# Patient Record
Sex: FEMALE | Race: WHITE | HISPANIC OR LATINO | Employment: UNEMPLOYED | URBAN - METROPOLITAN AREA
[De-identification: names, ages, dates, MRNs, and addresses within clinical notes are randomized per-mention and may not be internally consistent; named-entity substitution may affect disease eponyms.]

---

## 2017-10-04 ENCOUNTER — APPOINTMENT (OUTPATIENT)
Dept: WOMENS IMAGING | Facility: HOSPITAL | Age: 32
End: 2017-10-04

## 2017-10-04 PROCEDURE — 76641 ULTRASOUND BREAST COMPLETE: CPT | Performed by: RADIOLOGY

## 2017-10-04 PROCEDURE — 77066 DX MAMMO INCL CAD BI: CPT | Performed by: RADIOLOGY

## 2017-10-04 PROCEDURE — G0204 DX MAMMO INCL CAD BI: HCPCS | Performed by: RADIOLOGY

## 2018-04-30 LAB
EXTERNAL HEPATITIS B SURFACE ANTIGEN: NEGATIVE
EXTERNAL RUBELLA QUALITATIVE: NORMAL
EXTERNAL SYPHILIS RPR SCREEN: NORMAL
HIV1 P24 AG SERPL QL IA: NORMAL

## 2018-11-14 ENCOUNTER — HOSPITAL ENCOUNTER (OUTPATIENT)
Facility: HOSPITAL | Age: 33
Setting detail: SURGERY ADMIT
End: 2018-11-14
Attending: OBSTETRICS & GYNECOLOGY | Admitting: OBSTETRICS & GYNECOLOGY

## 2018-12-02 ENCOUNTER — HOSPITAL ENCOUNTER (OUTPATIENT)
Facility: HOSPITAL | Age: 33
Setting detail: OBSERVATION
Discharge: HOME | End: 2018-12-02
Attending: OBSTETRICS & GYNECOLOGY | Admitting: OBSTETRICS & GYNECOLOGY

## 2018-12-02 VITALS
BODY MASS INDEX: 26.23 KG/M2 | RESPIRATION RATE: 16 BRPM | HEIGHT: 60 IN | DIASTOLIC BLOOD PRESSURE: 56 MMHG | WEIGHT: 133.6 LBS | SYSTOLIC BLOOD PRESSURE: 104 MMHG | TEMPERATURE: 98.2 F | HEART RATE: 77 BPM

## 2018-12-02 PROBLEM — Z34.90 PREGNANCY: Status: ACTIVE | Noted: 2018-12-02

## 2018-12-02 LAB
EXPIRATION DATE: NORMAL
Lab: NORMAL
PROT UR STRIP-MCNC: NEGATIVE MG/DL

## 2018-12-02 PROCEDURE — 81002 URINALYSIS NONAUTO W/O SCOPE: CPT | Performed by: OBSTETRICS & GYNECOLOGY

## 2018-12-02 PROCEDURE — 59025 FETAL NON-STRESS TEST: CPT

## 2018-12-02 PROCEDURE — G0378 HOSPITAL OBSERVATION PER HR: HCPCS

## 2018-12-02 NOTE — NURSING NOTE
KS to Dr. Gutierrez, informed pt here at 37wks with decr DALTON. For rpt c/s 12/10. Will call MD back after tracing acquired.  Carlos Sagastume RN

## 2018-12-02 NOTE — NON STRESS TEST
Maribell Trinh, a  at 37w0d with an SUGEY of 2018, by Other Basis, was seen at [facility] DEL[facility] for a nonstress test.    Chief Complaint   Patient presents with   • Decreased Fetal Movement     since this am. Ate chocolate bar with brunch and still movt was not as much as normal. Denies ctx, LOF or bleeding.       Patient Active Problem List   Diagnosis   • Pregnancy       Start Time:   Stop Time:     Interpretation A  Nonstress Test Interpretation A: Reactive (18 1517 : Carlos Sagastume, RN)

## 2018-12-02 NOTE — NURSING NOTE
D/c papers given and explained. To call MD with any decr FM, vag bleeding, labor ctx or LOF. To keep next appt. Pt verbalized understanding. D/C home with .  Carlos Sagastume RN

## 2018-12-02 NOTE — NURSING NOTE
Tracing rev'd with MD. MILAD and reviewed. Pt not feeling any ctx. May d/c home, keep scheduled appt.  Carlos Sagastume RN

## 2018-12-10 ENCOUNTER — HOSPITAL ENCOUNTER (INPATIENT)
Facility: HOSPITAL | Age: 33
LOS: 3 days | Discharge: HOME | End: 2018-12-13
Attending: OBSTETRICS & GYNECOLOGY | Admitting: OBSTETRICS & GYNECOLOGY

## 2018-12-10 ENCOUNTER — ANESTHESIA (OUTPATIENT)
Dept: LABOR AND DELIVERY | Facility: HOSPITAL | Age: 33
End: 2018-12-10

## 2018-12-10 ENCOUNTER — ANESTHESIA EVENT (OUTPATIENT)
Dept: LABOR AND DELIVERY | Facility: HOSPITAL | Age: 33
End: 2018-12-10

## 2018-12-10 LAB
ABO GROUP BLD: NORMAL
ATMOSPHERIC PRESS: 757.3 MMHG
BASE EXCESS BLDCOA CALC-SCNC: -1.5 MMOL/L
BASOPHILS # BLD AUTO: 0.01 10*3/MM3 (ref 0–0.2)
BASOPHILS NFR BLD AUTO: 0.1 % (ref 0–1.5)
BDY SITE: ABNORMAL
BLD GP AB SCN SERPL QL: NEGATIVE
DEPRECATED RDW RBC AUTO: 46.2 FL (ref 37–54)
EOSINOPHIL # BLD AUTO: 0.08 10*3/MM3 (ref 0–0.7)
EOSINOPHIL NFR BLD AUTO: 0.9 % (ref 0.3–6.2)
ERYTHROCYTE [DISTWIDTH] IN BLOOD BY AUTOMATED COUNT: 14 % (ref 11.7–13)
GAS FLOW AIRWAY: 2 LPM
HCO3 BLDCOA-SCNC: 25.7 MMOL/L (ref 22–28)
HCT VFR BLD AUTO: 34.2 % (ref 35.6–45.5)
HGB BLD-MCNC: 11.8 G/DL (ref 11.9–15.5)
IMM GRANULOCYTES # BLD: 0.1 10*3/MM3 (ref 0–0.03)
IMM GRANULOCYTES NFR BLD: 1.2 % (ref 0–0.5)
LYMPHOCYTES # BLD AUTO: 2.03 10*3/MM3 (ref 0.9–4.8)
LYMPHOCYTES NFR BLD AUTO: 23.4 % (ref 19.6–45.3)
MCH RBC QN AUTO: 31.3 PG (ref 26.9–32)
MCHC RBC AUTO-ENTMCNC: 34.5 G/DL (ref 32.4–36.3)
MCV RBC AUTO: 90.7 FL (ref 80.5–98.2)
MODALITY: ABNORMAL
MONOCYTES # BLD AUTO: 0.75 10*3/MM3 (ref 0.2–1.2)
MONOCYTES NFR BLD AUTO: 8.7 % (ref 5–12)
NEUTROPHILS # BLD AUTO: 5.8 10*3/MM3 (ref 1.9–8.1)
NEUTROPHILS NFR BLD AUTO: 66.9 % (ref 42.7–76)
NOTE: ABNORMAL
PCO2 BLDCOA: 52.5 MMHG
PH BLDCOA: 7.3 PH UNITS (ref 7.18–7.34)
PLATELET # BLD AUTO: 126 10*3/MM3 (ref 140–500)
PMV BLD AUTO: 13.5 FL (ref 6–12)
PO2 BLDCOA: 17.1 MMHG (ref 12–26)
RBC # BLD AUTO: 3.77 10*6/MM3 (ref 3.9–5.2)
RH BLD: POSITIVE
SAO2 % BLDCOA: 20 % (ref 92–99)
T&S EXPIRATION DATE: NORMAL
WBC NRBC COR # BLD: 8.67 10*3/MM3 (ref 4.5–10.7)

## 2018-12-10 PROCEDURE — 25010000002 METHYLERGONOVINE MALEATE PER 0.2 MG: Performed by: OBSTETRICS & GYNECOLOGY

## 2018-12-10 PROCEDURE — 86850 RBC ANTIBODY SCREEN: CPT | Performed by: OBSTETRICS & GYNECOLOGY

## 2018-12-10 PROCEDURE — 86901 BLOOD TYPING SEROLOGIC RH(D): CPT | Performed by: OBSTETRICS & GYNECOLOGY

## 2018-12-10 PROCEDURE — 25010000002 HYDROMORPHONE PER 4 MG: Performed by: ANESTHESIOLOGY

## 2018-12-10 PROCEDURE — 85025 COMPLETE CBC W/AUTO DIFF WBC: CPT | Performed by: OBSTETRICS & GYNECOLOGY

## 2018-12-10 PROCEDURE — 82803 BLOOD GASES ANY COMBINATION: CPT

## 2018-12-10 PROCEDURE — 25010000002 ONDANSETRON PER 1 MG: Performed by: NURSE ANESTHETIST, CERTIFIED REGISTERED

## 2018-12-10 PROCEDURE — 25010000002 PROMETHAZINE PER 50 MG: Performed by: ANESTHESIOLOGY

## 2018-12-10 PROCEDURE — 25010000002 PHENYLEPHRINE PER 1 ML: Performed by: NURSE ANESTHETIST, CERTIFIED REGISTERED

## 2018-12-10 PROCEDURE — 86900 BLOOD TYPING SEROLOGIC ABO: CPT | Performed by: OBSTETRICS & GYNECOLOGY

## 2018-12-10 PROCEDURE — 25010000003 CEFAZOLIN IN DEXTROSE 2-4 GM/100ML-% SOLUTION: Performed by: OBSTETRICS & GYNECOLOGY

## 2018-12-10 PROCEDURE — 25010000002 MORPHINE PER 10 MG: Performed by: ANESTHESIOLOGY

## 2018-12-10 RX ORDER — MORPHINE SULFATE 1 MG/ML
INJECTION, SOLUTION EPIDURAL; INTRATHECAL; INTRAVENOUS
Status: COMPLETED | OUTPATIENT
Start: 2018-12-10 | End: 2018-12-10

## 2018-12-10 RX ORDER — CEFAZOLIN SODIUM 2 G/100ML
2 INJECTION, SOLUTION INTRAVENOUS ONCE
Status: COMPLETED | OUTPATIENT
Start: 2018-12-10 | End: 2018-12-10

## 2018-12-10 RX ORDER — FAMOTIDINE 10 MG/ML
20 INJECTION, SOLUTION INTRAVENOUS ONCE AS NEEDED
Status: CANCELLED | OUTPATIENT
Start: 2018-12-10

## 2018-12-10 RX ORDER — EPHEDRINE SULFATE 50 MG/ML
INJECTION, SOLUTION INTRAVENOUS AS NEEDED
Status: DISCONTINUED | OUTPATIENT
Start: 2018-12-10 | End: 2018-12-10 | Stop reason: SURG

## 2018-12-10 RX ORDER — IBUPROFEN 600 MG/1
600 TABLET ORAL EVERY 8 HOURS PRN
Status: DISCONTINUED | OUTPATIENT
Start: 2018-12-10 | End: 2018-12-13 | Stop reason: HOSPADM

## 2018-12-10 RX ORDER — NALOXONE HCL 0.4 MG/ML
0.2 VIAL (ML) INJECTION
Status: DISCONTINUED | OUTPATIENT
Start: 2018-12-10 | End: 2018-12-13 | Stop reason: HOSPADM

## 2018-12-10 RX ORDER — METHYLERGONOVINE MALEATE 0.2 MG/ML
200 INJECTION INTRAVENOUS ONCE AS NEEDED
Status: COMPLETED | OUTPATIENT
Start: 2018-12-10 | End: 2018-12-10

## 2018-12-10 RX ORDER — ONDANSETRON 4 MG/1
4 TABLET, FILM COATED ORAL EVERY 6 HOURS PRN
Status: DISCONTINUED | OUTPATIENT
Start: 2018-12-10 | End: 2018-12-10 | Stop reason: HOSPADM

## 2018-12-10 RX ORDER — PHYTONADIONE 2 MG/ML
INJECTION, EMULSION INTRAMUSCULAR; INTRAVENOUS; SUBCUTANEOUS
Status: DISPENSED
Start: 2018-12-10 | End: 2018-12-11

## 2018-12-10 RX ORDER — SIMETHICONE 80 MG
80 TABLET,CHEWABLE ORAL 4 TIMES DAILY PRN
Status: DISCONTINUED | OUTPATIENT
Start: 2018-12-10 | End: 2018-12-13 | Stop reason: HOSPADM

## 2018-12-10 RX ORDER — ERYTHROMYCIN 5 MG/G
OINTMENT OPHTHALMIC
Status: DISPENSED
Start: 2018-12-10 | End: 2018-12-11

## 2018-12-10 RX ORDER — OXYTOCIN-SODIUM CHLORIDE 0.9% IV SOLN 30 UNIT/500ML 30-0.9/5 UT/ML-%
125 SOLUTION INTRAVENOUS CONTINUOUS PRN
Status: COMPLETED | OUTPATIENT
Start: 2018-12-10 | End: 2018-12-10

## 2018-12-10 RX ORDER — SODIUM CHLORIDE, SODIUM LACTATE, POTASSIUM CHLORIDE, CALCIUM CHLORIDE 600; 310; 30; 20 MG/100ML; MG/100ML; MG/100ML; MG/100ML
125 INJECTION, SOLUTION INTRAVENOUS CONTINUOUS
Status: DISCONTINUED | OUTPATIENT
Start: 2018-12-10 | End: 2018-12-10

## 2018-12-10 RX ORDER — BUPIVACAINE HYDROCHLORIDE 7.5 MG/ML
INJECTION, SOLUTION EPIDURAL; RETROBULBAR
Status: COMPLETED | OUTPATIENT
Start: 2018-12-10 | End: 2018-12-10

## 2018-12-10 RX ORDER — BUPIVACAINE HYDROCHLORIDE 7.5 MG/ML
INJECTION, SOLUTION INTRASPINAL
Status: DISPENSED
Start: 2018-12-10 | End: 2018-12-10

## 2018-12-10 RX ORDER — ACETAMINOPHEN 500 MG
1000 TABLET ORAL ONCE
Status: CANCELLED | OUTPATIENT
Start: 2018-12-10 | End: 2018-12-10

## 2018-12-10 RX ORDER — OXYCODONE AND ACETAMINOPHEN 7.5; 325 MG/1; MG/1
1 TABLET ORAL EVERY 4 HOURS PRN
Status: DISCONTINUED | OUTPATIENT
Start: 2018-12-10 | End: 2018-12-13 | Stop reason: HOSPADM

## 2018-12-10 RX ORDER — DOCUSATE SODIUM 100 MG/1
100 CAPSULE, LIQUID FILLED ORAL 2 TIMES DAILY PRN
Status: DISCONTINUED | OUTPATIENT
Start: 2018-12-10 | End: 2018-12-13 | Stop reason: HOSPADM

## 2018-12-10 RX ORDER — MORPHINE SULFATE 1 MG/ML
INJECTION, SOLUTION EPIDURAL; INTRATHECAL; INTRAVENOUS
Status: COMPLETED
Start: 2018-12-10 | End: 2018-12-10

## 2018-12-10 RX ORDER — ONDANSETRON 2 MG/ML
INJECTION INTRAMUSCULAR; INTRAVENOUS AS NEEDED
Status: DISCONTINUED | OUTPATIENT
Start: 2018-12-10 | End: 2018-12-10 | Stop reason: SURG

## 2018-12-10 RX ORDER — ONDANSETRON 4 MG/1
4 TABLET, ORALLY DISINTEGRATING ORAL EVERY 6 HOURS PRN
Status: DISCONTINUED | OUTPATIENT
Start: 2018-12-10 | End: 2018-12-10 | Stop reason: HOSPADM

## 2018-12-10 RX ORDER — OXYTOCIN-SODIUM CHLORIDE 0.9% IV SOLN 30 UNIT/500ML 30-0.9/5 UT/ML-%
250 SOLUTION INTRAVENOUS CONTINUOUS
Status: DISPENSED | OUTPATIENT
Start: 2018-12-10 | End: 2018-12-10

## 2018-12-10 RX ORDER — ONDANSETRON 2 MG/ML
4 INJECTION INTRAMUSCULAR; INTRAVENOUS ONCE AS NEEDED
Status: DISCONTINUED | OUTPATIENT
Start: 2018-12-10 | End: 2018-12-13 | Stop reason: HOSPADM

## 2018-12-10 RX ORDER — ONDANSETRON 4 MG/1
4 TABLET, FILM COATED ORAL EVERY 8 HOURS PRN
Status: DISCONTINUED | OUTPATIENT
Start: 2018-12-10 | End: 2018-12-13 | Stop reason: HOSPADM

## 2018-12-10 RX ORDER — PROMETHAZINE HYDROCHLORIDE 25 MG/ML
5 INJECTION, SOLUTION INTRAMUSCULAR; INTRAVENOUS EVERY 6 HOURS PRN
Status: DISCONTINUED | OUTPATIENT
Start: 2018-12-10 | End: 2018-12-13 | Stop reason: HOSPADM

## 2018-12-10 RX ORDER — SODIUM CHLORIDE 0.9 % (FLUSH) 0.9 %
3 SYRINGE (ML) INJECTION EVERY 12 HOURS SCHEDULED
Status: DISCONTINUED | OUTPATIENT
Start: 2018-12-10 | End: 2018-12-10 | Stop reason: HOSPADM

## 2018-12-10 RX ORDER — OXYTOCIN-SODIUM CHLORIDE 0.9% IV SOLN 30 UNIT/500ML 30-0.9/5 UT/ML-%
999 SOLUTION INTRAVENOUS ONCE
Status: COMPLETED | OUTPATIENT
Start: 2018-12-10 | End: 2018-12-10

## 2018-12-10 RX ORDER — CARBOPROST TROMETHAMINE 250 UG/ML
250 INJECTION, SOLUTION INTRAMUSCULAR AS NEEDED
Status: DISCONTINUED | OUTPATIENT
Start: 2018-12-10 | End: 2018-12-10 | Stop reason: HOSPADM

## 2018-12-10 RX ORDER — ONDANSETRON 2 MG/ML
4 INJECTION INTRAMUSCULAR; INTRAVENOUS ONCE AS NEEDED
Status: CANCELLED | OUTPATIENT
Start: 2018-12-10

## 2018-12-10 RX ORDER — HYDROMORPHONE HYDROCHLORIDE 1 MG/ML
0.5 INJECTION, SOLUTION INTRAMUSCULAR; INTRAVENOUS; SUBCUTANEOUS
Status: DISPENSED | OUTPATIENT
Start: 2018-12-10 | End: 2018-12-11

## 2018-12-10 RX ORDER — ONDANSETRON 2 MG/ML
4 INJECTION INTRAMUSCULAR; INTRAVENOUS EVERY 6 HOURS PRN
Status: DISCONTINUED | OUTPATIENT
Start: 2018-12-10 | End: 2018-12-10 | Stop reason: HOSPADM

## 2018-12-10 RX ORDER — ACETAMINOPHEN 500 MG
1000 TABLET ORAL EVERY 4 HOURS PRN
Status: DISCONTINUED | OUTPATIENT
Start: 2018-12-10 | End: 2018-12-10 | Stop reason: HOSPADM

## 2018-12-10 RX ORDER — OXYCODONE HYDROCHLORIDE AND ACETAMINOPHEN 5; 325 MG/1; MG/1
1 TABLET ORAL EVERY 4 HOURS PRN
Status: DISCONTINUED | OUTPATIENT
Start: 2018-12-10 | End: 2018-12-13 | Stop reason: HOSPADM

## 2018-12-10 RX ORDER — LIDOCAINE HYDROCHLORIDE 10 MG/ML
5 INJECTION, SOLUTION EPIDURAL; INFILTRATION; INTRACAUDAL; PERINEURAL AS NEEDED
Status: DISCONTINUED | OUTPATIENT
Start: 2018-12-10 | End: 2018-12-10 | Stop reason: HOSPADM

## 2018-12-10 RX ORDER — MISOPROSTOL 200 UG/1
800 TABLET ORAL AS NEEDED
Status: DISCONTINUED | OUTPATIENT
Start: 2018-12-10 | End: 2018-12-10 | Stop reason: HOSPADM

## 2018-12-10 RX ORDER — FAMOTIDINE 10 MG/ML
20 INJECTION, SOLUTION INTRAVENOUS EVERY 12 HOURS SCHEDULED
Status: DISCONTINUED | OUTPATIENT
Start: 2018-12-10 | End: 2018-12-10 | Stop reason: HOSPADM

## 2018-12-10 RX ORDER — SODIUM CHLORIDE 0.9 % (FLUSH) 0.9 %
3-10 SYRINGE (ML) INJECTION AS NEEDED
Status: DISCONTINUED | OUTPATIENT
Start: 2018-12-10 | End: 2018-12-10 | Stop reason: HOSPADM

## 2018-12-10 RX ORDER — FAMOTIDINE 20 MG/1
20 TABLET, FILM COATED ORAL EVERY 12 HOURS SCHEDULED
Status: DISCONTINUED | OUTPATIENT
Start: 2018-12-10 | End: 2018-12-10 | Stop reason: HOSPADM

## 2018-12-10 RX ORDER — ACETAMINOPHEN 325 MG/1
650 TABLET ORAL EVERY 4 HOURS PRN
Status: DISCONTINUED | OUTPATIENT
Start: 2018-12-10 | End: 2018-12-13 | Stop reason: HOSPADM

## 2018-12-10 RX ADMIN — HYDROMORPHONE HYDROCHLORIDE 0.5 MG: 1 INJECTION, SOLUTION INTRAMUSCULAR; INTRAVENOUS; SUBCUTANEOUS at 16:04

## 2018-12-10 RX ADMIN — MORPHINE SULFATE 0.3 MCG: 1 INJECTION EPIDURAL; INTRATHECAL; INTRAVENOUS at 14:20

## 2018-12-10 RX ADMIN — SODIUM CHLORIDE, POTASSIUM CHLORIDE, SODIUM LACTATE AND CALCIUM CHLORIDE 125 ML/HR: 600; 310; 30; 20 INJECTION, SOLUTION INTRAVENOUS at 08:55

## 2018-12-10 RX ADMIN — ONDANSETRON 4 MG: 2 INJECTION INTRAMUSCULAR; INTRAVENOUS at 14:52

## 2018-12-10 RX ADMIN — FAMOTIDINE 20 MG: 10 INJECTION, SOLUTION INTRAVENOUS at 09:41

## 2018-12-10 RX ADMIN — EPHEDRINE SULFATE 5 MG: 50 INJECTION INTRAMUSCULAR; INTRAVENOUS; SUBCUTANEOUS at 14:34

## 2018-12-10 RX ADMIN — OXYTOCIN 125 ML/HR: 10 INJECTION, SOLUTION INTRAMUSCULAR; INTRAVENOUS at 16:28

## 2018-12-10 RX ADMIN — PHENYLEPHRINE HYDROCHLORIDE 100 MCG: 10 INJECTION INTRAVENOUS at 14:50

## 2018-12-10 RX ADMIN — HYDROMORPHONE HYDROCHLORIDE 0.5 MG: 1 INJECTION, SOLUTION INTRAMUSCULAR; INTRAVENOUS; SUBCUTANEOUS at 17:29

## 2018-12-10 RX ADMIN — SODIUM CHLORIDE, POTASSIUM CHLORIDE, SODIUM LACTATE AND CALCIUM CHLORIDE: 600; 310; 30; 20 INJECTION, SOLUTION INTRAVENOUS at 14:40

## 2018-12-10 RX ADMIN — CEFAZOLIN SODIUM 2 G: 2 INJECTION, SOLUTION INTRAVENOUS at 14:02

## 2018-12-10 RX ADMIN — OXYTOCIN 999 ML/HR: 10 INJECTION INTRAVENOUS at 14:36

## 2018-12-10 RX ADMIN — ACETAMINOPHEN 1000 MG: 500 TABLET, FILM COATED ORAL at 09:39

## 2018-12-10 RX ADMIN — SODIUM CHLORIDE, POTASSIUM CHLORIDE, SODIUM LACTATE AND CALCIUM CHLORIDE 1000 ML: 600; 310; 30; 20 INJECTION, SOLUTION INTRAVENOUS at 07:40

## 2018-12-10 RX ADMIN — PROMETHAZINE HYDROCHLORIDE 5 MG: 25 INJECTION INTRAMUSCULAR; INTRAVENOUS at 17:04

## 2018-12-10 RX ADMIN — BUPIVACAINE HYDROCHLORIDE 1.5 ML: 7.5 INJECTION, SOLUTION EPIDURAL; RETROBULBAR at 14:20

## 2018-12-10 RX ADMIN — IBUPROFEN 600 MG: 600 TABLET ORAL at 21:32

## 2018-12-10 RX ADMIN — SODIUM CHLORIDE, POTASSIUM CHLORIDE, SODIUM LACTATE AND CALCIUM CHLORIDE: 600; 310; 30; 20 INJECTION, SOLUTION INTRAVENOUS at 14:10

## 2018-12-10 RX ADMIN — METHYLERGONOVINE MALEATE 200 MCG: 0.2 INJECTION, SOLUTION INTRAMUSCULAR; INTRAVENOUS at 14:42

## 2018-12-10 NOTE — ANESTHESIA PREPROCEDURE EVALUATION
Anesthesia Evaluation     Patient summary reviewed and Nursing notes reviewed   NPO Solid Status: > 8 hours  NPO Liquid Status: > 8 hours           Airway   Mallampati: II  Dental      Pulmonary - negative pulmonary ROS and normal exam    breath sounds clear to auscultation  Cardiovascular - negative cardio ROS and normal exam        Neuro/Psych- negative ROS  GI/Hepatic/Renal/Endo - negative ROS     Musculoskeletal (-) negative ROS    Abdominal    Substance History - negative use     OB/GYN          Other - negative ROS                     Anesthesia Plan    ASA 2     spinal     Anesthetic plan, all risks, benefits, and alternatives have been provided, discussed and informed consent has been obtained with: patient and spouse/significant other.

## 2018-12-10 NOTE — ANESTHESIA POSTPROCEDURE EVALUATION
"Patient: Maribell Trinh    Procedure Summary     Date:  12/10/18 Room / Location:   [facility]   [facility]    Anesthesia Start:  1410 Anesthesia Stop:      Procedure:   SECTION REPEAT (N/A Abdomen) Diagnosis:      Surgeon:  Heidy Gutierrez MD Provider:  Rashid Jamison MD    Anesthesia Type:  spinal ASA Status:  2          Anesthesia Type: spinal  Last vitals  BP   104/66 (12/10/18 1031)   Temp   36.7 °C (98.1 °F) (12/10/18 0807)   Pulse   67 (12/10/18 1031)   Resp   16 (12/10/18 0807)     SpO2   98 % (12/10/18 0750)     Post Anesthesia Care and Evaluation    Patient location during evaluation: bedside  Patient participation: complete - patient participated  Level of consciousness: awake  Pain score: 1  Pain management: adequate  Airway patency: patent  Anesthetic complications: No anesthetic complications    Cardiovascular status: acceptable  Respiratory status: acceptable  Hydration status: acceptable    Comments: /66   Pulse 67   Temp 36.7 °C (98.1 °F) (Oral)   Resp 16   Ht 157.5 cm (62\")   Wt 60.5 kg (133 lb 4.8 oz)   SpO2 98%   Breastfeeding? Yes   BMI 24.38 kg/m²         "

## 2018-12-10 NOTE — ADDENDUM NOTE
Addendum  created 12/10/18 4168 by Rashid Jamison MD    Alternative orders not taken and original order placed, Order list changed, Order sets accessed

## 2018-12-10 NOTE — ANESTHESIA PROCEDURE NOTES
Spinal Block      Patient location during procedure: OB  Indication:at surgeon's request  Performed By  Anesthesiologist: Rashid Jamison MD  Preanesthetic Checklist  Completed: patient identified, site marked, surgical consent, pre-op evaluation, timeout performed, IV checked, risks and benefits discussed and monitors and equipment checked  Spinal Block Prep:  Patient Position:sitting  Sterile Tech:cap, gloves, mask and sterile barriers  Prep:Chloraprep  Patient Monitoring:blood pressure monitoring, continuous pulse oximetry and EKG  Spinal Block Procedure  Approach:midline  Guidance:landmark technique and palpation technique  Location:L3-L4  Needle Type:Sprotte  Needle Gauge:24 G  Placement of Spinal needle event:cerebrospinal fluid aspirated  Paresthesia: no  Fluid Appearance:clear  Medications: bupivacaine PF (MARCAINE) 0.75 % injection, 1.5 mL  Morphine PF injection, 0.3 mcg  Med Administered at 12/10/2018 2:20 PM   Post Assessment  Patient Tolerance:patient tolerated the procedure well with no apparent complications  Complications no

## 2018-12-11 PROBLEM — D64.9 ANEMIA: Status: ACTIVE | Noted: 2018-12-11

## 2018-12-11 PROBLEM — Z34.90 PREGNANCY: Status: RESOLVED | Noted: 2018-12-02 | Resolved: 2018-12-11

## 2018-12-11 LAB
BASOPHILS # BLD AUTO: 0.01 10*3/MM3 (ref 0–0.2)
BASOPHILS NFR BLD AUTO: 0.1 % (ref 0–1.5)
DEPRECATED RDW RBC AUTO: 46.6 FL (ref 37–54)
EOSINOPHIL # BLD AUTO: 0.07 10*3/MM3 (ref 0–0.7)
EOSINOPHIL NFR BLD AUTO: 0.7 % (ref 0.3–6.2)
ERYTHROCYTE [DISTWIDTH] IN BLOOD BY AUTOMATED COUNT: 13.9 % (ref 11.7–13)
HCT VFR BLD AUTO: 30.2 % (ref 35.6–45.5)
HGB BLD-MCNC: 10.2 G/DL (ref 11.9–15.5)
IMM GRANULOCYTES # BLD: 0.08 10*3/MM3 (ref 0–0.03)
IMM GRANULOCYTES NFR BLD: 0.8 % (ref 0–0.5)
LYMPHOCYTES # BLD AUTO: 1.63 10*3/MM3 (ref 0.9–4.8)
LYMPHOCYTES NFR BLD AUTO: 17.2 % (ref 19.6–45.3)
MCH RBC QN AUTO: 30.9 PG (ref 26.9–32)
MCHC RBC AUTO-ENTMCNC: 33.8 G/DL (ref 32.4–36.3)
MCV RBC AUTO: 91.5 FL (ref 80.5–98.2)
MONOCYTES # BLD AUTO: 0.68 10*3/MM3 (ref 0.2–1.2)
MONOCYTES NFR BLD AUTO: 7.2 % (ref 5–12)
NEUTROPHILS # BLD AUTO: 7.1 10*3/MM3 (ref 1.9–8.1)
NEUTROPHILS NFR BLD AUTO: 74.8 % (ref 42.7–76)
PLATELET # BLD AUTO: 127 10*3/MM3 (ref 140–500)
PMV BLD AUTO: 13.5 FL (ref 6–12)
RBC # BLD AUTO: 3.3 10*6/MM3 (ref 3.9–5.2)
WBC NRBC COR # BLD: 9.49 10*3/MM3 (ref 4.5–10.7)

## 2018-12-11 PROCEDURE — 85025 COMPLETE CBC W/AUTO DIFF WBC: CPT | Performed by: OBSTETRICS & GYNECOLOGY

## 2018-12-11 RX ADMIN — OXYCODONE AND ACETAMINOPHEN 1 TABLET: 5; 325 TABLET ORAL at 11:40

## 2018-12-11 RX ADMIN — IBUPROFEN 600 MG: 600 TABLET ORAL at 11:40

## 2018-12-11 RX ADMIN — SIMETHICONE CHEW TAB 80 MG 80 MG: 80 TABLET ORAL at 20:33

## 2018-12-11 RX ADMIN — IBUPROFEN 600 MG: 600 TABLET ORAL at 20:33

## 2018-12-11 RX ADMIN — DOCUSATE SODIUM 100 MG: 100 CAPSULE, LIQUID FILLED ORAL at 11:40

## 2018-12-11 RX ADMIN — OXYCODONE AND ACETAMINOPHEN 1 TABLET: 5; 325 TABLET ORAL at 20:33

## 2018-12-11 RX ADMIN — OXYCODONE AND ACETAMINOPHEN 1 TABLET: 5; 325 TABLET ORAL at 02:10

## 2018-12-12 RX ADMIN — OXYCODONE AND ACETAMINOPHEN 1 TABLET: 5; 325 TABLET ORAL at 18:43

## 2018-12-12 RX ADMIN — DOCUSATE SODIUM 100 MG: 100 CAPSULE, LIQUID FILLED ORAL at 10:53

## 2018-12-12 RX ADMIN — DOCUSATE SODIUM 100 MG: 100 CAPSULE, LIQUID FILLED ORAL at 00:47

## 2018-12-12 RX ADMIN — DOCUSATE SODIUM 100 MG: 100 CAPSULE, LIQUID FILLED ORAL at 23:02

## 2018-12-12 RX ADMIN — OXYCODONE AND ACETAMINOPHEN 1 TABLET: 5; 325 TABLET ORAL at 10:53

## 2018-12-12 RX ADMIN — OXYCODONE AND ACETAMINOPHEN 1 TABLET: 5; 325 TABLET ORAL at 23:02

## 2018-12-12 RX ADMIN — IBUPROFEN 600 MG: 600 TABLET ORAL at 18:43

## 2018-12-12 RX ADMIN — SIMETHICONE CHEW TAB 80 MG 80 MG: 80 TABLET ORAL at 13:45

## 2018-12-13 VITALS
HEIGHT: 62 IN | WEIGHT: 133.3 LBS | TEMPERATURE: 98 F | DIASTOLIC BLOOD PRESSURE: 70 MMHG | HEART RATE: 75 BPM | SYSTOLIC BLOOD PRESSURE: 106 MMHG | BODY MASS INDEX: 24.53 KG/M2 | OXYGEN SATURATION: 98 % | RESPIRATION RATE: 16 BRPM

## 2018-12-13 RX ORDER — IBUPROFEN 600 MG/1
600 TABLET ORAL EVERY 8 HOURS PRN
Qty: 30 TABLET | Refills: 3 | Status: SHIPPED | OUTPATIENT
Start: 2018-12-13 | End: 2021-06-25

## 2018-12-13 RX ORDER — OXYCODONE HYDROCHLORIDE AND ACETAMINOPHEN 5; 325 MG/1; MG/1
2 TABLET ORAL EVERY 4 HOURS PRN
Qty: 24 TABLET | Refills: 0 | Status: SHIPPED | OUTPATIENT
Start: 2018-12-13 | End: 2018-12-16

## 2018-12-13 RX ADMIN — OXYCODONE AND ACETAMINOPHEN 1 TABLET: 5; 325 TABLET ORAL at 09:27

## 2018-12-13 RX ADMIN — DOCUSATE SODIUM 100 MG: 100 CAPSULE, LIQUID FILLED ORAL at 09:27

## 2018-12-13 RX ADMIN — IBUPROFEN 600 MG: 600 TABLET ORAL at 09:26

## 2018-12-13 RX ADMIN — SIMETHICONE CHEW TAB 80 MG 80 MG: 80 TABLET ORAL at 09:27

## 2021-06-16 ENCOUNTER — TRANSCRIBE ORDERS (OUTPATIENT)
Dept: SLEEP MEDICINE | Facility: HOSPITAL | Age: 36
End: 2021-06-16

## 2021-06-16 DIAGNOSIS — Z01.818 OTHER SPECIFIED PRE-OPERATIVE EXAMINATION: Primary | ICD-10-CM

## 2021-06-25 ENCOUNTER — APPOINTMENT (OUTPATIENT)
Dept: LAB | Facility: HOSPITAL | Age: 36
End: 2021-06-25

## 2021-06-25 ENCOUNTER — PRE-ADMISSION TESTING (OUTPATIENT)
Dept: PREADMISSION TESTING | Facility: HOSPITAL | Age: 36
End: 2021-06-25

## 2021-06-25 VITALS
WEIGHT: 124.7 LBS | RESPIRATION RATE: 18 BRPM | HEIGHT: 64 IN | HEART RATE: 67 BPM | TEMPERATURE: 98 F | DIASTOLIC BLOOD PRESSURE: 65 MMHG | OXYGEN SATURATION: 100 % | BODY MASS INDEX: 21.29 KG/M2 | SYSTOLIC BLOOD PRESSURE: 99 MMHG

## 2021-06-25 LAB
BASOPHILS # BLD AUTO: 0.03 10*3/MM3 (ref 0–0.2)
BASOPHILS NFR BLD AUTO: 0.6 % (ref 0–1.5)
DEPRECATED RDW RBC AUTO: 41.1 FL (ref 37–54)
EOSINOPHIL # BLD AUTO: 0.2 10*3/MM3 (ref 0–0.4)
EOSINOPHIL NFR BLD AUTO: 3.7 % (ref 0.3–6.2)
ERYTHROCYTE [DISTWIDTH] IN BLOOD BY AUTOMATED COUNT: 12.5 % (ref 12.3–15.4)
HCG SERPL QL: NEGATIVE
HCT VFR BLD AUTO: 39.3 % (ref 34–46.6)
HGB BLD-MCNC: 13.1 G/DL (ref 12–15.9)
IMM GRANULOCYTES # BLD AUTO: 0.03 10*3/MM3 (ref 0–0.05)
IMM GRANULOCYTES NFR BLD AUTO: 0.6 % (ref 0–0.5)
LYMPHOCYTES # BLD AUTO: 1.71 10*3/MM3 (ref 0.7–3.1)
LYMPHOCYTES NFR BLD AUTO: 31.5 % (ref 19.6–45.3)
MCH RBC QN AUTO: 29.8 PG (ref 26.6–33)
MCHC RBC AUTO-ENTMCNC: 33.3 G/DL (ref 31.5–35.7)
MCV RBC AUTO: 89.5 FL (ref 79–97)
MONOCYTES # BLD AUTO: 0.39 10*3/MM3 (ref 0.1–0.9)
MONOCYTES NFR BLD AUTO: 7.2 % (ref 5–12)
NEUTROPHILS NFR BLD AUTO: 3.07 10*3/MM3 (ref 1.7–7)
NEUTROPHILS NFR BLD AUTO: 56.4 % (ref 42.7–76)
NRBC BLD AUTO-RTO: 0 /100 WBC (ref 0–0.2)
PLATELET # BLD AUTO: 212 10*3/MM3 (ref 140–450)
PMV BLD AUTO: 11.2 FL (ref 6–12)
RBC # BLD AUTO: 4.39 10*6/MM3 (ref 3.77–5.28)
SARS-COV-2 ORF1AB RESP QL NAA+PROBE: NOT DETECTED
WBC # BLD AUTO: 5.43 10*3/MM3 (ref 3.4–10.8)

## 2021-06-25 PROCEDURE — 84703 CHORIONIC GONADOTROPIN ASSAY: CPT

## 2021-06-25 PROCEDURE — 36415 COLL VENOUS BLD VENIPUNCTURE: CPT

## 2021-06-25 PROCEDURE — 85025 COMPLETE CBC W/AUTO DIFF WBC: CPT

## 2021-06-25 PROCEDURE — C9803 HOPD COVID-19 SPEC COLLECT: HCPCS | Performed by: NURSE PRACTITIONER

## 2021-06-25 PROCEDURE — U0004 COV-19 TEST NON-CDC HGH THRU: HCPCS | Performed by: NURSE PRACTITIONER

## 2021-06-25 RX ORDER — ACETAMINOPHEN 500 MG
500 TABLET ORAL EVERY 6 HOURS PRN
COMMUNITY
End: 2022-01-17

## 2021-06-25 RX ORDER — CETIRIZINE HYDROCHLORIDE 10 MG/1
10 TABLET ORAL DAILY
COMMUNITY
End: 2022-01-17

## 2021-06-28 ENCOUNTER — ANESTHESIA EVENT (OUTPATIENT)
Dept: PERIOP | Facility: HOSPITAL | Age: 36
End: 2021-06-28

## 2021-06-28 ENCOUNTER — ANESTHESIA (OUTPATIENT)
Dept: PERIOP | Facility: HOSPITAL | Age: 36
End: 2021-06-28

## 2021-06-28 ENCOUNTER — HOSPITAL ENCOUNTER (OUTPATIENT)
Facility: HOSPITAL | Age: 36
Setting detail: HOSPITAL OUTPATIENT SURGERY
Discharge: HOME | End: 2021-06-28
Attending: OBSTETRICS & GYNECOLOGY | Admitting: OBSTETRICS & GYNECOLOGY

## 2021-06-28 VITALS
RESPIRATION RATE: 18 BRPM | OXYGEN SATURATION: 100 % | SYSTOLIC BLOOD PRESSURE: 114 MMHG | HEART RATE: 60 BPM | TEMPERATURE: 97 F | DIASTOLIC BLOOD PRESSURE: 78 MMHG

## 2021-06-28 DIAGNOSIS — R10.2 PELVIC PAIN: ICD-10-CM

## 2021-06-28 PROCEDURE — 25010000002 PROPOFOL 10 MG/ML EMULSION: Performed by: REGISTERED NURSE

## 2021-06-28 PROCEDURE — 25010000002 DEXAMETHASONE PER 1 MG: Performed by: REGISTERED NURSE

## 2021-06-28 PROCEDURE — 87070 CULTURE OTHR SPECIMN AEROBIC: CPT | Performed by: OBSTETRICS & GYNECOLOGY

## 2021-06-28 PROCEDURE — 87186 SC STD MICRODIL/AGAR DIL: CPT | Performed by: OBSTETRICS & GYNECOLOGY

## 2021-06-28 PROCEDURE — 25010000002 FENTANYL CITRATE (PF) 50 MCG/ML SOLUTION: Performed by: REGISTERED NURSE

## 2021-06-28 PROCEDURE — 88305 TISSUE EXAM BY PATHOLOGIST: CPT | Performed by: OBSTETRICS & GYNECOLOGY

## 2021-06-28 PROCEDURE — 25010000002 ONDANSETRON PER 1 MG: Performed by: REGISTERED NURSE

## 2021-06-28 RX ORDER — DOXYCYCLINE HYCLATE 100 MG/1
100 TABLET, DELAYED RELEASE ORAL 2 TIMES DAILY
Qty: 28 TABLET | Refills: 0 | Status: SHIPPED | OUTPATIENT
Start: 2021-06-28 | End: 2021-07-12

## 2021-06-28 RX ORDER — LABETALOL HYDROCHLORIDE 5 MG/ML
5 INJECTION, SOLUTION INTRAVENOUS
Status: DISCONTINUED | OUTPATIENT
Start: 2021-06-28 | End: 2021-06-28 | Stop reason: HOSPADM

## 2021-06-28 RX ORDER — SODIUM CHLORIDE 0.9 % (FLUSH) 0.9 %
3 SYRINGE (ML) INJECTION EVERY 12 HOURS SCHEDULED
Status: DISCONTINUED | OUTPATIENT
Start: 2021-06-28 | End: 2021-06-28 | Stop reason: HOSPADM

## 2021-06-28 RX ORDER — HYDRALAZINE HYDROCHLORIDE 20 MG/ML
5 INJECTION INTRAMUSCULAR; INTRAVENOUS
Status: DISCONTINUED | OUTPATIENT
Start: 2021-06-28 | End: 2021-06-28 | Stop reason: HOSPADM

## 2021-06-28 RX ORDER — FENTANYL CITRATE 50 UG/ML
INJECTION, SOLUTION INTRAMUSCULAR; INTRAVENOUS AS NEEDED
Status: DISCONTINUED | OUTPATIENT
Start: 2021-06-28 | End: 2021-06-28 | Stop reason: SURG

## 2021-06-28 RX ORDER — OXYCODONE AND ACETAMINOPHEN 10; 325 MG/1; MG/1
1 TABLET ORAL EVERY 4 HOURS PRN
Status: DISCONTINUED | OUTPATIENT
Start: 2021-06-28 | End: 2021-06-28 | Stop reason: HOSPADM

## 2021-06-28 RX ORDER — FENTANYL CITRATE 50 UG/ML
50 INJECTION, SOLUTION INTRAMUSCULAR; INTRAVENOUS
Status: DISCONTINUED | OUTPATIENT
Start: 2021-06-28 | End: 2021-06-28 | Stop reason: HOSPADM

## 2021-06-28 RX ORDER — ONDANSETRON 2 MG/ML
4 INJECTION INTRAMUSCULAR; INTRAVENOUS ONCE AS NEEDED
Status: DISCONTINUED | OUTPATIENT
Start: 2021-06-28 | End: 2021-06-28 | Stop reason: HOSPADM

## 2021-06-28 RX ORDER — HYDROCODONE BITARTRATE AND ACETAMINOPHEN 7.5; 325 MG/1; MG/1
1 TABLET ORAL ONCE AS NEEDED
Status: DISCONTINUED | OUTPATIENT
Start: 2021-06-28 | End: 2021-06-28 | Stop reason: HOSPADM

## 2021-06-28 RX ORDER — MIDAZOLAM HYDROCHLORIDE 1 MG/ML
1 INJECTION INTRAMUSCULAR; INTRAVENOUS
Status: DISCONTINUED | OUTPATIENT
Start: 2021-06-28 | End: 2021-06-28 | Stop reason: HOSPADM

## 2021-06-28 RX ORDER — DIPHENHYDRAMINE HCL 25 MG
25 CAPSULE ORAL
Status: DISCONTINUED | OUTPATIENT
Start: 2021-06-28 | End: 2021-06-28 | Stop reason: HOSPADM

## 2021-06-28 RX ORDER — LIDOCAINE HYDROCHLORIDE 20 MG/ML
INJECTION, SOLUTION INFILTRATION; PERINEURAL AS NEEDED
Status: DISCONTINUED | OUTPATIENT
Start: 2021-06-28 | End: 2021-06-28 | Stop reason: SURG

## 2021-06-28 RX ORDER — SODIUM CHLORIDE 0.9 % (FLUSH) 0.9 %
3-10 SYRINGE (ML) INJECTION AS NEEDED
Status: DISCONTINUED | OUTPATIENT
Start: 2021-06-28 | End: 2021-06-28 | Stop reason: HOSPADM

## 2021-06-28 RX ORDER — PROMETHAZINE HYDROCHLORIDE 25 MG/1
25 SUPPOSITORY RECTAL ONCE AS NEEDED
Status: DISCONTINUED | OUTPATIENT
Start: 2021-06-28 | End: 2021-06-28 | Stop reason: HOSPADM

## 2021-06-28 RX ORDER — SODIUM CHLORIDE, SODIUM LACTATE, POTASSIUM CHLORIDE, CALCIUM CHLORIDE 600; 310; 30; 20 MG/100ML; MG/100ML; MG/100ML; MG/100ML
9 INJECTION, SOLUTION INTRAVENOUS CONTINUOUS
Status: DISCONTINUED | OUTPATIENT
Start: 2021-06-28 | End: 2021-06-28 | Stop reason: HOSPADM

## 2021-06-28 RX ORDER — DIPHENHYDRAMINE HYDROCHLORIDE 50 MG/ML
12.5 INJECTION INTRAMUSCULAR; INTRAVENOUS
Status: DISCONTINUED | OUTPATIENT
Start: 2021-06-28 | End: 2021-06-28 | Stop reason: HOSPADM

## 2021-06-28 RX ORDER — FAMOTIDINE 10 MG/ML
20 INJECTION, SOLUTION INTRAVENOUS ONCE
Status: COMPLETED | OUTPATIENT
Start: 2021-06-28 | End: 2021-06-28

## 2021-06-28 RX ORDER — PROPOFOL 10 MG/ML
VIAL (ML) INTRAVENOUS AS NEEDED
Status: DISCONTINUED | OUTPATIENT
Start: 2021-06-28 | End: 2021-06-28 | Stop reason: SURG

## 2021-06-28 RX ORDER — SCOLOPAMINE TRANSDERMAL SYSTEM 1 MG/1
1 PATCH, EXTENDED RELEASE TRANSDERMAL ONCE
Status: DISCONTINUED | OUTPATIENT
Start: 2021-06-28 | End: 2021-06-28 | Stop reason: HOSPADM

## 2021-06-28 RX ORDER — NALOXONE HCL 0.4 MG/ML
0.2 VIAL (ML) INJECTION AS NEEDED
Status: DISCONTINUED | OUTPATIENT
Start: 2021-06-28 | End: 2021-06-28 | Stop reason: HOSPADM

## 2021-06-28 RX ORDER — MAGNESIUM HYDROXIDE 1200 MG/15ML
LIQUID ORAL AS NEEDED
Status: DISCONTINUED | OUTPATIENT
Start: 2021-06-28 | End: 2021-06-28 | Stop reason: HOSPADM

## 2021-06-28 RX ORDER — FLUMAZENIL 0.1 MG/ML
0.2 INJECTION INTRAVENOUS AS NEEDED
Status: DISCONTINUED | OUTPATIENT
Start: 2021-06-28 | End: 2021-06-28 | Stop reason: HOSPADM

## 2021-06-28 RX ORDER — ONDANSETRON 2 MG/ML
INJECTION INTRAMUSCULAR; INTRAVENOUS AS NEEDED
Status: DISCONTINUED | OUTPATIENT
Start: 2021-06-28 | End: 2021-06-28 | Stop reason: SURG

## 2021-06-28 RX ORDER — LIDOCAINE HYDROCHLORIDE 10 MG/ML
0.5 INJECTION, SOLUTION EPIDURAL; INFILTRATION; INTRACAUDAL; PERINEURAL ONCE AS NEEDED
Status: COMPLETED | OUTPATIENT
Start: 2021-06-28 | End: 2021-06-28

## 2021-06-28 RX ORDER — HYDROMORPHONE HYDROCHLORIDE 1 MG/ML
0.5 INJECTION, SOLUTION INTRAMUSCULAR; INTRAVENOUS; SUBCUTANEOUS
Status: DISCONTINUED | OUTPATIENT
Start: 2021-06-28 | End: 2021-06-28 | Stop reason: HOSPADM

## 2021-06-28 RX ORDER — PROMETHAZINE HYDROCHLORIDE 25 MG/1
25 TABLET ORAL ONCE AS NEEDED
Status: DISCONTINUED | OUTPATIENT
Start: 2021-06-28 | End: 2021-06-28 | Stop reason: HOSPADM

## 2021-06-28 RX ORDER — EPHEDRINE SULFATE 50 MG/ML
5 INJECTION, SOLUTION INTRAVENOUS ONCE AS NEEDED
Status: DISCONTINUED | OUTPATIENT
Start: 2021-06-28 | End: 2021-06-28 | Stop reason: HOSPADM

## 2021-06-28 RX ORDER — DEXAMETHASONE SODIUM PHOSPHATE 10 MG/ML
INJECTION INTRAMUSCULAR; INTRAVENOUS AS NEEDED
Status: DISCONTINUED | OUTPATIENT
Start: 2021-06-28 | End: 2021-06-28 | Stop reason: SURG

## 2021-06-28 RX ADMIN — SODIUM CHLORIDE, POTASSIUM CHLORIDE, SODIUM LACTATE AND CALCIUM CHLORIDE 9 ML/HR: 600; 310; 30; 20 INJECTION, SOLUTION INTRAVENOUS at 10:27

## 2021-06-28 RX ADMIN — FENTANYL CITRATE 25 MCG: 50 INJECTION INTRAMUSCULAR; INTRAVENOUS at 11:16

## 2021-06-28 RX ADMIN — PROPOFOL 200 MG: 10 INJECTION, EMULSION INTRAVENOUS at 11:03

## 2021-06-28 RX ADMIN — LIDOCAINE HYDROCHLORIDE 0.5 ML: 10 INJECTION, SOLUTION EPIDURAL; INFILTRATION; INTRACAUDAL; PERINEURAL at 10:27

## 2021-06-28 RX ADMIN — ONDANSETRON 4 MG: 2 INJECTION INTRAMUSCULAR; INTRAVENOUS at 11:11

## 2021-06-28 RX ADMIN — SCOLOPAMINE TRANSDERMAL SYSTEM 1 PATCH: 1 PATCH, EXTENDED RELEASE TRANSDERMAL at 10:44

## 2021-06-28 RX ADMIN — DEXAMETHASONE SODIUM PHOSPHATE 8 MG: 10 INJECTION INTRAMUSCULAR; INTRAVENOUS at 11:11

## 2021-06-28 RX ADMIN — FAMOTIDINE 20 MG: 10 INJECTION INTRAVENOUS at 10:43

## 2021-06-28 RX ADMIN — LIDOCAINE HYDROCHLORIDE 100 MG: 20 INJECTION, SOLUTION INFILTRATION; PERINEURAL at 11:03

## 2021-06-28 RX ADMIN — FENTANYL CITRATE 50 MCG: 50 INJECTION INTRAMUSCULAR; INTRAVENOUS at 11:08

## 2021-06-28 RX ADMIN — FENTANYL CITRATE 25 MCG: 50 INJECTION INTRAMUSCULAR; INTRAVENOUS at 11:25

## 2021-06-28 NOTE — ANESTHESIA PREPROCEDURE EVALUATION
Anesthesia Evaluation     Patient summary reviewed and Nursing notes reviewed   history of anesthetic complications: PONV  NPO Solid Status: > 8 hours  NPO Liquid Status: > 2 hours           Airway   Mallampati: II  TM distance: >3 FB  Neck ROM: full  No difficulty expected  Dental - normal exam     Pulmonary - negative pulmonary ROS and normal exam     ROS comment: Seasonal allergies   Cardiovascular - normal exam  Exercise tolerance: good (4-7 METS)        Neuro/Psych- negative ROS  GI/Hepatic/Renal/Endo - negative ROS     Musculoskeletal     Abdominal    Substance History      OB/GYN          Other   blood dyscrasia anemia,                   Anesthesia Plan    ASA 2     general     intravenous induction     Anesthetic plan, all risks, benefits, and alternatives have been provided, discussed and informed consent has been obtained with: patient.

## 2021-06-28 NOTE — ANESTHESIA PROCEDURE NOTES
Airway  Urgency: elective    Date/Time: 6/28/2021 11:04 AM  Airway not difficult    General Information and Staff    Patient location during procedure: OR  CRNA: Michelle Eller CRNA    Indications and Patient Condition  Indications for airway management: airway protection    Preoxygenated: yes  MILS maintained throughout  Mask difficulty assessment: 0 - not attempted    Final Airway Details  Final airway type: supraglottic airway      Successful airway: LMA  Size 4    Number of attempts at approach: 1  Assessment: lips, teeth, and gum same as pre-op and atraumatic intubation    Additional Comments  Atraumatic insertion

## 2021-06-28 NOTE — ANESTHESIA POSTPROCEDURE EVALUATION
Patient: Maribell Lynn    Procedure Summary     Date: 06/28/21 Room / Location:  [facility]  /  [facility]    Anesthesia Start: 1056 Anesthesia Stop: 1148    Procedure: HYSTEROSCOPY REMOVAL INTRAUTERINE DEVICE WILL NEED ULTRASOUND IN ROOM (N/A Uterus) Diagnosis:     Surgeons: Heidy Gutierrez MD Provider: Carissa Shipley MD    Anesthesia Type: general ASA Status: 2          Anesthesia Type: general    Vitals  Vitals Value Taken Time   /75 06/28/21 1235   Temp 36.1 °C (97 °F) 06/28/21 1145   Pulse 63 06/28/21 1240   Resp 16 06/28/21 1230   SpO2 100 % 06/28/21 1240   Vitals shown include unvalidated device data.        Post Anesthesia Care and Evaluation    Patient location during evaluation: bedside  Patient participation: complete - patient participated  Level of consciousness: awake  Pain management: adequate  Airway patency: patent  Anesthetic complications: No anesthetic complications    Cardiovascular status: acceptable  Respiratory status: acceptable  Hydration status: acceptable    Comments: */78 (BP Location: Right arm, Patient Position: Sitting)   Pulse 60   Temp 36.1 °C (97 °F) (Temporal)   Resp 18   SpO2 100%

## 2021-06-29 LAB
LAB AP CASE REPORT: NORMAL
LAB AP DIAGNOSIS COMMENT: NORMAL
PATH REPORT.FINAL DX SPEC: NORMAL
PATH REPORT.GROSS SPEC: NORMAL

## 2021-07-04 LAB
BACTERIA SPEC AEROBE CULT: ABNORMAL
BACTERIA SPEC AEROBE CULT: ABNORMAL

## 2022-02-09 ENCOUNTER — APPOINTMENT (OUTPATIENT)
Dept: WOMENS IMAGING | Facility: HOSPITAL | Age: 37
End: 2022-02-09

## 2022-02-09 PROCEDURE — G0279 TOMOSYNTHESIS, MAMMO: HCPCS | Performed by: RADIOLOGY

## 2022-02-09 PROCEDURE — 77062 BREAST TOMOSYNTHESIS BI: CPT | Performed by: RADIOLOGY

## 2022-02-09 PROCEDURE — 77066 DX MAMMO INCL CAD BI: CPT | Performed by: RADIOLOGY

## 2022-02-09 PROCEDURE — 76642 ULTRASOUND BREAST LIMITED: CPT | Performed by: RADIOLOGY

## 2022-02-09 PROCEDURE — 76641 ULTRASOUND BREAST COMPLETE: CPT | Performed by: RADIOLOGY

## 2022-02-17 ENCOUNTER — APPOINTMENT (OUTPATIENT)
Dept: WOMENS IMAGING | Facility: HOSPITAL | Age: 37
End: 2022-02-17

## 2022-02-17 PROCEDURE — 19083 BX BREAST 1ST LESION US IMAG: CPT | Performed by: RADIOLOGY

## 2022-02-17 PROCEDURE — A4648 IMPLANTABLE TISSUE MARKER: HCPCS | Performed by: RADIOLOGY

## 2022-02-17 PROCEDURE — 19084 BX BREAST ADD LESION US IMAG: CPT | Performed by: RADIOLOGY

## 2022-02-23 ENCOUNTER — TELEPHONE (OUTPATIENT)
Dept: SURGERY | Facility: CLINIC | Age: 37
End: 2022-02-23

## 2022-02-23 NOTE — TELEPHONE ENCOUNTER
New patient appointment with Dr. Escudero is scheduled on 3/11/2022 @ 9:30am.    Called and spoke to patient, patient expressed v/u of appointment time.    Sent patient a reminder letter in the mail.

## 2022-03-02 ENCOUNTER — TELEPHONE (OUTPATIENT)
Dept: SURGERY | Facility: CLINIC | Age: 37
End: 2022-03-02

## 2022-03-02 DIAGNOSIS — Z17.0 MALIGNANT NEOPLASM OF UPPER-OUTER QUADRANT OF LEFT BREAST IN FEMALE, ESTROGEN RECEPTOR POSITIVE: Primary | ICD-10-CM

## 2022-03-02 DIAGNOSIS — C50.412 MALIGNANT NEOPLASM OF UPPER-OUTER QUADRANT OF LEFT BREAST IN FEMALE, ESTROGEN RECEPTOR POSITIVE: Primary | ICD-10-CM

## 2022-03-02 NOTE — TELEPHONE ENCOUNTER
Breast MRI scheduled on 03/04 @ 4:45pm. Pt arrival @ 4:15pm.     Called s/w Pt. Patient expressed v/u of appt

## 2022-03-04 ENCOUNTER — CLINICAL SUPPORT (OUTPATIENT)
Dept: SURGERY | Facility: CLINIC | Age: 37
End: 2022-03-04

## 2022-03-04 ENCOUNTER — HOSPITAL ENCOUNTER (OUTPATIENT)
Dept: MRI IMAGING | Facility: HOSPITAL | Age: 37
Discharge: HOME | End: 2022-03-04
Admitting: SURGERY

## 2022-03-04 DIAGNOSIS — C50.412 MALIGNANT NEOPLASM OF UPPER-OUTER QUADRANT OF LEFT BREAST IN FEMALE, ESTROGEN RECEPTOR POSITIVE: ICD-10-CM

## 2022-03-04 DIAGNOSIS — Z17.0 MALIGNANT NEOPLASM OF UPPER-OUTER QUADRANT OF LEFT BREAST IN FEMALE, ESTROGEN RECEPTOR POSITIVE: ICD-10-CM

## 2022-03-04 PROCEDURE — A9577 INJ MULTIHANCE: HCPCS | Performed by: SURGERY

## 2022-03-04 PROCEDURE — 77049 MRI BREAST C-+ W/CAD BI: CPT

## 2022-03-04 PROCEDURE — 0 GADOBENATE DIMEGLUMINE 529 MG/ML SOLUTION: Performed by: SURGERY

## 2022-03-04 RX ADMIN — GADOBENATE DIMEGLUMINE 11 ML: 529 INJECTION, SOLUTION INTRAVENOUS at 17:07

## 2022-03-08 ENCOUNTER — PREP FOR SURGERY (OUTPATIENT)
Dept: OTHER | Facility: HOSPITAL | Age: 37
End: 2022-03-08

## 2022-03-08 ENCOUNTER — OFFICE VISIT (OUTPATIENT)
Dept: SURGERY | Facility: CLINIC | Age: 37
End: 2022-03-08

## 2022-03-08 VITALS
HEART RATE: 72 BPM | DIASTOLIC BLOOD PRESSURE: 66 MMHG | BODY MASS INDEX: 24.54 KG/M2 | SYSTOLIC BLOOD PRESSURE: 102 MMHG | OXYGEN SATURATION: 98 % | WEIGHT: 125 LBS | HEIGHT: 60 IN

## 2022-03-08 DIAGNOSIS — Z17.0 MALIGNANT NEOPLASM OF UPPER-OUTER QUADRANT OF LEFT BREAST IN FEMALE, ESTROGEN RECEPTOR POSITIVE: Primary | ICD-10-CM

## 2022-03-08 DIAGNOSIS — C50.412 MALIGNANT NEOPLASM OF UPPER-OUTER QUADRANT OF LEFT BREAST IN FEMALE, ESTROGEN RECEPTOR POSITIVE: Primary | ICD-10-CM

## 2022-03-08 DIAGNOSIS — N64.89 COMPLEX SCLEROSING LESION OF LEFT BREAST: ICD-10-CM

## 2022-03-08 DIAGNOSIS — R92.8 ABNORMAL MRI, BREAST: ICD-10-CM

## 2022-03-08 PROCEDURE — 99204 OFFICE O/P NEW MOD 45 MIN: CPT | Performed by: SURGERY

## 2022-03-08 RX ORDER — SODIUM CHLORIDE, SODIUM LACTATE, POTASSIUM CHLORIDE, CALCIUM CHLORIDE 600; 310; 30; 20 MG/100ML; MG/100ML; MG/100ML; MG/100ML
100 INJECTION, SOLUTION INTRAVENOUS CONTINUOUS
Status: CANCELLED | OUTPATIENT
Start: 2022-04-13

## 2022-03-08 RX ORDER — CETIRIZINE HYDROCHLORIDE 10 MG/1
10 TABLET ORAL AS NEEDED
COMMUNITY
End: 2022-06-09 | Stop reason: SDDI

## 2022-03-08 RX ORDER — CEFAZOLIN SODIUM 2 G/100ML
2 INJECTION, SOLUTION INTRAVENOUS ONCE
Status: CANCELLED | OUTPATIENT
Start: 2022-04-13 | End: 2022-03-08

## 2022-03-08 RX ORDER — DIAZEPAM 5 MG/1
10 TABLET ORAL ONCE
Status: CANCELLED | OUTPATIENT
Start: 2022-04-13 | End: 2022-03-08

## 2022-03-08 NOTE — PROGRESS NOTES
Chief Complaint: Kylah Lynn is a 37 y.o. female who was seen in consultation at the request of Heidy Gutierrez MD  for newly diagnosed breast cancer    History of Present Illness:  Patient presents with newly diagnosed breast cancer.  She noted a left breast mass in December.  No associated pain or skin changes or nipple discharge.  She noted no other new masses, skin changes, nipple discharge, nipple changes prior to her most recent imaging.  Her most recent imaging includes the following:  10/04/2017   BILATERAL DIAGNOSTIC MAMMO & BILATERAL BREAST US   [facility]   KYLAH LYNN   No family history. Baseline study.  MMG:  Heterogeneously dense.  Finding 1: Area of asymmetric breast tissue seen in the left breast in the 1:30 o’clock region and in the upper outer region.  US:  Finding 1: Elongated isoechoic area 3 cm and is most consistent with a prominent lobule. This correlates to the palpable area.  Finding 2: Two oval complicated cyst versus solid masses right breast. This is an incidental finding.  IMPRESSION:  Finding 1: Ultrasound of the left breast in 6 months.  Finding 2: Ultrasound of the right breast in 6 months is recommended.  BI-RADS Category 3: Probably Benign.    02/09/2022   BILATERAL DIAGNOSTIC MAMMO WITH MARY & RIGHT LIMITED/LEFT COMPLETE BREAST US   [facility]   KYLAH LYNN   Palpable lump or thickening in the left breast.  MMG:  Heterogeneously dense.  Finding 1: There is a new area of architectural distortion seen in the left upper outer breast. This correlates with the pinpointed palpable area at 2:30-3:00. The extent is difficult to measure mammographically.  Finding 2: New focal asymmetry seen in the left upper outer breast, just superior and lateral to the above finding.  Finding 3: There is a new focal asymmetry seen in the left breast upper outer quadrant.  US:  Finding 1: Irregular hypoechoic area versus non-parallel hypoechoic mass with  posterior acoustic shadowing and indistinct and spiculated margins measuring 17 x 20 x 16 mm, in the 2:30 o’clock region of the left breast, 6 centimeters from the nipple.  Finding 2: Irregular non-parallel hypoechoic mass 11 x 10 x 11 mm seen in the 1:30 o’clock region of the left breast located 6 centimeters from the nipple.  Finding 3: Oval parallel hypoechoic mass 4 x 3 x 4 mm seen in the left breast at 1 o’clock located 4 centimeters from the nipple.  Finding 4: Stable oval parallel mass with circumscribed margins measuring 12 x 6 x 8 mm seen in the right breast at 3 o’clock located 5 centimeters from the nipple.  Finding 5: Oval mass measuring 8 x 5 x 7 mm seen in the sub-areolar region of the right breast. Finding has decreased in size.  IMPRESSION:  Finding 1: New palpable architectural distortion and mass in the 2:30 o’clock region of the left breast located 6 centimeters from the nipple is highly suggestive of malignancy.  Finding 2: Mass in the 1:30 o’clock region of the left breast located 6 centimeters from the nipple is suspicious.  Finding 3: Mass in the left breast at 1 o’clock located 4 centimeters from the nipple is suspicious.  Finding 4: Stable mass in the right breast since 2017 is considered benign.  Finding 5: Considered benign.  BI-RADS Category 5.      She had a biopsy on the following day that showed:   02/17/2022 LEFT US GUIDED BIOPSY     [facility]     KYLAH SHEIKHKAREEM LUIS  Let breast 1:00. 12 gauge Suros. 7 cores. [place] shaped s-pravin tissue marker was placed. Clip in the expected position. Pathology returned as benign breast tissue with complex sclerosing lesion. Pathology high risk and concordant.  Left breast 1:30. 12 gauge. 9 cores were obtained. A tophat shaped s-pravin tissue marker was placed. Clip in the expected position. Invasive lobular carcinoma. Malignant and concordant.  Left breast at the 2:30 position. 12 gauge core needle Suros. 14 cores were obtained. A [employer] Pro Q  shaped tissue marker was placed at the biopsy site. Clip in the expected position. Invasive lobular carcinoma.  PATHOLOGY:  1. Breast, left, 1 o’clock, biopsy:  Benign breast tissue with complex sclerosing lesion.  2. Breast, left, 1:30 o’clock, biopsy:  Invasive lobular carcinoma, histologic grade 2 (tubules = 3, nuclei = 2, mitosis = 1), largest focus measures 9 mm.  ER: 97.01%  NE: 94.9%  HER2: 1+  Ki-67: 32.71%  3. Breast, left, 2:30 o’clock, biopsy:  Invasive lobular carcinoma, histologic grade 2 (tubules = 3, nuclei = 2, mitosis = 1), largest focus measures 10 mm.  ER: 89.94%  NE: 95.74%  HER2: Score 0  Ki-67: 39.2%    We then arranged for a breast MRI:    03/04/22 [facility] MRI BREAST BILATERAL   KYLAH HENSLEY   1.  Suspicious mass and nonmass enhancement measuring up to 7.1 cm in the outer middle and posterior left breast encompasses 2 sites of biopsy-proven malignancy marked with Q and top hat clips. A biopsy clip (mini cork) with adjacent faint ill-defined nonmass enhancement inferior to the biopsy clip in the outer left breast represents a site of biopsy-proven complex sclerosing lesion. Surgical management isrecommended.  2.  No MRI evidence of malignancy in the right breast.   BI-RADS Category 6: Known biopsy-proven malignancy        She has not had a breast biopsy in the past.  She has her uterus and ovaries, is premenopausal, and takes nor hormones. She had a mirena removed in June 2021 and uses no birth control  Her family history includes the following: He has 1 daughter, 1 son, 1 sister, 2 maternal aunts, one paternal aunt, no family history of breast or ovarian cancer.  She does have a paternal aunt who had melanoma.    She is here for evaluation.    Review of Systems:  Review of Systems   All other systems reviewed and are negative.       Past Medical and Surgical History:  Breast Biopsy History:  Patient had not had a breast biopsy prior to her cancer diagnosis.  Breast Cancer  HIstory:  Patient does not have a past medical history of breast cancer.  Breast Operations, and year:  NONE   Obstetric/Gynecologic History:  Age menstrual periods began: 11  Patient is premenopausal, first day of last period: 21  Number of pregnancies:2  Number of live births: 2  Number of abortions or miscarriages: 0  Age of delivery of first child: 28  Patient breast fed, for the following lenth of time: 10 MONTHS   Length of time taking birth control pills: N/A   Patient has never taken hormone replacement    PATIENT HAS BOTH OVARIES AND UTERUS  Past Surgical History:   Procedure Laterality Date   • APPENDECTOMY     •  SECTION     •  SECTION N/A 12/10/2018    Procedure:  SECTION REPEAT;  Surgeon: Heidy Gutierrez MD;  Location: [facility] LABOR DELIVERY;  Service: Obstetrics/Gynecology   • D & C HYSTEROSCOPY N/A 2021    Procedure: HYSTEROSCOPY REMOVAL INTRAUTERINE DEVICE WILL NEED ULTRASOUND IN ROOM;  Surgeon: Heidy Gutierrez MD;  Location: [facility] OR [facility];  Service: Obstetrics/Gynecology;  Laterality: N/A;   • KNEE ARTHROSCOPY Right      Past Medical History:   Diagnosis Date   • Anesthesia complication     hypotension with last c section   • History of COVID-19     2021   • Pelvic pain    • PONV (postoperative nausea and vomiting)    • Seasonal allergies        Prior Hospitalizations, other than for surgery or childbirth, and year:  NONE     Social History     Socioeconomic History   • Marital status:    Tobacco Use   • Smoking status: Never Smoker   • Smokeless tobacco: Never Used   Vaping Use   • Vaping Use: Never used   Substance and Sexual Activity   • Alcohol use: Yes     Comment: occasionally   • Drug use: No   • Sexual activity: Defer     Patient is .  Patient is a homemaker.  Patient drinks 1 servings of caffeine per day.    Family History:  Family History   Problem Relation Age of Onset   • Melanoma Paternal Aunt    • Malig Hyperthermia Neg Hx   "      Vital Signs:  /66   Pulse 72   Ht 152.8 cm (60.15\")   Wt 56.7 kg (125 lb)   LMP 01/23/2022 (Approximate)   SpO2 98%   Breastfeeding No   BMI 24.29 kg/m²      Medications:    Current Outpatient Medications:   •  atorvastatin (LIPITOR) 10 MG tablet, Take 10 mg by mouth Daily., Disp: , Rfl:   •  cetirizine (zyrTEC) 10 MG tablet, Take 10 mg by mouth As Needed for Allergies., Disp: , Rfl:      Allergies:  Allergies   Allergen Reactions   • Asa [Aspirin] Anaphylaxis       Physical Examination:  /66   Pulse 72   Ht 152.8 cm (60.15\")   Wt 56.7 kg (125 lb)   LMP 01/23/2022 (Approximate)   SpO2 98%   Breastfeeding No   BMI 24.29 kg/m²   General Appearance:  Patient is in no distress.  She is well kept and has an average build.   Psychiatric:  Patient with appropriate mood and affect. Alert and oriented to self, time, and place.    Breast, RIGHT:  large sized, 34C, symmetric with the contralateral side.  Breast skin is without erythema, edema, rashes.  There are no visible abnormalities upon inspection during the arm-raising maneuver or with hands on hips in the sitting position. There is no nipple retraction, discharge or nipple/areolar skin changes.There are no masses palpable in the sitting or supine positions.    Breast, LEFT:  large sized, 34C, symmetric with the contralateral side.  Breast skin is without erythema, edema, rashes.  There are no visible abnormalities upon inspection during the arm-raising maneuver or with hands on hips in the sitting position. There is no nipple retraction, discharge or nipple/areolar skin changes.there are palpable masses in the left upper outer and lateral left breast at the sites of known malignancy.  Located at the 1:30, 6 cm from the nipple location and a 230, 6 cm from the nipple location.  No overlying skin changes.  Really mobile.  There are no other masses palpable in the sitting or supine positions.    Lymphatic:  There is no axillary, cervical, " infraclavicular, or supraclavicular adenopathy bilaterally.  Eyes:  Pupils are round and reactive to light.  Cardiovascular:  Heart rate and rhythm are regular.  Respiratory:  Lungs are clear bilaterally with no crackles or wheezes in any lung field.  Gastrointestinal:  Abdomen is soft, nondistended, and nontender    Musculoskeletal:  Good strength in all 4 extremities.   There is good range of motion in both shoulders.    Skin:  No new skin lesions or rashes on the skin excluding the breast (see breast exam above).        Imaging:  10/04/2017   BILATERAL DIAGNOSTIC MAMMO & BILATERAL BREAST US   [facility]   KYLAH JOYCE   No family history. Baseline study.  MMG:  Heterogeneously dense.  Finding 1: Area of asymmetric breast tissue seen in the left breast in the 1:30 o’clock region and in the upper outer region.  US:  Finding 1: Elongated isoechoic area 3 cm and is most consistent with a prominent lobule. This correlates to the palpable area.  Finding 2: Two oval complicated cyst versus solid masses right breast. This is an incidental finding.  IMPRESSION:  Finding 1: Ultrasound of the left breast in 6 months.  Finding 2: Ultrasound of the right breast in 6 months is recommended.  BI-RADS Category 3: Probably Benign.    02/09/2022   BILATERAL DIAGNOSTIC MAMMO WITH MARY & RIGHT LIMITED/[facility] BRE[facility]   Palpable lump or thickening in the left breast.  MMG:  Heterogeneously dense.  Finding 1: There is a new area of architectural distortion seen in the left upper outer breast. This correlates with the pinpointed palpable area at 2:30-3:00. The extent is difficult to measure mammographically.  Finding 2: New focal asymmetry seen in the left upper outer breast, just superior and lateral to the above finding.  Finding 3: There is a new focal asymmetry seen in the left breast upper outer quadrant.  US:  Finding 1: Irregular hypoechoic area versus non-parallel  hypoechoic mass with posterior acoustic shadowing and indistinct and spiculated margins measuring 17 x 20 x 16 mm, in the 2:30 o’clock region of the left breast, 6 centimeters from the nipple.  Finding 2: Irregular non-parallel hypoechoic mass 11 x 10 x 11 mm seen in the 1:30 o’clock region of the left breast located 6 centimeters from the nipple.  Finding 3: Oval parallel hypoechoic mass 4 x 3 x 4 mm seen in the left breast at 1 o’clock located 4 centimeters from the nipple.  Finding 4: Stable oval parallel mass with circumscribed margins measuring 12 x 6 x 8 mm seen in the right breast at 3 o’clock located 5 centimeters from the nipple.  Finding 5: Oval mass measuring 8 x 5 x 7 mm seen in the sub-areolar region of the right breast. Finding has decreased in size.  IMPRESSION:  Finding 1: New palpable architectural distortion and mass in the 2:30 o’clock region of the left breast located 6 centimeters from the nipple is highly suggestive of malignancy.  Finding 2: Mass in the 1:30 o’clock region of the left breast located 6 centimeters from the nipple is suspicious.  Finding 3: Mass in the left breast at 1 o’clock located 4 centimeters from the nipple is suspicious.  Finding 4: Stable mass in the right breast since 2017 is considered benign.  Finding 5: Considered benign.  BI-RADS Category 5.    03/04/22 [facility] MRI BREAST BILATERAL   KYLAH HENSLEY   1.  Suspicious mass and nonmass enhancement measuring up to 7.1 cm in the outer middle and posterior left breast encompasses 2 sites of biopsy-proven malignancy marked with Q and top hat clips. A biopsy clip (mini cork) with adjacent faint ill-defined nonmass enhancement inferior to the biopsy clip in the outer left breast represents a site of biopsy-proven complex sclerosing lesion. Surgical management isrecommended.  2.  No MRI evidence of malignancy in the right breast.   BI-RADS Category 6: Known biopsy-proven  malignancy        Pathology:  02/17/2022 [facility]  Let breast 1:00. 12 gauge Suros. 7 cores. [place] shaped s-pravin tissue marker was placed. Clip in the expected position. Pathology returned as benign breast tissue with complex sclerosing lesion. Pathology high risk and concordant.  Left breast 1:30. 12 gauge. 9 cores were obtained. A tophat shaped s-pravin tissue marker was placed. Clip in the expected position. Invasive lobular carcinoma. Malignant and concordant.  Left breast at the 2:30 position. 12 gauge core needle Suros. 14 cores were obtained. A [place] Pro Q shaped tissue marker was placed at the biopsy site. Clip in the expected position. Invasive lobular carcinoma.  PATHOLOGY:  1. Breast, left, 1 o’clock, biopsy:  Benign breast tissue with complex sclerosing lesion.  2. Breast, left, 1:30 o’clock, biopsy:  Invasive lobular carcinoma, histologic grade 2 (tubules = 3, nuclei = 2, mitosis = 1), largest focus measures 9 mm.  ER: 97.01%  FL: 94.9%  HER2: 1+  Ki-67: 32.71%  3. Breast, left, 2:30 o’clock, biopsy:  Invasive lobular carcinoma, histologic grade 2 (tubules = 3, nuclei = 2, mitosis = 1), largest focus measures 10 mm.  ER: 89.94%  FL: 95.74%  HER2: Score 0  Ki-67: 39.2%    Procedures:      Assessment:   Diagnosis Plan   1. Malignant neoplasm of upper-outer quadrant of left breast in female, estrogen receptor positive (HCC)     2. Abnormal MRI, breast     3. Complex sclerosing lesion of left breast     1-  LEFT multifocal malignancy  Left breast 130, 6 cm from the nipple, 1.1 cm on ultrasound.  Top- in good position, 1.2 cm on MRI, invasive lobular carcinoma, intermediate grade, 3, 2, 1, 9 mm in a core, estrogen 97, progesterone 95, HER-2/leonie 1+, Ki-67 33%.    Left breast 230, 6 cm from the nipple, 2 cm on ultrasound-palpable lesion, 2 cm on ultrasound, 6.1 cm on MRI.  Q. marker in good position.  Invasive lobular carcinoma, intermediate  grade, 3, 2, 1, 1 cm in greatest dimension, estrogen 90, progesterone 96, HER-2/leonie 0, Ki-67 39%.    Clinical stage mT3N0 stage IIb.      2-  RIGHT immediate retroareolar 7:-8:00- felt to be sebaceous cyst on MRI. Will obtain ultrasound      3-  Left breast 1:00, 4 cm in the nipple, 4 mm on ultrasound, complex sclerosing lesion cork marker.      Plan:  Maribell is here today wit her  Herb.  We reviewed her history, imaging, imaging reports, bedside ultrasound, pathology, family history and examination together today.  We reviewed her MRI and mammogram images together and she understands that she would not be a candidate for breast conserving therapy on the left.  She does not wish to do anything on the right unless her genetics return is abnormal.  We did discuss the plan to test genetics for breast and gynecology add on melanoma.  We discussed the procedure of a left total mastectomy, left axillary sentinel lymph node biopsy, possible axillary lymph node dissection, possible reconstruction.  We discussed the risks of bleeding, infection, skin loss, injury to nerves or vessels in the axilla, lymphedema.    We will possibly plan for an Oncotype in the postop period.    Preoperatively I will plan for an ultrasound of the right retroareolar area to further characterize the likely sebaceous cyst and to get a baseline for future clinical follow-up.    I will have her see plastic surgery.    I will get her in to see physical therapy for bioimpedance measurements.    I have asked her to please not get pregnant and told her that a metal IUD, vasectomy or condoms would be appropriate methods of birth control.  Before this diagnosis she and her  were considering having another child.    We will have our nurse navigator reach out to her and she would also be interested in talking with Jody Snider our behavioral oncologist.  We will arrange for those consultations.    We will plan to see a RIGHT US of the  supposed sebaceous cyst if needed, in 3-6 months.    If sent off [facility] breast and gynecology add on melanoma today.  Monisha Escudero MD        We have spent 65 minutes in face to face visit today, 55 in face to face .      Next Appointment:  Return for surgery.      EMR Dragon/transcription disclaimer:    Much of this encounter note is an electronic transcription/translocation of spoken language to printed text.  The electronic translation of spoken language may permit erroneous, or at times, nonsensical words or phrases to be inadvertently transcribed.  Although I have reviewed the note from such areas, some may still exist.

## 2022-03-09 ENCOUNTER — NURSE NAVIGATOR (OUTPATIENT)
Dept: OTHER | Facility: HOSPITAL | Age: 37
End: 2022-03-09

## 2022-03-09 ENCOUNTER — TELEPHONE (OUTPATIENT)
Dept: SURGERY | Facility: CLINIC | Age: 37
End: 2022-03-09

## 2022-03-09 NOTE — TELEPHONE ENCOUNTER
Emla cream 30 G tube no refills   Apply topically to affected nipple, outer edge of affected nipple and cover day of surgery before leaving home.     [id number] - [place], [place] - [address] [place] [address] - [phone]  - [phone] FX Phone:  [phone]

## 2022-03-09 NOTE — PROGRESS NOTES
Referral received from Dr. Escudero office. I called Ms. Romero and introduced myself and navigational services. She stated the plan is to have a mastectomy with reconstruction and she is waiting on surgery dates. She wanted to travel to [place] March 25th through April 5th and wanted to know if that would be ok. Dr. Escudero's office stated that would be fine. Otherwise, she has a good understanding of her pathology and treatment options and is comfortable with her plan of care.     She stated she has a good support system a this time. She was interested in talking to another lady who had a mastectomy. Referred her to [email or web address]. They will call her and set that up. We also discussed that we have Supportive Oncology Services and Dr. Escudero made a referral for her to speak with Jody ROPER. She stated she wanted to wait on that and will call back if the need arises.     We discussed integrative therapies and other services at [facility] She verbalized interest in receiving a navigation folder outlining services. I verified her mailing address and will send out a navigation folder with the following information:     [facility],  [facility] Breast Cancer Support Group, Cancer and Restorative Exercise (CARE), [employer] Exercise program, Guide for the Newly Diagnosed, Bioimpedance, [facility], Massage Therapy, Reiki Therapy, [facility] [place], Cancer Nutrition, and Survivorship Clinic.    She verbalized appreciation for navigational services and she has my contact information and will call with any questions that arise.

## 2022-03-10 ENCOUNTER — TELEPHONE (OUTPATIENT)
Dept: SURGERY | Facility: CLINIC | Age: 37
End: 2022-03-10

## 2022-03-10 ENCOUNTER — TRANSCRIBE ORDERS (OUTPATIENT)
Dept: SURGERY | Facility: CLINIC | Age: 37
End: 2022-03-10

## 2022-03-10 DIAGNOSIS — Z17.0 MALIGNANT NEOPLASM OF UPPER-OUTER QUADRANT OF LEFT BREAST IN FEMALE, ESTROGEN RECEPTOR POSITIVE: Primary | ICD-10-CM

## 2022-03-10 DIAGNOSIS — C50.412 MALIGNANT NEOPLASM OF UPPER-OUTER QUADRANT OF LEFT BREAST IN FEMALE, ESTROGEN RECEPTOR POSITIVE: Primary | ICD-10-CM

## 2022-03-10 NOTE — TELEPHONE ENCOUNTER
PAT Scheduled on 3/24/2022 @ 12:30pm    Consult with Dr. Johnson scheduled on 3/24/2022 @ 9:45am    PT Scheduled on 4/11/2022 @ 10:30am    U/S Scheduled on 4/11/2022 @ 11:30am    Surgery Scheduled on 4/13/2022 @ 8:30am, arrive @ 5:15am.    Post Op Scheduled on 5/5/2022 @ 1:30pm    Called Pt. Pt expressed v/u of all appointment times.     Appointment reminder letters sent in mail

## 2022-03-18 ENCOUNTER — TELEPHONE (OUTPATIENT)
Dept: SURGERY | Facility: CLINIC | Age: 37
End: 2022-03-18

## 2022-03-18 NOTE — TELEPHONE ENCOUNTER
[facility] genetic testing panel March 4, 2022 breast and gynecology add on melanoma returned as negative for mutation.  We will let her know

## 2022-03-24 ENCOUNTER — TELEPHONE (OUTPATIENT)
Dept: SURGERY | Facility: CLINIC | Age: 37
End: 2022-03-24

## 2022-03-24 ENCOUNTER — HOSPITAL ENCOUNTER (OUTPATIENT)
Dept: GENERAL RADIOLOGY | Facility: HOSPITAL | Age: 37
Discharge: HOME | End: 2022-03-24

## 2022-03-24 ENCOUNTER — NURSE NAVIGATOR (OUTPATIENT)
Dept: OTHER | Facility: HOSPITAL | Age: 37
End: 2022-03-24

## 2022-03-24 ENCOUNTER — PRE-ADMISSION TESTING (OUTPATIENT)
Dept: PREADMISSION TESTING | Facility: HOSPITAL | Age: 37
End: 2022-03-24

## 2022-03-24 VITALS
SYSTOLIC BLOOD PRESSURE: 106 MMHG | OXYGEN SATURATION: 99 % | DIASTOLIC BLOOD PRESSURE: 69 MMHG | TEMPERATURE: 98.3 F | BODY MASS INDEX: 20.79 KG/M2 | HEART RATE: 69 BPM | HEIGHT: 64 IN | WEIGHT: 121.8 LBS | RESPIRATION RATE: 16 BRPM

## 2022-03-24 DIAGNOSIS — C50.412 MALIGNANT NEOPLASM OF UPPER-OUTER QUADRANT OF LEFT BREAST IN FEMALE, ESTROGEN RECEPTOR POSITIVE: ICD-10-CM

## 2022-03-24 DIAGNOSIS — Z17.0 MALIGNANT NEOPLASM OF UPPER-OUTER QUADRANT OF LEFT BREAST IN FEMALE, ESTROGEN RECEPTOR POSITIVE: ICD-10-CM

## 2022-03-24 LAB
ALBUMIN SERPL-MCNC: 4.6 G/DL (ref 3.5–5.2)
ALBUMIN/GLOB SERPL: 2 G/DL
ALP SERPL-CCNC: 79 U/L (ref 39–117)
ALT SERPL W P-5'-P-CCNC: 14 U/L (ref 1–33)
ANION GAP SERPL CALCULATED.3IONS-SCNC: 10 MMOL/L (ref 5–15)
AST SERPL-CCNC: 25 U/L (ref 1–32)
B-HCG UR QL: NEGATIVE
BASOPHILS # BLD AUTO: 0.03 10*3/MM3 (ref 0–0.2)
BASOPHILS NFR BLD AUTO: 0.6 % (ref 0–1.5)
BILIRUB SERPL-MCNC: 0.3 MG/DL (ref 0–1.2)
BUN SERPL-MCNC: 7 MG/DL (ref 6–20)
BUN/CREAT SERPL: 11.1 (ref 7–25)
CALCIUM SPEC-SCNC: 8.7 MG/DL (ref 8.6–10.5)
CHLORIDE SERPL-SCNC: 104 MMOL/L (ref 98–107)
CO2 SERPL-SCNC: 24 MMOL/L (ref 22–29)
CREAT SERPL-MCNC: 0.63 MG/DL (ref 0.57–1)
DEPRECATED RDW RBC AUTO: 39.3 FL (ref 37–54)
EGFRCR SERPLBLD CKD-EPI 2021: 116.6 ML/MIN/1.73
EOSINOPHIL # BLD AUTO: 0.23 10*3/MM3 (ref 0–0.4)
EOSINOPHIL NFR BLD AUTO: 4.3 % (ref 0.3–6.2)
ERYTHROCYTE [DISTWIDTH] IN BLOOD BY AUTOMATED COUNT: 12.2 % (ref 12.3–15.4)
GLOBULIN UR ELPH-MCNC: 2.3 GM/DL
GLUCOSE SERPL-MCNC: 103 MG/DL (ref 65–99)
HCT VFR BLD AUTO: 37.2 % (ref 34–46.6)
HGB BLD-MCNC: 12.1 G/DL (ref 12–15.9)
IMM GRANULOCYTES # BLD AUTO: 0.01 10*3/MM3 (ref 0–0.05)
IMM GRANULOCYTES NFR BLD AUTO: 0.2 % (ref 0–0.5)
LYMPHOCYTES # BLD AUTO: 2.16 10*3/MM3 (ref 0.7–3.1)
LYMPHOCYTES NFR BLD AUTO: 40.5 % (ref 19.6–45.3)
MCH RBC QN AUTO: 29 PG (ref 26.6–33)
MCHC RBC AUTO-ENTMCNC: 32.5 G/DL (ref 31.5–35.7)
MCV RBC AUTO: 89.2 FL (ref 79–97)
MONOCYTES # BLD AUTO: 0.36 10*3/MM3 (ref 0.1–0.9)
MONOCYTES NFR BLD AUTO: 6.8 % (ref 5–12)
NEUTROPHILS NFR BLD AUTO: 2.54 10*3/MM3 (ref 1.7–7)
NEUTROPHILS NFR BLD AUTO: 47.6 % (ref 42.7–76)
NRBC BLD AUTO-RTO: 0 /100 WBC (ref 0–0.2)
PLATELET # BLD AUTO: 197 10*3/MM3 (ref 140–450)
PMV BLD AUTO: 12.3 FL (ref 6–12)
POTASSIUM SERPL-SCNC: 3.9 MMOL/L (ref 3.5–5.2)
PROT SERPL-MCNC: 6.9 G/DL (ref 6–8.5)
QT INTERVAL: 391 MS
RBC # BLD AUTO: 4.17 10*6/MM3 (ref 3.77–5.28)
SODIUM SERPL-SCNC: 138 MMOL/L (ref 136–145)
WBC NRBC COR # BLD: 5.33 10*3/MM3 (ref 3.4–10.8)

## 2022-03-24 PROCEDURE — 36415 COLL VENOUS BLD VENIPUNCTURE: CPT

## 2022-03-24 PROCEDURE — 71046 X-RAY EXAM CHEST 2 VIEWS: CPT

## 2022-03-24 PROCEDURE — 93010 ELECTROCARDIOGRAM REPORT: CPT | Performed by: INTERNAL MEDICINE

## 2022-03-24 PROCEDURE — 93005 ELECTROCARDIOGRAM TRACING: CPT

## 2022-03-24 PROCEDURE — 85025 COMPLETE CBC W/AUTO DIFF WBC: CPT

## 2022-03-24 PROCEDURE — 80053 COMPREHEN METABOLIC PANEL: CPT

## 2022-03-24 PROCEDURE — 81025 URINE PREGNANCY TEST: CPT

## 2022-03-24 RX ORDER — LIDOCAINE AND PRILOCAINE 25; 25 MG/G; MG/G
CREAM TOPICAL
COMMUNITY
Start: 2022-03-22 | End: 2022-04-14 | Stop reason: HOSPADM

## 2022-03-24 NOTE — DISCHARGE INSTRUCTIONS

## 2022-03-24 NOTE — PROGRESS NOTES
Met Ms. Eddie Romero after her pre admission testing today. I introduced myself and navigational services. She is scheduled for a mastectomy with reconstruction on April 13th. She has a good understanding of her pathology and treatment options and is comfortable with her plan of care. She had some questions about laser hair removal and will send message to Dr. Escudero's office for confirmation on answer.     She stated she has a good support system of family and friends and feels fortunate with that. She will be traveling to [place] for two weeks and will return with family to help during her recovery. She stated they have no needs at this time, but will reach out if the need arises.      She received the navigation folder in the mail and verbalized appreciation for navigational services. She has my contact information and will call with any questions that arise.

## 2022-03-28 ENCOUNTER — TELEPHONE (OUTPATIENT)
Dept: SURGERY | Facility: CLINIC | Age: 37
End: 2022-03-28

## 2022-03-28 NOTE — TELEPHONE ENCOUNTER
Patient scheduled for left mastectomy, left slnb 4/13/22  She does laser hair removal of her pelvic area q. 6wks  She wants to know if this is safe and okay with you that she has before and/or after surgery?     Pls cancel the one right before surgery.

## 2022-04-07 ENCOUNTER — TELEPHONE (OUTPATIENT)
Dept: SURGERY | Facility: CLINIC | Age: 37
End: 2022-04-07

## 2022-04-07 NOTE — TELEPHONE ENCOUNTER
Changed Appointment time for Pt's Post Op appointment with Dr. Escudero on 5/5/2022 from 1:30pm to 7:00am, arrive @ 6:45am. Due to scheduling conflict.    Called Pt and LM for her to call back to confirm appointment time change.

## 2022-04-11 ENCOUNTER — HOSPITAL ENCOUNTER (OUTPATIENT)
Dept: ULTRASOUND IMAGING | Facility: HOSPITAL | Age: 37
Discharge: HOME | End: 2022-04-11
Admitting: SURGERY

## 2022-04-11 ENCOUNTER — HOSPITAL ENCOUNTER (OUTPATIENT)
Dept: PHYSICAL THERAPY | Facility: HOSPITAL | Age: 37
Setting detail: THERAPIES SERIES
Discharge: HOME | End: 2022-04-11

## 2022-04-11 DIAGNOSIS — Z01.818 PRE-OPERATIVE EXAMINATION: ICD-10-CM

## 2022-04-11 DIAGNOSIS — Z17.0 MALIGNANT NEOPLASM OF UPPER-OUTER QUADRANT OF LEFT BREAST IN FEMALE, ESTROGEN RECEPTOR POSITIVE: Primary | ICD-10-CM

## 2022-04-11 DIAGNOSIS — Z91.89 AT RISK FOR LYMPHEDEMA: ICD-10-CM

## 2022-04-11 DIAGNOSIS — R92.8 ABNORMAL MRI, BREAST: ICD-10-CM

## 2022-04-11 DIAGNOSIS — C50.412 MALIGNANT NEOPLASM OF UPPER-OUTER QUADRANT OF LEFT BREAST IN FEMALE, ESTROGEN RECEPTOR POSITIVE: Primary | ICD-10-CM

## 2022-04-11 PROCEDURE — 76642 ULTRASOUND BREAST LIMITED: CPT

## 2022-04-11 PROCEDURE — 97535 SELF CARE MNGMENT TRAINING: CPT

## 2022-04-11 PROCEDURE — 97161 PT EVAL LOW COMPLEX 20 MIN: CPT

## 2022-04-11 PROCEDURE — 93702 BIS XTRACELL FLUID ANALYSIS: CPT

## 2022-04-11 NOTE — THERAPY EVALUATION
Physical Therapy Lymphedema Initial Evaluation  [facility]     Patient Name: Maribell Lynn  : 1985  MRN: [id number]  Today's Date: 2022      Visit Date: 2022    Visit Dx:    ICD-10-CM ICD-9-CM   1. Malignant neoplasm of upper-outer quadrant of left breast in female, estrogen receptor positive (HCC)  C50.412 174.4    Z17.0 V86.0   2. At risk for lymphedema  Z91.89 V49.89   3. Pre-operative examination  Z818 V784       Patient Active Problem List   Diagnosis   • Anemia   • Malignant neoplasm of upper-outer quadrant of left breast in female, estrogen receptor positive (HCC)        Past Medical History:   Diagnosis Date   • Anesthesia complication     hypotension with last c section   • Breast cancer (HCC)    • History of COVID-19     2021 AND 2022   • Hyperlipidemia    • PONV (postoperative nausea and vomiting)    • Seasonal allergies         Past Surgical History:   Procedure Laterality Date   • APPENDECTOMY     • BREAST BIOPSY Left    •  SECTION     •  SECTION N/A 12/10/2018    Procedure:  SECTION REPEAT;  Surgeon: Heidy Gutierrez MD;  Location: [facility] LABOR DELIVERY;  Service: Obstetrics/Gynecology   • D & C HYSTEROSCOPY N/A 2021    Procedure: HYSTEROSCOPY REMOVAL INTRAUTERINE DEVICE WILL NEED ULTRASOUND IN ROOM;  Surgeon: Heidy Gutierrez MD;  [place] [facility] [place] [facility] [place]  Service: Obstetrics/Gynecology;  Laterality: N/A;   • KNEE ARTHROSCOPY Right        Visit Dx:    ICD-10-CM ICD-9-CM   1. Malignant neoplasm of upper-outer quadrant of left breast in female, estrogen receptor positive (HCC)  C50.412 174.4    Z17.0 V86.0   2. At risk for lymphedema  Z91.89 V49.89   3. Pre-operative examination  Z.818 V72.84        Patient History     Row Name 22 1000             History    Chief Complaint --  none; pre-op  -LB      Date Current Problem(s) Began 22  -LB      Brief Description of Current Complaint Pt will undergo L  mastectomy with possible SLNB vs. Ax dissection 4/13/22. She is R handed. 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 She runs occasionally and does [employer] workouts. She has full ROM of BUE and denies issues with either arm. She does still carry her 3 year old daughter.  -LB      Patient/Caregiver Goals Return to prior level of function  -LB      Hand Dominance right-handed  -LB              Pain     Pain at Present 0  -LB      Pain at Best 0  -LB      Pain at Worst 0  -LB              Fall Risk Assessment    Any falls in the past year: No  -LB              Services    Prior Rehab/Home Health Experiences No  -LB      Are you currently receiving Home Health services No  -LB      Do you plan to receive Home Health services in the near future No  -LB              Daily Activities    Primary Language English  -LB      Pt Participated in POC and Goals Yes  -LB              Safety    Are you being hurt, hit, or frightened by anyone at home or in your life? No  -LB      Are you being neglected by a caregiver No  -LB      Have you had any of the following issues with N/A  -LB            User Key  (r) = Recorded By, (t) = Taken By, (c) = Cosigned By    Initials Name Provider Type    Carissa Steel PT Physical Therapist                 Lymphedema     Row Name 04/11/22 1000             Subjective Pain    Able to rate subjective pain? yes  -LB      Pre-Treatment Pain Level 0  -LB      Post-Treatment Pain Level 0  -LB              Subjective Comments    Subjective Comments I am doing well, just nervous about surgery.  -LB              Lymphedema Assessment    Lymphedema Classification LUE:;at risk/stage 0  -LB      Lymphedema Assessment Comments pre-operative examination  -LB              Posture/Observations    Posture/Observations Comments WNL  -LB              General ROM    GENERAL ROM COMMENTS BUE WFL  -LB              MMT (Manual Muscle Testing)    General MMT Comments BUE WFL  -LB              Lymphedema Edema  Assessment    Edema Assessment Comment no obvious edema  -LB              Skin Changes/Observations    Skin Observations Comment normal  -LB              Lymphedema Sensation    Lymphedema Sensation Comments normal  -LB              Lymphedema Pulses/Capillary Refill    Lymph Pulses Capillary Refill Comments normal  -LB              L-Dex Bioimpedence Screening    L-Dex Measurement Extremity LUE  -LB      L-Dex Patient Position Standing  -LB      L-Dex UE Dominate Side Right  -LB      L-Dex UE At Risk Side Left  -LB      L-Dex UE Pre Surgical Value Yes  -LB      L-Dex UE Score 4.5  -LB      L-Dex UE Baseline Score 4.5  -LB      L-Dex UE Value Change 0  -LB      L-Dex UE Comment WNL  -LB      $ L-Dex Charge yes  -LB            User Key  (r) = Recorded By, (t) = Taken By, (c) = Cosigned By    Initials Name Provider Type    Carissa Steel, WAN Physical Therapist                                Therapy Education  Education Details: discussed bioimpedance results and interpretation, post-operative rehab expectations, lymphedema etiology and management  Given: Symptoms/condition management, Edema management, Mobility training  Program: New  How Provided: Demonstration, Verbal  Provided to: Patient  Level of Understanding: Teach back education performed, Verbalized, Demonstrated  31667 - PT Self Care/Mgmt Minutes: 15       OP Exercises     Row Name 04/11/22 1000             Subjective Comments    Subjective Comments I am doing well, just nervous about surgery.  -LB              Subjective Pain    Able to rate subjective pain? yes  -LB      Pre-Treatment Pain Level 0  -LB      Post-Treatment Pain Level 0  -LB            User Key  (r) = Recorded By, (t) = Taken By, (c) = Cosigned By    Initials Name Provider Type    Carissa Steel PT Physical Therapist                             PT OP Goals     Row Name 04/11/22 1100          Long Term Goals    LTG Date to Achieve 07/10/22  -LB     LTG 1 Pt will maintain LDex  bioimpedance score WNL.  -LB     LTG 1 Progress New  -LB            Time Calculation    PT Goal Re-Cert Due Date 07/10/22  -LB           User Key  (r) = Recorded By, (t) = Taken By, (c) = Cosigned By    Initials Name Provider Type    LB Carissa Funes, PT Physical Therapist                 PT Assessment/Plan     Row Name 04/11/22 1106          PT Assessment    Functional Limitations Other (comment)  at risk for lymphedema; pre-op  -LB     Impairments --  at risk for lymphedema  -LB     Assessment Comments Pt is 36 yo female who presents today for pre-operative examination. She is scheduled for L mastectomy, SLNB, possible ax dissection with Dr. Escudero 4/13/22. She is R handed. She has 2 children, 3 and 9. She demonstrates full AROM and functional strength of BUE. She denies hx of injury to either shoulder. Her baseline bioimpedance is WNL at 4.5. We discussed post-operative rehab expectations, bioimpedance results, and lymphedema etiology. Pt's self reported QuickDASH disability score is 0% disability at today's pre-operative examination. She will be appropriate for continued skilled PT services post-operatively to ensure she returns to her prior level of function from mobility standpoint and continue to monitor bioimpedance.  -LB     Please refer to paper survey for additional self-reported information Yes  -LB     Rehab Potential Good  -LB     Patient/caregiver participated in establishment of treatment plan and goals Yes  -LB     Patient would benefit from skilled therapy intervention Yes  -LB            PT Plan    PT Frequency Other (comment)  -LB     Predicted Duration of Therapy Intervention (PT) return post-operatively for assessment  -LB     Planned CPT's? PT EVAL LOW COMPLEXITY: 35376;PT RE-EVAL: 77094;PT THER PROC EA 15 MIN: 59115;PT THER ACT EA 15 MIN: 30363;PT MANUAL THERAPY EA 15 MIN: 40698;PT SELF CARE/HOME MGMT/TRAIN EA 15: 73069;PT BIS XTRACELL FLUID ANALYSIS: 73827  -LB     PT Plan Comments repeat  bioimpedance post op, assess ROM, lymphedema education, compression garment fitting  -ERNESTINA           User Key  (r) = Recorded By, (t) = Taken By, (c) = Cosigned By    Initials Name Provider Type    Carissa Steel PT Physical Therapist                   Outcome Measure Options: Quick DASH  Quick DASH  Open a tight or new jar.: No Difficulty  Do heavy household chores (e.g., wash walls, wash floors): No Difficulty  Carry a shopping bag or briefcase: No Difficulty  Wash your back: No Difficulty  Use a knife to cut food: No Difficulty  Recreational activities in which you take some force or impact through your arm, should or hand (e.g. golf, hammering, tennis, etc.): No Difficulty  During the past week, to what extent has your arm, shoulder, or hand problem interfered with your normal social activites with family, friends, neighbors or groups?: Not at all  During the past week, were you limited in your work or other regular daily activities as a result of your arm, shoulder or hand problem?: Not limited at all  Arm, Shoulder, or hand pain: None  Tingling (pins and needles) in your arm, shoulder, or hand: None  During the past week, how much difficulty have you had sleeping because of the pain in your arm, shoulder or hand?: No difficulty  Number of Questions Answered: 11  Quick DASH Score: 0  Quick Dash Comments: 0% disability         Time Calculation:   Start Time: 1030  Stop Time: 1100  Time Calculation (min): 30 min  Total Timed Code Minutes- PT: 15 minute(s)  Timed Charges  06102 - PT Self Care/Mgmt Minutes: 15  Total Minutes  Timed Charges Total Minutes: 15   Total Minutes: 15   Therapy Charges for Today     Code Description Service Date Service Provider Modifiers Qty    54039850795 HC PT BIS XTRACELL FLUID ANALYSIS 4/11/2022 Carissa Funes, PT  1    63502410800 HC PT SELF CARE/MGMT/TRAIN EA 15 MIN 4/11/2022 Carissa Funse, PT GP 1    30978474097 HC PT EVAL LOW COMPLEXITY 1 4/11/2022 Carissa Funes, PT GP 1           PT G-Codes  Outcome Measure Options: Quick DASH  Quick DASH Score: 0         Carissa Funes, PT  4/11/2022

## 2022-04-12 ENCOUNTER — LAB (OUTPATIENT)
Dept: LAB | Facility: HOSPITAL | Age: 37
End: 2022-04-12

## 2022-04-12 ENCOUNTER — TELEPHONE (OUTPATIENT)
Dept: SURGERY | Facility: CLINIC | Age: 37
End: 2022-04-12

## 2022-04-12 DIAGNOSIS — C50.412 MALIGNANT NEOPLASM OF UPPER-OUTER QUADRANT OF LEFT BREAST IN FEMALE, ESTROGEN RECEPTOR POSITIVE: ICD-10-CM

## 2022-04-12 DIAGNOSIS — Z17.0 MALIGNANT NEOPLASM OF UPPER-OUTER QUADRANT OF LEFT BREAST IN FEMALE, ESTROGEN RECEPTOR POSITIVE: ICD-10-CM

## 2022-04-12 LAB — SARS-COV-2 ORF1AB RESP QL NAA+PROBE: NOT DETECTED

## 2022-04-12 PROCEDURE — C9803 HOPD COVID-19 SPEC COLLECT: HCPCS

## 2022-04-12 PROCEDURE — U0004 COV-19 TEST NON-CDC HGH THRU: HCPCS

## 2022-04-12 NOTE — TELEPHONE ENCOUNTER
Right breast ultrasound April 11, 2022 [facility] ultrasound retroareolar right breast 7-8 o'clock there is an 8 mm hypoechoic mass immediately deep to the skin.  Consistent with that of a benign complex cyst.  This corresponds to the nonenhancing lesion seen on the breast MRI consistent with a benign etiology.  We will let her know

## 2022-04-13 ENCOUNTER — ANESTHESIA (OUTPATIENT)
Dept: PERIOP | Facility: HOSPITAL | Age: 37
End: 2022-04-13

## 2022-04-13 ENCOUNTER — HOSPITAL ENCOUNTER (OUTPATIENT)
Dept: NUCLEAR MEDICINE | Facility: HOSPITAL | Age: 37
Discharge: HOME | End: 2022-04-13

## 2022-04-13 ENCOUNTER — ANESTHESIA EVENT (OUTPATIENT)
Dept: PERIOP | Facility: HOSPITAL | Age: 37
End: 2022-04-13

## 2022-04-13 ENCOUNTER — HOSPITAL ENCOUNTER (OUTPATIENT)
Facility: HOSPITAL | Age: 37
Discharge: HOME | End: 2022-04-14
Attending: SURGERY | Admitting: SURGERY
Payer: COMMERCIAL

## 2022-04-13 DIAGNOSIS — C50.412 MALIGNANT NEOPLASM OF UPPER-OUTER QUADRANT OF LEFT BREAST IN FEMALE, ESTROGEN RECEPTOR POSITIVE: ICD-10-CM

## 2022-04-13 DIAGNOSIS — Z17.0 MALIGNANT NEOPLASM OF UPPER-OUTER QUADRANT OF LEFT BREAST IN FEMALE, ESTROGEN RECEPTOR POSITIVE: ICD-10-CM

## 2022-04-13 LAB
B-HCG UR QL: NEGATIVE
EXPIRATION DATE: NORMAL
INTERNAL NEGATIVE CONTROL: NEGATIVE
INTERNAL POSITIVE CONTROL: POSITIVE
Lab: NORMAL

## 2022-04-13 PROCEDURE — 25010000002 HYDROMORPHONE PER 4 MG: Performed by: NURSE ANESTHETIST, CERTIFIED REGISTERED

## 2022-04-13 PROCEDURE — C1889 IMPLANT/INSERT DEVICE, NOC: HCPCS | Performed by: SURGERY

## 2022-04-13 PROCEDURE — 19303 MAST SIMPLE COMPLETE: CPT | Performed by: SPECIALIST/TECHNOLOGIST, OTHER

## 2022-04-13 PROCEDURE — 25810000003 LACTATED RINGERS PER 1000 ML: Performed by: ANESTHESIOLOGY

## 2022-04-13 PROCEDURE — 25010000002 EPINEPHRINE PER 0.1 MG: Performed by: SURGERY

## 2022-04-13 PROCEDURE — A9520 TC99 TILMANOCEPT DIAG 0.5MCI: HCPCS | Performed by: SURGERY

## 2022-04-13 PROCEDURE — 25010000002 HYDROMORPHONE PER 4 MG: Performed by: SURGERY

## 2022-04-13 PROCEDURE — 25010000002 ONDANSETRON PER 1 MG: Performed by: NURSE ANESTHETIST, CERTIFIED REGISTERED

## 2022-04-13 PROCEDURE — 25010000002 PROPOFOL 10 MG/ML EMULSION: Performed by: NURSE ANESTHETIST, CERTIFIED REGISTERED

## 2022-04-13 PROCEDURE — 25010000002 DEXAMETHASONE PER 1 MG: Performed by: NURSE ANESTHETIST, CERTIFIED REGISTERED

## 2022-04-13 PROCEDURE — 88307 TISSUE EXAM BY PATHOLOGIST: CPT | Performed by: SURGERY

## 2022-04-13 PROCEDURE — 78195 LYMPH SYSTEM IMAGING: CPT | Performed by: SURGERY

## 2022-04-13 PROCEDURE — 25010000002 FENTANYL CITRATE (PF) 50 MCG/ML SOLUTION: Performed by: NURSE ANESTHETIST, CERTIFIED REGISTERED

## 2022-04-13 PROCEDURE — 88342 IMHCHEM/IMCYTCHM 1ST ANTB: CPT | Performed by: SURGERY

## 2022-04-13 PROCEDURE — 88331 PATH CONSLTJ SURG 1 BLK 1SPC: CPT | Performed by: SURGERY

## 2022-04-13 PROCEDURE — 25010000002 NEOSTIGMINE 5 MG/10ML SOLUTION: Performed by: NURSE ANESTHETIST, CERTIFIED REGISTERED

## 2022-04-13 PROCEDURE — 0 TECHETIUM TC99M TILMANOCEPT: Performed by: SURGERY

## 2022-04-13 PROCEDURE — G0378 HOSPITAL OBSERVATION PER HR: HCPCS

## 2022-04-13 PROCEDURE — 25010000002 DIPHENHYDRAMINE PER 50 MG: Performed by: NURSE ANESTHETIST, CERTIFIED REGISTERED

## 2022-04-13 PROCEDURE — S0260 H&P FOR SURGERY: HCPCS | Performed by: SURGERY

## 2022-04-13 PROCEDURE — 19303 MAST SIMPLE COMPLETE: CPT | Performed by: SURGERY

## 2022-04-13 PROCEDURE — 63710000001 DIPHENHYDRAMINE PER 50 MG: Performed by: SURGERY

## 2022-04-13 PROCEDURE — 25010000002 CEFAZOLIN IN DEXTROSE 2-4 GM/100ML-% SOLUTION: Performed by: SURGERY

## 2022-04-13 PROCEDURE — 25010000002 ONDANSETRON PER 1 MG: Performed by: SURGERY

## 2022-04-13 PROCEDURE — C1789 PROSTHESIS, BREAST, IMP: HCPCS | Performed by: SURGERY

## 2022-04-13 PROCEDURE — 25010000002 GENTAMICIN PER 80 MG: Performed by: PLASTIC SURGERY

## 2022-04-13 PROCEDURE — 81025 URINE PREGNANCY TEST: CPT | Performed by: ANESTHESIOLOGY

## 2022-04-13 PROCEDURE — 38525 BIOPSY/REMOVAL LYMPH NODES: CPT | Performed by: SURGERY

## 2022-04-13 PROCEDURE — 38792 RA TRACER ID OF SENTINL NODE: CPT

## 2022-04-13 DEVICE — CLIP LIGAT VASC HORIZON TI SM/WD RED 24CT: Type: IMPLANTABLE DEVICE | Site: BREAST | Status: FUNCTIONAL

## 2022-04-13 DEVICE — CLIP LIGAT VASC HORIZON TI MD BLU 24CT: Type: IMPLANTABLE DEVICE | Site: BREAST | Status: FUNCTIONAL

## 2022-04-13 DEVICE — GRFT TISS ALLODERM RTM PERF 16X20CM 2.4MM/THK .4MM: Type: IMPLANTABLE DEVICE | Site: BREAST | Status: FUNCTIONAL

## 2022-04-13 DEVICE — TISSUE EXPANDER, SMOOTH SURFACE, INTEGRATED PORTS, FULL HEIGHT, 360-430 CC
Type: IMPLANTABLE DEVICE | Site: BREAST | Status: FUNCTIONAL
Brand: ALLOX2 TISSUE EXPANDER

## 2022-04-13 RX ORDER — FLUMAZENIL 0.1 MG/ML
0.2 INJECTION INTRAVENOUS AS NEEDED
Status: DISCONTINUED | OUTPATIENT
Start: 2022-04-13 | End: 2022-04-13 | Stop reason: HOSPADM

## 2022-04-13 RX ORDER — METOCLOPRAMIDE HYDROCHLORIDE 5 MG/ML
10 INJECTION INTRAMUSCULAR; INTRAVENOUS EVERY 6 HOURS PRN
Status: DISCONTINUED | OUTPATIENT
Start: 2022-04-13 | End: 2022-04-14 | Stop reason: HOSPADM

## 2022-04-13 RX ORDER — GLYCOPYRROLATE 0.2 MG/ML
INJECTION INTRAMUSCULAR; INTRAVENOUS AS NEEDED
Status: DISCONTINUED | OUTPATIENT
Start: 2022-04-13 | End: 2022-04-13 | Stop reason: SURG

## 2022-04-13 RX ORDER — CETIRIZINE HYDROCHLORIDE 10 MG/1
10 TABLET ORAL AS NEEDED
Status: DISCONTINUED | OUTPATIENT
Start: 2022-04-13 | End: 2022-04-14 | Stop reason: HOSPADM

## 2022-04-13 RX ORDER — DEXAMETHASONE SODIUM PHOSPHATE 10 MG/ML
INJECTION INTRAMUSCULAR; INTRAVENOUS AS NEEDED
Status: DISCONTINUED | OUTPATIENT
Start: 2022-04-13 | End: 2022-04-13 | Stop reason: SURG

## 2022-04-13 RX ORDER — MAGNESIUM HYDROXIDE 1200 MG/15ML
LIQUID ORAL AS NEEDED
Status: DISCONTINUED | OUTPATIENT
Start: 2022-04-13 | End: 2022-04-13 | Stop reason: HOSPADM

## 2022-04-13 RX ORDER — PROMETHAZINE HYDROCHLORIDE 25 MG/1
25 SUPPOSITORY RECTAL ONCE AS NEEDED
Status: DISCONTINUED | OUTPATIENT
Start: 2022-04-13 | End: 2022-04-13 | Stop reason: HOSPADM

## 2022-04-13 RX ORDER — NALOXONE HCL 0.4 MG/ML
0.2 VIAL (ML) INJECTION AS NEEDED
Status: DISCONTINUED | OUTPATIENT
Start: 2022-04-13 | End: 2022-04-13 | Stop reason: HOSPADM

## 2022-04-13 RX ORDER — ONDANSETRON 2 MG/ML
4 INJECTION INTRAMUSCULAR; INTRAVENOUS ONCE AS NEEDED
Status: COMPLETED | OUTPATIENT
Start: 2022-04-13 | End: 2022-04-13

## 2022-04-13 RX ORDER — FAMOTIDINE 10 MG/ML
20 INJECTION, SOLUTION INTRAVENOUS ONCE
Status: COMPLETED | OUTPATIENT
Start: 2022-04-13 | End: 2022-04-13

## 2022-04-13 RX ORDER — SODIUM CHLORIDE 0.9 % (FLUSH) 0.9 %
3 SYRINGE (ML) INJECTION EVERY 12 HOURS SCHEDULED
Status: DISCONTINUED | OUTPATIENT
Start: 2022-04-13 | End: 2022-04-13 | Stop reason: HOSPADM

## 2022-04-13 RX ORDER — DIPHENHYDRAMINE HCL 25 MG
25 CAPSULE ORAL EVERY 6 HOURS PRN
Status: DISCONTINUED | OUTPATIENT
Start: 2022-04-13 | End: 2022-04-14 | Stop reason: HOSPADM

## 2022-04-13 RX ORDER — HYDROCODONE BITARTRATE AND ACETAMINOPHEN 5; 325 MG/1; MG/1
1 TABLET ORAL EVERY 4 HOURS PRN
Status: DISCONTINUED | OUTPATIENT
Start: 2022-04-13 | End: 2022-04-14 | Stop reason: HOSPADM

## 2022-04-13 RX ORDER — SCOLOPAMINE TRANSDERMAL SYSTEM 1 MG/1
1 PATCH, EXTENDED RELEASE TRANSDERMAL ONCE
Status: COMPLETED | OUTPATIENT
Start: 2022-04-13 | End: 2022-04-14

## 2022-04-13 RX ORDER — FENTANYL CITRATE 50 UG/ML
INJECTION, SOLUTION INTRAMUSCULAR; INTRAVENOUS AS NEEDED
Status: DISCONTINUED | OUTPATIENT
Start: 2022-04-13 | End: 2022-04-13 | Stop reason: SURG

## 2022-04-13 RX ORDER — DEXTROSE MONOHYDRATE, SODIUM CHLORIDE, AND POTASSIUM CHLORIDE 50; 1.49; 4.5 G/1000ML; G/1000ML; G/1000ML
75 INJECTION, SOLUTION INTRAVENOUS CONTINUOUS
Status: DISCONTINUED | OUTPATIENT
Start: 2022-04-13 | End: 2022-04-14 | Stop reason: HOSPADM

## 2022-04-13 RX ORDER — HYDRALAZINE HYDROCHLORIDE 20 MG/ML
5 INJECTION INTRAMUSCULAR; INTRAVENOUS
Status: DISCONTINUED | OUTPATIENT
Start: 2022-04-13 | End: 2022-04-13 | Stop reason: HOSPADM

## 2022-04-13 RX ORDER — DIPHENHYDRAMINE HCL 25 MG
25 CAPSULE ORAL
Status: DISCONTINUED | OUTPATIENT
Start: 2022-04-13 | End: 2022-04-13 | Stop reason: HOSPADM

## 2022-04-13 RX ORDER — HYDROMORPHONE HYDROCHLORIDE 1 MG/ML
0.5 INJECTION, SOLUTION INTRAMUSCULAR; INTRAVENOUS; SUBCUTANEOUS
Status: DISCONTINUED | OUTPATIENT
Start: 2022-04-13 | End: 2022-04-13 | Stop reason: HOSPADM

## 2022-04-13 RX ORDER — NEOSTIGMINE METHYLSULFATE 0.5 MG/ML
INJECTION, SOLUTION INTRAVENOUS AS NEEDED
Status: DISCONTINUED | OUTPATIENT
Start: 2022-04-13 | End: 2022-04-13 | Stop reason: SURG

## 2022-04-13 RX ORDER — HYDROMORPHONE HYDROCHLORIDE 1 MG/ML
0.5 INJECTION, SOLUTION INTRAMUSCULAR; INTRAVENOUS; SUBCUTANEOUS
Status: DISCONTINUED | OUTPATIENT
Start: 2022-04-13 | End: 2022-04-14 | Stop reason: HOSPADM

## 2022-04-13 RX ORDER — LIDOCAINE HYDROCHLORIDE 10 MG/ML
0.5 INJECTION, SOLUTION EPIDURAL; INFILTRATION; INTRACAUDAL; PERINEURAL ONCE AS NEEDED
Status: DISCONTINUED | OUTPATIENT
Start: 2022-04-13 | End: 2022-04-13 | Stop reason: HOSPADM

## 2022-04-13 RX ORDER — HYDROMORPHONE HCL 110MG/55ML
PATIENT CONTROLLED ANALGESIA SYRINGE INTRAVENOUS AS NEEDED
Status: DISCONTINUED | OUTPATIENT
Start: 2022-04-13 | End: 2022-04-13 | Stop reason: SURG

## 2022-04-13 RX ORDER — SODIUM CHLORIDE 0.9 % (FLUSH) 0.9 %
3-10 SYRINGE (ML) INJECTION AS NEEDED
Status: DISCONTINUED | OUTPATIENT
Start: 2022-04-13 | End: 2022-04-13 | Stop reason: HOSPADM

## 2022-04-13 RX ORDER — PROMETHAZINE HYDROCHLORIDE 25 MG/1
25 TABLET ORAL ONCE AS NEEDED
Status: DISCONTINUED | OUTPATIENT
Start: 2022-04-13 | End: 2022-04-13 | Stop reason: HOSPADM

## 2022-04-13 RX ORDER — ROCURONIUM BROMIDE 10 MG/ML
INJECTION, SOLUTION INTRAVENOUS AS NEEDED
Status: DISCONTINUED | OUTPATIENT
Start: 2022-04-13 | End: 2022-04-13 | Stop reason: SURG

## 2022-04-13 RX ORDER — ONDANSETRON 2 MG/ML
4 INJECTION INTRAMUSCULAR; INTRAVENOUS EVERY 6 HOURS PRN
Status: DISCONTINUED | OUTPATIENT
Start: 2022-04-13 | End: 2022-04-14 | Stop reason: HOSPADM

## 2022-04-13 RX ORDER — LIDOCAINE HYDROCHLORIDE 20 MG/ML
INJECTION, SOLUTION INFILTRATION; PERINEURAL AS NEEDED
Status: DISCONTINUED | OUTPATIENT
Start: 2022-04-13 | End: 2022-04-13 | Stop reason: SURG

## 2022-04-13 RX ORDER — ATORVASTATIN CALCIUM 20 MG/1
10 TABLET, FILM COATED ORAL DAILY
Status: DISCONTINUED | OUTPATIENT
Start: 2022-04-13 | End: 2022-04-14 | Stop reason: HOSPADM

## 2022-04-13 RX ORDER — OXYCODONE AND ACETAMINOPHEN 7.5; 325 MG/1; MG/1
1 TABLET ORAL EVERY 4 HOURS PRN
Status: DISCONTINUED | OUTPATIENT
Start: 2022-04-13 | End: 2022-04-13 | Stop reason: HOSPADM

## 2022-04-13 RX ORDER — SODIUM CHLORIDE, SODIUM LACTATE, POTASSIUM CHLORIDE, CALCIUM CHLORIDE 600; 310; 30; 20 MG/100ML; MG/100ML; MG/100ML; MG/100ML
9 INJECTION, SOLUTION INTRAVENOUS CONTINUOUS
Status: DISCONTINUED | OUTPATIENT
Start: 2022-04-13 | End: 2022-04-14 | Stop reason: HOSPADM

## 2022-04-13 RX ORDER — ONDANSETRON 2 MG/ML
INJECTION INTRAMUSCULAR; INTRAVENOUS AS NEEDED
Status: DISCONTINUED | OUTPATIENT
Start: 2022-04-13 | End: 2022-04-13 | Stop reason: SURG

## 2022-04-13 RX ORDER — ONDANSETRON 4 MG/1
4 TABLET, FILM COATED ORAL EVERY 6 HOURS PRN
Status: DISCONTINUED | OUTPATIENT
Start: 2022-04-13 | End: 2022-04-14 | Stop reason: HOSPADM

## 2022-04-13 RX ORDER — SODIUM CHLORIDE, SODIUM LACTATE, POTASSIUM CHLORIDE, CALCIUM CHLORIDE 600; 310; 30; 20 MG/100ML; MG/100ML; MG/100ML; MG/100ML
100 INJECTION, SOLUTION INTRAVENOUS CONTINUOUS
Status: DISCONTINUED | OUTPATIENT
Start: 2022-04-13 | End: 2022-04-14 | Stop reason: HOSPADM

## 2022-04-13 RX ORDER — DIAZEPAM 5 MG
5 TABLET ORAL EVERY 8 HOURS PRN
Status: DISCONTINUED | OUTPATIENT
Start: 2022-04-13 | End: 2022-04-14 | Stop reason: HOSPADM

## 2022-04-13 RX ORDER — HYDROCODONE BITARTRATE AND ACETAMINOPHEN 7.5; 325 MG/1; MG/1
1 TABLET ORAL ONCE AS NEEDED
Status: DISCONTINUED | OUTPATIENT
Start: 2022-04-13 | End: 2022-04-13 | Stop reason: HOSPADM

## 2022-04-13 RX ORDER — ACETAMINOPHEN 325 MG/1
650 TABLET ORAL EVERY 4 HOURS PRN
Status: DISCONTINUED | OUTPATIENT
Start: 2022-04-13 | End: 2022-04-14 | Stop reason: HOSPADM

## 2022-04-13 RX ORDER — FENTANYL CITRATE 50 UG/ML
50 INJECTION, SOLUTION INTRAMUSCULAR; INTRAVENOUS
Status: DISCONTINUED | OUTPATIENT
Start: 2022-04-13 | End: 2022-04-13 | Stop reason: HOSPADM

## 2022-04-13 RX ORDER — EPHEDRINE SULFATE 50 MG/ML
5 INJECTION, SOLUTION INTRAVENOUS ONCE AS NEEDED
Status: DISCONTINUED | OUTPATIENT
Start: 2022-04-13 | End: 2022-04-13 | Stop reason: HOSPADM

## 2022-04-13 RX ORDER — PROPOFOL 10 MG/ML
VIAL (ML) INTRAVENOUS AS NEEDED
Status: DISCONTINUED | OUTPATIENT
Start: 2022-04-13 | End: 2022-04-13 | Stop reason: SURG

## 2022-04-13 RX ORDER — NALOXONE HCL 0.4 MG/ML
0.1 VIAL (ML) INJECTION
Status: DISCONTINUED | OUTPATIENT
Start: 2022-04-13 | End: 2022-04-14 | Stop reason: HOSPADM

## 2022-04-13 RX ORDER — LABETALOL HYDROCHLORIDE 5 MG/ML
5 INJECTION, SOLUTION INTRAVENOUS
Status: DISCONTINUED | OUTPATIENT
Start: 2022-04-13 | End: 2022-04-13 | Stop reason: HOSPADM

## 2022-04-13 RX ORDER — CEFAZOLIN SODIUM 2 G/100ML
2 INJECTION, SOLUTION INTRAVENOUS ONCE
Status: COMPLETED | OUTPATIENT
Start: 2022-04-13 | End: 2022-04-13

## 2022-04-13 RX ORDER — HYDROCODONE BITARTRATE AND ACETAMINOPHEN 5; 325 MG/1; MG/1
2 TABLET ORAL EVERY 4 HOURS PRN
Status: DISCONTINUED | OUTPATIENT
Start: 2022-04-13 | End: 2022-04-14 | Stop reason: HOSPADM

## 2022-04-13 RX ORDER — CEFAZOLIN SODIUM 2 G/100ML
2 INJECTION, SOLUTION INTRAVENOUS EVERY 8 HOURS
Status: COMPLETED | OUTPATIENT
Start: 2022-04-13 | End: 2022-04-14

## 2022-04-13 RX ORDER — MIDAZOLAM HYDROCHLORIDE 1 MG/ML
1 INJECTION INTRAMUSCULAR; INTRAVENOUS
Status: DISCONTINUED | OUTPATIENT
Start: 2022-04-13 | End: 2022-04-13 | Stop reason: HOSPADM

## 2022-04-13 RX ORDER — ACETAMINOPHEN 650 MG/1
650 SUPPOSITORY RECTAL EVERY 4 HOURS PRN
Status: DISCONTINUED | OUTPATIENT
Start: 2022-04-13 | End: 2022-04-14 | Stop reason: HOSPADM

## 2022-04-13 RX ORDER — DIPHENHYDRAMINE HYDROCHLORIDE 50 MG/ML
12.5 INJECTION INTRAMUSCULAR; INTRAVENOUS
Status: DISCONTINUED | OUTPATIENT
Start: 2022-04-13 | End: 2022-04-13 | Stop reason: HOSPADM

## 2022-04-13 RX ORDER — DIAZEPAM 5 MG
10 TABLET ORAL ONCE
Status: COMPLETED | OUTPATIENT
Start: 2022-04-13 | End: 2022-04-13

## 2022-04-13 RX ADMIN — DIPHENHYDRAMINE HYDROCHLORIDE 25 MG: 25 CAPSULE ORAL at 21:55

## 2022-04-13 RX ADMIN — FENTANYL CITRATE 50 MCG: 0.05 INJECTION, SOLUTION INTRAMUSCULAR; INTRAVENOUS at 09:24

## 2022-04-13 RX ADMIN — ONDANSETRON 4 MG: 2 INJECTION INTRAMUSCULAR; INTRAVENOUS at 11:18

## 2022-04-13 RX ADMIN — FENTANYL CITRATE 25 MCG: 0.05 INJECTION, SOLUTION INTRAMUSCULAR; INTRAVENOUS at 09:07

## 2022-04-13 RX ADMIN — PROPOFOL 60 MG: 10 INJECTION, EMULSION INTRAVENOUS at 10:16

## 2022-04-13 RX ADMIN — NEOSTIGMINE METHYLSULFATE 2 MG: 0.5 INJECTION INTRAVENOUS at 11:27

## 2022-04-13 RX ADMIN — SODIUM CHLORIDE, POTASSIUM CHLORIDE, SODIUM LACTATE AND CALCIUM CHLORIDE 9 ML/HR: 600; 310; 30; 20 INJECTION, SOLUTION INTRAVENOUS at 06:51

## 2022-04-13 RX ADMIN — FENTANYL CITRATE 25 MCG: 0.05 INJECTION, SOLUTION INTRAMUSCULAR; INTRAVENOUS at 09:52

## 2022-04-13 RX ADMIN — FENTANYL CITRATE 50 MCG: 0.05 INJECTION, SOLUTION INTRAMUSCULAR; INTRAVENOUS at 11:36

## 2022-04-13 RX ADMIN — CEFAZOLIN SODIUM 2 G: 2 INJECTION, SOLUTION INTRAVENOUS at 08:58

## 2022-04-13 RX ADMIN — CEFAZOLIN SODIUM 2 G: 2 INJECTION, SOLUTION INTRAVENOUS at 17:37

## 2022-04-13 RX ADMIN — DIAZEPAM 10 MG: 5 TABLET ORAL at 06:51

## 2022-04-13 RX ADMIN — POTASSIUM CHLORIDE, DEXTROSE MONOHYDRATE AND SODIUM CHLORIDE 75 ML/HR: 150; 5; 450 INJECTION, SOLUTION INTRAVENOUS at 20:47

## 2022-04-13 RX ADMIN — HYDROMORPHONE HYDROCHLORIDE 1 MG: 2 INJECTION, SOLUTION INTRAMUSCULAR; INTRAVENOUS; SUBCUTANEOUS at 10:16

## 2022-04-13 RX ADMIN — ONDANSETRON 4 MG: 2 INJECTION INTRAMUSCULAR; INTRAVENOUS at 18:58

## 2022-04-13 RX ADMIN — DIPHENHYDRAMINE HYDROCHLORIDE 12.5 MG: 50 INJECTION, SOLUTION INTRAMUSCULAR; INTRAVENOUS at 12:58

## 2022-04-13 RX ADMIN — DEXAMETHASONE SODIUM PHOSPHATE 8 MG: 10 INJECTION INTRAMUSCULAR; INTRAVENOUS at 09:23

## 2022-04-13 RX ADMIN — SODIUM CHLORIDE, POTASSIUM CHLORIDE, SODIUM LACTATE AND CALCIUM CHLORIDE: 600; 310; 30; 20 INJECTION, SOLUTION INTRAVENOUS at 10:06

## 2022-04-13 RX ADMIN — PROPOFOL 140 MG: 10 INJECTION, EMULSION INTRAVENOUS at 09:07

## 2022-04-13 RX ADMIN — GLYCOPYRROLATE 0.3 MG: 0.2 INJECTION INTRAMUSCULAR; INTRAVENOUS at 11:27

## 2022-04-13 RX ADMIN — LIDOCAINE HYDROCHLORIDE 60 MG: 20 INJECTION, SOLUTION INFILTRATION; PERINEURAL at 09:07

## 2022-04-13 RX ADMIN — SCOPALAMINE 1 PATCH: 1 PATCH, EXTENDED RELEASE TRANSDERMAL at 06:51

## 2022-04-13 RX ADMIN — FAMOTIDINE 20 MG: 10 INJECTION INTRAVENOUS at 06:51

## 2022-04-13 RX ADMIN — FENTANYL CITRATE 50 MCG: 50 INJECTION INTRAMUSCULAR; INTRAVENOUS at 12:58

## 2022-04-13 RX ADMIN — HYDROMORPHONE HYDROCHLORIDE 0.5 MG: 1 INJECTION, SOLUTION INTRAMUSCULAR; INTRAVENOUS; SUBCUTANEOUS at 20:47

## 2022-04-13 RX ADMIN — TILMANOCEPT 1 DOSE: KIT at 07:32

## 2022-04-13 RX ADMIN — FENTANYL CITRATE 50 MCG: 0.05 INJECTION, SOLUTION INTRAMUSCULAR; INTRAVENOUS at 11:29

## 2022-04-13 RX ADMIN — ONDANSETRON 4 MG: 2 INJECTION INTRAMUSCULAR; INTRAVENOUS at 12:05

## 2022-04-13 RX ADMIN — DIAZEPAM 5 MG: 5 TABLET ORAL at 16:04

## 2022-04-13 RX ADMIN — FENTANYL CITRATE 50 MCG: 50 INJECTION INTRAMUSCULAR; INTRAVENOUS at 12:04

## 2022-04-13 RX ADMIN — HYDROCODONE BITARTRATE AND ACETAMINOPHEN 2 TABLET: 5; 325 TABLET ORAL at 18:57

## 2022-04-13 RX ADMIN — ROCURONIUM BROMIDE 35 MG: 50 INJECTION INTRAVENOUS at 09:07

## 2022-04-13 RX ADMIN — HYDROMORPHONE HYDROCHLORIDE 0.5 MG: 1 INJECTION, SOLUTION INTRAMUSCULAR; INTRAVENOUS; SUBCUTANEOUS at 12:26

## 2022-04-13 RX ADMIN — PROPOFOL 25 MCG/KG/MIN: 10 INJECTION, EMULSION INTRAVENOUS at 09:13

## 2022-04-13 NOTE — OP NOTE
Operative note    Preoperative Diagnosis: Breast cancer     Postoperative Diagnosis: Breast cancer    Procedure Performed: left mastectomy and left DEEP axillary sentinel node biopsy with lymphoscintographic guidance. Reconstruction immediately following, tissue expanders with AlloDerm.    Dictating physician and surgeon: Monisha Escudero MD    Plastic Surgeon: Hermelinda Johnson MD    First asst: Joy Aime      was responsible for performing the following activities: Retraction, Suction, Irrigation and Suturing and their skilled assistance was necessary for the success of this case.       EBL:30cc    FINDINGS AND DESCRIPTION OF PROCEDURE:     The patient was brought to the operating room and placed on the table in the supine position. After adequate general ETT anesthesia was obtained, we sterilely prepped and draped the breast and axilla. We did a time out to identify correct patient and correct operative site.  She did receive IV antibiotic within an hour of and prior to incision.     For the mastectomy, I performed the following procedure:  I tumesced the mastectomy flaps using 210 mL of 1:500,000 epinephrine in normal saline.  I tumesced superiorly to the clavicle, inferiorly to the inframammary fold, medially to the sternum and laterally to the anterior axillary line.  I then incised a skin sparing ellipse of skin surrounding the nipple areolar complex using a 10 blade. I then sharply dissected using a 10 blade and a long curved Roblero scissors superiorly to the clavicle, inferiorly to the inframammary fold, medially to the sternum and laterally to the anterior axillary line. I then scored the perimeter of the specimen, the pectoral fascia, circumferentially. I then lifted the pectoral fascia off of the pectoralis major, as the posterior margin of the specimen.    I then labelled  the specimen stitch marks 12:00 and passed it from the field.    Next, I turned my attention to the axilla. I used the Neoprobe at  the start of the case to ensure that the radiotracer had migrated to the axilla, which it had.  I used Bovie electrocautery for dissection and the gamma probe for guidance, to remove the lymph nodes demonstrating radiopharmaceutical uptake.     There were 3 sentinel nodes removed. Counts 2300, 1280,, 900. All were grossly normal. These were sent for frozen section pathology and were found to be benign.    I then irrigated, assured hemostasis and plastics then came in to do the reconstructive portion of the case.    She tolerated the procedure well, there were no immediate complications, and all counts were correct at the end of the case.

## 2022-04-13 NOTE — H&P
There are no changes in the history or physical exam, aside from any that are listed as an addendum at the bottom of this document.   Today, patient will go for the surgery listed in the plan below.    Chief Complaint: Kylah Lynn is a 37 y.o. female who was seen in consultation at the request of Heidy Gutierrez MD  for newly diagnosed breast cancer    History of Present Illness:  Patient presents with newly diagnosed breast cancer.  She noted a left breast mass in December.  No associated pain or skin changes or nipple discharge.  She noted no other new masses, skin changes, nipple discharge, nipple changes prior to her most recent imaging.  Her most recent imaging includes the following:  10/04/2017   BILATERAL DIAGNOSTIC MAMMO & BILATERAL BREAST US   [facility]   KYLAH LYNN   No family history. Baseline study.  MMG:  Heterogeneously dense.  Finding 1: Area of asymmetric breast tissue seen in the left breast in the 1:30 o’clock region and in the upper outer region.  US:  Finding 1: Elongated isoechoic area 3 cm and is most consistent with a prominent lobule. This correlates to the palpable area.  Finding 2: Two oval complicated cyst versus solid masses right breast. This is an incidental finding.  IMPRESSION:  Finding 1: Ultrasound of the left breast in 6 months.  Finding 2: Ultrasound of the right breast in 6 months is recommended.  BI-RADS Category 3: Probably Benign.    02/09/2022   BILATERAL DIAGNOSTIC MAMMO WITH MARY & RIGHT LIMITED/LEFT COMPLETE BREAST US   [facility]   KYLAH LYNN   Palpable lump or thickening in the left breast.  MMG:  Heterogeneously dense.  Finding 1: There is a new area of architectural distortion seen in the left upper outer breast. This correlates with the pinpointed palpable area at 2:30-3:00. The extent is difficult to measure mammographically.  Finding 2: New focal asymmetry seen in the left upper outer breast, just superior and  lateral to the above finding.  Finding 3: There is a new focal asymmetry seen in the left breast upper outer quadrant.  US:  Finding 1: Irregular hypoechoic area versus non-parallel hypoechoic mass with posterior acoustic shadowing and indistinct and spiculated margins measuring 17 x 20 x 16 mm, in the 2:30 o’clock region of the left breast, 6 centimeters from the nipple.  Finding 2: Irregular non-parallel hypoechoic mass 11 x 10 x 11 mm seen in the 1:30 o’clock region of the left breast located 6 centimeters from the nipple.  Finding 3: Oval parallel hypoechoic mass 4 x 3 x 4 mm seen in the left breast at 1 o’clock located 4 centimeters from the nipple.  Finding 4: Stable oval parallel mass with circumscribed margins measuring 12 x 6 x 8 mm seen in the right breast at 3 o’clock located 5 centimeters from the nipple.  Finding 5: Oval mass measuring 8 x 5 x 7 mm seen in the sub-areolar region of the right breast. Finding has decreased in size.  IMPRESSION:  Finding 1: New palpable architectural distortion and mass in the 2:30 o’clock region of the left breast located 6 centimeters from the nipple is highly suggestive of malignancy.  Finding 2: Mass in the 1:30 o’clock region of the left breast located 6 centimeters from the nipple is suspicious.  Finding 3: Mass in the left breast at 1 o’clock located 4 centimeters from the nipple is suspicious.  Finding 4: Stable mass in the right breast since 2017 is considered benign.  Finding 5: Considered benign.  BI-RADS Category 5.      She had a biopsy on the following day that showed:   02/17/2022 LEFT US GUIDED BIOPSY     [facility]     KYLAH JOYCE  Let breast 1:00. 12 gauge Suros. 7 cores. [place] shaped s-pravin tissue marker was placed. Clip in the expected position. Pathology returned as benign breast tissue with complex sclerosing lesion. Pathology high risk and concordant.  Left breast 1:30. 12 gauge. 9 cores were obtained. A tophat shaped s-pravin tissue  marker was placed. Clip in the expected position. Invasive lobular carcinoma. Malignant and concordant.  Left breast at the 2:30 position. 12 gauge core needle Suros. 14 cores were obtained. A Tumark Pro Q shaped tissue marker was placed at the biopsy site. Clip in the expected position. Invasive lobular carcinoma.  PATHOLOGY:  1. Breast, left, 1 o’clock, biopsy:  Benign breast tissue with complex sclerosing lesion.  2. Breast, left, 1:30 o’clock, biopsy:  Invasive lobular carcinoma, histologic grade 2 (tubules = 3, nuclei = 2, mitosis = 1), largest focus measures 9 mm.  ER: 97.01%  IA: 94.9%  HER2: 1+  Ki-67: 32.71%  3. Breast, left, 2:30 o’clock, biopsy:  Invasive lobular carcinoma, histologic grade 2 (tubules = 3, nuclei = 2, mitosis = 1), largest focus measures 10 mm.  ER: 89.94%  IA: 95.74%  HER2: Score 0  Ki-67: 39.2%    We then arranged for a breast MRI:    03/04/22 [facility] MRI BREAST BILATERAL   KYLAH HENSLEY   1.  Suspicious mass and nonmass enhancement measuring up to 7.1 cm in the outer middle and posterior left breast encompasses 2 sites of biopsy-proven malignancy marked with Q and top hat clips. A biopsy clip (mini cork) with adjacent faint ill-defined nonmass enhancement inferior to the biopsy clip in the outer left breast represents a site of biopsy-proven complex sclerosing lesion. Surgical management isrecommended.  2.  No MRI evidence of malignancy in the right breast.   BI-RADS Category 6: Known biopsy-proven malignancy        She has not had a breast biopsy in the past.  She has her uterus and ovaries, is premenopausal, and takes nor hormones. She had a mirena removed in June 2021 and uses no birth control  Her family history includes the following: He has 1 daughter, 1 son, 1 sister, 2 maternal aunts, one paternal aunt, no family history of breast or ovarian cancer.  She does have a paternal aunt who had melanoma.    She is here for evaluation.    Review of Systems:  Review of  Systems   All other systems reviewed and are negative.       Past Medical and Surgical History:  Breast Biopsy History:  Patient had not had a breast biopsy prior to her cancer diagnosis.  Breast Cancer HIstory:  Patient does not have a past medical history of breast cancer.  Breast Operations, and year:  NONE   Obstetric/Gynecologic History:  Age menstrual periods began: 11  Patient is premenopausal, first day of last period: 21  Number of pregnancies:2  Number of live births: 2  Number of abortions or miscarriages: 0  Age of delivery of first child: 28  Patient breast fed, for the following lenth of time: 10 MONTHS   Length of time taking birth control pills: N/A   Patient has never taken hormone replacement    PATIENT HAS BOTH OVARIES AND UTERUS  Past Surgical History:   Procedure Laterality Date   • APPENDECTOMY     •  SECTION     •  SECTION N/A 12/10/2018    Procedure:  SECTION REPEAT;  Surgeon: Heidy Gutierrez MD;  Location: [facility] LABOR DELIVERY;  Service: Obstetrics/Gynecology   • D & C HYSTEROSCOPY N/A 2021    Procedure: HYSTEROSCOPY REMOVAL INTRAUTERINE DEVICE WILL NEED ULTRASOUND IN ROOM;  Surgeon: Heidy Gutierrez MD;  [place] [facility] [place] [facility]  Service: Obstetrics/Gynecology;  Laterality: N/A;   • KNEE ARTHROSCOPY Right      Past Medical History:   Diagnosis Date   • Anesthesia complication     hypotension with last c section   • History of COVID-19     2021   • Pelvic pain    • PONV (postoperative nausea and vomiting)    • Seasonal allergies        Prior Hospitalizations, other than for surgery or childbirth, and year:  NONE     Social History     Socioeconomic History   • Marital status:    Tobacco Use   • Smoking status: Never Smoker   • Smokeless tobacco: Never Used   Vaping Use   • Vaping Use: Never used   Substance and Sexual Activity   • Alcohol use: Yes     Comment: occasionally   • Drug use: No   • Sexual activity: Defer     Patient is  ".  Patient is a homemaker.  Patient drinks 1 servings of caffeine per day.    Family History:  Family History   Problem Relation Age of Onset   • Melanoma Paternal Aunt    • Malig Hyperthermia Neg Hx        Vital Signs:  /66   Pulse 72   Ht 152.8 cm (60.15\")   Wt 56.7 kg (125 lb)   LMP 01/23/2022 (Approximate)   SpO2 98%   Breastfeeding No   BMI 24.29 kg/m²      Medications:    Current Outpatient Medications:   •  atorvastatin (LIPITOR) 10 MG tablet, Take 10 mg by mouth Daily., Disp: , Rfl:   •  cetirizine (zyrTEC) 10 MG tablet, Take 10 mg by mouth As Needed for Allergies., Disp: , Rfl:      Allergies:  Allergies   Allergen Reactions   • Asa [Aspirin] Anaphylaxis       Physical Examination:  /66   Pulse 72   Ht 152.8 cm (60.15\")   Wt 56.7 kg (125 lb)   LMP 01/23/2022 (Approximate)   SpO2 98%   Breastfeeding No   BMI 24.29 kg/m²   General Appearance:  Patient is in no distress.  She is well kept and has an average build.   Psychiatric:  Patient with appropriate mood and affect. Alert and oriented to self, time, and place.    Breast, RIGHT:  large sized, 34C, symmetric with the contralateral side.  Breast skin is without erythema, edema, rashes.  There are no visible abnormalities upon inspection during the arm-raising maneuver or with hands on hips in the sitting position. There is no nipple retraction, discharge or nipple/areolar skin changes.There are no masses palpable in the sitting or supine positions.    Breast, LEFT:  large sized, 34C, symmetric with the contralateral side.  Breast skin is without erythema, edema, rashes.  There are no visible abnormalities upon inspection during the arm-raising maneuver or with hands on hips in the sitting position. There is no nipple retraction, discharge or nipple/areolar skin changes.there are palpable masses in the left upper outer and lateral left breast at the sites of known malignancy.  Located at the 1:30, 6 cm from the nipple location " and a 230, 6 cm from the nipple location.  No overlying skin changes.  Really mobile.  There are no other masses palpable in the sitting or supine positions.    Lymphatic:  There is no axillary, cervical, infraclavicular, or supraclavicular adenopathy bilaterally.  Eyes:  Pupils are round and reactive to light.  Cardiovascular:  Heart rate and rhythm are regular.  Respiratory:  Lungs are clear bilaterally with no crackles or wheezes in any lung field.  Gastrointestinal:  Abdomen is soft, nondistended, and nontender    Musculoskeletal:  Good strength in all 4 extremities.   There is good range of motion in both shoulders.    Skin:  No new skin lesions or rashes on the skin excluding the breast (see breast exam above).        Imaging:  10/04/2017   [facility]   No family history. Baseline study.  MMG:  Heterogeneously dense.  Finding 1: Area of asymmetric breast tissue seen in the left breast in the 1:30 o’clock region and in the upper outer region.  US:  Finding 1: Elongated isoechoic area 3 cm and is most consistent with a prominent lobule. This correlates to the palpable area.  Finding 2: Two oval complicated cyst versus solid masses right breast. This is an incidental finding.  IMPRESSION:  Finding 1: Ultrasound of the left breast in 6 months.  Finding 2: Ultrasound of the right breast in 6 months is recommended.  BI-RADS Category 3: Probably Benign.    02/09/2022   BILATERAL DIAGNOSTIC MAMMO WITH MARY & RIGHT LIMITED/LEFT COMPLETE BREAST [facility]   Palpable lump or thickening in the left breast.  MMG:  Heterogeneously dense.  Finding 1: There is a new area of architectural distortion seen in the left upper outer breast. This correlates with the pinpointed palpable area at 2:30-3:00. The extent is difficult to measure mammographically.  Finding 2: New focal asymmetry seen in the left upper outer breast,  just superior and lateral to the above finding.  Finding 3: There is a new focal asymmetry seen in the left breast upper outer quadrant.  US:  Finding 1: Irregular hypoechoic area versus non-parallel hypoechoic mass with posterior acoustic shadowing and indistinct and spiculated margins measuring 17 x 20 x 16 mm, in the 2:30 o’clock region of the left breast, 6 centimeters from the nipple.  Finding 2: Irregular non-parallel hypoechoic mass 11 x 10 x 11 mm seen in the 1:30 o’clock region of the left breast located 6 centimeters from the nipple.  Finding 3: Oval parallel hypoechoic mass 4 x 3 x 4 mm seen in the left breast at 1 o’clock located 4 centimeters from the nipple.  Finding 4: Stable oval parallel mass with circumscribed margins measuring 12 x 6 x 8 mm seen in the right breast at 3 o’clock located 5 centimeters from the nipple.  Finding 5: Oval mass measuring 8 x 5 x 7 mm seen in the sub-areolar region of the right breast. Finding has decreased in size.  IMPRESSION:  Finding 1: New palpable architectural distortion and mass in the 2:30 o’clock region of the left breast located 6 centimeters from the nipple is highly suggestive of malignancy.  Finding 2: Mass in the 1:30 o’clock region of the left breast located 6 centimeters from the nipple is suspicious.  Finding 3: Mass in the left breast at 1 o’clock located 4 centimeters from the nipple is suspicious.  Finding 4: Stable mass in the right breast since 2017 is considered benign.  Finding 5: Considered benign.  BI-RADS Category 5.    03/04/22 [facility] MRI BREAST BILATERAL   KYLAH HENSLEY   1.  Suspicious mass and nonmass enhancement measuring up to 7.1 cm in the outer middle and posterior left breast encompasses 2 sites of biopsy-proven malignancy marked with Q and top hat clips. A biopsy clip (mini cork) with adjacent faint ill-defined nonmass enhancement inferior to the biopsy clip in the outer left breast represents a site of biopsy-proven  complex sclerosing lesion. Surgical management isrecommended.  2.  No MRI evidence of malignancy in the right breast.   BI-RADS Category 6: Known biopsy-proven malignancy        Pathology:  02/17/2022 LEFT US GUIDED BIOPSY     [facility]     KYLAH JOYCE  Let breast 1:00. 12 gauge Suros. 7 cores. [place] shaped s-pravin tissue marker was placed. Clip in the expected position. Pathology returned as benign breast tissue with complex sclerosing lesion. Pathology high risk and concordant.  Left breast 1:30. 12 gauge. 9 cores were obtained. A tophat shaped s-pravin tissue marker was placed. Clip in the expected position. Invasive lobular carcinoma. Malignant and concordant.  Left breast at the 2:30 position. 12 gauge core needle Suros. 14 cores were obtained. A Tumark Pro Q shaped tissue marker was placed at the biopsy site. Clip in the expected position. Invasive lobular carcinoma.  PATHOLOGY:  1. Breast, left, 1 o’clock, biopsy:  Benign breast tissue with complex sclerosing lesion.  2. Breast, left, 1:30 o’clock, biopsy:  Invasive lobular carcinoma, histologic grade 2 (tubules = 3, nuclei = 2, mitosis = 1), largest focus measures 9 mm.  ER: 97.01%  SD: 94.9%  HER2: 1+  Ki-67: 32.71%  3. Breast, left, 2:30 o’clock, biopsy:  Invasive lobular carcinoma, histologic grade 2 (tubules = 3, nuclei = 2, mitosis = 1), largest focus measures 10 mm.  ER: 89.94%  SD: 95.74%  HER2: Score 0  Ki-67: 39.2%    Procedures:      Assessment:   Diagnosis Plan   1. Malignant neoplasm of upper-outer quadrant of left breast in female, estrogen receptor positive (HCC)     2. Abnormal MRI, breast     3. Complex sclerosing lesion of left breast     1-  LEFT multifocal malignancy  Left breast 130, 6 cm from the nipple, 1.1 cm on ultrasound.  Top- in good position, 1.2 cm on MRI, invasive lobular carcinoma, intermediate grade, 3, 2, 1, 9 mm in a core, estrogen 97, progesterone 95, HER-2/leonie 1+, Ki-67 33%.    Left breast 230, 6 cm  from the nipple, 2 cm on ultrasound-palpable lesion, 2 cm on ultrasound, 6.1 cm on MRI.  Q. marker in good position.  Invasive lobular carcinoma, intermediate grade, 3, 2, 1, 1 cm in greatest dimension, estrogen 90, progesterone 96, HER-2/leonie 0, Ki-67 39%.    Clinical stage mT3N0 stage IIb.      2-  RIGHT immediate retroareolar 7:-8:00- felt to be sebaceous cyst on MRI. Will obtain ultrasound      3-  Left breast 1:00, 4 cm in the nipple, 4 mm on ultrasound, complex sclerosing lesion cork marker.      Plan:  Maribell is here today wit her  Herb.  We reviewed her history, imaging, imaging reports, bedside ultrasound, pathology, family history and examination together today.  We reviewed her MRI and mammogram images together and she understands that she would not be a candidate for breast conserving therapy on the left.  She does not wish to do anything on the right unless her genetics return is abnormal.  We did discuss the plan to test genetics for breast and gynecology add on melanoma.  We discussed the procedure of a left total mastectomy, left axillary sentinel lymph node biopsy, possible axillary lymph node dissection, possible reconstruction.  We discussed the risks of bleeding, infection, skin loss, injury to nerves or vessels in the axilla, lymphedema.    We will possibly plan for an Oncotype in the postop period.    Preoperatively I will plan for an ultrasound of the right retroareolar area to further characterize the likely sebaceous cyst and to get a baseline for future clinical follow-up.    I will have her see plastic surgery.    I will get her in to see physical therapy for bioimpedance measurements.    I have asked her to please not get pregnant and told her that a metal IUD, vasectomy or condoms would be appropriate methods of birth control.  Before this diagnosis she and her  were considering having another child.    We will have our nurse navigator reach out to her and she would also be  interested in talking with Jody Snider our behavioral oncologist.  We will arrange for those consultations.    We will plan to see a RIGHT US of the supposed sebaceous cyst if needed, in 3-6 months.    If sent off [facility] breast [facility]ne[facility] add on melanoma today.  Monisha Escudero MD        We have spent 65 minutes in face to face visit today, 55 in face to face .      Next Appointment:  Return for surgery.      EMR Dragon/transcription disclaimer:    Much of this encounter note is an electronic transcription/translocation of spoken language to printed text.  The electronic translation of spoken language may permit erroneous, or at times, nonsensical words or phrases to be inadvertently transcribed.  Although I have reviewed the note from such areas, some may still exist.

## 2022-04-13 NOTE — PLAN OF CARE
Goal Outcome Evaluation:  Plan of Care Reviewed With: patient        Progress: improving  Outcome Evaluation: Pt arrived from PACU slighty drowsy, arousing to voice, dressing to left chest is clean, dry and intact, c/o intermittent pain/spasms, given Valium x1, opal x2 has serosanguinous output, IVF infusing, 2L O2 nasal cannula, afebrile, slightly tachycardic but otherwise stable, cruz catheter in place is to be removed at midnight, will continue to monitor.

## 2022-04-13 NOTE — ANESTHESIA PREPROCEDURE EVALUATION
" Anesthesia Evaluation     Patient summary reviewed and Nursing notes reviewed   history of anesthetic complications (hypotension with C section, patient in 90s SBP this morning): PONV  NPO Solid Status: > 8 hours  NPO Liquid Status: > 2 hours           Airway   Mallampati: II  TM distance: >3 FB  Neck ROM: full  No difficulty expected  Dental - normal exam     Pulmonary - normal exam   Cardiovascular - normal exam    ECG reviewed    (+) hyperlipidemia,       Neuro/Psych  GI/Hepatic/Renal/Endo      Musculoskeletal     Abdominal    Substance History      OB/GYN          Other      history of cancer (Left breast)                    Anesthesia Plan    ASA 2     general   (PONV prophylaxis    I have reviewed the patient's history and chart with the patient, including all pertinent laboratory results and imaging. I have explained the risks of anesthesia including but not limited to dental damage, corneal abrasion, nerve injury, MI, stroke, aspiration, and death. Patient has agreed to proceed.     BP 95/45 (BP Location: Right arm, Patient Position: Lying)   Pulse 70   Temp 36.7 °C (98 °F) (Oral)   Resp 20   Ht 162.6 cm (64\")   Wt 54.1 kg (119 lb 4.8 oz)   LMP 03/17/2022 Comment: (-) urine hcg 4/13/22  SpO2 100%   BMI 20.48 kg/m²   )  intravenous induction     Anesthetic plan, all risks, benefits, and alternatives have been provided, discussed and informed consent has been obtained with: patient.        CODE STATUS:       "

## 2022-04-13 NOTE — ANESTHESIA PROCEDURE NOTES
Airway  Urgency: elective    Date/Time: 4/13/2022 9:11 AM  Airway not difficult    General Information and Staff    Patient location during procedure: OR  Anesthesiologist: Justice Rutherford MD  CRNA: Kim Fong CRNA    Indications and Patient Condition  Indications for airway management: airway protection    Preoxygenated: yes  MILS maintained throughout  Mask difficulty assessment: 1 - vent by mask    Final Airway Details  Final airway type: endotracheal airway      Successful airway: ETT  Cuffed: yes   Successful intubation technique: direct laryngoscopy  Facilitating devices/methods: intubating stylet  Endotracheal tube insertion site: oral  Blade: Glaser  Blade size: 2  ETT size (mm): 6.5  Cormack-Lehane Classification: grade I - full view of glottis  Placement verified by: chest auscultation   Cuff volume (mL): 8  Measured from: lips  ETT/EBT  to lips (cm): 20  Number of attempts at approach: 1  Assessment: lips, teeth, and gum same as pre-op and atraumatic intubation    Additional Comments  Pt preoxygenated, SIVI, bag mask vent, ATETI, dentition as before             No

## 2022-04-13 NOTE — BRIEF OP NOTE
BREAST RECONSTRUCTION IMMEDIATE WITH TISSUE EXPANDER INSERTION  Progress Note    Maribell Lynn  4/13/2022    Pre-op Diagnosis:   Malignant neoplasm of upper-outer quadrant of left breast in female, estrogen receptor positive (HCC) [C50.412, Z17.0]       Post-Op Diagnosis Codes:     * Malignant neoplasm of upper-outer quadrant of left breast in female, estrogen receptor positive (HCC) [C50.412, Z17.0]    Procedure/CPT® Codes:        Procedure(s):  left total mastectomy with left axillary sentinel lymph node biopsy  LEFT BREAST Prepectoral 1ST STAGE RECONSTRUCTION WITH INSERTION OF TISSUE EXPANDER AND BIOLOGIC    Surgeon(s):  Monisha Escudero MD Palazzo, Michelle D, MD    Anesthesia: General    Staff:   Circulator: Deloris Valenzuela RN; Jaycee Sagastume RN  Scrub Person: Venus Vila RN; Tamra Gilmore  Assistant: Joy Salomon CSA  Assistant: Joy Salomon CSA      Estimated Blood Loss: 50 mL    Urine Voided: 90 mL    Specimens:                Specimens     ID Source Type Tests Collected By Collected At Frozen?    A [place] Lymph Node Tissue · TISSUE PATHOLOGY EXAM   Monisha Escudero MD 4/13/22 0957 Yes    Description: Left sentinel node # 1    Comment: rm # [id number]    B [place] Lymph Node Tissue · TISSUE PATHOLOGY EXAM   Monisha Escudero MD 4/13/22 1000 Yes    Description: Left sentinel node # 2    Comment: grant # [id number]    C [place] Lymph Node Tissue · TISSUE PATHOLOGY EXAM   Monisha Escudero MD 4/13/22 1003 Yes    Description: Left sentinel node # 3    Comment: rm # [id number]    D Breast, Left Tissue · TISSUE PATHOLOGY EXAM   Monisha Escudero MD 4/13/22 1030 No    Description: Left breast total mastecctomy- Stitch marks 12 o'clock    Comment: Fresh for  Permanent  622 grams                Drains:   Closed/Suction Drain 1 Left Breast Bulb 15 Fr. (Active)   Site Description Unable to view 04/13/22 1459   Dressing Status Clean;Dry;Intact 04/13/22 1459   Drainage  Appearance Serosanguineous 04/13/22 1459   Status To bulb suction 04/13/22 1459   Output (mL) 30 mL 04/13/22 1459       Closed/Suction Drain 2 Left Breast Bulb 15 Fr. (Active)   Site Description Unable to view 04/13/22 1459   Dressing Status Clean;Dry;Intact 04/13/22 1459   Drainage Appearance Serosanguineous 04/13/22 1459   Status To bulb suction 04/13/22 1459   Output (mL) 30 mL 04/13/22 1459       Urethral Catheter Double-lumen;Silicone (Active)   Daily Indications Required activity restriction from trauma, surgery, (e.g. unstable spine, fracture, hemodynamics) 04/13/22 1335   Site Assessment Clean;Skin intact 04/13/22 1459   Collection Container Standard drainage bag 04/13/22 1459   Securement Method Securing device 04/13/22 1459   Output (mL) 500 mL 04/13/22 1740       Findings: L breast 622 g  L prepectoral placement of tissue expander [id number]  0 fill  Alloderm 16 x 20 cm perforated     Complications: none        Hermelinda Johnson MD     Date: 4/13/2022  Time: 18:17 EDT

## 2022-04-13 NOTE — ANESTHESIA POSTPROCEDURE EVALUATION
Patient: Maribell Lynn    Procedure Summary     Date: 04/13/22 Room / Location: [facility] OR 09 / [facility] MAIN OR    Anesthesia Start: 0901 Anesthesia Stop: 1145    Procedures:       left total mastectomy with left axillary sentinel lymph node biopsy (Left Breast)      LEFT BREAST 1ST STAGE RECONSTRUCTION WITH INSERTION OF TISSUE EXPANDER AND BIOLOGIC (Left Breast) Diagnosis:       Malignant neoplasm of upper-outer quadrant of left breast in female, estrogen receptor positive (HCC)      (Malignant neoplasm of upper-outer quadrant of left breast in female, estrogen receptor positive (HCC) [C50.412, Z17.0])    Surgeons: Monisha Escudero MD; Hermelinda Johnson MD Provider: Justice Rutherford MD    Anesthesia Type: general ASA Status: 2          Anesthesia Type: general    Vitals  Vitals Value Taken Time   /73 04/13/22 1236   Temp 36.6 °C (97.9 °F) 04/13/22 1142   Pulse 97 04/13/22 1249   Resp 16 04/13/22 1205   SpO2 100 % 04/13/22 1249   Vitals shown include unvalidated device data.        Post Anesthesia Care and Evaluation    Patient location during evaluation: PACU  Patient participation: complete - patient participated  Level of consciousness: awake and alert  Pain management: adequate  Airway patency: patent  Anesthetic complications: No anesthetic complications    Cardiovascular status: acceptable  Respiratory status: acceptable  Hydration status: acceptable    Comments: --------------------            04/13/22               1235     --------------------   BP:       107/73     Pulse:     101       Resp:                Temp:                SpO2:      100%     --------------------

## 2022-04-14 VITALS
HEIGHT: 64 IN | OXYGEN SATURATION: 99 % | HEART RATE: 65 BPM | TEMPERATURE: 97.6 F | DIASTOLIC BLOOD PRESSURE: 59 MMHG | WEIGHT: 119.3 LBS | RESPIRATION RATE: 18 BRPM | SYSTOLIC BLOOD PRESSURE: 93 MMHG | BODY MASS INDEX: 20.37 KG/M2

## 2022-04-14 PROCEDURE — 99024 POSTOP FOLLOW-UP VISIT: CPT | Performed by: SURGERY

## 2022-04-14 PROCEDURE — G0378 HOSPITAL OBSERVATION PER HR: HCPCS

## 2022-04-14 PROCEDURE — 25010000002 CEFAZOLIN IN DEXTROSE 2-4 GM/100ML-% SOLUTION: Performed by: SURGERY

## 2022-04-14 RX ADMIN — HYDROCODONE BITARTRATE AND ACETAMINOPHEN 2 TABLET: 5; 325 TABLET ORAL at 00:25

## 2022-04-14 RX ADMIN — HYDROCODONE BITARTRATE AND ACETAMINOPHEN 2 TABLET: 5; 325 TABLET ORAL at 04:59

## 2022-04-14 RX ADMIN — HYDROCODONE BITARTRATE AND ACETAMINOPHEN 2 TABLET: 5; 325 TABLET ORAL at 09:00

## 2022-04-14 RX ADMIN — DIAZEPAM 5 MG: 5 TABLET ORAL at 09:00

## 2022-04-14 RX ADMIN — CEFAZOLIN SODIUM 2 G: 2 INJECTION, SOLUTION INTRAVENOUS at 00:07

## 2022-04-14 RX ADMIN — HYDROCODONE BITARTRATE AND ACETAMINOPHEN 2 TABLET: 5; 325 TABLET ORAL at 13:03

## 2022-04-14 NOTE — PLAN OF CARE
Goal Outcome Evaluation:  Plan of Care Reviewed With: patient        Progress: improving  Outcome Evaluation: VSS, low BP's noted overnight, Radu's X 2 w/ serosanguinous drainage, voiding freely, ambulated in hallway w/ staff, tolerating regular diet, PO and IV  meds given for c/o pain, dressing to chest is CDI, spouse at bedside

## 2022-04-14 NOTE — PROGRESS NOTES
INPATIENT ROUNDING NOTE    Postoperative day: 1    Overnight events:     Patient has done well overnight.    no nausea  She reports that her pain is responding favorably to the prescribed meds.  She has voided since her catheter has been removed.  She has ambulated.    Exam:  Temp:  [96.7 °F (35.9 °C)-98.6 °F (37 °C)] 97.6 °F (36.4 °C)  Heart Rate:  [] 65  Resp:  [16-18] 18  BP: ()/(52-75) 93/59     UOP  2.3L  Drains  Serosanguinous      On examination, her incisions are dressed and dressings clean dry and intact.  Her mastectomy flaps are well perfused with minimal ecchymoses.   There are no undrained fluid collections.      Assessment and plan:    Breast cancer, postoperative day 1.  Doing well.  HLIV.  Likely home today after drain teaching and breakfast and/or lunch.    She has an appointment with me in the office already scheduled for her postop visit.

## 2022-04-14 NOTE — NURSING NOTE
Pt,  and mother given discharge instructions both verbal and written. All watched RN show how to strip, empty and recompress MATHIEU drain. Mother was able to correctly demonstrate how to strip, empty and recompress MATHIEU drain. Breast resource packet, softee camisole and MATHIEU drain care kit given to pt. Pt will eat lunch and then discharge.

## 2022-04-14 NOTE — DISCHARGE INSTRUCTIONS
Responsible adult to stay with patient at discharge and at least 24 hours after discharge.   Call the office for any of the following: persistent bleeding, excessive bruising or swelling, excessive sleepiness or trouble breathing, severe pain, persistent nausea or vomiting, fever greater than 101.0   Please note: if a plastic surgeon has been involved in the case, please call his or her office. If there is no plastic surgeon involved, please call Dr Escudero's office.   Postop appointment was scheduled in Dr. Escudero's office preoperatively. If patient cannot find that appointment, please call the office for a reminder on the next business day.   No showering until Dr Johnson follow up appt. Sponge bath only.  Empty drains every 4 hours and keep track on drain sheets.

## 2022-04-15 ENCOUNTER — TELEPHONE (OUTPATIENT)
Dept: SURGERY | Facility: CLINIC | Age: 37
End: 2022-04-15

## 2022-04-15 ENCOUNTER — NURSE NAVIGATOR (OUTPATIENT)
Dept: OTHER | Facility: HOSPITAL | Age: 37
End: 2022-04-15

## 2022-04-15 DIAGNOSIS — C50.412 CARCINOMA OF UPPER-OUTER QUADRANT OF FEMALE BREAST, LEFT: Primary | ICD-10-CM

## 2022-04-15 NOTE — TELEPHONE ENCOUNTER
Pathology from 4/13/21 left total mastectomy and sentinel lymph node biopsy followed by reconstruction returned as:  1 of 4 lymph nodes with breast cancer.  Isolated tumor cells.  Invasive lobular carcinoma, intermediate grade, 3, 2, 1, 5 cm, lobular carcinoma in situ, measuring 7 cm.  All margins are clear with the closest being 10 mm from the posterior margin.  The size of the greatest deposit in the lymph node is 0.1 mm or isolated tumor cells.  Pathologic stage T2N0 (i+), stage 2A.  Estrogen , progesterone , HER-2/leonie 1+, Ki-67 39%.    I will arrange for her to see medical oncology and radiation oncology.  I will send an oncotype              Final Diagnosis   1. Lymph Node, Left [place] #1, Excision (H&E, AE1/AE3):               A. One lymph node with isolated tumor cells.     2. Lymph Node, Left [place] #2, Excision (H&E, AE1/AE3):               A. One benign lymph node (0/1).     3. Lymph Node, Left [place] #3, Excision (H&E, AE1/AE3):               A. Two benign lymph nodes (0/2).     4. Breast, Left, Mastectomy (655 grams):               A. Invasive lobular carcinoma, Shazia histologic grade II (tubules = 3, nuclei = 2, mitoses = 1) measuring        50 mm maximally.   B. Lobular carcinoma in situ, measuring 70 mm maximally.  C. All margins are free of in situ and invasive malignancy.  D. See synoptic template for complete tumor details.     [facility]    Electronically signed by Alyse Carver MD on 4/14/2022 at 1217   Synoptic Checklist     INVASIVE CARCINOMA OF THE BREAST: Resection  8th Edition - Protocol posted: 12/17/2021  INVASIVE CARCINOMA OF THE BREAST: COMPLETE EXCISION - All Specimens  SPECIMEN   Procedure  Total mastectomy    Specimen Laterality  Left    TUMOR   Tumor Site  Upper outer quadrant      Lower outer quadrant    Histologic Type  Invasive lobular carcinoma    Histologic Grade (Wheeler Histologic Score)     Glandular (Acinar) / Tubular Differentiation  Score 3     Nuclear Pleomorphism  Score 2    Mitotic Rate  Score 1    Overall Grade  Grade 2 (scores of 6 or 7)    Tumor Size  Greatest dimension of largest invasive focus (Millimeters): 50 mm   Tumor Focality  Single focus of invasive carcinoma    Ductal Carcinoma In Situ (DCIS)  Not identified    Lobular Carcinoma In Situ (LCIS)  Present         Lymphovascular Invasion  Not identified    Dermal Lymphovascular Invasion  Not identified    Microcalcifications  Not identified    Treatment Effect in the Breast  No known presurgical therapy    MARGINS   Margin Status for Invasive Carcinoma  All margins negative for invasive carcinoma    Distance from Invasive Carcinoma to Closest Margin  10 mm   Closest Margin(s) to Invasive Carcinoma  Posterior    Distance from Invasive Carcinoma to Anterior Margin  15 mm   Distance from Invasive Carcinoma to Superior Margin  40 mm   Distance from Invasive Carcinoma to Inferior Margin  30 mm   Distance from Invasive Carcinoma to Medial Margin  100 mm   Distance from Invasive Carcinoma to Lateral Margin  20 mm   REGIONAL LYMPH NODES   Regional Lymph Node Status  Tumor present in regional lymph node(s)    Number of Lymph Nodes with Macrometastases  0    Number of Lymph Nodes with Micrometastases  0    Number of Lymph Nodes with Isolated Tumor Cells  1    Size of Largest Krystin Metastatic Deposit  0.1 mm   Extranodal Extension  Not identified    Total Number of Lymph Nodes Examined (sentinel and non-sentinel)  4    Number of [place] Nodes Examined  4    PATHOLOGIC STAGE CLASSIFICATION (pTNM, AJCC 8th Edition)   Reporting of pT, pN, and (when applicable) pM categories is based on information available to the pathologist at the time the report is issued. As per the AJCC (Chapter 1, 8th Ed.) it is the managing physician’s responsibility to establish the final pathologic stage based upon all pertinent information, including but potentially not limited to this pathology report.   pT Category  pT2     Regional Lymph Nodes Modifier  (sn): [place] node(s) evaluated.    pN Category  pN0 (i+)    SPECIAL STUDIES        Estrogen Receptor (ER) Status  Positive (greater than 10% of cells demonstrate nuclear positivity)    Percentage of Cells with Nuclear Positivity  %         Progesterone Receptor (PgR) Status  Positive    Percentage of Cells with Nuclear Positivity  %         HER2 (by immunohistochemistry)  Negative (Score 1+)         Ki-67 Percentage of Positive Nuclei  39 %   Testing Performed on Case Number  [facility] [id number]    .      Comment    The patient's concurrent left breast core biopsy material is on file at [facility] ([id number]); those slides are not available for review. Both clips are associated with an invasive lobular carcinoma that is part of one roughly dumb bell shaped mass. Hormone receptors were previously performed at [facility]. The tumor is ER and MD positive, HER2 negative with a Ki-67 measuring up to 39.2% maximally.      mec/pkm

## 2022-04-15 NOTE — PROGRESS NOTES
POD #1 L mastectomy with reconstruction  Overall doing well, having pain, doesn't think she was getting muscle relaxer.     97.6 65 18 93/59 99  155 serosanguinous drainage    Sitting up in bed in pain  L breast incision c/d/i with dressings  Drains with serosanguinous fluid    POD #1  Has all Rx's, IS  Appointment fu 1 week  No lifting LUE, no showering

## 2022-04-15 NOTE — PROGRESS NOTES
Case Management Discharge Note      Final Note: Discharge home. Negin Tsang, [facility]         Transportation Services  Private: Car    Final Discharge Disposition Code: 01 - home or self-care

## 2022-04-15 NOTE — TELEPHONE ENCOUNTER
Pt calls, states she has had a lot of vomiting and upset stomach, she is worried about being dehydrated. She worries her antibiotic is up setting stomach.     She is contacting Dr. Hermelinda Johnson about this now.

## 2022-04-15 NOTE — PROGRESS NOTES
Called MsMariano Heather to see how she was doing. She stated she is struggling with constant nausea and vomiting and cannot keep anything down. She has been trying to take her pain and nausea meds, but keeps vomiting them up. She called Dr. Johnson's office and tried to have her paged. I also called their office and spoke with her nurse. They will contact the patient with next steps. Let Ms. Romero know to be expecting a call soon.     She was thankful for the call and will reach out if any questions or needs arise.

## 2022-04-20 ENCOUNTER — NURSE NAVIGATOR (OUTPATIENT)
Dept: OTHER | Facility: HOSPITAL | Age: 37
End: 2022-04-20

## 2022-04-20 NOTE — PROGRESS NOTES
Called Ms. Romero to see how she was doing and if her pain, nausea and vomiting were better. She stated her nausea was much better, but her pain was still lingering. She has a follow up appt with Dr. Johnson tomorrow and will discuss her pain needs at that time. Otherwise, she is doing well and has no questions or concerns. She will reach out if any needs arise.

## 2022-04-27 ENCOUNTER — TELEPHONE (OUTPATIENT)
Dept: SURGERY | Facility: CLINIC | Age: 37
End: 2022-04-27

## 2022-04-27 LAB
BEAKER LAB AP INTRAOPERATIVE CONSULTATION: NORMAL
LAB AP CASE REPORT: NORMAL
LAB AP CLINICAL INFORMATION: NORMAL
LAB AP DIAGNOSIS COMMENT: NORMAL
LAB AP SYNOPTIC CHECKLIST: NORMAL
Lab: NORMAL
PATH REPORT.ADDENDUM SPEC: NORMAL
PATH REPORT.FINAL DX SPEC: NORMAL
PATH REPORT.GROSS SPEC: NORMAL

## 2022-04-27 NOTE — TELEPHONE ENCOUNTER
Oncotype DX April 26, 2022 for patients with micro mets and nodes +1-30 returned as recurrence score of 18 with a breast cancer specific survival at 9 years of 97%.  Risk of distant recurrence at 9 years on a hormone blocker alone is 16%.  Chemotherapy for this group as benefit cannot be excluded.

## 2022-05-05 ENCOUNTER — OFFICE VISIT (OUTPATIENT)
Dept: SURGERY | Facility: CLINIC | Age: 37
End: 2022-05-05

## 2022-05-05 ENCOUNTER — TELEPHONE (OUTPATIENT)
Dept: SURGERY | Facility: CLINIC | Age: 37
End: 2022-05-05

## 2022-05-05 ENCOUNTER — MDT ASSESSMENT (OUTPATIENT)
Dept: OTHER | Facility: HOSPITAL | Age: 37
End: 2022-05-05

## 2022-05-05 VITALS
OXYGEN SATURATION: 98 % | BODY MASS INDEX: 20.49 KG/M2 | HEIGHT: 64 IN | WEIGHT: 120 LBS | HEART RATE: 70 BPM | DIASTOLIC BLOOD PRESSURE: 62 MMHG | SYSTOLIC BLOOD PRESSURE: 98 MMHG

## 2022-05-05 DIAGNOSIS — R92.8 ABNORMAL MRI, BREAST: ICD-10-CM

## 2022-05-05 DIAGNOSIS — Z17.0 MALIGNANT NEOPLASM OF UPPER-OUTER QUADRANT OF LEFT BREAST IN FEMALE, ESTROGEN RECEPTOR POSITIVE: Primary | ICD-10-CM

## 2022-05-05 DIAGNOSIS — C50.412 MALIGNANT NEOPLASM OF UPPER-OUTER QUADRANT OF LEFT BREAST IN FEMALE, ESTROGEN RECEPTOR POSITIVE: Primary | ICD-10-CM

## 2022-05-05 PROCEDURE — 99024 POSTOP FOLLOW-UP VISIT: CPT | Performed by: SURGERY

## 2022-05-05 RX ORDER — DIAZEPAM 2 MG/1
2 TABLET ORAL EVERY 8 HOURS PRN
COMMUNITY
Start: 2022-03-28 | End: 2022-05-05

## 2022-05-05 RX ORDER — HYDROCODONE BITARTRATE AND ACETAMINOPHEN 5; 325 MG/1; MG/1
TABLET ORAL
COMMUNITY
Start: 2022-03-26 | End: 2022-05-05

## 2022-05-05 RX ORDER — CEPHALEXIN 500 MG/1
CAPSULE ORAL
COMMUNITY
Start: 2022-03-26 | End: 2022-05-05

## 2022-05-05 RX ORDER — PROMETHAZINE HYDROCHLORIDE 25 MG/1
TABLET ORAL
COMMUNITY
Start: 2022-03-26 | End: 2022-05-05

## 2022-05-05 RX ORDER — IBUPROFEN 200 MG
400 TABLET ORAL EVERY 6 HOURS PRN
COMMUNITY
End: 2022-05-13 | Stop reason: HOSPADM

## 2022-05-05 NOTE — TELEPHONE ENCOUNTER
Scheduled apt with Dr. Lopes Thursday 5/12/22 2:00. Patient will stop by Dr. Lopes's office today to  new patient packet.

## 2022-05-05 NOTE — PROGRESS NOTES
Chief Complaint: Kylah Lynn is a 37 y.o. female who was seen in consultation at the request of Heidy Gutierrez MD  for newly diagnosed breast cancer    History of Present Illness:  Patient presents with newly diagnosed breast cancer.  She noted a left breast mass in December.  No associated pain or skin changes or nipple discharge.  She noted no other new masses, skin changes, nipple discharge, nipple changes prior to her most recent imaging.  Her most recent imaging includes the following:  10/04/2017   BILATERAL DIAGNOSTIC MAMMO & BILATERAL BREAST US   [facility]   KYLAH LYNN   No family history. Baseline study.  MMG:  Heterogeneously dense.  Finding 1: Area of asymmetric breast tissue seen in the left breast in the 1:30 o’clock region and in the upper outer region.  US:  Finding 1: Elongated isoechoic area 3 cm and is most consistent with a prominent lobule. This correlates to the palpable area.  Finding 2: Two oval complicated cyst versus solid masses right breast. This is an incidental finding.  IMPRESSION:  Finding 1: Ultrasound of the left breast in 6 months.  Finding 2: Ultrasound of the right breast in 6 months is recommended.  BI-RADS Category 3: Probably Benign.    02/09/2022   BILATERAL DIAGNOSTIC MAMMO WITH MARY & RIGHT LIMITED/LEFT COMPLETE BREAST US   [facility]   KYLAH LYNN   Palpable lump or thickening in the left breast.  MMG:  Heterogeneously dense.  Finding 1: There is a new area of architectural distortion seen in the left upper outer breast. This correlates with the pinpointed palpable area at 2:30-3:00. The extent is difficult to measure mammographically.  Finding 2: New focal asymmetry seen in the left upper outer breast, just superior and lateral to the above finding.  Finding 3: There is a new focal asymmetry seen in the left breast upper outer quadrant.  US:  Finding 1: Irregular hypoechoic area versus non-parallel hypoechoic mass with  posterior acoustic shadowing and indistinct and spiculated margins measuring 17 x 20 x 16 mm, in the 2:30 o’clock region of the left breast, 6 centimeters from the nipple.  Finding 2: Irregular non-parallel hypoechoic mass 11 x 10 x 11 mm seen in the 1:30 o’clock region of the left breast located 6 centimeters from the nipple.  Finding 3: Oval parallel hypoechoic mass 4 x 3 x 4 mm seen in the left breast at 1 o’clock located 4 centimeters from the nipple.  Finding 4: Stable oval parallel mass with circumscribed margins measuring 12 x 6 x 8 mm seen in the right breast at 3 o’clock located 5 centimeters from the nipple.  Finding 5: Oval mass measuring 8 x 5 x 7 mm seen in the sub-areolar region of the right breast. Finding has decreased in size.  IMPRESSION:  Finding 1: New palpable architectural distortion and mass in the 2:30 o’clock region of the left breast located 6 centimeters from the nipple is highly suggestive of malignancy.  Finding 2: Mass in the 1:30 o’clock region of the left breast located 6 centimeters from the nipple is suspicious.  Finding 3: Mass in the left breast at 1 o’clock located 4 centimeters from the nipple is suspicious.  Finding 4: Stable mass in the right breast since 2017 is considered benign.  Finding 5: Considered benign.  BI-RADS Category 5.      She had a biopsy on the following day that showed:   02/17/2022 LEFT US GUIDED BIOPSY     [facility]     KYLAH SHEIKHKAREEM LUIS  Let breast 1:00. 12 gauge Suros. 7 cores. [place] shaped s-pravin tissue marker was placed. Clip in the expected position. Pathology returned as benign breast tissue with complex sclerosing lesion. Pathology high risk and concordant.  Left breast 1:30. 12 gauge. 9 cores were obtained. A tophat shaped s-pravin tissue marker was placed. Clip in the expected position. Invasive lobular carcinoma. Malignant and concordant.  Left breast at the 2:30 position. 12 gauge core needle Suros. 14 cores were obtained. A [place] Pro Q  shaped tissue marker was placed at the biopsy site. Clip in the expected position. Invasive lobular carcinoma.  PATHOLOGY:  1. Breast, left, 1 o’clock, biopsy:  Benign breast tissue with complex sclerosing lesion.  2. Breast, left, 1:30 o’clock, biopsy:  Invasive lobular carcinoma, histologic grade 2 (tubules = 3, nuclei = 2, mitosis = 1), largest focus measures 9 mm.  ER: 97.01%  CO: 94.9%  HER2: 1+  Ki-67: 32.71%  3. Breast, left, 2:30 o’clock, biopsy:  Invasive lobular carcinoma, histologic grade 2 (tubules = 3, nuclei = 2, mitosis = 1), largest focus measures 10 mm.  ER: 89.94%  CO: 95.74%  HER2: Score 0  Ki-67: 39.2%    We then arranged for a breast MRI:    03/04/22 [facility] BREAST BILATERAL   KYLAH HENSLEY   1.  Suspicious mass and nonmass enhancement measuring up to 7.1 cm in the outer middle and posterior left breast encompasses 2 sites of biopsy-proven malignancy marked with Q and top hat clips. A biopsy clip (mini cork) with adjacent faint ill-defined nonmass enhancement inferior to the biopsy clip in the outer left breast represents a site of biopsy-proven complex sclerosing lesion. Surgical management isrecommended.  2.  No MRI evidence of malignancy in the right breast.   BI-RADS Category 6: Known biopsy-proven malignancy        She has not had a breast biopsy in the past.  She has her uterus and ovaries, is premenopausal, and takes nor hormones. She had a mirena removed in June 2021 and uses no birth control  Her family history includes the following: He has 1 daughter, 1 son, 1 sister, 2 maternal aunts, one paternal aunt, no family history of breast or ovarian cancer.  She does have a paternal aunt who had melanoma.      INterval HIstory:  [facility] genetic testing panel March 4, 2022 breast and gynecology add on melanoma returned as negative for mutation.    Right breast ultrasound April 11, 2022 [facility] ultrasound retroareolar right breast 7-8 o'clock there is an 8 mm  hypoechoic mass immediately deep to the skin.  Consistent with that of a benign complex cyst.  This corresponds to the nonenhancing lesion seen on the breast MRI consistent with a benign etiology.    Pathology from 4/13/21 left total mastectomy and sentinel lymph node biopsy followed by reconstruction returned as:  1 of 4 lymph nodes with breast cancer.  Isolated tumor cells.  Invasive lobular carcinoma, intermediate grade, 3, 2, 1, 5 cm, lobular carcinoma in situ, measuring 7 cm.  All margins are clear with the closest being 10 mm from the posterior margin.  The size of the greatest deposit in the lymph node is 0.1 mm or isolated tumor cells.  Pathologic stage T2N0 (i+), stage 2A.  Estrogen , progesterone , HER-2/leonie 1+, Ki-67 39%.        Oncotype DX April 26, 2022 for patients with micro mets and nodes +1-30 returned as recurrence score of 18 with a breast cancer specific survival at 9 years of 97%.  Risk of distant recurrence at 9 years on a hormone blocker alone is 16%.  Chemotherapy for this group as benefit cannot be excluded.    She is here for review.  She states that her drains have come out and she is started her fill.  She sees Dr. Johnson today for another follow-up.  Review of Systems:  Review of Systems   Constitutional: Negative for unexpected weight change (5 lb wt loss).   All other systems reviewed and are negative.       Past Medical and Surgical History:  Breast Biopsy History:  Patient had not had a breast biopsy prior to her cancer diagnosis.  Breast Cancer HIstory:  Patient does not have a past medical history of breast cancer.  Breast Operations, and year:  NONE   Obstetric/Gynecologic History:  Age menstrual periods began: 11  Patient is premenopausal, first day of last period: 1/23/22  Number of pregnancies:2  Number of live births: 2  Number of abortions or miscarriages: 0  Age of delivery of first child: 28  Patient breast fed, for the following lenth of time: 10 MONTHS    Length of time taking birth control pills: N/A   Patient has never taken hormone replacement    PATIENT HAS BOTH OVARIES AND UTERUS  Past Surgical History:   Procedure Laterality Date   • APPENDECTOMY     • BREAST BIOPSY Left    • BREAST RECONSTRUCTION Left 2022    Procedure: LEFT BREAST 1ST STAGE RECONSTRUCTION WITH INSERTION OF TISSUE EXPANDER AND BIOLOGIC;  Surgeon: Hermelinda Johnson MD;  Location: [facility];  Service: Plastics;  Laterality: Left;   •  SECTION     •  SECTION N/A 12/10/2018    Procedure:  SECTION REPEAT;  Surgeon: Heidy Gutierrez MD;  Location: [facility] LABOR DELIVERY;  Service: Obstetrics/Gynecology   • D & C HYSTEROSCOPY N/A 2021    Procedure: HYSTEROSCOPY REMOVAL INTRAUTERINE DEVICE WILL NEED ULTRASOUND IN ROOM;  Surgeon: Heidy Gutierrez MD;  Location: [facility];  Service: Obstetrics/Gynecology;  Laterality: N/A;   • KNEE ARTHROSCOPY Right    • MASTECTOMY W/ SENTINEL NODE BIOPSY Left 2022    Procedure: left total mastectomy with left axillary sentinel lymph node biopsy;  Surgeon: Monisha Escudero MD;  Location: [facility];  Service: General;  Laterality: Left;     Past Medical History:   Diagnosis Date   • Anesthesia complication     hypotension with last c section   • Breast cancer (HCC)    • History of COVID-19     2021 AND 2022   • Hyperlipidemia    • PONV (postoperative nausea and vomiting)    • Seasonal allergies        Prior Hospitalizations, other than for surgery or childbirth, and year:  NONE     Social History     Socioeconomic History   • Marital status:    Tobacco Use   • Smoking status: Never Smoker   • Smokeless tobacco: Never Used   Vaping Use   • Vaping Use: Never used   Substance and Sexual Activity   • Alcohol use: Not Currently   • Drug use: No   • Sexual activity: Defer     Patient is .  Patient is a homemaker.  Patient drinks 1 servings of caffeine per day.    Family  "History:  Family History   Problem Relation Age of Onset   • Melanoma Paternal Aunt    • [place] Hyperthermia Neg Hx        Vital Signs:  BP 98/62   Pulse 70   Ht 162.6 cm (64.02\")   Wt 54.4 kg (120 lb)   LMP  (LMP Unknown)   SpO2 98%   Breastfeeding No   BMI 20.59 kg/m²      Medications:    Current Outpatient Medications:   •  atorvastatin (LIPITOR) 10 MG tablet, Take 10 mg by mouth Daily., Disp: , Rfl:   •  cetirizine (zyrTEC) 10 MG tablet, Take 10 mg by mouth As Needed for Allergies., Disp: , Rfl:   •  ibuprofen (ADVIL,MOTRIN) 200 MG tablet, Take 200 mg by mouth Every 6 (Six) Hours As Needed for Mild Pain ., Disp: , Rfl:      Allergies:  Allergies   Allergen Reactions   • Asa [Aspirin] Anaphylaxis       Physical Examination:  BP 98/62   Pulse 70   Ht 162.6 cm (64.02\")   Wt 54.4 kg (120 lb)   LMP  (LMP Unknown)   SpO2 98%   Breastfeeding No   BMI 20.59 kg/m²   General Appearance:  Patient is in no distress.  She is well kept and has an average build.   Psychiatric:  Patient with appropriate mood and affect. Alert and oriented to self, time, and place.    Breast, RIGHT:  large sized, 34C,asymmetric with the contralateral surgically absent side.  Breast skin is without erythema, edema, rashes.  There are no visible abnormalities upon inspection during the arm-raising maneuver or with hands on hips in the sitting position. There is no nipple retraction, discharge or nipple/areolar skin changes.There are no masses palpable in the sitting or supine positions.    Breast, LEFT:  Surgically absent with a partially filled tissue expander in place.  Well-healing transverse mastectomy incision.  No skin nodules or discolorations.  No seroma.    Lymphatic:  There is no axillary, cervical, infraclavicular, or supraclavicular adenopathy bilaterally.  Eyes:  Pupils are round and reactive to light.  Cardiovascular:  Heart rate and rhythm are regular.  Respiratory:  Lungs are clear bilaterally with no crackles or " wheezes in any lung field.  Gastrointestinal:  Abdomen is soft, nondistended, and nontender    Musculoskeletal:  Good strength in all 4 extremities.   There is good range of motion in both shoulders.    Skin:  No new skin lesions or rashes on the skin excluding the breast (see breast exam above).        Imaging:  10/04/2017   BILATERAL DIAGNOSTIC MAMMO & BILATERAL BREAST US   [facility] MARIAN LUIS   No family history. Baseline study.  MMG:  Heterogeneously dense.  Finding 1: Area of asymmetric breast tissue seen in the left breast in the 1:30 o’clock region and in the upper outer region.  US:  Finding 1: Elongated isoechoic area 3 cm and is most consistent with a prominent lobule. This correlates to the palpable area.  Finding 2: Two oval complicated cyst versus solid masses right breast. This is an incidental finding.  IMPRESSION:  Finding 1: Ultrasound of the left breast in 6 months.  Finding 2: Ultrasound of the right breast in 6 months is recommended.  BI-RADS Category 3: Probably Benign.    02/09/2022   BILATERAL DIAGNOSTIC MAMMO WITH MARY & RIGHT LIMITED/LEFT COMPLETE BREAST US   [facility] HILARIO DUARTEYOGI LUIS   Palpable lump or thickening in the left breast.  MMG:  Heterogeneously dense.  Finding 1: There is a new area of architectural distortion seen in the left upper outer breast. This correlates with the pinpointed palpable area at 2:30-3:00. The extent is difficult to measure mammographically.  Finding 2: New focal asymmetry seen in the left upper outer breast, just superior and lateral to the above finding.  Finding 3: There is a new focal asymmetry seen in the left breast upper outer quadrant.  US:  Finding 1: Irregular hypoechoic area versus non-parallel hypoechoic mass with posterior acoustic shadowing and indistinct and spiculated margins measuring 17 x 20 x 16 mm, in the 2:30 o’clock region of the left breast, 6 centimeters from the nipple.  Finding 2: Irregular non-parallel  hypoechoic mass 11 x 10 x 11 mm seen in the 1:30 o’clock region of the left breast located 6 centimeters from the nipple.  Finding 3: Oval parallel hypoechoic mass 4 x 3 x 4 mm seen in the left breast at 1 o’clock located 4 centimeters from the nipple.  Finding 4: Stable oval parallel mass with circumscribed margins measuring 12 x 6 x 8 mm seen in the right breast at 3 o’clock located 5 centimeters from the nipple.  Finding 5: Oval mass measuring 8 x 5 x 7 mm seen in the sub-areolar region of the right breast. Finding has decreased in size.  IMPRESSION:  Finding 1: New palpable architectural distortion and mass in the 2:30 o’clock region of the left breast located 6 centimeters from the nipple is highly suggestive of malignancy.  Finding 2: Mass in the 1:30 o’clock region of the left breast located 6 centimeters from the nipple is suspicious.  Finding 3: Mass in the left breast at 1 o’clock located 4 centimeters from the nipple is suspicious.  Finding 4: Stable mass in the right breast since 2017 is considered benign.  Finding 5: Considered benign.  BI-RADS Category 5.    03/04/22 [facility] MRI BREAST BILATERAL   KYLAH HENSLEY   1.  Suspicious mass and nonmass enhancement measuring up to 7.1 cm in the outer middle and posterior left breast encompasses 2 sites of biopsy-proven malignancy marked with Q and top hat clips. A biopsy clip (mini cork) with adjacent faint ill-defined nonmass enhancement inferior to the biopsy clip in the outer left breast represents a site of biopsy-proven complex sclerosing lesion. Surgical management isrecommended.  2.  No MRI evidence of malignancy in the right breast.   BI-RADS Category 6: Known biopsy-proven malignancy    Right breast ultrasound April 11, 2022 [facility] ultrasound retroareolar right breast 7-8 o'clock there is an 8 mm hypoechoic mass immediately deep to the skin.  Consistent with that of a benign complex cyst.  This corresponds to the  nonenhancing lesion seen on the breast MRI consistent with a benign etiology.  We will let her know      Pathology:  02/17/2022 LEFT US GUIDED BIOPSY     [facility]     KYLAHBRAYAN JOYCE  Let breast 1:00. 12 gauge Suros. 7 cores. [place] shaped s-pravin tissue marker was placed. Clip in the expected position. Pathology returned as benign breast tissue with complex sclerosing lesion. Pathology high risk and concordant.  Left breast 1:30. 12 gauge. 9 cores were obtained. A tophat shaped s-pravin tissue marker was placed. Clip in the expected position. Invasive lobular carcinoma. Malignant and concordant.  Left breast at the 2:30 position. 12 gauge core needle Suros. 14 cores were obtained. A [place] Pro Q shaped tissue marker was placed at the biopsy site. Clip in the expected position. Invasive lobular carcinoma.  PATHOLOGY:  1. Breast, left, 1 o’clock, biopsy:  Benign breast tissue with complex sclerosing lesion.  2. Breast, left, 1:30 o’clock, biopsy:  Invasive lobular carcinoma, histologic grade 2 (tubules = 3, nuclei = 2, mitosis = 1), largest focus measures 9 mm.  ER: 97.01%  CA: 94.9%  HER2: 1+  Ki-67: 32.71%  3. Breast, left, 2:30 o’clock, biopsy:  Invasive lobular carcinoma, histologic grade 2 (tubules = 3, nuclei = 2, mitosis = 1), largest focus measures 10 mm.  ER: 89.94%  CA: 95.74%  HER2: Score 0  Ki-67: 39.2%    Pathology from 4/13/21 left total mastectomy and sentinel lymph node biopsy followed by reconstruction returned as:  1 of 4 lymph nodes with breast cancer.  Isolated tumor cells.  Invasive lobular carcinoma, intermediate grade, 3, 2, 1, 5 cm, lobular carcinoma in situ, measuring 7 cm.  All margins are clear with the closest being 10 mm from the posterior margin.  The size of the greatest deposit in the lymph node is 0.1 mm or isolated tumor cells.  Pathologic stage T2N0 (i+), stage 2A.  Estrogen , progesterone , HER-2/leonie 1+, Ki-67 39%.     I will arrange for her to see medical  oncology and radiation oncology.  I will send an oncotype                        Final Diagnosis   1. Lymph Node, Left [place] #1, Excision (H&E, AE1/AE3):               A. One lymph node with isolated tumor cells.     2. Lymph Node, Left [place] #2, Excision (H&E, AE1/AE3):               A. One benign lymph node (0/1).     3. Lymph Node, Left [place] #3, Excision (H&E, AE1/AE3):               A. Two benign lymph nodes (0/2).     4. Breast, Left, Mastectomy (655 grams):               A. Invasive lobular carcinoma, Shazia histologic grade II (tubules = 3, nuclei = 2, mitoses = 1) measuring        50 mm maximally.   B. Lobular carcinoma in situ, measuring 70 mm maximally.  C. All margins are free of in situ and invasive malignancy.  D. See synoptic template for complete tumor details.     [facility]    Electronically signed by Alyse Carver MD on 4/14/2022 at 1217   Synoptic Checklist      INVASIVE CARCINOMA OF THE BREAST: Resection  8th Edition - Protocol posted: 12/17/2021  INVASIVE CARCINOMA OF THE BREAST: COMPLETE EXCISION - All Specimens       SPECIMEN   Procedure   Total mastectomy    Specimen Laterality   Left    TUMOR   Tumor Site   Upper outer quadrant        Lower outer quadrant    Histologic Type   Invasive lobular carcinoma    Histologic Grade (Lagrange Histologic Score)       Glandular (Acinar) / Tubular Differentiation   Score 3    Nuclear Pleomorphism   Score 2    Mitotic Rate   Score 1    Overall Grade   Grade 2 (scores of 6 or 7)    Tumor Size   Greatest dimension of largest invasive focus (Millimeters): 50 mm   Tumor Focality   Single focus of invasive carcinoma    Ductal Carcinoma In Situ (DCIS)   Not identified    Lobular Carcinoma In Situ (LCIS)   Present            Lymphovascular Invasion   Not identified    Dermal Lymphovascular Invasion   Not identified    Microcalcifications   Not identified    Treatment Effect in the Breast   No known presurgical therapy    MARGINS   Margin Status  for Invasive Carcinoma   All margins negative for invasive carcinoma    Distance from Invasive Carcinoma to Closest Margin   10 mm   Closest Margin(s) to Invasive Carcinoma   Posterior    Distance from Invasive Carcinoma to Anterior Margin   15 mm   Distance from Invasive Carcinoma to Superior Margin   40 mm   Distance from Invasive Carcinoma to Inferior Margin   30 mm   Distance from Invasive Carcinoma to Medial Margin   100 mm   Distance from Invasive Carcinoma to Lateral Margin   20 mm   REGIONAL LYMPH NODES   Regional Lymph Node Status   Tumor present in regional lymph node(s)    Number of Lymph Nodes with Macrometastases   0    Number of Lymph Nodes with Micrometastases   0    Number of Lymph Nodes with Isolated Tumor Cells   1    Size of Largest Krystin Metastatic Deposit   0.1 mm   Extranodal Extension   Not identified    Total Number of Lymph Nodes Examined (sentinel and non-sentinel)   4    Number of [place] Nodes Examined   4    PATHOLOGIC STAGE CLASSIFICATION (pTNM, AJCC 8th Edition)   Reporting of pT, pN, and (when applicable) pM categories is based on information available to the pathologist at the time the report is issued. As per the AJCC (Chapter 1, 8th Ed.) it is the managing physician’s responsibility to establish the final pathologic stage based upon all pertinent information, including but potentially not limited to this pathology report.   pT Category   pT2    Regional Lymph Nodes Modifier   (sn): [place] node(s) evaluated.    pN Category   pN0 (i+)    SPECIAL STUDIES           Estrogen Receptor (ER) Status   Positive (greater than 10% of cells demonstrate nuclear positivity)    Percentage of Cells with Nuclear Positivity   %            Progesterone Receptor (PgR) Status   Positive    Percentage of Cells with Nuclear Positivity   %            HER2 (by immunohistochemistry)   Negative (Score 1+)            Ki-67 Percentage of Positive Nuclei   39 %   Testing Performed on Case Number    [facility] case [id number]    .      Comment     The patient's concurrent left breast core biopsy material is on file at [facility] ([id number]); those slides are not available for review. Both clips are associated with an invasive lobular carcinoma that is part of one roughly dumb bell shaped mass. Hormone receptors were previously performed at [facility]. The tumor is ER and HI positive, HER2 negative with a Ki-67 measuring up to 39.2% maximally.      mec/pkm             Oncotype DX April 26, 2022 for patients with micro mets and nodes +1-30 returned as recurrence score of 18 with a breast cancer specific survival at 9 years of 97%.  Risk of distant recurrence at 9 years on a hormone blocker alone is 16%.  Chemotherapy for this group as benefit cannot be excluded.        Labs:   [facility] genetic testing panel March 4, 2022 breast and gynecology add on melanoma returned as negative for mutation.  We will let her know        Procedures:      Assessment:   Diagnosis Plan   1. Malignant neoplasm of upper-outer quadrant of left breast in female, estrogen receptor positive (HCC)     2. Abnormal MRI, breast     1-  LEFT multifocal malignancy  Left breast 130, 6 cm from the nipple, 1.1 cm on ultrasound.  Top- in good position, 1.2 cm on MRI, invasive lobular carcinoma, intermediate grade, 3, 2, 1, 9 mm in a core, estrogen 97, progesterone 95, HER-2/leonie 1+, Ki-67 33%.    Left breast 230, 6 cm from the nipple, 2 cm on ultrasound-palpable lesion, 2 cm on ultrasound, 6.1 cm on MRI.  Q. marker in good position.  Invasive lobular carcinoma, intermediate grade, 3, 2, 1, 1 cm in greatest dimension, estrogen 90, progesterone 96, HER-2/leonie 0, Ki-67 39%.    Clinical stage mT3N0 stage IIb.      [facility] genetic testing panel March 4, 2022 breast and gynecology add on melanoma returned as negative for mutation.        Pathology from 4/13/21 left total mastectomy and sentinel lymph node biopsy followed by reconstruction returned as:  1 of  4 lymph nodes with breast cancer.  Isolated tumor cells.  Invasive lobular carcinoma, intermediate grade, 3, 2, 1, 5 cm, lobular carcinoma in situ, measuring 7 cm.  All margins are clear with the closest being 10 mm from the posterior margin.  The size of the greatest deposit in the lymph node is 0.1 mm or isolated tumor cells.  Estrogen , progesterone , HER-2/leonie 1+, Ki-67 39%.  Pathologic stage T2N0 (i+), stage 2A.        Oncotype DX April 26, 2022 for patients with micro mets and nodes +1-30 returned as recurrence score of 18 with a breast cancer specific survival at 8 years of 97%.  Risk of distant recurrence at 8 years on a hormone blocker alone is 16%.  Chemotherapy for this group as benefit cannot be excluded.        2-  RIGHT immediate retroareolar 7:-8:00- felt to be sebaceous cyst on MRI.   Right breast ultrasound April 11, 2022 [facility] ultrasound retroareolar right breast 7-8 o'clock there is an 8 mm hypoechoic mass immediately deep to the skin.  Consistent with that of a benign complex cyst.  This corresponds to the nonenhancing lesion seen on the breast MRI consistent with a benign etiology.        Plan:  Maribell is here today with her  Herb.    We reviewed her preoperative ultrasound which was benign on the right felt gerri benign.  She has seen physical therapy and had a lymphedema baseline.  Her genetic testing was negative.  We performed her left total mastectomy and sentinel node biopsy April 13, 2021.  Based on the size of 5 cm and possibly related to having isolated tumor cells, she may need radiation so I will have her see Dr. Falcon to discuss.  Her Oncotype returned with a score of 18 and she is seeing Dr. Kennedy on the sixth to discuss this.  She and I discussed the procedure of right port placement with risks of bleeding, infection, malfunction, thrombus, pneumothorax.  If he request a port we will schedule this for her.  Her next screening mammogram will  [facility]  I will arrange this and see her back after.      Monisha Escudero MD      Next Appointment:  Return for Next scheduled follow up, after imaging.      EMR Dragon/transcription disclaimer:    Much of this encounter note is an electronic transcription/translocation of spoken language to printed text.  The electronic translation of spoken language may permit erroneous, or at times, nonsensical words or phrases to be inadvertently transcribed.  Although I have reviewed the note from such areas, some may still exist.

## 2022-05-05 NOTE — PROGRESS NOTES
Subjective       DURING THE VISIT WITH THE PATIENT TODAY , PATIENT HAD FACE MASK, MY MEDICAL ASSISTANT AND I  HAD PROPPER PROTECTIVE EQUIPMENT, AND I DID HAND HYGIENE WITH SOAP AND WATER BEFORE AND AFTER THE VISIT.     REASON FOR CONSULTATION:  LOBULAR CARCINOMA OF THE LEFT BREAST, 70 MM IN SIZE, MARGINS OF RESECTION NEGATIVE AFTER MASTECTOMY, GRADE 2, ER POSITIVE CO POSITIVE, HER 2 NEGATIVE BY IHC,KI 67 39% ,ONE POSITIVE AXILLARY LYMPH NODE OUT OF 4 NODES REMOVAL, PREMENOPAUSAL .INVITAE PANEL NEGATIVE.ONCOTYPE 18  Provide an opinion on any further workup or treatment                             REQUESTING PHYSICIAN:    Monisha Escudero MD      Referring Physician, Breast Surgery     Since 5/5/2022     [phone]     Natalie Berumen PA      PCP - General, Physician Assistant     Since 6/25/2021     DR MÓNICA SANTIAGO MD.    RECORDS OBTAINED:  Records of the patients history including those obtained from the referring provider were reviewed and summarized in detail.    HISTORY OF PRESENT ILLNESS:  The patient is a 37 y.o. year old female who is here for an opinion about the above issue.  I had the opportunity to see this delightful Mong[place]ian female, 37 years old, mother of 2, who is premenopausal who has found abnormalities in the left breast since 12/2021, having sensation of fullness in the breast and discomfort and occasional pain. After this complaint she was seen by her primary physician and she initiated a process of mammograms and ultrasounds that culminated with the diagnosis of lobular carcinoma of the breast, invasive, 7 cm in size. The patient completed a mastectomy and sentinel lymph nodes almost 3 weeks ago and she is here for review and advice in regard to adjuvant therapy. The patient is still having pain at the surgical site, especially since yesterday she had injection of saline in the expander that was placed after the mastectomy. She also has neuropathic pain in the inner aspect of the  left arm that is bothering her a lot through the day and sometimes almost driving her crazy. She has no motor deficit into the left upper extremity. She denies any fevers or chills or bone pain. Her appetite is acceptable but she has changed her diet and she is almost going into vegetarian with the exception of some fish. She has eliminated all the dairy products and she has eliminated mostly carbohydrates as well. She has lost some weight. The patient otherwise feels well. She has normal bowel activity, normal urination. No skeletal pain or joint pain. No cough, sputum production, shortness of breath or palpitations. She is extremely anxious about this visit today. She is in company of her .        Past Medical History:   Diagnosis Date    Anesthesia complication     hypotension with last c section    Breast cancer (HCC)     History of COVID-19     2021 AND 2022    Hyperlipidemia     PONV (postoperative nausea and vomiting)     Seasonal allergies         Past Surgical History:   Procedure Laterality Date    APPENDECTOMY      BREAST BIOPSY Left     BREAST RECONSTRUCTION Left 2022    Procedure: LEFT BREAST 1ST STAGE RECONSTRUCTION WITH INSERTION OF TISSUE EXPANDER AND BIOLOGIC;  Surgeon: Hermelinda Johnson MD;  Location: [facility];  Service: Plastics;  Laterality: Left;     SECTION       SECTION N/A 12/10/2018    Procedure:  SECTION REPEAT;  Surgeon: Heidy Gutierrez MD;  Location: [facility] LABOR DELIVERY;  Service: Obstetrics/Gynecology    D & C HYSTEROSCOPY N/A 2021    Procedure: HYSTEROSCOPY REMOVAL INTRAUTERINE DEVICE WILL NEED ULTRASOUND IN ROOM;  Surgeon: Heidy Gutierrez MD;  Location: [facility];  Service: Obstetrics/Gynecology;  Laterality: N/A;    KNEE ARTHROSCOPY Right     MASTECTOMY W/ SENTINEL NODE BIOPSY Left 2022    Procedure: left total mastectomy with left axillary sentinel lymph node biopsy;  Surgeon: Monisha Escudero MD;   Location: [facility] OR;  Service: General;  Laterality: Left;        Current Outpatient Medications on File Prior to Visit   Medication Sig Dispense Refill    atorvastatin (LIPITOR) 10 MG tablet Take 10 mg by mouth Daily.      cetirizine (zyrTEC) 10 MG tablet Take 10 mg by mouth As Needed for Allergies.      ibuprofen (ADVIL,MOTRIN) 200 MG tablet Take 200 mg by mouth Every 6 (Six) Hours As Needed for Mild Pain .       No current facility-administered medications on file prior to visit.        ALLERGIES:    Allergies   Allergen Reactions    Asa [Aspirin] Anaphylaxis        Social History     Socioeconomic History    Marital status:    Tobacco Use    Smoking status: Never Smoker    Smokeless tobacco: Never Used   Vaping Use    Vaping Use: Never used   Substance and Sexual Activity    Alcohol use: Not Currently    Drug use: No    Sexual activity: Defer        Family History   Problem Relation Age of Onset    Melanoma Paternal Aunt     Malig Hyperthermia Neg Hx         Review of Systems   General: no fever, no chills,  fatigue,no weight changes, no lack of appetite.  Eyes: no epiphora, xerophthalmia,conjunctivitis, pain, glaucoma, blurred vision, blindness, secretion, photophobia, proptosis, diplopia.  Ears: no otorrhea, tinnitus, otorrhagia, deafness, pain, vertigo.  Nose: no rhinorrhea, no epistaxis, no alteration in perception of odors, no sinuses pressure.  Mouth: no alteration in gums or teeth,  No ulcers, no difficulty with mastication or deglution, no odynophagia.  Neck: no masses or pain, no thyroid alterations, no pain in muscles or arteries, no carotid odynia, no crepitation.  Respiratory: no cough, no sputum production,no dyspnea,no trepopnea, no pleuritic pain,no hemoptysis.  Heart: no syncope, no irregularity, no palpitations, no angina,no orthopnea,no paroxysmal nocturnal dyspnea.  Vascular Venous: no tenderness,no edema,no palpable cords,no postphlebitic syndrome, no skin changes no  "ulcerations.  Vascular Arterial: no distal ischemia, no claudication, no gangrene, no neuropathic ischemic pain, no skin ulcers, no paleness no cyanosis.  GI: no dysphagia, no odynophagia, no regurgitation, no heartburn,no indigestion,no nausea,no vomiting,no hematemesis ,no melena,no jaundice,no distention, no obstipation,no enterorrhagia,no proctalgia,no anal  lesions, no changes in bowel habits.  : no frequency, no hesitancy, no hematuria, no discharge,no  pain.  Musculoskeletal: no muscle or tendon pain or inflammation,no  joint pain, no edema, no functional limitation,no fasciculations, no mass.  Neurologic: no headache, no seizures, no alterations on craneal nerves, no motor deficit, no sensory deficit, normal coordination, no alteration in memory,normal orientation, calculation,normal writing, verbal and written language.  Skin: no rashes,no pruritus no localized lesions.  Psychiatric: no anxiety, no depression,no agitation, no delusions, proper insight.      Objective     Vitals:    05/06/22 0759   BP: 90/59   Pulse: 68   Resp: 18   Temp: 97.7 °F (36.5 °C)   TempSrc: Temporal   SpO2: 100%   Weight: 53.9 kg (118 lb 12.8 oz)   Height: 160 cm (63\")   PainSc:   6   PainLoc: Breast  Comment: left breast     Current Status 5/6/2022   ECOG score 0       Physical Exam  I HAVE PERSONALLY REVIEWED THE HISTORY OF THE PRESENT ILLNESS, PAST MEDICAL HISTORY, FAMILY HISTORY, SOCIAL HISTORY, ALLERGIES, MEDICATIONS STATED ABOVE IN THE  NOTE FROM TODAY.        GENERAL:  Well-developed, well-nourished  Patient  in no acute distress.   SKIN:  Warm, dry ,NO purpura ,NO petechiae, no rash.  HEENT:  Pupils were equal and reactive to light and accomodation, conjunctivae noninjected, no pterygium, normal extraocular movements, normal visual acuity.   NECK:  Supple with good range of motion; no thyromegaly , no other masses, no JVD or bruits,.No carotid artery pain, no carotid abnormal pulsation , NO arterial dance.  LYMPHATICS:  " No cervical, NO supraclavicular, NO axillary,NO epitrochlear , NO inguinal adenopathies.  CARDIAC   normal rate , regular rhythm, without murmur,NO rubs NO S3 NO S4   LUNGS: normal breath sounds bilateral, no wheezing, NO rhonchi, NO crackles ,NO rubs.  INSPECTION of RIGHT breast documented symmetry of the tissue per se and location and size of the nipple,no retractions or inversion of the nipple, normal skin without lesions, no erythema or nodules,no peau d'orange, no prominence of superficial veins or chest wall collateral circulation.PALPATION of the breast documented normal skin turgor, no induration, alteration in local temperature, or pain, no palpable masses or nodules, normal mobility of the tissues,no fixation of the tissue or parenchyma to the chest wall, no alteration at the tail of the breasts or axillas, no adenopathies. The patient had a total left mastectomy and she has had now an expander placed. It is very dense and mildly sensitive today with no erythema at the surgical site. The left axilla is very much sensitive but no fluid accumulation, no erythema or areas of alteration otherwise. There are no palpable adenopathies and there is no lymphedema. The patient has significant numbness in the inner aspect of the left arm. There is no erythema or alterations in the skin at that level.    VASCULAR VENOUS: no cyanosis, NO collateral circulation, NO varicosities, NO edema, NO palpable cords, NO pain,NO erythema, NO pigmentation of the skin.  ABDOMEN:  Soft, NO pain,no hepatomegaly, no splenomegaly,no masses, no ascites, no collateral circulation,no distention,no Srinivas sign.  EXTREMITIES  AND SPINE:  No clubbing, no cyanosis ,no deformities , no pain .No kyphosis,  no pain in spine, no pain in ribs , no pain in pelvic bone.  NEUROLOGICAL:  Patient was awake, alert, oriented to time, person and place.        RECENT LABS:  Hematology WBC   Date Value Ref Range Status   05/06/2022 7.60 3.40 - 10.80 10*3/mm3  Final     RBC   Date Value Ref Range Status   05/06/2022 4.20 3.77 - 5.28 10*6/mm3 Final     Hemoglobin   Date Value Ref Range Status   05/06/2022 12.4 12.0 - 15.9 g/dL Final     Hematocrit   Date Value Ref Range Status   05/06/2022 36.8 34.0 - 46.6 % Final     Platelets   Date Value Ref Range Status   05/06/2022 235 140 - 450 10*3/mm3 Final       CBC:    WBC   Date Value Ref Range Status   05/06/2022 7.60 3.40 - 10.80 10*3/mm3 Final     RBC   Date Value Ref Range Status   05/06/2022 4.20 3.77 - 5.28 10*6/mm3 Final     Hemoglobin   Date Value Ref Range Status   05/06/2022 12.4 12.0 - 15.9 g/dL Final     Hematocrit   Date Value Ref Range Status   05/06/2022 36.8 34.0 - 46.6 % Final     MCV   Date Value Ref Range Status   05/06/2022 87.6 79.0 - 97.0 fL Final     MCH   Date Value Ref Range Status   05/06/2022 29.5 26.6 - 33.0 pg Final     MCHC   Date Value Ref Range Status   05/06/2022 33.7 31.5 - 35.7 g/dL Final     RDW   Date Value Ref Range Status   05/06/2022 12.4 12.3 - 15.4 % Final     RDW-SD   Date Value Ref Range Status   05/06/2022 39.7 37.0 - 54.0 fl Final     MPV   Date Value Ref Range Status   05/06/2022 12.1 (H) 6.0 - 12.0 fL Final     Platelets   Date Value Ref Range Status   05/06/2022 235 140 - 450 10*3/mm3 Final     Neutrophil %   Date Value Ref Range Status   05/06/2022 46.5 42.7 - 76.0 % Final     Lymphocyte %   Date Value Ref Range Status   05/06/2022 30.3 19.6 - 45.3 % Final     Monocyte %   Date Value Ref Range Status   05/06/2022 6.2 5.0 - 12.0 % Final     Eosinophil %   Date Value Ref Range Status   05/06/2022 15.1 (H) 0.3 - 6.2 % Final     Basophil %   Date Value Ref Range Status   05/06/2022 0.8 0.0 - 1.5 % Final     Immature Grans %   Date Value Ref Range Status   05/06/2022 1.1 (H) 0.0 - 0.5 % Final     Neutrophils, Absolute   Date Value Ref Range Status   05/06/2022 3.54 1.70 - 7.00 10*3/mm3 Final     Lymphocytes, Absolute   Date Value Ref Range Status   05/06/2022 2.30 0.70 - 3.10 10*3/mm3  Final     Monocytes, Absolute   Date Value Ref Range Status   05/06/2022 0.47 0.10 - 0.90 10*3/mm3 Final     Eosinophils, Absolute   Date Value Ref Range Status   05/06/2022 1.15 (H) 0.00 - 0.40 10*3/mm3 Final     Basophils, Absolute   Date Value Ref Range Status   05/06/2022 0.06 0.00 - 0.20 10*3/mm3 Final     Immature Grans, Absolute   Date Value Ref Range Status   05/06/2022 0.08 (H) 0.00 - 0.05 10*3/mm3 Final     nRBC   Date Value Ref Range Status   05/06/2022 0.0 0.0 - 0.2 /100 WBC Final        CMP:    Glucose   Date Value Ref Range Status   05/06/2022 88 74 - 124 mg/dL Final     BUN   Date Value Ref Range Status   05/06/2022 12 6 - 20 mg/dL Final     Creatinine   Date Value Ref Range Status   05/06/2022 0.67 0.60 - 1.10 mg/dL Final     Sodium   Date Value Ref Range Status   05/06/2022 138 134 - 145 mmol/L Final     Potassium   Date Value Ref Range Status   05/06/2022 4.1 3.5 - 4.7 mmol/L Final     Chloride   Date Value Ref Range Status   05/06/2022 103 98 - 107 mmol/L Final     CO2   Date Value Ref Range Status   05/06/2022 22.5 22.0 - 29.0 mmol/L Final     Calcium   Date Value Ref Range Status   05/06/2022 9.6 8.5 - 10.2 mg/dL Final     Total Protein   Date Value Ref Range Status   05/06/2022 7.6 6.3 - 8.0 g/dL Final     Albumin   Date Value Ref Range Status   05/06/2022 4.80 3.50 - 5.20 g/dL Final     ALT (SGPT)   Date Value Ref Range Status   05/06/2022 16 0 - 33 U/L Final     AST (SGOT)   Date Value Ref Range Status   05/06/2022 22 0 - 32 U/L Final     Alkaline Phosphatase   Date Value Ref Range Status   05/06/2022 88 38 - 116 U/L Final     Total Bilirubin   Date Value Ref Range Status   05/06/2022 0.3 0.2 - 1.2 mg/dL Final     Globulin   Date Value Ref Range Status   05/06/2022 2.8 1.8 - 3.5 gm/dL Final     A/G Ratio   Date Value Ref Range Status   05/06/2022 1.7 1.1 - 2.4 g/dL Final     BUN/Creatinine Ratio   Date Value Ref Range Status   05/06/2022 17.9 7.3 - 30.0 Final     Anion Gap   Date Value Ref  Range Status   05/06/2022 12.5 5.0 - 15.0 mmol/L Final         Recent imaging:    NM Midnight Node Injection Only    Result Date: 4/13/2022  Narrative: PROCEDURE: Nuclear medicine injection to identify sentinel node.  HISTORY: 37 year old female with left breast carcinoma.  Procedure Note: The periareolar region of the left breast was swabbed with alcohol. Subsequently, 582 uCi of technetium Tc 99m tilmanocept was injected in 3 intradermal periareolar aliquots. The patient was then transported to the operating suite.  This report was finalized on 4/13/2022 10:01 AM by Dr. Jeremiah Maldonado M.D.      US Breast Right Limited    Result Date: 4/11/2022  Narrative: LIMITED RIGHT BREAST SONOGRAPHY  HISTORY: 37-year-old female with a diagnosis of left breast carcinoma who underwent a breast MRI which raised concern regarding a lesion in the right breast. The patient presents for second look sonography of the area.  ULTRASOUND: Targeted sonographic evaluation of the retroareolar region of the right breast at the 7-o'clock to 8-o'clock positions was performed. Comparison is made to a prior breast MRI without and with contrast dated 03/04/2022.  At this location there is a 0.8 x 0.7 x 0.5 cm oval circumscribed heterogenous hypoechoic mass that is located immediately deep to the skin. There is no overlying skin thickening. No definite inclusion within the skin is appreciated. There is no detectable internal vascularity. Mild posterior acoustic enhancement is noted. The imaging features are consistent with that of a benign complex cyst.      Impression: There is a 0.8 cm benign-appearing complex cyst in the right breast in a retroareolar location at the 7:30-o'clock position. This corresponds to the nonenhancing lesion seen on the breast MRI examination dated 3/4/2022. This is consistent with a benign etiology. Clinical follow-up for the known left breast malignancy is recommended.  BI-RADS Category 6: Known biopsy-proven  malignancy.  This report was finalized on 4/11/2022 3:34 PM by Dr. Jeremiah Maldonado M.D.     MRI BREAST BILATERAL W WO CONTRAST-     CLINICAL INDICATION: 37 years old female presents for contrast-enhanced  breast MRI to evaluate extent of disease. She has recently diagnosed  biopsy-proven left breast malignancy.      COMPARISON: Mammograms and breast ultrasounds dating back to 9/2/2022     TECHNIQUE: Multiplanar T1 and T2-weighted pre and postcontrast images of  the breast were obtained. Intravenous gadolinium was administered.   Post-processing includes subtraction and MIP images.  Contrast  enhancement kinetics were evaluated using commercial breast MRI CAD  software.      FINDINGS: There is heterogeneous fibroglandular tissue. There is mild  background parenchymal enhancement.     RIGHT BREAST:    At 7-8 o'clock in the subareolar right breast, there is a 0.7 x 0.6 x  0.8 cm oval heterogeneous predominantly T1 hyperintense intradermal mass  with no corresponding enhancement, which is most compatible with a  benign epidermal inclusion or sebaceous cyst. No suspicious enhancing  mass or area of non-mass enhancement is identified.  The visualized axilla is within normal limits.      LEFT BREAST:    In the outer middle and posterior left breast, involving the upper and  lower quadrants, there is suspicious mass and nonmass enhancement  together measuring approximately 7.1 cm AP dimension, 2.9 cm in  transverse dimension, and at least 3.8 cm in craniocaudal dimension.  This encompasses a focus of susceptibility from a biopsy clip (Q) at  4:00, 7.6 cm posterior to the nipple, which is located within a more  focal 6.1 cm AP dimension, 2.1 cm transverse dimension, 1.8 cm  craniocaudal dimension area of segmental nonmass enhancement and marks a  site of biopsy-proven malignancy. This also encompasses a more focal 1.0  cm AP dimension, 1.2 cm transverse dimension, 1.0 cm craniocaudal  dimension irregular enhancing mass  containing a focus of susceptibility  from a biopsy clip (top hat) at the medial margin of the mass marking an  additional site of biopsy-proven malignancy, which is located at 1:00,  6.1 cm posterior to the nipple.  An additional focus of susceptibility from a biopsy clip (mini cork) at  1:00 in the middle left breast, 3.8 cm posterior to the nipple marks the  site of biopsy-proven complex sclerosing lesion. Nonspecific faint  nonmass enhancement is identified inferior to the biopsy clip.  No suspicious enhancement is identified in the right nipple or chest  wall.  The visualized axilla is within normal limits.      EXTRAMAMMARY FINDINGS:   There are no abnormally enlarged internal mammary chain lymph nodes on  either side.           IMPRESSION AND RECOMMENDATION:     1.  Suspicious mass and nonmass enhancement measuring up to 7.1 cm in  the outer middle and posterior left breast encompasses 2 sites of  biopsy-proven malignancy marked with Q and top hat clips. A biopsy clip  (mini cork) with adjacent faint ill-defined nonmass enhancement inferior  to the biopsy clip in the outer left breast represents a site of  biopsy-proven complex sclerosing lesion. Surgical management is  recommended.  2.  No MRI evidence of malignancy in the right breast.     BI-RADS Category 6: Known biopsy-proven malignancy           This report was finalized on 3/7/2022 9:23 AM by Dr. Judie Koroma M.D.       Tissue Pathology Exam: [id number]  Order: [id number]  Status: Edited Result - FINAL    Visible to patient: No (not released)    Next appt: 05/06/2022 at 07:30 AM in Lab (LAB CHAIR 5 DOE [place])    Dx: Malignant neoplasm of upper-outer catherine...    Specimen Information: A: [place] Lymph Node; Tissue    B: [place] Lymph Node; Tissue    C: [place] Lymph Node; Tissue    D: Breast, Left; Tissue        0 Result Notes    Component    Addendum 2   Please see the completely scanned OncotypeDx report from [employer] below.      Addendum  electronically signed by Alyse Carver MD on 4/27/2022 at 1606   Addendum   A request for Oncotype Dx was received on 04/19/2022 from Dr. Monisha Escudero.  The test is to be performed on tissue from case [id number].  The case report, slides, and blocks for the cited accession were retrieved from archives. The pathologist whose signature appears below reviewed the original pathology report, examined candidate H&E slides, and selected block 4L as being appropriate to the specifications of the ordered molecular analysis.  Block 4L was prepared and forwarded to [facility] where the subject molecular test will be performed.  An addendum report will be issued when the results of this molecular test are available.     [facility]/jse      CPT Code: 35050    Addendum electronically signed by Alyse Carver MD on 4/19/2022 at 1141   Case Report   Surgical Pathology Report                         Case: [id number]                                   Authorizing Provider:  Monisha Escudero MD    Collected:           04/13/2022 09:57 AM           Ordering Location:     [facility]  Received:            04/13/2022 10:10 AM                                  MAIN OR                                                                       Pathologist:           Alyse Carver MD                                                           Specimens:   1) - [place] Lymph Node, Left sentinel node # 1                                                     2) - [place] Lymph Node, Left sentinel node # 2                                                     3) - [place] Lymph Node, Left sentinel node # 3                                                     4) - Breast, Left, Left breast total mastecctomy- Stitch marks 12 o'clock                  Clinical Information    rm # [id number]   Final Diagnosis   1. Lymph Node, Left [place] #1, Excision (H&E, AE1/AE3):               A. One lymph node with isolated tumor cells.     2. Lymph  Node, Left [place] #2, Excision (H&E, AE1/AE3):               A. One benign lymph node (0/1).     3. Lymph Node, Left [place] #3, Excision (H&E, AE1/AE3):               A. Two benign lymph nodes (0/2).     4. Breast, Left, Mastectomy (655 grams):               A. Invasive lobular carcinoma, Shazia histologic grade II (tubules = 3, nuclei = 2, mitoses = 1) measuring        50 mm maximally.   B. Lobular carcinoma in situ, measuring 70 mm maximally.  C. All margins are free of in situ and invasive malignancy.  D. See synoptic template for complete tumor details.     [facility]    Electronically signed by Alyse Carver MD on 4/14/2022 at 1217   Synoptic Checklist     INVASIVE CARCINOMA OF THE BREAST: Resection  8th Edition - Protocol posted: 12/17/2021  INVASIVE CARCINOMA OF THE BREAST: COMPLETE EXCISION - All Specimens  SPECIMEN   Procedure  Total mastectomy    Specimen Laterality  Left    TUMOR   Tumor Site  Upper outer quadrant      Lower outer quadrant    Histologic Type  Invasive lobular carcinoma    Histologic Grade (Shazia Histologic Score)     Glandular (Acinar) / Tubular Differentiation  Score 3    Nuclear Pleomorphism  Score 2    Mitotic Rate  Score 1    Overall Grade  Grade 2 (scores of 6 or 7)    Tumor Size  Greatest dimension of largest invasive focus (Millimeters): 50 mm   Tumor Focality  Single focus of invasive carcinoma    Ductal Carcinoma In Situ (DCIS)  Not identified    Lobular Carcinoma In Situ (LCIS)  Present         Lymphovascular Invasion  Not identified    Dermal Lymphovascular Invasion  Not identified    Microcalcifications  Not identified    Treatment Effect in the Breast  No known presurgical therapy    MARGINS   Margin Status for Invasive Carcinoma  All margins negative for invasive carcinoma    Distance from Invasive Carcinoma to Closest Margin  10 mm   Closest Margin(s) to Invasive Carcinoma  Posterior    Distance from Invasive Carcinoma to Anterior Margin  15 mm   Distance from  Invasive Carcinoma to Superior Margin  40 mm   Distance from Invasive Carcinoma to Inferior Margin  30 mm   Distance from Invasive Carcinoma to Medial Margin  100 mm   Distance from Invasive Carcinoma to Lateral Margin  20 mm   REGIONAL LYMPH NODES   Regional Lymph Node Status  Tumor present in regional lymph node(s)    Number of Lymph Nodes with Macrometastases  0    Number of Lymph Nodes with Micrometastases  0    Number of Lymph Nodes with Isolated Tumor Cells  1    Size of Largest Krystin Metastatic Deposit  0.1 mm   Extranodal Extension  Not identified    Total Number of Lymph Nodes Examined (sentinel and non-sentinel)  4    Number of [place] Nodes Examined  4    PATHOLOGIC STAGE CLASSIFICATION (pTNM, AJCC 8th Edition)   Reporting of pT, pN, and (when applicable) pM categories is based on information available to the pathologist at the time the report is issued. As per the AJCC (Chapter 1, 8th Ed.) it is the managing physician’s responsibility to establish the final pathologic stage based upon all pertinent information, including but potentially not limited to this pathology report.   pT Category  pT2    Regional Lymph Nodes Modifier  (sn): [place]orn node(s) evaluated.    pN Category  pN0 (i+)    SPECIAL STUDIES        Estrogen Receptor (ER) Status  Positive (greater than 10% of cells demonstrate nuclear positivity)    Percentage of Cells with Nuclear Positivity  %         Progesterone Receptor (PgR) Status  Positive    Percentage of Cells with Nuclear Positivity  %         HER2 (by immunohistochemistry)  Negative (Score 1+)         Ki-67 Percentage of Positive Nuclei  39 %   Testing Performed on Case Number  OPUS case [id number]    .               Assessment/Plan     Diagnoses and all orders for this visit:    1. Malignant neoplasm of upper-outer quadrant of left breast in female, estrogen receptor positive (HCC) (Primary)  -     CBC & Differential; Future  -     Comprehensive Metabolic Panel  -      Ferritin  -     Iron Profile  -     Retic With IRF & RET-He  -     Vitamin B12  -     Folate  -     Lactate Dehydrogenase  -     Cancer Antigen 15-3; Future  -     Lipid Panel; Future  -     Adult Transthoracic Echo Complete W/ Cont if Necessary Per Protocol; Future  -     Ambulatory Referral to Cardiology  -     Ambulatory Referral to Breast Surgery  -     gabapentin (NEURONTIN) 100 MG capsule; Take 1 capsule by mouth every night at bedtime.  Dispense: 30 capsule; Refill: 0    2. Anemia due to other cause, not classified       In summary this 37-year-old  female originally from [place] noticed abnormalities in the left breast in 12/2021, sensation of pressure, minor pain, sensation of fullness. She looked for help. Further mammogram and ultrasound documented abnormalities in the left breast that were consistent with breast cancer. Since then the patient has undergone breast MRI. Breast biopsies documented invasive lobular carcinoma. All this culminated with a left-sided mastectomy and sentinel lymph node sampling. The pathology has been posted above. She had a lobular carcinoma, grade 2, margins of resection negative, Ki-67 at 39%, ER positive, RI positive, HER2/leonie negative. She had 1 positive sentinel lymph node for malignancy with no extracapsular invasion. There was no evidence of lymphovascular invasion.     Invitae panel was negative for any genetic alteration.     The patient has had an expander placed at the mastectomy site. She has discomfort and pain after this was instillated with saline solution yesterday.     The other phenomenon that is happening to the patient is significant pain and discomfort in the inner aspect of the left arm. Sounds neuropathic related to her recent sentinel lymph node sampling. The patient is taking Aleve or ibuprofen with not too much benefit.     The patient also has a minimal component of anemia that will require assessment with ferritin, iron, TIBC, B12, folic acid  levels.     RECOMMENDATIONS:  I have advised this patient and  today that she needs to receive adjuvant chemotherapy in the form of AC x4 and subsequently Taxol x12. Oncotype DX in this patient shows intermediate risk of recurrence. Also not only the chemotherapy will be necessary but we need to make her postmenopausal in the next several weeks or months. This will bring in a lot of benefit for her in the long run to minimize any recurrent disease. She is extremely young. She has 2 young children and we have to do as much as we can in this patient to minimize any potentiality for recurrent disease.     I discussed briefly with her side effects of the treatment including alopecia, cardiac toxicity, anemia, leukopenia, thrombocytopenia, mucositis among others and peripheral neuropathy in the Taxol part of the treatment along with leukopenia, anemia, thrombocytopenia and allergic reaction to the medicine. I discussed with her that the whole treatment is going to take at least 24-26 weeks, in other words 6 months, and thereafter the patient will be hopefully postmenopausal. If she does not become postmenopausal she could qualify for a clinical trial or we could ask Dr. Heidy Gutierrez to do bilateral oophorectomy in this patient.     Eventually the patient will require endocrine adjuvant therapy after she becomes postmenopausal hopefully with letrozole.     I discussed all these facts with the patient and her . I spent 1 hour and 30 minutes with her going through the details of all this but she was able to grasp a lot of information. I appreciated highly the visit with her  and I pointed out to them that they need to work as a team. The patient has 2 young children. She is the only one taking care of the kids. The patient's mother is living with her, coming from [place] after all these issues. I asked her to ask her mother to stay for the period of time that she receives chemotherapy. If we need  to do any services for her in regard to visa requirements or status, we will be glad to incorporate our  into this process.     Finally, I discussed with the patient the need for her to proceed with cardiac assessment. I made a referral to be seen by Cardiology and scheduled her to have an echocardiogram and I sent her back to see Dr. Escudero in order to proceed with the placement of a port.     In order to control the pain in the inner aspect of her arm, I asked her to use Voltaren topically 4 times a day. It will be perfectly fine for her to use ibuprofen and I sent Neurontin 100 mg to take at bedtime. Hopefully this will be a transitory issue.     Dr. Johnson eventually will be in charge of deciding the time for the patient to initiate physical therapy for her left shoulder.     I reviewed all the pertinent records on this patient and I posted the information out of the pathology and radiology as above. I discussed the case with my partner, Dr. Landeros, who agrees with my plan of care.    Finally, I also posted to the patient today that will require followup in the future in regard to her right breast not only with mammogram but also ultrasound. I discussed with her the fact that sometimes lobular carcinoma likes to be a bilateral disease. There are minimal abnormalities in the mammogram in the right breast as well as in the MRI. This will require to be followed up in the future. I did not recommend a 2nd mastectomy prophylactically on her even though she asked the question today.

## 2022-05-05 NOTE — PROGRESS NOTES
MULTIDISCIPLINARY TREATMENT PLANNING CONFERENCE  DATE: 5/6/2022      SPECIALTY: Breast Conference    PRESENTER: Monisha Escudero MD    SITE: Left Breast        Please discuss clinical/working stage, TNM, Stage Group, National Treatment Guidelines, and Prognostic Indicators.       CONFERENCE SUMMARY:    - recommend radiation  -recommend consideration of 4xTC then focus on ovarian suppression with lupron and AI                          AJCC STAGE: clinical mT3N0- IIB        REFERRAL SUPPORT   Psychosocial Assessment:  []                Clinical Trials:  []                Genetic Testing:  [x]                Geriatric Assessment:  []                Smoking Cessation:  []                Nutrition Assessment:  []                Social Work Evaluation/Barriers to Care:  []                Behavioral Oncology Evaluation:  []                Palliative Care:  []        EVIDENCE BASED NATIONAL TREATMENT GUIDELINES:  [x]                   SOCIAL HISTORY:   reports that she has never smoked. She has never used smokeless tobacco. She reports previous alcohol use. She reports that she does not use drugs.                  PAST MEDICAL HISTORY:   has a past medical history of Anesthesia complication, Breast cancer (HCC), History of COVID-19, Hyperlipidemia, PONV (postoperative nausea and vomiting), and Seasonal allergies.                  PAST SURGICAL HISTORY:  @                 IMAGING:  XR Chest 2 View    Result Date: 3/24/2022  Negative PA and lateral chest radiographs.  This report was finalized on 3/24/2022 2:16 PM by Dr. Jeremiah Maldonado M.D.      US Breast Right Limited    Result Date: 4/11/2022  There is a 0.8 cm benign-appearing complex cyst in the right breast in a retroareolar location at the 7:30-o'clock position. This corresponds to the nonenhancing lesion seen on the breast MRI examination dated 3/4/2022. This is consistent with a benign etiology. Clinical follow-up for the known left breast malignancy is recommended.   BI-RADS Category 6: Known biopsy-proven malignancy.  This report was finalized on 4/11/2022 3:34 PM by Dr. Jeremiah Maldonado M.D.                      SURGICAL PROCEDURE / PATHOLOGY:     2/17/2022: [id number] ([facility])                       4/13/2022: [id number] [facility]    Final Diagnosis  1. Lymph Node, Left [place] #1, Excision (H&E, AE1/AE3):  A. One lymph node with isolated tumor cells.    2. Lymph Node, Left [place] #2, Excision (H&E, AE1/AE3):  A. One benign lymph node (0/1).    3. Lymph Node, Left [place] #3, Excision (H&E, AE1/AE3):  A. Two benign lymph nodes (0/2).    4. Breast, Left, Mastectomy (655 grams):  A. Invasive lobular carcinoma, Shazia histologic grade II (tubules = 3, nuclei = 2, mitoses = 1) measuring 50 mm maximally.  B. Lobular carcinoma in situ, measuring 70 mm maximally.      Labs & Biomarkers:      2/17/2022:           4/13/2022: OncoTyoe: 18      3/4/2022: Invitae, 44 genes, negative

## 2022-05-06 ENCOUNTER — LAB (OUTPATIENT)
Dept: LAB | Facility: HOSPITAL | Age: 37
End: 2022-05-06

## 2022-05-06 ENCOUNTER — CONSULT (OUTPATIENT)
Dept: ONCOLOGY | Facility: CLINIC | Age: 37
End: 2022-05-06

## 2022-05-06 VITALS
TEMPERATURE: 97.7 F | BODY MASS INDEX: 21.05 KG/M2 | HEIGHT: 63 IN | RESPIRATION RATE: 18 BRPM | OXYGEN SATURATION: 100 % | DIASTOLIC BLOOD PRESSURE: 59 MMHG | WEIGHT: 118.8 LBS | SYSTOLIC BLOOD PRESSURE: 90 MMHG | HEART RATE: 68 BPM

## 2022-05-06 DIAGNOSIS — C50.412 MALIGNANT NEOPLASM OF UPPER-OUTER QUADRANT OF LEFT BREAST IN FEMALE, ESTROGEN RECEPTOR POSITIVE: Primary | ICD-10-CM

## 2022-05-06 DIAGNOSIS — Z17.0 MALIGNANT NEOPLASM OF UPPER-OUTER QUADRANT OF LEFT BREAST IN FEMALE, ESTROGEN RECEPTOR POSITIVE: ICD-10-CM

## 2022-05-06 DIAGNOSIS — D64.89 ANEMIA DUE TO OTHER CAUSE, NOT CLASSIFIED: ICD-10-CM

## 2022-05-06 DIAGNOSIS — Z17.0 MALIGNANT NEOPLASM OF UPPER-OUTER QUADRANT OF LEFT BREAST IN FEMALE, ESTROGEN RECEPTOR POSITIVE: Primary | ICD-10-CM

## 2022-05-06 DIAGNOSIS — C50.412 MALIGNANT NEOPLASM OF UPPER-OUTER QUADRANT OF LEFT BREAST IN FEMALE, ESTROGEN RECEPTOR POSITIVE: ICD-10-CM

## 2022-05-06 LAB
ALBUMIN SERPL-MCNC: 4.8 G/DL (ref 3.5–5.2)
ALBUMIN/GLOB SERPL: 1.7 G/DL (ref 1.1–2.4)
ALP SERPL-CCNC: 88 U/L (ref 38–116)
ALT SERPL W P-5'-P-CCNC: 16 U/L (ref 0–33)
ANION GAP SERPL CALCULATED.3IONS-SCNC: 12.5 MMOL/L (ref 5–15)
AST SERPL-CCNC: 22 U/L (ref 0–32)
BASOPHILS # BLD AUTO: 0.06 10*3/MM3 (ref 0–0.2)
BASOPHILS NFR BLD AUTO: 0.8 % (ref 0–1.5)
BILIRUB SERPL-MCNC: 0.3 MG/DL (ref 0.2–1.2)
BUN SERPL-MCNC: 12 MG/DL (ref 6–20)
BUN/CREAT SERPL: 17.9 (ref 7.3–30)
CALCIUM SPEC-SCNC: 9.6 MG/DL (ref 8.5–10.2)
CANCER AG15-3 SERPL-ACNC: 10.8 U/ML
CHLORIDE SERPL-SCNC: 103 MMOL/L (ref 98–107)
CHOLEST SERPL-MCNC: 175 MG/DL (ref 0–200)
CO2 SERPL-SCNC: 22.5 MMOL/L (ref 22–29)
CREAT SERPL-MCNC: 0.67 MG/DL (ref 0.6–1.1)
DEPRECATED RDW RBC AUTO: 39.7 FL (ref 37–54)
EGFRCR SERPLBLD CKD-EPI 2021: 114.9 ML/MIN/1.73
EOSINOPHIL # BLD AUTO: 1.15 10*3/MM3 (ref 0–0.4)
EOSINOPHIL NFR BLD AUTO: 15.1 % (ref 0.3–6.2)
ERYTHROCYTE [DISTWIDTH] IN BLOOD BY AUTOMATED COUNT: 12.4 % (ref 12.3–15.4)
FERRITIN SERPL-MCNC: 60.9 NG/ML (ref 11–207)
FOLATE SERPL-MCNC: 13.4 NG/ML (ref 4.78–24.2)
GLOBULIN UR ELPH-MCNC: 2.8 GM/DL (ref 1.8–3.5)
GLUCOSE SERPL-MCNC: 88 MG/DL (ref 74–124)
HCT VFR BLD AUTO: 36.8 % (ref 34–46.6)
HDLC SERPL-MCNC: 62 MG/DL (ref 40–60)
HGB BLD-MCNC: 12.4 G/DL (ref 12–15.9)
HGB RETIC QN AUTO: 34.2 PG (ref 29.8–36.1)
IMM GRANULOCYTES # BLD AUTO: 0.08 10*3/MM3 (ref 0–0.05)
IMM GRANULOCYTES NFR BLD AUTO: 1.1 % (ref 0–0.5)
IMM RETICS NFR: 7.8 % (ref 3–15.8)
IRON 24H UR-MRATE: 112 MCG/DL (ref 37–145)
IRON SATN MFR SERPL: 30 % (ref 14–48)
LDH SERPL-CCNC: 188 U/L (ref 99–259)
LDLC SERPL CALC-MCNC: 95 MG/DL (ref 0–100)
LDLC/HDLC SERPL: 1.51 {RATIO}
LYMPHOCYTES # BLD AUTO: 2.3 10*3/MM3 (ref 0.7–3.1)
LYMPHOCYTES NFR BLD AUTO: 30.3 % (ref 19.6–45.3)
MCH RBC QN AUTO: 29.5 PG (ref 26.6–33)
MCHC RBC AUTO-ENTMCNC: 33.7 G/DL (ref 31.5–35.7)
MCV RBC AUTO: 87.6 FL (ref 79–97)
MONOCYTES # BLD AUTO: 0.47 10*3/MM3 (ref 0.1–0.9)
MONOCYTES NFR BLD AUTO: 6.2 % (ref 5–12)
NEUTROPHILS NFR BLD AUTO: 3.54 10*3/MM3 (ref 1.7–7)
NEUTROPHILS NFR BLD AUTO: 46.5 % (ref 42.7–76)
NRBC BLD AUTO-RTO: 0 /100 WBC (ref 0–0.2)
PLATELET # BLD AUTO: 235 10*3/MM3 (ref 140–450)
PMV BLD AUTO: 12.1 FL (ref 6–12)
POTASSIUM SERPL-SCNC: 4.1 MMOL/L (ref 3.5–4.7)
PROT SERPL-MCNC: 7.6 G/DL (ref 6.3–8)
RBC # BLD AUTO: 4.2 10*6/MM3 (ref 3.77–5.28)
RETICS # AUTO: 0.07 10*6/MM3 (ref 0.02–0.13)
RETICS/RBC NFR AUTO: 1.68 % (ref 0.7–1.9)
SODIUM SERPL-SCNC: 138 MMOL/L (ref 134–145)
TIBC SERPL-MCNC: 374 MCG/DL (ref 249–505)
TRANSFERRIN SERPL-MCNC: 267 MG/DL (ref 200–360)
TRIGL SERPL-MCNC: 98 MG/DL (ref 0–150)
VIT B12 BLD-MCNC: 839 PG/ML (ref 211–946)
VLDLC SERPL-MCNC: 18 MG/DL (ref 5–40)
WBC NRBC COR # BLD: 7.6 10*3/MM3 (ref 3.4–10.8)

## 2022-05-06 PROCEDURE — 80053 COMPREHEN METABOLIC PANEL: CPT | Performed by: INTERNAL MEDICINE

## 2022-05-06 PROCEDURE — 82746 ASSAY OF FOLIC ACID SERUM: CPT | Performed by: INTERNAL MEDICINE

## 2022-05-06 PROCEDURE — 99205 OFFICE O/P NEW HI 60 MIN: CPT | Performed by: INTERNAL MEDICINE

## 2022-05-06 PROCEDURE — 84466 ASSAY OF TRANSFERRIN: CPT | Performed by: INTERNAL MEDICINE

## 2022-05-06 PROCEDURE — 83615 LACTATE (LD) (LDH) ENZYME: CPT | Performed by: INTERNAL MEDICINE

## 2022-05-06 PROCEDURE — 36415 COLL VENOUS BLD VENIPUNCTURE: CPT

## 2022-05-06 PROCEDURE — 86300 IMMUNOASSAY TUMOR CA 15-3: CPT | Performed by: INTERNAL MEDICINE

## 2022-05-06 PROCEDURE — 80061 LIPID PANEL: CPT | Performed by: INTERNAL MEDICINE

## 2022-05-06 PROCEDURE — 82728 ASSAY OF FERRITIN: CPT | Performed by: INTERNAL MEDICINE

## 2022-05-06 PROCEDURE — 83540 ASSAY OF IRON: CPT | Performed by: INTERNAL MEDICINE

## 2022-05-06 PROCEDURE — 85025 COMPLETE CBC W/AUTO DIFF WBC: CPT

## 2022-05-06 PROCEDURE — 85046 RETICYTE/HGB CONCENTRATE: CPT | Performed by: INTERNAL MEDICINE

## 2022-05-06 PROCEDURE — 82607 VITAMIN B-12: CPT | Performed by: INTERNAL MEDICINE

## 2022-05-06 RX ORDER — GABAPENTIN 100 MG/1
100 CAPSULE ORAL
Qty: 30 CAPSULE | Refills: 0 | Status: SHIPPED | OUTPATIENT
Start: 2022-05-06 | End: 2022-05-12

## 2022-05-07 ENCOUNTER — NURSE NAVIGATOR (OUTPATIENT)
Dept: OTHER | Facility: HOSPITAL | Age: 37
End: 2022-05-07

## 2022-05-07 DIAGNOSIS — Z17.0 MALIGNANT NEOPLASM OF UPPER-OUTER QUADRANT OF LEFT BREAST IN FEMALE, ESTROGEN RECEPTOR POSITIVE: Primary | ICD-10-CM

## 2022-05-07 DIAGNOSIS — C50.412 MALIGNANT NEOPLASM OF UPPER-OUTER QUADRANT OF LEFT BREAST IN FEMALE, ESTROGEN RECEPTOR POSITIVE: Primary | ICD-10-CM

## 2022-05-07 NOTE — PROGRESS NOTES
Called Ms. Jannette Romero to see how she was doing. She stated her consult with Dr. Palacios went well. She is expecting to hear about her port surgery date soon and will start chemotherapy on May 20th. She was interested in speaking with our dietitian and that referral has been made. Otherwise she has no needs at this time. She was thankful for the call and will reach out if any questions or needs arise.

## 2022-05-09 ENCOUNTER — PREP FOR SURGERY (OUTPATIENT)
Dept: OTHER | Facility: HOSPITAL | Age: 37
End: 2022-05-09

## 2022-05-09 ENCOUNTER — TELEPHONE (OUTPATIENT)
Dept: SURGERY | Facility: CLINIC | Age: 37
End: 2022-05-09

## 2022-05-09 DIAGNOSIS — C50.412 MALIGNANT NEOPLASM OF UPPER-OUTER QUADRANT OF LEFT FEMALE BREAST, UNSPECIFIED ESTROGEN RECEPTOR STATUS: Primary | ICD-10-CM

## 2022-05-09 RX ORDER — SODIUM CHLORIDE, SODIUM LACTATE, POTASSIUM CHLORIDE, CALCIUM CHLORIDE 600; 310; 30; 20 MG/100ML; MG/100ML; MG/100ML; MG/100ML
100 INJECTION, SOLUTION INTRAVENOUS CONTINUOUS
Status: CANCELLED | OUTPATIENT
Start: 2022-05-13

## 2022-05-09 RX ORDER — CEFAZOLIN SODIUM 2 G/100ML
2 INJECTION, SOLUTION INTRAVENOUS ONCE
Status: CANCELLED | OUTPATIENT
Start: 2022-05-13 | End: 2022-05-09

## 2022-05-09 NOTE — TELEPHONE ENCOUNTER
Surgery scheduled on 5/13/2022 @ 10:00am, arrive @ 8:00am.    PAT scheduled on 5/12/2022 @ 3:30pm    Suture Removal w/MA scheduled on 5/25/2022 @ 9:15am.    Called Pt. Patient expressed v/u of all appointments.     Appointment Reminder sent in mail.

## 2022-05-09 NOTE — H&P
There are no changes in the history or physical exam, aside from any that are listed as an addendum at the bottom of this document.   Today, patient will go for the surgery listed in the plan below      Chief Complaint: Kylah Lynn is a 37 y.o. female who was seen in consultation at the request of Heidy Gutierrez MD  for newly diagnosed breast cancer    History of Present Illness:  Patient presents with newly diagnosed breast cancer.  She noted a left breast mass in December.  No associated pain or skin changes or nipple discharge.  She noted no other new masses, skin changes, nipple discharge, nipple changes prior to her most recent imaging.  Her most recent imaging includes the following:  10/04/2017   BILATERAL DIAGNOSTIC MAMMO & BILATERAL BREAST US   [facility]   KYLAH LYNN   No family history. Baseline study.  MMG:  Heterogeneously dense.  Finding 1: Area of asymmetric breast tissue seen in the left breast in the 1:30 o’clock region and in the upper outer region.  US:  Finding 1: Elongated isoechoic area 3 cm and is most consistent with a prominent lobule. This correlates to the palpable area.  Finding 2: Two oval complicated cyst versus solid masses right breast. This is an incidental finding.  IMPRESSION:  Finding 1: Ultrasound of the left breast in 6 months.  Finding 2: Ultrasound of the right breast in 6 months is recommended.  BI-RADS Category 3: Probably Benign.    02/09/2022   BILATERAL DIAGNOSTIC MAMMO WITH MARY & RIGHT LIMITED/LEFT COMPLETE BREAST US   [facility]   KYLAH LYNN   Palpable lump or thickening in the left breast.  MMG:  Heterogeneously dense.  Finding 1: There is a new area of architectural distortion seen in the left upper outer breast. This correlates with the pinpointed palpable area at 2:30-3:00. The extent is difficult to measure mammographically.  Finding 2: New focal asymmetry seen in the left upper outer breast, just superior and  lateral to the above finding.  Finding 3: There is a new focal asymmetry seen in the left breast upper outer quadrant.  US:  Finding 1: Irregular hypoechoic area versus non-parallel hypoechoic mass with posterior acoustic shadowing and indistinct and spiculated margins measuring 17 x 20 x 16 mm, in the 2:30 o’clock region of the left breast, 6 centimeters from the nipple.  Finding 2: Irregular non-parallel hypoechoic mass 11 x 10 x 11 mm seen in the 1:30 o’clock region of the left breast located 6 centimeters from the nipple.  Finding 3: Oval parallel hypoechoic mass 4 x 3 x 4 mm seen in the left breast at 1 o’clock located 4 centimeters from the nipple.  Finding 4: Stable oval parallel mass with circumscribed margins measuring 12 x 6 x 8 mm seen in the right breast at 3 o’clock located 5 centimeters from the nipple.  Finding 5: Oval mass measuring 8 x 5 x 7 mm seen in the sub-areolar region of the right breast. Finding has decreased in size.  IMPRESSION:  Finding 1: New palpable architectural distortion and mass in the 2:30 o’clock region of the left breast located 6 centimeters from the nipple is highly suggestive of malignancy.  Finding 2: Mass in the 1:30 o’clock region of the left breast located 6 centimeters from the nipple is suspicious.  Finding 3: Mass in the left breast at 1 o’clock located 4 centimeters from the nipple is suspicious.  Finding 4: Stable mass in the right breast since 2017 is considered benign.  Finding 5: Considered benign.  BI-RADS Category 5.      She had a biopsy on the following day that showed:   02/17/2022 LEFT US GUIDED BIOPSY     [facility]     KYLAH JOYCE  Let breast 1:00. 12 gauge Suros. 7 cores. [place] shaped s-pravin tissue marker was placed. Clip in the expected position. Pathology returned as benign breast tissue with complex sclerosing lesion. Pathology high risk and concordant.  Left breast 1:30. 12 gauge. 9 cores were obtained. A tophat shaped s-pravin tissue  marker was placed. Clip in the expected position. Invasive lobular carcinoma. Malignant and concordant.  Left breast at the 2:30 position. 12 gauge core needle Suros. 14 cores were obtained. A Tumark Pro Q shaped tissue marker was placed at the biopsy site. Clip in the expected position. Invasive lobular carcinoma.  PATHOLOGY:  1. Breast, left, 1 o’clock, biopsy:  Benign breast tissue with complex sclerosing lesion.  2. Breast, left, 1:30 o’clock, biopsy:  Invasive lobular carcinoma, histologic grade 2 (tubules = 3, nuclei = 2, mitosis = 1), largest focus measures 9 mm.  ER: 97.01%  ND: 94.9%  HER2: 1+  Ki-67: 32.71%  3. Breast, left, 2:30 o’clock, biopsy:  Invasive lobular carcinoma, histologic grade 2 (tubules = 3, nuclei = 2, mitosis = 1), largest focus measures 10 mm.  ER: 89.94%  ND: 95.74%  HER2: Score 0  Ki-67: 39.2%    We then arranged for a breast MRI:    03/04/22 [facility] MRI BREAST BILATERAL   KYLAH HENSLEY   1.  Suspicious mass and nonmass enhancement measuring up to 7.1 cm in the outer middle and posterior left breast encompasses 2 sites of biopsy-proven malignancy marked with Q and top hat clips. A biopsy clip (mini cork) with adjacent faint ill-defined nonmass enhancement inferior to the biopsy clip in the outer left breast represents a site of biopsy-proven complex sclerosing lesion. Surgical management isrecommended.  2.  No MRI evidence of malignancy in the right breast.   BI-RADS Category 6: Known biopsy-proven malignancy        She has not had a breast biopsy in the past.  She has her uterus and ovaries, is premenopausal, and takes nor hormones. She had a mirena removed in June 2021 and uses no birth control  Her family history includes the following: He has 1 daughter, 1 son, 1 sister, 2 maternal aunts, one paternal aunt, no family history of breast or ovarian cancer.  She does have a paternal aunt who had melanoma.      INterval HIstory:  [facility] genetic testing panel March 4, 2022  breast and gynecology add on melanoma returned as negative for mutation.    Right breast ultrasound April 11, 2022 [facility] ultrasound retroareolar right breast 7-8 o'clock there is an 8 mm hypoechoic mass immediately deep to the skin.  Consistent with that of a benign complex cyst.  This corresponds to the nonenhancing lesion seen on the breast MRI consistent with a benign etiology.    Pathology from 4/13/21 left total mastectomy and sentinel lymph node biopsy followed by reconstruction returned as:  1 of 4 lymph nodes with breast cancer.  Isolated tumor cells.  Invasive lobular carcinoma, intermediate grade, 3, 2, 1, 5 cm, lobular carcinoma in situ, measuring 7 cm.  All margins are clear with the closest being 10 mm from the posterior margin.  The size of the greatest deposit in the lymph node is 0.1 mm or isolated tumor cells.  Pathologic stage T2N0 (i+), stage 2A.  Estrogen , progesterone , HER-2/leonie 1+, Ki-67 39%.        Oncotype DX April 26, 2022 for patients with micro mets and nodes +1-30 returned as recurrence score of 18 with a breast cancer specific survival at 9 years of 97%.  Risk of distant recurrence at 9 years on a hormone blocker alone is 16%.  Chemotherapy for this group as benefit cannot be excluded.    She is here for review.  She states that her drains have come out and she is started her fill.  She sees Dr. Johnson today for another follow-up.  Review of Systems:  Review of Systems   Constitutional: Negative for unexpected weight change (5 lb wt loss).   All other systems reviewed and are negative.       Past Medical and Surgical History:  Breast Biopsy History:  Patient had not had a breast biopsy prior to her cancer diagnosis.  Breast Cancer HIstory:  Patient does not have a past medical history of breast cancer.  Breast Operations, and year:  NONE   Obstetric/Gynecologic History:  Age menstrual periods began: 11  Patient is premenopausal, first day of last  period: 21  Number of pregnancies:2  Number of live births: 2  Number of abortions or miscarriages: 0  Age of delivery of first child: 28  Patient breast fed, for the following lenth of time: 10 MONTHS   Length of time taking birth control pills: N/A   Patient has never taken hormone replacement    PATIENT HAS BOTH OVARIES AND UTERUS  Past Surgical History:   Procedure Laterality Date   • APPENDECTOMY     • BREAST BIOPSY Left    • BREAST RECONSTRUCTION Left 2022    Procedure: LEFT BREAST 1ST STAGE RECONSTRUCTION WITH INSERTION OF TISSUE EXPANDER AND BIOLOGIC;  Surgeon: Hermelinda Johnson MD;  Location: [facility];  Service: Plastics;  Laterality: Left;   •  SECTION     •  SECTION N/A 12/10/2018    Procedure:  SECTION REPEAT;  Surgeon: Heidy Gutierrez MD;  Location: [facility] LABOR DELIVERY;  Service: Obstetrics/Gynecology   • D & C HYSTEROSCOPY N/A 2021    Procedure: HYSTEROSCOPY REMOVAL INTRAUTERINE DEVICE WILL NEED ULTRASOUND IN ROOM;  Surgeon: Heidy Gutierrez MD;  Location: [facility];  Service: Obstetrics/Gynecology;  Laterality: N/A;   • KNEE ARTHROSCOPY Right    • MASTECTOMY W/ SENTINEL NODE BIOPSY Left 2022    Procedure: left total mastectomy with left axillary sentinel lymph node biopsy;  Surgeon: Monisha Escudero MD;  Location: [facility];  Service: General;  Laterality: Left;     Past Medical History:   Diagnosis Date   • Anesthesia complication     hypotension with last c section   • Breast cancer (HCC)    • History of COVID-19     2021 AND 2022   • Hyperlipidemia    • PONV (postoperative nausea and vomiting)    • Seasonal allergies        Prior Hospitalizations, other than for surgery or childbirth, and year:  NONE     Social History     Socioeconomic History   • Marital status:    Tobacco Use   • Smoking status: Never Smoker   • Smokeless tobacco: Never Used   Vaping Use   • Vaping Use: Never used   Substance and  "Sexual Activity   • Alcohol use: Not Currently   • Drug use: No   • Sexual activity: Defer     Patient is .  Patient is a homemaker.  Patient drinks 1 servings of caffeine per day.    Family History:  Family History   Problem Relation Age of Onset   • Melanoma Paternal Aunt    • Malig Hyperthermia Neg Hx        Vital Signs:  BP 98/62   Pulse 70   Ht 162.6 cm (64.02\")   Wt 54.4 kg (120 lb)   LMP  (LMP Unknown)   SpO2 98%   Breastfeeding No   BMI 20.59 kg/m²      Medications:    Current Outpatient Medications:   •  atorvastatin (LIPITOR) 10 MG tablet, Take 10 mg by mouth Daily., Disp: , Rfl:   •  cetirizine (zyrTEC) 10 MG tablet, Take 10 mg by mouth As Needed for Allergies., Disp: , Rfl:   •  ibuprofen (ADVIL,MOTRIN) 200 MG tablet, Take 200 mg by mouth Every 6 (Six) Hours As Needed for Mild Pain ., Disp: , Rfl:      Allergies:  Allergies   Allergen Reactions   • Asa [Aspirin] Anaphylaxis       Physical Examination:  BP 98/62   Pulse 70   Ht 162.6 cm (64.02\")   Wt 54.4 kg (120 lb)   LMP  (LMP Unknown)   SpO2 98%   Breastfeeding No   BMI 20.59 kg/m²   General Appearance:  Patient is in no distress.  She is well kept and has an average build.   Psychiatric:  Patient with appropriate mood and affect. Alert and oriented to self, time, and place.    Breast, RIGHT:  large sized, 34C,asymmetric with the contralateral surgically absent side.  Breast skin is without erythema, edema, rashes.  There are no visible abnormalities upon inspection during the arm-raising maneuver or with hands on hips in the sitting position. There is no nipple retraction, discharge or nipple/areolar skin changes.There are no masses palpable in the sitting or supine positions.    Breast, LEFT:  Surgically absent with a partially filled tissue expander in place.  Well-healing transverse mastectomy incision.  No skin nodules or discolorations.  No seroma.    Lymphatic:  There is no axillary, cervical, infraclavicular, or " supraclavicular adenopathy bilaterally.  Eyes:  Pupils are round and reactive to light.  Cardiovascular:  Heart rate and rhythm are regular.  Respiratory:  Lungs are clear bilaterally with no crackles or wheezes in any lung field.  Gastrointestinal:  Abdomen is soft, nondistended, and nontender    Musculoskeletal:  Good strength in all 4 extremities.   There is good range of motion in both shoulders.    Skin:  No new skin lesions or rashes on the skin excluding the breast (see breast exam above).        Imaging:  10/04/2017   B[facility]   No family history. Baseline study.  MMG:  Heterogeneously dense.  Finding 1: Area of asymmetric breast tissue seen in the left breast in the 1:30 o’clock region and in the upper outer region.  US:  Finding 1: Elongated isoechoic area 3 cm and is most consistent with a prominent lobule. This correlates to the palpable area.  Finding 2: Two oval complicated cyst versus solid masses right breast. This is an incidental finding.  IMPRESSION:  Finding 1: Ultrasound of the left breast in 6 months.  Finding 2: Ultrasound of the right breast in 6 months is recommended.  BI-RADS Category 3: Probably Benign.    02/09/2022   BILATERAL DIAGNOSTIC MAMMO WITH MARY & RIGHT LIMITED/LEFT COMPLETE BREAST US   [facility]   Palpable lump or thickening in the left breast.  MMG:  Heterogeneously dense.  Finding 1: There is a new area of architectural distortion seen in the left upper outer breast. This correlates with the pinpointed palpable area at 2:30-3:00. The extent is difficult to measure mammographically.  Finding 2: New focal asymmetry seen in the left upper outer breast, just superior and lateral to the above finding.  Finding 3: There is a new focal asymmetry seen in the left breast upper outer quadrant.  US:  Finding 1: Irregular hypoechoic area versus non-parallel hypoechoic mass with  posterior acoustic shadowing and indistinct and spiculated margins measuring 17 x 20 x 16 mm, in the 2:30 o’clock region of the left breast, 6 centimeters from the nipple.  Finding 2: Irregular non-parallel hypoechoic mass 11 x 10 x 11 mm seen in the 1:30 o’clock region of the left breast located 6 centimeters from the nipple.  Finding 3: Oval parallel hypoechoic mass 4 x 3 x 4 mm seen in the left breast at 1 o’clock located 4 centimeters from the nipple.  Finding 4: Stable oval parallel mass with circumscribed margins measuring 12 x 6 x 8 mm seen in the right breast at 3 o’clock located 5 centimeters from the nipple.  Finding 5: Oval mass measuring 8 x 5 x 7 mm seen in the sub-areolar region of the right breast. Finding has decreased in size.  IMPRESSION:  Finding 1: New palpable architectural distortion and mass in the 2:30 o’clock region of the left breast located 6 centimeters from the nipple is highly suggestive of malignancy.  Finding 2: Mass in the 1:30 o’clock region of the left breast located 6 centimeters from the nipple is suspicious.  Finding 3: Mass in the left breast at 1 o’clock located 4 centimeters from the nipple is suspicious.  Finding 4: Stable mass in the right breast since 2017 is considered benign.  Finding 5: Considered benign.  BI-RADS Category 5.    03/04/22 [facility] MRI BREAST BILATERAL   KYLAH HENSLEY   1.  Suspicious mass and nonmass enhancement measuring up to 7.1 cm in the outer middle and posterior left breast encompasses 2 sites of biopsy-proven malignancy marked with Q and top hat clips. A biopsy clip (mini cork) with adjacent faint ill-defined nonmass enhancement inferior to the biopsy clip in the outer left breast represents a site of biopsy-proven complex sclerosing lesion. Surgical management isrecommended.  2.  No MRI evidence of malignancy in the right breast.   BI-RADS Category 6: Known biopsy-proven malignancy    Right breast ultrasound April 11, 2022 [facility]  [facility] ultrasound retroareolar right breast 7-8 o'clock there is an 8 mm hypoechoic mass immediately deep to the skin.  Consistent with that of a benign complex cyst.  This corresponds to the nonenhancing lesion seen on the breast MRI consistent with a benign etiology.  We will let her know      Pathology:  02/17/2022 LEFT US GUIDED BIOPSY     [facility]     KYLAH JOYCE  Let breast 1:00. 12 gauge Suros. 7 cores. [place] shaped s-pravin tissue marker was placed. Clip in the expected position. Pathology returned as benign breast tissue with complex sclerosing lesion. Pathology high risk and concordant.  Left breast 1:30. 12 gauge. 9 cores were obtained. A tophat shaped s-pravin tissue marker was placed. Clip in the expected position. Invasive lobular carcinoma. Malignant and concordant.  Left breast at the 2:30 position. 12 gauge core needle Suros. 14 cores were obtained. A Tumark Pro Q shaped tissue marker was placed at the biopsy site. Clip in the expected position. Invasive lobular carcinoma.  PATHOLOGY:  1. Breast, left, 1 o’clock, biopsy:  Benign breast tissue with complex sclerosing lesion.  2. Breast, left, 1:30 o’clock, biopsy:  Invasive lobular carcinoma, histologic grade 2 (tubules = 3, nuclei = 2, mitosis = 1), largest focus measures 9 mm.  ER: 97.01%  OH: 94.9%  HER2: 1+  Ki-67: 32.71%  3. Breast, left, 2:30 o’clock, biopsy:  Invasive lobular carcinoma, histologic grade 2 (tubules = 3, nuclei = 2, mitosis = 1), largest focus measures 10 mm.  ER: 89.94%  OH: 95.74%  HER2: Score 0  Ki-67: 39.2%    Pathology from 4/13/21 left total mastectomy and sentinel lymph node biopsy followed by reconstruction returned as:  1 of 4 lymph nodes with breast cancer.  Isolated tumor cells.  Invasive lobular carcinoma, intermediate grade, 3, 2, 1, 5 cm, lobular carcinoma in situ, measuring 7 cm.  All margins are clear with the closest being 10 mm from the posterior margin.  The size of the greatest deposit  in the lymph node is 0.1 mm or isolated tumor cells.  Pathologic stage T2N0 (i+), stage 2A.  Estrogen , progesterone , HER-2/leonie 1+, Ki-67 39%.     I will arrange for her to see medical oncology and radiation oncology.  I will send an oncotype                        Final Diagnosis   1. Lymph Node, Left Cobb #1, Excision (H&E, AE1/AE3):               A. One lymph node with isolated tumor cells.     2. Lymph Node, Left Cobb #2, Excision (H&E, AE1/AE3):               A. One benign lymph node (0/1).     3. Lymph Node, Left Cobb #3, Excision (H&E, AE1/AE3):               A. Two benign lymph nodes (0/2).     4. Breast, Left, Mastectomy (655 grams):               A. Invasive lobular carcinoma, [place] histologic grade II (tubules = 3, nuclei = 2, mitoses = 1) measuring        50 mm maximally.   B. Lobular carcinoma in situ, measuring 70 mm maximally.  C. All margins are free of in situ and invasive malignancy.  D. See synoptic template for complete tumor details.     [facility]    Electronically signed by Alyse Carver MD on 4/14/2022 at 1217   Synoptic Checklist      INVASIVE CARCINOMA OF THE BREAST: Resection  8th Edition - Protocol posted: 12/17/2021  INVASIVE CARCINOMA OF THE BREAST: COMPLETE EXCISION - All Specimens       SPECIMEN   Procedure   Total mastectomy    Specimen Laterality   Left    TUMOR   Tumor Site   Upper outer quadrant        Lower outer quadrant    Histologic Type   Invasive lobular carcinoma    Histologic Grade ([place] Histologic Score)       Glandular (Acinar) / Tubular Differentiation   Score 3    Nuclear Pleomorphism   Score 2    Mitotic Rate   Score 1    Overall Grade   Grade 2 (scores of 6 or 7)    Tumor Size   Greatest dimension of largest invasive focus (Millimeters): 50 mm   Tumor Focality   Single focus of invasive carcinoma    Ductal Carcinoma In Situ (DCIS)   Not identified    Lobular Carcinoma In Situ (LCIS)   Present            Lymphovascular Invasion    Not identified    Dermal Lymphovascular Invasion   Not identified    Microcalcifications   Not identified    Treatment Effect in the Breast   No known presurgical therapy    MARGINS   Margin Status for Invasive Carcinoma   All margins negative for invasive carcinoma    Distance from Invasive Carcinoma to Closest Margin   10 mm   Closest Margin(s) to Invasive Carcinoma   Posterior    Distance from Invasive Carcinoma to Anterior Margin   15 mm   Distance from Invasive Carcinoma to Superior Margin   40 mm   Distance from Invasive Carcinoma to Inferior Margin   30 mm   Distance from Invasive Carcinoma to Medial Margin   100 mm   Distance from Invasive Carcinoma to Lateral Margin   20 mm   REGIONAL LYMPH NODES   Regional Lymph Node Status   Tumor present in regional lymph node(s)    Number of Lymph Nodes with Macrometastases   0    Number of Lymph Nodes with Micrometastases   0    Number of Lymph Nodes with Isolated Tumor Cells   1    Size of Largest Krystin Metastatic Deposit   0.1 mm   Extranodal Extension   Not identified    Total Number of Lymph Nodes Examined (sentinel and non-sentinel)   4    Number of [place] Nodes Examined   4    PATHOLOGIC STAGE CLASSIFICATION (pTNM, AJCC 8th Edition)   Reporting of pT, pN, and (when applicable) pM categories is based on information available to the pathologist at the time the report is issued. As per the AJCC (Chapter 1, 8th Ed.) it is the managing physician’s responsibility to establish the final pathologic stage based upon all pertinent information, including but potentially not limited to this pathology report.   pT Category   pT2    Regional Lymph Nodes Modifier   (sn): [place] node(s) evaluated.    pN Category   pN0 (i+)    SPECIAL STUDIES           Estrogen Receptor (ER) Status   Positive (greater than 10% of cells demonstrate nuclear positivity)    Percentage of Cells with Nuclear Positivity   %            Progesterone Receptor (PgR) Status   Positive    Percentage  of Cells with Nuclear Positivity   %            HER2 (by immunohistochemistry)   Negative (Score 1+)            Ki-67 Percentage of Positive Nuclei   39 %   Testing Performed on Case Number   [facility] case [id number]    .      Comment     The patient's concurrent left breast core biopsy material is on file at [facility] ([id number]); those slides are not available for review. Both clips are associated with an invasive lobular carcinoma that is part of one roughly dumb bell shaped mass. Hormone receptors were previously performed at [facility]. The tumor is ER and LA positive, HER2 negative with a Ki-67 measuring up to 39.2% maximally.      mec/pkm             Oncotype DX April 26, 2022 for patients with micro mets and nodes +1-30 returned as recurrence score of 18 with a breast cancer specific survival at 9 years of 97%.  Risk of distant recurrence at 9 years on a hormone blocker alone is 16%.  Chemotherapy for this group as benefit cannot be excluded.        Labs:   [facility] genetic testing panel March 4, 2022 breast and gynecology add on melanoma returned as negative for mutation.  We will let her know        Procedures:      Assessment:   Diagnosis Plan   1. Malignant neoplasm of upper-outer quadrant of left breast in female, estrogen receptor positive (HCC)     2. Abnormal MRI, breast     1-  LEFT multifocal malignancy  Left breast 130, 6 cm from the nipple, 1.1 cm on ultrasound.  Top- in good position, 1.2 cm on MRI, invasive lobular carcinoma, intermediate grade, 3, 2, 1, 9 mm in a core, estrogen 97, progesterone 95, HER-2/leonie 1+, Ki-67 33%.    Left breast 230, 6 cm from the nipple, 2 cm on ultrasound-palpable lesion, 2 cm on ultrasound, 6.1 cm on MRI.  Q. marker in good position.  Invasive lobular carcinoma, intermediate grade, 3, 2, 1, 1 cm in greatest dimension, estrogen 90, progesterone 96, HER-2/leonie 0, Ki-67 39%.    Clinical stage mT3N0 stage IIb.      [facility] genetic testing panel March 4, 2022  breast and gynecology add on melanoma returned as negative for mutation.        Pathology from 4/13/21 left total mastectomy and sentinel lymph node biopsy followed by reconstruction returned as:  1 of 4 lymph nodes with breast cancer.  Isolated tumor cells.  Invasive lobular carcinoma, intermediate grade, 3, 2, 1, 5 cm, lobular carcinoma in situ, measuring 7 cm.  All margins are clear with the closest being 10 mm from the posterior margin.  The size of the greatest deposit in the lymph node is 0.1 mm or isolated tumor cells.  Estrogen , progesterone , HER-2/leonie 1+, Ki-67 39%.  Pathologic stage T2N0 (i+), stage 2A.        Oncotype DX April 26, 2022 for patients with micro mets and nodes +1-30 returned as recurrence score of 18 with a breast cancer specific survival at 9 years of 97%.  Risk of distant recurrence at 9 years on a hormone blocker alone is 16%.  Chemotherapy for this group as benefit cannot be excluded.        2-  RIGHT immediate retroareolar 7:-8:00- felt to be sebaceous cyst on MRI.   Right breast ultrasound April 11, 2022 [facility] ultrasound retroareolar right breast 7-8 o'clock there is an 8 mm hypoechoic mass immediately deep to the skin.  Consistent with that of a benign complex cyst.  This corresponds to the nonenhancing lesion seen on the breast MRI consistent with a benign etiology.        Plan:  Maribell is here today with her  Herb.    We reviewed her preoperative ultrasound which was benign on the right felt gerri benign.  She has seen physical therapy and had a lymphedema baseline.  Her genetic testing was negative.  We performed her left total mastectomy and sentinel node biopsy April 13, 2021.  Based on the size of 5 cm and possibly related to having isolated tumor cells, she may need radiation so I will have her see Dr. Falcon to discuss.  Her Oncotype returned with a score of 18 and she is seeing Dr. Kennedy on the sixth to discuss this.  She and I  discussed the procedure of right port placement with risks of bleeding, infection, malfunction, thrombus, pneumothorax.  If he request a port we will schedule this for her.  [facility]  I will arrange this and see her back after.      Monisha Escudero MD      Next Appointment:  Return for Next scheduled follow up, after imaging.      EMR Dragon/transcription disclaimer:    Much of this encounter note is an electronic transcription/translocation of spoken language to printed text.  The electronic translation of spoken language may permit erroneous, or at times, nonsensical words or phrases to be inadvertently transcribed.  Although I have reviewed the note from such areas, some may still exist.                .

## 2022-05-10 ENCOUNTER — TELEPHONE (OUTPATIENT)
Dept: ONCOLOGY | Facility: CLINIC | Age: 37
End: 2022-05-10

## 2022-05-10 NOTE — TELEPHONE ENCOUNTER
Called pt and discussed Mac. With assistance this will not be a financially feasible option for the pt. Chelsea Abbott RN

## 2022-05-11 ENCOUNTER — OFFICE VISIT (OUTPATIENT)
Dept: ONCOLOGY | Facility: CLINIC | Age: 37
End: 2022-05-11

## 2022-05-11 ENCOUNTER — RESEARCH ENCOUNTER (OUTPATIENT)
Dept: ONCOLOGY | Facility: CLINIC | Age: 37
End: 2022-05-11

## 2022-05-11 ENCOUNTER — APPOINTMENT (OUTPATIENT)
Dept: LAB | Facility: HOSPITAL | Age: 37
End: 2022-05-11

## 2022-05-11 ENCOUNTER — HOSPITAL ENCOUNTER (OUTPATIENT)
Dept: CARDIOLOGY | Facility: HOSPITAL | Age: 37
Discharge: HOME | End: 2022-05-11
Admitting: INTERNAL MEDICINE

## 2022-05-11 VITALS
TEMPERATURE: 98 F | HEIGHT: 63 IN | RESPIRATION RATE: 18 BRPM | BODY MASS INDEX: 21.35 KG/M2 | HEART RATE: 72 BPM | DIASTOLIC BLOOD PRESSURE: 70 MMHG | OXYGEN SATURATION: 99 % | SYSTOLIC BLOOD PRESSURE: 106 MMHG | WEIGHT: 120.5 LBS

## 2022-05-11 DIAGNOSIS — C50.412 MALIGNANT NEOPLASM OF UPPER-OUTER QUADRANT OF LEFT BREAST IN FEMALE, ESTROGEN RECEPTOR POSITIVE: Primary | ICD-10-CM

## 2022-05-11 DIAGNOSIS — C50.412 MALIGNANT NEOPLASM OF UPPER-OUTER QUADRANT OF LEFT BREAST IN FEMALE, ESTROGEN RECEPTOR POSITIVE: ICD-10-CM

## 2022-05-11 DIAGNOSIS — Z17.0 MALIGNANT NEOPLASM OF UPPER-OUTER QUADRANT OF LEFT BREAST IN FEMALE, ESTROGEN RECEPTOR POSITIVE: ICD-10-CM

## 2022-05-11 DIAGNOSIS — Z17.0 MALIGNANT NEOPLASM OF UPPER-OUTER QUADRANT OF LEFT BREAST IN FEMALE, ESTROGEN RECEPTOR POSITIVE: Primary | ICD-10-CM

## 2022-05-11 LAB
AORTIC ARCH: 1.8 CM
ASCENDING AORTA: 1.7 CM
BH CV ECHO MEAS - ACS: 1.66 CM
BH CV ECHO MEAS - AO MAX PG: 10.6 MMHG
BH CV ECHO MEAS - AO MEAN PG: 6 MMHG
BH CV ECHO MEAS - AO ROOT DIAM: 2.11 CM
BH CV ECHO MEAS - AO V2 MAX: 162.9 CM/SEC
BH CV ECHO MEAS - AO V2 VTI: 36 CM
BH CV ECHO MEAS - AVA(I,D): 1.53 CM2
BH CV ECHO MEAS - EDV(CUBED): 48.5 ML
BH CV ECHO MEAS - EDV(MOD-SP2): 78 ML
BH CV ECHO MEAS - EDV(MOD-SP4): 84 ML
BH CV ECHO MEAS - EF(MOD-BP): 64.9 %
BH CV ECHO MEAS - EF(MOD-SP2): 66.7 %
BH CV ECHO MEAS - EF(MOD-SP4): 66.7 %
BH CV ECHO MEAS - ESV(CUBED): 17.5 ML
BH CV ECHO MEAS - ESV(MOD-SP2): 26 ML
BH CV ECHO MEAS - ESV(MOD-SP4): 28 ML
BH CV ECHO MEAS - FS: 28.8 %
BH CV ECHO MEAS - IVS/LVPW: 0.87 CM
BH CV ECHO MEAS - IVSD: 0.87 CM
BH CV ECHO MEAS - LAT PEAK E' VEL: 17.4 CM/SEC
BH CV ECHO MEAS - LV DIASTOLIC VOL/BSA (35-75): 54 CM2
BH CV ECHO MEAS - LV MASS(C)D: 100.3 GRAMS
BH CV ECHO MEAS - LV MAX PG: 4 MMHG
BH CV ECHO MEAS - LV MEAN PG: 2.14 MMHG
BH CV ECHO MEAS - LV SYSTOLIC VOL/BSA (12-30): 18 CM2
BH CV ECHO MEAS - LV V1 MAX: 99.4 CM/SEC
BH CV ECHO MEAS - LV V1 VTI: 19.2 CM
BH CV ECHO MEAS - LVIDD: 3.6 CM
BH CV ECHO MEAS - LVIDS: 2.6 CM
BH CV ECHO MEAS - LVOT AREA: 2.9 CM2
BH CV ECHO MEAS - LVOT DIAM: 1.91 CM
BH CV ECHO MEAS - LVPWD: 1 CM
BH CV ECHO MEAS - MED PEAK E' VEL: 12.6 CM/SEC
BH CV ECHO MEAS - MV A DUR: 0.13 SEC
BH CV ECHO MEAS - MV A MAX VEL: 87.4 CM/SEC
BH CV ECHO MEAS - MV DEC SLOPE: 357.4 CM/SEC2
BH CV ECHO MEAS - MV DEC TIME: 0.19 MSEC
BH CV ECHO MEAS - MV E MAX VEL: 103 CM/SEC
BH CV ECHO MEAS - MV E/A: 1.18
BH CV ECHO MEAS - MV MAX PG: 4.8 MMHG
BH CV ECHO MEAS - MV MEAN PG: 1.8 MMHG
BH CV ECHO MEAS - MV P1/2T: 96.8 MSEC
BH CV ECHO MEAS - MV V2 VTI: 31.9 CM
BH CV ECHO MEAS - MVA(P1/2T): 2.27 CM2
BH CV ECHO MEAS - MVA(VTI): 1.73 CM2
BH CV ECHO MEAS - PA ACC TIME: 0.16 SEC
BH CV ECHO MEAS - PA PR(ACCEL): 7.7 MMHG
BH CV ECHO MEAS - PA V2 MAX: 88.2 CM/SEC
BH CV ECHO MEAS - PULM A REVS DUR: 0.14 SEC
BH CV ECHO MEAS - PULM A REVS VEL: 21.9 CM/SEC
BH CV ECHO MEAS - PULM DIAS VEL: 30 CM/SEC
BH CV ECHO MEAS - PULM S/D: 1.01
BH CV ECHO MEAS - PULM SYS VEL: 30.4 CM/SEC
BH CV ECHO MEAS - QP/QS: 0.49
BH CV ECHO MEAS - RV MAX PG: 1.76 MMHG
BH CV ECHO MEAS - RV V1 MAX: 66.4 CM/SEC
BH CV ECHO MEAS - RV V1 VTI: 13.5 CM
BH CV ECHO MEAS - RVOT DIAM: 1.59 CM
BH CV ECHO MEAS - SI(MOD-SP2): 33.4 ML/M2
BH CV ECHO MEAS - SI(MOD-SP4): 36 ML/M2
BH CV ECHO MEAS - SUP REN AO DIAM: 1.6 CM
BH CV ECHO MEAS - SV(LVOT): 55.2 ML
BH CV ECHO MEAS - SV(MOD-SP2): 52 ML
BH CV ECHO MEAS - SV(MOD-SP4): 56 ML
BH CV ECHO MEAS - SV(RVOT): 26.8 ML
BH CV ECHO MEASUREMENTS AVERAGE E/E' RATIO: 6.87
BH CV XLRA - RV BASE: 2.47 CM
BH CV XLRA - RV LENGTH: 6.2 CM
BH CV XLRA - RV MID: 1.87 CM
LV EF 2D ECHO EST: 60 %
MAXIMAL PREDICTED HEART RATE: 182 BPM
SINUS: 2.37 CM
STJ: 1.76 CM
STRESS TARGET HR: 155 BPM

## 2022-05-11 PROCEDURE — 93306 TTE W/DOPPLER COMPLETE: CPT | Performed by: INTERNAL MEDICINE

## 2022-05-11 PROCEDURE — 93306 TTE W/DOPPLER COMPLETE: CPT

## 2022-05-11 PROCEDURE — 25010000002 PERFLUTREN (DEFINITY) 8.476 MG IN SODIUM CHLORIDE (PF) 0.9 % 10 ML INJECTION: Performed by: INTERNAL MEDICINE

## 2022-05-11 PROCEDURE — 99215 OFFICE O/P EST HI 40 MIN: CPT | Performed by: NURSE PRACTITIONER

## 2022-05-11 RX ORDER — OLANZAPINE 5 MG/1
5 TABLET ORAL NIGHTLY
Qty: 3 TABLET | Refills: 3 | Status: SHIPPED | OUTPATIENT
Start: 2022-05-11 | End: 2022-05-30 | Stop reason: HOSPADM

## 2022-05-11 RX ORDER — ONDANSETRON HYDROCHLORIDE 8 MG/1
8 TABLET, FILM COATED ORAL 3 TIMES DAILY PRN
Qty: 30 TABLET | Refills: 5 | Status: ON HOLD | OUTPATIENT
Start: 2022-05-11 | End: 2022-05-27

## 2022-05-11 RX ADMIN — PERFLUTREN 2 ML: 6.52 INJECTION, SUSPENSION INTRAVENOUS at 13:57

## 2022-05-11 NOTE — PROGRESS NOTES
TREATMENT  PREPARATION    Maribell Lynn  [id number]  1985    Chief Complaint: Treatment preparation and needs assessment    History of present illness:  Maribell Lynn is a 37 y.o. year old female who is here today for treatment preparation and needs assessment.  The patient has been diagnosed with   Encounter Diagnosis   Name Primary?   • Malignant neoplasm of upper-outer quadrant of left breast in female, estrogen receptor positive (HCC) Yes    and is scheduled to begin treatment with:     Oncology History:    Oncology/Hematology History   Malignant neoplasm of upper-outer quadrant of left breast in female, estrogen receptor positive (HCC)   3/10/2022 Initial Diagnosis    Malignant neoplasm of upper-outer quadrant of left breast in female, estrogen receptor positive (HCC)     5/5/2022 Cancer Staged    Staging form: Breast, AJCC 8th Edition  - Clinical stage from 5/5/2022: Stage IIA (cT3, cN1(sn), cM0, G2, ER+, HI+, HER2-) - Signed by Chau Palacios MD on 5/5/2022 5/20/2022 -  Chemotherapy    OP BREAST AC DD DOXOrubicin / Cyclophosphamide     7/11/2022 -  Chemotherapy    OP BREAST PACLitaxel (weekly X 12)         The current medication list and allergy list were reviewed and reconciled.     Past Medical History, Past Surgical History, Social History, Family History have been reviewed and are without significant changes except as mentioned.    Physical Exam:    Vitals:    05/11/22 1036   BP: 106/70   Pulse: 72   Resp: 18   Temp: 98 °F (36.7 °C)   SpO2: 99%     Vitals:    05/11/22 1036   PainSc: 6  Comment: pt stated pain under armpit on left side   PainLoc: Arm        ECOG score: 0         Physical Exam  Constitutional:       Appearance: Normal appearance.   HENT:      Head: Normocephalic.      Nose: Nose normal. No congestion.      Mouth/Throat:      Mouth: Mucous membranes are moist.      Pharynx: Oropharynx is clear. No oropharyngeal exudate.   Eyes:       Conjunctiva/sclera: Conjunctivae normal.      Pupils: Pupils are equal, round, and reactive to light.   Cardiovascular:      Rate and Rhythm: Normal rate.   Pulmonary:      Effort: Pulmonary effort is normal.   Musculoskeletal:         General: Normal range of motion.      Cervical back: Normal range of motion.   Skin:     General: Skin is warm and dry.      Findings: No rash.   Neurological:      Mental Status: She is alert.      Motor: No weakness.   Psychiatric:         Mood and Affect: Mood normal.         Behavior: Behavior normal.         Thought Content: Thought content normal.         Judgment: Judgment normal.           NEEDS ASSESSMENTS    Genetics  The patient's new diagnosis and family history have been reviewed for genetic counseling needs. A genetic referral is not recommended.     Psychosocial and Barriers to care  The patient has completed a PHQ-9 Depression Screening and the Distress Thermometer (DT) today.  PHQ-9 results show PHQ-2 Total Score: 0 PHQ-9 Total Score: PHQ-9 Total Score: 0     The patient scored their distress today as Distress Level: 0-No distress on a scale of 0-10 with 0 being no distress and 10 being extreme distress. Problems marked by the patient as being an issue for them within the last week include   .      Results were reviewed along with psychosocial resources offered by our cancer center.  Our Supportive Oncology team will be flagged for a score of 4 or above, and a same day call will be made for a score of 9 or 10.  A mental health referral is offered at that time. Patients who score less than 4 have been educated on our support services and can be referred to our  upon request.  The patient will not be referred to our .       Nutrition  The patient has completed the malnutrition screening today. They scored Malnutrition Screening Tool  Have you recently lost weight without trying?  If yes, how much weight have you lost?: 0--> No  Have you been  eating poorly because of a decreased appetite?: 0--> No  MST score: 0   with a score of 0-1 meaning not at risk in a score of 2 or greater meaning at risk.  Patients with a score of 3 or higher will be referred to our oncology dietitian for support. Patients beginning at risk treatment regimens or who have dietary concerns will also be referred to our oncology dietitian. The patient has already been referred per her request.    Functional Assessment  Persons who are age 70 or greater will be screened for qualification of a comprehensive geriatric assessment by our survivorship nurse practitioner.  Older adults with cancer face unique challenges. These may include an increased risk of drug reactions, financial burdens, and caregiver stress. The patient scored   . Patients scoring 14 or lower will referred for an older adult functional assessment with the survivorship advanced practice registered nurse to ensure all needed support is provided as patients plan for their treatments. NOT APPLICABLE    Intravenous Access Assessment  The patient and I discussed planned intravenous chemo/biotherapy as well as other IV treatments that are often needed throughout the course of treatment. These may include, but are not limited to blood transfusions, antibiotics, and IV hydration. Discussed that depending on selected treatment and vein assessment, patient may require venous access device (VAD) which could include but not limited to a Mediport or PICC line. Risks and benefits of VADs reviewed. The patient will be treated via Port.    Reproductive/Sexual Activity   People should avoid becoming pregnant and should not get a partner pregnant while undergoing chemo/biotherapy.  People of childbearing age should use effective contraception during active therapy. The best recommendation for all people is to use a barrier method for a minimum of 1 week after the last infusion of chemo/biotherapy to prevent your partner being exposed to  "byproducts from treatment medications in bodily fluids. Effective contraception should be discussed with your oncology team to make sure it is safe to take based on your diagnosis. Possible options include oral contraceptives, barrier methods. Chemo/biotherapy can change your ability to reproduce children in the future.  There are options for fertility preservation. The patient will not be referred.    Advanced Care Planning  Advance Care Planning   The patient and I discussed advanced care planning, \"Conversations that Matter\".   This service is offered for development of advance directives with a certified ACP facilitator.  The patient does not have an up-to-date advanced directive. This document is not on file with our office. The patient is not interested in an appointment with one of our facilitators to create or update their advanced directives.     Smoking cessation  Tobacco Use: Low Risk    • Smoking Tobacco Use: Never Smoker   • Smokeless Tobacco Use: Never Used       Patient and I discussed their tobacco use history. Referral will not be made for smoking cessation.      Palliative Care  When appropriate, the patient and I discussed the availability palliative care services and when appropriate Hospice care. Palliative care is not the same as Hospice care which was explained to the patient.  The patient is not interested in additional information from our  on these services.     Survivorship   When appropriate, we discussed that we will refer the patient to survivorship clinic to discuss next steps following completion of planned treatment.  Reviewed this visit will include assessment of your physical, psychological, functional, and spiritual needs as a survivor and the need at attend this visit when scheduled.    TREATMENT EDUCATION    Today I met with the patient to discuss the chemo/biotherapy regimen recommended for treatment of Malignant neoplasm of upper-outer quadrant of left breast in " female, estrogen receptor positive (HCC)  .  The patient was given explanation of treatment premed side effects including office policy that prohibits patients to drive if sedating medications are administered, MD explanation given regarding benefits, side effects, toxicities and goals of treatment.  The patient received a Chemotherapy/Biotherapy Plan Summary including diagnosis and explanation of specific treatment plan.    SIDE EFFECTS:  Common side effects were discussed with the patient and/or significant other.  Discussion included where applicable hair loss/discoloration, anemia/fatigue, infection/chills/fever, appetite, bleeding risk/precautions, constipation, diarrhea, mouth sores, taste alteration, loss of appetite, nausea/vomiting, peripheral neuropathy, skin/nail changes, rash, muscle aches/weakness, photosensitivity, weight gain/loss, hearing loss, dizziness, menopausal symptoms, menstrual irregularity, sterility, high blood pressure, heart damage, liver damage, lung damage, kidney damage, DVT/PE risk, fluid retention, pleural/pericardial effusion, somnolence, electrolyte/LFT imbalance, vein exercises and/or the possible need for vascular access/port placement.  The patient was advised that although uncommon, leakage of an infused medication from the vein or venous access device may lead to skin breakdown and/or other tissue damage.  The patient was advised that he/she may have pain, bleeding, and/or bruising from the insertion of a needle in their vein or venous access device (port).  The patient was further advised that, in spite of proper technique, infection with redness and irritation may rarely occur at the site where the needle was inserted.  The patient was advised that if complications occur, additional medical treatment is available.  Finally, where applicable we have reviewed rare but potential immune mediated side effects including shortness of breath, cough, chest pain (pneumonitis),  abdominal pain, diarrhea (colitis), thyroiditis (hypothyroid or hyperthyroid), hepatitis and liver dysfunction, nephritis and renal dysfunction.    Discussion also included side effects specific to drugs in the treatment plan, specifically:    Treatment Plans     Name Type Plan Dates Plan Provider         Active    OP BREAST AC DD DOXOrubicin / Cyclophosphamide ONCOLOGY TREATMENT  5/15/2022 - Present Chau Palacios MD                    Questions answered and additional information discussed on topics including:  Anemia, Thrombocytopenia, Neutropenia, Nutrition and appetite changes, Constipation, Diarrhea, Nausea & vomiting, Mouth sores, Alopecia, Skin & nail changes, [place] and Hand/Foot Cooling       Assessment and Plan:    Diagnoses and all orders for this visit:    1. Malignant neoplasm of upper-outer quadrant of left breast in female, estrogen receptor positive (HCC) (Primary)      No orders of the defined types were placed in this encounter.        1. The patient and I have reviewed their diagnosis and scheduled treatment plan. Needs assessment was completed where applicable including genetics, psychosocial needs, barriers to care, VAD evaluation, advanced care planning, survivorship, and palliative care services where indicated. Referrals have been ordered as appropriate based upon evaluation today and patient desires.   2. Chemo/biotherapy teaching was completed today and consent obtained. See separate documentation for further details.  3. Adequate time was given to answer questions.  Patient made aware of their care team members and contact information if they have questions or problems throughout the treatment course.  4. Discussion held and written information provided describing frequency of office visits and ongoing monitoring throughout the treatment plan.     5. Reviewed with patient any prescribed medication sent to pharmacy.  Education provided regarding proper storage, safe handling, and proper  disposal of unused medication.  6. Proper handling of body fluids and waste discussed and written information provided.  7. If appropriate, patient had pretreatment labs drawn today.    Learning assessment completed at initial patient encounter. See separate flowsheet. Chemo/biotherapy education comprehension assessed at today's visit.    I spent 50 minutes caring for Maribell Nolan on this date of service. This time includes time spent by me in the following activities: preparing for the visit, reviewing tests, obtaining and/or reviewing a separately obtained history, performing a medically appropriate examination and/or evaluation, counseling and educating the patient/family/caregiver, documenting information in the medical record and care coordination.     Tracy Mark, APRN   05/11/22

## 2022-05-11 NOTE — PROGRESS NOTES
[facility] Hematology/Oncology Treatment Plan Summary    Name: Maribell Lynn  [facility]# [id number]  MD: Dr. Palacios    Diagnosis:     ICD-10-CM ICD-9-CM   1. Malignant neoplasm of upper-outer quadrant of left breast in female, estrogen receptor positive (HCC)  C50.412 174.4    Z17.0 V86.0     Goal of treatment: neoadjuvant    Treatment Medication(s):   1. Adriamycin  2. Cytoxan    3. Taxol    Frequency/Number of Cycles:  AC every 14 days x 4  Taxol weekly x 12    Starting on: 5/20/2022    Repeat after TBD cycles: US    Items for home use: Senokot-S (for constipation), Imodium AD (for diarrhea) and Thermometer    Rx written for: [] Nausea    [] Pre-Treatment   olanzapine 5mg nightly on days as directed and ondansetron 8 mg by mouth every 8 hours as needed for nausea    Notes:      Next Steps: PORT, MUGA/ECHO and Obtain Hand and Feet Cooling Mitts and Gloves     Completing Provider: JACQUELIN Mijares           Date/time: 05/11/2022      Please note: You will be seen by a provider frequently with your treatment plan. This plan may change depending on many factors, if so, this will be discussed with you by your physician.  Last update 03/2022.

## 2022-05-11 NOTE — RESEARCH
Informed consent worksheet for the protocol: A Phase 3, Single Arm, Open-Label Study Evaluating  Ovarian Suppression Following Three-Month Leuprolide Acetate For Injectable Suspension ([id number]) in Combination with Endocrine Therapy in   Premenopausal Subjects with Hormone-Receptor-Positive (HR), Human Epidermal Growth Factor Receptor 2 (HER2) Negative Breast Cancer     Sub Title: OVarian Suppression Evaluating Subcutaneous LeuprolIde Acetate in Breast Cancer (OVELIA)    Consent version [id number] Protocol Version Date 12/10/2021 with the approval dated IRB APPROVED Feb 19, 2022    Subject -003      This patient was seen in the office by Dr. Palacios on 5/6/2022 and identified as a candidate for the for the above mentioned clinical trial.     The following people were present at the consent conference:   Patient: Maribell Lynn   : Nichelle Lucio RN   Other: Tamie Bai, research coordinator      The following was discussed with the patient prior to signing the informed consent.    • The study purposes   • Procedures   • Duration of study participation  • New findings   • Risks   • Discomforts  • Any known side effects when applicable    • The alternative treatments   • Benefits   • Patient and insurance costs   • Payments if applicable     • Patient's accountability and responsibilities    • The voluntary nature of research participants     • Right to withdraw consent    • The withdrawal process    • Confidentiality   • Authorization to use and disclose information for research purposes - Including who can view information and the types of information shared.    The patient was informed of what to do in case of an illness or injury resulting from the study and who to contact for more trial information, or information on rights and welfare as a research volunteer. The patient was given the  contact Information and clinical trial contact information.     The  patient was given opportunity for questions and opportunity to discuss this with family and friends. The  and or sub investigators were also available for any questions. The patient verbalizes understanding and is willing to sign the informed consent.     After all questions were satisfied the patient signed the informed consent and a copy of the signed main informed consent form & any sub-study informed consent forms were given to the patient.    No study-specific procedures were completed prior to patient signing study informed consent form(s)    The  and or sub investigator is aware of the subject's participation status.    Patient will begin Chemotherapy next week, 5/20/2022, and after all chemo completed, patient will meet with Research again for Screening visit 2.     ECOG 0.

## 2022-05-12 ENCOUNTER — TELEPHONE (OUTPATIENT)
Dept: ONCOLOGY | Facility: CLINIC | Age: 37
End: 2022-05-12

## 2022-05-12 ENCOUNTER — PRE-ADMISSION TESTING (OUTPATIENT)
Dept: PREADMISSION TESTING | Facility: HOSPITAL | Age: 37
End: 2022-05-12

## 2022-05-12 ENCOUNTER — CONSULT (OUTPATIENT)
Dept: RADIATION ONCOLOGY | Facility: HOSPITAL | Age: 37
End: 2022-05-12

## 2022-05-12 ENCOUNTER — APPOINTMENT (OUTPATIENT)
Dept: RADIATION ONCOLOGY | Facility: HOSPITAL | Age: 37
End: 2022-05-12

## 2022-05-12 VITALS
BODY MASS INDEX: 23.36 KG/M2 | HEART RATE: 71 BPM | OXYGEN SATURATION: 100 % | WEIGHT: 119 LBS | HEIGHT: 60 IN | RESPIRATION RATE: 20 BRPM | DIASTOLIC BLOOD PRESSURE: 66 MMHG | TEMPERATURE: 97.5 F | SYSTOLIC BLOOD PRESSURE: 102 MMHG

## 2022-05-12 VITALS
WEIGHT: 120 LBS | BODY MASS INDEX: 21.26 KG/M2 | DIASTOLIC BLOOD PRESSURE: 67 MMHG | OXYGEN SATURATION: 98 % | SYSTOLIC BLOOD PRESSURE: 100 MMHG | HEART RATE: 70 BPM

## 2022-05-12 DIAGNOSIS — C50.412 MALIGNANT NEOPLASM OF UPPER-OUTER QUADRANT OF LEFT BREAST IN FEMALE, ESTROGEN RECEPTOR POSITIVE: Primary | ICD-10-CM

## 2022-05-12 DIAGNOSIS — C50.412 MALIGNANT NEOPLASM OF UPPER-OUTER QUADRANT OF LEFT FEMALE BREAST, UNSPECIFIED ESTROGEN RECEPTOR STATUS: ICD-10-CM

## 2022-05-12 DIAGNOSIS — Z17.0 MALIGNANT NEOPLASM OF UPPER-OUTER QUADRANT OF LEFT BREAST IN FEMALE, ESTROGEN RECEPTOR POSITIVE: Primary | ICD-10-CM

## 2022-05-12 PROCEDURE — G0463 HOSPITAL OUTPT CLINIC VISIT: HCPCS | Performed by: RADIOLOGY

## 2022-05-12 PROCEDURE — 99205 OFFICE O/P NEW HI 60 MIN: CPT | Performed by: RADIOLOGY

## 2022-05-12 PROCEDURE — C9803 HOPD COVID-19 SPEC COLLECT: HCPCS

## 2022-05-12 RX ORDER — LIDOCAINE AND PRILOCAINE 25; 25 MG/G; MG/G
1 CREAM TOPICAL AS NEEDED
Status: ON HOLD | COMMUNITY
End: 2022-05-27

## 2022-05-12 RX ORDER — CHLORHEXIDINE GLUCONATE 500 MG/1
1 CLOTH TOPICAL TAKE AS DIRECTED
COMMUNITY
End: 2022-05-21

## 2022-05-12 NOTE — DISCHARGE INSTRUCTIONS

## 2022-05-12 NOTE — TELEPHONE ENCOUNTER
Clinical Case Management/[place]:  Telephone    Patient is a 37 year old woman who was seen by Dr. Palacios for malignant neoplasm of upper-outer quadrant of left breast in female, estrogen receptor positive stage IIA (cT3, cN1(sn), cM0, G2, ER+, HER2-). Patient completed the [facility] Distress Thermometer and Problems List for Patient on which she scored 6/10.     OSW called to introduce self/role, explain supportive oncology team services, provide support, and assess for needs. OSW was able to speak with patient and patient's  at the same time.     Distress Screening Follow-up    Diagnosis:     Location of Distress Screening: [facility] [place]     Distress Level: 0-No distress (5/12/2022  2:00 PM)    Physical Concerns:       Practical Problems:       Emotional Concerns:       Family Concerns:       Spiritual Concerns:        Interventions:   1. OSW introduced self/role and Supportive Oncology Team Services.  2. OSW assessed for needs, provided active listening, and discussed available resources, including resources for their children, should needs arise.   3. OSW provided direct contact information to patient and patient's .        Comments:  Patient and patient's  were open and sharing throughout this encounter. Patient reported to be feeling better since her appointment with Dr. Palacios on 5/6/22. Patient and patient's  feel supported at this time with patient's parents visiting from [place] and patient's 's company being understanding. There are no needs at this time however patient is aware and verbalized understanding to call if needs do arise.     Monet WADDELL, KAMLESHW, CSW  Oncology Social Worker   [place]

## 2022-05-12 NOTE — PROGRESS NOTES
Subjective     Monisha Escudero MD    Cancer Staging  CC:pT2N0 left breast cancer                                    Dear Monisha Escudero MD    I had the pleasure of seeing Maribell Lynn  [facility].   The patient is a 37 y.o. female with recently diagnosed left breast cancer.  She first noticed a mass in her left breast in 2021.  She had a bilateral diagnostic mammogram and ultrasound on 22 which showed a new area of architectural distortion in upper outer left breast corresponding to palpable mass at 2:30, a new focal asymmetry just superior to this, a dn a thrid focal asymmetry in uoq left breast.  Ultrasound confirmed a hypoechoic mass 17mm x 20mm x 16mm at 2:30 left breast, 6cm from nipple, a hypoechoic mass 11mm at 1:30 position, a 4mm mass at 1 oclock left breast, stable mass right breast considered benign.  She had an ultrasound guided biopsy on 22 with pathology revealing invasive lobular carcinoma at 1:30 left breast and at 2:30 left breast, ER MA pos Her 2 neg and ki67 39%.  Biopsy from 1 olcok was benign.      She then had a breast mri on 3/4/22 which showed a suspicious mass with nomass enhancement measuring up to 7.1cm outer middle and posterior left breast with clip.  She had a right brast us 22 which showed a complex cyst benign.      She underwent a left total mastectomy and sentinel node biopsy on 21 with pathology revealing invasive lobular caricnoma itnermediate grade, 5cm in size with negative margins and one of 4 lymph nodes involved with isolated tumor cells.  Her pathologic stage was pT2N0(i+).  Her oncotype score returned at 18.       She met with Dr. Palacios on May 6 and he has recommended dose dense AC follwed by weekly Taxol x 12.      She is  with menarche age 11 and first childbirth age 28.  She breast fed for 10 months and has never taken hormones.  She is premenopausal and takes no hormones. She has no  family hx of breast or ovarian cancer. She had invitae genetic testing which was negative.     She is recovering well from surgery and referred today for evaluation for adjuvant radiation.     Review of Systems   Constitutional: Negative.    HENT: Negative.    Respiratory: Negative.    Cardiovascular: Negative.    Gastrointestinal: Negative.    Genitourinary: Negative.    Musculoskeletal: Negative.    Skin: Negative.    Neurological: Negative.    Hematological: Negative.    Psychiatric/Behavioral: Negative.          Past Medical History:   Diagnosis Date   • Anesthesia complication     hypotension with last c section   • Breast cancer (HCC)    • History of COVID-19     2021 AND 2022   • Hyperlipidemia    • PONV (postoperative nausea and vomiting)    • Seasonal allergies          Past Surgical History:   Procedure Laterality Date   • APPENDECTOMY     • BREAST BIOPSY Left    • BREAST RECONSTRUCTION Left 2022    Procedure: LEFT BREAST 1ST STAGE RECONSTRUCTION WITH INSERTION OF TISSUE EXPANDER AND BIOLOGIC;  Surgeon: Hermelinda Johnson MD;  Location: [facility];  Service: Plastics;  Laterality: Left;   •  SECTION     •  SECTION N/A 12/10/2018    Procedure:  SECTION REPEAT;  Surgeon: Heidy Gutierrez MD;  Location: [facility] LABOR DELIVERY;  Service: Obstetrics/Gynecology   • D & C HYSTEROSCOPY N/A 2021    Procedure: HYSTEROSCOPY REMOVAL INTRAUTERINE DEVICE WILL NEED ULTRASOUND IN ROOM;  Surgeon: Heidy Gutierrez MD;  Location: [facility];  Service: Obstetrics/Gynecology;  Laterality: N/A;   • KNEE ARTHROSCOPY Right    • MASTECTOMY W/ SENTINEL NODE BIOPSY Left 2022    Procedure: left total mastectomy with left axillary sentinel lymph node biopsy;  Surgeon: Monisha Escudero MD;  Location: [facility];  Service: General;  Laterality: Left;         Social History     Socioeconomic History   • Marital status:    Tobacco Use   • Smoking status:  Never Smoker   • Smokeless tobacco: Never Used   Vaping Use   • Vaping Use: Never used   Substance and Sexual Activity   • Alcohol use: Not Currently   • Drug use: No   • Sexual activity: Defer         Family History   Problem Relation Age of Onset   • Melanoma Paternal Aunt    • Malig Hyperthermia Neg Hx           Objective    Physical Exam  Constitutional:       Appearance: Normal appearance.   HENT:      Head: Atraumatic.   Eyes:      Extraocular Movements: Extraocular movements intact.   Cardiovascular:      Pulses: Normal pulses.   Chest:          Comments: Left breast surgically absent with expander in place, no palpable masses in right breast or axilla  Abdominal:      General: Abdomen is flat.   Neurological:      General: No focal deficit present.      Mental Status: She is alert.   Psychiatric:         Mood and Affect: Mood normal.           Current Outpatient Medications on File Prior to Visit   Medication Sig Dispense Refill   • cetirizine (zyrTEC) 10 MG tablet Take 10 mg by mouth As Needed for Allergies.     • gabapentin (NEURONTIN) 100 MG capsule Take 1 capsule by mouth every night at bedtime. 30 capsule 0   • ibuprofen (ADVIL,MOTRIN) 200 MG tablet Take 200 mg by mouth Every 6 (Six) Hours As Needed for Mild Pain .     • OLANZapine (zyPREXA) 5 MG tablet Take 1 tablet by mouth Every Night. Take on days 2, 3 and 4 after chemotherapy. 3 tablet 3   • ondansetron (ZOFRAN) 8 MG tablet Take 1 tablet by mouth 3 (Three) Times a Day As Needed for Nausea or Vomiting. 30 tablet 5     Current Facility-Administered Medications on File Prior to Visit   Medication Dose Route Frequency Provider Last Rate Last Admin   • [COMPLETED] perflutren (DEFINITY) 8.476 mg in Sodium Chloride (PF) 0.9 % 10 mL injection  2 mL Intravenous Once in imaging Chau Palacios MD   2 mL at 05/11/22 1357       ALLERGIES:    Allergies   Allergen Reactions   • Asa [Aspirin] Anaphylaxis       LMP  (LMP Unknown)      Current Status 5/11/2022    ECOG score 0         Assessment & Plan     37 year old pre menopausal female with pT2N0 (i) invasive lobular carcinoma of left breast, ER NV pos Her 2 neg.  I discussed with her my recommendation for post-operative radiation therapy to the left chest wall and regional nodes to decrease the risk of local regional recurrence.      I discussed with her in detail the risks, benefits and rationale of radiation therapy to include but not limited to the following:    Acute: skin erythema, breakdown/moist desquamation, swelling or discomfort of the chest wall, tissues, t, fatigue, infection requiring removal of expander, pneumonitis resulting in shortness of breath, cough or pain, esophagitis, lymphedema of left arm    Late: Permanent skin changes including hyperpigmentation, telangiectasias, fibrosis of the skin tissue resulting in smaller size or poor cosmetic outcome, late edema or cellulitis, late rib fracture, late pulmonary fibrosis, late lymphedema and the remote risk of late cardiac damage resulting in increased risk of heart attack or second malignancies.      She and her  voiced understanding and were given an opportunity to ask questions which I believe were answered to their satisfaction.  I will see her back once she completes chemotherapy for re-evaluation and  CT simulation for treatment planning.  I plan to treat the left chest wall and regional nodes to a dose of approximately 5000cGy in 25 fractions with no boost.     I personally spent greater than 60minutes today assessing, managing, discussing and documenting my visit with the patient. That time includes review of records, imaging and pathology reports, obtaining my own history, performing a medically appropriate evaluation, counseling and educating the patient, discussing goals, logistics, alternatives and risks of my recommendations, surveillance options and potential outcomes. It also includes the time documenting the clinical information in  the EMR and communicating my recommendations to the other involved physicians.               Thank you very much for allowing me to participate in the care of this very pleasant patient.    Sincerely,      Rebecca Lopes MD

## 2022-05-13 ENCOUNTER — APPOINTMENT (OUTPATIENT)
Dept: GENERAL RADIOLOGY | Facility: HOSPITAL | Age: 37
End: 2022-05-13

## 2022-05-13 ENCOUNTER — TELEPHONE (OUTPATIENT)
Dept: SURGERY | Facility: CLINIC | Age: 37
End: 2022-05-13

## 2022-05-13 ENCOUNTER — APPOINTMENT (OUTPATIENT)
Dept: LAB | Facility: HOSPITAL | Age: 37
End: 2022-05-13

## 2022-05-13 ENCOUNTER — ANESTHESIA EVENT (OUTPATIENT)
Dept: PERIOP | Facility: HOSPITAL | Age: 37
End: 2022-05-13

## 2022-05-13 ENCOUNTER — ANESTHESIA (OUTPATIENT)
Dept: PERIOP | Facility: HOSPITAL | Age: 37
End: 2022-05-13

## 2022-05-13 ENCOUNTER — HOSPITAL ENCOUNTER (OUTPATIENT)
Facility: HOSPITAL | Age: 37
Setting detail: HOSPITAL OUTPATIENT SURGERY
Discharge: HOME | End: 2022-05-13
Attending: SURGERY | Admitting: SURGERY

## 2022-05-13 VITALS
WEIGHT: 118.39 LBS | SYSTOLIC BLOOD PRESSURE: 103 MMHG | BODY MASS INDEX: 21.79 KG/M2 | DIASTOLIC BLOOD PRESSURE: 63 MMHG | OXYGEN SATURATION: 97 % | HEIGHT: 62 IN | RESPIRATION RATE: 16 BRPM | HEART RATE: 67 BPM | TEMPERATURE: 98.6 F

## 2022-05-13 DIAGNOSIS — C50.412 MALIGNANT NEOPLASM OF UPPER-OUTER QUADRANT OF LEFT FEMALE BREAST, UNSPECIFIED ESTROGEN RECEPTOR STATUS: ICD-10-CM

## 2022-05-13 LAB
B-HCG UR QL: NEGATIVE
EXPIRATION DATE: NORMAL
INTERNAL NEGATIVE CONTROL: NEGATIVE
INTERNAL POSITIVE CONTROL: POSITIVE
Lab: NORMAL
SARS-COV-2 RNA PNL SPEC NAA+PROBE: NOT DETECTED

## 2022-05-13 PROCEDURE — 25010000002 CEFAZOLIN IN DEXTROSE 2-4 GM/100ML-% SOLUTION: Performed by: SURGERY

## 2022-05-13 PROCEDURE — 25010000002 ONDANSETRON PER 1 MG: Performed by: NURSE ANESTHETIST, CERTIFIED REGISTERED

## 2022-05-13 PROCEDURE — C1788 PORT, INDWELLING, IMP: HCPCS | Performed by: SURGERY

## 2022-05-13 PROCEDURE — 25010000002 DROPERIDOL PER 5 MG: Performed by: NURSE ANESTHETIST, CERTIFIED REGISTERED

## 2022-05-13 PROCEDURE — 77001 FLUOROGUIDE FOR VEIN DEVICE: CPT | Performed by: SURGERY

## 2022-05-13 PROCEDURE — 25010000002 HEPARIN (PORCINE) PER 1000 UNITS: Performed by: SURGERY

## 2022-05-13 PROCEDURE — 87635 SARS-COV-2 COVID-19 AMP PRB: CPT | Performed by: SURGERY

## 2022-05-13 PROCEDURE — 25010000002 DEXAMETHASONE PER 1 MG: Performed by: NURSE ANESTHETIST, CERTIFIED REGISTERED

## 2022-05-13 PROCEDURE — 36561 INSERT TUNNELED CV CATH: CPT | Performed by: SURGERY

## 2022-05-13 PROCEDURE — 76000 FLUOROSCOPY <1 HR PHYS/QHP: CPT

## 2022-05-13 PROCEDURE — 81025 URINE PREGNANCY TEST: CPT | Performed by: ANESTHESIOLOGY

## 2022-05-13 PROCEDURE — 0 LIDOCAINE 1 % SOLUTION 20 ML VIAL: Performed by: SURGERY

## 2022-05-13 PROCEDURE — C9803 HOPD COVID-19 SPEC COLLECT: HCPCS

## 2022-05-13 PROCEDURE — 25010000002 FENTANYL CITRATE (PF) 50 MCG/ML SOLUTION: Performed by: NURSE ANESTHETIST, CERTIFIED REGISTERED

## 2022-05-13 PROCEDURE — 25010000002 PROPOFOL 10 MG/ML EMULSION: Performed by: NURSE ANESTHETIST, CERTIFIED REGISTERED

## 2022-05-13 DEVICE — SMARTPORT PLASTIC LOW PROFILE PORT WITH VORTEX TECHNOLOGY
Type: IMPLANTABLE DEVICE | Site: CHEST | Status: FUNCTIONAL
Brand: BIOFLO VORTEX

## 2022-05-13 RX ORDER — LIDOCAINE HYDROCHLORIDE 20 MG/ML
INJECTION, SOLUTION INFILTRATION; PERINEURAL AS NEEDED
Status: DISCONTINUED | OUTPATIENT
Start: 2022-05-13 | End: 2022-05-13 | Stop reason: SURG

## 2022-05-13 RX ORDER — HYDROCODONE BITARTRATE AND ACETAMINOPHEN 7.5; 325 MG/1; MG/1
1 TABLET ORAL ONCE AS NEEDED
Status: COMPLETED | OUTPATIENT
Start: 2022-05-13 | End: 2022-05-13

## 2022-05-13 RX ORDER — PROMETHAZINE HYDROCHLORIDE 25 MG/1
25 SUPPOSITORY RECTAL ONCE AS NEEDED
Status: DISCONTINUED | OUTPATIENT
Start: 2022-05-13 | End: 2022-05-13 | Stop reason: HOSPADM

## 2022-05-13 RX ORDER — SODIUM CHLORIDE, SODIUM LACTATE, POTASSIUM CHLORIDE, CALCIUM CHLORIDE 600; 310; 30; 20 MG/100ML; MG/100ML; MG/100ML; MG/100ML
9 INJECTION, SOLUTION INTRAVENOUS CONTINUOUS
Status: DISCONTINUED | OUTPATIENT
Start: 2022-05-13 | End: 2022-05-13 | Stop reason: HOSPADM

## 2022-05-13 RX ORDER — EPHEDRINE SULFATE 50 MG/ML
5 INJECTION, SOLUTION INTRAVENOUS ONCE AS NEEDED
Status: DISCONTINUED | OUTPATIENT
Start: 2022-05-13 | End: 2022-05-13 | Stop reason: HOSPADM

## 2022-05-13 RX ORDER — HYDRALAZINE HYDROCHLORIDE 20 MG/ML
5 INJECTION INTRAMUSCULAR; INTRAVENOUS
Status: DISCONTINUED | OUTPATIENT
Start: 2022-05-13 | End: 2022-05-13 | Stop reason: HOSPADM

## 2022-05-13 RX ORDER — MIDAZOLAM HYDROCHLORIDE 1 MG/ML
1 INJECTION INTRAMUSCULAR; INTRAVENOUS
Status: DISCONTINUED | OUTPATIENT
Start: 2022-05-13 | End: 2022-05-13 | Stop reason: HOSPADM

## 2022-05-13 RX ORDER — FAMOTIDINE 10 MG/ML
20 INJECTION, SOLUTION INTRAVENOUS ONCE
Status: COMPLETED | OUTPATIENT
Start: 2022-05-13 | End: 2022-05-13

## 2022-05-13 RX ORDER — SODIUM CHLORIDE 0.9 % (FLUSH) 0.9 %
3-10 SYRINGE (ML) INJECTION AS NEEDED
Status: DISCONTINUED | OUTPATIENT
Start: 2022-05-13 | End: 2022-05-13 | Stop reason: HOSPADM

## 2022-05-13 RX ORDER — OXYCODONE AND ACETAMINOPHEN 7.5; 325 MG/1; MG/1
1 TABLET ORAL EVERY 4 HOURS PRN
Status: DISCONTINUED | OUTPATIENT
Start: 2022-05-13 | End: 2022-05-13 | Stop reason: HOSPADM

## 2022-05-13 RX ORDER — FLUMAZENIL 0.1 MG/ML
0.2 INJECTION INTRAVENOUS AS NEEDED
Status: DISCONTINUED | OUTPATIENT
Start: 2022-05-13 | End: 2022-05-13 | Stop reason: HOSPADM

## 2022-05-13 RX ORDER — DROPERIDOL 2.5 MG/ML
0.62 INJECTION, SOLUTION INTRAMUSCULAR; INTRAVENOUS ONCE AS NEEDED
Status: COMPLETED | OUTPATIENT
Start: 2022-05-13 | End: 2022-05-13

## 2022-05-13 RX ORDER — PROPOFOL 10 MG/ML
VIAL (ML) INTRAVENOUS AS NEEDED
Status: DISCONTINUED | OUTPATIENT
Start: 2022-05-13 | End: 2022-05-13 | Stop reason: SURG

## 2022-05-13 RX ORDER — LABETALOL HYDROCHLORIDE 5 MG/ML
5 INJECTION, SOLUTION INTRAVENOUS
Status: DISCONTINUED | OUTPATIENT
Start: 2022-05-13 | End: 2022-05-13 | Stop reason: HOSPADM

## 2022-05-13 RX ORDER — FENTANYL CITRATE 50 UG/ML
50 INJECTION, SOLUTION INTRAMUSCULAR; INTRAVENOUS
Status: DISCONTINUED | OUTPATIENT
Start: 2022-05-13 | End: 2022-05-13 | Stop reason: HOSPADM

## 2022-05-13 RX ORDER — SODIUM CHLORIDE 0.9 % (FLUSH) 0.9 %
3 SYRINGE (ML) INJECTION EVERY 12 HOURS SCHEDULED
Status: DISCONTINUED | OUTPATIENT
Start: 2022-05-13 | End: 2022-05-13 | Stop reason: HOSPADM

## 2022-05-13 RX ORDER — DIPHENHYDRAMINE HCL 25 MG
25 CAPSULE ORAL
Status: DISCONTINUED | OUTPATIENT
Start: 2022-05-13 | End: 2022-05-13 | Stop reason: HOSPADM

## 2022-05-13 RX ORDER — ONDANSETRON 2 MG/ML
4 INJECTION INTRAMUSCULAR; INTRAVENOUS ONCE AS NEEDED
Status: COMPLETED | OUTPATIENT
Start: 2022-05-13 | End: 2022-05-13

## 2022-05-13 RX ORDER — FENTANYL CITRATE 50 UG/ML
INJECTION, SOLUTION INTRAMUSCULAR; INTRAVENOUS AS NEEDED
Status: DISCONTINUED | OUTPATIENT
Start: 2022-05-13 | End: 2022-05-13 | Stop reason: SURG

## 2022-05-13 RX ORDER — SODIUM CHLORIDE, SODIUM LACTATE, POTASSIUM CHLORIDE, CALCIUM CHLORIDE 600; 310; 30; 20 MG/100ML; MG/100ML; MG/100ML; MG/100ML
100 INJECTION, SOLUTION INTRAVENOUS CONTINUOUS
Status: DISCONTINUED | OUTPATIENT
Start: 2022-05-13 | End: 2022-05-13 | Stop reason: HOSPADM

## 2022-05-13 RX ORDER — DIPHENHYDRAMINE HYDROCHLORIDE 50 MG/ML
12.5 INJECTION INTRAMUSCULAR; INTRAVENOUS
Status: DISCONTINUED | OUTPATIENT
Start: 2022-05-13 | End: 2022-05-13 | Stop reason: HOSPADM

## 2022-05-13 RX ORDER — NALOXONE HCL 0.4 MG/ML
0.2 VIAL (ML) INJECTION AS NEEDED
Status: DISCONTINUED | OUTPATIENT
Start: 2022-05-13 | End: 2022-05-13 | Stop reason: HOSPADM

## 2022-05-13 RX ORDER — SCOLOPAMINE TRANSDERMAL SYSTEM 1 MG/1
1 PATCH, EXTENDED RELEASE TRANSDERMAL ONCE
Status: DISCONTINUED | OUTPATIENT
Start: 2022-05-13 | End: 2022-05-13 | Stop reason: HOSPADM

## 2022-05-13 RX ORDER — DEXAMETHASONE SODIUM PHOSPHATE 10 MG/ML
INJECTION INTRAMUSCULAR; INTRAVENOUS AS NEEDED
Status: DISCONTINUED | OUTPATIENT
Start: 2022-05-13 | End: 2022-05-13 | Stop reason: SURG

## 2022-05-13 RX ORDER — PROMETHAZINE HYDROCHLORIDE 25 MG/1
25 TABLET ORAL ONCE AS NEEDED
Status: DISCONTINUED | OUTPATIENT
Start: 2022-05-13 | End: 2022-05-13 | Stop reason: HOSPADM

## 2022-05-13 RX ORDER — CEFAZOLIN SODIUM 2 G/100ML
2 INJECTION, SOLUTION INTRAVENOUS ONCE
Status: COMPLETED | OUTPATIENT
Start: 2022-05-13 | End: 2022-05-13

## 2022-05-13 RX ORDER — LIDOCAINE HYDROCHLORIDE 10 MG/ML
0.5 INJECTION, SOLUTION EPIDURAL; INFILTRATION; INTRACAUDAL; PERINEURAL ONCE AS NEEDED
Status: DISCONTINUED | OUTPATIENT
Start: 2022-05-13 | End: 2022-05-13 | Stop reason: HOSPADM

## 2022-05-13 RX ADMIN — FENTANYL CITRATE 50 MCG: 0.05 INJECTION, SOLUTION INTRAMUSCULAR; INTRAVENOUS at 10:33

## 2022-05-13 RX ADMIN — DROPERIDOL 0.62 MG: 2.5 INJECTION, SOLUTION INTRAMUSCULAR; INTRAVENOUS at 11:51

## 2022-05-13 RX ADMIN — CEFAZOLIN SODIUM 2 G: 2 INJECTION, SOLUTION INTRAVENOUS at 10:28

## 2022-05-13 RX ADMIN — HYDROCODONE BITARTRATE AND ACETAMINOPHEN 1 TABLET: 7.5; 325 TABLET ORAL at 12:54

## 2022-05-13 RX ADMIN — PROPOFOL 150 MG: 10 INJECTION, EMULSION INTRAVENOUS at 10:40

## 2022-05-13 RX ADMIN — FAMOTIDINE 20 MG: 10 INJECTION INTRAVENOUS at 08:57

## 2022-05-13 RX ADMIN — SODIUM CHLORIDE, POTASSIUM CHLORIDE, SODIUM LACTATE AND CALCIUM CHLORIDE 100 ML/HR: 600; 310; 30; 20 INJECTION, SOLUTION INTRAVENOUS at 08:57

## 2022-05-13 RX ADMIN — SCOPALAMINE 1 PATCH: 1 PATCH, EXTENDED RELEASE TRANSDERMAL at 08:57

## 2022-05-13 RX ADMIN — ONDANSETRON 4 MG: 2 INJECTION INTRAMUSCULAR; INTRAVENOUS at 12:55

## 2022-05-13 RX ADMIN — DEXAMETHASONE SODIUM PHOSPHATE 8 MG: 10 INJECTION INTRAMUSCULAR; INTRAVENOUS at 10:44

## 2022-05-13 RX ADMIN — FENTANYL CITRATE 50 MCG: 0.05 INJECTION, SOLUTION INTRAMUSCULAR; INTRAVENOUS at 10:37

## 2022-05-13 RX ADMIN — LIDOCAINE HYDROCHLORIDE 100 MG: 20 INJECTION, SOLUTION INFILTRATION; PERINEURAL at 10:40

## 2022-05-13 NOTE — DISCHARGE INSTRUCTIONS
A responsible adult should accompany the patient on discharge and for 24 hours following surgery.  No heavy lifting >5# or strenuous upper arm activity, and no raising arms over head until followup appointment.  No IV or blood pressure cuffs on arm on side of lymph node surgery, if patient has had lymph node surgery. No driving while taking pain or nausea medications, including muscle relaxants.      FOR PATIENTS WHO HAVE HAD PORT PLACED:   Keep dressing  in place and dressings dry for 72 hours.   May then remove dressings and may shower. Leave steri strips on skin and ignore clear suture tails.  Blot dry incision with towel.  No tubs, hot tubs, pools.   May use port immediately, but prefer to keep incision dry for 3 days.  Do not remove steri strips for 7-10 days.     FOR ALL PATIENTS:   Responsible adult to stay with patient at discharge and at least 24 hours after discharge.   Call the office for any of the following: persistent bleeding, excessive bruising or swelling, excessive sleepiness or trouble breathing, severe pain, persistent nausea or vomiting, fever greater than 101.0     Postop appointment was scheduled in the office preoperatively. If patient cannot find that appointment, please call the office for a reminder on the next business day. [phone].         ****YOU RECEIVED A PAIN PILL AT 12:54PM***        Scopolamine Patch  This patch has been applied to the skin behind one of your ears.  It may stay in place up to 24 hours. You may remove it at any time after your surgery; however, it should be removed after you are up and walking around the next day.  This medicine reduces stomach upset. Side effects may include: dry mouth, dizziness, sleepiness, constipation, or upset stomach.  An allergy would show up as: a rash, itching, wheezing or shortness of breath.  Follow these instructions:  Do not drink alcohol, drive or operate machinery while taking this medicine.  Wear only 1 patch at a time. You can  leave the patch on for up to 24 hours.  When you remove the patch, fold it in half with the sticky sides together and throw it away. Wash your hands and the area under the patch.  Do not touch your eye with your hand if it has touched the patch.  Wash your hands well before and after touching the patch.  Sit or stand slowly to avoid dizziness.  Call your doctor if you have:  Any sign of allergy  No relief  Trouble passing urine  Any new or severe symptoms

## 2022-05-13 NOTE — OP NOTE
Operative note    Preoperative Diagnosis: Breast cancer       Postoperative Diagnosis: Breast cancer    Procedure Performed: right port placement using Seldinger technique and fluoroscopic guidance    Dictating physician and surgeon: Monisha Escudero MD    EBL:30cc    FINDINGS AND DESCRIPTION OF PROCEDURE:       The patient was brought to the operating room and placed on the table in the supine position. After adequate general anesthesia was obtained, we sterily prepped and draped bilateral anterior chest wall.   We did a time out to identify correct patient and correct operative site.  She did receive IV antibiotic within an hour of and prior to incision.     I made an oblique incision in the deltopectoral groove using a 15 blade and dissected down using bovie electrocautery to open the clavipectoral fascia.  He had a diminutive cephalic vein so it was decided to perform a seldinger technique rather than planned cutdown.  I used the following port: 6 Fr low profile smart port     I made a pocket on the pectoral fascia using bovie electrocautery.    I placed the patient in Trendelenberg position. I then used a needle and syringe to access the subclavian vein with ease on the first pass.  There was good venous blood return . I then threaded the glidewire through the needle  And used intraoperative fluoroscopy to ensure that the glidewire was coursing towards the atrium. I then removed the needle and threaded the peel-away sheath and dilator combination over the glidewire. I then removed the glidewire and introducer, leaving the peel away sheath in place. I then threaded the catheter of the port through the sheath and peeled away the sheath, leaving the catheter in place.  I then confirmed the placement on fluoroscopy. I then sewed the port down to the pectoral fascia using 3 x 2-0 prolene sutures.    I then flushed and aly from the port using heparainized saline 10 units per mL to ensure that the port worked  properly, which it did.    I then irrigated, assured hemostasis and closed the skin in 2 layers. the first being an interrupted 3-0 Vicryl layer, followed by a running 4-0 Monocryl.    I then applied lengthwise 1/2 inch Steri-Strips, 2x2 s and a Tegederm.    She will have a CXR in the recovery room.    She tolerated the procedure well, there were no immediate complications, and all counts were correct at the end of the case.

## 2022-05-13 NOTE — ANESTHESIA PROCEDURE NOTES
Airway  Urgency: elective    Date/Time: 5/13/2022 10:41 AM  Airway not difficult    General Information and Staff    Patient location during procedure: OR  Anesthesiologist: Janay Rouse MD  CRNA/CAA: Tiny Vaughan CRNA    Indications and Patient Condition  Indications for airway management: airway protection    Preoxygenated: yes  MILS not maintained throughout  Mask difficulty assessment: 0 - not attempted    Final Airway Details  Final airway type: supraglottic airway      Successful airway: unique  Size 4    Number of attempts at approach: 1  Assessment: lips, teeth, and gum same as pre-op and atraumatic intubation    Additional Comments  LMA inserted with ease, assist to SV

## 2022-05-13 NOTE — TELEPHONE ENCOUNTER
Patient scheduled to see Dr. Anguiano for Radiation Oncology Monday 5/16/22 1:30 [place] location.

## 2022-05-13 NOTE — PERIOPERATIVE NURSING NOTE
Spoke with Patty from Dr. Zamora office, who stated that Dr. Escudero is aware of the pt's chest xray and it is okay for the pt to be discharged.

## 2022-05-13 NOTE — ANESTHESIA PREPROCEDURE EVALUATION
Anesthesia Evaluation     Patient summary reviewed and Nursing notes reviewed   history of anesthetic complications (hypotension with C section, patient in 90s SBP this morning): PONV  NPO Solid Status: > 8 hours  NPO Liquid Status: > 2 hours           Airway   Mallampati: II  TM distance: >3 FB  Neck ROM: full  No difficulty expected  Dental - normal exam     Pulmonary - normal exam   Cardiovascular - normal exam    ECG reviewed    (+) hyperlipidemia,       Neuro/Psych  GI/Hepatic/Renal/Endo      Musculoskeletal     Abdominal    Substance History      OB/GYN          Other      history of cancer (Left breast)                      Anesthesia Plan    ASA 2     general   (PONV prophylaxis    I have reviewed the patient's history and chart with the patient, including all pertinent laboratory results and imaging. I have explained the risks of anesthesia including but not limited to dental damage, corneal abrasion, nerve injury, MI, stroke, aspiration, and death. Patient has agreed to proceed.       )  intravenous induction     Anesthetic plan, all risks, benefits, and alternatives have been provided, discussed and informed consent has been obtained with: patient.        CODE STATUS:

## 2022-05-13 NOTE — ANESTHESIA POSTPROCEDURE EVALUATION
"Patient: KYLAH Gilbert    Procedure Summary     Date: 05/13/22 Room / Location: [facility] OR [address] [place] [address] MAIN OR    Anesthesia Start: 1031 Anesthesia Stop: 1147    Procedure: right port placement (Right ) Diagnosis:       Malignant neoplasm of upper-outer quadrant of left female breast, unspecified estrogen receptor status (HCC)      (Malignant neoplasm of upper-outer quadrant of left female breast, unspecified estrogen receptor status (HCC) [C50.412])    Surgeons: Monisha Escudero MD Provider: Janay Rouse MD    Anesthesia Type: general ASA Status: 2          Anesthesia Type: general    Vitals  Vitals Value Taken Time   BP 98/70 05/13/22 1316   Temp 37 °C (98.6 °F) 05/13/22 1144   Pulse 69 05/13/22 1326   Resp 18 05/13/22 1315   SpO2 98 % 05/13/22 1326   Vitals shown include unvalidated device data.        Post Anesthesia Care and Evaluation    Patient location during evaluation: bedside  Patient participation: complete - patient participated  Level of consciousness: awake and alert  Pain management: adequate  Airway patency: patent  Anesthetic complications: No anesthetic complications    Cardiovascular status: acceptable  Respiratory status: acceptable  Hydration status: acceptable    Comments: /63   Pulse 67   Temp 37 °C (98.6 °F) (Oral)   Resp 16   Ht 157.5 cm (62\")   Wt 53.7 kg (118 lb 6.2 oz)   LMP 04/15/2022 (Within Days)   SpO2 97%   BMI 21.65 kg/m²       "

## 2022-05-16 ENCOUNTER — OFFICE VISIT (OUTPATIENT)
Dept: RADIATION ONCOLOGY | Facility: HOSPITAL | Age: 37
End: 2022-05-16

## 2022-05-16 VITALS
TEMPERATURE: 98.1 F | SYSTOLIC BLOOD PRESSURE: 116 MMHG | HEART RATE: 71 BPM | RESPIRATION RATE: 16 BRPM | OXYGEN SATURATION: 100 % | WEIGHT: 118.6 LBS | BODY MASS INDEX: 21.69 KG/M2 | DIASTOLIC BLOOD PRESSURE: 65 MMHG

## 2022-05-16 DIAGNOSIS — C50.412 MALIGNANT NEOPLASM OF UPPER-OUTER QUADRANT OF LEFT BREAST IN FEMALE, ESTROGEN RECEPTOR POSITIVE: Primary | ICD-10-CM

## 2022-05-16 DIAGNOSIS — Z17.0 MALIGNANT NEOPLASM OF UPPER-OUTER QUADRANT OF LEFT BREAST IN FEMALE, ESTROGEN RECEPTOR POSITIVE: Primary | ICD-10-CM

## 2022-05-16 PROCEDURE — 99213 OFFICE O/P EST LOW 20 MIN: CPT | Performed by: RADIOLOGY

## 2022-05-16 NOTE — PROGRESS NOTES
FOLLOW-UP NOTE    Name: KYLAH Gilbert  YOB: 1985  MRN #: [id number]  Date of Service: 5/16/22  Primary Care Provider: Natalie Berumen PA    DIAGNOSIS: [id number];  pT2N0(i+) ILC of the L breast, ER/CT+[id number]  1. Malignant neoplasm of upper-outer quadrant of left breast in female, estrogen receptor positive (HCC)        REASON FOR VISIT: 2nd opinion/XRT discussion needs    HISTORY OF PRESENT ILLNESS: The patient is a 37 y.o. year old female who was seen in consult on 05/12/22 by my colleague Dr. Lopes and recommended adjuvant L CW and comprehensive tahir XRT after completion of chemotherapy. They had questions about this recommendation and wanted to further discuss today.    Briefly, her medical history is for noticing a mass in her left breast in December 2021.  She had a bilateral diagnostic mammogram and ultrasound on 2/9/22 which showed a new area of architectural distortion in the upper outer left breast corresponding to palpable mass at 2:30, a new focal asymmetry just superior to this, and a thrid focal asymmetry in uoq left breast.  Ultrasound confirmed a hypoechoic mass 17mm x 20mm x 16mm at 2:30 left breast, 6cm from nipple, a hypoechoic mass 11mm at 1:30 position, a 4mm mass at 1 oclock left breast, stable mass right breast considered benign.  She had an ultrasound guided biopsy on 2/17/22 with pathology revealing invasive lobular carcinoma at 1:30 left breast and at 2:30 left breast, ER CT pos Her 2 neg and ki67 39%.  Biopsy from [facility] was benign.       She then had a breast mri on 3/4/22 which showed a suspicious mass with nomass enhancement measuring up to 7.1cm outer middle and posterior left breast with clip.  She had a right breast u/s on 4/11/22 which showed a complex cyst benign.       She underwent a left total mastectomy and sentinel node biopsy on 4/13/21 with pathology revealing invasive lobular caricnoma itnermediate grade, 5cm in size with negative  margins and one of 4 lymph nodes involved with isolated tumor cells.  Her pathologic stage was pT2N0(i+).  Her oncotype score returned at 18.       Including the path from the biopsy, the total tumor size was greater than 5 cm.     She met with Dr. Palacios on May 6 and he has recommended dose dense AC follwed by weekly Taxol x 12.       She is  with menarche age 11 and first childbirth age 28.  She breast fed for 10 months and has never taken hormones.    She is premenopausal and takes no hormones. She has no family hx of breast or ovarian cancer. She had invitae genetic testing which was negative.           The following portions of the patient's history were reviewed and updated as appropriate: allergies, current medications, past family history, past medical history, past social history, past surgical history and problem list. Reviewed with the patient and remain unchanged.    PAST MEDICAL HISTORY:  she  has a past medical history of Anemia, Anesthesia complication, Breast cancer (HCC) (2022), History of COVID-19, Hyperlipidemia, PONV (postoperative nausea and vomiting), and Seasonal allergies.  MEDICATIONS:   Current Outpatient Medications:   •  cetirizine (zyrTEC) 10 MG tablet, Take 10 mg by mouth As Needed for Allergies., Disp: , Rfl:   •  lidocaine-prilocaine (EMLA) 2.5-2.5 % cream, Apply 1 application topically to the appropriate area as directed As Needed for Mild Pain ., Disp: , Rfl:   •  OLANZapine (zyPREXA) 5 MG tablet, Take 1 tablet by mouth Every Night. Take on days 2, 3 and 4 after chemotherapy., Disp: 3 tablet, Rfl: 3  •  ondansetron (ZOFRAN) 8 MG tablet, Take 1 tablet by mouth 3 (Three) Times a Day As Needed for Nausea or Vomiting., Disp: 30 tablet, Rfl: 5  No current facility-administered medications for this visit.  ALLERGIES:   Allergies   Allergen Reactions   • Asa [Aspirin] Anaphylaxis and Shortness Of Breath     PAST SURGICAL HISTORY: she has a past surgical history that includes   section; Appendectomy; Knee arthroscopy (Right);  section (N/A, 12/10/2018); d & c hysteroscopy (N/A, 2021); Breast biopsy (Left, ); Mastectomy w/ sentinel node biopsy (Left, 2022); Breast reconstruction (Left, 2022); and Venous Access Device (Port) (Right, 2022).  PREVIOUS RADIOTHERAPY OR CHEMOTHERAPY: no, will be starting chemotherapy.  FAMILY HISTORY: her family history includes Diabetes in an other family member; Melanoma in her paternal aunt.  SOCIAL HISTORY: she  reports that she has never smoked. She has never used smokeless tobacco. She reports previous alcohol use of about 5.0 standard drinks of alcohol per week. She reports that she does not use drugs.  PAIN AND PAIN MANAGEMENT: no pain.  Vitals:    22 1406   BP: 116/65   Pulse: 71   Resp: 16   Temp: 98.1 °F (36.7 °C)   SpO2: 100%   Weight: 53.8 kg (118 lb 9.6 oz)   PainSc: 0-No pain     NUTRITIONAL STATUS:     no issues  KPS: 90:  Minor signs or symptoms  Review of Systems:   General: No fevers, chills, weight change, or drenching night sweats. Skin: No rashes or jaundice.  HEENT: No change in vision or hearing, no headaches.  Neck: No dysphagia or masses.  Heme/Lymph: No easy bruising or bleeding.  Respiratory System: No shortness of breath or cough.  Cardiovascular: No chest pain, palpitations, or dyspnea on exertion.  - Pacemaker. GI: No nausea, vomiting, diarrhea, melena, or hematochezia.  : No dysuria or hematuria.  Endocrine: No heat or cold intolerance. Musculoskeletal: No myalgias or arthralgias.  Neuro: No weakness, numbness, syncope, or seizures. Psych: No mood changes or depression. Ext: Denies swelling.        Objective     Vitals:  Vitals:    22 1406   BP: 116/65   Pulse: 71   Resp: 16   Temp: 98.1 °F (36.7 °C)   SpO2: 100%   Weight: 53.8 kg (118 lb 9.6 oz)   PainSc: 0-No pain       PHYSICAL EXAM:  GENERAL: in no apparent distress, sitting comfortably in room.    HEENT: normocephalic,  atraumatic. Pupils are equal, round, reactive to light. Sclera anicteric. Conjunctiva not injected. Oropharynx without erythema, ulcerations or thrush.   NECK: Supple with no masses.  LYMPHATIC: no cervical, supraclavicular or axillary adenopathy appreciated bilaterally.   CARDIOVASCULAR: S1 & S2 detected; no murmurs, rubs or gallops.  CHEST: clear to auscultation bilaterally; no wheezes, crackles or rubs. Work of breathing normal.  ABDOMEN: bowel sounds present. Abdomen is soft, nontender, nondistended.   MUSCULOSKELETAL: no tenderness to palpation along the spine or scapulae. Normal range of motion.  EXTREMITIES: no clubbing, cyanosis, edema.  SKIN: no erythema, rashes, ulcerations noted.   NEUROLOGIC: cranial nerves II-XII grossly intact bilaterally. No focal neurologic deficits.  PSYCHIATRIC:  alert, aware, and appropriate.  BREASTS: Left CW post reconstruction with expander in place, no palpable CW or Right breast or bilateral axillary findings.        PERTINENT IMAGING/PATHOLOGY/LABS (Medical Decision Making):     COORDINATION OF CARE: A copy of this note is sent to the referring provider.    PATHOLOGY (Reviewed): as noted above.    IMAGING (Reviewed): as noted above.    LABS (Reviewed):  Hematology WBC   Date Value Ref Range Status   05/20/2022 6.33 3.40 - 10.80 10*3/mm3 Final     RBC   Date Value Ref Range Status   05/20/2022 3.91 3.77 - 5.28 10*6/mm3 Final     Hemoglobin   Date Value Ref Range Status   05/20/2022 11.7 (L) 12.0 - 15.9 g/dL Final     Hematocrit   Date Value Ref Range Status   05/20/2022 34.7 34.0 - 46.6 % Final     Platelets   Date Value Ref Range Status   05/20/2022 185 140 - 450 10*3/mm3 Final      Chemistry   Lab Results   Component Value Date    GLUCOSE 115 05/20/2022    BUN 9 05/20/2022    CREATININE 0.56 (L) 05/20/2022    BCR 16.1 05/20/2022    K 4.0 05/20/2022    CO2 24.5 05/20/2022    CALCIUM 9.2 05/20/2022    ALBUMIN 4.30 05/20/2022    AST 23 05/20/2022    ALT 16 05/20/2022          Assessment & Plan     ASSESSMENT AND PLAN:    1. Malignant neoplasm of upper-outer quadrant of left breast in female, estrogen receptor positive (HCC)       -- [id number] L breast ILC  -final path on mastectomy was exactly 5 cm and 1/4 LNs had isolated tumor cells.  She also had lobular in situ spanning 7 cm.    We discussed the NCCN guidelines and opened them in the room today for a long discussion of her high risk findings and potential role for adjuvant radiation therapy: Young age, tumor size > 5cm, isolated tumor cells.    Discussed this slide in great detail including the randomized trials which shaped it:            I discussed CW XRT including high tangents to cover the CW + Levels I/II vs. Comprehensive RNI in addition to CW XRT.    After a long discussion she is more understanding of the recommendation and will return post chemo for CT simulation for CW XRT + RNI, 50 Gy in 25 fractions.      This assessment comes from my review of the imaging, pathology, physician notes and other pertinent information as mentioned.    DISPOSITION: Patient to start systemic therapy with Dr. Palacios.      TIME SPENT WITH PATIENT:   I spent 25 minutes caring for KYLAH on this date of service. This time includes time spent by me in the following activities: preparing for the visit, reviewing tests, obtaining and/or reviewing a separately obtained history, performing a medically appropriate examination and/or evaluation, counseling and educating the patient/family/caregiver, documenting information in the medical record, independently interpreting results and communicating that information with the patient/family/caregiver and care coordination      CC: MD Chau Sharif MD      Approved by:     Lan Anguiano MD

## 2022-05-18 ENCOUNTER — TELEPHONE (OUTPATIENT)
Dept: ONCOLOGY | Facility: CLINIC | Age: 37
End: 2022-05-18

## 2022-05-18 NOTE — TELEPHONE ENCOUNTER
Returned call to patient who is reporting runny nose and sore throat.  She had a negative Covid test on 5/13/2022, prior to her surgery.  Please advise if should re-test for Covid or if ok to come into office on Friday.  She has no fever.

## 2022-05-18 NOTE — TELEPHONE ENCOUNTER
Per Dr. Palacios advised pt to test again and let us know results of test. Pt v/u. Chelsea Abbott RN

## 2022-05-20 ENCOUNTER — CLINICAL SUPPORT (OUTPATIENT)
Dept: OTHER | Facility: HOSPITAL | Age: 37
End: 2022-05-20

## 2022-05-20 ENCOUNTER — OFFICE VISIT (OUTPATIENT)
Dept: ONCOLOGY | Facility: CLINIC | Age: 37
End: 2022-05-20

## 2022-05-20 ENCOUNTER — INFUSION (OUTPATIENT)
Dept: ONCOLOGY | Facility: HOSPITAL | Age: 37
End: 2022-05-20

## 2022-05-20 VITALS
DIASTOLIC BLOOD PRESSURE: 67 MMHG | SYSTOLIC BLOOD PRESSURE: 100 MMHG | TEMPERATURE: 97.2 F | WEIGHT: 118.2 LBS | HEIGHT: 62 IN | BODY MASS INDEX: 21.75 KG/M2 | OXYGEN SATURATION: 98 % | RESPIRATION RATE: 16 BRPM | HEART RATE: 70 BPM

## 2022-05-20 VITALS — BODY MASS INDEX: 21.71 KG/M2 | HEIGHT: 62 IN | WEIGHT: 118 LBS

## 2022-05-20 DIAGNOSIS — D64.89 ANEMIA DUE TO OTHER CAUSE, NOT CLASSIFIED: ICD-10-CM

## 2022-05-20 DIAGNOSIS — Z17.0 MALIGNANT NEOPLASM OF UPPER-OUTER QUADRANT OF LEFT BREAST IN FEMALE, ESTROGEN RECEPTOR POSITIVE: Primary | ICD-10-CM

## 2022-05-20 DIAGNOSIS — Z17.0 MALIGNANT NEOPLASM OF UPPER-OUTER QUADRANT OF LEFT BREAST IN FEMALE, ESTROGEN RECEPTOR POSITIVE: ICD-10-CM

## 2022-05-20 DIAGNOSIS — C50.412 MALIGNANT NEOPLASM OF UPPER-OUTER QUADRANT OF LEFT BREAST IN FEMALE, ESTROGEN RECEPTOR POSITIVE: Primary | ICD-10-CM

## 2022-05-20 DIAGNOSIS — C50.412 MALIGNANT NEOPLASM OF UPPER-OUTER QUADRANT OF LEFT BREAST IN FEMALE, ESTROGEN RECEPTOR POSITIVE: ICD-10-CM

## 2022-05-20 LAB
ALBUMIN SERPL-MCNC: 4.3 G/DL (ref 3.5–5.2)
ALBUMIN/GLOB SERPL: 1.7 G/DL (ref 1.1–2.4)
ALP SERPL-CCNC: 85 U/L (ref 38–116)
ALT SERPL W P-5'-P-CCNC: 16 U/L (ref 0–33)
ANION GAP SERPL CALCULATED.3IONS-SCNC: 10.5 MMOL/L (ref 5–15)
AST SERPL-CCNC: 23 U/L (ref 0–32)
B-HCG UR QL: NEGATIVE
BASOPHILS # BLD AUTO: 0.05 10*3/MM3 (ref 0–0.2)
BASOPHILS NFR BLD AUTO: 0.8 % (ref 0–1.5)
BILIRUB SERPL-MCNC: 0.2 MG/DL (ref 0.2–1.2)
BUN SERPL-MCNC: 9 MG/DL (ref 6–20)
BUN/CREAT SERPL: 16.1 (ref 7.3–30)
CALCIUM SPEC-SCNC: 9.2 MG/DL (ref 8.5–10.2)
CHLORIDE SERPL-SCNC: 104 MMOL/L (ref 98–107)
CO2 SERPL-SCNC: 24.5 MMOL/L (ref 22–29)
CREAT SERPL-MCNC: 0.56 MG/DL (ref 0.6–1.1)
DEPRECATED RDW RBC AUTO: 39.4 FL (ref 37–54)
EGFRCR SERPLBLD CKD-EPI 2021: 120 ML/MIN/1.73
EOSINOPHIL # BLD AUTO: 1.26 10*3/MM3 (ref 0–0.4)
EOSINOPHIL NFR BLD AUTO: 19.9 % (ref 0.3–6.2)
ERYTHROCYTE [DISTWIDTH] IN BLOOD BY AUTOMATED COUNT: 12.2 % (ref 12.3–15.4)
ESTRADIOL SERPL HS-MCNC: 37.9 PG/ML
FSH SERPL-ACNC: 6.32 MIU/ML
GLOBULIN UR ELPH-MCNC: 2.5 GM/DL (ref 1.8–3.5)
GLUCOSE SERPL-MCNC: 115 MG/DL (ref 74–124)
HCT VFR BLD AUTO: 34.7 % (ref 34–46.6)
HGB BLD-MCNC: 11.7 G/DL (ref 12–15.9)
IMM GRANULOCYTES # BLD AUTO: 0.01 10*3/MM3 (ref 0–0.05)
IMM GRANULOCYTES NFR BLD AUTO: 0.2 % (ref 0–0.5)
LH SERPL-ACNC: 5.13 MIU/ML
LYMPHOCYTES # BLD AUTO: 1.72 10*3/MM3 (ref 0.7–3.1)
LYMPHOCYTES NFR BLD AUTO: 27.2 % (ref 19.6–45.3)
MCH RBC QN AUTO: 29.9 PG (ref 26.6–33)
MCHC RBC AUTO-ENTMCNC: 33.7 G/DL (ref 31.5–35.7)
MCV RBC AUTO: 88.7 FL (ref 79–97)
MONOCYTES # BLD AUTO: 0.43 10*3/MM3 (ref 0.1–0.9)
MONOCYTES NFR BLD AUTO: 6.8 % (ref 5–12)
NEUTROPHILS NFR BLD AUTO: 2.86 10*3/MM3 (ref 1.7–7)
NEUTROPHILS NFR BLD AUTO: 45.1 % (ref 42.7–76)
NRBC BLD AUTO-RTO: 0 /100 WBC (ref 0–0.2)
PLATELET # BLD AUTO: 185 10*3/MM3 (ref 140–450)
PMV BLD AUTO: 11.3 FL (ref 6–12)
POTASSIUM SERPL-SCNC: 4 MMOL/L (ref 3.5–4.7)
PROT SERPL-MCNC: 6.8 G/DL (ref 6.3–8)
RBC # BLD AUTO: 3.91 10*6/MM3 (ref 3.77–5.28)
SODIUM SERPL-SCNC: 139 MMOL/L (ref 134–145)
WBC NRBC COR # BLD: 6.33 10*3/MM3 (ref 3.4–10.8)

## 2022-05-20 PROCEDURE — 25010000002 PALONOSETRON PER 25 MCG: Performed by: INTERNAL MEDICINE

## 2022-05-20 PROCEDURE — 96367 TX/PROPH/DG ADDL SEQ IV INF: CPT

## 2022-05-20 PROCEDURE — 25010000002 CYCLOPHOSPHAMIDE 1 GM/5ML SOLUTION 5 ML VIAL: Performed by: INTERNAL MEDICINE

## 2022-05-20 PROCEDURE — 96413 CHEMO IV INFUSION 1 HR: CPT

## 2022-05-20 PROCEDURE — 96375 TX/PRO/DX INJ NEW DRUG ADDON: CPT

## 2022-05-20 PROCEDURE — 25010000002 FOSAPREPITANT PER 1 MG: Performed by: INTERNAL MEDICINE

## 2022-05-20 PROCEDURE — 99214 OFFICE O/P EST MOD 30 MIN: CPT | Performed by: INTERNAL MEDICINE

## 2022-05-20 PROCEDURE — 25010000002 PEGFILGRASTIM 6 MG/0.6ML PREFILLED SYRINGE KIT: Performed by: INTERNAL MEDICINE

## 2022-05-20 PROCEDURE — 81025 URINE PREGNANCY TEST: CPT | Performed by: INTERNAL MEDICINE

## 2022-05-20 PROCEDURE — 80053 COMPREHEN METABOLIC PANEL: CPT

## 2022-05-20 PROCEDURE — 83001 ASSAY OF GONADOTROPIN (FSH): CPT | Performed by: INTERNAL MEDICINE

## 2022-05-20 PROCEDURE — 96377 APPLICATON ON-BODY INJECTOR: CPT

## 2022-05-20 PROCEDURE — 25010000002 DOXORUBICIN PER 10 MG: Performed by: INTERNAL MEDICINE

## 2022-05-20 PROCEDURE — 82670 ASSAY OF TOTAL ESTRADIOL: CPT | Performed by: INTERNAL MEDICINE

## 2022-05-20 PROCEDURE — 83002 ASSAY OF GONADOTROPIN (LH): CPT | Performed by: INTERNAL MEDICINE

## 2022-05-20 PROCEDURE — 85025 COMPLETE CBC W/AUTO DIFF WBC: CPT

## 2022-05-20 PROCEDURE — 96411 CHEMO IV PUSH ADDL DRUG: CPT

## 2022-05-20 PROCEDURE — 25010000002 DEXAMETHASONE SODIUM PHOSPHATE 100 MG/10ML SOLUTION: Performed by: INTERNAL MEDICINE

## 2022-05-20 RX ORDER — DOXORUBICIN HYDROCHLORIDE 2 MG/ML
60 INJECTION, SOLUTION INTRAVENOUS ONCE
Status: COMPLETED | OUTPATIENT
Start: 2022-05-20 | End: 2022-05-20

## 2022-05-20 RX ORDER — PALONOSETRON 0.05 MG/ML
0.25 INJECTION, SOLUTION INTRAVENOUS ONCE
Status: COMPLETED | OUTPATIENT
Start: 2022-05-20 | End: 2022-05-20

## 2022-05-20 RX ORDER — SODIUM CHLORIDE 9 MG/ML
250 INJECTION, SOLUTION INTRAVENOUS ONCE
Status: COMPLETED | OUTPATIENT
Start: 2022-05-20 | End: 2022-05-20

## 2022-05-20 RX ORDER — OLANZAPINE 5 MG/1
5 TABLET ORAL ONCE
Status: CANCELLED | OUTPATIENT
Start: 2022-05-20 | End: 2022-05-20

## 2022-05-20 RX ORDER — PALONOSETRON 0.05 MG/ML
0.25 INJECTION, SOLUTION INTRAVENOUS ONCE
Status: CANCELLED | OUTPATIENT
Start: 2022-05-20

## 2022-05-20 RX ORDER — SODIUM CHLORIDE 9 MG/ML
250 INJECTION, SOLUTION INTRAVENOUS ONCE
Status: CANCELLED | OUTPATIENT
Start: 2022-05-20

## 2022-05-20 RX ORDER — DOXORUBICIN HYDROCHLORIDE 2 MG/ML
60 INJECTION, SOLUTION INTRAVENOUS ONCE
Status: CANCELLED | OUTPATIENT
Start: 2022-05-20

## 2022-05-20 RX ORDER — OLANZAPINE 5 MG/1
5 TABLET ORAL ONCE
Status: COMPLETED | OUTPATIENT
Start: 2022-05-20 | End: 2022-05-20

## 2022-05-20 RX ADMIN — CYCLOPHOSPHAMIDE 930 MG: 200 INJECTION, SOLUTION INTRAVENOUS at 10:19

## 2022-05-20 RX ADMIN — OLANZAPINE 5 MG: 5 TABLET, FILM COATED ORAL at 08:47

## 2022-05-20 RX ADMIN — PEGFILGRASTIM 6 MG: KIT SUBCUTANEOUS at 10:35

## 2022-05-20 RX ADMIN — SODIUM CHLORIDE 250 ML: 9 INJECTION, SOLUTION INTRAVENOUS at 08:46

## 2022-05-20 RX ADMIN — DOXORUBICIN HYDROCHLORIDE 94 MG: 2 INJECTION, SOLUTION INTRAVENOUS at 10:04

## 2022-05-20 RX ADMIN — DEXAMETHASONE SODIUM PHOSPHATE 12 MG: 10 INJECTION, SOLUTION INTRAMUSCULAR; INTRAVENOUS at 08:46

## 2022-05-20 RX ADMIN — PALONOSETRON 0.25 MG: 0.05 INJECTION, SOLUTION INTRAVENOUS at 08:47

## 2022-05-20 RX ADMIN — SODIUM CHLORIDE 100 ML: 9 INJECTION, SOLUTION INTRAVENOUS at 09:01

## 2022-05-20 NOTE — PROGRESS NOTES
RM:________    Referral Provider: Chau Palacios MD Coleman, Christy, PA    NEW PATIENT/ CONSULT  PREVIOUS CARDIOLOGIST:   CARDIAC TESTING:     : 1985   AGE: 37 y.o.    2022  REASON FOR VISIT/  CC:      WT: ____________ BP: __________L __________R/ HR_______________    CHEST PAIN: _____________    SOA: ____________PALPS: __________      LIGHTHEADED: ___________ FATIGUE: _______________ EDEMA______________  ALLERGIES:  Asa [aspirin]  SMOKING HISTORY  Social History     Tobacco Use   • Smoking status: Never Smoker   • Smokeless tobacco: Never Used   Vaping Use   • Vaping Use: Never used   Substance Use Topics   • Alcohol use: Not Currently     Alcohol/week: 5.0 standard drinks     Types: 5 Shots of liquor per week   • Drug use: Never          CHILDREN: _______________________       CAFFEINE USE________  ALCOHOL _____________  OCCUPATION_____________  Past Surgical History:   Procedure Laterality Date   • APPENDECTOMY     • BREAST BIOPSY Left    • BREAST RECONSTRUCTION Left 2022    Procedure: LEFT BREAST 1ST STAGE RECONSTRUCTION WITH INSERTION OF TISSUE EXPANDER AND BIOLOGIC;  Surgeon: Hermelinda Johnson MD;  Location: [facility];  Service: Plastics;  Laterality: Left;   •  SECTION     •  SECTION N/A 12/10/2018    Procedure:  SECTION REPEAT;  Surgeon: Heidy Gutierrez MD;  Location: [facility] LABOR DELIVERY;  Service: Obstetrics/Gynecology   • D & C HYSTEROSCOPY N/A 2021    Procedure: HYSTEROSCOPY REMOVAL INTRAUTERINE DEVICE WILL NEED ULTRASOUND IN ROOM;  Surgeon: Heidy Gutierrez MD;  Location: [facility];  Service: Obstetrics/Gynecology;  Laterality: N/A;   • KNEE ARTHROSCOPY Right    • MASTECTOMY W/ SENTINEL NODE BIOPSY Left 2022    Procedure: left total mastectomy with left axillary sentinel lymph node biopsy;  Surgeon: Monisha Escudero MD;  Location: [facility];  Service: General;  Laterality: Left;   • VENOUS ACCESS DEVICE  (PORT) INSERTION Right 5/13/2022    Procedure: right port placement;  Surgeon: Monisha Escudero MD;  Location: [facility];  Service: General;  Laterality: Right;                FAMILY HISTORY  HEART DISEASE  CHF  DIABETES  CARDIAC ARREST  STROKE  CANCER  ANEURYSM                                                             H/O: MI_____   STROKE________   GOUT_____   ANEMIA______     CAROTID________ HIV____ CAD_______ HYPERCHOL _____    H/O: CHF _____   RF____ DM___ HTN_______PVD____THYROID DISEASE_______    PMH: GI ____   HEPATITIS ___ KIDNEY DISEASE ___ LUNG DISEASE _______     SLEEP APNEA ____ BLOOD CLOTS ____ DVT ____ VEIN STRIPPING ___________     CANCER _________________________________ CHEMO/ RADIATION__________

## 2022-05-20 NOTE — PROGRESS NOTES
TREATMENT  PREPARATION    KYLAH Gilbert  [id number]  1985    Chief Complaint: Treatment preparation and needs assessment    History of present illness:  KYLAH Gilbert is a 37 y.o. year old female who is here today for treatment preparation and needs assessment.  The patient has been diagnosed with   Encounter Diagnoses   Name Primary?   • Malignant neoplasm of upper-outer quadrant of left breast in female, estrogen receptor positive (HCC) Yes   • Anemia due to other cause, not classified     and is scheduled to begin treatment with:     Oncology History:    Oncology/Hematology History   Malignant neoplasm of upper-outer quadrant of left breast in female, estrogen receptor positive (HCC)   3/10/2022 Initial Diagnosis    Malignant neoplasm of upper-outer quadrant of left breast in female, estrogen receptor positive (HCC)     5/5/2022 Cancer Staged    Staging form: Breast, AJCC 8th Edition  - Clinical stage from 5/5/2022: Stage IIA (cT3, cN1(sn), cM0, G2, ER+, MT+, HER2-) - Signed by Chau Palacios MD on 5/5/2022 5/20/2022 -  Chemotherapy    OP BREAST AC DD DOXOrubicin / Cyclophosphamide     7/11/2022 -  Chemotherapy    OP BREAST PACLitaxel (weekly X 12)         The current medication list and allergy list were reviewed and reconciled.     Past Medical History, Past Surgical History, Social History, Family History have been reviewed and are without significant changes except as mentioned.    Physical Exam:    Vitals:    05/20/22 0754   BP: 100/67   Pulse: 70   Resp: 16   Temp: 97.2 °F (36.2 °C)   SpO2: 98%     Vitals:    05/20/22 0754   PainSc: 0-No pain        ECOG score: 0         Physical Exam  Constitutional:       Appearance: Normal appearance.   HENT:      Head: Normocephalic.      Nose: Nose normal. No congestion.      Mouth/Throat:      Mouth: Mucous membranes are moist.      Pharynx: Oropharynx is clear. No oropharyngeal exudate.   Eyes:      Conjunctiva/sclera:  Conjunctivae normal.      Pupils: Pupils are equal, round, and reactive to light.   Cardiovascular:      Rate and Rhythm: Normal rate.   Pulmonary:      Effort: Pulmonary effort is normal.   Musculoskeletal:         General: Normal range of motion.      Cervical back: Normal range of motion.   Skin:     General: Skin is warm and dry.      Findings: No rash.   Neurological:      Mental Status: She is alert.      Motor: No weakness.   Psychiatric:         Mood and Affect: Mood normal.         Behavior: Behavior normal.         Thought Content: Thought content normal.         Judgment: Judgment normal.           NEEDS ASSESSMENTS    Genetics  The patient's new diagnosis and family history have been reviewed for genetic counseling needs. A genetic referral is not recommended.     Psychosocial and Barriers to care  The patient has completed a PHQ-9 Depression Screening and the Distress Thermometer (DT) today.  PHQ-9 results show PHQ-2 Total Score:   PHQ-9 Total Score: PHQ-9 Total Score:       The patient scored their distress today as   on a scale of 0-10 with 0 being no distress and 10 being extreme distress. Problems marked by the patient as being an issue for them within the last week include   .      Results were reviewed along with psychosocial resources offered by our cancer center.  Our Supportive Oncology team will be flagged for a score of 4 or above, and a same day call will be made for a score of 9 or 10.  A mental health referral is offered at that time. Patients who score less than 4 have been educated on our support services and can be referred to our  upon request.  The patient will not be referred to our .       Nutrition  The patient has completed the malnutrition screening today. They scored Malnutrition Screening Tool  Have you recently lost weight without trying?  If yes, how much weight have you lost?: 0--> No  Have you been eating poorly because of a decreased appetite?:  0--> No  MST score: 0   with a score of 0-1 meaning not at risk in a score of 2 or greater meaning at risk.  Patients with a score of 3 or higher will be referred to our oncology dietitian for support. Patients beginning at risk treatment regimens or who have dietary concerns will also be referred to our oncology dietitian. The patient has already been referred per her request.    Functional Assessment  Persons who are age 70 or greater will be screened for qualification of a comprehensive geriatric assessment by our survivorship nurse practitioner.  Older adults with cancer face unique challenges. These may include an increased risk of drug reactions, financial burdens, and caregiver stress. The patient scored   . Patients scoring 14 or lower will referred for an older adult functional assessment with the survivorship advanced practice registered nurse to ensure all needed support is provided as patients plan for their treatments. NOT APPLICABLE    Intravenous Access Assessment  The patient and I discussed planned intravenous chemo/biotherapy as well as other IV treatments that are often needed throughout the course of treatment. These may include, but are not limited to blood transfusions, antibiotics, and IV hydration. Discussed that depending on selected treatment and vein assessment, patient may require venous access device (VAD) which could include but not limited to a Mediport or PICC line. Risks and benefits of VADs reviewed. The patient will be treated via Port.    Reproductive/Sexual Activity   People should avoid becoming pregnant and should not get a partner pregnant while undergoing chemo/biotherapy.  People of childbearing age should use effective contraception during active therapy. The best recommendation for all people is to use a barrier method for a minimum of 1 week after the last infusion of chemo/biotherapy to prevent your partner being exposed to byproducts from treatment medications in bodily  "fluids. Effective contraception should be discussed with your oncology team to make sure it is safe to take based on your diagnosis. Possible options include oral contraceptives, barrier methods. Chemo/biotherapy can change your ability to reproduce children in the future.  There are options for fertility preservation. The patient will not be referred.    Advanced Care Planning  Advance Care Planning   The patient and I discussed advanced care planning, \"Conversations that Matter\".   This service is offered for development of advance directives with a certified ACP facilitator.  The patient does not have an up-to-date advanced directive. This document is not on file with our office. The patient is not interested in an appointment with one of our facilitators to create or update their advanced directives.     Smoking cessation  Tobacco Use: Low Risk    • Smoking Tobacco Use: Never Smoker   • Smokeless Tobacco Use: Never Used       Patient and I discussed their tobacco use history. Referral will not be made for smoking cessation.      Palliative Care  When appropriate, the patient and I discussed the availability palliative care services and when appropriate Hospice care. Palliative care is not the same as Hospice care which was explained to the patient.  The patient is not interested in additional information from our  on these services.     Survivorship   When appropriate, we discussed that we will refer the patient to survivorship clinic to discuss next steps following completion of planned treatment.  Reviewed this visit will include assessment of your physical, psychological, functional, and spiritual needs as a survivor and the need at attend this visit when scheduled.    TREATMENT EDUCATION    Today I met with the patient to discuss the chemo/biotherapy regimen recommended for treatment of Malignant neoplasm of upper-outer quadrant of left breast in female, estrogen receptor positive (HCC)  - " Estradiol  - Follicle Stimulating Hormone  - Luteinizing Hormone    Anemia due to other cause, not classified  - Estradiol  - Follicle Stimulating Hormone  - Luteinizing Hormone  .  The patient was given explanation of treatment premed side effects including office policy that prohibits patients to drive if sedating medications are administered, MD explanation given regarding benefits, side effects, toxicities and goals of treatment.  The patient received a Chemotherapy/Biotherapy Plan Summary including diagnosis and explanation of specific treatment plan.    SIDE EFFECTS:  Common side effects were discussed with the patient and/or significant other.  Discussion included where applicable hair loss/discoloration, anemia/fatigue, infection/chills/fever, appetite, bleeding risk/precautions, constipation, diarrhea, mouth sores, taste alteration, loss of appetite, nausea/vomiting, peripheral neuropathy, skin/nail changes, rash, muscle aches/weakness, photosensitivity, weight gain/loss, hearing loss, dizziness, menopausal symptoms, menstrual irregularity, sterility, high blood pressure, heart damage, liver damage, lung damage, kidney damage, DVT/PE risk, fluid retention, pleural/pericardial effusion, somnolence, electrolyte/LFT imbalance, vein exercises and/or the possible need for vascular access/port placement.  The patient was advised that although uncommon, leakage of an infused medication from the vein or venous access device may lead to skin breakdown and/or other tissue damage.  The patient was advised that he/she may have pain, bleeding, and/or bruising from the insertion of a needle in their vein or venous access device (port).  The patient was further advised that, in spite of proper technique, infection with redness and irritation may rarely occur at the site where the needle was inserted.  The patient was advised that if complications occur, additional medical treatment is available.  Finally, where applicable  we have reviewed rare but potential immune mediated side effects including shortness of breath, cough, chest pain (pneumonitis), abdominal pain, diarrhea (colitis), thyroiditis (hypothyroid or hyperthyroid), hepatitis and liver dysfunction, nephritis and renal dysfunction.    Discussion also included side effects specific to drugs in the treatment plan, specifically:    Treatment Plans     Name Type Plan Dates Plan Provider         Active    OP BREAST AC DD DOXOrubicin / Cyclophosphamide ONCOLOGY TREATMENT  5/15/2022 - Present Chau Palacios MD                    Questions answered and additional information discussed on topics including:  Anemia, Thrombocytopenia, Neutropenia, Nutrition and appetite changes, Constipation, Diarrhea, Nausea & vomiting, Mouth sores, Alopecia, Skin & nail changes, [place] and Hand/Foot Cooling       Assessment and Plan:    Diagnoses and all orders for this visit:    1. Malignant neoplasm of upper-outer quadrant of left breast in female, estrogen receptor positive (HCC) (Primary)  -     Estradiol; Future  -     Follicle Stimulating Hormone; Future  -     Luteinizing Hormone; Future    2. Anemia due to other cause, not classified  -     Estradiol; Future  -     Follicle Stimulating Hormone; Future  -     Luteinizing Hormone; Future      Orders Placed This Encounter   Procedures   • Estradiol     Standing Status:   Future     Number of Occurrences:   1     Standing Expiration Date:   5/17/2023     Order Specific Question:   Release to patient     Answer:   Immediate   • Follicle Stimulating Hormone     Standing Status:   Future     Number of Occurrences:   1     Standing Expiration Date:   5/17/2023     Order Specific Question:   Release to patient     Answer:   Immediate   • Luteinizing Hormone     Standing Status:   Future     Number of Occurrences:   1     Standing Expiration Date:   5/17/2023     Order Specific Question:   Release to patient     Answer:   Immediate         1. The  patient and I have reviewed their diagnosis and scheduled treatment plan. Needs assessment was completed where applicable including genetics, psychosocial needs, barriers to care, VAD evaluation, advanced care planning, survivorship, and palliative care services where indicated. Referrals have been ordered as appropriate based upon evaluation today and patient desires.   2. Chemo/biotherapy teaching was completed today and consent obtained. See separate documentation for further details.  3. Adequate time was given to answer questions.  Patient made aware of their care team members and contact information if they have questions or problems throughout the treatment course.  4. Discussion held and written information provided describing frequency of office visits and ongoing monitoring throughout the treatment plan.     5. Reviewed with patient any prescribed medication sent to pharmacy.  Education provided regarding proper storage, safe handling, and proper disposal of unused medication.  6. Proper handling of body fluids and waste discussed and written information provided.  7. If appropriate, patient had pretreatment labs drawn today.    Learning assessment completed at initial patient encounter. See separate flowsheet. Chemo/biotherapy education comprehension assessed at today's visit.    I spent 50 minutes caring for KYLAH on this date of service. This time includes time spent by me in the following activities: preparing for the visit, reviewing tests, obtaining and/or reviewing a separately obtained history, performing a medically appropriate examination and/or evaluation, counseling and educating the patient/family/caregiver, documenting information in the medical record and care coordination.

## 2022-05-20 NOTE — PROGRESS NOTES
"Outpatient Oncology Nutrition  Assessment/PES    Patient Name:  KYLAH Gilbert  YOB: 1985  MRN: [id number]    Assessment Date:  5/20/2022    Comments:  Met patient in infusion. Beginning AC today for breast cancer. The patient was groggy from [employer] so left the ACS \"nutrition during cancer treatment\" booklet and contact info with family member. Encouraged them to call with any questions.  Will be available as needed.      General Info     Row Name 05/20/22 1058       Today's Session    Person(s) attending today's session Patient       General Information    Oncology patient? yes  L breast cancer       Oncology Specific Assessment    Type of treatment Chemotherapy    Frequency of treatment AC every 14 days x 4, then taxol x12                 Anthropometrics     Row Name 05/20/22 1110          Anthropometrics    Height 157 cm (61.81\")     Weight 53.5 kg (118 lb)     Weight for Calculation 53.5 kg (118 lb)                    Estimated/Assessed Needs - Anthropometrics     Row Name 05/20/22 1110          Anthropometrics    Height 157 cm (61.81\")     Weight 53.5 kg (118 lb)     Weight for Calculation 53.5 kg (118 lb)            Estimated/Assessed Needs    Additional Documentation KCAL/KG (Group);Fluid Requirements (Group);Protein Requirements (Group)            KCAL/KG    KCAL/KG 25 Kcal/Kg (kcal)     25 Kcal/Kg (kcal) 1338.1            Protein Requirements    Weight Used For Protein Calculations 52.6 kg (116 lb)     Est Protein Requirement Amount (gms/kg) 1.0 gm protein     Estimated Protein Requirements (gms/day) 52.62            Fluid Requirements    Fluid Requirements (mL/day) 1400     Estimated Fluid Requirement Method RDA Method     RDA Method (mL) 1400                  Labs/Tests/Procedures/Meds     Row Name 05/20/22 1111          Labs/Procedures/Meds    Lab Results Reviewed reviewed            Diagnostic Tests/Procedures    Diagnostic Test/Procedure Reviewed reviewed            " Medications    Pertinent Medications Reviewed reviewed     Pertinent Medications Comments zyrtec, zyprexa, zofran                   Problem/Interventions:   Problem 1     Row Name 05/20/22 1111          Nutrition Diagnoses Problem 1    Problem 1 Nutrition Appropriate for Condition at this Time     Etiology (related to) Medical Diagnosis     Oncology Breast cancer     Signs/Symptoms (evidenced by) Potential Information Deficit                        Intervention Goal     Row Name 05/20/22 1111          Intervention Goal    General Provide information regarding MNT for treatment/condition;Disease management/therapy;Meet nutritional needs for age/condition;Maintain nutrition     PO Maintain intake     Weight Maintain weight                    Education/Evaluation     Row Name 05/20/22 1112          Education    Education Will Instruct as appropriate                 Electronically signed by:  Jasmin Valladares RD, LD  05/20/22 11:12 EDT

## 2022-05-20 NOTE — NURSING NOTE
Pt c/o numbness around lips and dizziness after Emend infusion. S/w FARIDA Molina, will put pt on saline for 15 minutes and monitor for worsening symptoms. After 15 minutes, pt states that symptoms have improved. OK to proceed with first AC per NP.     Pt given Neulasta educational video to watch.

## 2022-05-20 NOTE — PROGRESS NOTES
Subjective       DURING THE VISIT WITH THE PATIENT TODAY , PATIENT HAD FACE MASK, MY MEDICAL ASSISTANT AND I  HAD PROPPER PROTECTIVE EQUIPMENT, AND I DID HAND HYGIENE WITH SOAP AND WATER BEFORE AND AFTER THE VISIT.     REASON FOR FOLLOW UP:  LOBULAR CARCINOMA OF THE LEFT BREAST, 70 MM IN SIZE, MARGINS OF RESECTION NEGATIVE AFTER MASTECTOMY, GRADE 2, ER POSITIVE [place] POSITIVE, HER 2 NEGATIVE BY IHC,KI 67 39% ,ONE POSITIVE AXILLARY LYMPH NODE OUT OF 4 NODES REMOVAL, PREMENOPAUSAL .INVITAE PANEL NEGATIVE.ONCOTYPE 18        HISTORY OF PRESENT ILLNESS:  The patient is a 37 y.o. year old female who is here for an opinion about the above issue.  I had the opportunity to see this delightful [place] female, 37 years old, mother of 2, who is premenopausal who has found abnormalities in the left breast since 12/2021, having sensation of fullness in the breast and discomfort and occasional pain. After this complaint she was seen by her primary physician and she initiated a process of mammograms and ultrasounds that culminated with the diagnosis of lobular carcinoma of the breast, invasive, 7 cm in size. The patient completed a mastectomy and sentinel lymph nodes almost 3 weeks ago and she is here for review and advice in regard to adjuvant therapy. The patient is still having pain at the surgical site, especially since yesterday she had injection of saline in the expander that was placed after the mastectomy. She also has neuropathic pain in the inner aspect of the left arm that is bothering her a lot through the day and sometimes almost driving her crazy. She has no motor deficit into the left upper extremity. She denies any fevers or chills or bone pain. Her appetite is acceptable but she has changed her diet and she is almost going into vegetarian with the exception of some fish. She has eliminated all the dairy products and she has eliminated mostly carbohydrates as well. She has lost some weight. The patient otherwise  feels well. She has normal bowel activity, normal urination. No skeletal pain or joint pain. No cough, sputum production, shortness of breath or palpitations. She is extremely anxious about this visit today. She is in company of her .    The patient returned to the office on 2022 in preparation for initiation of chemotherapy. In the meantime she has undergone a port placement in right subclavian position with no difficulties and an echocardiogram that will be discussed below. The patient has had very significant upper respiratory allergies. She has been tested this week for COVID being negative. She is not running any fever. Most of the symptoms in the respiratory tract include sneezing, itching, itchy eyes and rhinorrhea. No sore throat, no alteration in taste or smell. No cough, shortness of breath, pleuritic pain or hemoptysis.     The surgical site from the left breast expander is not painful. She has healed extremely well. The same is applicable to her port that was placed a week ago.    ·     Past Medical History:   Diagnosis Date   • Anemia    • Anesthesia complication     hypotension with last C section   • Breast cancer (HCC) 2022    Left   • History of COVID-19     2021 AND 2022   • Hyperlipidemia    • PONV (postoperative nausea and vomiting)    • Seasonal allergies         Past Surgical History:   Procedure Laterality Date   • APPENDECTOMY     • BREAST BIOPSY Left    • BREAST RECONSTRUCTION Left 2022    Procedure: LEFT BREAST 1ST STAGE RECONSTRUCTION WITH INSERTION OF TISSUE EXPANDER AND BIOLOGIC;  Surgeon: Hermelinda Johnson MD;  Location: [facility] OR;  Service: Plastics;  Laterality: Left;   •  SECTION     •  SECTION N/A 12/10/2018    Procedure:  SECTION REPEAT;  Surgeon: Heidy Gutierrez MD;  Location: [facility] LABOR DELIVERY;  Service: Obstetrics/Gynecology   • D & C HYSTEROSCOPY N/A 2021    Procedure: HYSTEROSCOPY REMOVAL  INTRAUTERINE DEVICE WILL NEED ULTRASOUND IN ROOM;  Surgeon: Heidy Gutierrez MD;  [facility];  Service: Obstetrics/Gynecology;  Laterality: N/A;   • KNEE ARTHROSCOPY Right    • MASTECTOMY W/ SENTINEL NODE BIOPSY Left 04/13/2022    Procedure: left total mastectomy with left axillary sentinel lymph node biopsy;  Surgeon: Monisha Escudero MD;  Location: [facility];  Service: General;  Laterality: Left;   • VENOUS ACCESS DEVICE (PORT) INSERTION Right 5/13/2022    Procedure: right port placement;  Surgeon: Monisha Escudero MD;  Location: [facility] OR;  Service: General;  Laterality: Right;        Current Outpatient Medications on File Prior to Visit   Medication Sig Dispense Refill   • cetirizine (zyrTEC) 10 MG tablet Take 10 mg by mouth As Needed for Allergies.     • lidocaine-prilocaine (EMLA) 2.5-2.5 % cream Apply 1 application topically to the appropriate area as directed As Needed for Mild Pain .     • OLANZapine (zyPREXA) 5 MG tablet Take 1 tablet by mouth Every Night. Take on days 2, 3 and 4 after chemotherapy. 3 tablet 3   • ondansetron (ZOFRAN) 8 MG tablet Take 1 tablet by mouth 3 (Three) Times a Day As Needed for Nausea or Vomiting. 30 tablet 5   • Chlorhexidine Gluconate Cloth 2 % pads Apply 1 application topically Take As Directed. USE AS DIRECTED PREOP       No current facility-administered medications on file prior to visit.        ALLERGIES:    Allergies   Allergen Reactions   • Asa [Aspirin] Anaphylaxis and Shortness Of Breath        Social History     Socioeconomic History   • Marital status:    Tobacco Use   • Smoking status: Never Smoker   • Smokeless tobacco: Never Used   Vaping Use   • Vaping Use: Never used   Substance and Sexual Activity   • Alcohol use: Not Currently     Alcohol/week: 5.0 standard drinks     Types: 5 Shots of liquor per week   • Drug use: Never   • Sexual activity: Defer        Family History   Problem Relation Age of Onset   • Melanoma  "Paternal Aunt    • Malig Hyperthermia Neg Hx           Objective     Vitals:    05/20/22 0754   BP: 100/67   Pulse: 70   Resp: 16   Temp: 97.2 °F (36.2 °C)   TempSrc: Temporal   SpO2: 98%   Weight: 53.6 kg (118 lb 3.2 oz)   Height: 157 cm (61.81\")   PainSc: 0-No pain     Current Status 5/20/2022   ECOG score 0       Physical Exam    I HAVE PERSONALLY REVIEWED THE HISTORY OF THE PRESENT ILLNESS, PAST MEDICAL HISTORY, FAMILY HISTORY, SOCIAL HISTORY, ALLERGIES, MEDICATIONS STATED ABOVE IN THE  NOTE FROM TODAY.        GENERAL:  Well-developed, well-nourished  Patient  in no acute distress.   SKIN:  Warm, dry ,NO purpura ,NO petechiae, no rash.  HEENT:  Pupils were equal and reactive to light and accomodation, conjunctivae noninjected, no pterygium, normal extraocular movements, normal visual acuity.   NECK:  Supple with good range of motion; no thyromegaly , no other masses, no JVD or bruits,.No carotid artery pain, no carotid abnormal pulsation , NO arterial dance.  LYMPHATICS:  No cervical, NO supraclavicular, NO axillary,NO epitrochlear , NO inguinal adenopathies.  CARDIAC   normal rate , regular rhythm, without murmur,NO rubs NO S3 NO S4   LUNGS: normal breath sounds bilateral, no wheezing, NO rhonchi, NO crackles ,NO rubs.PORT R SUBCLAVIAN POSITION NORMAL.LEFT BREAST EXPANDER LOOKS FINE, NO ERYTHEMA  IN THE WOUND THAT IS HEALING FINE  VASCULAR VENOUS: no cyanosis, NO collateral circulation, NO varicosities, NO edema, NO palpable cords, NO pain,NO erythema, NO pigmentation of the skin.  ABDOMEN:  Soft, NO pain,no hepatomegaly, no splenomegaly,no masses, no ascites, no collateral circulation,no distention,no [place] sign.  EXTREMITIES  AND SPINE:  No clubbing, no cyanosis ,no deformities , no pain .No kyphosis,  no pain in spine, no pain in ribs , no pain in pelvic bone.  NEUROLOGICAL:  Patient was awake, alert, oriented to time, person and place.          RECENT LABS:  Hematology WBC   Date Value Ref Range Status "   05/20/2022 6.33 3.40 - 10.80 10*3/mm3 Final     RBC   Date Value Ref Range Status   05/20/2022 3.91 3.77 - 5.28 10*6/mm3 Final     Hemoglobin   Date Value Ref Range Status   05/20/2022 11.7 (L) 12.0 - 15.9 g/dL Final     Hematocrit   Date Value Ref Range Status   05/20/2022 34.7 34.0 - 46.6 % Final     Platelets   Date Value Ref Range Status   05/20/2022 185 140 - 450 10*3/mm3 Final       CBC:    WBC   Date Value Ref Range Status   05/20/2022 6.33 3.40 - 10.80 10*3/mm3 Final     RBC   Date Value Ref Range Status   05/20/2022 3.91 3.77 - 5.28 10*6/mm3 Final     Hemoglobin   Date Value Ref Range Status   05/20/2022 11.7 (L) 12.0 - 15.9 g/dL Final     Hematocrit   Date Value Ref Range Status   05/20/2022 34.7 34.0 - 46.6 % Final     MCV   Date Value Ref Range Status   05/20/2022 88.7 79.0 - 97.0 fL Final     MCH   Date Value Ref Range Status   05/20/2022 29.9 26.6 - 33.0 pg Final     MCHC   Date Value Ref Range Status   05/20/2022 33.7 31.5 - 35.7 g/dL Final     RDW   Date Value Ref Range Status   05/20/2022 12.2 (L) 12.3 - 15.4 % Final     RDW-SD   Date Value Ref Range Status   05/20/2022 39.4 37.0 - 54.0 fl Final     MPV   Date Value Ref Range Status   05/20/2022 11.3 6.0 - 12.0 fL Final     Platelets   Date Value Ref Range Status   05/20/2022 185 140 - 450 10*3/mm3 Final     Neutrophil %   Date Value Ref Range Status   05/20/2022 45.1 42.7 - 76.0 % Final     Lymphocyte %   Date Value Ref Range Status   05/20/2022 27.2 19.6 - 45.3 % Final     Monocyte %   Date Value Ref Range Status   05/20/2022 6.8 5.0 - 12.0 % Final     Eosinophil %   Date Value Ref Range Status   05/20/2022 19.9 (H) 0.3 - 6.2 % Final     Basophil %   Date Value Ref Range Status   05/20/2022 0.8 0.0 - 1.5 % Final     Immature Grans %   Date Value Ref Range Status   05/20/2022 0.2 0.0 - 0.5 % Final     Neutrophils, Absolute   Date Value Ref Range Status   05/20/2022 2.86 1.70 - 7.00 10*3/mm3 Final     Lymphocytes, Absolute   Date Value Ref Range  Status   05/20/2022 1.72 0.70 - 3.10 10*3/mm3 Final     Monocytes, Absolute   Date Value Ref Range Status   05/20/2022 0.43 0.10 - 0.90 10*3/mm3 Final     Eosinophils, Absolute   Date Value Ref Range Status   05/20/2022 1.26 (H) 0.00 - 0.40 10*3/mm3 Final     Basophils, Absolute   Date Value Ref Range Status   05/20/2022 0.05 0.00 - 0.20 10*3/mm3 Final     Immature Grans, Absolute   Date Value Ref Range Status   05/20/2022 0.01 0.00 - 0.05 10*3/mm3 Final     nRBC   Date Value Ref Range Status   05/20/2022 0.0 0.0 - 0.2 /100 WBC Final        CMP:    Glucose   Date Value Ref Range Status   05/20/2022 115 74 - 124 mg/dL Final     BUN   Date Value Ref Range Status   05/20/2022 9 6 - 20 mg/dL Final     Creatinine   Date Value Ref Range Status   05/20/2022 0.56 (L) 0.60 - 1.10 mg/dL Final     Sodium   Date Value Ref Range Status   05/20/2022 139 134 - 145 mmol/L Final     Potassium   Date Value Ref Range Status   05/20/2022 4.0 3.5 - 4.7 mmol/L Final     Chloride   Date Value Ref Range Status   05/20/2022 104 98 - 107 mmol/L Final     CO2   Date Value Ref Range Status   05/20/2022 24.5 22.0 - 29.0 mmol/L Final     Calcium   Date Value Ref Range Status   05/20/2022 9.2 8.5 - 10.2 mg/dL Final     Total Protein   Date Value Ref Range Status   05/20/2022 6.8 6.3 - 8.0 g/dL Final     Albumin   Date Value Ref Range Status   05/20/2022 4.30 3.50 - 5.20 g/dL Final     ALT (SGPT)   Date Value Ref Range Status   05/20/2022 16 0 - 33 U/L Final     AST (SGOT)   Date Value Ref Range Status   05/20/2022 23 0 - 32 U/L Final     Alkaline Phosphatase   Date Value Ref Range Status   05/20/2022 85 38 - 116 U/L Final     Total Bilirubin   Date Value Ref Range Status   05/20/2022 0.2 0.2 - 1.2 mg/dL Final     Globulin   Date Value Ref Range Status   05/20/2022 2.5 1.8 - 3.5 gm/dL Final     A/G Ratio   Date Value Ref Range Status   05/20/2022 1.7 1.1 - 2.4 g/dL Final     BUN/Creatinine Ratio   Date Value Ref Range Status   05/20/2022 16.1 7.3  - 30.0 Final     Anion Gap   Date Value Ref Range Status   05/20/2022 10.5 5.0 - 15.0 mmol/L Final         Recent imaging:    Adult Transthoracic Echo Complete W/ Cont if Necessary Per Protocol    Result Date: 5/11/2022  Narrative: · The study was certainly difficult due to very recent surgery and the presence of tissue expanders. · Estimated left ventricular EF = 60% Left ventricular systolic function is normal. Normal left ventricular cavity size and wall thickness noted. All left ventricular wall segments contract normally. Left ventricular diastolic function was normal. · Global longitudinal strain could not be determined due to suboptimal visualization.      FL C Arm During Surgery    Result Date: 5/13/2022  Narrative: This procedure was auto-finalized with no dictation required.    XR Chest Post CVA Port    Result Date: 5/13/2022  Narrative: Portable chest radiograph  HISTORY:Port placement  TECHNIQUE: Single AP portable radiograph of the chest  COMPARISON:Chest radiograph 03/24/2022      Impression: FINDINGS AND IMPRESSION: Presumed left breast expander is present. A right Port-A-Cath tip terminates over the right atrium. There is asymmetric opacification overlying the left midlung suggestive of atelectasis versus pneumonia in the appropriate clinical context and correlation with patient history is recommended. Radiopaque linear object overlies the epigastrium is of indeterminate etiology.  No pleural effusion or pneumothorax is seen. Cardiac silhouette is within normal limits for size.  This report was finalized on 5/13/2022 1:09 PM by Dr. Juan Pérez M.D.              Assessment & Plan     Diagnoses and all orders for this visit:    1. Malignant neoplasm of upper-outer quadrant of left breast in female, estrogen receptor positive (HCC) (Primary)  -     Estradiol; Future  -     Follicle Stimulating Hormone; Future  -     Luteinizing Hormone; Future  -     CBC & Differential; Future  -     CBC &  Differential; Future  -     Comprehensive Metabolic Panel; Future  -     CBC & Differential; Future  -     CBC & Differential; Future  -     Comprehensive Metabolic Panel; Future  -     CBC & Differential; Future  -     CBC & Differential; Future  -     Comprehensive Metabolic Panel; Future    2. Anemia due to other cause, not classified  -     Estradiol; Future  -     Follicle Stimulating Hormone; Future  -     Luteinizing Hormone; Future  -     CBC & Differential; Future  -     CBC & Differential; Future  -     Comprehensive Metabolic Panel; Future  -     CBC & Differential; Future  -     CBC & Differential; Future  -     Comprehensive Metabolic Panel; Future  -     CBC & Differential; Future  -     CBC & Differential; Future  -     Comprehensive Metabolic Panel; Future       In summary this 37-year-old  female originally from [place] noticed abnormalities in the left breast in 12/2021, sensation of pressure, minor pain, sensation of fullness. She looked for help. Further mammogram and ultrasound documented abnormalities in the left breast that were consistent with breast cancer. Since then the patient has undergone breast MRI. Breast biopsies documented invasive lobular carcinoma. All this culminated with a left-sided mastectomy and sentinel lymph node sampling. The pathology has been posted above. She had a lobular carcinoma, grade 2, margins of resection negative, Ki-67 at 39%, ER positive, KY positive, HER2/leonie negative. She had 1 positive sentinel lymph node for malignancy with no extracapsular invasion. There was no evidence of lymphovascular invasion.     Invitae panel was negative for any genetic alteration.     The patient has had an expander placed at the mastectomy site. She has discomfort and pain after this was instillated with saline solution yesterday.     The other phenomenon that is happening to the patient is significant pain and discomfort in the inner aspect of the left arm. Sounds  neuropathic related to her recent sentinel lymph node sampling. The patient is taking Aleve or ibuprofen with not too much benefit.     The patient also has a minimal component of anemia that will require assessment with ferritin, iron, TIBC, B12, folic acid levels.     RECOMMENDATIONS:  I have advised this patient and  today that she needs to receive adjuvant chemotherapy in the form of AC x4 and subsequently Taxol x12. Oncotype DX in this patient shows intermediate risk of recurrence. Also not only the chemotherapy will be necessary but we need to make her postmenopausal in the next several weeks or months. This will bring in a lot of benefit for her in the long run to minimize any recurrent disease. She is extremely young. She has 2 young children and we have to do as much as we can in this patient to minimize any potentiality for recurrent disease.     I discussed briefly with her side effects of the treatment including alopecia, cardiac toxicity, anemia, leukopenia, thrombocytopenia, mucositis among others and peripheral neuropathy in the Taxol part of the treatment along with leukopenia, anemia, thrombocytopenia and allergic reaction to the medicine. I discussed with her that the whole treatment is going to take at least 24-26 weeks, in other words 6 months, and thereafter the patient will be hopefully postmenopausal. If she does not become postmenopausal she could qualify for a clinical trial or we could ask Dr. Heidy Gutierrez to do bilateral oophorectomy in this patient.     Eventually the patient will require endocrine adjuvant therapy after she becomes postmenopausal hopefully with letrozole.     I discussed all these facts with the patient and her . I spent 1 hour and 30 minutes with her going through the details of all this but she was able to grasp a lot of information. I appreciated highly the visit with her  and I pointed out to them that they need to work as a team. The  patient has 2 young children. She is the only one taking care of the kids. The patient's mother is living with her, coming from [place] after all these issues. I asked her to ask her mother to stay for the period of time that she receives chemotherapy. If we need to do any services for her in regard to visa requirements or status, we will be glad to incorporate our  into this process.     Finally, I discussed with the patient the need for her to proceed with cardiac assessment. I made a referral to be seen by Cardiology and scheduled her to have an echocardiogram and I sent her back to see Dr. Escudero in order to proceed with the placement of a port.     In order to control the pain in the inner aspect of her arm, I asked her to use Voltaren topically 4 times a day. It will be perfectly fine for her to use ibuprofen and I sent Neurontin 100 mg to take at bedtime. Hopefully this will be a transitory issue.     Dr. Johnson eventually will be in charge of deciding the time for the patient to initiate physical therapy for her left shoulder.     I reviewed all the pertinent records on this patient and I posted the information out of the pathology and radiology as above. I discussed the case with my partner, Dr. Landeros, who agrees with my plan of care.    Finally, I also posted to the patient today that will require followup in the future in regard to her right breast not only with mammogram but also ultrasound. I discussed with her the fact that sometimes lobular carcinoma likes to be a bilateral disease. There are minimal abnormalities in the mammogram in the right breast as well as in the MRI. This will require to be followed up in the future. I did not recommend a 2nd mastectomy prophylactically on her even though she asked the question today.     The patient was reviewed on 05/20/2022. She has had her port placed in right subclavian position a week ago. She is healing perfectly fine and her surgical site  on the left mastectomy and the expander looks perfectly normal with healing. No infection, erythema or discharge.     The patient since then has undergone formal education and consent for chemotherapy administration. Her echocardiogram even though technically difficult documented a normal left ventricular ejection fraction. No pericardial effusion and no valvular dysfunction.     Her white count, hemoglobin and platelets are normal today.     Her chemistry profile, CA 15-3 as well as ferritin, iron, TIBC, B12 and folic acid levels were all normal.     Therefore under the present circumstances I advised the patient and  the followin. For the treatment of her breast cancer the patient will initiate today chemotherapy with dose dense AC that she will receive every 2 weeks supported with Neulasta. The side effects of this already were discussed with her including nausea, vomiting, alopecia, anemia, leukopenia, thrombocytopenia and cardiac dysfunction among others. The patient was ready to proceed.   2. The patient will be utilizing Neulasta On-body to minimize hematological toxicity and be able to keep up on the schedule in regard to her treatment. I advised her that this can produce significant bone pain in the sternum, in the rib cage, in the pelvic bone and she could use antihistaminic that she is already taking in the first place and also she can use either Aleve or Tylenol or a hot shower if necessary.   3. I encouraged her to remain on her nausea medicine, ondansetron, during the next 3 days to minimize any nausea or vomiting.   4. I advised her to stay away from smelling foods like hot soup or fried foods in the next 3 days given the significant increase in smell that patients receiving AC typically have. This will minimize the potential for nausea and vomiting.   5. The patient will require toxicity assessment in a week along with blood clot.   6. The patient once completes 4 cycles of AC will  initiate weekly cycles of Taxol x12. Upon completion of that the patient will initiate a plan of radiation therapy and she already has been seen by Radiation Oncology.     The patient will participate in a clinical trial that we have in the office and she already has been consented for this in regard to the utilization of antiestrogen medicine upon completion of chemotherapy administration.     The patient will continue using her other medications on routine basis that include the Zyprexa and she will use the EMLA cream to minimize pain at the port site at the time of accessing.     Finally, I advised the patient and the  present in the room that they at any cost had to minimize the risk of pregnancy. In fact a pregnancy test today was negative. I advised them to use a condom.     The patient was ready to proceed.    All the discussion of all these issues took place in [place].    ·

## 2022-05-24 ENCOUNTER — TELEPHONE (OUTPATIENT)
Dept: ONCOLOGY | Facility: CLINIC | Age: 37
End: 2022-05-24

## 2022-05-24 NOTE — TELEPHONE ENCOUNTER
Returned call to patient who for the past 3 days she has been experiencing a stabbing pain in her head.  She states that this pain comes and goes, but is not controlled by Tylenol.  She has no sinus congestion or drainage. She is scheduled to see Dr. Palacios on 5/26/2022.  Please advise.

## 2022-05-24 NOTE — TELEPHONE ENCOUNTER
Discussed w/ pt and Dr. Palacios. Informed her per Dr. Palacios it could be the neulasta causing her headache as well as zofran. Pt hasn't had to take any zofran today. Pt will be in on 5/26 for her appt. Chelsea Abbott RN

## 2022-05-25 ENCOUNTER — HOSPITAL ENCOUNTER (OUTPATIENT)
Dept: CARDIOLOGY | Facility: HOSPITAL | Age: 37
Discharge: HOME | End: 2022-05-25
Admitting: INTERNAL MEDICINE

## 2022-05-25 ENCOUNTER — OFFICE VISIT (OUTPATIENT)
Dept: CARDIOLOGY | Facility: CLINIC | Age: 37
End: 2022-05-25

## 2022-05-25 VITALS
HEIGHT: 62 IN | HEART RATE: 80 BPM | SYSTOLIC BLOOD PRESSURE: 94 MMHG | DIASTOLIC BLOOD PRESSURE: 60 MMHG | WEIGHT: 113.4 LBS | BODY MASS INDEX: 20.87 KG/M2

## 2022-05-25 DIAGNOSIS — C50.919 MALIGNANT NEOPLASM OF FEMALE BREAST, UNSPECIFIED ESTROGEN RECEPTOR STATUS, UNSPECIFIED LATERALITY, UNSPECIFIED SITE OF BREAST: Primary | ICD-10-CM

## 2022-05-25 DIAGNOSIS — C50.912 MALIGNANT NEOPLASM OF LEFT BREAST IN FEMALE, ESTROGEN RECEPTOR POSITIVE, UNSPECIFIED SITE OF BREAST: Primary | ICD-10-CM

## 2022-05-25 DIAGNOSIS — Z17.0 MALIGNANT NEOPLASM OF LEFT BREAST IN FEMALE, ESTROGEN RECEPTOR POSITIVE, UNSPECIFIED SITE OF BREAST: Primary | ICD-10-CM

## 2022-05-25 DIAGNOSIS — Z09 CHEMOTHERAPY FOLLOW-UP EXAMINATION: ICD-10-CM

## 2022-05-25 LAB
BH CV ECHO LEFT VENTRICLE GLOBAL LONGITUDINAL STRAIN: -22.8 %
BH CV ECHO MEAS - EDV(MOD-SP2): 96 ML
BH CV ECHO MEAS - EDV(MOD-SP4): 74 ML
BH CV ECHO MEAS - EF(MOD-BP): 57.5 %
BH CV ECHO MEAS - EF(MOD-SP2): 61.5 %
BH CV ECHO MEAS - EF(MOD-SP4): 59.5 %
BH CV ECHO MEAS - ESV(MOD-SP2): 37 ML
BH CV ECHO MEAS - ESV(MOD-SP4): 30 ML
BH CV ECHO MEAS - SV(MOD-SP2): 59 ML
BH CV ECHO MEAS - SV(MOD-SP4): 44 ML
LV EF 2D ECHO EST: 60 %
MAXIMAL PREDICTED HEART RATE: 182 BPM
STRESS TARGET HR: 155 BPM

## 2022-05-25 PROCEDURE — 93000 ELECTROCARDIOGRAM COMPLETE: CPT | Performed by: INTERNAL MEDICINE

## 2022-05-25 PROCEDURE — 25010000002 PERFLUTREN (DEFINITY) 8.476 MG IN SODIUM CHLORIDE (PF) 0.9 % 10 ML INJECTION: Performed by: INTERNAL MEDICINE

## 2022-05-25 PROCEDURE — 93308 TTE F-UP OR LMTD: CPT | Performed by: INTERNAL MEDICINE

## 2022-05-25 PROCEDURE — 93356 MYOCRD STRAIN IMG SPCKL TRCK: CPT | Performed by: INTERNAL MEDICINE

## 2022-05-25 PROCEDURE — 93308 TTE F-UP OR LMTD: CPT

## 2022-05-25 PROCEDURE — 93356 MYOCRD STRAIN IMG SPCKL TRCK: CPT

## 2022-05-25 PROCEDURE — 99204 OFFICE O/P NEW MOD 45 MIN: CPT | Performed by: INTERNAL MEDICINE

## 2022-05-25 RX ADMIN — PERFLUTREN 2 ML: 6.52 INJECTION, SUSPENSION INTRAVENOUS at 12:13

## 2022-05-25 NOTE — PROGRESS NOTES
Date of Office Visit: 22  Encounter Provider: Ananda Arita MD  Place of Service: [facility] CARDIOLOGY  Patient Name: KYLAH Gilbert  :1985    Chief Complaint   Patient presents with   • CARDIO ONCOLOGY    :     HPI:     Ms. Eddie Romero is 37 y.o. and presents today in cardio-oncology consultation at the request of Dr Palacios.     She is a healthy young woman with no previous chronic medical conditions.  She has had 2 uncomplicated pregnancies and deliveries.  She has never had any history of pregnancy-induced hypertension, diabetes, or preeclampsia.    In 2021, she palpated a left breast mass.  Work-up ultimately revealed a stage III breast cancer, ER/OH positive, HER2/leonie negative.  She is status post left-sided mastectomy and placement of tissue expanders.  She has received the first treatment with doxorubicin/cyclophosphamide.  She will receive 4 treatments and then will receive 12 treatments of paclitaxel.  She will need long-term antiestrogen therapy, radiation therapy, and eventual reconstruction.    She denies any cardiac symptoms.  She is feeling very poorly today (nausea, headache, generalized malaise/fatigue) due to her chemotherapy treatment but denies chest pain, shortness of breath, palpitations, leg swelling, or orthopnea.    She had an echo performed prior to her first dose of doxorubicin.  However, it was an extremely difficult study because of her recent surgery.  She was in too much pain to allow adequate imaging.    Past Medical History:   Diagnosis Date   • Anemia    • Anesthesia complication     hypotension with last C section   • Breast cancer (HCC) 2022    left sided, stage 3, ER/OH+, Her2-, s/p mastectomy with plans for AC/Taxol and radiation   • History of COVID-19     2021 AND 2022   • Hyperlipidemia    • PONV (postoperative nausea and vomiting)    • Seasonal allergies        Past Surgical History:   Procedure  Laterality Date   • APPENDECTOMY     • BREAST BIOPSY Left    • BREAST RECONSTRUCTION Left 2022    Procedure: LEFT BREAST 1ST STAGE RECONSTRUCTION WITH INSERTION OF TISSUE EXPANDER AND BIOLOGIC;  Surgeon: Hermelinda Johnson MD;  Location: [facility] OR;  Service: Plastics;  Laterality: Left;   •  SECTION     •  SECTION N/A 12/10/2018    Procedure:  SECTION REPEAT;  Surgeon: Heidy Gutierrez MD;  Location: [facility] LABOR DELIVERY;  Service: Obstetrics/Gynecology   • D & C HYSTEROSCOPY N/A 2021    Procedure: HYSTEROSCOPY REMOVAL INTRAUTERINE DEVICE WILL NEED ULTRASOUND IN ROOM;  Surgeon: Heidy Gutierrez MD;  Location: [facility] OR OSC;  Service: Obstetrics/Gynecology;  Laterality: N/A;   • KNEE ARTHROSCOPY Right    • MASTECTOMY W/ SENTINEL NODE BIOPSY Left 2022    Procedure: left total mastectomy with left axillary sentinel lymph node biopsy;  Surgeon: Monisha Escudero MD;  Location: [facility];  Service: General;  Laterality: Left;   • VENOUS ACCESS DEVICE (PORT) INSERTION Right 2022    Procedure: right port placement;  Surgeon: Monisha Escudero MD;  Location: [facility] OR;  Service: General;  Laterality: Right;       Social History     Socioeconomic History   • Marital status:    Tobacco Use   • Smoking status: Never Smoker   • Smokeless tobacco: Never Used   Vaping Use   • Vaping Use: Never used   Substance and Sexual Activity   • Alcohol use: Not Currently     Alcohol/week: 5.0 standard drinks     Types: 5 Shots of liquor per week   • Drug use: Never   • Sexual activity: Defer       Family History   Problem Relation Age of Onset   • Melanoma Paternal Aunt    • Diabetes Other    • Malig Hyperthermia Neg Hx        Review of Systems   Constitutional: Positive for malaise/fatigue.   Gastrointestinal: Positive for nausea.   Neurological: Positive for headaches.   All other systems reviewed and are negative.      Allergies   Allergen Reactions  "  • Asa [Aspirin] Anaphylaxis and Shortness Of Breath         Current Outpatient Medications:   •  cetirizine (zyrTEC) 10 MG tablet, Take 10 mg by mouth As Needed for Allergies., Disp: , Rfl:   •  lidocaine-prilocaine (EMLA) 2.5-2.5 % cream, Apply 1 application topically to the appropriate area as directed As Needed for Mild Pain ., Disp: , Rfl:   •  OLANZapine (zyPREXA) 5 MG tablet, Take 1 tablet by mouth Every Night. Take on days 2, 3 and 4 after chemotherapy., Disp: 3 tablet, Rfl: 3  •  ondansetron (ZOFRAN) 8 MG tablet, Take 1 tablet by mouth 3 (Three) Times a Day As Needed for Nausea or Vomiting., Disp: 30 tablet, Rfl: 5  No current facility-administered medications for this visit.      Objective:     STOP-Bang Score  Have you been diagnosed with Sleep Apnea?: no  Snoring?: no  Tired?: yes  Observed?: no  Pressure?: no  Stop Score: 1  Body Mass Index more than 35 kg/m2?: no  Age older than 50 year old?: no  Neck large? \">17\"/43cm-M, >16\"/41cm-F: yes  Gender=Male?: no  Total Stop-Bang Score: 2    Vitals:    05/25/22 1009   BP: 94/60   BP Location: Right arm   Pulse: 80   Weight: 51.4 kg (113 lb 6.4 oz)   Height: 157 cm (61.81\")     Body mass index is 20.87 kg/m².    Vitals reviewed.   Constitutional:       Appearance: Well-developed and not in distress.      Comments: She looks tired, but she's in NAD   Eyes:      Conjunctiva/sclera: Conjunctivae normal.   HENT:      Head: Normocephalic.      Nose: Nose normal.         Comments: masked  Neck:      Vascular: No JVD. JVD normal.      Lymphadenopathy: No cervical adenopathy.   Pulmonary:      Effort: Pulmonary effort is normal.      Breath sounds: Normal breath sounds.   Chest:      Comments: Left breast expander, no redness, mild TTP, incision looks good  Cardiovascular:      Normal rate. Regular rhythm.      Murmurs: There is no murmur.   Pulses:     Intact distal pulses.   Edema:     Peripheral edema absent.   Abdominal:      Palpations: Abdomen is soft.      " Tenderness: There is no abdominal tenderness.   Musculoskeletal: Normal range of motion.      Cervical back: Normal range of motion. Skin:     General: Skin is warm and dry.      Findings: No rash.   Neurological:      General: No focal deficit present.      Mental Status: Alert and oriented to person, place, and time.      Cranial Nerves: No cranial nerve deficit.   Psychiatric:         Behavior: Behavior normal.         Thought Content: Thought content normal.         Judgment: Judgment normal.           ECG 12 Lead    Date/Time: 5/25/2022 12:28 PM  Performed by: Ananda Arita MD  Authorized by: Ananda Arita MD   Comparison: compared with previous ECG   Similar to previous ECG  Rhythm: sinus rhythm  Conduction: conduction normal  ST Segments: ST segments normal  T Waves: T waves normal  QRS axis: normal  Other: no other findings    Clinical impression: normal ECG            Assessment:       Diagnosis Plan   1. Malignant neoplasm of left breast in female, estrogen receptor positive, unspecified site of breast (HCC)  Adult Transthoracic Echo Limited W/ Cont if Necessary Per Protocol    Adult Transthoracic Echo Limited W/ Cont if Necessary Per Protocol    Adult Transthoracic Echo Limited W/ Cont if Necessary Per Protocol   2. Chemotherapy follow-up examination  Adult Transthoracic Echo Limited W/ Cont if Necessary Per Protocol    Adult Transthoracic Echo Limited W/ Cont if Necessary Per Protocol    Adult Transthoracic Echo Limited W/ Cont if Necessary Per Protocol          Plan:       Mrs Eddie Romero is a previously healthy 36yo woman who will be undergoing treatment with doxorubicin for breast cancer, followed by long-term antiestrogen therapy.  She will also require left-sided radiation.  At this point, her 10-year risk of cardiovascular events is so low, she does not require any preventive therapies.  I was able to get a limited echo today, and her ejection fraction is 60%.  Global longitudinal strain is  normal (-23%).    She will need an echo every 3 months for 1 year during treatment with the anthracycline, then yearly for 5 years.  We will reassess her ischemic heart disease risk on a yearly basis.    Sincerely,       Ananda Arita MD

## 2022-05-26 ENCOUNTER — LAB (OUTPATIENT)
Dept: LAB | Facility: HOSPITAL | Age: 37
End: 2022-05-26

## 2022-05-26 ENCOUNTER — OFFICE VISIT (OUTPATIENT)
Dept: ONCOLOGY | Facility: CLINIC | Age: 37
End: 2022-05-26

## 2022-05-26 VITALS
RESPIRATION RATE: 16 BRPM | SYSTOLIC BLOOD PRESSURE: 94 MMHG | DIASTOLIC BLOOD PRESSURE: 60 MMHG | TEMPERATURE: 97.6 F | HEART RATE: 94 BPM | BODY MASS INDEX: 21.04 KG/M2 | WEIGHT: 114.3 LBS | HEIGHT: 62 IN | OXYGEN SATURATION: 97 %

## 2022-05-26 DIAGNOSIS — D70.1 LEUKOPENIA DUE TO ANTINEOPLASTIC CHEMOTHERAPY: ICD-10-CM

## 2022-05-26 DIAGNOSIS — D64.89 ANEMIA DUE TO OTHER CAUSE, NOT CLASSIFIED: ICD-10-CM

## 2022-05-26 DIAGNOSIS — C50.412 MALIGNANT NEOPLASM OF UPPER-OUTER QUADRANT OF LEFT BREAST IN FEMALE, ESTROGEN RECEPTOR POSITIVE: Primary | ICD-10-CM

## 2022-05-26 DIAGNOSIS — T45.1X5A CHEMOTHERAPY INDUCED NAUSEA AND VOMITING: ICD-10-CM

## 2022-05-26 DIAGNOSIS — K21.00 PEPTIC REFLUX ESOPHAGITIS: ICD-10-CM

## 2022-05-26 DIAGNOSIS — Z17.0 MALIGNANT NEOPLASM OF UPPER-OUTER QUADRANT OF LEFT BREAST IN FEMALE, ESTROGEN RECEPTOR POSITIVE: Primary | ICD-10-CM

## 2022-05-26 DIAGNOSIS — Z17.0 MALIGNANT NEOPLASM OF UPPER-OUTER QUADRANT OF LEFT BREAST IN FEMALE, ESTROGEN RECEPTOR POSITIVE: ICD-10-CM

## 2022-05-26 DIAGNOSIS — T45.1X5A LEUKOPENIA DUE TO ANTINEOPLASTIC CHEMOTHERAPY: ICD-10-CM

## 2022-05-26 DIAGNOSIS — C50.412 MALIGNANT NEOPLASM OF UPPER-OUTER QUADRANT OF LEFT BREAST IN FEMALE, ESTROGEN RECEPTOR POSITIVE: ICD-10-CM

## 2022-05-26 DIAGNOSIS — R11.2 CHEMOTHERAPY INDUCED NAUSEA AND VOMITING: ICD-10-CM

## 2022-05-26 LAB
BASOPHILS # BLD AUTO: 0.03 10*3/MM3 (ref 0–0.2)
BASOPHILS NFR BLD AUTO: 2.1 % (ref 0–1.5)
DEPRECATED RDW RBC AUTO: 37.7 FL (ref 37–54)
EOSINOPHIL # BLD AUTO: 0.32 10*3/MM3 (ref 0–0.4)
EOSINOPHIL NFR BLD AUTO: 22.4 % (ref 0.3–6.2)
ERYTHROCYTE [DISTWIDTH] IN BLOOD BY AUTOMATED COUNT: 11.7 % (ref 12.3–15.4)
HCT VFR BLD AUTO: 34.4 % (ref 34–46.6)
HGB BLD-MCNC: 11.5 G/DL (ref 12–15.9)
IMM GRANULOCYTES # BLD AUTO: 0.15 10*3/MM3 (ref 0–0.05)
IMM GRANULOCYTES NFR BLD AUTO: 10.5 % (ref 0–0.5)
LYMPHOCYTES # BLD AUTO: 0.56 10*3/MM3 (ref 0.7–3.1)
LYMPHOCYTES NFR BLD AUTO: 39.2 % (ref 19.6–45.3)
MCH RBC QN AUTO: 29.6 PG (ref 26.6–33)
MCHC RBC AUTO-ENTMCNC: 33.4 G/DL (ref 31.5–35.7)
MCV RBC AUTO: 88.7 FL (ref 79–97)
MONOCYTES # BLD AUTO: 0.06 10*3/MM3 (ref 0.1–0.9)
MONOCYTES NFR BLD AUTO: 4.2 % (ref 5–12)
NEUTROPHILS NFR BLD AUTO: 0.31 10*3/MM3 (ref 1.7–7)
NEUTROPHILS NFR BLD AUTO: 21.6 % (ref 42.7–76)
NRBC BLD AUTO-RTO: 0 /100 WBC (ref 0–0.2)
PLATELET # BLD AUTO: 111 10*3/MM3 (ref 140–450)
PMV BLD AUTO: 11.9 FL (ref 6–12)
RBC # BLD AUTO: 3.88 10*6/MM3 (ref 3.77–5.28)
WBC NRBC COR # BLD: 1.43 10*3/MM3 (ref 3.4–10.8)

## 2022-05-26 PROCEDURE — 99214 OFFICE O/P EST MOD 30 MIN: CPT | Performed by: INTERNAL MEDICINE

## 2022-05-26 PROCEDURE — 36415 COLL VENOUS BLD VENIPUNCTURE: CPT

## 2022-05-26 PROCEDURE — 85025 COMPLETE CBC W/AUTO DIFF WBC: CPT

## 2022-05-26 RX ORDER — OMEPRAZOLE 20 MG/1
20 CAPSULE, DELAYED RELEASE ORAL 2 TIMES DAILY
Qty: 60 CAPSULE | Refills: 3 | Status: SHIPPED | OUTPATIENT
Start: 2022-05-26 | End: 2023-04-05

## 2022-05-27 ENCOUNTER — APPOINTMENT (OUTPATIENT)
Dept: CT IMAGING | Facility: HOSPITAL | Age: 37
End: 2022-05-27

## 2022-05-27 ENCOUNTER — TELEPHONE (OUTPATIENT)
Dept: ONCOLOGY | Facility: CLINIC | Age: 37
End: 2022-05-27

## 2022-05-27 ENCOUNTER — HOSPITAL ENCOUNTER (INPATIENT)
Facility: HOSPITAL | Age: 37
LOS: 3 days | Discharge: HOME | End: 2022-05-30
Attending: EMERGENCY MEDICINE | Admitting: HOSPITALIST

## 2022-05-27 ENCOUNTER — APPOINTMENT (OUTPATIENT)
Dept: GENERAL RADIOLOGY | Facility: HOSPITAL | Age: 37
End: 2022-05-27

## 2022-05-27 ENCOUNTER — LAB (OUTPATIENT)
Dept: LAB | Facility: HOSPITAL | Age: 37
End: 2022-05-27

## 2022-05-27 ENCOUNTER — OFFICE VISIT (OUTPATIENT)
Dept: ONCOLOGY | Facility: CLINIC | Age: 37
End: 2022-05-27

## 2022-05-27 VITALS
BODY MASS INDEX: 21.03 KG/M2 | RESPIRATION RATE: 16 BRPM | DIASTOLIC BLOOD PRESSURE: 59 MMHG | HEART RATE: 92 BPM | TEMPERATURE: 99.8 F | HEIGHT: 62 IN | OXYGEN SATURATION: 100 % | SYSTOLIC BLOOD PRESSURE: 86 MMHG

## 2022-05-27 DIAGNOSIS — C50.412 MALIGNANT NEOPLASM OF UPPER-OUTER QUADRANT OF LEFT BREAST IN FEMALE, ESTROGEN RECEPTOR POSITIVE: ICD-10-CM

## 2022-05-27 DIAGNOSIS — Z17.0 MALIGNANT NEOPLASM OF UPPER-OUTER QUADRANT OF LEFT BREAST IN FEMALE, ESTROGEN RECEPTOR POSITIVE: ICD-10-CM

## 2022-05-27 DIAGNOSIS — E87.1 HYPONATREMIA: ICD-10-CM

## 2022-05-27 DIAGNOSIS — C50.412 MALIGNANT NEOPLASM OF UPPER-OUTER QUADRANT OF LEFT BREAST IN FEMALE, ESTROGEN RECEPTOR POSITIVE: Primary | ICD-10-CM

## 2022-05-27 DIAGNOSIS — T45.1X5A CHEMOTHERAPY INDUCED NAUSEA AND VOMITING: ICD-10-CM

## 2022-05-27 DIAGNOSIS — T45.1X5A CHEMOTHERAPY INDUCED NEUTROPENIA: ICD-10-CM

## 2022-05-27 DIAGNOSIS — R11.2 CHEMOTHERAPY INDUCED NAUSEA AND VOMITING: ICD-10-CM

## 2022-05-27 DIAGNOSIS — D70.9 NEUTROPENIC FEVER: Primary | ICD-10-CM

## 2022-05-27 DIAGNOSIS — D70.9 FEBRILE NEUTROPENIA: Primary | ICD-10-CM

## 2022-05-27 DIAGNOSIS — R50.81 NEUTROPENIC FEVER: Primary | ICD-10-CM

## 2022-05-27 DIAGNOSIS — R50.81 FEBRILE NEUTROPENIA: Primary | ICD-10-CM

## 2022-05-27 DIAGNOSIS — D70.1 CHEMOTHERAPY INDUCED NEUTROPENIA: ICD-10-CM

## 2022-05-27 DIAGNOSIS — C50.912 MALIGNANT NEOPLASM OF LEFT FEMALE BREAST, UNSPECIFIED ESTROGEN RECEPTOR STATUS, UNSPECIFIED SITE OF BREAST: ICD-10-CM

## 2022-05-27 DIAGNOSIS — Z17.0 MALIGNANT NEOPLASM OF UPPER-OUTER QUADRANT OF LEFT BREAST IN FEMALE, ESTROGEN RECEPTOR POSITIVE: Primary | ICD-10-CM

## 2022-05-27 DIAGNOSIS — A41.9 SEPSIS, DUE TO UNSPECIFIED ORGANISM, UNSPECIFIED WHETHER ACUTE ORGAN DYSFUNCTION PRESENT: ICD-10-CM

## 2022-05-27 PROBLEM — C50.919 BREAST CANCER: Status: ACTIVE | Noted: 2022-03-01

## 2022-05-27 LAB
ALBUMIN SERPL-MCNC: 4.3 G/DL (ref 3.5–5.2)
ALBUMIN SERPL-MCNC: 4.4 G/DL (ref 3.5–5.2)
ALBUMIN/GLOB SERPL: 1.4 G/DL
ALBUMIN/GLOB SERPL: 1.6 G/DL (ref 1.1–2.4)
ALP SERPL-CCNC: 86 U/L (ref 39–117)
ALP SERPL-CCNC: 89 U/L (ref 38–116)
ALT SERPL W P-5'-P-CCNC: 8 U/L (ref 1–33)
ALT SERPL W P-5'-P-CCNC: 9 U/L (ref 0–33)
ANION GAP SERPL CALCULATED.3IONS-SCNC: 10.2 MMOL/L (ref 5–15)
ANION GAP SERPL CALCULATED.3IONS-SCNC: 8.1 MMOL/L (ref 5–15)
AST SERPL-CCNC: 15 U/L (ref 0–32)
AST SERPL-CCNC: 15 U/L (ref 1–32)
BASOPHILS # BLD AUTO: 0.01 10*3/MM3 (ref 0–0.2)
BASOPHILS # BLD MANUAL: 0.01 10*3/MM3 (ref 0–0.2)
BASOPHILS NFR BLD AUTO: 1.9 % (ref 0–1.5)
BASOPHILS NFR BLD MANUAL: 1 % (ref 0–1.5)
BILIRUB SERPL-MCNC: 0.5 MG/DL (ref 0.2–1.2)
BILIRUB SERPL-MCNC: 0.5 MG/DL (ref 0–1.2)
BILIRUB UR QL STRIP: NEGATIVE
BUN SERPL-MCNC: 10 MG/DL (ref 6–20)
BUN SERPL-MCNC: 10 MG/DL (ref 6–20)
BUN/CREAT SERPL: 17.9 (ref 7.3–30)
BUN/CREAT SERPL: 17.9 (ref 7–25)
CALCIUM SPEC-SCNC: 9.3 MG/DL (ref 8.5–10.2)
CALCIUM SPEC-SCNC: 9.4 MG/DL (ref 8.6–10.5)
CHLORIDE SERPL-SCNC: 100 MMOL/L (ref 98–107)
CHLORIDE SERPL-SCNC: 101 MMOL/L (ref 98–107)
CLARITY UR: CLEAR
CO2 SERPL-SCNC: 21.9 MMOL/L (ref 22–29)
CO2 SERPL-SCNC: 23.8 MMOL/L (ref 22–29)
COLOR UR: YELLOW
CREAT SERPL-MCNC: 0.56 MG/DL (ref 0.57–1)
CREAT SERPL-MCNC: 0.56 MG/DL (ref 0.6–1.1)
D-LACTATE SERPL-SCNC: 1.8 MMOL/L (ref 0.5–2)
DEPRECATED RDW RBC AUTO: 37.2 FL (ref 37–54)
DEPRECATED RDW RBC AUTO: 38.2 FL (ref 37–54)
EGFRCR SERPLBLD CKD-EPI 2021: 120 ML/MIN/1.73
EGFRCR SERPLBLD CKD-EPI 2021: 120 ML/MIN/1.73
EOSINOPHIL # BLD AUTO: 0.08 10*3/MM3 (ref 0–0.4)
EOSINOPHIL # BLD MANUAL: 0.11 10*3/MM3 (ref 0–0.4)
EOSINOPHIL NFR BLD AUTO: 15.4 % (ref 0.3–6.2)
EOSINOPHIL NFR BLD MANUAL: 20 % (ref 0.3–6.2)
ERYTHROCYTE [DISTWIDTH] IN BLOOD BY AUTOMATED COUNT: 11.6 % (ref 12.3–15.4)
ERYTHROCYTE [DISTWIDTH] IN BLOOD BY AUTOMATED COUNT: 11.9 % (ref 12.3–15.4)
GLOBULIN UR ELPH-MCNC: 2.7 GM/DL (ref 1.8–3.5)
GLOBULIN UR ELPH-MCNC: 3 GM/DL
GLUCOSE SERPL-MCNC: 102 MG/DL (ref 74–124)
GLUCOSE SERPL-MCNC: 105 MG/DL (ref 65–99)
GLUCOSE UR STRIP-MCNC: NEGATIVE MG/DL
HCT VFR BLD AUTO: 31.3 % (ref 34–46.6)
HCT VFR BLD AUTO: 32.9 % (ref 34–46.6)
HGB BLD-MCNC: 10.7 G/DL (ref 12–15.9)
HGB BLD-MCNC: 11 G/DL (ref 12–15.9)
HGB UR QL STRIP.AUTO: NEGATIVE
IMM GRANULOCYTES # BLD AUTO: 0 10*3/MM3 (ref 0–0.05)
IMM GRANULOCYTES NFR BLD AUTO: 0 % (ref 0–0.5)
KETONES UR QL STRIP: ABNORMAL
LEUKOCYTE ESTERASE UR QL STRIP.AUTO: NEGATIVE
LIPASE SERPL-CCNC: 16 U/L (ref 13–60)
LYMPHOCYTES # BLD AUTO: 0.3 10*3/MM3 (ref 0.7–3.1)
LYMPHOCYTES # BLD MANUAL: 0.32 10*3/MM3 (ref 0.7–3.1)
LYMPHOCYTES NFR BLD AUTO: 57.7 % (ref 19.6–45.3)
LYMPHOCYTES NFR BLD MANUAL: 7 % (ref 5–12)
MAGNESIUM SERPL-MCNC: 2.1 MG/DL (ref 1.6–2.6)
MCH RBC QN AUTO: 29.5 PG (ref 26.6–33)
MCH RBC QN AUTO: 30.1 PG (ref 26.6–33)
MCHC RBC AUTO-ENTMCNC: 33.4 G/DL (ref 31.5–35.7)
MCHC RBC AUTO-ENTMCNC: 34.2 G/DL (ref 31.5–35.7)
MCV RBC AUTO: 88.2 FL (ref 79–97)
MCV RBC AUTO: 88.2 FL (ref 79–97)
MONOCYTES # BLD AUTO: 0.07 10*3/MM3 (ref 0.1–0.9)
MONOCYTES # BLD: 0.04 10*3/MM3 (ref 0.1–0.9)
MONOCYTES NFR BLD AUTO: 13.5 % (ref 5–12)
NEUTROPHILS # BLD AUTO: 0.06 10*3/MM3 (ref 1.7–7)
NEUTROPHILS NFR BLD AUTO: 0.06 10*3/MM3 (ref 1.7–7)
NEUTROPHILS NFR BLD AUTO: 11.5 % (ref 42.7–76)
NEUTROPHILS NFR BLD MANUAL: 12 % (ref 42.7–76)
NITRITE UR QL STRIP: NEGATIVE
NRBC BLD AUTO-RTO: 0 /100 WBC (ref 0–0.2)
OSMOLALITY UR: 165 MOSM/KG
PH UR STRIP.AUTO: 7 [PH] (ref 5–8)
PLAT MORPH BLD: NORMAL
PLATELET # BLD AUTO: 103 10*3/MM3 (ref 140–450)
PLATELET # BLD AUTO: 99 10*3/MM3 (ref 140–450)
PMV BLD AUTO: 10.8 FL (ref 6–12)
PMV BLD AUTO: 11.2 FL (ref 6–12)
POTASSIUM SERPL-SCNC: 4 MMOL/L (ref 3.5–5.2)
POTASSIUM SERPL-SCNC: 4.1 MMOL/L (ref 3.5–4.7)
PROT SERPL-MCNC: 7.1 G/DL (ref 6.3–8)
PROT SERPL-MCNC: 7.3 G/DL (ref 6–8.5)
PROT UR QL STRIP: NEGATIVE
QT INTERVAL: 344 MS
RBC # BLD AUTO: 3.55 10*6/MM3 (ref 3.77–5.28)
RBC # BLD AUTO: 3.73 10*6/MM3 (ref 3.77–5.28)
RBC MORPH BLD: NORMAL
SARS-COV-2 RNA RESP QL NAA+PROBE: NOT DETECTED
SODIUM SERPL-SCNC: 130 MMOL/L (ref 136–145)
SODIUM SERPL-SCNC: 135 MMOL/L (ref 134–145)
SODIUM UR-SCNC: 42 MMOL/L
SP GR UR STRIP: 1.02 (ref 1–1.03)
TROPONIN T SERPL-MCNC: <0.01 NG/ML (ref 0–0.03)
UROBILINOGEN UR QL STRIP: ABNORMAL
VARIANT LYMPHS NFR BLD MANUAL: 60 % (ref 19.6–45.3)
WBC MORPH BLD: NORMAL
WBC NRBC COR # BLD: 0.52 10*3/MM3 (ref 3.4–10.8)
WBC NRBC COR # BLD: 0.53 10*3/MM3 (ref 3.4–10.8)
WHOLE BLOOD HOLD COAG: NORMAL
WHOLE BLOOD HOLD SPECIMEN: NORMAL

## 2022-05-27 PROCEDURE — 84300 ASSAY OF URINE SODIUM: CPT | Performed by: HOSPITALIST

## 2022-05-27 PROCEDURE — 83605 ASSAY OF LACTIC ACID: CPT | Performed by: EMERGENCY MEDICINE

## 2022-05-27 PROCEDURE — U0003 INFECTIOUS AGENT DETECTION BY NUCLEIC ACID (DNA OR RNA); SEVERE ACUTE RESPIRATORY SYNDROME CORONAVIRUS 2 (SARS-COV-2) (CORONAVIRUS DISEASE [COVID-19]), AMPLIFIED PROBE TECHNIQUE, MAKING USE OF HIGH THROUGHPUT TECHNOLOGIES AS DESCRIBED BY CMS-2020-01-R: HCPCS | Performed by: EMERGENCY MEDICINE

## 2022-05-27 PROCEDURE — 93005 ELECTROCARDIOGRAM TRACING: CPT

## 2022-05-27 PROCEDURE — 99215 OFFICE O/P EST HI 40 MIN: CPT | Performed by: NURSE PRACTITIONER

## 2022-05-27 PROCEDURE — 85025 COMPLETE CBC W/AUTO DIFF WBC: CPT

## 2022-05-27 PROCEDURE — 81003 URINALYSIS AUTO W/O SCOPE: CPT | Performed by: EMERGENCY MEDICINE

## 2022-05-27 PROCEDURE — 83690 ASSAY OF LIPASE: CPT | Performed by: EMERGENCY MEDICINE

## 2022-05-27 PROCEDURE — 93010 ELECTROCARDIOGRAM REPORT: CPT | Performed by: INTERNAL MEDICINE

## 2022-05-27 PROCEDURE — 83735 ASSAY OF MAGNESIUM: CPT | Performed by: EMERGENCY MEDICINE

## 2022-05-27 PROCEDURE — 36415 COLL VENOUS BLD VENIPUNCTURE: CPT

## 2022-05-27 PROCEDURE — 25010000002 IOPAMIDOL 61 % SOLUTION: Performed by: EMERGENCY MEDICINE

## 2022-05-27 PROCEDURE — 84484 ASSAY OF TROPONIN QUANT: CPT

## 2022-05-27 PROCEDURE — 71045 X-RAY EXAM CHEST 1 VIEW: CPT

## 2022-05-27 PROCEDURE — 80053 COMPREHEN METABOLIC PANEL: CPT

## 2022-05-27 PROCEDURE — 87040 BLOOD CULTURE FOR BACTERIA: CPT | Performed by: EMERGENCY MEDICINE

## 2022-05-27 PROCEDURE — 25010000002 ONDANSETRON PER 1 MG: Performed by: EMERGENCY MEDICINE

## 2022-05-27 PROCEDURE — 83935 ASSAY OF URINE OSMOLALITY: CPT | Performed by: HOSPITALIST

## 2022-05-27 PROCEDURE — 99285 EMERGENCY DEPT VISIT HI MDM: CPT

## 2022-05-27 PROCEDURE — 85007 BL SMEAR W/DIFF WBC COUNT: CPT

## 2022-05-27 PROCEDURE — 25010000002 CEFEPIME PER 500 MG: Performed by: EMERGENCY MEDICINE

## 2022-05-27 PROCEDURE — 70491 CT SOFT TISSUE NECK W/DYE: CPT

## 2022-05-27 RX ORDER — ACETAMINOPHEN 500 MG
1000 TABLET ORAL ONCE
Status: COMPLETED | OUTPATIENT
Start: 2022-05-27 | End: 2022-05-27

## 2022-05-27 RX ORDER — ACETAMINOPHEN 325 MG/1
650 TABLET ORAL EVERY 4 HOURS PRN
Status: DISCONTINUED | OUTPATIENT
Start: 2022-05-27 | End: 2022-05-30 | Stop reason: HOSPADM

## 2022-05-27 RX ORDER — SODIUM CHLORIDE 0.9 % (FLUSH) 0.9 %
10 SYRINGE (ML) INJECTION AS NEEDED
Status: DISCONTINUED | OUTPATIENT
Start: 2022-05-27 | End: 2022-05-30 | Stop reason: HOSPADM

## 2022-05-27 RX ORDER — ONDANSETRON 2 MG/ML
4 INJECTION INTRAMUSCULAR; INTRAVENOUS ONCE
Status: COMPLETED | OUTPATIENT
Start: 2022-05-27 | End: 2022-05-27

## 2022-05-27 RX ADMIN — SODIUM CHLORIDE, POTASSIUM CHLORIDE, SODIUM LACTATE AND CALCIUM CHLORIDE 500 ML: 600; 310; 30; 20 INJECTION, SOLUTION INTRAVENOUS at 20:47

## 2022-05-27 RX ADMIN — SODIUM CHLORIDE, POTASSIUM CHLORIDE, SODIUM LACTATE AND CALCIUM CHLORIDE 1000 ML: 600; 310; 30; 20 INJECTION, SOLUTION INTRAVENOUS at 14:30

## 2022-05-27 RX ADMIN — CEFEPIME HYDROCHLORIDE 2 G: 2 INJECTION, POWDER, FOR SOLUTION INTRAVENOUS at 15:25

## 2022-05-27 RX ADMIN — ACETAMINOPHEN 1000 MG: 500 TABLET ORAL at 15:24

## 2022-05-27 RX ADMIN — ACETAMINOPHEN 650 MG: 325 TABLET ORAL at 22:52

## 2022-05-27 RX ADMIN — SODIUM CHLORIDE, POTASSIUM CHLORIDE, SODIUM LACTATE AND CALCIUM CHLORIDE 1000 ML: 600; 310; 30; 20 INJECTION, SOLUTION INTRAVENOUS at 16:41

## 2022-05-27 RX ADMIN — ONDANSETRON 4 MG: 2 INJECTION INTRAMUSCULAR; INTRAVENOUS at 14:31

## 2022-05-27 RX ADMIN — IOPAMIDOL 75 ML: 612 INJECTION, SOLUTION INTRAVENOUS at 17:44

## 2022-05-27 RX ADMIN — CEFEPIME HYDROCHLORIDE 2 G: 2 INJECTION, POWDER, FOR SOLUTION INTRAVENOUS at 15:47

## 2022-05-27 NOTE — TELEPHONE ENCOUNTER
----- Message from Chelsea Abbott RN sent at 5/27/2022 12:01 PM EDT -----    ----- Message -----  From: Emily Mcgowan APRN  Sent: 5/27/2022  11:50 AM EDT  To: Chelsea Abbott RN    Tracy can see her this afternoon, id do a CBC CMP. 2 units.    Thanks    Emily    ----- Message -----  From: Chelsea Abbott RN  Sent: 5/27/2022  11:43 AM EDT  To: [facility] Hessel Barak Pool    Could anyone see this patient today? She had AC on 5/20 and she is not feeling well experiencing chills, sore throat, and right ear pain. I had her take a home test which came back negative. I can place labs if you tell me what you'd like. Thanks! Kaykay

## 2022-05-27 NOTE — PROGRESS NOTES
Subjective     REASON FOR FOLLOW UP:  LOBULAR CARCINOMA OF THE LEFT BREAST, 70 MM IN SIZE, MARGINS OF RESECTION NEGATIVE AFTER MASTECTOMY, GRADE 2, ER POSITIVE MI POSITIVE, HER 2 NEGATIVE BY IHC,KI 67 39% ,ONE POSITIVE AXILLARY LYMPH NODE OUT OF 4 NODES REMOVAL, PREMENOPAUSAL .INVITAE PANEL NEGATIVE.ONCOTYPE 18    HISTORY OF PRESENT ILLNESS:    The patient returns today for triage visit.  She received cycle 1 AC on 5/20/2022.  She was seen yesterday in office by Dr. Palacios and adjustment were made to future treatments due to significant toxicities including 20% dose reduction in before meals and changing treatment to every 3 weeks.  She called today to report new onset sore throat, right ear pain, and chills.  She had taken tylenol without relief.      She is seen in the exam room today seated in a wheelchair because she is too weak to stand.  She is so fatigued that she is unable to hold her head up and can hardly keep her eyes open.  She is seen with her significant other who reports she woke up feeling like this.  She reports feeling chilled, but no fever.  Temperature 99.8 in clinic today.  She has been feeling chilled with ear pain and sore throat.    After brief discussion with the patient and her significant other, the patient was wheeled straight over to the ED for most likely admission due to concern for sepsis.    Past Medical History:   Diagnosis Date   • Anemia    • Anesthesia complication     hypotension with last C section   • Breast cancer (HCC) 03/2022    left sided, stage 3, ER/MI+, Her2-, s/p mastectomy with plans for AC/Taxol and radiation   • History of COVID-19     1/2021 AND 2/8/2022   • Hyperlipidemia    • PONV (postoperative nausea and vomiting)    • Seasonal allergies         Past Surgical History:   Procedure Laterality Date   • APPENDECTOMY     • BREAST BIOPSY Left 2022   • BREAST RECONSTRUCTION Left 04/13/2022    Procedure: LEFT BREAST 1ST STAGE RECONSTRUCTION WITH INSERTION OF TISSUE  EXPANDER AND BIOLOGIC;  Surgeon: Hermelinda Johnson MD;  [facility] OR;  Service: Plastics;  Laterality: Left;   •  SECTION     •  SECTION N/A 12/10/2018    Procedure:  SECTION REPEAT;  Surgeon: Heidy Gutierrez MD;  Location: [facility] LABOR DELIVERY;  Service: Obstetrics/Gynecology   • D & C HYSTEROSCOPY N/A 2021    Procedure: HYSTEROSCOPY REMOVAL INTRAUTERINE DEVICE WILL NEED ULTRASOUND IN ROOM;  Surgeon: Heidy Gutierrez MD;  Location: [facility] OR OSC;  Service: Obstetrics/Gynecology;  Laterality: N/A;   • KNEE ARTHROSCOPY Right    • MASTECTOMY W/ SENTINEL NODE BIOPSY Left 2022    Procedure: left total mastectomy with left axillary sentinel lymph node biopsy;  Surgeon: Monisha Escudero MD;  Location: [facility];  Service: General;  Laterality: Left;   • VENOUS ACCESS DEVICE (PORT) INSERTION Right 2022    Procedure: right port placement;  Surgeon: Monisha Escudero MD;  Location: [facility];  Service: General;  Laterality: Right;        No current facility-administered medications on file prior to visit.     Current Outpatient Medications on File Prior to Visit   Medication Sig Dispense Refill   • cetirizine (zyrTEC) 10 MG tablet Take 10 mg by mouth As Needed for Allergies.     • lidocaine-prilocaine (EMLA) 2.5-2.5 % cream Apply 1 application topically to the appropriate area as directed As Needed for Mild Pain .     • OLANZapine (zyPREXA) 5 MG tablet Take 1 tablet by mouth Every Night. Take on days 2, 3 and 4 after chemotherapy. 3 tablet 3   • omeprazole (priLOSEC) 20 MG capsule Take 1 capsule by mouth 2 (Two) Times a Day. 60 capsule 3   • ondansetron (ZOFRAN) 8 MG tablet Take 1 tablet by mouth 3 (Three) Times a Day As Needed for Nausea or Vomiting. 30 tablet 5        ALLERGIES:    Allergies   Allergen Reactions   • Asa [Aspirin] Anaphylaxis and Shortness Of Breath        Social History     Socioeconomic History   • Marital status:   "  Tobacco Use   • Smoking status: Never Smoker   • Smokeless tobacco: Never Used   Vaping Use   • Vaping Use: Never used   Substance and Sexual Activity   • Alcohol use: Not Currently     Alcohol/week: 5.0 standard drinks     Types: 5 Shots of liquor per week   • Drug use: Never   • Sexual activity: Defer        Family History   Problem Relation Age of Onset   • Melanoma Paternal Aunt    • Diabetes Other    • Malig Hyperthermia Neg Hx           Objective     Vitals:    05/27/22 1345   BP: (!) 86/59   Pulse: 92   Resp: 16   Temp: 99.8 °F (37.7 °C)   TempSrc: Temporal   SpO2: 100%   Weight: Comment: Unable to stand   Height: 157 cm (61.81\")   PainSc: 0-No pain     Current Status 5/20/2022   ECOG score 0     Physical Exam  HENT:      Head: Normocephalic.      Nose: Nose normal.   Eyes:      Conjunctiva/sclera: Conjunctivae normal.      Pupils: Pupils are equal, round, and reactive to light.   Cardiovascular:      Rate and Rhythm: Normal rate.      Heart sounds: Normal heart sounds.   Pulmonary:      Effort: Pulmonary effort is normal.   Abdominal:      General: Abdomen is flat. Bowel sounds are normal.   Musculoskeletal:         General: Normal range of motion.      Cervical back: Normal range of motion.   Skin:     General: Skin is warm and dry.      Findings: No erythema.   Neurological:      Mental Status: She is alert.      Motor: Weakness present.      Comments: Lethargic         RECENT LABS:  Hematology WBC   Date Value Ref Range Status   05/27/2022 0.52 (C) 3.40 - 10.80 10*3/mm3 Final     RBC   Date Value Ref Range Status   05/27/2022 3.55 (L) 3.77 - 5.28 10*6/mm3 Final     Hemoglobin   Date Value Ref Range Status   05/27/2022 10.7 (L) 12.0 - 15.9 g/dL Final     Hematocrit   Date Value Ref Range Status   05/27/2022 31.3 (L) 34.0 - 46.6 % Final     Platelets   Date Value Ref Range Status   05/27/2022 103 (L) 140 - 450 10*3/mm3 Final       CBC:    WBC   Date Value Ref Range Status   05/27/2022 0.52 (C) 3.40 - " 10.80 10*3/mm3 Final     RBC   Date Value Ref Range Status   05/27/2022 3.55 (L) 3.77 - 5.28 10*6/mm3 Final     Hemoglobin   Date Value Ref Range Status   05/27/2022 10.7 (L) 12.0 - 15.9 g/dL Final     Hematocrit   Date Value Ref Range Status   05/27/2022 31.3 (L) 34.0 - 46.6 % Final     MCV   Date Value Ref Range Status   05/27/2022 88.2 79.0 - 97.0 fL Final     MCH   Date Value Ref Range Status   05/27/2022 30.1 26.6 - 33.0 pg Final     MCHC   Date Value Ref Range Status   05/27/2022 34.2 31.5 - 35.7 g/dL Final     RDW   Date Value Ref Range Status   05/27/2022 11.6 (L) 12.3 - 15.4 % Final     RDW-SD   Date Value Ref Range Status   05/27/2022 37.2 37.0 - 54.0 fl Final     MPV   Date Value Ref Range Status   05/27/2022 10.8 6.0 - 12.0 fL Final     Platelets   Date Value Ref Range Status   05/27/2022 103 (L) 140 - 450 10*3/mm3 Final     Neutrophil %   Date Value Ref Range Status   05/27/2022 11.5 (L) 42.7 - 76.0 % Final     Lymphocyte %   Date Value Ref Range Status   05/27/2022 57.7 (H) 19.6 - 45.3 % Final     Monocyte %   Date Value Ref Range Status   05/27/2022 13.5 (H) 5.0 - 12.0 % Final     Eosinophil %   Date Value Ref Range Status   05/27/2022 15.4 (H) 0.3 - 6.2 % Final     Basophil %   Date Value Ref Range Status   05/27/2022 1.9 (H) 0.0 - 1.5 % Final     Immature Grans %   Date Value Ref Range Status   05/27/2022 0.0 0.0 - 0.5 % Final     Neutrophils, Absolute   Date Value Ref Range Status   05/27/2022 0.06 (L) 1.70 - 7.00 10*3/mm3 Final     Lymphocytes, Absolute   Date Value Ref Range Status   05/27/2022 0.30 (L) 0.70 - 3.10 10*3/mm3 Final     Monocytes, Absolute   Date Value Ref Range Status   05/27/2022 0.07 (L) 0.10 - 0.90 10*3/mm3 Final     Eosinophils, Absolute   Date Value Ref Range Status   05/27/2022 0.08 0.00 - 0.40 10*3/mm3 Final     Basophils, Absolute   Date Value Ref Range Status   05/27/2022 0.01 0.00 - 0.20 10*3/mm3 Final     Immature Grans, Absolute   Date Value Ref Range Status    05/27/2022 0.00 0.00 - 0.05 10*3/mm3 Final     nRBC   Date Value Ref Range Status   05/27/2022 0.0 0.0 - 0.2 /100 WBC Final        CMP:    Glucose   Date Value Ref Range Status   05/27/2022 102 74 - 124 mg/dL Final     BUN   Date Value Ref Range Status   05/27/2022 10 6 - 20 mg/dL Final     Creatinine   Date Value Ref Range Status   05/27/2022 0.56 (L) 0.60 - 1.10 mg/dL Final     Sodium   Date Value Ref Range Status   05/27/2022 135 134 - 145 mmol/L Final     Potassium   Date Value Ref Range Status   05/27/2022 4.1 3.5 - 4.7 mmol/L Final     Chloride   Date Value Ref Range Status   05/27/2022 101 98 - 107 mmol/L Final     CO2   Date Value Ref Range Status   05/27/2022 23.8 22.0 - 29.0 mmol/L Final     Calcium   Date Value Ref Range Status   05/27/2022 9.3 8.5 - 10.2 mg/dL Final     Total Protein   Date Value Ref Range Status   05/27/2022 7.1 6.3 - 8.0 g/dL Final     Albumin   Date Value Ref Range Status   05/27/2022 4.40 3.50 - 5.20 g/dL Final     ALT (SGPT)   Date Value Ref Range Status   05/27/2022 9 0 - 33 U/L Final     AST (SGOT)   Date Value Ref Range Status   05/27/2022 15 0 - 32 U/L Final     Alkaline Phosphatase   Date Value Ref Range Status   05/27/2022 89 38 - 116 U/L Final     Total Bilirubin   Date Value Ref Range Status   05/27/2022 0.5 0.2 - 1.2 mg/dL Final     Globulin   Date Value Ref Range Status   05/27/2022 2.7 1.8 - 3.5 gm/dL Final     A/G Ratio   Date Value Ref Range Status   05/27/2022 1.6 1.1 - 2.4 g/dL Final     BUN/Creatinine Ratio   Date Value Ref Range Status   05/27/2022 17.9 7.3 - 30.0 Final     Anion Gap   Date Value Ref Range Status   05/27/2022 10.2 5.0 - 15.0 mmol/L Final           Assessment & Plan     There are no diagnoses linked to this encounter.   *Left Breast Cancer  · 37-year-old  female originally from [place] noticed abnormalities in the left breast in 12/2021, sensation of pressure, minor pain, sensation of fullness.   · Further mammogram and ultrasound documented  abnormalities in the left breast that were consistent with breast cancer.   · Since then the patient has undergone breast MRI. Breast biopsies documented invasive lobular carcinoma.   · All this culminated with a left-sided mastectomy and sentinel lymph node sampling.   · She had a lobular carcinoma, grade 2, margins of resection negative, Ki-67 at 39%, ER positive, ME positive, HER2/leonie negative. She had 1 positive sentinel lymph node for malignancy with no extracapsular invasion. There was no evidence of lymphovascular invasion.   · Invitae panel was negative for any genetic alteration.   · The patient has had an expander placed at the mastectomy site. She has discomfort and pain after this was instillated with saline solution yesterday.   · Recommended adjuvant AC x 4 followed by Taxol x 12.  · Oncotype DX in this patient shows intermediate risk of recurrence.   · Also not only the chemotherapy will be necessary but we need to make her postmenopausal in the next several weeks or months. This will bring in a lot of benefit for her in the long run to minimize any recurrent disease. She is extremely young. She has 2 young children and we have to do as much as we can in this patient to minimize any potentiality for recurrent disease.   · Dr. Palacios discussed with her that the whole treatment is going to take at least 24-26 weeks, in other words 6 months, and thereafter the patient will be hopefully postmenopausal. If she does not become postmenopausal she could qualify for a clinical trial or we could ask Dr. Heidy Gutierrez to do bilateral oophorectomy in this patient.   · Eventually the patient will require endocrine adjuvant therapy after she becomes postmenopausal hopefully with letrozole.   · Finally, the patient will need cardiac assessment.  She has been referred to Cardiology and scheduled her to have an echocardiogram.  · Dr. Palacios also discussed that the patient will require followup in the future in  regard to her right breast not only with mammogram but also ultrasound due to the fact that sometimes lobular carcinoma likes to be a bilateral disease. There are minimal abnormalities in the mammogram in the right breast as well as in the MRI. This will require to be followed up in the future. Did not recommend a 2nd mastectomy prophylactically on her even though she asked the question.   The patient was reviewed on 05/20/2022. She has had her port placed in right subclavian position a week ago. She is healing perfectly fine and her surgical site on the left mastectomy and the expander looks perfectly normal with healing. No infection, erythema or discharge.   1 week following C1D1 AC WBC 0.52, ANC 0.06.  Temperature 102.9.  Patient admitted through ED for neutropenic fever and concern for sepsis as detailed below.    *Left arm pain, neuropathic?  · She reports experiencing discomfort in the inner aspect of the left arm. Sounds neuropathic related to her recent sentinel lymph node sampling. The patient is taking Aleve or ibuprofen with not too much benefit.   · Recommended Voltaren topically 4 times a day. It will be perfectly fine for her to use ibuprofen and start Neurontin 100 mg to take at bedtime. Hopefully this will be a transitory issue.     *Social Factors  · The patient's mother is living with her, coming from [place] after all these issues.   · Dr. Palacios asked her to ask her mother to stay for the period of time that she receives chemotherapy. If we need to do any services for her in regard to visa requirements or status, we will be glad to incorporate our  into this process.     *Cardiac Function  · 5/25/2022, left ventricular ejection fraction 60%, GLS within normal limits.    *Weakness, chills, lethargy- concern for sepsis  · 5/27/2022, patient seen for triage visit.  Her  called this morning to report she was very fatigued and experiencing chills and sore throat.  Temperature at  that time was 99.8.  Upon arrival to clinic, temperature 99.8 °F.  She was in a wheelchair due to severe weakness.  She is so weak/fatigued she is unable to hold her head up and can barely keep her eyes open.  WBC 0.52, ANC 0.06.  The patient was taken directly to the ED for admission due to serious concern for sepsis.  · Temperature in the .9.    PLAN:   · Patient taken to ED for admission, possible sepsis.  · Patient is currently receiving AC with a 20% dose reduction every 3 weeks due to toxicities.  She will receive 4 total doses.   Next due 6/9/2022.  · Continue Neulasta support with chemotherapy.  She can utilize Tylenol for arthralgias secondary to Neulasta.  · Continue Zofran as needed for nausea.    · Continue Zyprexa 2.5 mg.  · Continue omeprazole 20 mg twice daily.  · After completion of AC, patient will initiate weekly taxol x 12.  · Upon completion of chemotherapy the patient will initiate radiation therapy and has already been seen by Radiation Oncology.  · The patient will participate in a clinical trial that we have in the office and she already has been consented for this in regard to the utilization of antiestrogen medicine upon completion of chemotherapy administration.     Patient is on high risk medication that requires close monitoring for toxicity.    I spent 50 minutes caring for KYLAH on this date of service. This time includes time spent by me in the following activities: preparing for the visit, reviewing tests, obtaining and/or reviewing a separately obtained history, performing a medically appropriate examination and/or evaluation, counseling and educating the patient/family/caregiver, documenting information in the medical record and care coordination.     Tracy Mark, APRN  05/27/22

## 2022-05-27 NOTE — TELEPHONE ENCOUNTER
Returned call to patient who is reporting that she is not feeling well.  Symptoms started last night.  She has a sore throat, right ear pain, chills, temp is 98 currently.  She has no runny nose of cough.  She took some Tylenol, but has not helped.  Please advise.

## 2022-05-28 PROBLEM — T45.1X5A ANEMIA ASSOCIATED WITH CHEMOTHERAPY: Status: ACTIVE | Noted: 2022-05-28

## 2022-05-28 PROBLEM — T45.1X5A PANCYTOPENIA DUE TO ANTINEOPLASTIC CHEMOTHERAPY: Status: ACTIVE | Noted: 2022-05-28

## 2022-05-28 PROBLEM — D64.81 ANEMIA ASSOCIATED WITH CHEMOTHERAPY: Status: ACTIVE | Noted: 2022-05-28

## 2022-05-28 PROBLEM — D61.810 PANCYTOPENIA DUE TO ANTINEOPLASTIC CHEMOTHERAPY: Status: ACTIVE | Noted: 2022-05-28

## 2022-05-28 PROBLEM — T45.1X5A CHEMOTHERAPY-INDUCED THROMBOCYTOPENIA: Status: ACTIVE | Noted: 2022-05-28

## 2022-05-28 PROBLEM — D69.59 CHEMOTHERAPY-INDUCED THROMBOCYTOPENIA: Status: ACTIVE | Noted: 2022-05-28

## 2022-05-28 LAB
B PARAPERT DNA SPEC QL NAA+PROBE: NOT DETECTED
B PERT DNA SPEC QL NAA+PROBE: NOT DETECTED
BACTERIA UR QL AUTO: NORMAL /HPF
BILIRUB UR QL STRIP: NEGATIVE
C PNEUM DNA NPH QL NAA+NON-PROBE: NOT DETECTED
CLARITY UR: CLEAR
COLOR UR: YELLOW
FLUAV SUBTYP SPEC NAA+PROBE: NOT DETECTED
FLUBV RNA ISLT QL NAA+PROBE: NOT DETECTED
GLUCOSE UR STRIP-MCNC: NEGATIVE MG/DL
HADV DNA SPEC NAA+PROBE: NOT DETECTED
HCOV 229E RNA SPEC QL NAA+PROBE: NOT DETECTED
HCOV HKU1 RNA SPEC QL NAA+PROBE: NOT DETECTED
HCOV NL63 RNA SPEC QL NAA+PROBE: NOT DETECTED
HCOV OC43 RNA SPEC QL NAA+PROBE: NOT DETECTED
HGB UR QL STRIP.AUTO: ABNORMAL
HMPV RNA NPH QL NAA+NON-PROBE: NOT DETECTED
HPIV1 RNA ISLT QL NAA+PROBE: NOT DETECTED
HPIV2 RNA SPEC QL NAA+PROBE: NOT DETECTED
HPIV3 RNA NPH QL NAA+PROBE: NOT DETECTED
HPIV4 P GENE NPH QL NAA+PROBE: NOT DETECTED
HYALINE CASTS UR QL AUTO: NORMAL /LPF
KETONES UR QL STRIP: ABNORMAL
LEUKOCYTE ESTERASE UR QL STRIP.AUTO: NEGATIVE
M PNEUMO IGG SER IA-ACNC: NOT DETECTED
NITRITE UR QL STRIP: NEGATIVE
PH UR STRIP.AUTO: 8 [PH] (ref 5–8)
PROCALCITONIN SERPL-MCNC: 0.14 NG/ML (ref 0–0.25)
PROT UR QL STRIP: NEGATIVE
RBC # UR STRIP: NORMAL /HPF
REF LAB TEST METHOD: NORMAL
RHINOVIRUS RNA SPEC NAA+PROBE: DETECTED
RSV RNA NPH QL NAA+NON-PROBE: NOT DETECTED
SARS-COV-2 RNA NPH QL NAA+NON-PROBE: NOT DETECTED
SP GR UR STRIP: 1.03 (ref 1–1.03)
SQUAMOUS #/AREA URNS HPF: NORMAL /HPF
UROBILINOGEN UR QL STRIP: ABNORMAL
WBC # UR STRIP: NORMAL /HPF

## 2022-05-28 PROCEDURE — 99222 1ST HOSP IP/OBS MODERATE 55: CPT | Performed by: INTERNAL MEDICINE

## 2022-05-28 PROCEDURE — 25010000002 FILGRASTIM 300 MCG/0.5ML SOLUTION PREFILLED SYRINGE: Performed by: INTERNAL MEDICINE

## 2022-05-28 PROCEDURE — 25010000002 CEFEPIME PER 500 MG: Performed by: INTERNAL MEDICINE

## 2022-05-28 PROCEDURE — 84145 PROCALCITONIN (PCT): CPT | Performed by: INTERNAL MEDICINE

## 2022-05-28 PROCEDURE — 0202U NFCT DS 22 TRGT SARS-COV-2: CPT | Performed by: INTERNAL MEDICINE

## 2022-05-28 PROCEDURE — 81001 URINALYSIS AUTO W/SCOPE: CPT | Performed by: HOSPITALIST

## 2022-05-28 RX ORDER — ONDANSETRON 2 MG/ML
4 INJECTION INTRAMUSCULAR; INTRAVENOUS EVERY 6 HOURS PRN
Status: DISCONTINUED | OUTPATIENT
Start: 2022-05-28 | End: 2022-05-30 | Stop reason: HOSPADM

## 2022-05-28 RX ORDER — SODIUM CHLORIDE 0.9 % (FLUSH) 0.9 %
10 SYRINGE (ML) INJECTION AS NEEDED
Status: DISCONTINUED | OUTPATIENT
Start: 2022-05-28 | End: 2022-05-30 | Stop reason: HOSPADM

## 2022-05-28 RX ORDER — SODIUM CHLORIDE 9 MG/ML
75 INJECTION, SOLUTION INTRAVENOUS CONTINUOUS
Status: DISCONTINUED | OUTPATIENT
Start: 2022-05-28 | End: 2022-05-30 | Stop reason: HOSPADM

## 2022-05-28 RX ORDER — ACETAMINOPHEN 325 MG/1
650 TABLET ORAL EVERY 4 HOURS PRN
Status: DISCONTINUED | OUTPATIENT
Start: 2022-05-28 | End: 2022-05-30 | Stop reason: HOSPADM

## 2022-05-28 RX ORDER — PANTOPRAZOLE SODIUM 40 MG/1
40 TABLET, DELAYED RELEASE ORAL EVERY MORNING
Status: DISCONTINUED | OUTPATIENT
Start: 2022-05-28 | End: 2022-05-28

## 2022-05-28 RX ORDER — ACETAMINOPHEN 160 MG/5ML
650 SOLUTION ORAL EVERY 4 HOURS PRN
Status: DISCONTINUED | OUTPATIENT
Start: 2022-05-28 | End: 2022-05-30 | Stop reason: HOSPADM

## 2022-05-28 RX ORDER — SODIUM CHLORIDE 9 MG/ML
125 INJECTION, SOLUTION INTRAVENOUS CONTINUOUS
Status: DISCONTINUED | OUTPATIENT
Start: 2022-05-28 | End: 2022-05-28

## 2022-05-28 RX ORDER — SODIUM CHLORIDE 0.9 % (FLUSH) 0.9 %
10 SYRINGE (ML) INJECTION EVERY 12 HOURS SCHEDULED
Status: DISCONTINUED | OUTPATIENT
Start: 2022-05-28 | End: 2022-05-30 | Stop reason: HOSPADM

## 2022-05-28 RX ORDER — CETIRIZINE HYDROCHLORIDE 10 MG/1
10 TABLET ORAL DAILY
Status: DISCONTINUED | OUTPATIENT
Start: 2022-05-28 | End: 2022-05-30 | Stop reason: HOSPADM

## 2022-05-28 RX ORDER — OLANZAPINE 5 MG/1
5 TABLET ORAL NIGHTLY
Status: DISCONTINUED | OUTPATIENT
Start: 2022-05-28 | End: 2022-05-29

## 2022-05-28 RX ORDER — ACETAMINOPHEN 650 MG/1
650 SUPPOSITORY RECTAL EVERY 4 HOURS PRN
Status: DISCONTINUED | OUTPATIENT
Start: 2022-05-28 | End: 2022-05-30 | Stop reason: HOSPADM

## 2022-05-28 RX ORDER — ONDANSETRON 4 MG/1
4 TABLET, FILM COATED ORAL EVERY 6 HOURS PRN
Status: DISCONTINUED | OUTPATIENT
Start: 2022-05-28 | End: 2022-05-30 | Stop reason: HOSPADM

## 2022-05-28 RX ORDER — PANTOPRAZOLE SODIUM 40 MG/1
40 TABLET, DELAYED RELEASE ORAL
Status: DISCONTINUED | OUTPATIENT
Start: 2022-05-28 | End: 2022-05-30 | Stop reason: HOSPADM

## 2022-05-28 RX ORDER — HYDROCODONE BITARTRATE AND ACETAMINOPHEN 5; 325 MG/1; MG/1
1 TABLET ORAL EVERY 6 HOURS PRN
Status: DISCONTINUED | OUTPATIENT
Start: 2022-05-28 | End: 2022-05-30 | Stop reason: HOSPADM

## 2022-05-28 RX ADMIN — CEFEPIME HYDROCHLORIDE 2 G: 2 INJECTION, POWDER, FOR SOLUTION INTRAVENOUS at 13:45

## 2022-05-28 RX ADMIN — Medication 10 ML: at 21:26

## 2022-05-28 RX ADMIN — Medication 10 ML: at 10:25

## 2022-05-28 RX ADMIN — SODIUM CHLORIDE 75 ML/HR: 9 INJECTION, SOLUTION INTRAVENOUS at 08:00

## 2022-05-28 RX ADMIN — SODIUM CHLORIDE 125 ML/HR: 9 INJECTION, SOLUTION INTRAVENOUS at 03:40

## 2022-05-28 RX ADMIN — FILGRASTIM 300 MCG: 300 INJECTION, SOLUTION INTRAVENOUS; SUBCUTANEOUS at 18:47

## 2022-05-28 RX ADMIN — PANTOPRAZOLE SODIUM 40 MG: 40 TABLET, DELAYED RELEASE ORAL at 16:30

## 2022-05-28 RX ADMIN — ACETAMINOPHEN 650 MG: 325 TABLET ORAL at 07:56

## 2022-05-28 RX ADMIN — CETIRIZINE HYDROCHLORIDE 10 MG: 10 TABLET ORAL at 14:51

## 2022-05-28 RX ADMIN — ACETAMINOPHEN 650 MG: 325 TABLET ORAL at 14:51

## 2022-05-28 RX ADMIN — PANTOPRAZOLE SODIUM 40 MG: 40 TABLET, DELAYED RELEASE ORAL at 08:02

## 2022-05-28 RX ADMIN — CEFEPIME HYDROCHLORIDE 2 G: 2 INJECTION, POWDER, FOR SOLUTION INTRAVENOUS at 21:26

## 2022-05-28 RX ADMIN — SODIUM CHLORIDE 500 ML: 9 INJECTION, SOLUTION INTRAVENOUS at 02:22

## 2022-05-28 RX ADMIN — ACETAMINOPHEN 650 MG: 325 TABLET ORAL at 21:31

## 2022-05-29 LAB
ANION GAP SERPL CALCULATED.3IONS-SCNC: 9 MMOL/L (ref 5–15)
BUN SERPL-MCNC: 9 MG/DL (ref 6–20)
BUN/CREAT SERPL: 22 (ref 7–25)
CALCIUM SPEC-SCNC: 8.2 MG/DL (ref 8.6–10.5)
CHLORIDE SERPL-SCNC: 110 MMOL/L (ref 98–107)
CO2 SERPL-SCNC: 20 MMOL/L (ref 22–29)
CREAT SERPL-MCNC: 0.41 MG/DL (ref 0.57–1)
DEPRECATED RDW RBC AUTO: 38.1 FL (ref 37–54)
EGFRCR SERPLBLD CKD-EPI 2021: 129.3 ML/MIN/1.73
EOSINOPHIL # BLD MANUAL: 0.05 10*3/MM3 (ref 0–0.4)
EOSINOPHIL NFR BLD MANUAL: 2.1 % (ref 0.3–6.2)
ERYTHROCYTE [DISTWIDTH] IN BLOOD BY AUTOMATED COUNT: 11.6 % (ref 12.3–15.4)
GLUCOSE SERPL-MCNC: 90 MG/DL (ref 65–99)
HCT VFR BLD AUTO: 26.9 % (ref 34–46.6)
HGB BLD-MCNC: 9.1 G/DL (ref 12–15.9)
LYMPHOCYTES # BLD MANUAL: 0.84 10*3/MM3 (ref 0.7–3.1)
LYMPHOCYTES NFR BLD MANUAL: 8.2 % (ref 5–12)
MCH RBC QN AUTO: 30.1 PG (ref 26.6–33)
MCHC RBC AUTO-ENTMCNC: 33.8 G/DL (ref 31.5–35.7)
MCV RBC AUTO: 89.1 FL (ref 79–97)
MONOCYTES # BLD: 0.19 10*3/MM3 (ref 0.1–0.9)
NEUTROPHILS # BLD AUTO: 1.24 10*3/MM3 (ref 1.7–7)
NEUTROPHILS NFR BLD MANUAL: 53.6 % (ref 42.7–76)
PLAT MORPH BLD: NORMAL
PLATELET # BLD AUTO: 81 10*3/MM3 (ref 140–450)
PMV BLD AUTO: 11.5 FL (ref 6–12)
POTASSIUM SERPL-SCNC: 3.8 MMOL/L (ref 3.5–5.2)
RBC # BLD AUTO: 3.02 10*6/MM3 (ref 3.77–5.28)
RBC MORPH BLD: NORMAL
SODIUM SERPL-SCNC: 139 MMOL/L (ref 136–145)
VARIANT LYMPHS NFR BLD MANUAL: 1 % (ref 0–5)
VARIANT LYMPHS NFR BLD MANUAL: 35.1 % (ref 19.6–45.3)
WBC MORPH BLD: NORMAL
WBC NRBC COR # BLD: 2.32 10*3/MM3 (ref 3.4–10.8)

## 2022-05-29 PROCEDURE — 99232 SBSQ HOSP IP/OBS MODERATE 35: CPT | Performed by: INTERNAL MEDICINE

## 2022-05-29 PROCEDURE — 80048 BASIC METABOLIC PNL TOTAL CA: CPT | Performed by: HOSPITALIST

## 2022-05-29 PROCEDURE — 85007 BL SMEAR W/DIFF WBC COUNT: CPT | Performed by: HOSPITALIST

## 2022-05-29 PROCEDURE — 25010000002 FILGRASTIM 300 MCG/0.5ML SOLUTION PREFILLED SYRINGE: Performed by: INTERNAL MEDICINE

## 2022-05-29 PROCEDURE — 85025 COMPLETE CBC W/AUTO DIFF WBC: CPT | Performed by: HOSPITALIST

## 2022-05-29 PROCEDURE — 25010000002 CEFEPIME PER 500 MG: Performed by: INTERNAL MEDICINE

## 2022-05-29 RX ADMIN — SODIUM CHLORIDE 75 ML/HR: 9 INJECTION, SOLUTION INTRAVENOUS at 05:45

## 2022-05-29 RX ADMIN — ACETAMINOPHEN 650 MG: 325 TABLET ORAL at 07:50

## 2022-05-29 RX ADMIN — CETIRIZINE HYDROCHLORIDE 10 MG: 10 TABLET ORAL at 07:50

## 2022-05-29 RX ADMIN — ACETAMINOPHEN 650 MG: 325 TABLET ORAL at 23:04

## 2022-05-29 RX ADMIN — Medication 10 ML: at 20:09

## 2022-05-29 RX ADMIN — SODIUM CHLORIDE 75 ML/HR: 9 INJECTION, SOLUTION INTRAVENOUS at 23:04

## 2022-05-29 RX ADMIN — PANTOPRAZOLE SODIUM 40 MG: 40 TABLET, DELAYED RELEASE ORAL at 17:31

## 2022-05-29 RX ADMIN — Medication 10 ML: at 12:41

## 2022-05-29 RX ADMIN — ACETAMINOPHEN 650 MG: 325 TABLET ORAL at 15:51

## 2022-05-29 RX ADMIN — CEFEPIME HYDROCHLORIDE 2 G: 2 INJECTION, POWDER, FOR SOLUTION INTRAVENOUS at 05:45

## 2022-05-29 RX ADMIN — SODIUM CHLORIDE 75 ML/HR: 9 INJECTION, SOLUTION INTRAVENOUS at 12:41

## 2022-05-29 RX ADMIN — FILGRASTIM 300 MCG: 300 INJECTION, SOLUTION INTRAVENOUS; SUBCUTANEOUS at 17:33

## 2022-05-29 RX ADMIN — PANTOPRAZOLE SODIUM 40 MG: 40 TABLET, DELAYED RELEASE ORAL at 07:10

## 2022-05-30 ENCOUNTER — READMISSION MANAGEMENT (OUTPATIENT)
Dept: CALL CENTER | Facility: HOSPITAL | Age: 37
End: 2022-05-30

## 2022-05-30 VITALS
BODY MASS INDEX: 21.16 KG/M2 | WEIGHT: 115 LBS | OXYGEN SATURATION: 98 % | HEART RATE: 75 BPM | DIASTOLIC BLOOD PRESSURE: 56 MMHG | TEMPERATURE: 98 F | HEIGHT: 62 IN | RESPIRATION RATE: 16 BRPM | SYSTOLIC BLOOD PRESSURE: 90 MMHG

## 2022-05-30 LAB
ANION GAP SERPL CALCULATED.3IONS-SCNC: 9 MMOL/L (ref 5–15)
BUN SERPL-MCNC: 8 MG/DL (ref 6–20)
BUN/CREAT SERPL: 15.1 (ref 7–25)
CALCIUM SPEC-SCNC: 8.4 MG/DL (ref 8.6–10.5)
CHLORIDE SERPL-SCNC: 110 MMOL/L (ref 98–107)
CO2 SERPL-SCNC: 21 MMOL/L (ref 22–29)
CREAT SERPL-MCNC: 0.53 MG/DL (ref 0.57–1)
DEPRECATED RDW RBC AUTO: 39.4 FL (ref 37–54)
EGFRCR SERPLBLD CKD-EPI 2021: 121.6 ML/MIN/1.73
EOSINOPHIL # BLD MANUAL: 0.1 10*3/MM3 (ref 0–0.4)
EOSINOPHIL NFR BLD MANUAL: 1 % (ref 0.3–6.2)
ERYTHROCYTE [DISTWIDTH] IN BLOOD BY AUTOMATED COUNT: 11.9 % (ref 12.3–15.4)
GLUCOSE SERPL-MCNC: 90 MG/DL (ref 65–99)
HCT VFR BLD AUTO: 28.2 % (ref 34–46.6)
HGB BLD-MCNC: 9.2 G/DL (ref 12–15.9)
LYMPHOCYTES # BLD MANUAL: 1.56 10*3/MM3 (ref 0.7–3.1)
LYMPHOCYTES NFR BLD MANUAL: 7 % (ref 5–12)
MAGNESIUM SERPL-MCNC: 1.8 MG/DL (ref 1.6–2.6)
MCH RBC QN AUTO: 29.5 PG (ref 26.6–33)
MCHC RBC AUTO-ENTMCNC: 32.6 G/DL (ref 31.5–35.7)
MCV RBC AUTO: 90.4 FL (ref 79–97)
METAMYELOCYTES NFR BLD MANUAL: 1 % (ref 0–0)
MONOCYTES # BLD: 0.73 10*3/MM3 (ref 0.1–0.9)
MYELOCYTES NFR BLD MANUAL: 4 % (ref 0–0)
NEUTROPHILS # BLD AUTO: 7.3 10*3/MM3 (ref 1.7–7)
NEUTROPHILS NFR BLD MANUAL: 70 % (ref 42.7–76)
NRBC BLD AUTO-RTO: 0 /100 WBC (ref 0–0.2)
PLAT MORPH BLD: NORMAL
PLATELET # BLD AUTO: 91 10*3/MM3 (ref 140–450)
PMV BLD AUTO: 12.1 FL (ref 6–12)
POTASSIUM SERPL-SCNC: 3.6 MMOL/L (ref 3.5–5.2)
PROMYELOCYTES NFR BLD MANUAL: 2 % (ref 0–0)
RBC # BLD AUTO: 3.12 10*6/MM3 (ref 3.77–5.28)
RBC MORPH BLD: NORMAL
SODIUM SERPL-SCNC: 140 MMOL/L (ref 136–145)
TOXIC GRANULATION: ABNORMAL
VARIANT LYMPHS NFR BLD MANUAL: 15 % (ref 19.6–45.3)
WBC NRBC COR # BLD: 10.43 10*3/MM3 (ref 3.4–10.8)

## 2022-05-30 PROCEDURE — 83735 ASSAY OF MAGNESIUM: CPT | Performed by: HOSPITALIST

## 2022-05-30 PROCEDURE — 85025 COMPLETE CBC W/AUTO DIFF WBC: CPT | Performed by: HOSPITALIST

## 2022-05-30 PROCEDURE — 85007 BL SMEAR W/DIFF WBC COUNT: CPT | Performed by: HOSPITALIST

## 2022-05-30 PROCEDURE — 80048 BASIC METABOLIC PNL TOTAL CA: CPT | Performed by: HOSPITALIST

## 2022-05-30 RX ADMIN — ACETAMINOPHEN 650 MG: 325 TABLET ORAL at 12:32

## 2022-05-30 RX ADMIN — Medication 10 ML: at 08:18

## 2022-05-30 RX ADMIN — PANTOPRAZOLE SODIUM 40 MG: 40 TABLET, DELAYED RELEASE ORAL at 08:18

## 2022-05-30 RX ADMIN — CETIRIZINE HYDROCHLORIDE 10 MG: 10 TABLET ORAL at 08:18

## 2022-05-31 NOTE — OUTREACH NOTE
Prep Survey    Flowsheet Row Responses   [facility] facility patient discharged from? [facility]   Is LACE score < 7 ? No   Emergency Room discharge w/ pulse ox? No   Eligibility Readm Mgmt   Discharge diagnosis Febrile neutropenia   Does the patient have one of the following disease processes/diagnoses(primary or secondary)? Other   Does the patient have Home health ordered? No   Is there a DME ordered? No   Prep survey completed? Yes          YOBANY MARIA - Registered Nurse

## 2022-06-01 LAB
BACTERIA SPEC AEROBE CULT: NORMAL
BACTERIA SPEC AEROBE CULT: NORMAL

## 2022-06-02 ENCOUNTER — APPOINTMENT (OUTPATIENT)
Dept: LAB | Facility: HOSPITAL | Age: 37
End: 2022-06-02
Payer: COMMERCIAL

## 2022-06-02 ENCOUNTER — NURSE NAVIGATOR (OUTPATIENT)
Dept: OTHER | Facility: HOSPITAL | Age: 37
End: 2022-06-02

## 2022-06-02 ENCOUNTER — READMISSION MANAGEMENT (OUTPATIENT)
Dept: CALL CENTER | Facility: HOSPITAL | Age: 37
End: 2022-06-02

## 2022-06-02 NOTE — OUTREACH NOTE
Medical Week 1 Survey    Flowsheet Row Responses   [facility] patient discharged from? [place]   Does the patient have one of the following disease processes/diagnoses(primary or secondary)? Other   Week 1 attempt successful? Yes   Call start time 1448   Call end time 1450   Discharge diagnosis Febrile neutropenia   Meds reviewed with patient/caregiver? Yes   Is the patient having any side effects they believe may be caused by any medication additions or changes? No   Does the patient have all medications ordered at discharge? N/A   Is the patient taking all medications as directed (includes completed medication regime)? Yes   Comments regarding appointments Appt with hematology and chemo is on 6/9/22   Does the patient have a primary care provider?  Yes   Does the patient have an appointment with their PCP within 7 days of discharge? No   What is preventing the patient from scheduling follow up appointments within 7 days of discharge? Haven't had time   Nursing Interventions Advised patient to make appointment   Has the patient kept scheduled appointments due by today? N/A   Psychosocial issues? No   Did the patient receive a copy of their discharge instructions? Yes   Nursing interventions Reviewed instructions with patient   What is the patient's perception of their health status since discharge? Improving   Is the patient/caregiver able to teach back signs and symptoms related to disease process for when to call PCP? Yes   Is the patient/caregiver able to teach back signs and symptoms related to disease process for when to call 911? Yes   Is the patient/caregiver able to teach back the hierarchy of who to call/visit for symptoms/problems? PCP, Specialist, Home health nurse, Urgent Care, ED, 911 Yes   If the patient is a current smoker, are they able to teach back resources for cessation? Not a smoker   Week 1 call completed? Yes          JEANINE AUGUSTINE - Registered Nurse

## 2022-06-02 NOTE — PROGRESS NOTES
Called Ms. MorganJoseHeather to see how she was doing. She stated she was in the hospital last week with fever and infection. She is doing much better today and is able to function normally. She had questions about the injection they give post chemo treatment and weather she should have the Neulasta or the Neupogn. Encouraged her to talk to Dr. Palacios at her next appointment about her reactions to those. She stated she would do that.     She also had a question about her physical therapy bill. Message sent to PT department to help clarify for her. She was thankful for the call and will reach out if any questions or needs arise.

## 2022-06-07 ENCOUNTER — TELEPHONE (OUTPATIENT)
Dept: ONCOLOGY | Facility: CLINIC | Age: 37
End: 2022-06-07

## 2022-06-07 NOTE — TELEPHONE ENCOUNTER
Received call from patient asking about her schedule. Explained she will come in Friday for fluids and antiemetics. Pt v/u. Chelsea Abbott RN

## 2022-06-08 NOTE — PROGRESS NOTES
Subjective     REASON FOR FOLLOW UP:  LOBULAR CARCINOMA OF THE LEFT BREAST, 70 MM IN SIZE, MARGINS OF RESECTION NEGATIVE AFTER MASTECTOMY, GRADE 2, ER POSITIVE HI POSITIVE, HER 2 NEGATIVE BY IHC,KI 67 39% ,ONE POSITIVE AXILLARY LYMPH NODE OUT OF 4 NODES REMOVAL, PREMENOPAUSAL .INVITAE PANEL NEGATIVE.ONCOTYPE 18    HISTORY OF PRESENT ILLNESS:    Patient is a 37-year-old female with the above-mentioned history is here today for lab review and evaluation prior to cycle 2 Adriamycin/Cytoxan.    Patient was hospitalized 2022 - 2022 for febrile neutropenia.  Respiratory viral panel ended up being positive for human rhinovirus/enterovirus.    Patient comes in today reporting that she is feeling better.  After her first treatment she had a lot of nausea. She took Zofran but felt that it made her made her sleep a lot. As soon as she woke up very nauseated and very weak. No more fevers.     Past Medical History:   Diagnosis Date   • Anemia    • Anesthesia complication     hypotension with last C section   • Breast cancer (HCC) 2022    left sided, stage 3, ER/HI+, Her2-, s/p mastectomy with plans for AC/Taxol and radiation   • History of COVID-19     2021 AND 2022   • Hyperlipidemia    • PONV (postoperative nausea and vomiting)    • Seasonal allergies         Past Surgical History:   Procedure Laterality Date   • APPENDECTOMY     • BREAST BIOPSY Left    • BREAST RECONSTRUCTION Left 2022    Procedure: LEFT BREAST 1ST STAGE RECONSTRUCTION WITH INSERTION OF TISSUE EXPANDER AND BIOLOGIC;  Surgeon: Hermelinda Johnson MD;  Location: [facility] OR;  Service: Plastics;  Laterality: Left;   •  SECTION     •  SECTION N/A 12/10/2018    Procedure:  SECTION REPEAT;  Surgeon: Heidy Gutierrez MD;  Location: [facility] LABOR DELIVERY;  Service: Obstetrics/Gynecology   • D & C HYSTEROSCOPY N/A 2021    Procedure: HYSTEROSCOPY REMOVAL INTRAUTERINE DEVICE WILL NEED ULTRASOUND IN  "ROOM;  Surgeon: Heidy Gutierrez MD;  Location: [facility];  Service: Obstetrics/Gynecology;  Laterality: N/A;   • KNEE ARTHROSCOPY Right    • MASTECTOMY W/ SENTINEL NODE BIOPSY Left 04/13/2022    Procedure: left total mastectomy with left axillary sentinel lymph node biopsy;  Surgeon: Monisha Escudero MD;  Location: [facility];  Service: General;  Laterality: Left;   • VENOUS ACCESS DEVICE (PORT) INSERTION Right 5/13/2022    Procedure: right port placement;  Surgeon: Monisha Escudero MD;  Location: [facility];  Service: General;  Laterality: Right;        Current Outpatient Medications on File Prior to Visit   Medication Sig Dispense Refill   • omeprazole (priLOSEC) 20 MG capsule Take 1 capsule by mouth 2 (Two) Times a Day. 60 capsule 3   • [DISCONTINUED] cetirizine (zyrTEC) 10 MG tablet Take 10 mg by mouth As Needed for Allergies.       No current facility-administered medications on file prior to visit.        ALLERGIES:    Allergies   Allergen Reactions   • Asa [Aspirin] Anaphylaxis and Shortness Of Breath        Social History     Socioeconomic History   • Marital status:    Tobacco Use   • Smoking status: Never Smoker   • Smokeless tobacco: Never Used   Vaping Use   • Vaping Use: Never used   Substance and Sexual Activity   • Alcohol use: Not Currently     Alcohol/week: 5.0 standard drinks     Types: 5 Shots of liquor per week   • Drug use: Never   • Sexual activity: Defer        Family History   Problem Relation Age of Onset   • Melanoma Paternal Aunt    • Diabetes Other    • Malig Hyperthermia Neg Hx           Objective     Vitals:    06/09/22 0901   BP: 115/77   Pulse: 70   Resp: 16   Temp: 97.1 °F (36.2 °C)   TempSrc: Temporal   SpO2: 98%   Weight: 52.1 kg (114 lb 12.8 oz)   Height: 157 cm (61.81\")   PainSc: 0-No pain     Current Status 5/20/2022   ECOG score 0     Physical Exam  Vitals reviewed.   Constitutional:       General: She is not in acute distress.     Appearance: " Normal appearance. She is well-developed.   HENT:      Head: Normocephalic and atraumatic.      Mouth/Throat:      Pharynx: No oropharyngeal exudate.   Eyes:      Pupils: Pupils are equal, round, and reactive to light.   Cardiovascular:      Rate and Rhythm: Normal rate and regular rhythm.      Heart sounds: Normal heart sounds. No murmur heard.  Pulmonary:      Effort: Pulmonary effort is normal. No respiratory distress.      Breath sounds: Normal breath sounds. No wheezing, rhonchi or rales.   Abdominal:      General: Bowel sounds are normal. There is no distension.      Palpations: Abdomen is soft.   Musculoskeletal:         General: Normal range of motion.      Cervical back: Normal range of motion.   Skin:     General: Skin is warm and dry.      Findings: No rash.   Neurological:      Mental Status: She is alert and oriented to person, place, and time.         RECENT LABS:  Hematology WBC   Date Value Ref Range Status   06/09/2022 3.83 3.40 - 10.80 10*3/mm3 Final     RBC   Date Value Ref Range Status   06/09/2022 3.53 (L) 3.77 - 5.28 10*6/mm3 Final     Hemoglobin   Date Value Ref Range Status   06/09/2022 10.4 (L) 12.0 - 15.9 g/dL Final     Hematocrit   Date Value Ref Range Status   06/09/2022 32.1 (L) 34.0 - 46.6 % Final     Platelets   Date Value Ref Range Status   06/09/2022 344 140 - 450 10*3/mm3 Final       CBC:    WBC   Date Value Ref Range Status   06/09/2022 3.83 3.40 - 10.80 10*3/mm3 Final     RBC   Date Value Ref Range Status   06/09/2022 3.53 (L) 3.77 - 5.28 10*6/mm3 Final     Hemoglobin   Date Value Ref Range Status   06/09/2022 10.4 (L) 12.0 - 15.9 g/dL Final     Hematocrit   Date Value Ref Range Status   06/09/2022 32.1 (L) 34.0 - 46.6 % Final     MCV   Date Value Ref Range Status   06/09/2022 90.9 79.0 - 97.0 fL Final     MCH   Date Value Ref Range Status   06/09/2022 29.5 26.6 - 33.0 pg Final     MCHC   Date Value Ref Range Status   06/09/2022 32.4 31.5 - 35.7 g/dL Final     RDW   Date Value Ref  Range Status   06/09/2022 12.8 12.3 - 15.4 % Final     RDW-SD   Date Value Ref Range Status   06/09/2022 39.8 37.0 - 54.0 fl Final     MPV   Date Value Ref Range Status   06/09/2022 10.0 6.0 - 12.0 fL Final     Platelets   Date Value Ref Range Status   06/09/2022 344 140 - 450 10*3/mm3 Final     Neutrophil %   Date Value Ref Range Status   06/09/2022 60.8 42.7 - 76.0 % Final     Lymphocyte %   Date Value Ref Range Status   06/09/2022 23.8 19.6 - 45.3 % Final     Monocyte %   Date Value Ref Range Status   06/09/2022 10.7 5.0 - 12.0 % Final     Eosinophil %   Date Value Ref Range Status   06/09/2022 0.3 0.3 - 6.2 % Final     Basophil %   Date Value Ref Range Status   06/09/2022 2.3 (H) 0.0 - 1.5 % Final     Immature Grans %   Date Value Ref Range Status   06/09/2022 2.1 (H) 0.0 - 0.5 % Final     Neutrophils, Absolute   Date Value Ref Range Status   06/09/2022 2.33 1.70 - 7.00 10*3/mm3 Final     Lymphocytes, Absolute   Date Value Ref Range Status   06/09/2022 0.91 0.70 - 3.10 10*3/mm3 Final     Monocytes, Absolute   Date Value Ref Range Status   06/09/2022 0.41 0.10 - 0.90 10*3/mm3 Final     Eosinophils, Absolute   Date Value Ref Range Status   06/09/2022 0.01 0.00 - 0.40 10*3/mm3 Final     Basophils, Absolute   Date Value Ref Range Status   06/09/2022 0.09 0.00 - 0.20 10*3/mm3 Final     Immature Grans, Absolute   Date Value Ref Range Status   06/09/2022 0.08 (H) 0.00 - 0.05 10*3/mm3 Final     nRBC   Date Value Ref Range Status   06/09/2022 0.0 0.0 - 0.2 /100 WBC Final        CMP:    Glucose   Date Value Ref Range Status   06/09/2022 112 74 - 124 mg/dL Final     BUN   Date Value Ref Range Status   06/09/2022 14 6 - 20 mg/dL Final     Creatinine   Date Value Ref Range Status   06/09/2022 0.54 (L) 0.60 - 1.10 mg/dL Final     Sodium   Date Value Ref Range Status   06/09/2022 139 134 - 145 mmol/L Final     Potassium   Date Value Ref Range Status   06/09/2022 4.1 3.5 - 4.7 mmol/L Final     Chloride   Date Value Ref Range  Status   06/09/2022 103 98 - 107 mmol/L Final     CO2   Date Value Ref Range Status   06/09/2022 23.6 22.0 - 29.0 mmol/L Final     Calcium   Date Value Ref Range Status   06/09/2022 9.2 8.5 - 10.2 mg/dL Final     Total Protein   Date Value Ref Range Status   06/09/2022 7.0 6.3 - 8.0 g/dL Final     Albumin   Date Value Ref Range Status   06/09/2022 4.40 3.50 - 5.20 g/dL Final     ALT (SGPT)   Date Value Ref Range Status   06/09/2022 20 0 - 33 U/L Final     AST (SGOT)   Date Value Ref Range Status   06/09/2022 17 0 - 32 U/L Final     Alkaline Phosphatase   Date Value Ref Range Status   06/09/2022 79 38 - 116 U/L Final     Total Bilirubin   Date Value Ref Range Status   06/09/2022 0.2 0.2 - 1.2 mg/dL Final     Globulin   Date Value Ref Range Status   06/09/2022 2.6 1.8 - 3.5 gm/dL Final     A/G Ratio   Date Value Ref Range Status   06/09/2022 1.7 1.1 - 2.4 g/dL Final     BUN/Creatinine Ratio   Date Value Ref Range Status   06/09/2022 25.9 7.3 - 30.0 Final     Anion Gap   Date Value Ref Range Status   06/09/2022 12.4 5.0 - 15.0 mmol/L Final           Assessment & Plan     Diagnoses and all orders for this visit:    1. Malignant neoplasm of upper-outer quadrant of left breast in female, estrogen receptor positive (HCC) (Primary)    2. Pancytopenia due to antineoplastic chemotherapy (HCC)    3. Chemotherapy-induced nausea    Other orders  -     OLANZapine (ZyPREXA) 2.5 MG tablet; 1 tablet at bedtime on days 2, 3, 4 after each chemotherapy  Dispense: 12 tablet; Refill: 1       *Left Breast Cancer  · 37-year-old  female originally from [place] noticed abnormalities in the left breast in 12/2021, sensation of pressure, minor pain, sensation of fullness.   · Further mammogram and ultrasound documented abnormalities in the left breast that were consistent with breast cancer.   · Since then the patient has undergone breast MRI. Breast biopsies documented invasive lobular carcinoma.   · All this culminated with a  left-sided mastectomy and sentinel lymph node sampling.   · She had a lobular carcinoma, grade 2, margins of resection negative, Ki-67 at 39%, ER positive, [place] positive, HER2/leonie negative. She had 1 positive sentinel lymph node for malignancy with no extracapsular invasion. There was no evidence of lymphovascular invasion.   · Invitae panel was negative for any genetic alteration.   · The patient has had an expander placed at the mastectomy site. She has discomfort and pain after this was instillated with saline solution yesterday.   · Recommended adjuvant AC x 4 followed by Taxol x 12.  · Oncotype DX in this patient shows intermediate risk of recurrence.   · Also not only the chemotherapy will be necessary but we need to make her postmenopausal in the next several weeks or months. This will bring in a lot of benefit for her in the long run to minimize any recurrent disease. She is extremely young. She has 2 young children and we have to do as much as we can in this patient to minimize any potentiality for recurrent disease.   · Dr. Palacios discussed with her that the whole treatment is going to take at least 24-26 weeks, in other words 6 months, and thereafter the patient will be hopefully postmenopausal. If she does not become postmenopausal she could qualify for a clinical trial or we could ask Dr. Heidy Gutierrez to do bilateral oophorectomy in this patient.   · Eventually the patient will require endocrine adjuvant therapy after she becomes postmenopausal hopefully with letrozole.   · Finally, the patient will need cardiac assessment.  She has been referred to Cardiology and scheduled her to have an echocardiogram.  · Dr. Palacios also discussed that the patient will require followup in the future in regard to her right breast not only with mammogram but also ultrasound due to the fact that sometimes lobular carcinoma likes to be a bilateral disease. There are minimal abnormalities in the mammogram in the right  breast as well as in the MRI. This will require to be followed up in the future. Did not recommend a 2nd mastectomy prophylactically on her even though she asked the question.   The patient was reviewed on 05/20/2022. She has had her port placed in right subclavian position a week ago. She is healing perfectly fine and her surgical site on the left mastectomy and the expander looks perfectly normal with healing. No infection, erythema or discharge.   1 week following C1D1 AC WBC 0.52, ANC 0.06.  Temperature 102.9.  Patient admitted through ED for neutropenic fever and concern for sepsis as detailed below.  Respiratory viral panel ended up being positive for rhinovirus/enterovirus.  6/9/2022 patient here for C2 AC plans to dose reduce treatment by 20% and change treatment schedule to every 3 weeks.    *Left arm pain, neuropathic?  · She reports experiencing discomfort in the inner aspect of the left arm. Sounds neuropathic related to her recent sentinel lymph node sampling. The patient is taking Aleve or ibuprofen with not too much benefit.   · Recommended Voltaren topically 4 times a day. It will be perfectly fine for her to use ibuprofen and start Neurontin 100 mg to take at bedtime. Hopefully this will be a transitory issue.     *Social Factors  · The patient's mother is living with her, coming from [place] after all these issues.   · Dr. Palacios asked her to ask her mother to stay for the period of time that she receives chemotherapy. If we need to do any services for her in regard to visa requirements or status, we will be glad to incorporate our  into this process.     *Cardiac Function  · 5/25/2022, left ventricular ejection fraction 60%, GLS within normal limits.    *Weakness, chills, lethargy- concern for sepsis  · 5/27/2022, patient seen for triage visit.  Her  called this morning to report she was very fatigued and experiencing chills and sore throat.  Temperature at that time was 99.8.   Upon arrival to clinic, temperature 99.8 °F.  She was in a wheelchair due to severe weakness.  She is so weak/fatigued she is unable to hold her head up and can barely keep her eyes open.  WBC 0.52, ANC 0.06.  The patient was taken directly to the ED for admission due to serious concern for sepsis.  · Admitted 5/27/2022 - 5/30/2022.    *Nausea due to chemotherapy: Patient is scheduled to return tomorrow for IV fluids and IV antiemetics.  We discussed decreasing the dose of olanzapine down to 2.5 mg daily for 3 days after treatments and have also prescribed Compazine to see if she tolerates this better than the Zofran.    PLAN:   · Proceed with cycle 2 Adriamycin/Cytoxan with dose reduction..  · Patient is currently receiving AC with a 20% dose reduction every 3 weeks due to toxicities.  She will receive 4 total doses.     · Continue Neulasta support with chemotherapy, we will give her dose of Neulasta injection tomorrow in the office when she is here for day 2 IV fluids and IV antiemetics.  · She can utilize Tylenol for arthralgias secondary to Neulasta.  · Continue Zofran as needed for nausea.    · Continue Zyprexa 2.5 mg.  · Continue omeprazole 20 mg twice daily.  · After completion of AC, patient will initiate weekly taxol x 12.  · Upon completion of chemotherapy the patient will initiate radiation therapy and has already been seen by Radiation Oncology.  · The patient will participate in a clinical trial that we have in the office and she already has been consented for this in regard to the utilization of antiestrogen medicine upon completion of chemotherapy administration.   · Call/return sooner should she develop any new concerns or problems.    Patient is on high risk medication requiring close monitor for toxicity.        Jaycee Crawford, APRN  06/09/22

## 2022-06-09 ENCOUNTER — INFUSION (OUTPATIENT)
Dept: ONCOLOGY | Facility: HOSPITAL | Age: 37
End: 2022-06-09
Payer: COMMERCIAL

## 2022-06-09 ENCOUNTER — TELEPHONE (OUTPATIENT)
Dept: PHYSICAL THERAPY | Facility: HOSPITAL | Age: 37
End: 2022-06-09

## 2022-06-09 ENCOUNTER — OFFICE VISIT (OUTPATIENT)
Dept: ONCOLOGY | Facility: CLINIC | Age: 37
End: 2022-06-09

## 2022-06-09 VITALS
HEART RATE: 70 BPM | BODY MASS INDEX: 21.12 KG/M2 | WEIGHT: 114.8 LBS | DIASTOLIC BLOOD PRESSURE: 77 MMHG | SYSTOLIC BLOOD PRESSURE: 115 MMHG | RESPIRATION RATE: 16 BRPM | TEMPERATURE: 97.1 F | OXYGEN SATURATION: 98 % | HEIGHT: 62 IN

## 2022-06-09 DIAGNOSIS — T45.1X5A PANCYTOPENIA DUE TO ANTINEOPLASTIC CHEMOTHERAPY: ICD-10-CM

## 2022-06-09 DIAGNOSIS — T45.1X5A CHEMOTHERAPY-INDUCED NAUSEA: ICD-10-CM

## 2022-06-09 DIAGNOSIS — C50.412 MALIGNANT NEOPLASM OF UPPER-OUTER QUADRANT OF LEFT BREAST IN FEMALE, ESTROGEN RECEPTOR POSITIVE: Primary | ICD-10-CM

## 2022-06-09 DIAGNOSIS — Z17.0 MALIGNANT NEOPLASM OF UPPER-OUTER QUADRANT OF LEFT BREAST IN FEMALE, ESTROGEN RECEPTOR POSITIVE: ICD-10-CM

## 2022-06-09 DIAGNOSIS — Z17.0 MALIGNANT NEOPLASM OF UPPER-OUTER QUADRANT OF LEFT BREAST IN FEMALE, ESTROGEN RECEPTOR POSITIVE: Primary | ICD-10-CM

## 2022-06-09 DIAGNOSIS — R11.0 CHEMOTHERAPY-INDUCED NAUSEA: ICD-10-CM

## 2022-06-09 DIAGNOSIS — D61.810 PANCYTOPENIA DUE TO ANTINEOPLASTIC CHEMOTHERAPY: ICD-10-CM

## 2022-06-09 DIAGNOSIS — C50.412 MALIGNANT NEOPLASM OF UPPER-OUTER QUADRANT OF LEFT BREAST IN FEMALE, ESTROGEN RECEPTOR POSITIVE: ICD-10-CM

## 2022-06-09 DIAGNOSIS — Z45.2 FITTING AND ADJUSTMENT OF VASCULAR CATHETER: ICD-10-CM

## 2022-06-09 LAB
ALBUMIN SERPL-MCNC: 4.4 G/DL (ref 3.5–5.2)
ALBUMIN/GLOB SERPL: 1.7 G/DL (ref 1.1–2.4)
ALP SERPL-CCNC: 79 U/L (ref 38–116)
ALT SERPL W P-5'-P-CCNC: 20 U/L (ref 0–33)
ANION GAP SERPL CALCULATED.3IONS-SCNC: 12.4 MMOL/L (ref 5–15)
AST SERPL-CCNC: 17 U/L (ref 0–32)
BASOPHILS # BLD AUTO: 0.09 10*3/MM3 (ref 0–0.2)
BASOPHILS NFR BLD AUTO: 2.3 % (ref 0–1.5)
BILIRUB SERPL-MCNC: 0.2 MG/DL (ref 0.2–1.2)
BUN SERPL-MCNC: 14 MG/DL (ref 6–20)
BUN/CREAT SERPL: 25.9 (ref 7.3–30)
CALCIUM SPEC-SCNC: 9.2 MG/DL (ref 8.5–10.2)
CHLORIDE SERPL-SCNC: 103 MMOL/L (ref 98–107)
CO2 SERPL-SCNC: 23.6 MMOL/L (ref 22–29)
CREAT SERPL-MCNC: 0.54 MG/DL (ref 0.6–1.1)
DEPRECATED RDW RBC AUTO: 39.8 FL (ref 37–54)
EGFRCR SERPLBLD CKD-EPI 2021: 121 ML/MIN/1.73
EOSINOPHIL # BLD AUTO: 0.01 10*3/MM3 (ref 0–0.4)
EOSINOPHIL NFR BLD AUTO: 0.3 % (ref 0.3–6.2)
ERYTHROCYTE [DISTWIDTH] IN BLOOD BY AUTOMATED COUNT: 12.8 % (ref 12.3–15.4)
GLOBULIN UR ELPH-MCNC: 2.6 GM/DL (ref 1.8–3.5)
GLUCOSE SERPL-MCNC: 112 MG/DL (ref 74–124)
HCT VFR BLD AUTO: 32.1 % (ref 34–46.6)
HGB BLD-MCNC: 10.4 G/DL (ref 12–15.9)
IMM GRANULOCYTES # BLD AUTO: 0.08 10*3/MM3 (ref 0–0.05)
IMM GRANULOCYTES NFR BLD AUTO: 2.1 % (ref 0–0.5)
LYMPHOCYTES # BLD AUTO: 0.91 10*3/MM3 (ref 0.7–3.1)
LYMPHOCYTES NFR BLD AUTO: 23.8 % (ref 19.6–45.3)
MCH RBC QN AUTO: 29.5 PG (ref 26.6–33)
MCHC RBC AUTO-ENTMCNC: 32.4 G/DL (ref 31.5–35.7)
MCV RBC AUTO: 90.9 FL (ref 79–97)
MONOCYTES # BLD AUTO: 0.41 10*3/MM3 (ref 0.1–0.9)
MONOCYTES NFR BLD AUTO: 10.7 % (ref 5–12)
NEUTROPHILS NFR BLD AUTO: 2.33 10*3/MM3 (ref 1.7–7)
NEUTROPHILS NFR BLD AUTO: 60.8 % (ref 42.7–76)
NRBC BLD AUTO-RTO: 0 /100 WBC (ref 0–0.2)
PLATELET # BLD AUTO: 344 10*3/MM3 (ref 140–450)
PMV BLD AUTO: 10 FL (ref 6–12)
POTASSIUM SERPL-SCNC: 4.1 MMOL/L (ref 3.5–4.7)
PROT SERPL-MCNC: 7 G/DL (ref 6.3–8)
RBC # BLD AUTO: 3.53 10*6/MM3 (ref 3.77–5.28)
SODIUM SERPL-SCNC: 139 MMOL/L (ref 134–145)
WBC NRBC COR # BLD: 3.83 10*3/MM3 (ref 3.4–10.8)

## 2022-06-09 PROCEDURE — 96367 TX/PROPH/DG ADDL SEQ IV INF: CPT

## 2022-06-09 PROCEDURE — 85025 COMPLETE CBC W/AUTO DIFF WBC: CPT

## 2022-06-09 PROCEDURE — 25010000002 FOSAPREPITANT PER 1 MG: Performed by: NURSE PRACTITIONER

## 2022-06-09 PROCEDURE — 25010000002 CYCLOPHOSPHAMIDE 1 GM/5ML SOLUTION 5 ML VIAL: Performed by: NURSE PRACTITIONER

## 2022-06-09 PROCEDURE — 25010000002 DEXAMETHASONE SODIUM PHOSPHATE 100 MG/10ML SOLUTION: Performed by: NURSE PRACTITIONER

## 2022-06-09 PROCEDURE — 80053 COMPREHEN METABOLIC PANEL: CPT

## 2022-06-09 PROCEDURE — 25010000002 LORAZEPAM PER 2 MG: Performed by: INTERNAL MEDICINE

## 2022-06-09 PROCEDURE — 25010000002 HEPARIN LOCK FLUSH PER 10 UNITS: Performed by: INTERNAL MEDICINE

## 2022-06-09 PROCEDURE — 25010000002 PALONOSETRON PER 25 MCG: Performed by: NURSE PRACTITIONER

## 2022-06-09 PROCEDURE — 96411 CHEMO IV PUSH ADDL DRUG: CPT

## 2022-06-09 PROCEDURE — 96375 TX/PRO/DX INJ NEW DRUG ADDON: CPT

## 2022-06-09 PROCEDURE — 99214 OFFICE O/P EST MOD 30 MIN: CPT | Performed by: NURSE PRACTITIONER

## 2022-06-09 PROCEDURE — 25010000002 DOXORUBICIN PER 10 MG: Performed by: NURSE PRACTITIONER

## 2022-06-09 PROCEDURE — 96413 CHEMO IV INFUSION 1 HR: CPT

## 2022-06-09 RX ORDER — PALONOSETRON 0.05 MG/ML
0.25 INJECTION, SOLUTION INTRAVENOUS ONCE
Status: COMPLETED | OUTPATIENT
Start: 2022-06-09 | End: 2022-06-09

## 2022-06-09 RX ORDER — OLANZAPINE 5 MG/1
2.5 TABLET ORAL ONCE
Status: COMPLETED | OUTPATIENT
Start: 2022-06-09 | End: 2022-06-09

## 2022-06-09 RX ORDER — SODIUM CHLORIDE 0.9 % (FLUSH) 0.9 %
10 SYRINGE (ML) INJECTION AS NEEDED
Status: CANCELLED | OUTPATIENT
Start: 2022-06-09

## 2022-06-09 RX ORDER — PALONOSETRON 0.05 MG/ML
0.25 INJECTION, SOLUTION INTRAVENOUS ONCE
Status: CANCELLED | OUTPATIENT
Start: 2022-06-09

## 2022-06-09 RX ORDER — OLANZAPINE 2.5 MG/1
TABLET ORAL
Qty: 12 TABLET | Refills: 1 | Status: SHIPPED | OUTPATIENT
Start: 2022-06-09 | End: 2022-08-08

## 2022-06-09 RX ORDER — SODIUM CHLORIDE 9 MG/ML
250 INJECTION, SOLUTION INTRAVENOUS ONCE
Status: COMPLETED | OUTPATIENT
Start: 2022-06-09 | End: 2022-06-09

## 2022-06-09 RX ORDER — SODIUM CHLORIDE 0.9 % (FLUSH) 0.9 %
10 SYRINGE (ML) INJECTION AS NEEDED
Status: DISCONTINUED | OUTPATIENT
Start: 2022-06-09 | End: 2022-06-09 | Stop reason: HOSPADM

## 2022-06-09 RX ORDER — HEPARIN SODIUM (PORCINE) LOCK FLUSH IV SOLN 100 UNIT/ML 100 UNIT/ML
500 SOLUTION INTRAVENOUS AS NEEDED
Status: CANCELLED | OUTPATIENT
Start: 2022-06-09

## 2022-06-09 RX ORDER — HEPARIN SODIUM (PORCINE) LOCK FLUSH IV SOLN 100 UNIT/ML 100 UNIT/ML
500 SOLUTION INTRAVENOUS AS NEEDED
Status: DISCONTINUED | OUTPATIENT
Start: 2022-06-09 | End: 2022-06-09 | Stop reason: HOSPADM

## 2022-06-09 RX ORDER — LORAZEPAM 2 MG/ML
0.25 INJECTION INTRAMUSCULAR ONCE
Status: COMPLETED | OUTPATIENT
Start: 2022-06-09 | End: 2022-06-09

## 2022-06-09 RX ORDER — SODIUM CHLORIDE 9 MG/ML
250 INJECTION, SOLUTION INTRAVENOUS ONCE
Status: CANCELLED | OUTPATIENT
Start: 2022-06-09

## 2022-06-09 RX ORDER — OLANZAPINE 5 MG/1
2.5 TABLET ORAL ONCE
Status: CANCELLED | OUTPATIENT
Start: 2022-06-09 | End: 2022-06-09

## 2022-06-09 RX ORDER — DOXORUBICIN HYDROCHLORIDE 2 MG/ML
48 INJECTION, SOLUTION INTRAVENOUS ONCE
Status: COMPLETED | OUTPATIENT
Start: 2022-06-09 | End: 2022-06-09

## 2022-06-09 RX ORDER — DOXORUBICIN HYDROCHLORIDE 2 MG/ML
48 INJECTION, SOLUTION INTRAVENOUS ONCE
Status: CANCELLED | OUTPATIENT
Start: 2022-06-09

## 2022-06-09 RX ADMIN — DOXORUBICIN HYDROCHLORIDE 74 MG: 2 INJECTION, SOLUTION INTRAVENOUS at 10:57

## 2022-06-09 RX ADMIN — DEXAMETHASONE SODIUM PHOSPHATE 12 MG: 10 INJECTION, SOLUTION INTRAMUSCULAR; INTRAVENOUS at 10:36

## 2022-06-09 RX ADMIN — CYCLOPHOSPHAMIDE 740 MG: 200 INJECTION, SOLUTION INTRAVENOUS at 11:28

## 2022-06-09 RX ADMIN — PALONOSETRON 0.25 MG: 0.05 INJECTION, SOLUTION INTRAVENOUS at 10:01

## 2022-06-09 RX ADMIN — Medication 500 UNITS: at 12:02

## 2022-06-09 RX ADMIN — OLANZAPINE 2.5 MG: 5 TABLET, FILM COATED ORAL at 09:57

## 2022-06-09 RX ADMIN — SODIUM CHLORIDE 100 ML: 9 INJECTION, SOLUTION INTRAVENOUS at 10:02

## 2022-06-09 RX ADMIN — LORAZEPAM 0.25 MG: 2 INJECTION INTRAMUSCULAR; INTRAVENOUS at 11:19

## 2022-06-09 RX ADMIN — Medication 10 ML: at 12:02

## 2022-06-09 RX ADMIN — SODIUM CHLORIDE 250 ML: 9 INJECTION, SOLUTION INTRAVENOUS at 09:57

## 2022-06-09 NOTE — NURSING NOTE
Pt with complaint of tingling in both hands while IV Zachery being pushed.  Stopped Zachery. Pt with complaint of feeling nervous  Discussed with Jaycee ROPER.  0.25 mg of IV Ativan given slow IV push.   Tingling sensation gone.  Proceeded to complete pushing IV Ativan.  No complaints.  Tolerated Zachery and Cytoxan well.  Excellent blood return from port throughout Adriamycin administration.  Site clear, and needle intact.

## 2022-06-10 ENCOUNTER — INFUSION (OUTPATIENT)
Dept: ONCOLOGY | Facility: HOSPITAL | Age: 37
End: 2022-06-10
Payer: COMMERCIAL

## 2022-06-10 VITALS
HEART RATE: 81 BPM | SYSTOLIC BLOOD PRESSURE: 92 MMHG | DIASTOLIC BLOOD PRESSURE: 46 MMHG | RESPIRATION RATE: 16 BRPM | WEIGHT: 112.8 LBS | TEMPERATURE: 98 F | OXYGEN SATURATION: 97 % | BODY MASS INDEX: 20.76 KG/M2

## 2022-06-10 DIAGNOSIS — C50.412 MALIGNANT NEOPLASM OF UPPER-OUTER QUADRANT OF LEFT BREAST IN FEMALE, ESTROGEN RECEPTOR POSITIVE: Primary | ICD-10-CM

## 2022-06-10 DIAGNOSIS — Z17.0 MALIGNANT NEOPLASM OF UPPER-OUTER QUADRANT OF LEFT BREAST IN FEMALE, ESTROGEN RECEPTOR POSITIVE: Primary | ICD-10-CM

## 2022-06-10 PROCEDURE — 96372 THER/PROPH/DIAG INJ SC/IM: CPT

## 2022-06-10 PROCEDURE — 25010000002 PEGFILGRASTIM 6 MG/0.6ML SOLUTION PREFILLED SYRINGE: Performed by: NURSE PRACTITIONER

## 2022-06-10 PROCEDURE — 25010000002 ONDANSETRON PER 1 MG: Performed by: INTERNAL MEDICINE

## 2022-06-10 PROCEDURE — 96361 HYDRATE IV INFUSION ADD-ON: CPT

## 2022-06-10 PROCEDURE — 96374 THER/PROPH/DIAG INJ IV PUSH: CPT

## 2022-06-10 RX ORDER — ONDANSETRON HCL IN 0.9 % NACL 8 MG/50 ML
8 INTRAVENOUS SOLUTION, PIGGYBACK (ML) INTRAVENOUS ONCE
Status: DISCONTINUED | OUTPATIENT
Start: 2022-06-10 | End: 2022-06-10

## 2022-06-10 RX ORDER — PROCHLORPERAZINE MALEATE 10 MG
10 TABLET ORAL EVERY 6 HOURS PRN
Qty: 60 TABLET | Refills: 1 | Status: SHIPPED | OUTPATIENT
Start: 2022-06-10 | End: 2022-06-23

## 2022-06-10 RX ORDER — SODIUM CHLORIDE 9 MG/ML
1000 INJECTION, SOLUTION INTRAVENOUS ONCE
Status: COMPLETED | OUTPATIENT
Start: 2022-06-10 | End: 2022-06-10

## 2022-06-10 RX ORDER — ONDANSETRON 2 MG/ML
8 INJECTION INTRAMUSCULAR; INTRAVENOUS ONCE
Status: COMPLETED | OUTPATIENT
Start: 2022-06-10 | End: 2022-06-10

## 2022-06-10 RX ADMIN — ONDANSETRON 8 MG: 2 INJECTION, SOLUTION INTRAMUSCULAR; INTRAVENOUS at 14:35

## 2022-06-10 RX ADMIN — SODIUM CHLORIDE 1000 ML: 9 INJECTION, SOLUTION INTRAVENOUS at 14:29

## 2022-06-10 RX ADMIN — PEGFILGRASTIM 6 MG: 6 INJECTION SUBCUTANEOUS at 15:51

## 2022-06-10 NOTE — NURSING NOTE
Patient arrived with nausea. Stated is struggling with nausea and is not able to eat or drink. Discussed with Dr Palacios. Compazine sent to pharmacy. Written and verbal instructions given to patient. May add oral compazine every 6 hours to zofran and zyprexa. Not to take oral compazine at bedtime while taking zyprexa. May take in morning and afternoon. Verbalized understanding.

## 2022-06-13 ENCOUNTER — TELEPHONE (OUTPATIENT)
Dept: SURGERY | Facility: CLINIC | Age: 37
End: 2022-06-13

## 2022-06-13 NOTE — TELEPHONE ENCOUNTER
Pt notified of the following appts:  Screening MMG scheduled at [facility] on 02/13/2023 at 11:30  Follow up with Dr. Escudero on 02/20/2023 at 8:30.  Pt verbalized understanding but questioned if her reconstruction would be done by then.  I let her know if anything delayed that surgery she can always call us back to move this appt.     CMA

## 2022-06-16 ENCOUNTER — TELEPHONE (OUTPATIENT)
Dept: ONCOLOGY | Facility: CLINIC | Age: 37
End: 2022-06-16

## 2022-06-16 RX ORDER — LORAZEPAM 0.5 MG/1
0.5 TABLET ORAL EVERY 12 HOURS PRN
Qty: 28 TABLET | Refills: 0 | Status: SHIPPED | OUTPATIENT
Start: 2022-06-16 | End: 2022-11-21

## 2022-06-16 NOTE — TELEPHONE ENCOUNTER
Spoke w/ Maribell. Pt states this round of chemo she was less nauseas and the ativan given during admin of sophia helped. Discussed w/ Dr. Palacios who added ativan onto her careplans as well as ordered ativan for home to be used one week after BID to help w/ nausea. Discussed w/ pt. Pt v/u. Message sent to scheduling to change frequency of chemo to q3 weeks d/t toxicity. Chelsea Abbott RN

## 2022-06-23 ENCOUNTER — LAB (OUTPATIENT)
Dept: LAB | Facility: HOSPITAL | Age: 37
End: 2022-06-23
Payer: COMMERCIAL

## 2022-06-23 ENCOUNTER — APPOINTMENT (OUTPATIENT)
Dept: ONCOLOGY | Facility: HOSPITAL | Age: 37
End: 2022-06-23
Payer: COMMERCIAL

## 2022-06-23 ENCOUNTER — OFFICE VISIT (OUTPATIENT)
Dept: ONCOLOGY | Facility: CLINIC | Age: 37
End: 2022-06-23

## 2022-06-23 VITALS
TEMPERATURE: 97.5 F | RESPIRATION RATE: 16 BRPM | OXYGEN SATURATION: 100 % | DIASTOLIC BLOOD PRESSURE: 62 MMHG | SYSTOLIC BLOOD PRESSURE: 90 MMHG | HEART RATE: 76 BPM | BODY MASS INDEX: 21.05 KG/M2 | WEIGHT: 114.4 LBS | HEIGHT: 62 IN

## 2022-06-23 DIAGNOSIS — D69.59 CHEMOTHERAPY-INDUCED THROMBOCYTOPENIA: ICD-10-CM

## 2022-06-23 DIAGNOSIS — T45.1X5A PANCYTOPENIA DUE TO ANTINEOPLASTIC CHEMOTHERAPY: ICD-10-CM

## 2022-06-23 DIAGNOSIS — D70.1 LEUKOPENIA DUE TO ANTINEOPLASTIC CHEMOTHERAPY: ICD-10-CM

## 2022-06-23 DIAGNOSIS — C50.412 MALIGNANT NEOPLASM OF UPPER-OUTER QUADRANT OF LEFT BREAST IN FEMALE, ESTROGEN RECEPTOR POSITIVE: ICD-10-CM

## 2022-06-23 DIAGNOSIS — Z17.0 MALIGNANT NEOPLASM OF UPPER-OUTER QUADRANT OF LEFT BREAST IN FEMALE, ESTROGEN RECEPTOR POSITIVE: Primary | ICD-10-CM

## 2022-06-23 DIAGNOSIS — R11.2 CHEMOTHERAPY INDUCED NAUSEA AND VOMITING: ICD-10-CM

## 2022-06-23 DIAGNOSIS — T45.1X5A CHEMOTHERAPY INDUCED NAUSEA AND VOMITING: ICD-10-CM

## 2022-06-23 DIAGNOSIS — R50.81 FEBRILE NEUTROPENIA: ICD-10-CM

## 2022-06-23 DIAGNOSIS — K21.00 PEPTIC REFLUX ESOPHAGITIS: ICD-10-CM

## 2022-06-23 DIAGNOSIS — T45.1X5A LEUKOPENIA DUE TO ANTINEOPLASTIC CHEMOTHERAPY: ICD-10-CM

## 2022-06-23 DIAGNOSIS — Z17.0 MALIGNANT NEOPLASM OF UPPER-OUTER QUADRANT OF LEFT BREAST IN FEMALE, ESTROGEN RECEPTOR POSITIVE: ICD-10-CM

## 2022-06-23 DIAGNOSIS — C50.412 MALIGNANT NEOPLASM OF UPPER-OUTER QUADRANT OF LEFT BREAST IN FEMALE, ESTROGEN RECEPTOR POSITIVE: Primary | ICD-10-CM

## 2022-06-23 DIAGNOSIS — T45.1X5A CHEMOTHERAPY-INDUCED THROMBOCYTOPENIA: ICD-10-CM

## 2022-06-23 DIAGNOSIS — D64.89 ANEMIA DUE TO OTHER CAUSE, NOT CLASSIFIED: ICD-10-CM

## 2022-06-23 DIAGNOSIS — D61.810 PANCYTOPENIA DUE TO ANTINEOPLASTIC CHEMOTHERAPY: ICD-10-CM

## 2022-06-23 DIAGNOSIS — Z45.2 FITTING AND ADJUSTMENT OF VASCULAR CATHETER: ICD-10-CM

## 2022-06-23 DIAGNOSIS — D70.9 FEBRILE NEUTROPENIA: ICD-10-CM

## 2022-06-23 LAB
ALBUMIN SERPL-MCNC: 4.7 G/DL (ref 3.5–5.2)
ALBUMIN/GLOB SERPL: 1.7 G/DL (ref 1.1–2.4)
ALP SERPL-CCNC: 92 U/L (ref 38–116)
ALT SERPL W P-5'-P-CCNC: 11 U/L (ref 0–33)
ANION GAP SERPL CALCULATED.3IONS-SCNC: 11.7 MMOL/L (ref 5–15)
AST SERPL-CCNC: 18 U/L (ref 0–32)
BASOPHILS # BLD AUTO: 0.11 10*3/MM3 (ref 0–0.2)
BASOPHILS NFR BLD AUTO: 1.2 % (ref 0–1.5)
BILIRUB SERPL-MCNC: <0.2 MG/DL (ref 0.2–1.2)
BUN SERPL-MCNC: 11 MG/DL (ref 6–20)
BUN/CREAT SERPL: 19.3 (ref 7.3–30)
CALCIUM SPEC-SCNC: 9.3 MG/DL (ref 8.5–10.2)
CHLORIDE SERPL-SCNC: 103 MMOL/L (ref 98–107)
CO2 SERPL-SCNC: 23.3 MMOL/L (ref 22–29)
CREAT SERPL-MCNC: 0.57 MG/DL (ref 0.6–1.1)
DEPRECATED RDW RBC AUTO: 41.6 FL (ref 37–54)
EGFRCR SERPLBLD CKD-EPI 2021: 119.5 ML/MIN/1.73
EOSINOPHIL # BLD AUTO: 0.41 10*3/MM3 (ref 0–0.4)
EOSINOPHIL NFR BLD AUTO: 4.4 % (ref 0.3–6.2)
ERYTHROCYTE [DISTWIDTH] IN BLOOD BY AUTOMATED COUNT: 13.2 % (ref 12.3–15.4)
GLOBULIN UR ELPH-MCNC: 2.7 GM/DL (ref 1.8–3.5)
GLUCOSE SERPL-MCNC: 96 MG/DL (ref 74–124)
HCT VFR BLD AUTO: 32.4 % (ref 34–46.6)
HGB BLD-MCNC: 10.7 G/DL (ref 12–15.9)
IMM GRANULOCYTES # BLD AUTO: 0.82 10*3/MM3 (ref 0–0.05)
IMM GRANULOCYTES NFR BLD AUTO: 8.8 % (ref 0–0.5)
LYMPHOCYTES # BLD AUTO: 1.37 10*3/MM3 (ref 0.7–3.1)
LYMPHOCYTES NFR BLD AUTO: 14.6 % (ref 19.6–45.3)
MCH RBC QN AUTO: 30.2 PG (ref 26.6–33)
MCHC RBC AUTO-ENTMCNC: 33 G/DL (ref 31.5–35.7)
MCV RBC AUTO: 91.5 FL (ref 79–97)
MONOCYTES # BLD AUTO: 0.81 10*3/MM3 (ref 0.1–0.9)
MONOCYTES NFR BLD AUTO: 8.7 % (ref 5–12)
NEUTROPHILS NFR BLD AUTO: 5.84 10*3/MM3 (ref 1.7–7)
NEUTROPHILS NFR BLD AUTO: 62.3 % (ref 42.7–76)
NRBC BLD AUTO-RTO: 0 /100 WBC (ref 0–0.2)
PLATELET # BLD AUTO: 121 10*3/MM3 (ref 140–450)
PMV BLD AUTO: 10.4 FL (ref 6–12)
POTASSIUM SERPL-SCNC: 3.9 MMOL/L (ref 3.5–4.7)
PROT SERPL-MCNC: 7.4 G/DL (ref 6.3–8)
RBC # BLD AUTO: 3.54 10*6/MM3 (ref 3.77–5.28)
SODIUM SERPL-SCNC: 138 MMOL/L (ref 134–145)
WBC NRBC COR # BLD: 9.36 10*3/MM3 (ref 3.4–10.8)

## 2022-06-23 PROCEDURE — 36415 COLL VENOUS BLD VENIPUNCTURE: CPT

## 2022-06-23 PROCEDURE — 80053 COMPREHEN METABOLIC PANEL: CPT

## 2022-06-23 PROCEDURE — 99214 OFFICE O/P EST MOD 30 MIN: CPT | Performed by: INTERNAL MEDICINE

## 2022-06-23 PROCEDURE — 85025 COMPLETE CBC W/AUTO DIFF WBC: CPT

## 2022-06-23 NOTE — PROGRESS NOTES
Subjective       DURING THE VISIT WITH THE PATIENT TODAY , PATIENT HAD FACE MASK, MY MEDICAL ASSISTANT AND I  HAD PROPPER PROTECTIVE EQUIPMENT, AND I DID HAND HYGIENE WITH SOAP AND WATER BEFORE AND AFTER THE VISIT.     REASON FOR FOLLOW UP:  LOBULAR CARCINOMA OF THE LEFT BREAST, 70 MM IN SIZE, MARGINS OF RESECTION NEGATIVE AFTER MASTECTOMY, GRADE 2, ER POSITIVE IL POSITIVE, HER 2 NEGATIVE BY IHC,KI 67 39% ,ONE POSITIVE AXILLARY LYMPH NODE OUT OF 4 NODES REMOVAL, PREMENOPAUSAL .INVITAE PANEL NEGATIVE.ONCOTYPE 18        HISTORY OF PRESENT ILLNESS:  The patient is a 37 y.o. year old female who is here for an opinion about the above issue.  I had the opportunity to see this delightful [place] female, 37 years old, mother of 2, who is premenopausal who has found abnormalities in the left breast since 12/2021, having sensation of fullness in the breast and discomfort and occasional pain. After this complaint she was seen by her primary physician and she initiated a process of mammograms and ultrasounds that culminated with the diagnosis of lobular carcinoma of the breast, invasive, 7 cm in size. The patient completed a mastectomy and sentinel lymph nodes almost 3 weeks ago and she is here for review and advice in regard to adjuvant therapy. The patient is still having pain at the surgical site, especially since yesterday she had injection of saline in the expander that was placed after the mastectomy. She also has neuropathic pain in the inner aspect of the left arm that is bothering her a lot through the day and sometimes almost driving her crazy. She has no motor deficit into the left upper extremity. She denies any fevers or chills or bone pain. Her appetite is acceptable but she has changed her diet and she is almost going into vegetarian with the exception of some fish. She has eliminated all the dairy products and she has eliminated mostly carbohydrates as well. She has lost some weight. The patient otherwise  feels well. She has normal bowel activity, normal urination. No skeletal pain or joint pain. No cough, sputum production, shortness of breath or palpitations. She is extremely anxious about this visit today. She is in company of her .    The patient returned to the office on 05/20/2022 in preparation for initiation of chemotherapy. In the meantime she has undergone a port placement in right subclavian position with no difficulties and an echocardiogram that will be discussed below. The patient has had very significant upper respiratory allergies. She has been tested this week for COVID being negative. She is not running any fever. Most of the symptoms in the respiratory tract include sneezing, itching, itchy eyes and rhinorrhea. No sore throat, no alteration in taste or smell. No cough, shortness of breath, pleuritic pain or hemoptysis.     The surgical site from the left breast expander is not painful. She has healed extremely well. The same is applicable to her port that was placed a week ago.  The patient returned to the office on 05/26/2022 for an interim assessment. Since the chemotherapy administration last week the patient had persistent nausea and vomiting at least for 4 days to the point that she spent most of the time sleeping because of the effect of the Zyprexa but no resolution of her nausea and vomiting. She has vomited so much that now she has significant esophagitis with heartburn and indigestion. She has not had any hematemesis or melena. She just started to eat yesterday rice and potatoes. She was able to handle this and she has been able to handle liquids. She also has had discoloration of the urine that originally was pink and subsequently now has normalized. She had no burning sensation upon urination or vaginal bleeding. She has been very fatigued and tired. On top of that Neulasta triggers significant pain in the cranium, in the pelvic bone and in her femurs and she has the feeling that  she has the flu. Again, she has not had any fever. Today is the best day that she has had since the chemotherapy administration. Obviously given the symptoms and this toxicity assessment, the patient will require radical change in her treatment as will be described below.  On 06/23/2022, the patient returns after she has completed 2 cycles of AC. The first cycle was dramatically cumbersome because of the tremendous amount of side effects including persistent nausea and vomiting after chemotherapy that required IV fluids and modification in her nausea regimen. She also developed neutropenic fever and ended up in the hospital. After 2nd cycle with dose adjustment the patient handled the treatment much better and the miracle medicine in regard to nausea control was Ativan. Phenergan and Compazine triggered tremendous degree of confusion. Therefore, for the subsequent cycle 3 and 4, Ativan will be the main medicine to utilize. Zofran also will be utilized.     Physically the patient feels much better today. The patient has had appetite to eat. The heartburn and indigestion have mostly subsided. Her nutrition and hydration are much better. She is keeping her weight and she has no pain. Bowel activity with occasional constipation. Urination is normal. No cardiovascular or respiratory issues. Energy level much better.     She had significant pain with Neulasta use 4 days after the treatment requiring Zyrtec and significant Tylenol utilization.            Past Medical History:   Diagnosis Date    Anemia     Anesthesia complication     hypotension with last C section    Breast cancer (HCC) 03/2022    left sided, stage 3, ER/OR+, Her2-, s/p mastectomy with plans for AC/Taxol and radiation    History of COVID-19     1/2021 AND 2/8/2022    Hyperlipidemia     PONV (postoperative nausea and vomiting)     Seasonal allergies         Past Surgical History:   Procedure Laterality Date    APPENDECTOMY      BREAST BIOPSY Left 2022     BREAST RECONSTRUCTION Left 2022    Procedure: LEFT BREAST 1ST STAGE RECONSTRUCTION WITH INSERTION OF TISSUE EXPANDER AND BIOLOGIC;  Surgeon: Hermelinda Johnson MD;  Location: [facility] OR;  Service: Plastics;  Laterality: Left;     SECTION       SECTION N/A 12/10/2018    Procedure:  SECTION REPEAT;  Surgeon: Heidy Gutierrez MD;  Location: [facility] LABOR DELIVERY;  Service: Obstetrics/Gynecology    D & C HYSTEROSCOPY N/A 2021    Procedure: HYSTEROSCOPY REMOVAL INTRAUTERINE DEVICE WILL NEED ULTRASOUND IN ROOM;  Surgeon: eHidy Gutierrez MD;  Location: [facility] OR OSC;  Service: Obstetrics/Gynecology;  Laterality: N/A;    KNEE ARTHROSCOPY Right     MASTECTOMY W/ SENTINEL NODE BIOPSY Left 2022    Procedure: left total mastectomy with left axillary sentinel lymph node biopsy;  Surgeon: Monisha Escudero MD;  Location: [facility] OR;  Service: General;  Laterality: Left;    VENOUS ACCESS DEVICE (PORT) INSERTION Right 2022    Procedure: right port placement;  Surgeon: Monisha Escudero MD;  Location: [facility];  Service: General;  Laterality: Right;        Current Outpatient Medications on File Prior to Visit   Medication Sig Dispense Refill    LORazepam (ATIVAN) 0.5 MG tablet Take 1 tablet by mouth Every 12 (Twelve) Hours As Needed (Nausea). To be taken every 12 hours as needed for up to one week after chemotherapy 28 tablet 0    OLANZapine (ZyPREXA) 2.5 MG tablet 1 tablet at bedtime on days 2, 3, 4 after each chemotherapy 12 tablet 1    omeprazole (priLOSEC) 20 MG capsule Take 1 capsule by mouth 2 (Two) Times a Day. 60 capsule 3    prochlorperazine (COMPAZINE) 10 MG tablet Take 1 tablet by mouth Every 6 (Six) Hours As Needed for Nausea or Vomiting. 60 tablet 1     No current facility-administered medications on file prior to visit.        ALLERGIES:    Allergies   Allergen Reactions    Asa [Aspirin] Anaphylaxis and Shortness Of Breath        Social  "History     Socioeconomic History    Marital status:    Tobacco Use    Smoking status: Never Smoker    Smokeless tobacco: Never Used   Vaping Use    Vaping Use: Never used   Substance and Sexual Activity    Alcohol use: Not Currently     Alcohol/week: 5.0 standard drinks     Types: 5 Shots of liquor per week    Drug use: Never    Sexual activity: Defer        Family History   Problem Relation Age of Onset    Melanoma Paternal Aunt     Diabetes Other     Malig Hyperthermia Neg Hx           Objective     Vitals:    06/23/22 1313   BP: 90/62   Pulse: 76   Resp: 16   Temp: 97.5 °F (36.4 °C)   TempSrc: Temporal   SpO2: 100%   Weight: 51.9 kg (114 lb 6.4 oz)   Height: 157 cm (61.81\")   PainSc: 0-No pain     Current Status 6/23/2022   ECOG score 0       Physical Exam          GENERAL:  Well-developed, well-nourished  Patient  in no acute distress.   SKIN:  Warm, dry ,NO purpura ,no rash.  HEENT:  Pupils were equal and reactive to light and accomodation, conjunctivae noninjected, normal extraocular movements, normal visual acuity.   NECK:  Supple with good range of motion; no thyromegaly , no JVD or bruits,.No carotid artery pain, no carotid abnormal pulsation   LYMPHATICS:  No cervical, NO supraclavicular, NO axillary, NO inguinal adenopathies.  CARDIAC   normal rate , regular rhythm, without murmur,NO rubs NO S3 NO S4   LUNGS: normal breath sounds bilateral, no wheezing, NO rhonchi, NO crackles ,NO rubs.  VASCULAR VENOUS: no cyanosis, NO collateral circulation, NO varicosities, NO edema, NO palpable cords, NO pain,NO erythema, NO pigmentation of the skin.  ABDOMEN:  Soft, NO pain,no hepatomegaly, no splenomegaly,no masses, no ascites, no collateral circulation,no distention.  EXTREMITIES  AND SPINE:  No clubbing, no cyanosis ,no deformities , no pain .No kyphosis,  no pain in spine, no pain in ribs , no pain in pelvic bone.  NEUROLOGICAL:  Patient was awake, alert, oriented to time, person and " place.            RECENT LABS:  Hematology WBC   Date Value Ref Range Status   06/23/2022 9.36 3.40 - 10.80 10*3/mm3 Final     RBC   Date Value Ref Range Status   06/23/2022 3.54 (L) 3.77 - 5.28 10*6/mm3 Final     Hemoglobin   Date Value Ref Range Status   06/23/2022 10.7 (L) 12.0 - 15.9 g/dL Final     Hematocrit   Date Value Ref Range Status   06/23/2022 32.4 (L) 34.0 - 46.6 % Final     Platelets   Date Value Ref Range Status   06/23/2022 121 (L) 140 - 450 10*3/mm3 Final       CBC:    WBC   Date Value Ref Range Status   06/23/2022 9.36 3.40 - 10.80 10*3/mm3 Final     RBC   Date Value Ref Range Status   06/23/2022 3.54 (L) 3.77 - 5.28 10*6/mm3 Final     Hemoglobin   Date Value Ref Range Status   06/23/2022 10.7 (L) 12.0 - 15.9 g/dL Final     Hematocrit   Date Value Ref Range Status   06/23/2022 32.4 (L) 34.0 - 46.6 % Final     MCV   Date Value Ref Range Status   06/23/2022 91.5 79.0 - 97.0 fL Final     MCH   Date Value Ref Range Status   06/23/2022 30.2 26.6 - 33.0 pg Final     MCHC   Date Value Ref Range Status   06/23/2022 33.0 31.5 - 35.7 g/dL Final     RDW   Date Value Ref Range Status   06/23/2022 13.2 12.3 - 15.4 % Final     RDW-SD   Date Value Ref Range Status   06/23/2022 41.6 37.0 - 54.0 fl Final     MPV   Date Value Ref Range Status   06/23/2022 10.4 6.0 - 12.0 fL Final     Platelets   Date Value Ref Range Status   06/23/2022 121 (L) 140 - 450 10*3/mm3 Final     Neutrophil %   Date Value Ref Range Status   06/23/2022 62.3 42.7 - 76.0 % Final     Lymphocyte %   Date Value Ref Range Status   06/23/2022 14.6 (L) 19.6 - 45.3 % Final     Monocyte %   Date Value Ref Range Status   06/23/2022 8.7 5.0 - 12.0 % Final     Eosinophil %   Date Value Ref Range Status   06/23/2022 4.4 0.3 - 6.2 % Final     Basophil %   Date Value Ref Range Status   06/23/2022 1.2 0.0 - 1.5 % Final     Immature Grans %   Date Value Ref Range Status   06/23/2022 8.8 (H) 0.0 - 0.5 % Final     Neutrophils, Absolute   Date Value Ref Range  Status   06/23/2022 5.84 1.70 - 7.00 10*3/mm3 Final     Lymphocytes, Absolute   Date Value Ref Range Status   06/23/2022 1.37 0.70 - 3.10 10*3/mm3 Final     Monocytes, Absolute   Date Value Ref Range Status   06/23/2022 0.81 0.10 - 0.90 10*3/mm3 Final     Eosinophils, Absolute   Date Value Ref Range Status   06/23/2022 0.41 (H) 0.00 - 0.40 10*3/mm3 Final     Basophils, Absolute   Date Value Ref Range Status   06/23/2022 0.11 0.00 - 0.20 10*3/mm3 Final     Immature Grans, Absolute   Date Value Ref Range Status   06/23/2022 0.82 (H) 0.00 - 0.05 10*3/mm3 Final     nRBC   Date Value Ref Range Status   06/23/2022 0.0 0.0 - 0.2 /100 WBC Final        CMP:    Glucose   Date Value Ref Range Status   06/23/2022 96 74 - 124 mg/dL Final     BUN   Date Value Ref Range Status   06/23/2022 11 6 - 20 mg/dL Final     Creatinine   Date Value Ref Range Status   06/23/2022 0.57 (L) 0.60 - 1.10 mg/dL Final     Sodium   Date Value Ref Range Status   06/23/2022 138 134 - 145 mmol/L Final     Potassium   Date Value Ref Range Status   06/23/2022 3.9 3.5 - 4.7 mmol/L Final     Chloride   Date Value Ref Range Status   06/23/2022 103 98 - 107 mmol/L Final     CO2   Date Value Ref Range Status   06/23/2022 23.3 22.0 - 29.0 mmol/L Final     Calcium   Date Value Ref Range Status   06/23/2022 9.3 8.5 - 10.2 mg/dL Final     Total Protein   Date Value Ref Range Status   06/23/2022 7.4 6.3 - 8.0 g/dL Final     Albumin   Date Value Ref Range Status   06/23/2022 4.70 3.50 - 5.20 g/dL Final     ALT (SGPT)   Date Value Ref Range Status   06/23/2022 11 0 - 33 U/L Final     AST (SGOT)   Date Value Ref Range Status   06/23/2022 18 0 - 32 U/L Final     Alkaline Phosphatase   Date Value Ref Range Status   06/23/2022 92 38 - 116 U/L Final     Total Bilirubin   Date Value Ref Range Status   06/23/2022 <0.2 (L) 0.2 - 1.2 mg/dL Final     Globulin   Date Value Ref Range Status   06/23/2022 2.7 1.8 - 3.5 gm/dL Final     A/G Ratio   Date Value Ref Range Status    06/23/2022 1.7 1.1 - 2.4 g/dL Final     BUN/Creatinine Ratio   Date Value Ref Range Status   06/23/2022 19.3 7.3 - 30.0 Final     Anion Gap   Date Value Ref Range Status   06/23/2022 11.7 5.0 - 15.0 mmol/L Final                     Assessment & Plan     Diagnoses and all orders for this visit:    1. Malignant neoplasm of upper-outer quadrant of left breast in female, estrogen receptor positive (HCC) (Primary)  -     CBC & Differential; Future  -     Comprehensive Metabolic Panel; Future  -     CBC & Differential; Future  -     CBC & Differential; Future  -     CBC & Differential; Future  -     Comprehensive Metabolic Panel; Future    2. Chemotherapy induced nausea and vomiting  -     CBC & Differential; Future  -     Comprehensive Metabolic Panel; Future  -     CBC & Differential; Future  -     CBC & Differential; Future  -     CBC & Differential; Future  -     Comprehensive Metabolic Panel; Future    3. Peptic reflux esophagitis  -     CBC & Differential; Future  -     Comprehensive Metabolic Panel; Future  -     CBC & Differential; Future  -     CBC & Differential; Future  -     CBC & Differential; Future  -     Comprehensive Metabolic Panel; Future    4. Leukopenia due to antineoplastic chemotherapy (HCC)  -     CBC & Differential; Future  -     Comprehensive Metabolic Panel; Future  -     CBC & Differential; Future  -     CBC & Differential; Future  -     CBC & Differential; Future  -     Comprehensive Metabolic Panel; Future    5. Anemia due to other cause, not classified  -     CBC & Differential; Future  -     Comprehensive Metabolic Panel; Future  -     CBC & Differential; Future  -     CBC & Differential; Future  -     CBC & Differential; Future  -     Comprehensive Metabolic Panel; Future    6. Febrile neutropenia (HCC)  -     CBC & Differential; Future  -     Comprehensive Metabolic Panel; Future  -     CBC & Differential; Future  -     CBC & Differential; Future  -     CBC & Differential; Future  -      Comprehensive Metabolic Panel; Future    7. Pancytopenia due to antineoplastic chemotherapy (HCC)  -     CBC & Differential; Future  -     Comprehensive Metabolic Panel; Future  -     CBC & Differential; Future  -     CBC & Differential; Future  -     CBC & Differential; Future  -     Comprehensive Metabolic Panel; Future    8. Chemotherapy-induced thrombocytopenia  -     CBC & Differential; Future  -     Comprehensive Metabolic Panel; Future  -     CBC & Differential; Future  -     CBC & Differential; Future  -     CBC & Differential; Future  -     Comprehensive Metabolic Panel; Future    9. Fitting and adjustment of vascular catheter  -     CBC & Differential; Future  -     Comprehensive Metabolic Panel; Future  -     CBC & Differential; Future  -     CBC & Differential; Future  -     CBC & Differential; Future  -     Comprehensive Metabolic Panel; Future       In summary this 37-year-old  female originally from [place] noticed abnormalities in the left breast in 12/2021, sensation of pressure, minor pain, sensation of fullness. She looked for help. Further mammogram and ultrasound documented abnormalities in the left breast that were consistent with breast cancer. Since then the patient has undergone breast MRI. Breast biopsies documented invasive lobular carcinoma. All this culminated with a left-sided mastectomy and sentinel lymph node sampling. The pathology has been posted above. She had a lobular carcinoma, grade 2, margins of resection negative, Ki-67 at 39%, ER positive, NV positive, HER2/leonie negative. She had 1 positive sentinel lymph node for malignancy with no extracapsular invasion. There was no evidence of lymphovascular invasion.     Invitae panel was negative for any genetic alteration.     The patient has had an expander placed at the mastectomy site. She has discomfort and pain after this was instillated with saline solution yesterday.     The other phenomenon that is happening to the  patient is significant pain and discomfort in the inner aspect of the left arm. Sounds neuropathic related to her recent sentinel lymph node sampling. The patient is taking Aleve or ibuprofen with not too much benefit.     The patient also has a minimal component of anemia that will require assessment with ferritin, iron, TIBC, B12, folic acid levels.     RECOMMENDATIONS:  I have advised this patient and  today that she needs to receive adjuvant chemotherapy in the form of AC x4 and subsequently Taxol x12. Oncotype DX in this patient shows intermediate risk of recurrence. Also not only the chemotherapy will be necessary but we need to make her postmenopausal in the next several weeks or months. This will bring in a lot of benefit for her in the long run to minimize any recurrent disease. She is extremely young. She has 2 young children and we have to do as much as we can in this patient to minimize any potentiality for recurrent disease.     I discussed briefly with her side effects of the treatment including alopecia, cardiac toxicity, anemia, leukopenia, thrombocytopenia, mucositis among others and peripheral neuropathy in the Taxol part of the treatment along with leukopenia, anemia, thrombocytopenia and allergic reaction to the medicine. I discussed with her that the whole treatment is going to take at least 24-26 weeks, in other words 6 months, and thereafter the patient will be hopefully postmenopausal. If she does not become postmenopausal she could qualify for a clinical trial or we could ask Dr. Heidy Gutierrez to do bilateral oophorectomy in this patient.     Eventually the patient will require endocrine adjuvant therapy after she becomes postmenopausal hopefully with letrozole.     I discussed all these facts with the patient and her . I spent 1 hour and 30 minutes with her going through the details of all this but she was able to grasp a lot of information. I appreciated highly the visit  with her  and I pointed out to them that they need to work as a team. The patient has 2 young children. She is the only one taking care of the kids. The patient's mother is living with her, coming from [place] after all these issues. I asked her to ask her mother to stay for the period of time that she receives chemotherapy. If we need to do any services for her in regard to visa requirements or status, we will be glad to incorporate our  into this process.     Finally, I discussed with the patient the need for her to proceed with cardiac assessment. I made a referral to be seen by Cardiology and scheduled her to have an echocardiogram and I sent her back to see Dr. Escudero in order to proceed with the placement of a port.     In order to control the pain in the inner aspect of her arm, I asked her to use Voltaren topically 4 times a day. It will be perfectly fine for her to use ibuprofen and I sent Neurontin 100 mg to take at bedtime. Hopefully this will be a transitory issue.     Dr. Johnson eventually will be in charge of deciding the time for the patient to initiate physical therapy for her left shoulder.     I reviewed all the pertinent records on this patient and I posted the information out of the pathology and radiology as above. I discussed the case with my partner, Dr. Landeros, who agrees with my plan of care.    Finally, I also posted to the patient today that will require followup in the future in regard to her right breast not only with mammogram but also ultrasound. I discussed with her the fact that sometimes lobular carcinoma likes to be a bilateral disease. There are minimal abnormalities in the mammogram in the right breast as well as in the MRI. This will require to be followed up in the future. I did not recommend a 2nd mastectomy prophylactically on her even though she asked the question today.     The patient was reviewed on 05/20/2022. She has had her port placed in right  subclavian position a week ago. She is healing perfectly fine and her surgical site on the left mastectomy and the expander looks perfectly normal with healing. No infection, erythema or discharge.     The patient since then has undergone formal education and consent for chemotherapy administration. Her echocardiogram even though technically difficult documented a normal left ventricular ejection fraction. No pericardial effusion and no valvular dysfunction.     Her white count, hemoglobin and platelets are normal today.     Her chemistry profile, CA 15-3 as well as ferritin, iron, TIBC, B12 and folic acid levels were all normal.     Therefore under the present circumstances I advised the patient and  the followin. For the treatment of her breast cancer the patient will initiate today chemotherapy with dose dense AC that she will receive every 2 weeks supported with Neulasta. The side effects of this already were discussed with her including nausea, vomiting, alopecia, anemia, leukopenia, thrombocytopenia and cardiac dysfunction among others. The patient was ready to proceed.   2. The patient will be utilizing Neulasta On-body to minimize hematological toxicity and be able to keep up on the schedule in regard to her treatment. I advised her that this can produce significant bone pain in the sternum, in the rib cage, in the pelvic bone and she could use antihistaminic that she is already taking in the first place and also she can use either Aleve or Tylenol or a hot shower if necessary.   3. I encouraged her to remain on her nausea medicine, ondansetron, during the next 3 days to minimize any nausea or vomiting.   4. I advised her to stay away from smelling foods like hot soup or fried foods in the next 3 days given the significant increase in smell that patients receiving AC typically have. This will minimize the potential for nausea and vomiting.   5. The patient will require toxicity assessment in a  week along with blood WORK CBC.   6. The patient once completes 4 cycles of AC will initiate weekly cycles of Taxol x12. Upon completion of that the patient will initiate a plan of radiation therapy and she already has been seen by Radiation Oncology.     The patient will participate in a clinical trial that we have in the office and she already has been consented for this in regard to the utilization of antiestrogen medicine upon completion of chemotherapy administration.     The patient will continue using her other medications on routine basis that include the Zyprexa and she will use the EMLA cream to minimize pain at the port site at the time of accessing.     Finally, I advised the patient and the  present in the room that they at any cost had to minimize the risk of pregnancy. In fact a pregnancy test today was negative. I advised them to use a condom.     The patient was ready to proceed.    All the discussion of all these issues took place in Indonesian.  The patient returned to the office on 05/26/2022. Since the previous visit a week ago the patient had chemotherapy administration and she developed very significant nausea and vomiting nonstoppable for almost 5 days. Yesterday was the first day that she was able to have some rice and potatoes. Because she has thrown up so much she has developed significant heartburn and indigestion indicating probably bile gastritis, esophagitis and acid esophagitis. The patient has not had any hematemesis or melena. The other phenomenon that she had was tremendous somnolence due to the use of Zyprexa at the dose of 5 mg every night. She spent almost 4 days in bed sleepy but still with nausea and vomiting.     On top of things, she has very significant pain in her muscles, in her thighs, in her scalp and in her pelvic bone associated with Neulasta use. She is taking Tylenol for this with some improvement.     The patient has been able to reach hydration since  yesterday, drinking proper liquids, and she is urinating okay. Therefore, the patient has multiple issues and on top of that she has now leukopenia associated with chemotherapy. The hemoglobin is stable. The platelet count has dropped but is not dramatically or risky for bleeding.     Given all the above circumstances I proposed her the following changes in regard to plan of chemotherapy for the next cycle.     1. We will modify her plan of chemotherapy, decreasing the doses for all the medications by 20%.   2. The patient will space out the chemotherapy treatments to every 3 weeks for the AC part of the treatment until completion of 4 cycles.   3. The patient will be supported with Neulasta injection. She will utilize Tylenol for pain.   4. The patient will continue using chemotherapy administration, anti-nausea medication and she will receive Emend on day 2 in the office and Emend on day 3 at home. Pharmacist will take care of this issue.   5. The patient will decrease Zyprexa from 5 mg a day to 2.5 mg in the evening to minimize somnolence.   6. The patient was advised to use omeprazole at the dose of 20 mg twice a day to minimize esophageal irritation associated with so much nausea and vomiting.   7. The patient will require still laboratory parameters the week after each one of the cycles of chemotherapy.     I feel the obligation to make all these changes; otherwise this patient will quit her treatment and that is the worst thing that could happen under the present scenario. She agreed with all the changes made. I explained to her all these changes in Turkish to make her life a little bit more comfortable and easy to handle. She understands at the time that she returns in 2 weeks she probably will start to develop alopecia and she already has discussed this with her mother who will help through the process of shaving her head.  On 06/23/2022, the patient had dramatic improvement in regard to nausea control  after dose adjustment for the 2nd cycle of chemotherapy and also modification of the nausea regimen. We tried to deliver Compazine and Phenergan. These medicines were tremendously tolling on her in regard to confusion and inability to function. Ativan was the miracle medicine. Therefore for the next 2 cycles Zofran and Ativan will be the ideal combination and she will require also IV fluids on day 2 of each one of the next cycles of AC.     In regard to pain associated with Neulasta, she required Tylenol around-the-clock for 4 days after each one of the treatments and now she has no discomfort whatsoever.     Today her white count is normal, hemoglobin improved to 10.7, platelet count with minor decrease, no clinical bleeding.    The patient feels substantially better. She is able to function at home. She is able to be with her family. She is able to attend her children and she has been able to walk 2 miles a day late in the evening. Encouraged her to continue doing this.     I suggested for her the followin. She will return in a week to proceed with cycle 3 of AC at the previous dose and supported with Neulasta.   2. She will be supported on day 2 with IV fluids and nausea medication.   3. We will discontinue Phenergan and/or Compazine and she will remain on Zofran and Ativan for nausea control.   4. She will continue PPI to minimize reflux symptomatology, still minimal problem but dramatically better than before.   5. She will use Milk of Magnesia for constipation. Another measure could be prunes and dates.     The patient will complete the total 4 cycles of AC and then will move into the Taxol arena. I pointed out to her that we will have the discussion about how to handle this medicine after the completion of the next 2 cycles.     She also raised the question in regard what to do upon completion of chemotherapy. I advised her that eventually she will have radiological assessment as a baseline and we will  proceed with PATRICIA blood sampling for evaluation of minimal residual disease.     The patient was ready to proceed now that she feels dramatically better. What a difference it makes in regard to adjustment of medicines and trying to adjust nausea medication accordingly.

## 2022-06-30 ENCOUNTER — INFUSION (OUTPATIENT)
Dept: ONCOLOGY | Facility: HOSPITAL | Age: 37
End: 2022-06-30
Payer: COMMERCIAL

## 2022-06-30 ENCOUNTER — OFFICE VISIT (OUTPATIENT)
Dept: ONCOLOGY | Facility: CLINIC | Age: 37
End: 2022-06-30

## 2022-06-30 VITALS
OXYGEN SATURATION: 98 % | HEIGHT: 62 IN | SYSTOLIC BLOOD PRESSURE: 90 MMHG | TEMPERATURE: 98 F | WEIGHT: 114.7 LBS | DIASTOLIC BLOOD PRESSURE: 60 MMHG | RESPIRATION RATE: 16 BRPM | BODY MASS INDEX: 21.11 KG/M2 | HEART RATE: 70 BPM

## 2022-06-30 DIAGNOSIS — Z17.0 MALIGNANT NEOPLASM OF UPPER-OUTER QUADRANT OF LEFT BREAST IN FEMALE, ESTROGEN RECEPTOR POSITIVE: Primary | ICD-10-CM

## 2022-06-30 DIAGNOSIS — C50.412 MALIGNANT NEOPLASM OF UPPER-OUTER QUADRANT OF LEFT BREAST IN FEMALE, ESTROGEN RECEPTOR POSITIVE: ICD-10-CM

## 2022-06-30 DIAGNOSIS — C50.412 MALIGNANT NEOPLASM OF UPPER-OUTER QUADRANT OF LEFT BREAST IN FEMALE, ESTROGEN RECEPTOR POSITIVE: Primary | ICD-10-CM

## 2022-06-30 DIAGNOSIS — R11.2 CHEMOTHERAPY INDUCED NAUSEA AND VOMITING: ICD-10-CM

## 2022-06-30 DIAGNOSIS — Z17.0 MALIGNANT NEOPLASM OF UPPER-OUTER QUADRANT OF LEFT BREAST IN FEMALE, ESTROGEN RECEPTOR POSITIVE: ICD-10-CM

## 2022-06-30 DIAGNOSIS — T45.1X5A CHEMOTHERAPY INDUCED NAUSEA AND VOMITING: ICD-10-CM

## 2022-06-30 LAB
ALBUMIN SERPL-MCNC: 4.5 G/DL (ref 3.5–5.2)
ALBUMIN/GLOB SERPL: 1.7 G/DL (ref 1.1–2.4)
ALP SERPL-CCNC: 90 U/L (ref 38–116)
ALT SERPL W P-5'-P-CCNC: 15 U/L (ref 0–33)
ANION GAP SERPL CALCULATED.3IONS-SCNC: 12.5 MMOL/L (ref 5–15)
AST SERPL-CCNC: 19 U/L (ref 0–32)
BASOPHILS # BLD AUTO: 0.05 10*3/MM3 (ref 0–0.2)
BASOPHILS NFR BLD AUTO: 1.2 % (ref 0–1.5)
BILIRUB SERPL-MCNC: 0.2 MG/DL (ref 0.2–1.2)
BUN SERPL-MCNC: 12 MG/DL (ref 6–20)
BUN/CREAT SERPL: 21.4 (ref 7.3–30)
CALCIUM SPEC-SCNC: 9.4 MG/DL (ref 8.5–10.2)
CHLORIDE SERPL-SCNC: 105 MMOL/L (ref 98–107)
CO2 SERPL-SCNC: 22.5 MMOL/L (ref 22–29)
CREAT SERPL-MCNC: 0.56 MG/DL (ref 0.6–1.1)
DEPRECATED RDW RBC AUTO: 44.2 FL (ref 37–54)
EGFRCR SERPLBLD CKD-EPI 2021: 120 ML/MIN/1.73
EOSINOPHIL # BLD AUTO: 0.15 10*3/MM3 (ref 0–0.4)
EOSINOPHIL NFR BLD AUTO: 3.7 % (ref 0.3–6.2)
ERYTHROCYTE [DISTWIDTH] IN BLOOD BY AUTOMATED COUNT: 13.6 % (ref 12.3–15.4)
GLOBULIN UR ELPH-MCNC: 2.7 GM/DL (ref 1.8–3.5)
GLUCOSE SERPL-MCNC: 92 MG/DL (ref 74–124)
HCT VFR BLD AUTO: 32.9 % (ref 34–46.6)
HGB BLD-MCNC: 10.8 G/DL (ref 12–15.9)
IMM GRANULOCYTES # BLD AUTO: 0.03 10*3/MM3 (ref 0–0.05)
IMM GRANULOCYTES NFR BLD AUTO: 0.7 % (ref 0–0.5)
LYMPHOCYTES # BLD AUTO: 0.78 10*3/MM3 (ref 0.7–3.1)
LYMPHOCYTES NFR BLD AUTO: 19.2 % (ref 19.6–45.3)
MCH RBC QN AUTO: 30 PG (ref 26.6–33)
MCHC RBC AUTO-ENTMCNC: 32.8 G/DL (ref 31.5–35.7)
MCV RBC AUTO: 91.4 FL (ref 79–97)
MONOCYTES # BLD AUTO: 0.49 10*3/MM3 (ref 0.1–0.9)
MONOCYTES NFR BLD AUTO: 12.1 % (ref 5–12)
NEUTROPHILS NFR BLD AUTO: 2.56 10*3/MM3 (ref 1.7–7)
NEUTROPHILS NFR BLD AUTO: 63.1 % (ref 42.7–76)
NRBC BLD AUTO-RTO: 0 /100 WBC (ref 0–0.2)
PLATELET # BLD AUTO: 190 10*3/MM3 (ref 140–450)
PMV BLD AUTO: 10.8 FL (ref 6–12)
POTASSIUM SERPL-SCNC: 3.8 MMOL/L (ref 3.5–4.7)
PROT SERPL-MCNC: 7.2 G/DL (ref 6.3–8)
RBC # BLD AUTO: 3.6 10*6/MM3 (ref 3.77–5.28)
SODIUM SERPL-SCNC: 140 MMOL/L (ref 134–145)
WBC NRBC COR # BLD: 4.06 10*3/MM3 (ref 3.4–10.8)

## 2022-06-30 PROCEDURE — 25010000002 PALONOSETRON PER 25 MCG

## 2022-06-30 PROCEDURE — 25010000002 DEXAMETHASONE SODIUM PHOSPHATE 100 MG/10ML SOLUTION

## 2022-06-30 PROCEDURE — 25010000002 LORAZEPAM PER 2 MG: Performed by: INTERNAL MEDICINE

## 2022-06-30 PROCEDURE — 85025 COMPLETE CBC W/AUTO DIFF WBC: CPT

## 2022-06-30 PROCEDURE — 25010000002 FOSAPREPITANT PER 1 MG

## 2022-06-30 PROCEDURE — 25010000002 CYCLOPHOSPHAMIDE 1 GM/5ML SOLUTION 5 ML VIAL

## 2022-06-30 PROCEDURE — 99214 OFFICE O/P EST MOD 30 MIN: CPT

## 2022-06-30 PROCEDURE — 80053 COMPREHEN METABOLIC PANEL: CPT

## 2022-06-30 PROCEDURE — 25010000002 PEGFILGRASTIM 6 MG/0.6ML PREFILLED SYRINGE KIT

## 2022-06-30 PROCEDURE — 96375 TX/PRO/DX INJ NEW DRUG ADDON: CPT

## 2022-06-30 PROCEDURE — 96377 APPLICATON ON-BODY INJECTOR: CPT

## 2022-06-30 PROCEDURE — 96367 TX/PROPH/DG ADDL SEQ IV INF: CPT

## 2022-06-30 PROCEDURE — 96411 CHEMO IV PUSH ADDL DRUG: CPT

## 2022-06-30 PROCEDURE — 25010000002 DOXORUBICIN PER 10 MG

## 2022-06-30 PROCEDURE — 96413 CHEMO IV INFUSION 1 HR: CPT

## 2022-06-30 RX ORDER — SODIUM CHLORIDE 9 MG/ML
250 INJECTION, SOLUTION INTRAVENOUS ONCE
Status: CANCELLED | OUTPATIENT
Start: 2022-06-30

## 2022-06-30 RX ORDER — PALONOSETRON 0.05 MG/ML
0.25 INJECTION, SOLUTION INTRAVENOUS ONCE
Status: COMPLETED | OUTPATIENT
Start: 2022-06-30 | End: 2022-06-30

## 2022-06-30 RX ORDER — DOXORUBICIN HYDROCHLORIDE 2 MG/ML
48 INJECTION, SOLUTION INTRAVENOUS ONCE
Status: COMPLETED | OUTPATIENT
Start: 2022-06-30 | End: 2022-06-30

## 2022-06-30 RX ORDER — DOXORUBICIN HYDROCHLORIDE 2 MG/ML
48 INJECTION, SOLUTION INTRAVENOUS ONCE
Status: CANCELLED | OUTPATIENT
Start: 2022-06-30

## 2022-06-30 RX ORDER — SODIUM CHLORIDE 9 MG/ML
250 INJECTION, SOLUTION INTRAVENOUS ONCE
Status: COMPLETED | OUTPATIENT
Start: 2022-06-30 | End: 2022-06-30

## 2022-06-30 RX ORDER — PALONOSETRON 0.05 MG/ML
0.25 INJECTION, SOLUTION INTRAVENOUS ONCE
Status: CANCELLED | OUTPATIENT
Start: 2022-06-30

## 2022-06-30 RX ORDER — LORAZEPAM 2 MG/ML
0.25 INJECTION INTRAMUSCULAR ONCE
Status: COMPLETED | OUTPATIENT
Start: 2022-06-30 | End: 2022-06-30

## 2022-06-30 RX ORDER — OLANZAPINE 5 MG/1
2.5 TABLET ORAL ONCE
Status: CANCELLED | OUTPATIENT
Start: 2022-06-30 | End: 2022-06-30

## 2022-06-30 RX ORDER — OLANZAPINE 5 MG/1
2.5 TABLET ORAL ONCE
Status: COMPLETED | OUTPATIENT
Start: 2022-06-30 | End: 2022-06-30

## 2022-06-30 RX ADMIN — SODIUM CHLORIDE 250 ML: 9 INJECTION, SOLUTION INTRAVENOUS at 08:35

## 2022-06-30 RX ADMIN — PALONOSETRON 0.25 MG: 0.05 INJECTION, SOLUTION INTRAVENOUS at 08:37

## 2022-06-30 RX ADMIN — PEGFILGRASTIM 6 MG: KIT SUBCUTANEOUS at 09:51

## 2022-06-30 RX ADMIN — OLANZAPINE 2.5 MG: 5 TABLET, FILM COATED ORAL at 08:38

## 2022-06-30 RX ADMIN — SODIUM CHLORIDE 100 ML: 9 INJECTION, SOLUTION INTRAVENOUS at 08:57

## 2022-06-30 RX ADMIN — CYCLOPHOSPHAMIDE 740 MG: 200 INJECTION, SOLUTION INTRAVENOUS at 09:44

## 2022-06-30 RX ADMIN — DEXAMETHASONE SODIUM PHOSPHATE 12 MG: 10 INJECTION, SOLUTION INTRAMUSCULAR; INTRAVENOUS at 08:38

## 2022-06-30 RX ADMIN — LORAZEPAM 0.25 MG: 2 INJECTION INTRAMUSCULAR; INTRAVENOUS at 09:29

## 2022-06-30 RX ADMIN — DOXORUBICIN HYDROCHLORIDE 74 MG: 2 INJECTION, SOLUTION INTRAVENOUS at 09:31

## 2022-06-30 NOTE — PROGRESS NOTES
Subjective     REASON FOR FOLLOW UP:  LOBULAR CARCINOMA OF THE LEFT BREAST, 70 MM IN SIZE, MARGINS OF RESECTION NEGATIVE AFTER MASTECTOMY, GRADE 2, ER POSITIVE WA POSITIVE, HER 2 NEGATIVE BY IHC,KI 67 39% ,ONE POSITIVE AXILLARY LYMPH NODE OUT OF 4 NODES REMOVAL, PREMENOPAUSAL .INVITAE PANEL NEGATIVE.ONCOTYPE 18    HISTORY OF PRESENT ILLNESS:     Maribell Encarnacion is a 37 y.o. female with the above mentioned history returning to the office today for evaluation with lab review prior to cycle 2 Adriamycin/Cytoxan. Overall, she is doing well today. She is eating and drinking adequately. Her nausea is now well controlled with ativan and zofran. In fact, she denies nausea today. She also return tomorrow for IV fluids.  She does report that she had a menstrual cycle earlier this week.  She reports noticing a difference in this cycle and experienced an increase in abdominal cramping.  She reports a cycle lasting 4 days.  Otherwise, she has no new concerns today.  Labs reviewed with the patient and stable.      Past Medical History:   Diagnosis Date   • Anemia    • Anesthesia complication     hypotension with last C section   • Breast cancer (HCC) 2022    left sided, stage 3, ER/WA+, Her2-, s/p mastectomy with plans for AC/Taxol and radiation   • History of COVID-19     2021 AND 2022   • Hyperlipidemia    • PONV (postoperative nausea and vomiting)    • Seasonal allergies         Past Surgical History:   Procedure Laterality Date   • APPENDECTOMY     • BREAST BIOPSY Left    • BREAST RECONSTRUCTION Left 2022    Procedure: LEFT BREAST 1ST STAGE RECONSTRUCTION WITH INSERTION OF TISSUE EXPANDER AND BIOLOGIC;  Surgeon: Hermelinda Johnson MD;  Location: [facility] OR;  Service: Plastics;  Laterality: Left;   •  SECTION     •  SECTION N/A 12/10/2018    Procedure:  SECTION REPEAT;  Surgeon: Heidy Gutierrez MD;  Location: [facility] LABOR DELIVERY;  Service:  Obstetrics/Gynecology   • D & C HYSTEROSCOPY N/A 06/28/2021    Procedure: HYSTEROSCOPY REMOVAL INTRAUTERINE DEVICE WILL NEED ULTRASOUND IN ROOM;  Surgeon: Heidy Gutierrez MD;  Location: [facility];  Service: Obstetrics/Gynecology;  Laterality: N/A;   • KNEE ARTHROSCOPY Right    • MASTECTOMY W/ SENTINEL NODE BIOPSY Left 04/13/2022    Procedure: left total mastectomy with left axillary sentinel lymph node biopsy;  Surgeon: Monisha Escudero MD;  Location: [facility];  Service: General;  Laterality: Left;   • VENOUS ACCESS DEVICE (PORT) INSERTION Right 5/13/2022    Procedure: right port placement;  Surgeon: Monisha Escudero MD;  Location: [facility];  Service: General;  Laterality: Right;        Current Outpatient Medications on File Prior to Visit   Medication Sig Dispense Refill   • LORazepam (ATIVAN) 0.5 MG tablet Take 1 tablet by mouth Every 12 (Twelve) Hours As Needed (Nausea). To be taken every 12 hours as needed for up to one week after chemotherapy 28 tablet 0   • OLANZapine (ZyPREXA) 2.5 MG tablet 1 tablet at bedtime on days 2, 3, 4 after each chemotherapy 12 tablet 1   • omeprazole (priLOSEC) 20 MG capsule Take 1 capsule by mouth 2 (Two) Times a Day. 60 capsule 3     No current facility-administered medications on file prior to visit.        ALLERGIES:    Allergies   Allergen Reactions   • Asa [Aspirin] Anaphylaxis and Shortness Of Breath        Social History     Socioeconomic History   • Marital status:    Tobacco Use   • Smoking status: Never Smoker   • Smokeless tobacco: Never Used   Vaping Use   • Vaping Use: Never used   Substance and Sexual Activity   • Alcohol use: Not Currently     Alcohol/week: 5.0 standard drinks     Types: 5 Shots of liquor per week   • Drug use: Never   • Sexual activity: Defer        Family History   Problem Relation Age of Onset   • Melanoma Paternal Aunt    • Diabetes Other    • Malig Hyperthermia Neg Hx           Objective     There were no  vitals filed for this visit.  Current Status 6/23/2022   ECOG score 0     Physical Exam  Vitals reviewed.   Constitutional:       General: She is not in acute distress.     Appearance: Normal appearance. She is well-developed.   HENT:      Head: Normocephalic and atraumatic.      Mouth/Throat:      Pharynx: No oropharyngeal exudate.   Eyes:      Pupils: Pupils are equal, round, and reactive to light.   Cardiovascular:      Rate and Rhythm: Normal rate and regular rhythm.      Heart sounds: Normal heart sounds. No murmur heard.  Pulmonary:      Effort: Pulmonary effort is normal. No respiratory distress.      Breath sounds: Normal breath sounds. No wheezing, rhonchi or rales.   Abdominal:      General: Bowel sounds are normal. There is no distension.      Palpations: Abdomen is soft.   Musculoskeletal:         General: Normal range of motion.      Cervical back: Normal range of motion.   Skin:     General: Skin is warm and dry.      Findings: No rash.   Neurological:      Mental Status: She is alert and oriented to person, place, and time.   Psychiatric:         Behavior: Behavior normal.         RECENT LABS:  Hematology WBC   Date Value Ref Range Status   06/23/2022 9.36 3.40 - 10.80 10*3/mm3 Final     RBC   Date Value Ref Range Status   06/23/2022 3.54 (L) 3.77 - 5.28 10*6/mm3 Final     Hemoglobin   Date Value Ref Range Status   06/23/2022 10.7 (L) 12.0 - 15.9 g/dL Final     Hematocrit   Date Value Ref Range Status   06/23/2022 32.4 (L) 34.0 - 46.6 % Final     Platelets   Date Value Ref Range Status   06/23/2022 121 (L) 140 - 450 10*3/mm3 Final       CBC:    WBC   Date Value Ref Range Status   06/23/2022 9.36 3.40 - 10.80 10*3/mm3 Final     RBC   Date Value Ref Range Status   06/23/2022 3.54 (L) 3.77 - 5.28 10*6/mm3 Final     Hemoglobin   Date Value Ref Range Status   06/23/2022 10.7 (L) 12.0 - 15.9 g/dL Final     Hematocrit   Date Value Ref Range Status   06/23/2022 32.4 (L) 34.0 - 46.6 % Final     MCV   Date  Value Ref Range Status   06/23/2022 91.5 79.0 - 97.0 fL Final     MCH   Date Value Ref Range Status   06/23/2022 30.2 26.6 - 33.0 pg Final     MCHC   Date Value Ref Range Status   06/23/2022 33.0 31.5 - 35.7 g/dL Final     RDW   Date Value Ref Range Status   06/23/2022 13.2 12.3 - 15.4 % Final     RDW-SD   Date Value Ref Range Status   06/23/2022 41.6 37.0 - 54.0 fl Final     MPV   Date Value Ref Range Status   06/23/2022 10.4 6.0 - 12.0 fL Final     Platelets   Date Value Ref Range Status   06/23/2022 121 (L) 140 - 450 10*3/mm3 Final     Neutrophil %   Date Value Ref Range Status   06/23/2022 62.3 42.7 - 76.0 % Final     Lymphocyte %   Date Value Ref Range Status   06/23/2022 14.6 (L) 19.6 - 45.3 % Final     Monocyte %   Date Value Ref Range Status   06/23/2022 8.7 5.0 - 12.0 % Final     Eosinophil %   Date Value Ref Range Status   06/23/2022 4.4 0.3 - 6.2 % Final     Basophil %   Date Value Ref Range Status   06/23/2022 1.2 0.0 - 1.5 % Final     Immature Grans %   Date Value Ref Range Status   06/23/2022 8.8 (H) 0.0 - 0.5 % Final     Neutrophils, Absolute   Date Value Ref Range Status   06/23/2022 5.84 1.70 - 7.00 10*3/mm3 Final     Lymphocytes, Absolute   Date Value Ref Range Status   06/23/2022 1.37 0.70 - 3.10 10*3/mm3 Final     Monocytes, Absolute   Date Value Ref Range Status   06/23/2022 0.81 0.10 - 0.90 10*3/mm3 Final     Eosinophils, Absolute   Date Value Ref Range Status   06/23/2022 0.41 (H) 0.00 - 0.40 10*3/mm3 Final     Basophils, Absolute   Date Value Ref Range Status   06/23/2022 0.11 0.00 - 0.20 10*3/mm3 Final     Immature Grans, Absolute   Date Value Ref Range Status   06/23/2022 0.82 (H) 0.00 - 0.05 10*3/mm3 Final     nRBC   Date Value Ref Range Status   06/23/2022 0.0 0.0 - 0.2 /100 WBC Final        CMP:    Glucose   Date Value Ref Range Status   06/23/2022 96 74 - 124 mg/dL Final     BUN   Date Value Ref Range Status   06/23/2022 11 6 - 20 mg/dL Final     Creatinine   Date Value Ref Range Status    06/23/2022 0.57 (L) 0.60 - 1.10 mg/dL Final     Sodium   Date Value Ref Range Status   06/23/2022 138 134 - 145 mmol/L Final     Potassium   Date Value Ref Range Status   06/23/2022 3.9 3.5 - 4.7 mmol/L Final     Chloride   Date Value Ref Range Status   06/23/2022 103 98 - 107 mmol/L Final     CO2   Date Value Ref Range Status   06/23/2022 23.3 22.0 - 29.0 mmol/L Final     Calcium   Date Value Ref Range Status   06/23/2022 9.3 8.5 - 10.2 mg/dL Final     Total Protein   Date Value Ref Range Status   06/23/2022 7.4 6.3 - 8.0 g/dL Final     Albumin   Date Value Ref Range Status   06/23/2022 4.70 3.50 - 5.20 g/dL Final     ALT (SGPT)   Date Value Ref Range Status   06/23/2022 11 0 - 33 U/L Final     AST (SGOT)   Date Value Ref Range Status   06/23/2022 18 0 - 32 U/L Final     Alkaline Phosphatase   Date Value Ref Range Status   06/23/2022 92 38 - 116 U/L Final     Total Bilirubin   Date Value Ref Range Status   06/23/2022 <0.2 (L) 0.2 - 1.2 mg/dL Final     Globulin   Date Value Ref Range Status   06/23/2022 2.7 1.8 - 3.5 gm/dL Final     A/G Ratio   Date Value Ref Range Status   06/23/2022 1.7 1.1 - 2.4 g/dL Final     BUN/Creatinine Ratio   Date Value Ref Range Status   06/23/2022 19.3 7.3 - 30.0 Final     Anion Gap   Date Value Ref Range Status   06/23/2022 11.7 5.0 - 15.0 mmol/L Final           Assessment & Plan     There are no diagnoses linked to this encounter.   *Left Breast Cancer  · 37-year-old  female originally from [place] noticed abnormalities in the left breast in 12/2021, sensation of pressure, minor pain, sensation of fullness.   · Further mammogram and ultrasound documented abnormalities in the left breast that were consistent with breast cancer.   · Since then the patient has undergone breast MRI. Breast biopsies documented invasive lobular carcinoma.   · All this culminated with a left-sided mastectomy and sentinel lymph node sampling.   · She had a lobular carcinoma, grade 2, margins of  resection negative, Ki-67 at 39%, ER positive, MI positive, HER2/leonie negative. She had 1 positive sentinel lymph node for malignancy with no extracapsular invasion. There was no evidence of lymphovascular invasion.   · Invitae panel was negative for any genetic alteration.   · The patient has had an expander placed at the mastectomy site. She has discomfort and pain after this was instillated with saline solution yesterday.   · Recommended adjuvant AC x 4 followed by Taxol x 12.  · Oncotype DX in this patient shows intermediate risk of recurrence.   · Also not only the chemotherapy will be necessary but we need to make her postmenopausal in the next several weeks or months. This will bring in a lot of benefit for her in the long run to minimize any recurrent disease. She is extremely young. She has 2 young children and we have to do as much as we can in this patient to minimize any potentiality for recurrent disease.   · Dr. Palacios discussed with her that the whole treatment is going to take at least 24-26 weeks, in other words 6 months, and thereafter the patient will be hopefully postmenopausal. If she does not become postmenopausal she could qualify for a clinical trial or we could ask Dr. Heidy Gutierrez to do bilateral oophorectomy in this patient.   · Eventually the patient will require endocrine adjuvant therapy after she becomes postmenopausal hopefully with letrozole.   · Finally, the patient will need cardiac assessment.  She has been referred to Cardiology and scheduled her to have an echocardiogram.  · Dr. Palacios also discussed that the patient will require followup in the future in regard to her right breast not only with mammogram but also ultrasound due to the fact that sometimes lobular carcinoma likes to be a bilateral disease. There are minimal abnormalities in the mammogram in the right breast as well as in the MRI. This will require to be followed up in the future. Did not recommend a 2nd  mastectomy prophylactically on her even though she asked the question.   The patient was reviewed on 05/20/2022. She has had her port placed in right subclavian position a week ago. She is healing perfectly fine and her surgical site on the left mastectomy and the expander looks perfectly normal with healing. No infection, erythema or discharge.   1 week following C1D1 AC WBC 0.52, ANC 0.06.  Temperature 102.9.  Patient admitted through ED for neutropenic fever and concern for sepsis as detailed below.  Respiratory viral panel ended up being positive for rhinovirus/enterovirus.  6/9/2022 patient here for C2 AC plans to dose reduce treatment by 20% and change treatment schedule to every 3 weeks.  6/30/2022: Patient here for cycle 3 AC.  Plan to continue with 20% dose reduction.  Today, she is feeling well.  Nausea is resolved.  Denies fevers, chills, signs of infection.  CBC, CMP stable.    *Left arm pain, neuropathic?  · She reports experiencing discomfort in the inner aspect of the left arm. Sounds neuropathic related to her recent sentinel lymph node sampling. The patient is taking Aleve or ibuprofen with not too much benefit.   · Recommended Voltaren topically 4 times a day. It will be perfectly fine for her to use ibuprofen and start Neurontin 100 mg to take at bedtime. Hopefully this will be a transitory issue.     *Social Factors  · The patient's mother is living with her, coming from [place] after all these issues.   · Dr. Palacios asked her to ask her mother to stay for the period of time that she receives chemotherapy. If we need to do any services for her in regard to visa requirements or status, we will be glad to incorporate our  into this process.     *Cardiac Function  · 5/25/2022, left ventricular ejection fraction 60%, GLS within normal limits.    *Weakness, chills, lethargy- concern for sepsis  · 5/27/2022, patient seen for triage visit.  Her  called this morning to report she was  very fatigued and experiencing chills and sore throat.  Temperature at that time was 99.8.  Upon arrival to clinic, temperature 99.8 °F.  She was in a wheelchair due to severe weakness.  She is so weak/fatigued she is unable to hold her head up and can barely keep her eyes open.  WBC 0.52, ANC 0.06.  The patient was taken directly to the ED for admission due to serious concern for sepsis.  · Admitted 5/27/2022 - 5/30/2022.    *Nausea due to chemotherapy:   · Receiving IV fluids and antiemetics the day after chemo  · Nausea currently controlled with Zofran and Ativan    PLAN:   · Proceed with cycle 3 Adriamycin/Cytoxan with dose reduction  · Patient to return tomorrow for IV fluids, antiemetics, and Neulasta  · Continue treatment with AC at a 20% dose reduction every 3 weeks for total of 4 cycles  · She can utilize Tylenol for arthralgias secondary to Neulasta.  · Continue Zofran as needed for nausea.    · Continue omeprazole 20 mg twice daily.  · After completion of AC, patient will initiate weekly taxol x 12.  · Upon completion of chemotherapy the patient will initiate radiation therapy and has already been seen by Radiation Oncology.  · The patient will participate in a clinical trial that we have in the office and she already has been consented for this in regard to the utilization of antiestrogen medicine upon completion of chemotherapy administration.   · Call/return sooner should she develop any new concerns or problems.  ·   Patient on high risk medication requiring close monitoring for toxicity      JACQUELIN Matthews  06/30/22

## 2022-06-30 NOTE — NURSING NOTE
Pt here for C3D1  Adriamycin + Cytoxan. Pt reported  New at home current nausea medications are helping significantly with pt's nausea management.   Pt chewed on ice chips during Adriamycin administration. Blood return from port noted q 3cc Zachery infusion. Pt tolerated tx without complication/complaint.

## 2022-07-01 ENCOUNTER — INFUSION (OUTPATIENT)
Dept: ONCOLOGY | Facility: HOSPITAL | Age: 37
End: 2022-07-01

## 2022-07-01 VITALS
BODY MASS INDEX: 20.83 KG/M2 | WEIGHT: 113.2 LBS | RESPIRATION RATE: 16 BRPM | OXYGEN SATURATION: 93 % | HEART RATE: 88 BPM | DIASTOLIC BLOOD PRESSURE: 59 MMHG | TEMPERATURE: 98.2 F | SYSTOLIC BLOOD PRESSURE: 97 MMHG

## 2022-07-01 DIAGNOSIS — Z17.0 MALIGNANT NEOPLASM OF UPPER-OUTER QUADRANT OF LEFT BREAST IN FEMALE, ESTROGEN RECEPTOR POSITIVE: Primary | ICD-10-CM

## 2022-07-01 DIAGNOSIS — C50.412 MALIGNANT NEOPLASM OF UPPER-OUTER QUADRANT OF LEFT BREAST IN FEMALE, ESTROGEN RECEPTOR POSITIVE: Primary | ICD-10-CM

## 2022-07-01 PROCEDURE — 96375 TX/PRO/DX INJ NEW DRUG ADDON: CPT

## 2022-07-01 PROCEDURE — 25010000002 LORAZEPAM PER 2 MG: Performed by: INTERNAL MEDICINE

## 2022-07-01 PROCEDURE — 25010000002 ONDANSETRON PER 1 MG: Performed by: INTERNAL MEDICINE

## 2022-07-01 PROCEDURE — 96361 HYDRATE IV INFUSION ADD-ON: CPT

## 2022-07-01 PROCEDURE — 96360 HYDRATION IV INFUSION INIT: CPT

## 2022-07-01 PROCEDURE — 96374 THER/PROPH/DIAG INJ IV PUSH: CPT

## 2022-07-01 RX ORDER — ONDANSETRON 2 MG/ML
8 INJECTION INTRAMUSCULAR; INTRAVENOUS ONCE
Status: COMPLETED | OUTPATIENT
Start: 2022-07-01 | End: 2022-07-01

## 2022-07-01 RX ORDER — LORAZEPAM 2 MG/ML
0.25 INJECTION INTRAMUSCULAR ONCE
Status: COMPLETED | OUTPATIENT
Start: 2022-07-01 | End: 2022-07-01

## 2022-07-01 RX ORDER — SODIUM CHLORIDE 9 MG/ML
1000 INJECTION, SOLUTION INTRAVENOUS ONCE
Status: COMPLETED | OUTPATIENT
Start: 2022-07-01 | End: 2022-07-01

## 2022-07-01 RX ADMIN — SODIUM CHLORIDE 1000 ML: 9 INJECTION, SOLUTION INTRAVENOUS at 08:36

## 2022-07-01 RX ADMIN — ONDANSETRON 8 MG: 2 INJECTION, SOLUTION INTRAMUSCULAR; INTRAVENOUS at 08:34

## 2022-07-01 RX ADMIN — LORAZEPAM 0.25 MG: 2 INJECTION INTRAMUSCULAR; INTRAVENOUS at 08:35

## 2022-07-21 ENCOUNTER — INFUSION (OUTPATIENT)
Dept: ONCOLOGY | Facility: HOSPITAL | Age: 37
End: 2022-07-21

## 2022-07-21 ENCOUNTER — OFFICE VISIT (OUTPATIENT)
Dept: ONCOLOGY | Facility: CLINIC | Age: 37
End: 2022-07-21

## 2022-07-21 VITALS
BODY MASS INDEX: 21.29 KG/M2 | DIASTOLIC BLOOD PRESSURE: 64 MMHG | TEMPERATURE: 97.3 F | RESPIRATION RATE: 16 BRPM | WEIGHT: 115.7 LBS | HEIGHT: 62 IN | OXYGEN SATURATION: 98 % | HEART RATE: 67 BPM | SYSTOLIC BLOOD PRESSURE: 93 MMHG

## 2022-07-21 DIAGNOSIS — T45.1X5A CHEMOTHERAPY-INDUCED THROMBOCYTOPENIA: ICD-10-CM

## 2022-07-21 DIAGNOSIS — Z17.0 MALIGNANT NEOPLASM OF UPPER-OUTER QUADRANT OF LEFT BREAST IN FEMALE, ESTROGEN RECEPTOR POSITIVE: Primary | ICD-10-CM

## 2022-07-21 DIAGNOSIS — D70.1 LEUKOPENIA DUE TO ANTINEOPLASTIC CHEMOTHERAPY: ICD-10-CM

## 2022-07-21 DIAGNOSIS — C50.412 MALIGNANT NEOPLASM OF UPPER-OUTER QUADRANT OF LEFT BREAST IN FEMALE, ESTROGEN RECEPTOR POSITIVE: Primary | ICD-10-CM

## 2022-07-21 DIAGNOSIS — Z45.2 FITTING AND ADJUSTMENT OF VASCULAR CATHETER: ICD-10-CM

## 2022-07-21 DIAGNOSIS — D64.81 ANEMIA ASSOCIATED WITH CHEMOTHERAPY: ICD-10-CM

## 2022-07-21 DIAGNOSIS — T45.1X5A ANEMIA ASSOCIATED WITH CHEMOTHERAPY: ICD-10-CM

## 2022-07-21 DIAGNOSIS — D69.59 CHEMOTHERAPY-INDUCED THROMBOCYTOPENIA: ICD-10-CM

## 2022-07-21 DIAGNOSIS — C50.412 MALIGNANT NEOPLASM OF UPPER-OUTER QUADRANT OF LEFT BREAST IN FEMALE, ESTROGEN RECEPTOR POSITIVE: ICD-10-CM

## 2022-07-21 DIAGNOSIS — Z17.0 MALIGNANT NEOPLASM OF UPPER-OUTER QUADRANT OF LEFT BREAST IN FEMALE, ESTROGEN RECEPTOR POSITIVE: ICD-10-CM

## 2022-07-21 DIAGNOSIS — T45.1X5A CHEMOTHERAPY INDUCED NAUSEA AND VOMITING: ICD-10-CM

## 2022-07-21 DIAGNOSIS — K21.00 PEPTIC REFLUX ESOPHAGITIS: ICD-10-CM

## 2022-07-21 DIAGNOSIS — D64.89 ANEMIA DUE TO OTHER CAUSE, NOT CLASSIFIED: ICD-10-CM

## 2022-07-21 DIAGNOSIS — R11.2 CHEMOTHERAPY INDUCED NAUSEA AND VOMITING: ICD-10-CM

## 2022-07-21 DIAGNOSIS — T45.1X5A LEUKOPENIA DUE TO ANTINEOPLASTIC CHEMOTHERAPY: ICD-10-CM

## 2022-07-21 LAB
ALBUMIN SERPL-MCNC: 4.3 G/DL (ref 3.5–5.2)
ALBUMIN/GLOB SERPL: 1.7 G/DL (ref 1.1–2.4)
ALP SERPL-CCNC: 84 U/L (ref 38–116)
ALT SERPL W P-5'-P-CCNC: 12 U/L (ref 0–33)
ANION GAP SERPL CALCULATED.3IONS-SCNC: 12.5 MMOL/L (ref 5–15)
AST SERPL-CCNC: 18 U/L (ref 0–32)
BASOPHILS # BLD AUTO: 0.05 10*3/MM3 (ref 0–0.2)
BASOPHILS NFR BLD AUTO: 1.4 % (ref 0–1.5)
BILIRUB SERPL-MCNC: 0.2 MG/DL (ref 0.2–1.2)
BUN SERPL-MCNC: 14 MG/DL (ref 6–20)
BUN/CREAT SERPL: 25.5 (ref 7.3–30)
CALCIUM SPEC-SCNC: 9 MG/DL (ref 8.5–10.2)
CHLORIDE SERPL-SCNC: 106 MMOL/L (ref 98–107)
CO2 SERPL-SCNC: 22.5 MMOL/L (ref 22–29)
CREAT SERPL-MCNC: 0.55 MG/DL (ref 0.6–1.1)
DEPRECATED RDW RBC AUTO: 48 FL (ref 37–54)
EGFRCR SERPLBLD CKD-EPI 2021: 120.5 ML/MIN/1.73
EOSINOPHIL # BLD AUTO: 0.09 10*3/MM3 (ref 0–0.4)
EOSINOPHIL NFR BLD AUTO: 2.6 % (ref 0.3–6.2)
ERYTHROCYTE [DISTWIDTH] IN BLOOD BY AUTOMATED COUNT: 14.5 % (ref 12.3–15.4)
GLOBULIN UR ELPH-MCNC: 2.6 GM/DL (ref 1.8–3.5)
GLUCOSE SERPL-MCNC: 104 MG/DL (ref 74–124)
HCT VFR BLD AUTO: 31.1 % (ref 34–46.6)
HGB BLD-MCNC: 10.3 G/DL (ref 12–15.9)
IMM GRANULOCYTES # BLD AUTO: 0.02 10*3/MM3 (ref 0–0.05)
IMM GRANULOCYTES NFR BLD AUTO: 0.6 % (ref 0–0.5)
LYMPHOCYTES # BLD AUTO: 0.83 10*3/MM3 (ref 0.7–3.1)
LYMPHOCYTES NFR BLD AUTO: 23.7 % (ref 19.6–45.3)
MCH RBC QN AUTO: 30.2 PG (ref 26.6–33)
MCHC RBC AUTO-ENTMCNC: 33.1 G/DL (ref 31.5–35.7)
MCV RBC AUTO: 91.2 FL (ref 79–97)
MONOCYTES # BLD AUTO: 0.45 10*3/MM3 (ref 0.1–0.9)
MONOCYTES NFR BLD AUTO: 12.9 % (ref 5–12)
NEUTROPHILS NFR BLD AUTO: 2.06 10*3/MM3 (ref 1.7–7)
NEUTROPHILS NFR BLD AUTO: 58.8 % (ref 42.7–76)
NRBC BLD AUTO-RTO: 0 /100 WBC (ref 0–0.2)
PLATELET # BLD AUTO: 212 10*3/MM3 (ref 140–450)
PMV BLD AUTO: 9.9 FL (ref 6–12)
POTASSIUM SERPL-SCNC: 3.8 MMOL/L (ref 3.5–4.7)
PROT SERPL-MCNC: 6.9 G/DL (ref 6.3–8)
RBC # BLD AUTO: 3.41 10*6/MM3 (ref 3.77–5.28)
SODIUM SERPL-SCNC: 141 MMOL/L (ref 134–145)
WBC NRBC COR # BLD: 3.5 10*3/MM3 (ref 3.4–10.8)

## 2022-07-21 PROCEDURE — 25010000002 DOXORUBICIN PER 10 MG: Performed by: INTERNAL MEDICINE

## 2022-07-21 PROCEDURE — 96415 CHEMO IV INFUSION ADDL HR: CPT

## 2022-07-21 PROCEDURE — 96367 TX/PROPH/DG ADDL SEQ IV INF: CPT

## 2022-07-21 PROCEDURE — 80053 COMPREHEN METABOLIC PANEL: CPT

## 2022-07-21 PROCEDURE — 96411 CHEMO IV PUSH ADDL DRUG: CPT

## 2022-07-21 PROCEDURE — 25010000002 CYCLOPHOSPHAMIDE 1 GM/5ML SOLUTION 5 ML VIAL: Performed by: INTERNAL MEDICINE

## 2022-07-21 PROCEDURE — 96413 CHEMO IV INFUSION 1 HR: CPT

## 2022-07-21 PROCEDURE — 99214 OFFICE O/P EST MOD 30 MIN: CPT | Performed by: INTERNAL MEDICINE

## 2022-07-21 PROCEDURE — 25010000002 DEXAMETHASONE SODIUM PHOSPHATE 100 MG/10ML SOLUTION: Performed by: INTERNAL MEDICINE

## 2022-07-21 PROCEDURE — 25010000002 FOSAPREPITANT PER 1 MG: Performed by: INTERNAL MEDICINE

## 2022-07-21 PROCEDURE — 25010000002 PALONOSETRON PER 25 MCG: Performed by: INTERNAL MEDICINE

## 2022-07-21 PROCEDURE — 25010000002 LORAZEPAM PER 2 MG: Performed by: INTERNAL MEDICINE

## 2022-07-21 PROCEDURE — 85025 COMPLETE CBC W/AUTO DIFF WBC: CPT

## 2022-07-21 PROCEDURE — 96375 TX/PRO/DX INJ NEW DRUG ADDON: CPT

## 2022-07-21 RX ORDER — LORAZEPAM 2 MG/ML
0.25 INJECTION INTRAMUSCULAR ONCE
Status: CANCELLED
Start: 2022-07-21 | End: 2022-07-21

## 2022-07-21 RX ORDER — OLANZAPINE 5 MG/1
2.5 TABLET ORAL ONCE
Status: COMPLETED | OUTPATIENT
Start: 2022-07-21 | End: 2022-07-21

## 2022-07-21 RX ORDER — ONDANSETRON 2 MG/ML
8 INJECTION INTRAMUSCULAR; INTRAVENOUS ONCE
Status: CANCELLED
Start: 2022-07-22 | End: 2022-07-22

## 2022-07-21 RX ORDER — PALONOSETRON 0.05 MG/ML
0.25 INJECTION, SOLUTION INTRAVENOUS ONCE
Status: COMPLETED | OUTPATIENT
Start: 2022-07-21 | End: 2022-07-21

## 2022-07-21 RX ORDER — DOXORUBICIN HYDROCHLORIDE 2 MG/ML
48 INJECTION, SOLUTION INTRAVENOUS ONCE
Status: COMPLETED | OUTPATIENT
Start: 2022-07-21 | End: 2022-07-21

## 2022-07-21 RX ORDER — OLANZAPINE 5 MG/1
2.5 TABLET ORAL ONCE
Status: CANCELLED | OUTPATIENT
Start: 2022-07-21 | End: 2022-07-21

## 2022-07-21 RX ORDER — SODIUM CHLORIDE 9 MG/ML
1000 INJECTION, SOLUTION INTRAVENOUS ONCE
Status: CANCELLED
Start: 2022-07-22 | End: 2022-07-22

## 2022-07-21 RX ORDER — LORAZEPAM 2 MG/ML
0.25 INJECTION INTRAMUSCULAR ONCE
Status: COMPLETED | OUTPATIENT
Start: 2022-07-21 | End: 2022-07-21

## 2022-07-21 RX ORDER — LORAZEPAM 2 MG/ML
0.25 INJECTION INTRAMUSCULAR ONCE
Status: CANCELLED
Start: 2022-07-22 | End: 2022-07-22

## 2022-07-21 RX ORDER — DOXORUBICIN HYDROCHLORIDE 2 MG/ML
48 INJECTION, SOLUTION INTRAVENOUS ONCE
Status: CANCELLED | OUTPATIENT
Start: 2022-07-21

## 2022-07-21 RX ORDER — SODIUM CHLORIDE 9 MG/ML
250 INJECTION, SOLUTION INTRAVENOUS ONCE
Status: CANCELLED | OUTPATIENT
Start: 2022-07-21

## 2022-07-21 RX ORDER — SODIUM CHLORIDE 9 MG/ML
250 INJECTION, SOLUTION INTRAVENOUS ONCE
Status: COMPLETED | OUTPATIENT
Start: 2022-07-21 | End: 2022-07-21

## 2022-07-21 RX ORDER — PALONOSETRON 0.05 MG/ML
0.25 INJECTION, SOLUTION INTRAVENOUS ONCE
Status: CANCELLED | OUTPATIENT
Start: 2022-07-21

## 2022-07-21 RX ADMIN — PALONOSETRON 0.25 MG: 0.05 INJECTION, SOLUTION INTRAVENOUS at 09:33

## 2022-07-21 RX ADMIN — DEXAMETHASONE SODIUM PHOSPHATE 12 MG: 10 INJECTION, SOLUTION INTRAMUSCULAR; INTRAVENOUS at 10:07

## 2022-07-21 RX ADMIN — DOXORUBICIN HYDROCHLORIDE 74 MG: 2 INJECTION, SOLUTION INTRAVENOUS at 10:33

## 2022-07-21 RX ADMIN — CYCLOPHOSPHAMIDE 740 MG: 200 INJECTION, SOLUTION INTRAVENOUS at 10:47

## 2022-07-21 RX ADMIN — SODIUM CHLORIDE 100 ML: 9 INJECTION, SOLUTION INTRAVENOUS at 09:32

## 2022-07-21 RX ADMIN — OLANZAPINE 2.5 MG: 5 TABLET, FILM COATED ORAL at 09:36

## 2022-07-21 RX ADMIN — LORAZEPAM 0.25 MG: 2 INJECTION INTRAMUSCULAR; INTRAVENOUS at 09:33

## 2022-07-21 RX ADMIN — SODIUM CHLORIDE 250 ML: 9 INJECTION, SOLUTION INTRAVENOUS at 09:32

## 2022-07-21 NOTE — PROGRESS NOTES
Subjective       DURING THE VISIT WITH THE PATIENT TODAY , PATIENT HAD FACE MASK, MY MEDICAL ASSISTANT AND I  HAD [employer] PROTECTIVE EQUIPMENT, AND I DID HAND HYGIENE WITH SOAP AND WATER BEFORE AND AFTER THE VISIT.     REASON FOR FOLLOW UP:  LOBULAR CARCINOMA OF THE LEFT BREAST, 70 MM IN SIZE, MARGINS OF RESECTION NEGATIVE AFTER MASTECTOMY, GRADE 2, ER POSITIVE IA POSITIVE, HER 2 NEGATIVE BY IHC,KI 67 39% ,ONE POSITIVE AXILLARY LYMPH NODE OUT OF 4 NODES REMOVAL, PREMENOPAUSAL .INVITAE PANEL NEGATIVE.ONCOTYPE 18        HISTORY OF PRESENT ILLNESS:    On 07/21/2022, this 37-year-old  female returns to the office for continuation of chemotherapy in the adjuvant setting after she has undergone a left-sided mastectomy for her breast cancer. The patient is here today to entertain the last cycle of AC before she initiates in 3 weeks from now Taxol therapy. She has multiple issues today including the fact that she still has occasional heartburn but she continues using her H2 blocker with significant benefit. The patient denies any nausea, vomiting or hematemesis. Defecation is normal. Urination is normal. She has had menstrual flow in the interim, abundant in nature without any further issues. Minimal pelvic pain. The patient also has had some sensation of pulling and burning in the inner aspect of the left arm that she wants for me to review and occasional pain in the dorsal aspect of the left hand in absence of any trauma with no local inflammation. She has not had any cough or sputum production. She has not had any issues in her lower extremities. She remains fatigued.     She has concerns about billing from [employer] and her insurance company issues.        ONCOLOGIC HISTORY:  The patient is a 37 y.o. year old female who is here for an opinion about the above issue.  I had the opportunity to see this delightful [place] female, 37 years old, mother of 2, who is premenopausal who has found abnormalities in  the left breast since 12/2021, having sensation of fullness in the breast and discomfort and occasional pain. After this complaint she was seen by her primary physician and she initiated a process of mammograms and ultrasounds that culminated with the diagnosis of lobular carcinoma of the breast, invasive, 7 cm in size. The patient completed a mastectomy and sentinel lymph nodes almost 3 weeks ago and she is here for review and advice in regard to adjuvant therapy. The patient is still having pain at the surgical site, especially since yesterday she had injection of saline in the expander that was placed after the mastectomy. She also has neuropathic pain in the inner aspect of the left arm that is bothering her a lot through the day and sometimes almost driving her crazy. She has no motor deficit into the left upper extremity. She denies any fevers or chills or bone pain. Her appetite is acceptable but she has changed her diet and she is almost going into vegetarian with the exception of some fish. She has eliminated all the dairy products and she has eliminated mostly carbohydrates as well. She has lost some weight. The patient otherwise feels well. She has normal bowel activity, normal urination. No skeletal pain or joint pain. No cough, sputum production, shortness of breath or palpitations. She is extremely anxious about this visit today. She is in company of her .    • The patient returned to the office on 05/20/2022 in preparation for initiation of chemotherapy. In the meantime she has undergone a port placement in right subclavian position with no difficulties and an echocardiogram that will be discussed below. The patient has had very significant upper respiratory allergies. She has been tested this week for COVID being negative. She is not running any fever. Most of the symptoms in the respiratory tract include sneezing, itching, itchy eyes and rhinorrhea. No sore throat, no alteration in taste or  smell. No cough, shortness of breath, pleuritic pain or hemoptysis.     The surgical site from the left breast expander is not painful. She has healed extremely well. The same is applicable to her port that was placed a week ago.  • The patient returned to the office on 05/26/2022 for an interim assessment. Since the chemotherapy administration last week the patient had persistent nausea and vomiting at least for 4 days to the point that she spent most of the time sleeping because of the effect of the Zyprexa but no resolution of her nausea and vomiting. She has vomited so much that now she has significant esophagitis with heartburn and indigestion. She has not had any hematemesis or melena. She just started to eat yesterday rice and potatoes. She was able to handle this and she has been able to handle liquids. She also has had discoloration of the urine that originally was pink and subsequently now has normalized. She had no burning sensation upon urination or vaginal bleeding. She has been very fatigued and tired. On top of that Neulasta triggers significant pain in the cranium, in the pelvic bone and in her femurs and she has the feeling that she has the flu. Again, she has not had any fever. Today is the best day that she has had since the chemotherapy administration. Obviously given the symptoms and this toxicity assessment, the patient will require radical change in her treatment as will be described below.  On 06/23/2022, the patient returns after she has completed 2 cycles of AC. The first cycle was dramatically cumbersome because of the tremendous amount of side effects including persistent nausea and vomiting after chemotherapy that required IV fluids and modification in her nausea regimen. She also developed neutropenic fever and ended up in the hospital. After 2nd cycle with dose adjustment the patient handled the treatment much better and the miracle medicine in regard to nausea control was Ativan.  Phenergan and Compazine triggered tremendous degree of confusion. Therefore, for the subsequent cycle 3 and 4, Ativan will be the main medicine to utilize. Zofran also will be utilized.     Physically the patient feels much better today. The patient has had appetite to eat. The heartburn and indigestion have mostly subsided. Her nutrition and hydration are much better. She is keeping her weight and she has no pain. Bowel activity with occasional constipation. Urination is normal. No cardiovascular or respiratory issues. Energy level much better.     She had significant pain with Neulasta use 4 days after the treatment requiring Zyrtec and significant Tylenol utilization.  •   •   •   ·     Past Medical History:   Diagnosis Date   • Anemia    • Anesthesia complication     hypotension with last C section   • Breast cancer (HCC) 2022    left sided, stage 3, ER/HI+, Her2-, s/p mastectomy with plans for AC/Taxol and radiation   • History of COVID-19     2021 AND 2022   • Hyperlipidemia    • PONV (postoperative nausea and vomiting)    • Seasonal allergies         Past Surgical History:   Procedure Laterality Date   • APPENDECTOMY     • BREAST BIOPSY Left    • BREAST RECONSTRUCTION Left 2022    Procedure: LEFT BREAST 1ST STAGE RECONSTRUCTION WITH INSERTION OF TISSUE EXPANDER AND BIOLOGIC;  Surgeon: Hermelinda Johnson MD;  Location: [facility] OR;  Service: Plastics;  Laterality: Left;   •  SECTION     •  SECTION N/A 12/10/2018    Procedure:  SECTION REPEAT;  Surgeon: Heidy Gutierrez MD;  Location: [facility] LABOR DELIVERY;  Service: Obstetrics/Gynecology   • D & C HYSTEROSCOPY N/A 2021    Procedure: HYSTEROSCOPY REMOVAL INTRAUTERINE DEVICE WILL NEED ULTRASOUND IN ROOM;  Surgeon: Heidy Gutierrez MD;  Location: [facility] OR OSC;  Service: Obstetrics/Gynecology;  Laterality: N/A;   • KNEE ARTHROSCOPY Right    • MASTECTOMY W/ SENTINEL NODE BIOPSY Left 2022     "Procedure: left total mastectomy with left axillary sentinel lymph node biopsy;  Surgeon: Monisha Escudero MD;  Location: [facility] OR;  Service: General;  Laterality: Left;   • VENOUS ACCESS DEVICE (PORT) INSERTION Right 5/13/2022    Procedure: right port placement;  Surgeon: Monisha Escudero MD;  Location: [facility] OR;  Service: General;  Laterality: Right;        Current Outpatient Medications on File Prior to Visit   Medication Sig Dispense Refill   • LORazepam (ATIVAN) 0.5 MG tablet Take 1 tablet by mouth Every 12 (Twelve) Hours As Needed (Nausea). To be taken every 12 hours as needed for up to one week after chemotherapy 28 tablet 0   • OLANZapine (ZyPREXA) 2.5 MG tablet 1 tablet at bedtime on days 2, 3, 4 after each chemotherapy 12 tablet 1   • omeprazole (priLOSEC) 20 MG capsule Take 1 capsule by mouth 2 (Two) Times a Day. 60 capsule 3     No current facility-administered medications on file prior to visit.        ALLERGIES:    Allergies   Allergen Reactions   • Asa [Aspirin] Anaphylaxis and Shortness Of Breath        Social History     Socioeconomic History   • Marital status:    Tobacco Use   • Smoking status: Never Smoker   • Smokeless tobacco: Never Used   Vaping Use   • Vaping Use: Never used   Substance and Sexual Activity   • Alcohol use: Not Currently     Alcohol/week: 5.0 standard drinks     Types: 5 Shots of liquor per week   • Drug use: Never   • Sexual activity: Defer        Family History   Problem Relation Age of Onset   • Melanoma Paternal Aunt    • Diabetes Other    • Malig Hyperthermia Neg Hx           Objective     Vitals:    07/21/22 0837   BP: 93/64   Pulse: 67   Resp: 16   Temp: 97.3 °F (36.3 °C)   TempSrc: Temporal   SpO2: 98%   Weight: 52.5 kg (115 lb 11.2 oz)   Height: 157 cm (61.81\")   PainSc: 0-No pain     Current Status 7/1/2022   ECOG score 0       Physical Exam          GENERAL:  Well-developed, well-nourished  Patient  in no acute distress.   SKIN:  Warm, dry " ,NO purpura ,no rash.  HEENT:  Pupils were equal and reactive to light and accomodation, conjunctivae noninjected, normal extraocular movements, normal visual acuity.   NECK:  Supple with good range of motion; no thyromegaly , no JVD or bruits,.No carotid artery pain, no carotid abnormal pulsation   LYMPHATICS:  No cervical, NO supraclavicular, NO axillary, NO inguinal adenopathies.  CARDIAC   normal rate , regular rhythm, without murmur,NO rubs NO S3 NO S4   LUNGS: normal breath sounds bilateral, no wheezing, NO rhonchi, NO crackles ,NO rubs.  VASCULAR VENOUS: no cyanosis, NO collateral circulation, NO varicosities, NO edema, NO palpable cords, NO pain,NO erythema, NO pigmentation of the skin.  ABDOMEN:  Soft, NO pain,no hepatomegaly, no splenomegaly,no masses, no ascites, no collateral circulation,no distention.  EXTREMITIES  AND SPINE:  No clubbing, no cyanosis ,no deformities , no pain .No kyphosis,  no pain in spine, no pain in ribs , no pain in pelvic bone.  NEUROLOGICAL:  Patient was awake, alert, oriented to time, person and place.  Her right breast was normal. Her left breast expander was unremarkable. The tail of the left breast expander area was unremarkable. Careful methodic palpation of the left axilla disclosed no masses, areas of induration, tenderness or erythema. Careful palpation of the inner aspect of the left arm disclosed minimal numbness but no palpable masses or abnormalities in the skin otherwise. Careful palpation of the left hand disclosed minimal area in the tendon apparatus in the flexor of the 2nd finger. There was no erythema.              RECENT LABS:  Hematology WBC   Date Value Ref Range Status   07/21/2022 3.50 3.40 - 10.80 10*3/mm3 Final     RBC   Date Value Ref Range Status   07/21/2022 3.41 (L) 3.77 - 5.28 10*6/mm3 Final     Hemoglobin   Date Value Ref Range Status   07/21/2022 10.3 (L) 12.0 - 15.9 g/dL Final     Hematocrit   Date Value Ref Range Status   07/21/2022 31.1 (L) 34.0  - 46.6 % Final     Platelets   Date Value Ref Range Status   07/21/2022 212 140 - 450 10*3/mm3 Final       CBC:    WBC   Date Value Ref Range Status   07/21/2022 3.50 3.40 - 10.80 10*3/mm3 Final     RBC   Date Value Ref Range Status   07/21/2022 3.41 (L) 3.77 - 5.28 10*6/mm3 Final     Hemoglobin   Date Value Ref Range Status   07/21/2022 10.3 (L) 12.0 - 15.9 g/dL Final     Hematocrit   Date Value Ref Range Status   07/21/2022 31.1 (L) 34.0 - 46.6 % Final     MCV   Date Value Ref Range Status   07/21/2022 91.2 79.0 - 97.0 fL Final     MCH   Date Value Ref Range Status   07/21/2022 30.2 26.6 - 33.0 pg Final     MCHC   Date Value Ref Range Status   07/21/2022 33.1 31.5 - 35.7 g/dL Final     RDW   Date Value Ref Range Status   07/21/2022 14.5 12.3 - 15.4 % Final     RDW-SD   Date Value Ref Range Status   07/21/2022 48.0 37.0 - 54.0 fl Final     MPV   Date Value Ref Range Status   07/21/2022 9.9 6.0 - 12.0 fL Final     Platelets   Date Value Ref Range Status   07/21/2022 212 140 - 450 10*3/mm3 Final     Neutrophil %   Date Value Ref Range Status   07/21/2022 58.8 42.7 - 76.0 % Final     Lymphocyte %   Date Value Ref Range Status   07/21/2022 23.7 19.6 - 45.3 % Final     Monocyte %   Date Value Ref Range Status   07/21/2022 12.9 (H) 5.0 - 12.0 % Final     Eosinophil %   Date Value Ref Range Status   07/21/2022 2.6 0.3 - 6.2 % Final     Basophil %   Date Value Ref Range Status   07/21/2022 1.4 0.0 - 1.5 % Final     Immature Grans %   Date Value Ref Range Status   07/21/2022 0.6 (H) 0.0 - 0.5 % Final     Neutrophils, Absolute   Date Value Ref Range Status   07/21/2022 2.06 1.70 - 7.00 10*3/mm3 Final     Lymphocytes, Absolute   Date Value Ref Range Status   07/21/2022 0.83 0.70 - 3.10 10*3/mm3 Final     Monocytes, Absolute   Date Value Ref Range Status   07/21/2022 0.45 0.10 - 0.90 10*3/mm3 Final     Eosinophils, Absolute   Date Value Ref Range Status   07/21/2022 0.09 0.00 - 0.40 10*3/mm3 Final     Basophils, Absolute    Date Value Ref Range Status   07/21/2022 0.05 0.00 - 0.20 10*3/mm3 Final     Immature Grans, Absolute   Date Value Ref Range Status   07/21/2022 0.02 0.00 - 0.05 10*3/mm3 Final     nRBC   Date Value Ref Range Status   07/21/2022 0.0 0.0 - 0.2 /100 WBC Final        CMP:    Glucose   Date Value Ref Range Status   06/30/2022 92 74 - 124 mg/dL Final     BUN   Date Value Ref Range Status   06/30/2022 12 6 - 20 mg/dL Final     Creatinine   Date Value Ref Range Status   06/30/2022 0.56 (L) 0.60 - 1.10 mg/dL Final     Sodium   Date Value Ref Range Status   06/30/2022 140 134 - 145 mmol/L Final     Potassium   Date Value Ref Range Status   06/30/2022 3.8 3.5 - 4.7 mmol/L Final     Chloride   Date Value Ref Range Status   06/30/2022 105 98 - 107 mmol/L Final     CO2   Date Value Ref Range Status   06/30/2022 22.5 22.0 - 29.0 mmol/L Final     Calcium   Date Value Ref Range Status   06/30/2022 9.4 8.5 - 10.2 mg/dL Final     Total Protein   Date Value Ref Range Status   06/30/2022 7.2 6.3 - 8.0 g/dL Final     Albumin   Date Value Ref Range Status   06/30/2022 4.50 3.50 - 5.20 g/dL Final     ALT (SGPT)   Date Value Ref Range Status   06/30/2022 15 0 - 33 U/L Final     AST (SGOT)   Date Value Ref Range Status   06/30/2022 19 0 - 32 U/L Final     Alkaline Phosphatase   Date Value Ref Range Status   06/30/2022 90 38 - 116 U/L Final     Total Bilirubin   Date Value Ref Range Status   06/30/2022 0.2 0.2 - 1.2 mg/dL Final     Globulin   Date Value Ref Range Status   06/30/2022 2.7 1.8 - 3.5 gm/dL Final     A/G Ratio   Date Value Ref Range Status   06/30/2022 1.7 1.1 - 2.4 g/dL Final     BUN/Creatinine Ratio   Date Value Ref Range Status   06/30/2022 21.4 7.3 - 30.0 Final     Anion Gap   Date Value Ref Range Status   06/30/2022 12.5 5.0 - 15.0 mmol/L Final                     Assessment & Plan     Diagnoses and all orders for this visit:    1. Malignant neoplasm of upper-outer quadrant of left breast in female, estrogen receptor  positive (HCC) (Primary)  -     CBC and Differential; Future  -     Comprehensive metabolic panel; Future    2. Chemotherapy induced nausea and vomiting    3. Peptic reflux esophagitis    4. Leukopenia due to antineoplastic chemotherapy (HCC)    5. Anemia due to other cause, not classified    6. Chemotherapy-induced thrombocytopenia    7. Anemia associated with chemotherapy    8. Fitting and adjustment of vascular catheter       In summary this 37-year-old  female originally from [place] noticed abnormalities in the left breast in 12/2021, sensation of pressure, minor pain, sensation of fullness. She looked for help. Further mammogram and ultrasound documented abnormalities in the left breast that were consistent with breast cancer. Since then the patient has undergone breast MRI. Breast biopsies documented invasive lobular carcinoma. All this culminated with a left-sided mastectomy and sentinel lymph node sampling. The pathology has been posted above. She had a lobular carcinoma, grade 2, margins of resection negative, Ki-67 at 39%, ER positive, ME positive, HER2/leonie negative. She had 1 positive sentinel lymph node for malignancy with no extracapsular invasion. There was no evidence of lymphovascular invasion.     Invitae panel was negative for any genetic alteration.     The patient has had an expander placed at the mastectomy site. She has discomfort and pain after this was instillated with saline solution yesterday.     The other phenomenon that is happening to the patient is significant pain and discomfort in the inner aspect of the left arm. Sounds neuropathic related to her recent sentinel lymph node sampling. The patient is taking Aleve or ibuprofen with not too much benefit.     The patient also has a minimal component of anemia that will require assessment with ferritin, iron, TIBC, B12, folic acid levels.     RECOMMENDATIONS:  I have advised this patient and  today that she needs to receive  adjuvant chemotherapy in the form of AC x4 and subsequently Taxol x12. Oncotype DX in this patient shows intermediate risk of recurrence. Also not only the chemotherapy will be necessary but we need to make her postmenopausal in the next several weeks or months. This will bring in a lot of benefit for her in the long run to minimize any recurrent disease. She is extremely young. She has 2 young children and we have to do as much as we can in this patient to minimize any potentiality for recurrent disease.     I discussed briefly with her side effects of the treatment including alopecia, cardiac toxicity, anemia, leukopenia, thrombocytopenia, mucositis among others and peripheral neuropathy in the Taxol part of the treatment along with leukopenia, anemia, thrombocytopenia and allergic reaction to the medicine. I discussed with her that the whole treatment is going to take at least 24-26 weeks, in other words 6 months, and thereafter the patient will be hopefully postmenopausal. If she does not become postmenopausal she could qualify for a clinical trial or we could ask Dr. Heidy Gutierrez to do bilateral oophorectomy in this patient.     Eventually the patient will require endocrine adjuvant therapy after she becomes postmenopausal hopefully with letrozole.     I discussed all these facts with the patient and her . I spent 1 hour and 30 minutes with her going through the details of all this but she was able to grasp a lot of information. I appreciated highly the visit with her  and I pointed out to them that they need to work as a team. The patient has 2 young children. She is the only one taking care of the kids. The patient's mother is living with her, coming from [place] after all these issues. I asked her to ask her mother to stay for the period of time that she receives chemotherapy. If we need to do any services for her in regard to visa requirements or status, we will be glad to incorporate our   into this process.     Finally, I discussed with the patient the need for her to proceed with cardiac assessment. I made a referral to be seen by Cardiology and scheduled her to have an echocardiogram and I sent her back to see Dr. Escudero in order to proceed with the placement of a port.     In order to control the pain in the inner aspect of her arm, I asked her to use Voltaren topically 4 times a day. It will be perfectly fine for her to use ibuprofen and I sent Neurontin 100 mg to take at bedtime. Hopefully this will be a transitory issue.     Dr. Johnson eventually will be in charge of deciding the time for the patient to initiate physical therapy for her left shoulder.     I reviewed all the pertinent records on this patient and I posted the information out of the pathology and radiology as above. I discussed the case with my partner, Dr. Landeros, who agrees with my plan of care.    Finally, I also posted to the patient today that will require followup in the future in regard to her right breast not only with mammogram but also ultrasound. I discussed with her the fact that sometimes lobular carcinoma likes to be a bilateral disease. There are minimal abnormalities in the mammogram in the right breast as well as in the MRI. This will require to be followed up in the future. I did not recommend a 2nd mastectomy prophylactically on her even though she asked the question today.    •  The patient was reviewed on 05/20/2022. She has had her port placed in right subclavian position a week ago. She is healing perfectly fine and her surgical site on the left mastectomy and the expander looks perfectly normal with healing. No infection, erythema or discharge.     The patient since then has undergone formal education and consent for chemotherapy administration. Her echocardiogram even though technically difficult documented a normal left ventricular ejection fraction. No pericardial effusion and no valvular  dysfunction.     Her white count, hemoglobin and platelets are normal today.     Her chemistry profile, CA 15-3 as well as ferritin, iron, TIBC, B12 and folic acid levels were all normal.     Therefore under the present circumstances I advised the patient and  the followin. For the treatment of her breast cancer the patient will initiate today chemotherapy with dose dense AC that she will receive every 2 weeks supported with Neulasta. The side effects of this already were discussed with her including nausea, vomiting, alopecia, anemia, leukopenia, thrombocytopenia and cardiac dysfunction among others. The patient was ready to proceed.   2. The patient will be utilizing Neulasta On-body to minimize hematological toxicity and be able to keep up on the schedule in regard to her treatment. I advised her that this can produce significant bone pain in the sternum, in the rib cage, in the pelvic bone and she could use antihistaminic that she is already taking in the first place and also she can use either Aleve or Tylenol or a hot shower if necessary.   3. I encouraged her to remain on her nausea medicine, ondansetron, during the next 3 days to minimize any nausea or vomiting.   4. I advised her to stay away from smelling foods like hot soup or fried foods in the next 3 days given the significant increase in smell that patients receiving AC typically have. This will minimize the potential for nausea and vomiting.   5. The patient will require toxicity assessment in a week along with blood WORK CBC.   6. The patient once completes 4 cycles of AC will initiate weekly cycles of Taxol x12. Upon completion of that the patient will initiate a plan of radiation therapy and she already has been seen by Radiation Oncology.     The patient will participate in a clinical trial that we have in the office and she already has been consented for this in regard to the utilization of antiestrogen medicine upon completion of  chemotherapy administration.     The patient will continue using her other medications on routine basis that include the Zyprexa and she will use the EMLA cream to minimize pain at the port site at the time of accessing.     Finally, I advised the patient and the  present in the room that they at any cost had to minimize the risk of pregnancy. In fact a pregnancy test today was negative. I advised them to use a condom.     The patient was ready to proceed.    All the discussion of all these issues took place in [place].  • The patient returned to the office on 05/26/2022. Since the previous visit a week ago the patient had chemotherapy administration and she developed very significant nausea and vomiting nonstoppable for almost 5 days. Yesterday was the first day that she was able to have some rice and potatoes. Because she has thrown up so much she has developed significant heartburn and indigestion indicating probably bile gastritis, esophagitis and acid esophagitis. The patient has not had any hematemesis or melena. The other phenomenon that she had was tremendous somnolence due to the use of Zyprexa at the dose of 5 mg every night. She spent almost 4 days in bed sleepy but still with nausea and vomiting.     On top of things, she has very significant pain in her muscles, in her thighs, in her scalp and in her pelvic bone associated with Neulasta use. She is taking Tylenol for this with some improvement.     The patient has been able to reach hydration since yesterday, drinking proper liquids, and she is urinating okay. Therefore, the patient has multiple issues and on top of that she has now leukopenia associated with chemotherapy. The hemoglobin is stable. The platelet count has dropped but is not dramatically or risky for bleeding.     Given all the above circumstances I proposed her the following changes in regard to plan of chemotherapy for the next cycle.     1. We will modify her plan of  chemotherapy, decreasing the doses for all the medications by 20%.   2. The patient will space out the chemotherapy treatments to every 3 weeks for the AC part of the treatment until completion of 4 cycles.   3. The patient will be supported with Neulasta injection. She will utilize Tylenol for pain.   4. The patient will continue using chemotherapy administration, anti-nausea medication and she will receive Emend on day 2 in the office and Emend on day 3 at home. Pharmacist will take care of this issue.   5. The patient will decrease Zyprexa from 5 mg a day to 2.5 mg in the evening to minimize somnolence.   6. The patient was advised to use omeprazole at the dose of 20 mg twice a day to minimize esophageal irritation associated with so much nausea and vomiting.   7. The patient will require still laboratory parameters the week after each one of the cycles of chemotherapy.     I feel the obligation to make all these changes; otherwise this patient will quit her treatment and that is the worst thing that could happen under the present scenario. She agreed with all the changes made. I explained to her all these changes in Polish to make her life a little bit more comfortable and easy to handle. She understands at the time that she returns in 2 weeks she probably will start to develop alopecia and she already has discussed this with her mother who will help through the process of shaving her head.  On 06/23/2022, the patient had dramatic improvement in regard to nausea control after dose adjustment for the 2nd cycle of chemotherapy and also modification of the nausea regimen. We tried to deliver Compazine and Phenergan. These medicines were tremendously tolling on her in regard to confusion and inability to function. Ativan was the miracle medicine. Therefore for the next 2 cycles Zofran and Ativan will be the ideal combination and she will require also IV fluids on day 2 of each one of the next cycles of AC.     In  regard to pain associated with Neulasta, she required Tylenol around-the-clock for 4 days after each one of the treatments and now she has no discomfort whatsoever.     Today her white count is normal, hemoglobin improved to 10.7, platelet count with minor decrease, no clinical bleeding.    The patient feels substantially better. She is able to function at home. She is able to be with her family. She is able to attend her children and she has been able to walk 2 miles a day late in the evening. Encouraged her to continue doing this.     I suggested for her the followin. She will return in a week to proceed with cycle 3 of AC at the previous dose and supported with Neulasta.   2. She will be supported on day 2 with IV fluids and nausea medication.   3. We will discontinue Phenergan and/or Compazine and she will remain on Zofran and Ativan for nausea control.   4. She will continue PPI to minimize reflux symptomatology, still minimal problem but dramatically better than before.   5. She will use Milk of Magnesia for constipation. Another measure could be prunes and dates.     The patient will complete the total 4 cycles of AC and then will move into the Taxol arena. I pointed out to her that we will have the discussion about how to handle this medicine after the completion of the next 2 cycles.     She also raised the question in regard what to do upon completion of chemotherapy. I advised her that eventually she will have radiological assessment as a baseline and we will proceed with PATRICIA blood sampling for evaluation of minimal residual disease.     The patient was ready to proceed now that she feels dramatically better. What a difference it makes in regard to adjustment of medicines and trying to adjust nausea medication accordingly.  The patient returned to the office on 2022. In regard to her chemotherapy treatment, she is ready to proceed with her 4th or last cycle of AC today. Her ANC is a little  bit on the low side but the patient will be receiving Neulasta, therefore this will be taking care of this problem.     Her hemoglobin remains stable. This is anemia associated with chemotherapy administration. She also had a menstrual flow that was very abundant a few weeks ago. She is not taking any vitamins at this time and I have not advised to take any given concerning data about supplements in the background of adjuvant therapy for breast cancer.     In regard to the sensation of numbness in the left upper extremity arm, I think this is natural after mastectomy more than expected and I pointed out to her that this does not signify anything and does not require any therapy. This is extremely common in many women and it has the tendency to go away spontaneously.     In regard to the abnormality in the left hand there is no evidence of lymphedema. I think she could have a minimal element of tendonitis. I advised her to use topical anti-inflammatory medication or ice the hand. She has not received any IV sites for blood draws in this area.     In regard to future visits, the patient would like to continue her chemotherapy treatments on Mondays because her children will be going back to school and she wants to have a weekend with them as much as she can. I find no problem seeing her in 3 weeks from now on Monday to proceed with initiation of Taxol. I pointed out to her that Taxol will be easier to handle a lower dose, less toxicity in regard to hematological parameters and hopefully no issues in regard to neuropathy.     I also advised her that we will continue monitoring the status of menstrual flow upon completion of chemotherapy. She still has 12 Taxol treatments to go and we do not know if the chemotherapy treatment is going to put her in menopause or not. Depending on the final analysis of this upon completion of chemotherapy and before initiation of hormonal therapy, we will need to do hormone levels.      Finally, the patient has had a lot of issues between her insurance company and the billing system at [facility]. I asked the billing system to give her a call and relate this to the insurance company. The specific question is about the cost and the utilization of PEGylated Neulasta or Neulasta On-body. Hopefully this will be happening to settle down the situation. Each one of these medicines have been billed in thousands of dollars and she already will complete 4 bills of this medication. She is extremely anxious about this situation. This is called economic toxicity of chemotherapy administration and we need to influence this situation as well on her to give her the benefit of settling down once and for all.    This patient already has enough stressors on the plate to add another one that is economical stressor of her treatment.    •   •   •   ·

## 2022-07-22 ENCOUNTER — TELEPHONE (OUTPATIENT)
Dept: ONCOLOGY | Facility: CLINIC | Age: 37
End: 2022-07-22

## 2022-07-22 ENCOUNTER — INFUSION (OUTPATIENT)
Dept: ONCOLOGY | Facility: HOSPITAL | Age: 37
End: 2022-07-22

## 2022-07-22 VITALS
TEMPERATURE: 98.7 F | HEART RATE: 82 BPM | BODY MASS INDEX: 21.09 KG/M2 | SYSTOLIC BLOOD PRESSURE: 98 MMHG | DIASTOLIC BLOOD PRESSURE: 64 MMHG | OXYGEN SATURATION: 97 % | WEIGHT: 114.6 LBS | RESPIRATION RATE: 18 BRPM

## 2022-07-22 DIAGNOSIS — Z17.0 MALIGNANT NEOPLASM OF UPPER-OUTER QUADRANT OF LEFT BREAST IN FEMALE, ESTROGEN RECEPTOR POSITIVE: Primary | ICD-10-CM

## 2022-07-22 DIAGNOSIS — C50.412 MALIGNANT NEOPLASM OF UPPER-OUTER QUADRANT OF LEFT BREAST IN FEMALE, ESTROGEN RECEPTOR POSITIVE: Primary | ICD-10-CM

## 2022-07-22 DIAGNOSIS — Z45.2 FITTING AND ADJUSTMENT OF VASCULAR CATHETER: ICD-10-CM

## 2022-07-22 PROCEDURE — 96374 THER/PROPH/DIAG INJ IV PUSH: CPT

## 2022-07-22 PROCEDURE — 25010000002 PEGFILGRASTIM 6 MG/0.6ML SOLUTION PREFILLED SYRINGE: Performed by: INTERNAL MEDICINE

## 2022-07-22 PROCEDURE — 96375 TX/PRO/DX INJ NEW DRUG ADDON: CPT

## 2022-07-22 PROCEDURE — 25010000002 ONDANSETRON PER 1 MG: Performed by: INTERNAL MEDICINE

## 2022-07-22 PROCEDURE — 25010000002 LORAZEPAM PER 2 MG: Performed by: INTERNAL MEDICINE

## 2022-07-22 PROCEDURE — 96360 HYDRATION IV INFUSION INIT: CPT

## 2022-07-22 PROCEDURE — 96361 HYDRATE IV INFUSION ADD-ON: CPT

## 2022-07-22 PROCEDURE — 96372 THER/PROPH/DIAG INJ SC/IM: CPT

## 2022-07-22 PROCEDURE — 25010000002 HEPARIN LOCK FLUSH PER 10 UNITS: Performed by: INTERNAL MEDICINE

## 2022-07-22 RX ORDER — SODIUM CHLORIDE 0.9 % (FLUSH) 0.9 %
10 SYRINGE (ML) INJECTION AS NEEDED
Status: CANCELLED | OUTPATIENT
Start: 2022-07-22

## 2022-07-22 RX ORDER — HEPARIN SODIUM (PORCINE) LOCK FLUSH IV SOLN 100 UNIT/ML 100 UNIT/ML
500 SOLUTION INTRAVENOUS AS NEEDED
Status: CANCELLED | OUTPATIENT
Start: 2022-07-22

## 2022-07-22 RX ORDER — SODIUM CHLORIDE 0.9 % (FLUSH) 0.9 %
10 SYRINGE (ML) INJECTION AS NEEDED
Status: DISCONTINUED | OUTPATIENT
Start: 2022-07-22 | End: 2022-07-22 | Stop reason: HOSPADM

## 2022-07-22 RX ORDER — HEPARIN SODIUM (PORCINE) LOCK FLUSH IV SOLN 100 UNIT/ML 100 UNIT/ML
500 SOLUTION INTRAVENOUS AS NEEDED
Status: DISCONTINUED | OUTPATIENT
Start: 2022-07-22 | End: 2022-07-22 | Stop reason: HOSPADM

## 2022-07-22 RX ORDER — LORAZEPAM 2 MG/ML
0.25 INJECTION INTRAMUSCULAR ONCE
Status: COMPLETED | OUTPATIENT
Start: 2022-07-22 | End: 2022-07-22

## 2022-07-22 RX ORDER — ONDANSETRON 2 MG/ML
8 INJECTION INTRAMUSCULAR; INTRAVENOUS ONCE
Status: COMPLETED | OUTPATIENT
Start: 2022-07-22 | End: 2022-07-22

## 2022-07-22 RX ORDER — SODIUM CHLORIDE 9 MG/ML
1000 INJECTION, SOLUTION INTRAVENOUS ONCE
Status: COMPLETED | OUTPATIENT
Start: 2022-07-22 | End: 2022-07-22

## 2022-07-22 RX ADMIN — ONDANSETRON HYDROCHLORIDE 8 MG: 2 INJECTION, SOLUTION INTRAMUSCULAR; INTRAVENOUS at 11:50

## 2022-07-22 RX ADMIN — SODIUM CHLORIDE 1000 ML: 9 INJECTION, SOLUTION INTRAVENOUS at 11:50

## 2022-07-22 RX ADMIN — LORAZEPAM 0.25 MG: 2 INJECTION INTRAMUSCULAR; INTRAVENOUS at 12:07

## 2022-07-22 RX ADMIN — SODIUM CHLORIDE, PRESERVATIVE FREE 500 UNITS: 5 INJECTION INTRAVENOUS at 13:11

## 2022-07-22 RX ADMIN — PEGFILGRASTIM 6 MG: 6 INJECTION SUBCUTANEOUS at 12:49

## 2022-07-22 NOTE — TELEPHONE ENCOUNTER
----- Message from Elena Harden RN sent at 7/22/2022 12:54 PM EDT -----  We don't have anything to do with billing issues.  I looked under Media and do not see anything that has had to be appealed for a denial claim.  We just do the pre-authorization and compliance for the chemotherapy drugs.  Her treatment is standard of care for breast cancer so I do not know what the problem is.  If she has been told that the diagnosis code should be  changed, that would be something that billing would/should know as they are the ones who bill and attach the codes.      I'm sorry, that is all I know.  ----- Message -----  From: Chelsea Abbott RN  Sent: 7/22/2022  12:38 PM EDT  To: Elena Harden RN    This patient received a notice from her insurance company that she would be receiving a bill from us for all of her neulasta injections which totalled to 200,000. Of course she is panicking so I reached out to the billing department. They have no knowledge of her being billed or intentions for her to be billed for her neulasta. I asked her if she has called her insurance company and they told her that her doctor needed to list a new diagnosis code. I'm not really sure where to go from here. I set up her [employer] and had her send me a screenshot of this notification from her insurance antoine. I am going to send it to you in a patient advice request so you can see exactly what she is seeing. Let me know if there is anything else I can do I just feel like I have hit my limits in this area.

## 2022-07-22 NOTE — TELEPHONE ENCOUNTER
I have reached out to the billing department who does not have any knowledge of an outstanding bill for neulasta. I asked the patient to reach out to our billing department or she is welcome to call me if she receives a bill from [facility] for neulasta and we will address it then. Chelsea Abbott RN

## 2022-07-26 ENCOUNTER — TELEPHONE (OUTPATIENT)
Dept: ONCOLOGY | Facility: HOSPITAL | Age: 37
End: 2022-07-26

## 2022-07-26 NOTE — TELEPHONE ENCOUNTER
Clinical Case Management/[place]:  Telephone     OSW received a referral to assist patient's family with extending their visitation permit with immigration. OSW called patient to gain clarity. Patient explained that her parents are currently visiting from [place] to assist patient and family during patient's treatment. Patient would like a letter to be written asking permission for her parents to remain here as visitors until March 2023 which she anticipates with be the end of chemotherapy, radiation, and post-surgery.     Patient provided verbal consent to include patient name, diagnosis, and that she's a patient under the care of Dr. Palacios in this letter. Patient is in agreement to retrieve this letter from [facility]W on 7/27/22 at 4:00 pm at [facility].     BEN Greco  Oncology Social Worker   [place]

## 2022-07-27 ENCOUNTER — DOCUMENTATION (OUTPATIENT)
Dept: ONCOLOGY | Facility: CLINIC | Age: 37
End: 2022-07-27

## 2022-07-27 NOTE — PROGRESS NOTES
Clinical Case Management/[place]:  In person     Per patient's request based on referral from Dr. Palacios, OSW composed an advocacy letter requesting consideration for patient's parents to have their permits extended until March 2023. Dr. Palacios signed the letter, patient came in person to review letter and patient was satisfied with letter. Patient will now submit it to the appropriate decision makers.     OSW to remain available as needs arise.     BEN Greco  Oncology Social Worker   [place]

## 2022-08-04 ENCOUNTER — TELEPHONE (OUTPATIENT)
Dept: ONCOLOGY | Facility: CLINIC | Age: 37
End: 2022-08-04

## 2022-08-04 NOTE — PROGRESS NOTES
Subjective     REASON FOR FOLLOW UP:  LOBULAR CARCINOMA OF THE LEFT BREAST, 70 MM IN SIZE, MARGINS OF RESECTION NEGATIVE AFTER MASTECTOMY, GRADE 2, ER POSITIVE OH POSITIVE, HER 2 NEGATIVE BY IHC,KI 67 39% ,ONE POSITIVE AXILLARY LYMPH NODE OUT OF 4 NODES REMOVAL, PREMENOPAUSAL .INVITAE PANEL NEGATIVE.ONCOTYPE 18    HISTORY OF PRESENT ILLNESS:    The patient returns today for follow-up and evaluation prior to receiving her first weekly Taxol.  She is seen today with her  present.  She has been experiencing nausea without vomiting, not taking any medication for this.  She does have Zofran available at home, and she was encouraged to take this as needed.  She continues to experience neuropathic pain to the left extremity, she continues on Voltaren gel for this with relief.  She feels she is eating and drinking well, weight remains stable.  Denies fever or chills.  Denies new or worsening pain.      Past Medical History:   Diagnosis Date   • Anemia    • Anesthesia complication     hypotension with last C section   • Breast cancer (HCC) 2022    left sided, stage 3, ER/OH+, Her2-, s/p mastectomy with plans for AC/Taxol and radiation   • History of COVID-19     2021 AND 2022   • Hyperlipidemia    • PONV (postoperative nausea and vomiting)    • Seasonal allergies         Past Surgical History:   Procedure Laterality Date   • APPENDECTOMY     • BREAST BIOPSY Left    • BREAST RECONSTRUCTION Left 2022    Procedure: LEFT BREAST 1ST STAGE RECONSTRUCTION WITH INSERTION OF TISSUE EXPANDER AND BIOLOGIC;  Surgeon: Hermelinda Johnson MD;  Location: [facility] OR;  Service: Plastics;  Laterality: Left;   •  SECTION     •  SECTION N/A 12/10/2018    Procedure:  SECTION REPEAT;  Surgeon: Heidy Gutierrez MD;  Location: [facility] LABOR DELIVERY;  Service: Obstetrics/Gynecology   • D & C HYSTEROSCOPY N/A 2021    Procedure: HYSTEROSCOPY REMOVAL INTRAUTERINE DEVICE WILL NEED  ULTRASOUND IN ROOM;  Surgeon: Heidy Gutierrez MD;  Location: [facility];  Service: Obstetrics/Gynecology;  Laterality: N/A;   • KNEE ARTHROSCOPY Right    • MASTECTOMY W/ SENTINEL NODE BIOPSY Left 04/13/2022    Procedure: left total mastectomy with left axillary sentinel lymph node biopsy;  Surgeon: Monisha Escudero MD;  Location: [facility];  Service: General;  Laterality: Left;   • VENOUS ACCESS DEVICE (PORT) INSERTION Right 5/13/2022    Procedure: right port placement;  Surgeon: Monisha Escudero MD;  Location: [facility];  Service: General;  Laterality: Right;        Current Outpatient Medications on File Prior to Visit   Medication Sig Dispense Refill   • LORazepam (ATIVAN) 0.5 MG tablet Take 1 tablet by mouth Every 12 (Twelve) Hours As Needed (Nausea). To be taken every 12 hours as needed for up to one week after chemotherapy 28 tablet 0   • omeprazole (priLOSEC) 20 MG capsule Take 1 capsule by mouth 2 (Two) Times a Day. 60 capsule 3   • [DISCONTINUED] OLANZapine (ZyPREXA) 2.5 MG tablet 1 tablet at bedtime on days 2, 3, 4 after each chemotherapy 12 tablet 1     Current Facility-Administered Medications on File Prior to Visit   Medication Dose Route Frequency Provider Last Rate Last Admin   • dexamethasone (DECADRON) IVPB 12 mg  12 mg Intravenous Once Mich Agudelo MD       • [COMPLETED] diphenhydrAMINE (BENADRYL) IVPB 50 mg  50 mg Intravenous Once Mich Agudelo  mL/hr at 08/08/22 0916 50 mg at 08/08/22 0916   • [COMPLETED] famotidine (PEPCID) injection 20 mg  20 mg Intravenous Once Mich Agudelo MD   20 mg at 08/08/22 0914   • heparin injection 500 Units  500 Units Intravenous PRN Chau Palacios MD       • PACLitaxel (TAXOL) 125 mg in sodium chloride 0.9 % 270.8 mL chemo IVPB  80 mg/m2 (Treatment Plan Recorded) Intravenous Once Mich Agudelo MD       • sodium chloride 0.9 % flush 10 mL  10 mL Intravenous PRN Chau Palacios MD       • [COMPLETED] sodium  "chloride 0.9 % infusion 250 mL  250 mL Intravenous Once Mich Agudelo MD 20 mL/hr at 08/08/22 0911 250 mL at 08/08/22 0911        ALLERGIES:    Allergies   Allergen Reactions   • Asa [Aspirin] Anaphylaxis and Shortness Of Breath        Social History     Socioeconomic History   • Marital status:    Tobacco Use   • Smoking status: Never Smoker   • Smokeless tobacco: Never Used   Vaping Use   • Vaping Use: Never used   Substance and Sexual Activity   • Alcohol use: Not Currently     Alcohol/week: 5.0 standard drinks     Types: 5 Shots of liquor per week   • Drug use: Never   • Sexual activity: Defer        Family History   Problem Relation Age of Onset   • Melanoma Paternal Aunt    • Diabetes Other    • Malig Hyperthermia Neg Hx       Objective     Vitals:    08/08/22 0822   BP: 108/69   Pulse: 77   Resp: 12   Temp: 97.8 °F (36.6 °C)   SpO2: 99%   Weight: 51.3 kg (113 lb)   Height: 157 cm (61.81\")   PainSc: 0-No pain     Current Status 8/8/2022   ECOG score 0     Physical Exam  Vitals reviewed.   Constitutional:       General: She is not in acute distress.     Appearance: Normal appearance. She is well-developed.   HENT:      Head: Normocephalic and atraumatic.      Mouth/Throat:      Pharynx: No oropharyngeal exudate.   Eyes:      Pupils: Pupils are equal, round, and reactive to light.   Cardiovascular:      Rate and Rhythm: Normal rate and regular rhythm.      Heart sounds: Normal heart sounds. No murmur heard.  Pulmonary:      Effort: Pulmonary effort is normal. No respiratory distress.      Breath sounds: Normal breath sounds. No wheezing, rhonchi or rales.   Abdominal:      General: Bowel sounds are normal. There is no distension.      Palpations: Abdomen is soft.   Musculoskeletal:         General: Normal range of motion.      Cervical back: Normal range of motion.   Skin:     General: Skin is warm and dry.      Findings: No rash.   Neurological:      Mental Status: She is alert and oriented to " person, place, and time.   Psychiatric:         Behavior: Behavior normal.         RECENT LABS:  Hematology WBC   Date Value Ref Range Status   08/08/2022 5.08 3.40 - 10.80 10*3/mm3 Final     RBC   Date Value Ref Range Status   08/08/2022 3.47 (L) 3.77 - 5.28 10*6/mm3 Final     Hemoglobin   Date Value Ref Range Status   08/08/2022 10.7 (L) 12.0 - 15.9 g/dL Final     Hematocrit   Date Value Ref Range Status   08/08/2022 32.1 (L) 34.0 - 46.6 % Final     Platelets   Date Value Ref Range Status   08/08/2022 214 140 - 450 10*3/mm3 Final       CBC:    WBC   Date Value Ref Range Status   08/08/2022 5.08 3.40 - 10.80 10*3/mm3 Final     RBC   Date Value Ref Range Status   08/08/2022 3.47 (L) 3.77 - 5.28 10*6/mm3 Final     Hemoglobin   Date Value Ref Range Status   08/08/2022 10.7 (L) 12.0 - 15.9 g/dL Final     Hematocrit   Date Value Ref Range Status   08/08/2022 32.1 (L) 34.0 - 46.6 % Final     MCV   Date Value Ref Range Status   08/08/2022 92.5 79.0 - 97.0 fL Final     MCH   Date Value Ref Range Status   08/08/2022 30.8 26.6 - 33.0 pg Final     MCHC   Date Value Ref Range Status   08/08/2022 33.3 31.5 - 35.7 g/dL Final     RDW   Date Value Ref Range Status   08/08/2022 14.7 12.3 - 15.4 % Final     RDW-SD   Date Value Ref Range Status   08/08/2022 49.1 37.0 - 54.0 fl Final     MPV   Date Value Ref Range Status   08/08/2022 9.8 6.0 - 12.0 fL Final     Platelets   Date Value Ref Range Status   08/08/2022 214 140 - 450 10*3/mm3 Final     Neutrophil %   Date Value Ref Range Status   08/08/2022 65.6 42.7 - 76.0 % Final     Lymphocyte %   Date Value Ref Range Status   08/08/2022 16.3 (L) 19.6 - 45.3 % Final     Monocyte %   Date Value Ref Range Status   08/08/2022 10.4 5.0 - 12.0 % Final     Eosinophil %   Date Value Ref Range Status   08/08/2022 4.3 0.3 - 6.2 % Final     Basophil %   Date Value Ref Range Status   08/08/2022 1.0 0.0 - 1.5 % Final     Immature Grans %   Date Value Ref Range Status   08/08/2022 2.4 (H) 0.0 - 0.5 %  Final     Neutrophils, Absolute   Date Value Ref Range Status   08/08/2022 3.33 1.70 - 7.00 10*3/mm3 Final     Lymphocytes, Absolute   Date Value Ref Range Status   08/08/2022 0.83 0.70 - 3.10 10*3/mm3 Final     Monocytes, Absolute   Date Value Ref Range Status   08/08/2022 0.53 0.10 - 0.90 10*3/mm3 Final     Eosinophils, Absolute   Date Value Ref Range Status   08/08/2022 0.22 0.00 - 0.40 10*3/mm3 Final     Basophils, Absolute   Date Value Ref Range Status   08/08/2022 0.05 0.00 - 0.20 10*3/mm3 Final     Immature Grans, Absolute   Date Value Ref Range Status   08/08/2022 0.12 (H) 0.00 - 0.05 10*3/mm3 Final     nRBC   Date Value Ref Range Status   08/08/2022 0.0 0.0 - 0.2 /100 WBC Final        CMP:    Glucose   Date Value Ref Range Status   08/08/2022 124 74 - 124 mg/dL Final     BUN   Date Value Ref Range Status   08/08/2022 15 6 - 20 mg/dL Final     Creatinine   Date Value Ref Range Status   08/08/2022 0.57 (L) 0.60 - 1.10 mg/dL Final     Sodium   Date Value Ref Range Status   08/08/2022 141 134 - 145 mmol/L Final     Potassium   Date Value Ref Range Status   08/08/2022 3.7 3.5 - 4.7 mmol/L Final     Chloride   Date Value Ref Range Status   08/08/2022 105 98 - 107 mmol/L Final     CO2   Date Value Ref Range Status   08/08/2022 23.2 22.0 - 29.0 mmol/L Final     Calcium   Date Value Ref Range Status   08/08/2022 9.4 8.5 - 10.2 mg/dL Final     Total Protein   Date Value Ref Range Status   08/08/2022 7.1 6.3 - 8.0 g/dL Final     Albumin   Date Value Ref Range Status   08/08/2022 4.50 3.50 - 5.20 g/dL Final     ALT (SGPT)   Date Value Ref Range Status   08/08/2022 12 0 - 33 U/L Final     AST (SGOT)   Date Value Ref Range Status   08/08/2022 18 0 - 32 U/L Final     Alkaline Phosphatase   Date Value Ref Range Status   08/08/2022 98 38 - 116 U/L Final     Total Bilirubin   Date Value Ref Range Status   08/08/2022 <0.2 (L) 0.2 - 1.2 mg/dL Final     Globulin   Date Value Ref Range Status   08/08/2022 2.6 1.8 - 3.5 gm/dL  Final     A/G Ratio   Date Value Ref Range Status   08/08/2022 1.7 1.1 - 2.4 g/dL Final     BUN/Creatinine Ratio   Date Value Ref Range Status   08/08/2022 26.3 7.3 - 30.0 Final     Anion Gap   Date Value Ref Range Status   08/08/2022 12.8 5.0 - 15.0 mmol/L Final           Assessment & Plan     There are no diagnoses linked to this encounter.   *Left Breast Cancer  · 37-year-old  female originally from [place] noticed abnormalities in the left breast in 12/2021, sensation of pressure, minor pain, sensation of fullness.   · Further mammogram and ultrasound documented abnormalities in the left breast that were consistent with breast cancer.   · Since then the patient has undergone breast MRI. Breast biopsies documented invasive lobular carcinoma.   · All this culminated with a left-sided mastectomy and sentinel lymph node sampling.   · She had a lobular carcinoma, grade 2, margins of resection negative, Ki-67 at 39%, ER positive, ME positive, HER2/leonie negative. She had 1 positive sentinel lymph node for malignancy with no extracapsular invasion. There was no evidence of lymphovascular invasion.   · [facility] panel was negative for any genetic alteration.   · The patient has had an expander placed at the mastectomy site. She has discomfort and pain after this was instillated with saline solution yesterday.   · Recommended adjuvant AC x 4 followed by Taxol x 12.  · Oncotype DX in this patient shows intermediate risk of recurrence.   · Also not only the chemotherapy will be necessary but we need to make her postmenopausal in the next several weeks or months. This will bring in a lot of benefit for her in the long run to minimize any recurrent disease. She is extremely young. She has 2 young children and we have to do as much as we can in this patient to minimize any potentiality for recurrent disease.   · Dr. Palacios discussed with her that the whole treatment is going to take at least 24-26 weeks, in other words 6  months, and thereafter the patient will be hopefully postmenopausal. If she does not become postmenopausal she could qualify for a clinical trial or we could ask Dr. Heidy Gutierrez to do bilateral oophorectomy in this patient.   · Eventually the patient will require endocrine adjuvant therapy after she becomes postmenopausal hopefully with letrozole.   · Finally, the patient will need cardiac assessment.  She has been referred to Cardiology and scheduled her to have an echocardiogram.  · Dr. Palacios also discussed that the patient will require followup in the future in regard to her right breast not only with mammogram but also ultrasound due to the fact that sometimes lobular carcinoma likes to be a bilateral disease. There are minimal abnormalities in the mammogram in the right breast as well as in the MRI. This will require to be followed up in the future. Did not recommend a 2nd mastectomy prophylactically on her even though she asked the question.   The patient was reviewed on 05/20/2022. She has had her port placed in right subclavian position a week ago. She is healing perfectly fine and her surgical site on the left mastectomy and the expander looks perfectly normal with healing. No infection, erythema or discharge.   1 week following C1D1 AC WBC 0.52, ANC 0.06.  Temperature 102.9.  Patient admitted through ED for neutropenic fever and concern for sepsis as detailed below.  Respiratory viral panel ended up being positive for rhinovirus/enterovirus.  6/9/2022 C2 AC plans to dose reduce treatment by 20% and change treatment schedule to every 3 weeks.  6/30/2022: cycle 3 AC, with continued 20% dose reduction, receiving treatment every 3 weeks.  7/21/2022 C4 AC with continued 20% dose reduction with neulasta support.  Will return in 3 weeks to start Taxol.  Patient prefers Monday treatments as her children start back to school.  Patient is still having menstrual cycles, plan to recheck hormone levels at  completion of taxol.  If she does not become postmenopausal after chemotherapy she could qualify for a clinical trial or we could ask Dr. Heidy Gutierrez to do bilateral oophorectomy in this patient. She will require adjuvant endocrine therapy after she becomes postmenopausal, hopefully with letrozole.  8/8/2022 week #1 taxol.      *Left arm pain, neuropathic?  · She reports experiencing discomfort in the inner aspect of the left arm. Sounds neuropathic related to her recent sentinel lymph node sampling. The patient is taking Aleve or ibuprofen with not too much benefit.   · Recommended Voltaren topically 4 times a day. It will be perfectly fine for her to use ibuprofen and start Neurontin 100 mg to take at bedtime. Hopefully this will be a transitory issue.   · 8/8/2022, patient continues to experience neuropathic pain to the left arm.  Feels Voltaren gel is helpful.  We will need to monitor this closely as she begins Taxol.    *Social Factors  · The patient's mother is living with her, coming from [place] after all these issues.   · Dr. Palacios asked her to ask her mother to stay for the period of time that she receives chemotherapy. If we need to do any services for her in regard to visa requirements or status, we will be glad to incorporate our  into this process.     *Cardiac Function  · 5/25/2022, left ventricular ejection fraction 60%, GLS within normal limits.    *Weakness, chills, lethargy- concern for sepsis  · 5/27/2022, patient seen for triage visit.  Her  called this morning to report she was very fatigued and experiencing chills and sore throat.  Temperature at that time was 99.8.  Upon arrival to clinic, temperature 99.8 °F.  She was in a wheelchair due to severe weakness.  She is so weak/fatigued she is unable to hold her head up and can barely keep her eyes open.  WBC 0.52, ANC 0.06.  The patient was taken directly to the ED for admission due to serious concern for  sepsis.  · Admitted 5/27/2022 - 5/30/2022.    *Nausea due to chemotherapy:   · Receiving IV fluids and antiemetics the day after chemo  · 8/8/2022, patient complains of occasional nausea, no emesis.  Not currently utilizing Zofran, encouraged her to do so.    PLAN:   · Proceed with week #1 Taxol today.  Plan weekly x12.  · Continue Zofran as needed for nausea.  · Continue omeprazole 20 mg twice daily.  · Upon completion of chemotherapy the patient will initiate radiation therapy and has already been seen by Radiation Oncology.  · The patient will participate in a clinical trial that we have in the office and she already has been consented for this in regard to the utilization of antiestrogen medicine upon completion of chemotherapy administration.   · Call/return sooner should she develop any new concerns or problems.    Patient on high risk medication requiring close monitoring for toxicity      JACQUELIN Mijares  08/08/22

## 2022-08-05 NOTE — TELEPHONE ENCOUNTER
Dr. Palacios aware. No changes at this time. Asked pt to send a picture via [employer]. Pt states her pain is not unbearable. Pt has an appt. On Monday. Chelsea Abbott RN

## 2022-08-08 ENCOUNTER — OFFICE VISIT (OUTPATIENT)
Dept: ONCOLOGY | Facility: CLINIC | Age: 37
End: 2022-08-08

## 2022-08-08 ENCOUNTER — INFUSION (OUTPATIENT)
Dept: ONCOLOGY | Facility: HOSPITAL | Age: 37
End: 2022-08-08

## 2022-08-08 VITALS
OXYGEN SATURATION: 99 % | HEART RATE: 77 BPM | SYSTOLIC BLOOD PRESSURE: 108 MMHG | BODY MASS INDEX: 20.8 KG/M2 | HEIGHT: 62 IN | TEMPERATURE: 97.8 F | WEIGHT: 113 LBS | RESPIRATION RATE: 12 BRPM | DIASTOLIC BLOOD PRESSURE: 69 MMHG

## 2022-08-08 VITALS — DIASTOLIC BLOOD PRESSURE: 67 MMHG | HEART RATE: 62 BPM | SYSTOLIC BLOOD PRESSURE: 100 MMHG

## 2022-08-08 DIAGNOSIS — Z17.0 MALIGNANT NEOPLASM OF UPPER-OUTER QUADRANT OF LEFT BREAST IN FEMALE, ESTROGEN RECEPTOR POSITIVE: Primary | ICD-10-CM

## 2022-08-08 DIAGNOSIS — Z79.899 HIGH RISK MEDICATION USE: ICD-10-CM

## 2022-08-08 DIAGNOSIS — C50.412 MALIGNANT NEOPLASM OF UPPER-OUTER QUADRANT OF LEFT BREAST IN FEMALE, ESTROGEN RECEPTOR POSITIVE: ICD-10-CM

## 2022-08-08 DIAGNOSIS — D64.81 ANEMIA ASSOCIATED WITH CHEMOTHERAPY: ICD-10-CM

## 2022-08-08 DIAGNOSIS — T45.1X5A CHEMOTHERAPY INDUCED NAUSEA AND VOMITING: ICD-10-CM

## 2022-08-08 DIAGNOSIS — Z17.0 MALIGNANT NEOPLASM OF UPPER-OUTER QUADRANT OF LEFT BREAST IN FEMALE, ESTROGEN RECEPTOR POSITIVE: ICD-10-CM

## 2022-08-08 DIAGNOSIS — C50.412 MALIGNANT NEOPLASM OF UPPER-OUTER QUADRANT OF LEFT BREAST IN FEMALE, ESTROGEN RECEPTOR POSITIVE: Primary | ICD-10-CM

## 2022-08-08 DIAGNOSIS — T45.1X5A ANEMIA ASSOCIATED WITH CHEMOTHERAPY: ICD-10-CM

## 2022-08-08 DIAGNOSIS — R11.2 CHEMOTHERAPY INDUCED NAUSEA AND VOMITING: ICD-10-CM

## 2022-08-08 DIAGNOSIS — Z45.2 FITTING AND ADJUSTMENT OF VASCULAR CATHETER: ICD-10-CM

## 2022-08-08 LAB
ALBUMIN SERPL-MCNC: 4.5 G/DL (ref 3.5–5.2)
ALBUMIN/GLOB SERPL: 1.7 G/DL (ref 1.1–2.4)
ALP SERPL-CCNC: 98 U/L (ref 38–116)
ALT SERPL W P-5'-P-CCNC: 12 U/L (ref 0–33)
ANION GAP SERPL CALCULATED.3IONS-SCNC: 12.8 MMOL/L (ref 5–15)
AST SERPL-CCNC: 18 U/L (ref 0–32)
B-HCG UR QL: NEGATIVE
BASOPHILS # BLD AUTO: 0.05 10*3/MM3 (ref 0–0.2)
BASOPHILS NFR BLD AUTO: 1 % (ref 0–1.5)
BILIRUB SERPL-MCNC: <0.2 MG/DL (ref 0.2–1.2)
BUN SERPL-MCNC: 15 MG/DL (ref 6–20)
BUN/CREAT SERPL: 26.3 (ref 7.3–30)
CALCIUM SPEC-SCNC: 9.4 MG/DL (ref 8.5–10.2)
CHLORIDE SERPL-SCNC: 105 MMOL/L (ref 98–107)
CO2 SERPL-SCNC: 23.2 MMOL/L (ref 22–29)
CREAT SERPL-MCNC: 0.57 MG/DL (ref 0.6–1.1)
DEPRECATED RDW RBC AUTO: 49.1 FL (ref 37–54)
EGFRCR SERPLBLD CKD-EPI 2021: 119.5 ML/MIN/1.73
EOSINOPHIL # BLD AUTO: 0.22 10*3/MM3 (ref 0–0.4)
EOSINOPHIL NFR BLD AUTO: 4.3 % (ref 0.3–6.2)
ERYTHROCYTE [DISTWIDTH] IN BLOOD BY AUTOMATED COUNT: 14.7 % (ref 12.3–15.4)
GLOBULIN UR ELPH-MCNC: 2.6 GM/DL (ref 1.8–3.5)
GLUCOSE SERPL-MCNC: 124 MG/DL (ref 74–124)
HCT VFR BLD AUTO: 32.1 % (ref 34–46.6)
HGB BLD-MCNC: 10.7 G/DL (ref 12–15.9)
IMM GRANULOCYTES # BLD AUTO: 0.12 10*3/MM3 (ref 0–0.05)
IMM GRANULOCYTES NFR BLD AUTO: 2.4 % (ref 0–0.5)
LYMPHOCYTES # BLD AUTO: 0.83 10*3/MM3 (ref 0.7–3.1)
LYMPHOCYTES NFR BLD AUTO: 16.3 % (ref 19.6–45.3)
MCH RBC QN AUTO: 30.8 PG (ref 26.6–33)
MCHC RBC AUTO-ENTMCNC: 33.3 G/DL (ref 31.5–35.7)
MCV RBC AUTO: 92.5 FL (ref 79–97)
MONOCYTES # BLD AUTO: 0.53 10*3/MM3 (ref 0.1–0.9)
MONOCYTES NFR BLD AUTO: 10.4 % (ref 5–12)
NEUTROPHILS NFR BLD AUTO: 3.33 10*3/MM3 (ref 1.7–7)
NEUTROPHILS NFR BLD AUTO: 65.6 % (ref 42.7–76)
NRBC BLD AUTO-RTO: 0 /100 WBC (ref 0–0.2)
PLATELET # BLD AUTO: 214 10*3/MM3 (ref 140–450)
PMV BLD AUTO: 9.8 FL (ref 6–12)
POTASSIUM SERPL-SCNC: 3.7 MMOL/L (ref 3.5–4.7)
PROT SERPL-MCNC: 7.1 G/DL (ref 6.3–8)
RBC # BLD AUTO: 3.47 10*6/MM3 (ref 3.77–5.28)
SODIUM SERPL-SCNC: 141 MMOL/L (ref 134–145)
WBC NRBC COR # BLD: 5.08 10*3/MM3 (ref 3.4–10.8)

## 2022-08-08 PROCEDURE — 81025 URINE PREGNANCY TEST: CPT | Performed by: INTERNAL MEDICINE

## 2022-08-08 PROCEDURE — 85025 COMPLETE CBC W/AUTO DIFF WBC: CPT

## 2022-08-08 PROCEDURE — 25010000002 LORAZEPAM PER 2 MG: Performed by: INTERNAL MEDICINE

## 2022-08-08 PROCEDURE — 96375 TX/PRO/DX INJ NEW DRUG ADDON: CPT

## 2022-08-08 PROCEDURE — 25010000002 DEXAMETHASONE PER 1 MG: Performed by: INTERNAL MEDICINE

## 2022-08-08 PROCEDURE — 96415 CHEMO IV INFUSION ADDL HR: CPT

## 2022-08-08 PROCEDURE — 25010000002 PACLITAXEL PER 1 MG: Performed by: INTERNAL MEDICINE

## 2022-08-08 PROCEDURE — 25010000002 HEPARIN LOCK FLUSH PER 10 UNITS: Performed by: INTERNAL MEDICINE

## 2022-08-08 PROCEDURE — 99214 OFFICE O/P EST MOD 30 MIN: CPT | Performed by: NURSE PRACTITIONER

## 2022-08-08 PROCEDURE — 80053 COMPREHEN METABOLIC PANEL: CPT

## 2022-08-08 PROCEDURE — 96413 CHEMO IV INFUSION 1 HR: CPT

## 2022-08-08 PROCEDURE — 25010000002 DIPHENHYDRAMINE PER 50 MG: Performed by: INTERNAL MEDICINE

## 2022-08-08 RX ORDER — FAMOTIDINE 10 MG/ML
20 INJECTION, SOLUTION INTRAVENOUS AS NEEDED
Status: CANCELLED | OUTPATIENT
Start: 2022-08-08

## 2022-08-08 RX ORDER — SODIUM CHLORIDE 0.9 % (FLUSH) 0.9 %
10 SYRINGE (ML) INJECTION AS NEEDED
Status: CANCELLED | OUTPATIENT
Start: 2022-08-08

## 2022-08-08 RX ORDER — ONDANSETRON HYDROCHLORIDE 8 MG/1
8 TABLET, FILM COATED ORAL EVERY 8 HOURS PRN
Qty: 30 TABLET | Refills: 5
Start: 2022-08-08 | End: 2022-11-21

## 2022-08-08 RX ORDER — HEPARIN SODIUM (PORCINE) LOCK FLUSH IV SOLN 100 UNIT/ML 100 UNIT/ML
500 SOLUTION INTRAVENOUS AS NEEDED
Status: CANCELLED | OUTPATIENT
Start: 2022-08-08

## 2022-08-08 RX ORDER — DIPHENHYDRAMINE HYDROCHLORIDE 50 MG/ML
50 INJECTION INTRAMUSCULAR; INTRAVENOUS AS NEEDED
Status: CANCELLED | OUTPATIENT
Start: 2022-08-08

## 2022-08-08 RX ORDER — FAMOTIDINE 10 MG/ML
20 INJECTION, SOLUTION INTRAVENOUS ONCE
Status: COMPLETED | OUTPATIENT
Start: 2022-08-08 | End: 2022-08-08

## 2022-08-08 RX ORDER — SODIUM CHLORIDE 9 MG/ML
250 INJECTION, SOLUTION INTRAVENOUS ONCE
Status: CANCELLED | OUTPATIENT
Start: 2022-08-08

## 2022-08-08 RX ORDER — LORAZEPAM 2 MG/ML
0.25 INJECTION INTRAMUSCULAR ONCE
Status: COMPLETED | OUTPATIENT
Start: 2022-08-08 | End: 2022-08-08

## 2022-08-08 RX ORDER — HEPARIN SODIUM (PORCINE) LOCK FLUSH IV SOLN 100 UNIT/ML 100 UNIT/ML
500 SOLUTION INTRAVENOUS AS NEEDED
Status: DISCONTINUED | OUTPATIENT
Start: 2022-08-08 | End: 2022-08-08 | Stop reason: HOSPADM

## 2022-08-08 RX ORDER — SODIUM CHLORIDE 9 MG/ML
250 INJECTION, SOLUTION INTRAVENOUS ONCE
Status: COMPLETED | OUTPATIENT
Start: 2022-08-08 | End: 2022-08-08

## 2022-08-08 RX ORDER — FAMOTIDINE 10 MG/ML
20 INJECTION, SOLUTION INTRAVENOUS ONCE
Status: CANCELLED | OUTPATIENT
Start: 2022-08-08

## 2022-08-08 RX ORDER — SODIUM CHLORIDE 0.9 % (FLUSH) 0.9 %
10 SYRINGE (ML) INJECTION AS NEEDED
Status: DISCONTINUED | OUTPATIENT
Start: 2022-08-08 | End: 2022-08-08 | Stop reason: HOSPADM

## 2022-08-08 RX ADMIN — Medication 10 ML: at 11:29

## 2022-08-08 RX ADMIN — PACLITAXEL 125 MG: 6 INJECTION, SOLUTION INTRAVENOUS at 10:25

## 2022-08-08 RX ADMIN — LORAZEPAM 0.25 MG: 2 INJECTION INTRAMUSCULAR; INTRAVENOUS at 10:11

## 2022-08-08 RX ADMIN — DEXAMETHASONE SODIUM PHOSPHATE 12 MG: 10 INJECTION INTRAMUSCULAR; INTRAVENOUS at 09:33

## 2022-08-08 RX ADMIN — DIPHENHYDRAMINE HYDROCHLORIDE 50 MG: 50 INJECTION, SOLUTION INTRAMUSCULAR; INTRAVENOUS at 09:16

## 2022-08-08 RX ADMIN — FAMOTIDINE 20 MG: 10 INJECTION, SOLUTION INTRAVENOUS at 09:14

## 2022-08-08 RX ADMIN — SODIUM CHLORIDE 250 ML: 9 INJECTION, SOLUTION INTRAVENOUS at 09:11

## 2022-08-08 RX ADMIN — Medication 500 UNITS: at 11:29

## 2022-08-10 ENCOUNTER — NURSE NAVIGATOR (OUTPATIENT)
Dept: OTHER | Facility: HOSPITAL | Age: 37
End: 2022-08-10

## 2022-08-10 NOTE — PROGRESS NOTES
Call placed to patient for f/u telephone visit. Introduced self and explained role. States she is tolerating Taxol better than the previous chemotherapy she was receiving. Says she is tired and a little nauseated at times but it's manageable. No additional complaints voiced. Encouraged patient to contact oncology nurse navigator with any questions or concerns. Will continue to follow.....Sharon Ashley RN, Oncology Nurse Navigator

## 2022-08-11 NOTE — PROGRESS NOTES
Subjective     REASON FOR FOLLOW UP:  LOBULAR CARCINOMA OF THE LEFT BREAST, 70 MM IN SIZE, MARGINS OF RESECTION NEGATIVE AFTER MASTECTOMY, GRADE 2, ER POSITIVE NM POSITIVE, HER 2 NEGATIVE BY IHC,KI 67 39% ,ONE POSITIVE AXILLARY LYMPH NODE OUT OF 4 NODES REMOVAL, PREMENOPAUSAL .INVITAE PANEL NEGATIVE.ONCOTYPE 18    HISTORY OF PRESENT ILLNESS:    The patient returns today for follow-up and evaluation prior to week #2 of weekly Taxol.  She is seen today with her father-in-law present.  She continues to struggle with nausea.  She is utilizing Zofran throughout the day, still experiencing 1 episode of vomiting daily.  She also reports struggling with constipation over the last week, currently not taking anything for this.  She continues to experience neuropathic pain to the her left extremity, this remains stable.  She has also started to notice some darkening of her nails, with no changes to sensation.  She also complains that her ears feel full at times.  She feels her appetite is good, and feels she is eating and drinking adequately.  Her weight today is stable/improved at 115 pounds.  She denies fever or chills.  She denies signs or symptoms of peripheral neuropathy.  She denies new or worsening pain.      Past Medical History:   Diagnosis Date   • Anemia    • Anesthesia complication     hypotension with last C section   • Breast cancer (HCC) 03/2022    left sided, stage 3, ER/NM+, Her2-, s/p mastectomy with plans for AC/Taxol and radiation   • History of COVID-19     1/2021 AND 2/8/2022   • Hyperlipidemia    • PONV (postoperative nausea and vomiting)    • Seasonal allergies         Past Surgical History:   Procedure Laterality Date   • APPENDECTOMY     • BREAST BIOPSY Left 2022   • BREAST RECONSTRUCTION Left 04/13/2022    Procedure: LEFT BREAST 1ST STAGE RECONSTRUCTION WITH INSERTION OF TISSUE EXPANDER AND BIOLOGIC;  Surgeon: Hermelinda Johnson MD;  Location: [facility];  Service: Plastics;  Laterality:  Left;   •  SECTION     •  SECTION N/A 12/10/2018    Procedure:  SECTION REPEAT;  Surgeon: Heidy Gutierrez MD;  Location: [facility] LABOR DELIVERY;  Service: Obstetrics/Gynecology   • D & C HYSTEROSCOPY N/A 2021    Procedure: HYSTEROSCOPY REMOVAL INTRAUTERINE DEVICE WILL NEED ULTRASOUND IN ROOM;  Surgeon: Heidy Gutierrez MD;  Location: [facility] OR OSC;  Service: Obstetrics/Gynecology;  Laterality: N/A;   • KNEE ARTHROSCOPY Right    • MASTECTOMY W/ SENTINEL NODE BIOPSY Left 2022    Procedure: left total mastectomy with left axillary sentinel lymph node biopsy;  Surgeon: Monisha Escudero MD;  Location: [facility] MAIN OR;  Service: General;  Laterality: Left;   • VENOUS ACCESS DEVICE (PORT) INSERTION Right 2022    Procedure: right port placement;  Surgeon: Monisha Escudero MD;  Location: [facility] OR;  Service: General;  Laterality: Right;        Current Outpatient Medications on File Prior to Visit   Medication Sig Dispense Refill   • LORazepam (ATIVAN) 0.5 MG tablet Take 1 tablet by mouth Every 12 (Twelve) Hours As Needed (Nausea). To be taken every 12 hours as needed for up to one week after chemotherapy 28 tablet 0   • omeprazole (priLOSEC) 20 MG capsule Take 1 capsule by mouth 2 (Two) Times a Day. 60 capsule 3   • ondansetron (Zofran) 8 MG tablet Take 1 tablet by mouth Every 8 (Eight) Hours As Needed for Nausea or Vomiting. 30 tablet 5     No current facility-administered medications on file prior to visit.        ALLERGIES:    Allergies   Allergen Reactions   • Asa [Aspirin] Anaphylaxis and Shortness Of Breath        Social History     Socioeconomic History   • Marital status:    Tobacco Use   • Smoking status: Never Smoker   • Smokeless tobacco: Never Used   Vaping Use   • Vaping Use: Never used   Substance and Sexual Activity   • Alcohol use: Not Currently     Alcohol/week: 5.0 standard drinks     Types: 5 Shots of liquor per week   • Drug use: Never  "  • Sexual activity: Defer        Family History   Problem Relation Age of Onset   • Melanoma Paternal Aunt    • Diabetes Other    • Malig Hyperthermia Neg Hx       Objective     Vitals:    08/15/22 1036   BP: 99/68   Pulse: 77   Resp: 18   Temp: 97.8 °F (36.6 °C)   TempSrc: Temporal   SpO2: 100%   Weight: 52.3 kg (115 lb 4.8 oz)   Height: 157 cm (61.81\")   PainSc: 0-No pain   PainLoc: Comment: no pain/ Nauseous     Current Status 8/15/2022   ECOG score 0     Physical Exam  Vitals reviewed.   Constitutional:       General: She is not in acute distress.     Appearance: Normal appearance. She is well-developed.   HENT:      Head: Normocephalic and atraumatic.      Right Ear: Tympanic membrane and external ear normal. There is no impacted cerumen.      Left Ear: Tympanic membrane and external ear normal. There is no impacted cerumen.      Mouth/Throat:      Pharynx: No oropharyngeal exudate.   Eyes:      Pupils: Pupils are equal, round, and reactive to light.   Cardiovascular:      Rate and Rhythm: Normal rate and regular rhythm.      Heart sounds: Normal heart sounds. No murmur heard.  Pulmonary:      Effort: Pulmonary effort is normal. No respiratory distress.      Breath sounds: Normal breath sounds. No wheezing, rhonchi or rales.   Abdominal:      General: Bowel sounds are normal. There is no distension.      Palpations: Abdomen is soft.   Musculoskeletal:         General: Normal range of motion.      Cervical back: Normal range of motion.   Skin:     General: Skin is warm and dry.      Findings: No rash.      Comments: Darkening of nailbeds, secondary to Taxol.   Neurological:      Mental Status: She is alert and oriented to person, place, and time.   Psychiatric:         Behavior: Behavior normal.         RECENT LABS:  Hematology WBC   Date Value Ref Range Status   08/15/2022 3.73 3.40 - 10.80 10*3/mm3 Final     RBC   Date Value Ref Range Status   08/15/2022 3.24 (L) 3.77 - 5.28 10*6/mm3 Final     Hemoglobin "   Date Value Ref Range Status   08/15/2022 10.0 (L) 12.0 - 15.9 g/dL Final     Hematocrit   Date Value Ref Range Status   08/15/2022 29.4 (L) 34.0 - 46.6 % Final     Platelets   Date Value Ref Range Status   08/15/2022 249 140 - 450 10*3/mm3 Final       CBC:    WBC   Date Value Ref Range Status   08/15/2022 3.73 3.40 - 10.80 10*3/mm3 Final     RBC   Date Value Ref Range Status   08/15/2022 3.24 (L) 3.77 - 5.28 10*6/mm3 Final     Hemoglobin   Date Value Ref Range Status   08/15/2022 10.0 (L) 12.0 - 15.9 g/dL Final     Hematocrit   Date Value Ref Range Status   08/15/2022 29.4 (L) 34.0 - 46.6 % Final     MCV   Date Value Ref Range Status   08/15/2022 90.7 79.0 - 97.0 fL Final     MCH   Date Value Ref Range Status   08/15/2022 30.9 26.6 - 33.0 pg Final     MCHC   Date Value Ref Range Status   08/15/2022 34.0 31.5 - 35.7 g/dL Final     RDW   Date Value Ref Range Status   08/15/2022 14.1 12.3 - 15.4 % Final     RDW-SD   Date Value Ref Range Status   08/15/2022 46.7 37.0 - 54.0 fl Final     MPV   Date Value Ref Range Status   08/15/2022 10.1 6.0 - 12.0 fL Final     Platelets   Date Value Ref Range Status   08/15/2022 249 140 - 450 10*3/mm3 Final     Neutrophil %   Date Value Ref Range Status   08/15/2022 59.2 42.7 - 76.0 % Final     Lymphocyte %   Date Value Ref Range Status   08/15/2022 22.8 19.6 - 45.3 % Final     Monocyte %   Date Value Ref Range Status   08/15/2022 11.3 5.0 - 12.0 % Final     Eosinophil %   Date Value Ref Range Status   08/15/2022 4.3 0.3 - 6.2 % Final     Basophil %   Date Value Ref Range Status   08/15/2022 1.6 (H) 0.0 - 1.5 % Final     Immature Grans %   Date Value Ref Range Status   08/15/2022 0.8 (H) 0.0 - 0.5 % Final     Neutrophils, Absolute   Date Value Ref Range Status   08/15/2022 2.21 1.70 - 7.00 10*3/mm3 Final     Lymphocytes, Absolute   Date Value Ref Range Status   08/15/2022 0.85 0.70 - 3.10 10*3/mm3 Final     Monocytes, Absolute   Date Value Ref Range Status   08/15/2022 0.42 0.10 -  0.90 10*3/mm3 Final     Eosinophils, Absolute   Date Value Ref Range Status   08/15/2022 0.16 0.00 - 0.40 10*3/mm3 Final     Basophils, Absolute   Date Value Ref Range Status   08/15/2022 0.06 0.00 - 0.20 10*3/mm3 Final     Immature Grans, Absolute   Date Value Ref Range Status   08/15/2022 0.03 0.00 - 0.05 10*3/mm3 Final     nRBC   Date Value Ref Range Status   08/15/2022 0.0 0.0 - 0.2 /100 WBC Final        CMP:    Glucose   Date Value Ref Range Status   08/08/2022 124 74 - 124 mg/dL Final     BUN   Date Value Ref Range Status   08/08/2022 15 6 - 20 mg/dL Final     Creatinine   Date Value Ref Range Status   08/08/2022 0.57 (L) 0.60 - 1.10 mg/dL Final     Sodium   Date Value Ref Range Status   08/08/2022 141 134 - 145 mmol/L Final     Potassium   Date Value Ref Range Status   08/08/2022 3.7 3.5 - 4.7 mmol/L Final     Chloride   Date Value Ref Range Status   08/08/2022 105 98 - 107 mmol/L Final     CO2   Date Value Ref Range Status   08/08/2022 23.2 22.0 - 29.0 mmol/L Final     Calcium   Date Value Ref Range Status   08/08/2022 9.4 8.5 - 10.2 mg/dL Final     Total Protein   Date Value Ref Range Status   08/08/2022 7.1 6.3 - 8.0 g/dL Final     Albumin   Date Value Ref Range Status   08/08/2022 4.50 3.50 - 5.20 g/dL Final     ALT (SGPT)   Date Value Ref Range Status   08/08/2022 12 0 - 33 U/L Final     AST (SGOT)   Date Value Ref Range Status   08/08/2022 18 0 - 32 U/L Final     Alkaline Phosphatase   Date Value Ref Range Status   08/08/2022 98 38 - 116 U/L Final     Total Bilirubin   Date Value Ref Range Status   08/08/2022 <0.2 (L) 0.2 - 1.2 mg/dL Final     Globulin   Date Value Ref Range Status   08/08/2022 2.6 1.8 - 3.5 gm/dL Final     A/G Ratio   Date Value Ref Range Status   08/08/2022 1.7 1.1 - 2.4 g/dL Final     BUN/Creatinine Ratio   Date Value Ref Range Status   08/08/2022 26.3 7.3 - 30.0 Final     Anion Gap   Date Value Ref Range Status   08/08/2022 12.8 5.0 - 15.0 mmol/L Final           Assessment & Plan      Diagnoses and all orders for this visit:    1. Malignant neoplasm of upper-outer quadrant of left breast in female, estrogen receptor positive (HCC) (Primary)  -     CBC and Differential; Future  -     Comprehensive metabolic panel; Future    Other orders  -     prochlorperazine (COMPAZINE) 10 MG tablet; Take 1 tablet by mouth Every 6 (Six) Hours As Needed for Nausea or Vomiting.  Dispense: 30 tablet; Refill: 5       *Left Breast Cancer  · 37-year-old  female originally from [place] noticed abnormalities in the left breast in 12/2021, sensation of pressure, minor pain, sensation of fullness.   · Further mammogram and ultrasound documented abnormalities in the left breast that were consistent with breast cancer.   · Since then the patient has undergone breast MRI. Breast biopsies documented invasive lobular carcinoma.   · All this culminated with a left-sided mastectomy and sentinel lymph node sampling.   · She had a lobular carcinoma, grade 2, margins of resection negative, Ki-67 at 39%, ER positive, OH positive, HER2/leonie negative. She had 1 positive sentinel lymph node for malignancy with no extracapsular invasion. There was no evidence of lymphovascular invasion.   · [facility] panel was negative for any genetic alteration.   · The patient has had an expander placed at the mastectomy site. She has discomfort and pain after this was instillated with saline solution yesterday.   · Recommended adjuvant AC x 4 followed by Taxol x 12.  · Oncotype DX in this patient shows intermediate risk of recurrence.   · Also not only the chemotherapy will be necessary but we need to make her postmenopausal in the next several weeks or months. This will bring in a lot of benefit for her in the long run to minimize any recurrent disease. She is extremely young. She has 2 young children and we have to do as much as we can in this patient to minimize any potentiality for recurrent disease.   · Dr. Palacios discussed with her that  the whole treatment is going to take at least 24-26 weeks, in other words 6 months, and thereafter the patient will be hopefully postmenopausal. If she does not become postmenopausal she could qualify for a clinical trial or we could ask Dr. Heidy Gutierrez to do bilateral oophorectomy in this patient.   · Eventually the patient will require endocrine adjuvant therapy after she becomes postmenopausal hopefully with letrozole.   · Finally, the patient will need cardiac assessment.  She has been referred to Cardiology and scheduled her to have an echocardiogram.  · Dr. Palacios also discussed that the patient will require followup in the future in regard to her right breast not only with mammogram but also ultrasound due to the fact that sometimes lobular carcinoma likes to be a bilateral disease. There are minimal abnormalities in the mammogram in the right breast as well as in the MRI. This will require to be followed up in the future. Did not recommend a 2nd mastectomy prophylactically on her even though she asked the question.   The patient was reviewed on 05/20/2022. She has had her port placed in right subclavian position a week ago. She is healing perfectly fine and her surgical site on the left mastectomy and the expander looks perfectly normal with healing. No infection, erythema or discharge.   1 week following C1D1 AC WBC 0.52, ANC 0.06.  Temperature 102.9.  Patient admitted through ED for neutropenic fever and concern for sepsis as detailed below.  Respiratory viral panel ended up being positive for rhinovirus/enterovirus.  6/9/2022 C2 AC plans to dose reduce treatment by 20% and change treatment schedule to every 3 weeks.  6/30/2022: cycle 3 AC, with continued 20% dose reduction, receiving treatment every 3 weeks.  7/21/2022 C4 AC with continued 20% dose reduction with neulasta support.  Will return in 3 weeks to start Taxol.  Patient prefers Monday treatments as her children start back to  school.  Patient is still having menstrual cycles, plan to recheck hormone levels at completion of taxol.  If she does not become postmenopausal after chemotherapy she could qualify for a clinical trial or we could ask Dr. Heidy Gutierrez to do bilateral oophorectomy in this patient. She will require adjuvant endocrine therapy after she becomes postmenopausal, hopefully with letrozole.  8/8/2022 week #1 taxol.    8/15/2022 week #2 taxol.  Denies signs or symptoms of peripheral neuropathy.  Continues to struggle with fatigue and nausea as detailed below.  She is also complaining of her ears feeling full at times.  Ear exam is within normal limits today.  Advised that she may start Flonase as needed to see if this helps.    *Left arm pain, neuropathic?  · She reports experiencing discomfort in the inner aspect of the left arm. Sounds neuropathic related to her recent sentinel lymph node sampling. The patient is taking Aleve or ibuprofen with not too much benefit.   · Recommended Voltaren topically 4 times a day. It will be perfectly fine for her to use ibuprofen and start Neurontin 100 mg to take at bedtime. Hopefully this will be a transitory issue.   · 8/8/2022, patient continues to experience neuropathic pain to the left arm.  Feels Voltaren gel is helpful.  We will need to monitor this closely as she begins Taxol.   · 8/15/2022, stable.    *Social Factors  · The patient's mother is living with her, coming from [place] after all these issues.   · Dr. Palacios asked her to ask her mother to stay for the period of time that she receives chemotherapy. If we need to do any services for her in regard to visa requirements or status, we will be glad to incorporate our  into this process.     *Cardiac Function  · 5/25/2022, left ventricular ejection fraction 60%, GLS within normal limits.    *Weakness, chills, lethargy- concern for sepsis  · 5/27/2022, patient seen for triage visit.  Her  called this  morning to report she was very fatigued and experiencing chills and sore throat.  Temperature at that time was 99.8.  Upon arrival to clinic, temperature 99.8 °F.  She was in a wheelchair due to severe weakness.  She is so weak/fatigued she is unable to hold her head up and can barely keep her eyes open.  WBC 0.52, ANC 0.06.  The patient was taken directly to the ED for admission due to serious concern for sepsis.  · Admitted 5/27/2022 - 5/30/2022.    *Nausea due to chemotherapy:   · Receiving IV fluids and antiemetics the day after chemo  · 8/8/2022, patient complains of occasional nausea, no emesis.  Not currently utilizing Zofran, encouraged her to do so.  · 8/15/2022, patient reports taking Zofran several times throughout the day and still experiencing 1 episode of vomiting daily.  We will send her in a prescription of Compazine, reviewed instructions for rotating Zofran and Compazine for better control of nausea.  She also has Ativan available at home, however does not like to use this as it makes her very tired.  At her appointment last week, she required IV Zofran in addition to IV Ativan.  She is requesting IV Zofran today, we will have this added to her treatment plan for all future treatments.  She understands not to take oral Zofran at home prior to her appointment so she can receive IV Zofran.    *Darkening of the nailbeds, secondary to Taxol.  No changes in sensation.  Denies peripheral neuropathy.    *Chemotherapy-induced fatigue  · 8/15/2022, patient continues to experience fatigue secondary to chemotherapy.  We will give her 500 cc normal saline in addition to chemotherapy today.    *Chemotherapy induced constipation  · 8/15/2022, patient complaining of constipation since initiating Taxol.  She is not currently taking anything for constipation, but has adjusted her diet to try and compensate for this.  Recommended Senokot S for constipation, instructions reviewed.  ·   PLAN:   · Proceed with week #2  Taxol today.  Plan weekly x12.  · Patient will receive Zofran 8 mg IV today in clinic.  This will be added to the patient's treatment plan, to be given with each treatment in the future.  She understands not to take oral Zofran prior to coming for chemo so she can receive IV Zofran.  · Patient will receive 500 cc normal saline today in addition to chemotherapy.  · Start Senokot-S as needed for constipation.  · Start Flonase.  · Start Compazine 10 mg every 6 hours as needed for nausea.  Prescription sent to pharmacy today.  · Continue Zofran as needed for nausea.  Reviewed instructions on alternating Compazine and Zofran for better control of nausea.  · Continue omeprazole 20 mg twice daily.  · Upon completion of chemotherapy the patient will initiate radiation therapy and has already been seen by Radiation Oncology.  · The patient will participate in a clinical trial that we have in the office and she already has been consented for this in regard to the utilization of antiestrogen medicine upon completion of chemotherapy administration.   · Return in 1 week for MD follow-up and week #3 Taxol.  · Call/return sooner should she develop any new concerns or problems.    Patient on high risk medication requiring close monitoring for toxicity    I spent 65 minutes caring for Maribell on this date of service. This time includes time spent by me in the following activities: preparing for the visit, reviewing tests, obtaining and/or reviewing a separately obtained history, performing a medically appropriate examination and/or evaluation, counseling and educating the patient/family/caregiver, ordering medications, tests, or procedures, documenting information in the medical record and care coordination.     Tracy Mark, JACQUELIN  08/15/22

## 2022-08-15 ENCOUNTER — INFUSION (OUTPATIENT)
Dept: ONCOLOGY | Facility: HOSPITAL | Age: 37
End: 2022-08-15

## 2022-08-15 ENCOUNTER — OFFICE VISIT (OUTPATIENT)
Dept: ONCOLOGY | Facility: CLINIC | Age: 37
End: 2022-08-15

## 2022-08-15 VITALS
OXYGEN SATURATION: 100 % | HEIGHT: 62 IN | WEIGHT: 115.3 LBS | SYSTOLIC BLOOD PRESSURE: 99 MMHG | DIASTOLIC BLOOD PRESSURE: 68 MMHG | BODY MASS INDEX: 21.22 KG/M2 | RESPIRATION RATE: 18 BRPM | TEMPERATURE: 97.8 F | HEART RATE: 77 BPM

## 2022-08-15 DIAGNOSIS — Z17.0 MALIGNANT NEOPLASM OF UPPER-OUTER QUADRANT OF LEFT BREAST IN FEMALE, ESTROGEN RECEPTOR POSITIVE: Primary | ICD-10-CM

## 2022-08-15 DIAGNOSIS — D64.89 ANEMIA DUE TO OTHER CAUSE, NOT CLASSIFIED: ICD-10-CM

## 2022-08-15 DIAGNOSIS — K21.00 PEPTIC REFLUX ESOPHAGITIS: ICD-10-CM

## 2022-08-15 DIAGNOSIS — D64.81 ANEMIA ASSOCIATED WITH CHEMOTHERAPY: ICD-10-CM

## 2022-08-15 DIAGNOSIS — R11.2 CHEMOTHERAPY INDUCED NAUSEA AND VOMITING: ICD-10-CM

## 2022-08-15 DIAGNOSIS — C50.412 MALIGNANT NEOPLASM OF UPPER-OUTER QUADRANT OF LEFT BREAST IN FEMALE, ESTROGEN RECEPTOR POSITIVE: Primary | ICD-10-CM

## 2022-08-15 DIAGNOSIS — Z45.2 FITTING AND ADJUSTMENT OF VASCULAR CATHETER: ICD-10-CM

## 2022-08-15 DIAGNOSIS — T45.1X5A ANEMIA ASSOCIATED WITH CHEMOTHERAPY: ICD-10-CM

## 2022-08-15 DIAGNOSIS — K59.03 DRUG INDUCED CONSTIPATION: ICD-10-CM

## 2022-08-15 DIAGNOSIS — C50.412 MALIGNANT NEOPLASM OF UPPER-OUTER QUADRANT OF LEFT BREAST IN FEMALE, ESTROGEN RECEPTOR POSITIVE: ICD-10-CM

## 2022-08-15 DIAGNOSIS — T45.1X5A CHEMOTHERAPY-INDUCED FATIGUE: ICD-10-CM

## 2022-08-15 DIAGNOSIS — T45.1X5A CHEMOTHERAPY INDUCED NAUSEA AND VOMITING: ICD-10-CM

## 2022-08-15 DIAGNOSIS — R53.83 CHEMOTHERAPY-INDUCED FATIGUE: ICD-10-CM

## 2022-08-15 DIAGNOSIS — Z79.899 HIGH RISK MEDICATION USE: ICD-10-CM

## 2022-08-15 DIAGNOSIS — Z17.0 MALIGNANT NEOPLASM OF UPPER-OUTER QUADRANT OF LEFT BREAST IN FEMALE, ESTROGEN RECEPTOR POSITIVE: ICD-10-CM

## 2022-08-15 DIAGNOSIS — T45.1X5A LEUKOPENIA DUE TO ANTINEOPLASTIC CHEMOTHERAPY: ICD-10-CM

## 2022-08-15 DIAGNOSIS — T45.1X5A CHEMOTHERAPY-INDUCED THROMBOCYTOPENIA: ICD-10-CM

## 2022-08-15 DIAGNOSIS — D70.1 LEUKOPENIA DUE TO ANTINEOPLASTIC CHEMOTHERAPY: ICD-10-CM

## 2022-08-15 DIAGNOSIS — D69.59 CHEMOTHERAPY-INDUCED THROMBOCYTOPENIA: ICD-10-CM

## 2022-08-15 LAB
ALBUMIN SERPL-MCNC: 4.4 G/DL (ref 3.5–5.2)
ALBUMIN/GLOB SERPL: 1.8 G/DL (ref 1.1–2.4)
ALP SERPL-CCNC: 78 U/L (ref 38–116)
ALT SERPL W P-5'-P-CCNC: 28 U/L (ref 0–33)
ANION GAP SERPL CALCULATED.3IONS-SCNC: 10.6 MMOL/L (ref 5–15)
AST SERPL-CCNC: 22 U/L (ref 0–32)
BASOPHILS # BLD AUTO: 0.06 10*3/MM3 (ref 0–0.2)
BASOPHILS NFR BLD AUTO: 1.6 % (ref 0–1.5)
BILIRUB SERPL-MCNC: 0.2 MG/DL (ref 0.2–1.2)
BUN SERPL-MCNC: 12 MG/DL (ref 6–20)
BUN/CREAT SERPL: 21.8 (ref 7.3–30)
CALCIUM SPEC-SCNC: 9.4 MG/DL (ref 8.5–10.2)
CHLORIDE SERPL-SCNC: 105 MMOL/L (ref 98–107)
CO2 SERPL-SCNC: 23.4 MMOL/L (ref 22–29)
CREAT SERPL-MCNC: 0.55 MG/DL (ref 0.6–1.1)
DEPRECATED RDW RBC AUTO: 46.7 FL (ref 37–54)
EGFRCR SERPLBLD CKD-EPI 2021: 120.5 ML/MIN/1.73
EOSINOPHIL # BLD AUTO: 0.16 10*3/MM3 (ref 0–0.4)
EOSINOPHIL NFR BLD AUTO: 4.3 % (ref 0.3–6.2)
ERYTHROCYTE [DISTWIDTH] IN BLOOD BY AUTOMATED COUNT: 14.1 % (ref 12.3–15.4)
GLOBULIN UR ELPH-MCNC: 2.5 GM/DL (ref 1.8–3.5)
GLUCOSE SERPL-MCNC: 93 MG/DL (ref 74–124)
HCT VFR BLD AUTO: 29.4 % (ref 34–46.6)
HGB BLD-MCNC: 10 G/DL (ref 12–15.9)
IMM GRANULOCYTES # BLD AUTO: 0.03 10*3/MM3 (ref 0–0.05)
IMM GRANULOCYTES NFR BLD AUTO: 0.8 % (ref 0–0.5)
LYMPHOCYTES # BLD AUTO: 0.85 10*3/MM3 (ref 0.7–3.1)
LYMPHOCYTES NFR BLD AUTO: 22.8 % (ref 19.6–45.3)
MCH RBC QN AUTO: 30.9 PG (ref 26.6–33)
MCHC RBC AUTO-ENTMCNC: 34 G/DL (ref 31.5–35.7)
MCV RBC AUTO: 90.7 FL (ref 79–97)
MONOCYTES # BLD AUTO: 0.42 10*3/MM3 (ref 0.1–0.9)
MONOCYTES NFR BLD AUTO: 11.3 % (ref 5–12)
NEUTROPHILS NFR BLD AUTO: 2.21 10*3/MM3 (ref 1.7–7)
NEUTROPHILS NFR BLD AUTO: 59.2 % (ref 42.7–76)
NRBC BLD AUTO-RTO: 0 /100 WBC (ref 0–0.2)
PLATELET # BLD AUTO: 249 10*3/MM3 (ref 140–450)
PMV BLD AUTO: 10.1 FL (ref 6–12)
POTASSIUM SERPL-SCNC: 4 MMOL/L (ref 3.5–4.7)
PROT SERPL-MCNC: 6.9 G/DL (ref 6.3–8)
RBC # BLD AUTO: 3.24 10*6/MM3 (ref 3.77–5.28)
SODIUM SERPL-SCNC: 139 MMOL/L (ref 134–145)
WBC NRBC COR # BLD: 3.73 10*3/MM3 (ref 3.4–10.8)

## 2022-08-15 PROCEDURE — 96361 HYDRATE IV INFUSION ADD-ON: CPT

## 2022-08-15 PROCEDURE — 25010000002 DIPHENHYDRAMINE PER 50 MG: Performed by: NURSE PRACTITIONER

## 2022-08-15 PROCEDURE — 96375 TX/PRO/DX INJ NEW DRUG ADDON: CPT

## 2022-08-15 PROCEDURE — 85025 COMPLETE CBC W/AUTO DIFF WBC: CPT

## 2022-08-15 PROCEDURE — 25010000002 HEPARIN LOCK FLUSH PER 10 UNITS: Performed by: INTERNAL MEDICINE

## 2022-08-15 PROCEDURE — 96413 CHEMO IV INFUSION 1 HR: CPT

## 2022-08-15 PROCEDURE — 25010000002 PACLITAXEL PER 1 MG: Performed by: NURSE PRACTITIONER

## 2022-08-15 PROCEDURE — 80053 COMPREHEN METABOLIC PANEL: CPT

## 2022-08-15 PROCEDURE — 25010000002 LORAZEPAM PER 2 MG: Performed by: INTERNAL MEDICINE

## 2022-08-15 PROCEDURE — 99215 OFFICE O/P EST HI 40 MIN: CPT | Performed by: NURSE PRACTITIONER

## 2022-08-15 PROCEDURE — 25010000002 DEXAMETHASONE SODIUM PHOSPHATE 100 MG/10ML SOLUTION: Performed by: NURSE PRACTITIONER

## 2022-08-15 PROCEDURE — 25010000002 ONDANSETRON PER 1 MG: Performed by: NURSE PRACTITIONER

## 2022-08-15 RX ORDER — SODIUM CHLORIDE 0.9 % (FLUSH) 0.9 %
10 SYRINGE (ML) INJECTION AS NEEDED
Status: CANCELLED | OUTPATIENT
Start: 2022-08-15

## 2022-08-15 RX ORDER — SODIUM CHLORIDE 9 MG/ML
250 INJECTION, SOLUTION INTRAVENOUS ONCE
Status: COMPLETED | OUTPATIENT
Start: 2022-08-15 | End: 2022-08-15

## 2022-08-15 RX ORDER — HEPARIN SODIUM (PORCINE) LOCK FLUSH IV SOLN 100 UNIT/ML 100 UNIT/ML
500 SOLUTION INTRAVENOUS AS NEEDED
Status: DISCONTINUED | OUTPATIENT
Start: 2022-08-15 | End: 2022-08-15 | Stop reason: HOSPADM

## 2022-08-15 RX ORDER — HEPARIN SODIUM (PORCINE) LOCK FLUSH IV SOLN 100 UNIT/ML 100 UNIT/ML
500 SOLUTION INTRAVENOUS AS NEEDED
Status: CANCELLED | OUTPATIENT
Start: 2022-08-15

## 2022-08-15 RX ORDER — SODIUM CHLORIDE 9 MG/ML
500 INJECTION, SOLUTION INTRAVENOUS ONCE
Status: COMPLETED | OUTPATIENT
Start: 2022-08-15 | End: 2022-08-15

## 2022-08-15 RX ORDER — PROCHLORPERAZINE MALEATE 10 MG
10 TABLET ORAL EVERY 6 HOURS PRN
Qty: 30 TABLET | Refills: 5 | Status: SHIPPED | OUTPATIENT
Start: 2022-08-15 | End: 2023-04-05

## 2022-08-15 RX ORDER — SODIUM CHLORIDE 9 MG/ML
250 INJECTION, SOLUTION INTRAVENOUS ONCE
Status: CANCELLED | OUTPATIENT
Start: 2022-08-15

## 2022-08-15 RX ORDER — FAMOTIDINE 10 MG/ML
20 INJECTION, SOLUTION INTRAVENOUS AS NEEDED
Status: CANCELLED | OUTPATIENT
Start: 2022-08-15

## 2022-08-15 RX ORDER — DIPHENHYDRAMINE HYDROCHLORIDE 50 MG/ML
50 INJECTION INTRAMUSCULAR; INTRAVENOUS AS NEEDED
Status: CANCELLED | OUTPATIENT
Start: 2022-08-15

## 2022-08-15 RX ORDER — LORAZEPAM 2 MG/ML
0.25 INJECTION INTRAMUSCULAR AS NEEDED
Status: DISCONTINUED | OUTPATIENT
Start: 2022-08-15 | End: 2022-08-15 | Stop reason: HOSPADM

## 2022-08-15 RX ORDER — FAMOTIDINE 10 MG/ML
20 INJECTION, SOLUTION INTRAVENOUS ONCE
Status: CANCELLED | OUTPATIENT
Start: 2022-08-15

## 2022-08-15 RX ORDER — ONDANSETRON 2 MG/ML
8 INJECTION INTRAMUSCULAR; INTRAVENOUS ONCE
Status: COMPLETED | OUTPATIENT
Start: 2022-08-15 | End: 2022-08-15

## 2022-08-15 RX ORDER — FAMOTIDINE 10 MG/ML
20 INJECTION, SOLUTION INTRAVENOUS ONCE
Status: COMPLETED | OUTPATIENT
Start: 2022-08-15 | End: 2022-08-15

## 2022-08-15 RX ORDER — SODIUM CHLORIDE 0.9 % (FLUSH) 0.9 %
10 SYRINGE (ML) INJECTION AS NEEDED
Status: DISCONTINUED | OUTPATIENT
Start: 2022-08-15 | End: 2022-08-15 | Stop reason: HOSPADM

## 2022-08-15 RX ADMIN — SODIUM CHLORIDE 250 ML: 9 INJECTION, SOLUTION INTRAVENOUS at 11:19

## 2022-08-15 RX ADMIN — SODIUM CHLORIDE 500 ML: 9 INJECTION, SOLUTION INTRAVENOUS at 11:10

## 2022-08-15 RX ADMIN — DEXAMETHASONE SODIUM PHOSPHATE 12 MG: 10 INJECTION, SOLUTION INTRAMUSCULAR; INTRAVENOUS at 11:35

## 2022-08-15 RX ADMIN — FAMOTIDINE 20 MG: 10 INJECTION, SOLUTION INTRAVENOUS at 11:33

## 2022-08-15 RX ADMIN — PACLITAXEL 125 MG: 6 INJECTION, SOLUTION INTRAVENOUS at 12:24

## 2022-08-15 RX ADMIN — Medication 10 ML: at 13:25

## 2022-08-15 RX ADMIN — DIPHENHYDRAMINE HYDROCHLORIDE 25 MG: 50 INJECTION, SOLUTION INTRAMUSCULAR; INTRAVENOUS at 11:23

## 2022-08-15 RX ADMIN — Medication 500 UNITS: at 13:26

## 2022-08-15 RX ADMIN — ONDANSETRON 8 MG: 2 INJECTION, SOLUTION INTRAMUSCULAR; INTRAVENOUS at 11:19

## 2022-08-15 RX ADMIN — LORAZEPAM 0.25 MG: 2 INJECTION INTRAMUSCULAR; INTRAVENOUS at 11:21

## 2022-08-16 ENCOUNTER — TELEPHONE (OUTPATIENT)
Dept: ONCOLOGY | Facility: CLINIC | Age: 37
End: 2022-08-16

## 2022-08-16 NOTE — TELEPHONE ENCOUNTER
Returned call to patient who is reporting that she had two nosebleeds on Friday, one yesterday and one today.  The nose starts bleeding for no reason spontaneously and lasts a few minutes then stops.  Please advise.

## 2022-08-17 NOTE — TELEPHONE ENCOUNTER
Called pt in response to call regarding nose bleeds after speaking with Dr. Palacios. It is only in the right nare. She is not on any NSAIDS. Pt will bring up again at her visit with Dr. Palacios on Monday. She has had no nosebleeds today. Chelsea Abbott RN

## 2022-08-22 ENCOUNTER — INFUSION (OUTPATIENT)
Dept: ONCOLOGY | Facility: HOSPITAL | Age: 37
End: 2022-08-22

## 2022-08-22 ENCOUNTER — OFFICE VISIT (OUTPATIENT)
Dept: ONCOLOGY | Facility: CLINIC | Age: 37
End: 2022-08-22

## 2022-08-22 VITALS
DIASTOLIC BLOOD PRESSURE: 59 MMHG | WEIGHT: 115.1 LBS | OXYGEN SATURATION: 99 % | HEART RATE: 80 BPM | TEMPERATURE: 97.3 F | BODY MASS INDEX: 21.18 KG/M2 | RESPIRATION RATE: 14 BRPM | HEIGHT: 62 IN | SYSTOLIC BLOOD PRESSURE: 92 MMHG

## 2022-08-22 DIAGNOSIS — Z45.2 FITTING AND ADJUSTMENT OF VASCULAR CATHETER: ICD-10-CM

## 2022-08-22 DIAGNOSIS — T45.1X5A ANEMIA ASSOCIATED WITH CHEMOTHERAPY: ICD-10-CM

## 2022-08-22 DIAGNOSIS — C50.412 MALIGNANT NEOPLASM OF UPPER-OUTER QUADRANT OF LEFT BREAST IN FEMALE, ESTROGEN RECEPTOR POSITIVE: Primary | ICD-10-CM

## 2022-08-22 DIAGNOSIS — C50.412 MALIGNANT NEOPLASM OF UPPER-OUTER QUADRANT OF LEFT BREAST IN FEMALE, ESTROGEN RECEPTOR POSITIVE: ICD-10-CM

## 2022-08-22 DIAGNOSIS — D64.89 ANEMIA DUE TO OTHER CAUSE, NOT CLASSIFIED: ICD-10-CM

## 2022-08-22 DIAGNOSIS — Z17.0 MALIGNANT NEOPLASM OF UPPER-OUTER QUADRANT OF LEFT BREAST IN FEMALE, ESTROGEN RECEPTOR POSITIVE: Primary | ICD-10-CM

## 2022-08-22 DIAGNOSIS — D69.59 CHEMOTHERAPY-INDUCED THROMBOCYTOPENIA: ICD-10-CM

## 2022-08-22 DIAGNOSIS — D64.81 ANEMIA ASSOCIATED WITH CHEMOTHERAPY: ICD-10-CM

## 2022-08-22 DIAGNOSIS — R04.0 ANTERIOR EPISTAXIS: ICD-10-CM

## 2022-08-22 DIAGNOSIS — T45.1X5A CHEMOTHERAPY-INDUCED THROMBOCYTOPENIA: ICD-10-CM

## 2022-08-22 DIAGNOSIS — Z17.0 MALIGNANT NEOPLASM OF UPPER-OUTER QUADRANT OF LEFT BREAST IN FEMALE, ESTROGEN RECEPTOR POSITIVE: ICD-10-CM

## 2022-08-22 LAB
ALBUMIN SERPL-MCNC: 4.4 G/DL (ref 3.5–5.2)
ALBUMIN/GLOB SERPL: 1.7 G/DL (ref 1.1–2.4)
ALP SERPL-CCNC: 81 U/L (ref 38–116)
ALT SERPL W P-5'-P-CCNC: 26 U/L (ref 0–33)
ANION GAP SERPL CALCULATED.3IONS-SCNC: 10.3 MMOL/L (ref 5–15)
AST SERPL-CCNC: 22 U/L (ref 0–32)
BASOPHILS # BLD AUTO: 0.03 10*3/MM3 (ref 0–0.2)
BASOPHILS NFR BLD AUTO: 0.8 % (ref 0–1.5)
BILIRUB SERPL-MCNC: <0.2 MG/DL (ref 0.2–1.2)
BUN SERPL-MCNC: 13 MG/DL (ref 6–20)
BUN/CREAT SERPL: 23.6 (ref 7.3–30)
CALCIUM SPEC-SCNC: 9.5 MG/DL (ref 8.5–10.2)
CHLORIDE SERPL-SCNC: 106 MMOL/L (ref 98–107)
CO2 SERPL-SCNC: 23.7 MMOL/L (ref 22–29)
CREAT SERPL-MCNC: 0.55 MG/DL (ref 0.6–1.1)
DEPRECATED RDW RBC AUTO: 47.3 FL (ref 37–54)
EGFRCR SERPLBLD CKD-EPI 2021: 120.5 ML/MIN/1.73
EOSINOPHIL # BLD AUTO: 0.26 10*3/MM3 (ref 0–0.4)
EOSINOPHIL NFR BLD AUTO: 7.1 % (ref 0.3–6.2)
ERYTHROCYTE [DISTWIDTH] IN BLOOD BY AUTOMATED COUNT: 14 % (ref 12.3–15.4)
GLOBULIN UR ELPH-MCNC: 2.6 GM/DL (ref 1.8–3.5)
GLUCOSE SERPL-MCNC: 110 MG/DL (ref 74–124)
HCT VFR BLD AUTO: 29.7 % (ref 34–46.6)
HGB BLD-MCNC: 10 G/DL (ref 12–15.9)
IMM GRANULOCYTES # BLD AUTO: 0.03 10*3/MM3 (ref 0–0.05)
IMM GRANULOCYTES NFR BLD AUTO: 0.8 % (ref 0–0.5)
LYMPHOCYTES # BLD AUTO: 0.77 10*3/MM3 (ref 0.7–3.1)
LYMPHOCYTES NFR BLD AUTO: 21.2 % (ref 19.6–45.3)
MCH RBC QN AUTO: 30.9 PG (ref 26.6–33)
MCHC RBC AUTO-ENTMCNC: 33.7 G/DL (ref 31.5–35.7)
MCV RBC AUTO: 91.7 FL (ref 79–97)
MONOCYTES # BLD AUTO: 0.36 10*3/MM3 (ref 0.1–0.9)
MONOCYTES NFR BLD AUTO: 9.9 % (ref 5–12)
NEUTROPHILS NFR BLD AUTO: 2.19 10*3/MM3 (ref 1.7–7)
NEUTROPHILS NFR BLD AUTO: 60.2 % (ref 42.7–76)
NRBC BLD AUTO-RTO: 0 /100 WBC (ref 0–0.2)
PLATELET # BLD AUTO: 223 10*3/MM3 (ref 140–450)
PMV BLD AUTO: 10.3 FL (ref 6–12)
POTASSIUM SERPL-SCNC: 4 MMOL/L (ref 3.5–4.7)
PROT SERPL-MCNC: 7 G/DL (ref 6.3–8)
RBC # BLD AUTO: 3.24 10*6/MM3 (ref 3.77–5.28)
SODIUM SERPL-SCNC: 140 MMOL/L (ref 134–145)
WBC NRBC COR # BLD: 3.64 10*3/MM3 (ref 3.4–10.8)

## 2022-08-22 PROCEDURE — 25010000002 PACLITAXEL PER 1 MG: Performed by: INTERNAL MEDICINE

## 2022-08-22 PROCEDURE — 96413 CHEMO IV INFUSION 1 HR: CPT

## 2022-08-22 PROCEDURE — 25010000002 DIPHENHYDRAMINE PER 50 MG: Performed by: INTERNAL MEDICINE

## 2022-08-22 PROCEDURE — 96375 TX/PRO/DX INJ NEW DRUG ADDON: CPT

## 2022-08-22 PROCEDURE — 25010000002 LORAZEPAM PER 2 MG: Performed by: INTERNAL MEDICINE

## 2022-08-22 PROCEDURE — 25010000002 ONDANSETRON PER 1 MG: Performed by: INTERNAL MEDICINE

## 2022-08-22 PROCEDURE — 80053 COMPREHEN METABOLIC PANEL: CPT

## 2022-08-22 PROCEDURE — 25010000002 DEXAMETHASONE SODIUM PHOSPHATE 100 MG/10ML SOLUTION: Performed by: INTERNAL MEDICINE

## 2022-08-22 PROCEDURE — 99214 OFFICE O/P EST MOD 30 MIN: CPT | Performed by: INTERNAL MEDICINE

## 2022-08-22 PROCEDURE — 85025 COMPLETE CBC W/AUTO DIFF WBC: CPT

## 2022-08-22 RX ORDER — ONDANSETRON HCL IN 0.9 % NACL 8 MG/50 ML
8 INTRAVENOUS SOLUTION, PIGGYBACK (ML) INTRAVENOUS ONCE
Status: COMPLETED | OUTPATIENT
Start: 2022-08-22 | End: 2022-08-22

## 2022-08-22 RX ORDER — FAMOTIDINE 10 MG/ML
20 INJECTION, SOLUTION INTRAVENOUS ONCE
Status: CANCELLED | OUTPATIENT
Start: 2022-08-22

## 2022-08-22 RX ORDER — SODIUM CHLORIDE 9 MG/ML
250 INJECTION, SOLUTION INTRAVENOUS ONCE
Status: COMPLETED | OUTPATIENT
Start: 2022-08-22 | End: 2022-08-22

## 2022-08-22 RX ORDER — DIPHENHYDRAMINE HYDROCHLORIDE 50 MG/ML
50 INJECTION INTRAMUSCULAR; INTRAVENOUS AS NEEDED
Status: CANCELLED | OUTPATIENT
Start: 2022-08-22

## 2022-08-22 RX ORDER — LORAZEPAM 2 MG/ML
0.25 INJECTION INTRAMUSCULAR AS NEEDED
Status: CANCELLED | OUTPATIENT
Start: 2022-08-22

## 2022-08-22 RX ORDER — SODIUM CHLORIDE 9 MG/ML
250 INJECTION, SOLUTION INTRAVENOUS ONCE
Status: CANCELLED | OUTPATIENT
Start: 2022-08-22

## 2022-08-22 RX ORDER — LORAZEPAM 2 MG/ML
0.25 INJECTION INTRAMUSCULAR AS NEEDED
Status: DISCONTINUED | OUTPATIENT
Start: 2022-08-22 | End: 2022-08-22 | Stop reason: HOSPADM

## 2022-08-22 RX ORDER — ONDANSETRON 2 MG/ML
8 INJECTION INTRAMUSCULAR; INTRAVENOUS ONCE
Status: CANCELLED | OUTPATIENT
Start: 2022-08-22 | End: 2022-08-22

## 2022-08-22 RX ORDER — FAMOTIDINE 10 MG/ML
20 INJECTION, SOLUTION INTRAVENOUS AS NEEDED
Status: CANCELLED | OUTPATIENT
Start: 2022-08-22

## 2022-08-22 RX ORDER — FAMOTIDINE 10 MG/ML
20 INJECTION, SOLUTION INTRAVENOUS ONCE
Status: COMPLETED | OUTPATIENT
Start: 2022-08-22 | End: 2022-08-22

## 2022-08-22 RX ADMIN — LORAZEPAM 0.25 MG: 2 INJECTION INTRAMUSCULAR; INTRAVENOUS at 10:36

## 2022-08-22 RX ADMIN — ONDANSETRON 8 MG: 2 INJECTION INTRAMUSCULAR; INTRAVENOUS at 11:10

## 2022-08-22 RX ADMIN — PACLITAXEL 125 MG: 6 INJECTION, SOLUTION INTRAVENOUS at 11:49

## 2022-08-22 RX ADMIN — FAMOTIDINE 20 MG: 10 INJECTION, SOLUTION INTRAVENOUS at 10:35

## 2022-08-22 RX ADMIN — DEXAMETHASONE SODIUM PHOSPHATE 12 MG: 10 INJECTION, SOLUTION INTRAMUSCULAR; INTRAVENOUS at 10:37

## 2022-08-22 RX ADMIN — SODIUM CHLORIDE 250 ML: 9 INJECTION, SOLUTION INTRAVENOUS at 10:35

## 2022-08-22 RX ADMIN — DIPHENHYDRAMINE HYDROCHLORIDE 25 MG: 50 INJECTION, SOLUTION INTRAMUSCULAR; INTRAVENOUS at 10:54

## 2022-08-22 NOTE — PROGRESS NOTES
Subjective       DURING THE VISIT WITH THE PATIENT TODAY , PATIENT HAD FACE MASK, MY MEDICAL ASSISTANT AND I  HAD PROPPER PROTECTIVE EQUIPMENT, AND I DID HAND HYGIENE WITH SOAP AND WATER BEFORE AND AFTER THE VISIT.     REASON FOR FOLLOW UP:  LOBULAR CARCINOMA OF THE LEFT BREAST, 70 MM IN SIZE, MARGINS OF RESECTION NEGATIVE AFTER MASTECTOMY, GRADE 2, ER POSITIVE MA POSITIVE, HER 2 NEGATIVE BY IHC,KI 67 39% ,ONE POSITIVE AXILLARY LYMPH NODE OUT OF 4 NODES REMOVAL, PREMENOPAUSAL .INVITAE PANEL NEGATIVE.ONCOTYPE 18        HISTORY OF PRESENT ILLNESS:      On 08/22/2022 this 37-year-old  female returns to the office for followup and continued therapy with Taxol on a weekly basis on the background for adjuvant treatment for her left breast cancer. The patient had been seen by nurse practitioner a few days ago in regard to persistent epistaxis through the right nostril. This has recurred intermittently. She never had issues of this before. She has not had any recent trauma, infection, or allergy. Besides this, her appetite is very good. Her bowel activity is normal. Her urination is normal. She has pruritus in her hands and her feet in the absence of any lesions on the skin that happens from time to time and she still has occasional joint pain associated with Taxol infusion. She has not had any sensory neuropathy yet. Her energy level has improved. She has less nausea to the point of not noting at all. The patient denies any cardiovascular or respiratory issues. The patient denies any other issues of the skin. Her nails are slowly becoming pigmented and darker, as well as the skin, and her hair is starting to grow back and she is very worried about this.           ONCOLOGIC HISTORY:  The patient is a 37 y.o. year old female who is here for an opinion about the above issue.  I had the opportunity to see this delightful [place] female, 37 years old, mother of 2, who is premenopausal who has found abnormalities in  the left breast since 12/2021, having sensation of fullness in the breast and discomfort and occasional pain. After this complaint she was seen by her primary physician and she initiated a process of mammograms and ultrasounds that culminated with the diagnosis of lobular carcinoma of the breast, invasive, 7 cm in size. The patient completed a mastectomy and sentinel lymph nodes almost 3 weeks ago and she is here for review and advice in regard to adjuvant therapy. The patient is still having pain at the surgical site, especially since yesterday she had injection of saline in the expander that was placed after the mastectomy. She also has neuropathic pain in the inner aspect of the left arm that is bothering her a lot through the day and sometimes almost driving her crazy. She has no motor deficit into the left upper extremity. She denies any fevers or chills or bone pain. Her appetite is acceptable but she has changed her diet and she is almost going into vegetarian with the exception of some fish. She has eliminated all the dairy products and she has eliminated mostly carbohydrates as well. She has lost some weight. The patient otherwise feels well. She has normal bowel activity, normal urination. No skeletal pain or joint pain. No cough, sputum production, shortness of breath or palpitations. She is extremely anxious about this visit today. She is in company of her .    • The patient returned to the office on 05/20/2022 in preparation for initiation of chemotherapy. In the meantime she has undergone a port placement in right subclavian position with no difficulties and an echocardiogram that will be discussed below. The patient has had very significant upper respiratory allergies. She has been tested this week for COVID being negative. She is not running any fever. Most of the symptoms in the respiratory tract include sneezing, itching, itchy eyes and rhinorrhea. No sore throat, no alteration in taste or  smell. No cough, shortness of breath, pleuritic pain or hemoptysis.     The surgical site from the left breast expander is not painful. She has healed extremely well. The same is applicable to her port that was placed a week ago.  • The patient returned to the office on 05/26/2022 for an interim assessment. Since the chemotherapy administration last week the patient had persistent nausea and vomiting at least for 4 days to the point that she spent most of the time sleeping because of the effect of the Zyprexa but no resolution of her nausea and vomiting. She has vomited so much that now she has significant esophagitis with heartburn and indigestion. She has not had any hematemesis or melena. She just started to eat yesterday rice and potatoes. She was able to handle this and she has been able to handle liquids. She also has had discoloration of the urine that originally was pink and subsequently now has normalized. She had no burning sensation upon urination or vaginal bleeding. She has been very fatigued and tired. On top of that Neulasta triggers significant pain in the cranium, in the pelvic bone and in her femurs and she has the feeling that she has the flu. Again, she has not had any fever. Today is the best day that she has had since the chemotherapy administration. Obviously given the symptoms and this toxicity assessment, the patient will require radical change in her treatment as will be described below.  On 06/23/2022, the patient returns after she has completed 2 cycles of AC. The first cycle was dramatically cumbersome because of the tremendous amount of side effects including persistent nausea and vomiting after chemotherapy that required IV fluids and modification in her nausea regimen. She also developed neutropenic fever and ended up in the hospital. After 2nd cycle with dose adjustment the patient handled the treatment much better and the miracle medicine in regard to nausea control was Ativan.  Phenergan and Compazine triggered tremendous degree of confusion. Therefore, for the subsequent cycle 3 and 4, Ativan will be the main medicine to utilize. Zofran also will be utilized.     Physically the patient feels much better today. The patient has had appetite to eat. The heartburn and indigestion have mostly subsided. Her nutrition and hydration are much better. She is keeping her weight and she has no pain. Bowel activity with occasional constipation. Urination is normal. No cardiovascular or respiratory issues. Energy level much better.     She had significant pain with Neulasta use 4 days after the treatment requiring Zyrtec and significant Tylenol utilization.  •   •   •   ·     Past Medical History:   Diagnosis Date   • Anemia    • Anesthesia complication     hypotension with last C section   • Breast cancer (HCC) 2022    left sided, stage 3, ER/ID+, Her2-, s/p mastectomy with plans for AC/Taxol and radiation   • History of COVID-19     2021 AND 2022   • Hyperlipidemia    • PONV (postoperative nausea and vomiting)    • Seasonal allergies         Past Surgical History:   Procedure Laterality Date   • APPENDECTOMY     • BREAST BIOPSY Left    • BREAST RECONSTRUCTION Left 2022    Procedure: LEFT BREAST 1ST STAGE RECONSTRUCTION WITH INSERTION OF TISSUE EXPANDER AND BIOLOGIC;  Surgeon: Hermelinda Johnson MD;  Location: [facility] OR;  Service: Plastics;  Laterality: Left;   •  SECTION     •  SECTION N/A 12/10/2018    Procedure:  SECTION REPEAT;  Surgeon: Heidy Gutierrez MD;  Location: [facility] LABOR DELIVERY;  Service: Obstetrics/Gynecology   • D & C HYSTEROSCOPY N/A 2021    Procedure: HYSTEROSCOPY REMOVAL INTRAUTERINE DEVICE WILL NEED ULTRASOUND IN ROOM;  Surgeon: Heidy Gutierrez MD;  Location: [facility] OR [facility];  Service: Obstetrics/Gynecology;  Laterality: N/A;   • KNEE ARTHROSCOPY Right    • MASTECTOMY W/ SENTINEL NODE BIOPSY Left 2022     "Procedure: left total mastectomy with left axillary sentinel lymph node biopsy;  Surgeon: Monisha Escudero MD;  Location: [facility] OR;  Service: General;  Laterality: Left;   • VENOUS ACCESS DEVICE (PORT) INSERTION Right 5/13/2022    Procedure: right port placement;  Surgeon: Monisha Escudero MD;  Location: [facility] OR;  Service: General;  Laterality: Right;        Current Outpatient Medications on File Prior to Visit   Medication Sig Dispense Refill   • LORazepam (ATIVAN) 0.5 MG tablet Take 1 tablet by mouth Every 12 (Twelve) Hours As Needed (Nausea). To be taken every 12 hours as needed for up to one week after chemotherapy 28 tablet 0   • omeprazole (priLOSEC) 20 MG capsule Take 1 capsule by mouth 2 (Two) Times a Day. 60 capsule 3   • ondansetron (Zofran) 8 MG tablet Take 1 tablet by mouth Every 8 (Eight) Hours As Needed for Nausea or Vomiting. 30 tablet 5   • prochlorperazine (COMPAZINE) 10 MG tablet Take 1 tablet by mouth Every 6 (Six) Hours As Needed for Nausea or Vomiting. 30 tablet 5     No current facility-administered medications on file prior to visit.        ALLERGIES:    Allergies   Allergen Reactions   • Asa [Aspirin] Anaphylaxis and Shortness Of Breath        Social History     Socioeconomic History   • Marital status:    Tobacco Use   • Smoking status: Never Smoker   • Smokeless tobacco: Never Used   Vaping Use   • Vaping Use: Never used   Substance and Sexual Activity   • Alcohol use: Not Currently     Alcohol/week: 5.0 standard drinks     Types: 5 Shots of liquor per week   • Drug use: Never   • Sexual activity: Defer        Family History   Problem Relation Age of Onset   • Melanoma Paternal Aunt    • Diabetes Other    • Malig Hyperthermia Neg Hx           Objective     Vitals:    08/22/22 0918   BP: 92/59   Pulse: 80   Resp: 14   Temp: 97.3 °F (36.3 °C)   TempSrc: Infrared   SpO2: 99%   Weight: 52.2 kg (115 lb 1.6 oz)   Height: 157 cm (61.81\")   PainSc: 0-No pain     Current " Status 8/22/2022   ECOG score 0       Physical Exam              GENERAL:  Well-developed, well-nourished  Patient  in no acute distress.   SKIN:  Warm, dry ,NO purpura ,no rash.Darker pigmentation of the nails and the skin in the surrounding areas of her fingers and toes.       HEENT:  Pupils were equal and reactive to light and accomodation, conjunctivae noninjected, normal extraocular movements, normal visual acuity. The patient has deviation of the nasal septum toward the right with obvious capillary abnormality in the septal area associated with abnormal airflow through this anatomical site. There is no active bleeding. The left nasal septum was unremarkable.       NECK:  Supple with good range of motion; no thyromegaly , no JVD or bruits,.No carotid artery pain, no carotid abnormal pulsation   LYMPHATICS:  No cervical, NO supraclavicular, NO axillary, NO inguinal adenopathies.  CARDIAC   normal rate , regular rhythm, without murmur,NO rubs NO S3 NO S4   LUNGS: normal breath sounds bilateral, no wheezing, NO rhonchi, NO crackles ,NO rubs.  VASCULAR VENOUS: no cyanosis, NO collateral circulation, NO varicosities, NO edema, NO palpable cords, NO pain,NO erythema, NO pigmentation of the skin.  ABDOMEN:  Soft, NO pain,no hepatomegaly, no splenomegaly,no masses, no ascites, no collateral circulation,no distention.  EXTREMITIES  AND SPINE:  No clubbing, no cyanosis ,no deformities , no pain .No kyphosis,  no pain in spine, no pain in ribs , no pain in pelvic bone.  NEUROLOGICAL:  Patient was awake, alert, oriented to time, person and place.              RECENT LABS:  Hematology WBC   Date Value Ref Range Status   08/22/2022 3.64 3.40 - 10.80 10*3/mm3 Final     RBC   Date Value Ref Range Status   08/22/2022 3.24 (L) 3.77 - 5.28 10*6/mm3 Final     Hemoglobin   Date Value Ref Range Status   08/22/2022 10.0 (L) 12.0 - 15.9 g/dL Final     Hematocrit   Date Value Ref Range Status   08/22/2022 29.7 (L) 34.0 - 46.6 % Final      Platelets   Date Value Ref Range Status   08/22/2022 223 140 - 450 10*3/mm3 Final       CBC:    WBC   Date Value Ref Range Status   08/22/2022 3.64 3.40 - 10.80 10*3/mm3 Final     RBC   Date Value Ref Range Status   08/22/2022 3.24 (L) 3.77 - 5.28 10*6/mm3 Final     Hemoglobin   Date Value Ref Range Status   08/22/2022 10.0 (L) 12.0 - 15.9 g/dL Final     Hematocrit   Date Value Ref Range Status   08/22/2022 29.7 (L) 34.0 - 46.6 % Final     MCV   Date Value Ref Range Status   08/22/2022 91.7 79.0 - 97.0 fL Final     MCH   Date Value Ref Range Status   08/22/2022 30.9 26.6 - 33.0 pg Final     MCHC   Date Value Ref Range Status   08/22/2022 33.7 31.5 - 35.7 g/dL Final     RDW   Date Value Ref Range Status   08/22/2022 14.0 12.3 - 15.4 % Final     RDW-SD   Date Value Ref Range Status   08/22/2022 47.3 37.0 - 54.0 fl Final     MPV   Date Value Ref Range Status   08/22/2022 10.3 6.0 - 12.0 fL Final     Platelets   Date Value Ref Range Status   08/22/2022 223 140 - 450 10*3/mm3 Final     Neutrophil %   Date Value Ref Range Status   08/22/2022 60.2 42.7 - 76.0 % Final     Lymphocyte %   Date Value Ref Range Status   08/22/2022 21.2 19.6 - 45.3 % Final     Monocyte %   Date Value Ref Range Status   08/22/2022 9.9 5.0 - 12.0 % Final     Eosinophil %   Date Value Ref Range Status   08/22/2022 7.1 (H) 0.3 - 6.2 % Final     Basophil %   Date Value Ref Range Status   08/22/2022 0.8 0.0 - 1.5 % Final     Immature Grans %   Date Value Ref Range Status   08/22/2022 0.8 (H) 0.0 - 0.5 % Final     Neutrophils, Absolute   Date Value Ref Range Status   08/22/2022 2.19 1.70 - 7.00 10*3/mm3 Final     Lymphocytes, Absolute   Date Value Ref Range Status   08/22/2022 0.77 0.70 - 3.10 10*3/mm3 Final     Monocytes, Absolute   Date Value Ref Range Status   08/22/2022 0.36 0.10 - 0.90 10*3/mm3 Final     Eosinophils, Absolute   Date Value Ref Range Status   08/22/2022 0.26 0.00 - 0.40 10*3/mm3 Final     Basophils, Absolute   Date Value Ref  Range Status   08/22/2022 0.03 0.00 - 0.20 10*3/mm3 Final     Immature Grans, Absolute   Date Value Ref Range Status   08/22/2022 0.03 0.00 - 0.05 10*3/mm3 Final     nRBC   Date Value Ref Range Status   08/22/2022 0.0 0.0 - 0.2 /100 WBC Final        CMP:    Glucose   Date Value Ref Range Status   08/22/2022 110 74 - 124 mg/dL Final     BUN   Date Value Ref Range Status   08/22/2022 13 6 - 20 mg/dL Final     Creatinine   Date Value Ref Range Status   08/22/2022 0.55 (L) 0.60 - 1.10 mg/dL Final     Sodium   Date Value Ref Range Status   08/22/2022 140 134 - 145 mmol/L Final     Potassium   Date Value Ref Range Status   08/22/2022 4.0 3.5 - 4.7 mmol/L Final     Chloride   Date Value Ref Range Status   08/22/2022 106 98 - 107 mmol/L Final     CO2   Date Value Ref Range Status   08/22/2022 23.7 22.0 - 29.0 mmol/L Final     Calcium   Date Value Ref Range Status   08/22/2022 9.5 8.5 - 10.2 mg/dL Final     Total Protein   Date Value Ref Range Status   08/22/2022 7.0 6.3 - 8.0 g/dL Final     Albumin   Date Value Ref Range Status   08/22/2022 4.40 3.50 - 5.20 g/dL Final     ALT (SGPT)   Date Value Ref Range Status   08/22/2022 26 0 - 33 U/L Final     AST (SGOT)   Date Value Ref Range Status   08/22/2022 22 0 - 32 U/L Final     Alkaline Phosphatase   Date Value Ref Range Status   08/22/2022 81 38 - 116 U/L Final     Total Bilirubin   Date Value Ref Range Status   08/22/2022 <0.2 (L) 0.2 - 1.2 mg/dL Final     Globulin   Date Value Ref Range Status   08/22/2022 2.6 1.8 - 3.5 gm/dL Final     A/G Ratio   Date Value Ref Range Status   08/22/2022 1.7 1.1 - 2.4 g/dL Final     BUN/Creatinine Ratio   Date Value Ref Range Status   08/22/2022 23.6 7.3 - 30.0 Final     Anion Gap   Date Value Ref Range Status   08/22/2022 10.3 5.0 - 15.0 mmol/L Final                     Assessment & Plan     Diagnoses and all orders for this visit:    1. Malignant neoplasm of upper-outer quadrant of left breast in female, estrogen receptor positive (HCC)  (Primary)  -     CBC and Differential; Future  -     Comprehensive metabolic panel; Future  -     CBC & Differential; Future  -     CBC & Differential; Future  -     Comprehensive Metabolic Panel; Future  -     CBC & Differential; Future  -     Comprehensive Metabolic Panel; Future  -     CBC & Differential; Future  -     Comprehensive Metabolic Panel; Future    2. Anemia due to other cause, not classified  -     CBC & Differential; Future  -     CBC & Differential; Future  -     Comprehensive Metabolic Panel; Future  -     CBC & Differential; Future  -     Comprehensive Metabolic Panel; Future  -     CBC & Differential; Future  -     Comprehensive Metabolic Panel; Future    3. Chemotherapy-induced thrombocytopenia  -     CBC & Differential; Future  -     CBC & Differential; Future  -     Comprehensive Metabolic Panel; Future  -     CBC & Differential; Future  -     Comprehensive Metabolic Panel; Future  -     CBC & Differential; Future  -     Comprehensive Metabolic Panel; Future    4. Anemia associated with chemotherapy  -     CBC & Differential; Future  -     CBC & Differential; Future  -     Comprehensive Metabolic Panel; Future  -     CBC & Differential; Future  -     Comprehensive Metabolic Panel; Future  -     CBC & Differential; Future  -     Comprehensive Metabolic Panel; Future    5. Fitting and adjustment of vascular catheter  -     CBC & Differential; Future  -     CBC & Differential; Future  -     Comprehensive Metabolic Panel; Future  -     CBC & Differential; Future  -     Comprehensive Metabolic Panel; Future  -     CBC & Differential; Future  -     Comprehensive Metabolic Panel; Future    6. Anterior epistaxis       In summary this 37-year-old  female originally from [place] noticed abnormalities in the left breast in 12/2021, sensation of pressure, minor pain, sensation of fullness. She looked for help. Further mammogram and ultrasound documented abnormalities in the left breast that were  consistent with breast cancer. Since then the patient has undergone breast MRI. Breast biopsies documented invasive lobular carcinoma. All this culminated with a left-sided mastectomy and sentinel lymph node sampling. The pathology has been posted above. She had a lobular carcinoma, grade 2, margins of resection negative, Ki-67 at 39%, ER positive, FL positive, HER2/leonie negative. She had 1 positive sentinel lymph node for malignancy with no extracapsular invasion. There was no evidence of lymphovascular invasion.     Invitae panel was negative for any genetic alteration.     The patient has had an expander placed at the mastectomy site. She has discomfort and pain after this was instillated with saline solution yesterday.     The other phenomenon that is happening to the patient is significant pain and discomfort in the inner aspect of the left arm. Sounds neuropathic related to her recent sentinel lymph node sampling. The patient is taking Aleve or ibuprofen with not too much benefit.     The patient also has a minimal component of anemia that will require assessment with ferritin, iron, TIBC, B12, folic acid levels.     RECOMMENDATIONS:  I have advised this patient and  today that she needs to receive adjuvant chemotherapy in the form of AC x4 and subsequently Taxol x12. Oncotype DX in this patient shows intermediate risk of recurrence. Also not only the chemotherapy will be necessary but we need to make her postmenopausal in the next several weeks or months. This will bring in a lot of benefit for her in the long run to minimize any recurrent disease. She is extremely young. She has 2 young children and we have to do as much as we can in this patient to minimize any potentiality for recurrent disease.     I discussed briefly with her side effects of the treatment including alopecia, cardiac toxicity, anemia, leukopenia, thrombocytopenia, mucositis among others and peripheral neuropathy in the Taxol part of  the treatment along with leukopenia, anemia, thrombocytopenia and allergic reaction to the medicine. I discussed with her that the whole treatment is going to take at least 24-26 weeks, in other words 6 months, and thereafter the patient will be hopefully postmenopausal. If she does not become postmenopausal she could qualify for a clinical trial or we could ask Dr. Heidy Gutierrez to do bilateral oophorectomy in this patient.     Eventually the patient will require endocrine adjuvant therapy after she becomes postmenopausal hopefully with letrozole.     I discussed all these facts with the patient and her . I spent 1 hour and 30 minutes with her going through the details of all this but she was able to grasp a lot of information. I appreciated highly the visit with her  and I pointed out to them that they need to work as a team. The patient has 2 young children. She is the only one taking care of the kids. The patient's mother is living with her, coming from [place] after all these issues. I asked her to ask her mother to stay for the period of time that she receives chemotherapy. If we need to do any services for her in regard to visa requirements or status, we will be glad to incorporate our  into this process.     Finally, I discussed with the patient the need for her to proceed with cardiac assessment. I made a referral to be seen by Cardiology and scheduled her to have an echocardiogram and I sent her back to see Dr. Escudero in order to proceed with the placement of a port.     In order to control the pain in the inner aspect of her arm, I asked her to use Voltaren topically 4 times a day. It will be perfectly fine for her to use ibuprofen and I sent Neurontin 100 mg to take at bedtime. Hopefully this will be a transitory issue.     Dr. Johnson eventually will be in charge of deciding the time for the patient to initiate physical therapy for her left shoulder.     I reviewed all  the pertinent records on this patient and I posted the information out of the pathology and radiology as above. I discussed the case with my partner, Dr. Landeros, who agrees with my plan of care.    Finally, I also posted to the patient today that will require followup in the future in regard to her right breast not only with mammogram but also ultrasound. I discussed with her the fact that sometimes lobular carcinoma likes to be a bilateral disease. There are minimal abnormalities in the mammogram in the right breast as well as in the MRI. This will require to be followed up in the future. I did not recommend a 2nd mastectomy prophylactically on her even though she asked the question today.    •  The patient was reviewed on 2022. She has had her port placed in right subclavian position a week ago. She is healing perfectly fine and her surgical site on the left mastectomy and the expander looks perfectly normal with healing. No infection, erythema or discharge.     The patient since then has undergone formal education and consent for chemotherapy administration. Her echocardiogram even though technically difficult documented a normal left ventricular ejection fraction. No pericardial effusion and no valvular dysfunction.     Her white count, hemoglobin and platelets are normal today.     Her chemistry profile, CA 15-3 as well as ferritin, iron, TIBC, B12 and folic acid levels were all normal.     Therefore under the present circumstances I advised the patient and  the followin. For the treatment of her breast cancer the patient will initiate today chemotherapy with dose dense AC that she will receive every 2 weeks supported with Neulasta. The side effects of this already were discussed with her including nausea, vomiting, alopecia, anemia, leukopenia, thrombocytopenia and cardiac dysfunction among others. The patient was ready to proceed.   2. The patient will be utilizing Neulasta On-body to  minimize hematological toxicity and be able to keep up on the schedule in regard to her treatment. I advised her that this can produce significant bone pain in the sternum, in the rib cage, in the pelvic bone and she could use antihistaminic that she is already taking in the first place and also she can use either Aleve or Tylenol or a hot shower if necessary.   3. I encouraged her to remain on her nausea medicine, ondansetron, during the next 3 days to minimize any nausea or vomiting.   4. I advised her to stay away from smelling foods like hot soup or fried foods in the next 3 days given the significant increase in smell that patients receiving AC typically have. This will minimize the potential for nausea and vomiting.   5. The patient will require toxicity assessment in a week along with blood WORK CBC.   6. The patient once completes 4 cycles of AC will initiate weekly cycles of Taxol x12. Upon completion of that the patient will initiate a plan of radiation therapy and she already has been seen by Radiation Oncology.     The patient will participate in a clinical trial that we have in the office and she already has been consented for this in regard to the utilization of antiestrogen medicine upon completion of chemotherapy administration.     The patient will continue using her other medications on routine basis that include the Zyprexa and she will use the EMLA cream to minimize pain at the port site at the time of accessing.     Finally, I advised the patient and the  present in the room that they at any cost had to minimize the risk of pregnancy. In fact a pregnancy test today was negative. I advised them to use a condom.     The patient was ready to proceed.    All the discussion of all these issues took place in [facility].  • The patient returned to the office on 05/26/2022. Since the previous visit a week ago the patient had chemotherapy administration and she developed very significant nausea and  vomiting nonstoppable for almost 5 days. Yesterday was the first day that she was able to have some rice and potatoes. Because she has thrown up so much she has developed significant heartburn and indigestion indicating probably bile gastritis, esophagitis and acid esophagitis. The patient has not had any hematemesis or melena. The other phenomenon that she had was tremendous somnolence due to the use of Zyprexa at the dose of 5 mg every night. She spent almost 4 days in bed sleepy but still with nausea and vomiting.     On top of things, she has very significant pain in her muscles, in her thighs, in her scalp and in her pelvic bone associated with Neulasta use. She is taking Tylenol for this with some improvement.     The patient has been able to reach hydration since yesterday, drinking proper liquids, and she is urinating okay. Therefore, the patient has multiple issues and on top of that she has now leukopenia associated with chemotherapy. The hemoglobin is stable. The platelet count has dropped but is not dramatically or risky for bleeding.     Given all the above circumstances I proposed her the following changes in regard to plan of chemotherapy for the next cycle.     1. We will modify her plan of chemotherapy, decreasing the doses for all the medications by 20%.   2. The patient will space out the chemotherapy treatments to every 3 weeks for the AC part of the treatment until completion of 4 cycles.   3. The patient will be supported with Neulasta injection. She will utilize Tylenol for pain.   4. The patient will continue using chemotherapy administration, anti-nausea medication and she will receive Emend on day 2 in the office and Emend on day 3 at home. Pharmacist will take care of this issue.   5. The patient will decrease Zyprexa from 5 mg a day to 2.5 mg in the evening to minimize somnolence.   6. The patient was advised to use omeprazole at the dose of 20 mg twice a day to minimize esophageal  irritation associated with so much nausea and vomiting.   7. The patient will require still laboratory parameters the week after each one of the cycles of chemotherapy.     I feel the obligation to make all these changes; otherwise this patient will quit her treatment and that is the worst thing that could happen under the present scenario. She agreed with all the changes made. I explained to her all these changes in [place] to make her life a little bit more comfortable and easy to handle. She understands at the time that she returns in 2 weeks she probably will start to develop alopecia and she already has discussed this with her mother who will help through the process of shaving her head.  On 2022, the patient had dramatic improvement in regard to nausea control after dose adjustment for the 2nd cycle of chemotherapy and also modification of the nausea regimen. We tried to deliver Compazine and Phenergan. These medicines were tremendously tolling on her in regard to confusion and inability to function. Ativan was the miracle medicine. Therefore for the next 2 cycles Zofran and Ativan will be the ideal combination and she will require also IV fluids on day 2 of each one of the next cycles of AC.     In regard to pain associated with Neulasta, she required Tylenol around-the-clock for 4 days after each one of the treatments and now she has no discomfort whatsoever.     Today her white count is normal, hemoglobin improved to 10.7, platelet count with minor decrease, no clinical bleeding.    The patient feels substantially better. She is able to function at home. She is able to be with her family. She is able to attend her children and she has been able to walk 2 miles a day late in the evening. Encouraged her to continue doing this.     I suggested for her the followin. She will return in a week to proceed with cycle 3 of AC at the previous dose and supported with Neulasta.   2. She will be supported on  day 2 with IV fluids and nausea medication.   3. We will discontinue Phenergan and/or Compazine and she will remain on Zofran and Ativan for nausea control.   4. She will continue PPI to minimize reflux symptomatology, still minimal problem but dramatically better than before.   5. She will use Milk of Magnesia for constipation. Another measure could be prunes and dates.     The patient will complete the total 4 cycles of AC and then will move into the Taxol arena. I pointed out to her that we will have the discussion about how to handle this medicine after the completion of the next 2 cycles.     She also raised the question in regard what to do upon completion of chemotherapy. I advised her that eventually she will have radiological assessment as a baseline and we will proceed with PATRICIA blood sampling for evaluation of minimal residual disease.     The patient was ready to proceed now that she feels dramatically better. What a difference it makes in regard to adjustment of medicines and trying to adjust nausea medication accordingly.  The patient returned to the office on 07/21/2022. In regard to her chemotherapy treatment, she is ready to proceed with her 4th or last cycle of AC today. Her ANC is a little bit on the low side but the patient will be receiving Neulasta, therefore this will be taking care of this problem.     Her hemoglobin remains stable. This is anemia associated with chemotherapy administration. She also had a menstrual flow that was very abundant a few weeks ago. She is not taking any vitamins at this time and I have not advised to take any given concerning data about supplements in the background of adjuvant therapy for breast cancer.     In regard to the sensation of numbness in the left upper extremity arm, I think this is natural after mastectomy more than expected and I pointed out to her that this does not signify anything and does not require any therapy. This is extremely common in  many women and it has the tendency to go away spontaneously.     In regard to the abnormality in the left hand there is no evidence of lymphedema. I think she could have a minimal element of tendonitis. I advised her to use topical anti-inflammatory medication or ice the hand. She has not received any IV sites for blood draws in this area.     In regard to future visits, the patient would like to continue her chemotherapy treatments on Mondays because her children will be going back to school and she wants to have a weekend with them as much as she can. I find no problem seeing her in 3 weeks from now on Monday to proceed with initiation of Taxol. I pointed out to her that Taxol will be easier to handle a lower dose, less toxicity in regard to hematological parameters and hopefully no issues in regard to neuropathy.     I also advised her that we will continue monitoring the status of menstrual flow upon completion of chemotherapy. She still has 12 Taxol treatments to go and we do not know if the chemotherapy treatment is going to put her in menopause or not. Depending on the final analysis of this upon completion of chemotherapy and before initiation of hormonal therapy, we will need to do hormone levels.     Finally, the patient has had a lot of issues between her insurance company and the billing system at [facility]. I asked the billing system to give her a call and relate this to the insurance company. The specific question is about the cost and the utilization of PEGylated Neulasta or Neulasta On-body. Hopefully this will be happening to settle down the situation. Each one of these medicines have been billed in thousands of dollars and she already will complete 4 bills of this medication. She is extremely anxious about this situation. This is called economic toxicity of chemotherapy administration and we need to influence this situation as well on her to give her the benefit of settling down once and for  all.    This patient already has enough stressors on the plate to add another one that is economical stressor of her treatment.  On 08/22/2022, the patient's white count, hemoglobin and platelets are stable. Tolerance to Taxol is much better than AC. She has some occasional nausea, some joint pain, some pruritus in the absence of any rash on her hands and her feet and I think this all is side effect of Taxol. I advised her to go ahead and proceed with her Taxol today and I advised her to go back to taking her Zyrtec on a regular basis to try to minimize any itching in her hands and her feet.     I also advised her to continue using Ativan that has been terrific in regard to control of nausea and vomiting and she can use 2-3 doses today and tomorrow to minimize the symptoms. It will be perfectly okay as well for her to take Tylenol tonight and tomorrow to minimize joint pain associated with Taxol utilization.     In regard to her epistaxis, it is very obvious that the nasal passages on the right side are different than the ones on the left. She has very significant deviation of the septum. This triggers airflow currents that are abnormal and capillarity in the anterior part of the septum that is very abnormal and is the source of bleeding. If this continues she will require referral for ENT for cauterization. Her platelet count is normal.     The patient has not had any menstrual flow now for 2 months and will continue asking this question because theoretically the chemotherapy should make her menopausal. Upon completion of Taxol we will need to do endocrine testing in this regard.     Overall I think the patient is doing much better than before and she will be advised to continue her weekly Taxol along with weekly laboratory testing and weekly nurse practitioner visit and visit with me in 1 month.       •   •   •   ·

## 2022-08-25 ENCOUNTER — HOSPITAL ENCOUNTER (OUTPATIENT)
Dept: CARDIOLOGY | Facility: HOSPITAL | Age: 37
Discharge: HOME | End: 2022-08-25
Admitting: INTERNAL MEDICINE

## 2022-08-25 VITALS
HEART RATE: 79 BPM | BODY MASS INDEX: 21.71 KG/M2 | SYSTOLIC BLOOD PRESSURE: 108 MMHG | HEIGHT: 61 IN | OXYGEN SATURATION: 99 % | WEIGHT: 115 LBS | DIASTOLIC BLOOD PRESSURE: 64 MMHG

## 2022-08-25 DIAGNOSIS — Z09 CHEMOTHERAPY FOLLOW-UP EXAMINATION: ICD-10-CM

## 2022-08-25 DIAGNOSIS — Z17.0 MALIGNANT NEOPLASM OF LEFT BREAST IN FEMALE, ESTROGEN RECEPTOR POSITIVE, UNSPECIFIED SITE OF BREAST: ICD-10-CM

## 2022-08-25 DIAGNOSIS — C50.912 MALIGNANT NEOPLASM OF LEFT BREAST IN FEMALE, ESTROGEN RECEPTOR POSITIVE, UNSPECIFIED SITE OF BREAST: ICD-10-CM

## 2022-08-25 LAB
BH CV ECHO LEFT VENTRICLE GLOBAL LONGITUDINAL STRAIN: -22.9 %
BH CV ECHO MEAS - ACS: 1.56 CM
BH CV ECHO MEAS - AO MAX PG: 9.5 MMHG
BH CV ECHO MEAS - AO MEAN PG: 5 MMHG
BH CV ECHO MEAS - AO ROOT DIAM: 2.5 CM
BH CV ECHO MEAS - AO V2 MAX: 154 CM/SEC
BH CV ECHO MEAS - AO V2 VTI: 30.8 CM
BH CV ECHO MEAS - AVA(I,D): 1.37 CM2
BH CV ECHO MEAS - EDV(CUBED): 51.7 ML
BH CV ECHO MEAS - EDV(MOD-SP2): 68 ML
BH CV ECHO MEAS - EDV(MOD-SP4): 100 ML
BH CV ECHO MEAS - EF(MOD-BP): 63 %
BH CV ECHO MEAS - EF(MOD-SP2): 61.8 %
BH CV ECHO MEAS - EF(MOD-SP4): 63 %
BH CV ECHO MEAS - ESV(CUBED): 9.5 ML
BH CV ECHO MEAS - ESV(MOD-SP2): 26 ML
BH CV ECHO MEAS - ESV(MOD-SP4): 37 ML
BH CV ECHO MEAS - FS: 43.2 %
BH CV ECHO MEAS - IVS/LVPW: 0.82 CM
BH CV ECHO MEAS - IVSD: 0.89 CM
BH CV ECHO MEAS - LAT PEAK E' VEL: 16.2 CM/SEC
BH CV ECHO MEAS - LV DIASTOLIC VOL/BSA (35-75): 67 CM2
BH CV ECHO MEAS - LV MASS(C)D: 111.6 GRAMS
BH CV ECHO MEAS - LV MAX PG: 3.2 MMHG
BH CV ECHO MEAS - LV MEAN PG: 2 MMHG
BH CV ECHO MEAS - LV SYSTOLIC VOL/BSA (12-30): 24.8 CM2
BH CV ECHO MEAS - LV V1 MAX: 89.4 CM/SEC
BH CV ECHO MEAS - LV V1 VTI: 18.6 CM
BH CV ECHO MEAS - LVIDD: 3.7 CM
BH CV ECHO MEAS - LVIDS: 2.12 CM
BH CV ECHO MEAS - LVOT AREA: 2.27 CM2
BH CV ECHO MEAS - LVOT DIAM: 1.7 CM
BH CV ECHO MEAS - LVPWD: 1.08 CM
BH CV ECHO MEAS - MED PEAK E' VEL: 8.8 CM/SEC
BH CV ECHO MEAS - MV A DUR: 0.1 SEC
BH CV ECHO MEAS - MV A MAX VEL: 87 CM/SEC
BH CV ECHO MEAS - MV DEC SLOPE: 564.8 CM/SEC2
BH CV ECHO MEAS - MV DEC TIME: 0.27 MSEC
BH CV ECHO MEAS - MV E MAX VEL: 99 CM/SEC
BH CV ECHO MEAS - MV E/A: 1.14
BH CV ECHO MEAS - MV MAX PG: 5.5 MMHG
BH CV ECHO MEAS - MV MEAN PG: 2.7 MMHG
BH CV ECHO MEAS - MV P1/2T: 61.2 MSEC
BH CV ECHO MEAS - MV V2 VTI: 29.9 CM
BH CV ECHO MEAS - MVA(P1/2T): 3.6 CM2
BH CV ECHO MEAS - MVA(VTI): 1.41 CM2
BH CV ECHO MEAS - PA ACC TIME: 0.13 SEC
BH CV ECHO MEAS - PA PR(ACCEL): 19.6 MMHG
BH CV ECHO MEAS - PA V2 MAX: 97.7 CM/SEC
BH CV ECHO MEAS - PULM A REVS DUR: 0.12 SEC
BH CV ECHO MEAS - PULM A REVS VEL: 26.6 CM/SEC
BH CV ECHO MEAS - PULM DIAS VEL: 46.3 CM/SEC
BH CV ECHO MEAS - PULM S/D: 0.96
BH CV ECHO MEAS - PULM SYS VEL: 44.6 CM/SEC
BH CV ECHO MEAS - QP/QS: 0.81
BH CV ECHO MEAS - RAP SYSTOLE: 3 MMHG
BH CV ECHO MEAS - RV MAX PG: 2.14 MMHG
BH CV ECHO MEAS - RV V1 MAX: 73.1 CM/SEC
BH CV ECHO MEAS - RV V1 VTI: 15.1 CM
BH CV ECHO MEAS - RVOT DIAM: 1.7 CM
BH CV ECHO MEAS - RVSP: 14.2 MMHG
BH CV ECHO MEAS - SI(MOD-SP2): 28.1 ML/M2
BH CV ECHO MEAS - SI(MOD-SP4): 42.2 ML/M2
BH CV ECHO MEAS - SV(LVOT): 42.2 ML
BH CV ECHO MEAS - SV(MOD-SP2): 42 ML
BH CV ECHO MEAS - SV(MOD-SP4): 63 ML
BH CV ECHO MEAS - SV(RVOT): 34.3 ML
BH CV ECHO MEAS - TAPSE (>1.6): 2.16 CM
BH CV ECHO MEAS - TR MAX PG: 11.2 MMHG
BH CV ECHO MEAS - TR MAX VEL: 167 CM/SEC
BH CV ECHO MEASUREMENTS AVERAGE E/E' RATIO: 7.92
BH CV XLRA - RV BASE: 2.16 CM
BH CV XLRA - RV LENGTH: 7.7 CM
BH CV XLRA - RV MID: 2.6 CM
BH CV XLRA - TDI S': 14.4 CM/SEC
MAXIMAL PREDICTED HEART RATE: 182 BPM
SINUS: 2.25 CM
STJ: 2.23 CM
STRESS TARGET HR: 155 BPM

## 2022-08-25 PROCEDURE — 93308 TTE F-UP OR LMTD: CPT

## 2022-08-25 PROCEDURE — 93356 MYOCRD STRAIN IMG SPCKL TRCK: CPT

## 2022-08-25 PROCEDURE — 93325 DOPPLER ECHO COLOR FLOW MAPG: CPT

## 2022-08-25 PROCEDURE — 93321 DOPPLER ECHO F-UP/LMTD STD: CPT

## 2022-08-25 PROCEDURE — 93325 DOPPLER ECHO COLOR FLOW MAPG: CPT | Performed by: INTERNAL MEDICINE

## 2022-08-25 PROCEDURE — 93308 TTE F-UP OR LMTD: CPT | Performed by: INTERNAL MEDICINE

## 2022-08-25 PROCEDURE — 25010000002 PERFLUTREN (DEFINITY) 8.476 MG IN SODIUM CHLORIDE (PF) 0.9 % 10 ML INJECTION: Performed by: INTERNAL MEDICINE

## 2022-08-25 PROCEDURE — 93321 DOPPLER ECHO F-UP/LMTD STD: CPT | Performed by: INTERNAL MEDICINE

## 2022-08-25 PROCEDURE — 93356 MYOCRD STRAIN IMG SPCKL TRCK: CPT | Performed by: INTERNAL MEDICINE

## 2022-08-25 RX ADMIN — PERFLUTREN 1.5 ML: 6.52 INJECTION, SUSPENSION INTRAVENOUS at 11:36

## 2022-08-25 NOTE — PROGRESS NOTES
Subjective     REASON FOR FOLLOW UP:  LOBULAR CARCINOMA OF THE LEFT BREAST, 70 MM IN SIZE, MARGINS OF RESECTION NEGATIVE AFTER MASTECTOMY, GRADE 2, ER POSITIVE AK POSITIVE, HER 2 NEGATIVE BY IHC,KI 67 39% ,ONE POSITIVE AXILLARY LYMPH NODE OUT OF 4 NODES REMOVAL, PREMENOPAUSAL .INVITAE PANEL NEGATIVE.ONCOTYPE 18    HISTORY OF PRESENT ILLNESS:    The patient is seen via video visit today.  She is located at our [facility] location and I am located at home.    The patient returns today for follow-up and evaluation prior to week #4 of weekly Taxol.  She is seen with her father-in law present.  She is feeling much improved, and happy with her tolerance to taxol so far.  Her nausea has improved, she is now only utilizing Zofran as needed and has not had any further episodes of vomiting.  She does experience fatigue the day of chemotherapy, but this improves the next day.  She continues to stay busy with her children, who have started back to school.  She does discuss the new development of tingling in her fingers and toes.  This started Friday following her last treatment.  Tingling is intermittent, occurring most days, about 1-2 times daily and lasting 15-20 minutes.  She has not experienced any further arthralgias and has not required anymore tylenol.  Bowels moving regularly.  Feels she is eating and drinking well, her weight remains stable.      Past Medical History:   Diagnosis Date   • Anemia    • Anesthesia complication     hypotension with last C section   • Breast cancer (HCC) 03/2022    left sided, stage 3, ER/AK+, Her2-, s/p mastectomy with plans for AC/Taxol and radiation   • History of COVID-19     1/2021 AND 2/8/2022   • Hyperlipidemia    • PONV (postoperative nausea and vomiting)    • Seasonal allergies         Past Surgical History:   Procedure Laterality Date   • APPENDECTOMY     • BREAST BIOPSY Left 2022   • BREAST RECONSTRUCTION Left 04/13/2022    Procedure: LEFT BREAST 1ST STAGE RECONSTRUCTION  WITH INSERTION OF TISSUE EXPANDER AND BIOLOGIC;  Surgeon: Hermelinda Johnson MD;  Location: [facility] OR;  Service: Plastics;  Laterality: Left;   •  SECTION     •  SECTION N/A 12/10/2018    Procedure:  SECTION REPEAT;  Surgeon: Heidy Gutierrez MD;  Location: [facility] LABOR DELIVERY;  Service: Obstetrics/Gynecology   • D & C HYSTEROSCOPY N/A 2021    Procedure: HYSTEROSCOPY REMOVAL INTRAUTERINE DEVICE WILL NEED ULTRASOUND IN ROOM;  Surgeon: Heidy Gutierrez MD;  Location: [facility] OR [facility];  Service: Obstetrics/Gynecology;  Laterality: N/A;   • KNEE ARTHROSCOPY Right    • MASTECTOMY W/ SENTINEL NODE BIOPSY Left 2022    Procedure: left total mastectomy with left axillary sentinel lymph node biopsy;  Surgeon: Monisha Escudero MD;  Location: [facility];  Service: General;  Laterality: Left;   • VENOUS ACCESS DEVICE (PORT) INSERTION Right 2022    Procedure: right port placement;  Surgeon: Monisha Escudero MD;  Location: [facility];  Service: General;  Laterality: Right;        Current Outpatient Medications on File Prior to Visit   Medication Sig Dispense Refill   • LORazepam (ATIVAN) 0.5 MG tablet Take 1 tablet by mouth Every 12 (Twelve) Hours As Needed (Nausea). To be taken every 12 hours as needed for up to one week after chemotherapy 28 tablet 0   • omeprazole (priLOSEC) 20 MG capsule Take 1 capsule by mouth 2 (Two) Times a Day. 60 capsule 3   • ondansetron (Zofran) 8 MG tablet Take 1 tablet by mouth Every 8 (Eight) Hours As Needed for Nausea or Vomiting. 30 tablet 5   • prochlorperazine (COMPAZINE) 10 MG tablet Take 1 tablet by mouth Every 6 (Six) Hours As Needed for Nausea or Vomiting. 30 tablet 5     Current Facility-Administered Medications on File Prior to Visit   Medication Dose Route Frequency Provider Last Rate Last Admin   • dexamethasone (DECADRON) IVPB 12 mg  12 mg Intravenous Once Tracy Mark APRN       • diphenhydrAMINE (BENADRYL) IVPB  "25 mg  25 mg Intravenous Once Tracy Mark APRN       • famotidine (PEPCID) injection 20 mg  20 mg Intravenous Once Tracy Mark APRN       • LORazepam (ATIVAN) injection 0.25 mg  0.25 mg Intravenous PRN Tracy Mark APRN       • ondansetron (ZOFRAN) injection 8 mg  8 mg Intravenous Once Tracy Mark APRN       • sodium chloride 0.9 % infusion 250 mL  250 mL Intravenous Once Tracy Mark APRN            ALLERGIES:    Allergies   Allergen Reactions   • Asa [Aspirin] Anaphylaxis and Shortness Of Breath        Social History     Socioeconomic History   • Marital status:    Tobacco Use   • Smoking status: Never Smoker   • Smokeless tobacco: Never Used   Vaping Use   • Vaping Use: Never used   Substance and Sexual Activity   • Alcohol use: Not Currently     Alcohol/week: 5.0 standard drinks     Types: 5 Shots of liquor per week   • Drug use: Never   • Sexual activity: Defer        Family History   Problem Relation Age of Onset   • Melanoma Paternal Aunt    • Diabetes Other    • Malig Hyperthermia Neg Hx       Objective     Vitals:    08/29/22 0949   BP: 95/65   Pulse: 74   Resp: 18   Temp: 98.6 °F (37 °C)   TempSrc: Temporal   SpO2: 100%   Weight: 52.9 kg (116 lb 11.2 oz)   Height: 154.9 cm (60.98\")   PainSc: 0-No pain  Comment: tingling in hands and feet     Current Status 8/29/2022   ECOG score 0   Limited due to video visit.  Physical Exam  Vitals reviewed.   Constitutional:       General: She is not in acute distress.     Appearance: Normal appearance. She is well-developed.   HENT:      Head: Normocephalic and atraumatic.      Mouth/Throat:      Pharynx: No oropharyngeal exudate.   Eyes:      Pupils: Pupils are equal, round, and reactive to light.   Pulmonary:      Effort: Pulmonary effort is normal.   Musculoskeletal:         General: Normal range of motion.      Cervical back: Normal range of motion.   Skin:     General: Skin is warm and dry.      Findings: No rash.      Comments: " Darkening of nailbeds, secondary to Taxol.   Neurological:      General: No focal deficit present.      Mental Status: She is alert and oriented to person, place, and time. Mental status is at baseline.      Motor: No weakness.   Psychiatric:         Behavior: Behavior normal.         RECENT LABS:  Hematology WBC   Date Value Ref Range Status   08/29/2022 3.68 3.40 - 10.80 10*3/mm3 Final     RBC   Date Value Ref Range Status   08/29/2022 3.30 (L) 3.77 - 5.28 10*6/mm3 Final     Hemoglobin   Date Value Ref Range Status   08/29/2022 10.3 (L) 12.0 - 15.9 g/dL Final     Hematocrit   Date Value Ref Range Status   08/29/2022 30.7 (L) 34.0 - 46.6 % Final     Platelets   Date Value Ref Range Status   08/29/2022 194 140 - 450 10*3/mm3 Final       CBC:    WBC   Date Value Ref Range Status   08/29/2022 3.68 3.40 - 10.80 10*3/mm3 Final     RBC   Date Value Ref Range Status   08/29/2022 3.30 (L) 3.77 - 5.28 10*6/mm3 Final     Hemoglobin   Date Value Ref Range Status   08/29/2022 10.3 (L) 12.0 - 15.9 g/dL Final     Hematocrit   Date Value Ref Range Status   08/29/2022 30.7 (L) 34.0 - 46.6 % Final     MCV   Date Value Ref Range Status   08/29/2022 93.0 79.0 - 97.0 fL Final     MCH   Date Value Ref Range Status   08/29/2022 31.2 26.6 - 33.0 pg Final     MCHC   Date Value Ref Range Status   08/29/2022 33.6 31.5 - 35.7 g/dL Final     RDW   Date Value Ref Range Status   08/29/2022 13.9 12.3 - 15.4 % Final     RDW-SD   Date Value Ref Range Status   08/29/2022 47.0 37.0 - 54.0 fl Final     MPV   Date Value Ref Range Status   08/29/2022 10.2 6.0 - 12.0 fL Final     Platelets   Date Value Ref Range Status   08/29/2022 194 140 - 450 10*3/mm3 Final     Neutrophil %   Date Value Ref Range Status   08/29/2022 60.6 42.7 - 76.0 % Final     Lymphocyte %   Date Value Ref Range Status   08/29/2022 22.6 19.6 - 45.3 % Final     Monocyte %   Date Value Ref Range Status   08/29/2022 7.6 5.0 - 12.0 % Final     Eosinophil %   Date Value Ref Range Status    08/29/2022 7.6 (H) 0.3 - 6.2 % Final     Basophil %   Date Value Ref Range Status   08/29/2022 0.8 0.0 - 1.5 % Final     Immature Grans %   Date Value Ref Range Status   08/29/2022 0.8 (H) 0.0 - 0.5 % Final     Neutrophils, Absolute   Date Value Ref Range Status   08/29/2022 2.23 1.70 - 7.00 10*3/mm3 Final     Lymphocytes, Absolute   Date Value Ref Range Status   08/29/2022 0.83 0.70 - 3.10 10*3/mm3 Final     Monocytes, Absolute   Date Value Ref Range Status   08/29/2022 0.28 0.10 - 0.90 10*3/mm3 Final     Eosinophils, Absolute   Date Value Ref Range Status   08/29/2022 0.28 0.00 - 0.40 10*3/mm3 Final     Basophils, Absolute   Date Value Ref Range Status   08/29/2022 0.03 0.00 - 0.20 10*3/mm3 Final     Immature Grans, Absolute   Date Value Ref Range Status   08/29/2022 0.03 0.00 - 0.05 10*3/mm3 Final     nRBC   Date Value Ref Range Status   08/29/2022 0.0 0.0 - 0.2 /100 WBC Final        CMP:    Glucose   Date Value Ref Range Status   08/22/2022 110 74 - 124 mg/dL Final     BUN   Date Value Ref Range Status   08/22/2022 13 6 - 20 mg/dL Final     Creatinine   Date Value Ref Range Status   08/22/2022 0.55 (L) 0.60 - 1.10 mg/dL Final     Sodium   Date Value Ref Range Status   08/22/2022 140 134 - 145 mmol/L Final     Potassium   Date Value Ref Range Status   08/22/2022 4.0 3.5 - 4.7 mmol/L Final     Chloride   Date Value Ref Range Status   08/22/2022 106 98 - 107 mmol/L Final     CO2   Date Value Ref Range Status   08/22/2022 23.7 22.0 - 29.0 mmol/L Final     Calcium   Date Value Ref Range Status   08/22/2022 9.5 8.5 - 10.2 mg/dL Final     Total Protein   Date Value Ref Range Status   08/22/2022 7.0 6.3 - 8.0 g/dL Final     Albumin   Date Value Ref Range Status   08/22/2022 4.40 3.50 - 5.20 g/dL Final     ALT (SGPT)   Date Value Ref Range Status   08/22/2022 26 0 - 33 U/L Final     AST (SGOT)   Date Value Ref Range Status   08/22/2022 22 0 - 32 U/L Final     Alkaline Phosphatase   Date Value Ref Range Status    08/22/2022 81 38 - 116 U/L Final     Total Bilirubin   Date Value Ref Range Status   08/22/2022 <0.2 (L) 0.2 - 1.2 mg/dL Final     Globulin   Date Value Ref Range Status   08/22/2022 2.6 1.8 - 3.5 gm/dL Final     A/G Ratio   Date Value Ref Range Status   08/22/2022 1.7 1.1 - 2.4 g/dL Final     BUN/Creatinine Ratio   Date Value Ref Range Status   08/22/2022 23.6 7.3 - 30.0 Final     Anion Gap   Date Value Ref Range Status   08/22/2022 10.3 5.0 - 15.0 mmol/L Final           Assessment & Plan     There are no diagnoses linked to this encounter.   *Left Breast Cancer  · 37-year-old  female originally from [place] noticed abnormalities in the left breast in 12/2021, sensation of pressure, minor pain, sensation of fullness.   · Further mammogram and ultrasound documented abnormalities in the left breast that were consistent with breast cancer.   · Since then the patient has undergone breast MRI. Breast biopsies documented invasive lobular carcinoma.   · All this culminated with a left-sided mastectomy and sentinel lymph node sampling.   · She had a lobular carcinoma, grade 2, margins of resection negative, Ki-67 at 39%, ER positive, AL positive, HER2/leonie negative. She had 1 positive sentinel lymph node for malignancy with no extracapsular invasion. There was no evidence of lymphovascular invasion.   · [employer] panel was negative for any genetic alteration.   · The patient has had an expander placed at the mastectomy site. She has discomfort and pain after this was instillated with saline solution yesterday.   · Recommended adjuvant AC x 4 followed by Taxol x 12.  · Oncotype DX in this patient shows intermediate risk of recurrence.   · Also not only the chemotherapy will be necessary but we need to make her postmenopausal in the next several weeks or months. This will bring in a lot of benefit for her in the long run to minimize any recurrent disease. She is extremely young. She has 2 young children and we have to  do as much as we can in this patient to minimize any potentiality for recurrent disease.   · Dr. Palacios discussed with her that the whole treatment is going to take at least 24-26 weeks, in other words 6 months, and thereafter the patient will be hopefully postmenopausal. If she does not become postmenopausal she could qualify for a clinical trial or we could ask Dr. Heidy Gutierrez to do bilateral oophorectomy in this patient.   · Eventually the patient will require endocrine adjuvant therapy after she becomes postmenopausal hopefully with letrozole.   · Finally, the patient will need cardiac assessment.  She has been referred to Cardiology and scheduled her to have an echocardiogram.  · Dr. Palacios also discussed that the patient will require followup in the future in regard to her right breast not only with mammogram but also ultrasound due to the fact that sometimes lobular carcinoma likes to be a bilateral disease. There are minimal abnormalities in the mammogram in the right breast as well as in the MRI. This will require to be followed up in the future. Did not recommend a 2nd mastectomy prophylactically on her even though she asked the question.   The patient was reviewed on 05/20/2022. She has had her port placed in right subclavian position a week ago. She is healing perfectly fine and her surgical site on the left mastectomy and the expander looks perfectly normal with healing. No infection, erythema or discharge.   1 week following C1D1 AC WBC 0.52, ANC 0.06.  Temperature 102.9.  Patient admitted through ED for neutropenic fever and concern for sepsis as detailed below.  Respiratory viral panel ended up being positive for rhinovirus/enterovirus.  6/9/2022 C2 AC plans to dose reduce treatment by 20% and change treatment schedule to every 3 weeks.  6/30/2022: cycle 3 AC, with continued 20% dose reduction, receiving treatment every 3 weeks.  7/21/2022 C4 AC with continued 20% dose reduction with neulasta  support.  Will return in 3 weeks to start Taxol.  Patient prefers Monday treatments as her children start back to school.  Patient is still having menstrual cycles, plan to recheck hormone levels at completion of taxol.  If she does not become postmenopausal after chemotherapy she could qualify for a clinical trial or we could ask Dr. Heidy Gutierrez to do bilateral oophorectomy in this patient. She will require adjuvant endocrine therapy after she becomes postmenopausal, hopefully with letrozole.  8/8/2022 week #1 taxol.    8/15/2022 week #2 taxol.    8/22/2022, week #3 Taxol.  Tolerance to Taxol is much better than AC.  Occasional nausea and joint pain.  Some pruritus in the absence of rash, recommended patient take Zyrtec on a regular basis.  Continues Ativan with good control of nausea.  Continues Tylenol with good control of arthralgias.  8/29/2022 week #4 taxol.  Patient has developed new onset of tingling in her fingers and toes.  This developed Friday after her last treatment (#3 taxol).  Tingling occurs most days, typically 1-2 times daily, lasting 15-20 minutes, then resolves.  Discussed with Dr. Lawrence (MD#2), in Dr. Palacios' absence and we will proceed with same dose taxol and monitor this closely.  Consider dose reduction if neuropathy continues to worsen.    *Left arm pain, neuropathic?  · She reports experiencing discomfort in the inner aspect of the left arm. Sounds neuropathic related to her recent sentinel lymph node sampling. The patient is taking Aleve or ibuprofen with not too much benefit.   · Recommended Voltaren topically 4 times a day. It will be perfectly fine for her to use ibuprofen and start Neurontin 100 mg to take at bedtime. Hopefully this will be a transitory issue.   · 8/8/2022, patient continues to experience neuropathic pain to the left arm.  Feels Voltaren gel is helpful.  We will need to monitor this closely as she begins Taxol.   · Stable today.      *Social Factors  · The  patient's mother is living with her, coming from [place] after all these issues.   · Dr. Palacios asked her to ask her mother to stay for the period of time that she receives chemotherapy. If we need to do any services for her in regard to visa requirements or status, we will be glad to incorporate our  into this process.   · Patient will come next Tuesday for treatment instead of Monday due to labor day holiday.  Patient would like to change visit back to Mondays following the holiday.    *Cardiac Function  · 5/25/2022, left ventricular ejection fraction 60%, GLS within normal limits.    *Weakness, chills, lethargy- concern for sepsis  · 5/27/2022, patient seen for triage visit.  Her  called this morning to report she was very fatigued and experiencing chills and sore throat.  Temperature at that time was 99.8.  Upon arrival to clinic, temperature 99.8 °F.  She was in a wheelchair due to severe weakness.  She is so weak/fatigued she is unable to hold her head up and can barely keep her eyes open.  WBC 0.52, ANC 0.06.  The patient was taken directly to the ED for admission due to serious concern for sepsis.  · Admitted 5/27/2022 - 5/30/2022.    *Nausea due to chemotherapy:   · Receiving IV fluids and antiemetics the day after chemo  · 8/8/2022, patient complains of occasional nausea, no emesis.  Not currently utilizing Zofran, encouraged her to do so.  · 8/15/2022, patient reports taking Zofran several times throughout the day and still experiencing 1 episode of vomiting daily.  We will send her in a prescription of Compazine, reviewed instructions for rotating Zofran and Compazine for better control of nausea.  She also has Ativan available at home, however does not like to use this as it makes her very tired.  At her appointment last week, she required IV Zofran in addition to IV Ativan.  She is requesting IV Zofran today, we will have this added to her treatment plan for all future treatments.   She understands not to take oral Zofran at home prior to her appointment so she can receive IV Zofran.  · 8/29/2022 nausea much improved on taxol.  Now requiring Zofran as needed without further emesis.    *Darkening of the nailbeds, secondary to Taxol.      *Chemotherapy-induced fatigue  · 8/15/2022, patient continues to experience fatigue secondary to chemotherapy.  We will give her 500 cc normal saline in addition to chemotherapy today.  · Improved on taxol    *Chemotherapy induced constipation  · 8/15/2022, patient complaining of constipation since initiating Taxol.  She is not currently taking anything for constipation, but has adjusted her diet to try and compensate for this.  Recommended Senokot S for constipation, instructions reviewed.  · Resolved.    *Epistaxis  · The nasal passages on the right side are different than the ones on the left. She has very significant deviation of the septum. This triggers airflow currents that are abnormal and capillarity in the anterior part of the septum that is very abnormal and is the source of bleeding.   · Platelet count within normal limits.  · If this continues she will require referral for ENT for cauterization.     *Chemotherapy induced neuropathy  · Developed following cycle 3 weekly taxol.  · Tingling occurs almost daily, 1-2 times a day, lasting 15-20 minutes then resolves.  Not interfering with daily functions.  Discussed with Dr. Lawrence (MD#2) in Dr. Palacios' absence, we will continue same dose taxol and monitor neuropathy closely.    PLAN:   · Proceed with week #4 Taxol today.  Plan weekly x12.    · Continue Zofran as needed for nausea.    · Continue omeprazole 20 mg twice daily.  · Upon completion of chemotherapy the patient will initiate radiation therapy and has already been seen by Radiation Oncology.  · The patient will participate in a clinical trial that we have in the office and she already has been consented for this in regard to the utilization of  antiestrogen medicine upon completion of chemotherapy administration.   · Return in 1 week for NP follow-up and week #5 Taxol.  · Return in 2 weeks for MD follow-up and week #6 Taxol.  This is scheduled on a Tuesday, I have sent a message to scheduling to request this be moved to Monday.  · Call/return sooner should she develop any new concerns or problems.    Patient on high risk medication requiring close monitoring for toxicity    JACQUELIN Mijares  08/29/22     This was an audio and video enabled telemedicine encounter.  You have chosen to receive care through a telemedicine visit. Do you consent to use a telemedicine visit for your medical care today?  YES

## 2022-08-25 NOTE — PROGRESS NOTES
Please let her know her echo looks great. Please make sure she has an appointment with TK on the same day as her next echo in November.    choco blair

## 2022-08-26 ENCOUNTER — TELEPHONE (OUTPATIENT)
Dept: CARDIOLOGY | Facility: CLINIC | Age: 37
End: 2022-08-26

## 2022-08-26 NOTE — PROGRESS NOTES
Left voicemail for Maribell Encarnacion requesting callback.  Please see telephone note for updates.    Thank you,  Amira Mark RN  Triage Nurse BRITTANIE

## 2022-08-26 NOTE — TELEPHONE ENCOUNTER
Notified patient of results. Patient verbalized understanding. FU appt scheduled.    Nabila Ramon RN  Triage [facility]

## 2022-08-26 NOTE — TELEPHONE ENCOUNTER
Left voicemail for Maribell Encarnacion requesting callback.    Thank you,  Amira Mark RN  Triage Nurse G

## 2022-08-29 ENCOUNTER — INFUSION (OUTPATIENT)
Dept: ONCOLOGY | Facility: HOSPITAL | Age: 37
End: 2022-08-29

## 2022-08-29 ENCOUNTER — TELEMEDICINE (OUTPATIENT)
Dept: ONCOLOGY | Facility: CLINIC | Age: 37
End: 2022-08-29

## 2022-08-29 VITALS
DIASTOLIC BLOOD PRESSURE: 65 MMHG | HEART RATE: 74 BPM | OXYGEN SATURATION: 100 % | BODY MASS INDEX: 22.03 KG/M2 | HEIGHT: 61 IN | TEMPERATURE: 98.6 F | RESPIRATION RATE: 18 BRPM | SYSTOLIC BLOOD PRESSURE: 95 MMHG | WEIGHT: 116.7 LBS

## 2022-08-29 DIAGNOSIS — Z79.899 HIGH RISK MEDICATION USE: ICD-10-CM

## 2022-08-29 DIAGNOSIS — T45.1X5A ANEMIA ASSOCIATED WITH CHEMOTHERAPY: ICD-10-CM

## 2022-08-29 DIAGNOSIS — Z17.0 MALIGNANT NEOPLASM OF UPPER-OUTER QUADRANT OF LEFT BREAST IN FEMALE, ESTROGEN RECEPTOR POSITIVE: Primary | ICD-10-CM

## 2022-08-29 DIAGNOSIS — C50.412 MALIGNANT NEOPLASM OF UPPER-OUTER QUADRANT OF LEFT BREAST IN FEMALE, ESTROGEN RECEPTOR POSITIVE: Primary | ICD-10-CM

## 2022-08-29 DIAGNOSIS — D64.81 ANEMIA ASSOCIATED WITH CHEMOTHERAPY: ICD-10-CM

## 2022-08-29 DIAGNOSIS — T45.1X5A CHEMOTHERAPY-INDUCED NEUROPATHY: ICD-10-CM

## 2022-08-29 DIAGNOSIS — C50.412 MALIGNANT NEOPLASM OF UPPER-OUTER QUADRANT OF LEFT BREAST IN FEMALE, ESTROGEN RECEPTOR POSITIVE: ICD-10-CM

## 2022-08-29 DIAGNOSIS — G62.0 CHEMOTHERAPY-INDUCED NEUROPATHY: ICD-10-CM

## 2022-08-29 DIAGNOSIS — Z17.0 MALIGNANT NEOPLASM OF UPPER-OUTER QUADRANT OF LEFT BREAST IN FEMALE, ESTROGEN RECEPTOR POSITIVE: ICD-10-CM

## 2022-08-29 LAB
ALBUMIN SERPL-MCNC: 4.3 G/DL (ref 3.5–5.2)
ALBUMIN/GLOB SERPL: 1.7 G/DL (ref 1.1–2.4)
ALP SERPL-CCNC: 72 U/L (ref 38–116)
ALT SERPL W P-5'-P-CCNC: 21 U/L (ref 0–33)
ANION GAP SERPL CALCULATED.3IONS-SCNC: 10.8 MMOL/L (ref 5–15)
AST SERPL-CCNC: 22 U/L (ref 0–32)
BASOPHILS # BLD AUTO: 0.03 10*3/MM3 (ref 0–0.2)
BASOPHILS NFR BLD AUTO: 0.8 % (ref 0–1.5)
BILIRUB SERPL-MCNC: 0.2 MG/DL (ref 0.2–1.2)
BUN SERPL-MCNC: 11 MG/DL (ref 6–20)
BUN/CREAT SERPL: 18.6 (ref 7.3–30)
CALCIUM SPEC-SCNC: 9.4 MG/DL (ref 8.5–10.2)
CHLORIDE SERPL-SCNC: 106 MMOL/L (ref 98–107)
CO2 SERPL-SCNC: 23.2 MMOL/L (ref 22–29)
CREAT SERPL-MCNC: 0.59 MG/DL (ref 0.6–1.1)
DEPRECATED RDW RBC AUTO: 47 FL (ref 37–54)
EGFRCR SERPLBLD CKD-EPI 2021: 118.5 ML/MIN/1.73
EOSINOPHIL # BLD AUTO: 0.28 10*3/MM3 (ref 0–0.4)
EOSINOPHIL NFR BLD AUTO: 7.6 % (ref 0.3–6.2)
ERYTHROCYTE [DISTWIDTH] IN BLOOD BY AUTOMATED COUNT: 13.9 % (ref 12.3–15.4)
GLOBULIN UR ELPH-MCNC: 2.6 GM/DL (ref 1.8–3.5)
GLUCOSE SERPL-MCNC: 91 MG/DL (ref 74–124)
HCT VFR BLD AUTO: 30.7 % (ref 34–46.6)
HGB BLD-MCNC: 10.3 G/DL (ref 12–15.9)
IMM GRANULOCYTES # BLD AUTO: 0.03 10*3/MM3 (ref 0–0.05)
IMM GRANULOCYTES NFR BLD AUTO: 0.8 % (ref 0–0.5)
LYMPHOCYTES # BLD AUTO: 0.83 10*3/MM3 (ref 0.7–3.1)
LYMPHOCYTES NFR BLD AUTO: 22.6 % (ref 19.6–45.3)
MCH RBC QN AUTO: 31.2 PG (ref 26.6–33)
MCHC RBC AUTO-ENTMCNC: 33.6 G/DL (ref 31.5–35.7)
MCV RBC AUTO: 93 FL (ref 79–97)
MONOCYTES # BLD AUTO: 0.28 10*3/MM3 (ref 0.1–0.9)
MONOCYTES NFR BLD AUTO: 7.6 % (ref 5–12)
NEUTROPHILS NFR BLD AUTO: 2.23 10*3/MM3 (ref 1.7–7)
NEUTROPHILS NFR BLD AUTO: 60.6 % (ref 42.7–76)
NRBC BLD AUTO-RTO: 0 /100 WBC (ref 0–0.2)
PLATELET # BLD AUTO: 194 10*3/MM3 (ref 140–450)
PMV BLD AUTO: 10.2 FL (ref 6–12)
POTASSIUM SERPL-SCNC: 4 MMOL/L (ref 3.5–4.7)
PROT SERPL-MCNC: 6.9 G/DL (ref 6.3–8)
RBC # BLD AUTO: 3.3 10*6/MM3 (ref 3.77–5.28)
SODIUM SERPL-SCNC: 140 MMOL/L (ref 134–145)
WBC NRBC COR # BLD: 3.68 10*3/MM3 (ref 3.4–10.8)

## 2022-08-29 PROCEDURE — 25010000002 DIPHENHYDRAMINE PER 50 MG: Performed by: NURSE PRACTITIONER

## 2022-08-29 PROCEDURE — 25010000002 DEXAMETHASONE SODIUM PHOSPHATE 100 MG/10ML SOLUTION: Performed by: NURSE PRACTITIONER

## 2022-08-29 PROCEDURE — 80053 COMPREHEN METABOLIC PANEL: CPT

## 2022-08-29 PROCEDURE — 25010000002 ONDANSETRON PER 1 MG: Performed by: NURSE PRACTITIONER

## 2022-08-29 PROCEDURE — 96375 TX/PRO/DX INJ NEW DRUG ADDON: CPT

## 2022-08-29 PROCEDURE — 25010000002 PACLITAXEL PER 1 MG: Performed by: NURSE PRACTITIONER

## 2022-08-29 PROCEDURE — 25010000002 LORAZEPAM PER 2 MG: Performed by: NURSE PRACTITIONER

## 2022-08-29 PROCEDURE — 99214 OFFICE O/P EST MOD 30 MIN: CPT | Performed by: NURSE PRACTITIONER

## 2022-08-29 PROCEDURE — 85025 COMPLETE CBC W/AUTO DIFF WBC: CPT

## 2022-08-29 PROCEDURE — 96413 CHEMO IV INFUSION 1 HR: CPT

## 2022-08-29 RX ORDER — FAMOTIDINE 10 MG/ML
20 INJECTION, SOLUTION INTRAVENOUS AS NEEDED
Status: CANCELLED | OUTPATIENT
Start: 2022-08-29

## 2022-08-29 RX ORDER — SODIUM CHLORIDE 9 MG/ML
250 INJECTION, SOLUTION INTRAVENOUS ONCE
Status: COMPLETED | OUTPATIENT
Start: 2022-08-29 | End: 2022-08-29

## 2022-08-29 RX ORDER — LORAZEPAM 2 MG/ML
0.25 INJECTION INTRAMUSCULAR AS NEEDED
Status: DISCONTINUED | OUTPATIENT
Start: 2022-08-29 | End: 2022-08-29 | Stop reason: HOSPADM

## 2022-08-29 RX ORDER — FAMOTIDINE 10 MG/ML
20 INJECTION, SOLUTION INTRAVENOUS ONCE
Status: COMPLETED | OUTPATIENT
Start: 2022-08-29 | End: 2022-08-29

## 2022-08-29 RX ORDER — ONDANSETRON 2 MG/ML
8 INJECTION INTRAMUSCULAR; INTRAVENOUS ONCE
Status: COMPLETED | OUTPATIENT
Start: 2022-08-29 | End: 2022-08-29

## 2022-08-29 RX ORDER — DIPHENHYDRAMINE HYDROCHLORIDE 50 MG/ML
50 INJECTION INTRAMUSCULAR; INTRAVENOUS AS NEEDED
Status: CANCELLED | OUTPATIENT
Start: 2022-08-29

## 2022-08-29 RX ADMIN — FAMOTIDINE 20 MG: 10 INJECTION, SOLUTION INTRAVENOUS at 10:30

## 2022-08-29 RX ADMIN — DEXAMETHASONE SODIUM PHOSPHATE 12 MG: 10 INJECTION, SOLUTION INTRAMUSCULAR; INTRAVENOUS at 10:30

## 2022-08-29 RX ADMIN — ONDANSETRON 8 MG: 2 INJECTION, SOLUTION INTRAMUSCULAR; INTRAVENOUS at 10:30

## 2022-08-29 RX ADMIN — LORAZEPAM 0.25 MG: 2 INJECTION INTRAMUSCULAR; INTRAVENOUS at 10:30

## 2022-08-29 RX ADMIN — SODIUM CHLORIDE 250 ML: 9 INJECTION, SOLUTION INTRAVENOUS at 10:30

## 2022-08-29 RX ADMIN — DIPHENHYDRAMINE HYDROCHLORIDE 25 MG: 50 INJECTION, SOLUTION INTRAMUSCULAR; INTRAVENOUS at 10:57

## 2022-08-29 RX ADMIN — PACLITAXEL 125 MG: 6 INJECTION, SOLUTION INTRAVENOUS at 11:49

## 2022-08-29 NOTE — NURSING NOTE
Per NP pts dose will not be adjusted for her neuropathy. She will monitor and pay close attention to her tingling and we will reassess next week.    Shortness of Breath (Dyspnea)  Shortness of breath is theÂ feelingÂ that you can't catch your breath or get enough air. It is also known as dyspnea. Dyspnea can be caused by many different conditions. They include:  Â· Acute asthma attack. Â· Worsening ofÂ chronic lung diseases such as chronic bronchitis and emphysema. Â   Â· Heart failure. This is when weak heart muscleÂ allows extra fluid to collect inÂ the lungs. Â· Panic attacks or anxiety. Fear can cause rapid breathing (hyperventilation). Â· Pneumonia, or an infection in the lung tissue. Â· Exposure to toxicÂ substances,Â fumes, smoke, or certain medicines. Â· Blood clot in the lung (pulmonary embolism). This is often from a piece of blood clot in a deep vein of the leg (deep vein thrombosis) that breaks off and travels to the lungs. Â   Â· Heart attack or heart-related chest pain (angina). Â· Anemia. Â· Collapsed lung (pneumothorax). Â· Dehydration. Â· Pregnancy. Based on your visit today, the exact cause of your shortness of breath is not certain. Your tests donât show any of the serious causes of dyspnea. You may need other tests to find out if you have a serious problem. Itâs important to watch for any new symptoms or symptoms that get worse. Follow up with your healthcare provider as directed. Home care  Follow these tips to take care of yourself at home:  Â· When your symptoms are better, go back to your usual activities. Â· If you smoke, you should stop. Join a quit-smoking program or ask your healthcare provider for help. Â· Eat a healthy diet and get plenty of sleep. Â· Get regular exercise. Talk with your healthcare provider before starting to exercise, especially if you have other medical problems. Â· Cut down on the amount of caffeine and stimulants you consume. Follow-up care  Follow up with your healthcare provider, or as advised. If tests were done, you will be told if your treatment needs to be changed.  You can call as directed for the results. (Note:Â If an X-ray wasÂ taken, aÂ specialist will review it. You will be notified of any new findings that may affect your care.)  Call 911Â or get immediate medical care  Shortness of breath may be a sign of a serious medical problem. For example, it may be a problem with your heart or lungs. Â Call 911Â if you haveÂ worseningÂ shortness of breath or trouble breathing, especially with any of theÂ symptoms below:  Â· You are confused or itâs difficult to wake you. Â· You faint or lose consciousness. Â· You have a fast heartbeat,Â or your heartbeat is irregular. Â   Â· You are coughing up blood. Â· You have pain in your chest, arm, shoulder, neck, or upper back. Â· You break out in a sweat. When to seek medical advice  Call your healthcare provider right awayÂ if any of these occur:  Â· Slight shortness of breath or wheezingÂ   Â· Redness, pain or swelling in your leg, arm, or other body area  Â· Swelling in both legs or ankles  Â· Fast weight gain  Â· DizzinessÂ orÂ weakness  Â· Fever of 100.4ÂºF (38ÂºC) or higher, or as directed by your healthcare provider  Â© 0559-2359 The [address] [facility]. [address]. All rights reserved. This information is not intended as a substitute for professional medical care. Always follow your healthcare professional's instructions. Please follow up with your pulmonologist as it appears your sarcoidosis may be getting worse, causing your symptoms.

## 2022-09-06 ENCOUNTER — INFUSION (OUTPATIENT)
Dept: ONCOLOGY | Facility: HOSPITAL | Age: 37
End: 2022-09-06

## 2022-09-06 ENCOUNTER — OFFICE VISIT (OUTPATIENT)
Dept: ONCOLOGY | Facility: CLINIC | Age: 37
End: 2022-09-06

## 2022-09-06 ENCOUNTER — NURSE NAVIGATOR (OUTPATIENT)
Dept: OTHER | Facility: HOSPITAL | Age: 37
End: 2022-09-06

## 2022-09-06 VITALS
RESPIRATION RATE: 16 BRPM | HEIGHT: 61 IN | OXYGEN SATURATION: 98 % | SYSTOLIC BLOOD PRESSURE: 95 MMHG | BODY MASS INDEX: 22.28 KG/M2 | TEMPERATURE: 97.5 F | DIASTOLIC BLOOD PRESSURE: 65 MMHG | HEART RATE: 78 BPM | WEIGHT: 118 LBS

## 2022-09-06 DIAGNOSIS — Z45.2 FITTING AND ADJUSTMENT OF VASCULAR CATHETER: ICD-10-CM

## 2022-09-06 DIAGNOSIS — T45.1X5A ANEMIA ASSOCIATED WITH CHEMOTHERAPY: ICD-10-CM

## 2022-09-06 DIAGNOSIS — G62.0 CHEMOTHERAPY-INDUCED NEUROPATHY: ICD-10-CM

## 2022-09-06 DIAGNOSIS — Z17.0 MALIGNANT NEOPLASM OF UPPER-OUTER QUADRANT OF LEFT BREAST IN FEMALE, ESTROGEN RECEPTOR POSITIVE: Primary | ICD-10-CM

## 2022-09-06 DIAGNOSIS — C50.412 MALIGNANT NEOPLASM OF UPPER-OUTER QUADRANT OF LEFT BREAST IN FEMALE, ESTROGEN RECEPTOR POSITIVE: Primary | ICD-10-CM

## 2022-09-06 DIAGNOSIS — C50.412 MALIGNANT NEOPLASM OF UPPER-OUTER QUADRANT OF LEFT BREAST IN FEMALE, ESTROGEN RECEPTOR POSITIVE: ICD-10-CM

## 2022-09-06 DIAGNOSIS — D64.81 ANEMIA ASSOCIATED WITH CHEMOTHERAPY: ICD-10-CM

## 2022-09-06 DIAGNOSIS — T45.1X5A LEUKOPENIA DUE TO ANTINEOPLASTIC CHEMOTHERAPY: ICD-10-CM

## 2022-09-06 DIAGNOSIS — T45.1X5A CHEMOTHERAPY-INDUCED NEUROPATHY: ICD-10-CM

## 2022-09-06 DIAGNOSIS — D70.1 LEUKOPENIA DUE TO ANTINEOPLASTIC CHEMOTHERAPY: ICD-10-CM

## 2022-09-06 DIAGNOSIS — Z17.0 MALIGNANT NEOPLASM OF UPPER-OUTER QUADRANT OF LEFT BREAST IN FEMALE, ESTROGEN RECEPTOR POSITIVE: ICD-10-CM

## 2022-09-06 LAB
ALBUMIN SERPL-MCNC: 4.3 G/DL (ref 3.5–5.2)
ALBUMIN/GLOB SERPL: 1.7 G/DL (ref 1.1–2.4)
ALP SERPL-CCNC: 76 U/L (ref 38–116)
ALT SERPL W P-5'-P-CCNC: 21 U/L (ref 0–33)
ANION GAP SERPL CALCULATED.3IONS-SCNC: 12.5 MMOL/L (ref 5–15)
AST SERPL-CCNC: 21 U/L (ref 0–32)
BASOPHILS # BLD AUTO: 0.02 10*3/MM3 (ref 0–0.2)
BASOPHILS NFR BLD AUTO: 0.6 % (ref 0–1.5)
BILIRUB SERPL-MCNC: 0.2 MG/DL (ref 0.2–1.2)
BUN SERPL-MCNC: 13 MG/DL (ref 6–20)
BUN/CREAT SERPL: 22.8 (ref 7.3–30)
CALCIUM SPEC-SCNC: 9.5 MG/DL (ref 8.5–10.2)
CHLORIDE SERPL-SCNC: 104 MMOL/L (ref 98–107)
CO2 SERPL-SCNC: 22.5 MMOL/L (ref 22–29)
CREAT SERPL-MCNC: 0.57 MG/DL (ref 0.6–1.1)
DEPRECATED RDW RBC AUTO: 46.1 FL (ref 37–54)
EGFRCR SERPLBLD CKD-EPI 2021: 119.5 ML/MIN/1.73
EOSINOPHIL # BLD AUTO: 0.19 10*3/MM3 (ref 0–0.4)
EOSINOPHIL NFR BLD AUTO: 5.8 % (ref 0.3–6.2)
ERYTHROCYTE [DISTWIDTH] IN BLOOD BY AUTOMATED COUNT: 13.5 % (ref 12.3–15.4)
GLOBULIN UR ELPH-MCNC: 2.5 GM/DL (ref 1.8–3.5)
GLUCOSE SERPL-MCNC: 121 MG/DL (ref 74–124)
HCT VFR BLD AUTO: 30.2 % (ref 34–46.6)
HGB BLD-MCNC: 10 G/DL (ref 12–15.9)
IMM GRANULOCYTES # BLD AUTO: 0.02 10*3/MM3 (ref 0–0.05)
IMM GRANULOCYTES NFR BLD AUTO: 0.6 % (ref 0–0.5)
LYMPHOCYTES # BLD AUTO: 0.69 10*3/MM3 (ref 0.7–3.1)
LYMPHOCYTES NFR BLD AUTO: 21.2 % (ref 19.6–45.3)
MCH RBC QN AUTO: 30.9 PG (ref 26.6–33)
MCHC RBC AUTO-ENTMCNC: 33.1 G/DL (ref 31.5–35.7)
MCV RBC AUTO: 93.2 FL (ref 79–97)
MONOCYTES # BLD AUTO: 0.3 10*3/MM3 (ref 0.1–0.9)
MONOCYTES NFR BLD AUTO: 9.2 % (ref 5–12)
NEUTROPHILS NFR BLD AUTO: 2.04 10*3/MM3 (ref 1.7–7)
NEUTROPHILS NFR BLD AUTO: 62.6 % (ref 42.7–76)
NRBC BLD AUTO-RTO: 0 /100 WBC (ref 0–0.2)
PLATELET # BLD AUTO: 191 10*3/MM3 (ref 140–450)
PMV BLD AUTO: 10.2 FL (ref 6–12)
POTASSIUM SERPL-SCNC: 3.6 MMOL/L (ref 3.5–4.7)
PROT SERPL-MCNC: 6.8 G/DL (ref 6.3–8)
RBC # BLD AUTO: 3.24 10*6/MM3 (ref 3.77–5.28)
SODIUM SERPL-SCNC: 139 MMOL/L (ref 134–145)
WBC NRBC COR # BLD: 3.26 10*3/MM3 (ref 3.4–10.8)

## 2022-09-06 PROCEDURE — 96413 CHEMO IV INFUSION 1 HR: CPT

## 2022-09-06 PROCEDURE — 85025 COMPLETE CBC W/AUTO DIFF WBC: CPT

## 2022-09-06 PROCEDURE — 84466 ASSAY OF TRANSFERRIN: CPT | Performed by: INTERNAL MEDICINE

## 2022-09-06 PROCEDURE — 25010000002 DEXAMETHASONE SODIUM PHOSPHATE 100 MG/10ML SOLUTION: Performed by: INTERNAL MEDICINE

## 2022-09-06 PROCEDURE — 36415 COLL VENOUS BLD VENIPUNCTURE: CPT | Performed by: INTERNAL MEDICINE

## 2022-09-06 PROCEDURE — 99214 OFFICE O/P EST MOD 30 MIN: CPT | Performed by: INTERNAL MEDICINE

## 2022-09-06 PROCEDURE — 25010000002 ONDANSETRON PER 1 MG: Performed by: INTERNAL MEDICINE

## 2022-09-06 PROCEDURE — 25010000002 LORAZEPAM PER 2 MG: Performed by: INTERNAL MEDICINE

## 2022-09-06 PROCEDURE — 25010000002 DIPHENHYDRAMINE PER 50 MG: Performed by: INTERNAL MEDICINE

## 2022-09-06 PROCEDURE — 96375 TX/PRO/DX INJ NEW DRUG ADDON: CPT

## 2022-09-06 PROCEDURE — 80053 COMPREHEN METABOLIC PANEL: CPT

## 2022-09-06 PROCEDURE — 25010000002 PACLITAXEL PER 1 MG: Performed by: INTERNAL MEDICINE

## 2022-09-06 PROCEDURE — 82728 ASSAY OF FERRITIN: CPT | Performed by: INTERNAL MEDICINE

## 2022-09-06 PROCEDURE — 25010000002 HEPARIN LOCK FLUSH PER 10 UNITS: Performed by: INTERNAL MEDICINE

## 2022-09-06 PROCEDURE — 83540 ASSAY OF IRON: CPT | Performed by: INTERNAL MEDICINE

## 2022-09-06 RX ORDER — DIPHENHYDRAMINE HYDROCHLORIDE 50 MG/ML
50 INJECTION INTRAMUSCULAR; INTRAVENOUS AS NEEDED
Status: CANCELLED | OUTPATIENT
Start: 2022-09-06

## 2022-09-06 RX ORDER — ONDANSETRON 2 MG/ML
8 INJECTION INTRAMUSCULAR; INTRAVENOUS ONCE
Status: COMPLETED | OUTPATIENT
Start: 2022-09-06 | End: 2022-09-06

## 2022-09-06 RX ORDER — HEPARIN SODIUM (PORCINE) LOCK FLUSH IV SOLN 100 UNIT/ML 100 UNIT/ML
500 SOLUTION INTRAVENOUS AS NEEDED
Status: CANCELLED | OUTPATIENT
Start: 2022-09-06

## 2022-09-06 RX ORDER — LORAZEPAM 2 MG/ML
0.25 INJECTION INTRAMUSCULAR AS NEEDED
Status: DISCONTINUED | OUTPATIENT
Start: 2022-09-06 | End: 2022-09-06 | Stop reason: HOSPADM

## 2022-09-06 RX ORDER — HEPARIN SODIUM (PORCINE) LOCK FLUSH IV SOLN 100 UNIT/ML 100 UNIT/ML
500 SOLUTION INTRAVENOUS AS NEEDED
Status: DISCONTINUED | OUTPATIENT
Start: 2022-09-06 | End: 2022-09-06 | Stop reason: HOSPADM

## 2022-09-06 RX ORDER — LORAZEPAM 2 MG/ML
0.25 INJECTION INTRAMUSCULAR AS NEEDED
Status: CANCELLED | OUTPATIENT
Start: 2022-09-06

## 2022-09-06 RX ORDER — FAMOTIDINE 10 MG/ML
20 INJECTION, SOLUTION INTRAVENOUS ONCE
Status: COMPLETED | OUTPATIENT
Start: 2022-09-06 | End: 2022-09-06

## 2022-09-06 RX ORDER — FAMOTIDINE 10 MG/ML
20 INJECTION, SOLUTION INTRAVENOUS AS NEEDED
Status: CANCELLED | OUTPATIENT
Start: 2022-09-06

## 2022-09-06 RX ORDER — VITAMIN B COMPLEX
1 TABLET ORAL DAILY
Refills: 0
Start: 2022-09-06 | End: 2023-04-05

## 2022-09-06 RX ORDER — SODIUM CHLORIDE 0.9 % (FLUSH) 0.9 %
10 SYRINGE (ML) INJECTION AS NEEDED
Status: DISCONTINUED | OUTPATIENT
Start: 2022-09-06 | End: 2022-09-06 | Stop reason: HOSPADM

## 2022-09-06 RX ORDER — FAMOTIDINE 10 MG/ML
20 INJECTION, SOLUTION INTRAVENOUS ONCE
Status: CANCELLED | OUTPATIENT
Start: 2022-09-06

## 2022-09-06 RX ORDER — SODIUM CHLORIDE 0.9 % (FLUSH) 0.9 %
10 SYRINGE (ML) INJECTION AS NEEDED
Status: CANCELLED | OUTPATIENT
Start: 2022-09-06

## 2022-09-06 RX ORDER — ONDANSETRON 2 MG/ML
8 INJECTION INTRAMUSCULAR; INTRAVENOUS ONCE
Status: CANCELLED | OUTPATIENT
Start: 2022-09-06 | End: 2022-09-06

## 2022-09-06 RX ORDER — SODIUM CHLORIDE 9 MG/ML
250 INJECTION, SOLUTION INTRAVENOUS ONCE
Status: COMPLETED | OUTPATIENT
Start: 2022-09-06 | End: 2022-09-06

## 2022-09-06 RX ORDER — SODIUM CHLORIDE 9 MG/ML
250 INJECTION, SOLUTION INTRAVENOUS ONCE
Status: CANCELLED | OUTPATIENT
Start: 2022-09-06

## 2022-09-06 RX ADMIN — Medication 500 UNITS: at 11:45

## 2022-09-06 RX ADMIN — LORAZEPAM 0.25 MG: 2 INJECTION INTRAMUSCULAR; INTRAVENOUS at 09:57

## 2022-09-06 RX ADMIN — FAMOTIDINE 20 MG: 10 INJECTION, SOLUTION INTRAVENOUS at 09:41

## 2022-09-06 RX ADMIN — DIPHENHYDRAMINE HYDROCHLORIDE 25 MG: 50 INJECTION, SOLUTION INTRAMUSCULAR; INTRAVENOUS at 09:58

## 2022-09-06 RX ADMIN — SODIUM CHLORIDE 250 ML: 9 INJECTION, SOLUTION INTRAVENOUS at 09:41

## 2022-09-06 RX ADMIN — ONDANSETRON 8 MG: 2 INJECTION, SOLUTION INTRAMUSCULAR; INTRAVENOUS at 09:41

## 2022-09-06 RX ADMIN — DEXAMETHASONE SODIUM PHOSPHATE 12 MG: 10 INJECTION, SOLUTION INTRAMUSCULAR; INTRAVENOUS at 09:44

## 2022-09-06 RX ADMIN — PACLITAXEL 120 MG: 6 INJECTION, SOLUTION INTRAVENOUS at 10:44

## 2022-09-06 RX ADMIN — Medication 10 ML: at 11:45

## 2022-09-06 NOTE — PROGRESS NOTES
Met with patient and father-in-law in the infusion center for f/u visit. Receiving Taxol cycle #5. States she is feeling well. No complaints voiced at this time. Gave patient copy of navigator letter and business card. Encouraged her to call with any questions or concerns. Continue to follow.....Sharon Ashley RN, Oncology Nurse Navigator

## 2022-09-06 NOTE — PROGRESS NOTES
"Subjective     CHIEF COMPLAINT:      Chief Complaint   Patient presents with   • Follow-up     No concerns/tingling in fingers and toes       HISTORY OF PRESENT ILLNESS:     Maribell Encarnacion is a 37 y.o. female patient who returns today for follow up on her left breast cancer.  She returns today for follow-up and she is scheduled to receive Taxol.  She reports having tingling in the fingers and toes.  It is not affecting her fine motor activities.  No difficulty with walking.  She is taking Zyrtec as needed to help with the symptoms.    Patient reports fatigue.  No chest pain.  No shortness of breath.    ROS:  Pertinent ROS is in the HPI.     Past medical, surgical, social and family history were reviewed.     MEDICATIONS:    Current Outpatient Medications:   •  Cetirizine HCl (ZYRTEC PO), Take  by mouth Daily., Disp: , Rfl:   •  LORazepam (ATIVAN) 0.5 MG tablet, Take 1 tablet by mouth Every 12 (Twelve) Hours As Needed (Nausea). To be taken every 12 hours as needed for up to one week after chemotherapy, Disp: 28 tablet, Rfl: 0  •  ondansetron (Zofran) 8 MG tablet, Take 1 tablet by mouth Every 8 (Eight) Hours As Needed for Nausea or Vomiting., Disp: 30 tablet, Rfl: 5  •  prochlorperazine (COMPAZINE) 10 MG tablet, Take 1 tablet by mouth Every 6 (Six) Hours As Needed for Nausea or Vomiting., Disp: 30 tablet, Rfl: 5  •  omeprazole (priLOSEC) 20 MG capsule, Take 1 capsule by mouth 2 (Two) Times a Day., Disp: 60 capsule, Rfl: 3  Objective     VITAL SIGNS:     Vitals:    09/06/22 0901   BP: 95/65   Pulse: 78   Resp: 16   Temp: 97.5 °F (36.4 °C)   TempSrc: Temporal   SpO2: 98%   Weight: 53.5 kg (118 lb)   Height: 154.9 cm (60.98\")   PainSc: 0-No pain     Body mass index is 22.31 kg/m².     Wt Readings from Last 5 Encounters:   09/06/22 53.5 kg (118 lb)   08/29/22 52.9 kg (116 lb 11.2 oz)   08/25/22 52.2 kg (115 lb)   08/22/22 52.2 kg (115 lb 1.6 oz)   08/15/22 52.3 kg (115 lb 4.8 oz)     PHYSICAL EXAMINATION: "   GENERAL: The patient appears in good general condition, not in acute distress.   SKIN: No ecchymosis.  EYES: No jaundice. No pallor.  LYMPHATICS: No cervical lymphadenopathy.  CHEST: Normal respiratory effort.  Lungs clear bilaterally.  No added sounds.  Port in the right upper chest appears benign.  CVS: Normal S1-S2.  No murmurs.  ABDOMEN: Nondistended.  EXTREMITIES: No edema.  No calf tenderness.    DIAGNOSTIC DATA:     Results from last 7 days   Lab Units 09/06/22  0847   WBC 10*3/mm3 3.26*   NEUTROS ABS 10*3/mm3 2.04   HEMOGLOBIN g/dL 10.0*   HEMATOCRIT % 30.2*   PLATELETS 10*3/mm3 191     Results from last 7 days   Lab Units 09/06/22  0847   SODIUM mmol/L 139   POTASSIUM mmol/L 3.6   CHLORIDE mmol/L 104   CO2 mmol/L 22.5   BUN mg/dL 13   CREATININE mg/dL 0.57*   CALCIUM mg/dL 9.5   ALBUMIN g/dL 4.30   BILIRUBIN mg/dL 0.2   ALK PHOS U/L 76   ALT (SGPT) U/L 21   AST (SGOT) U/L 21   GLUCOSE mg/dL 121     Component      Latest Ref Rng & Units 5/6/2022   Iron      37 - 145 mcg/dL 112   Iron Saturation      14 - 48 % 30   Transferrin      200 - 360 mg/dL 267   TIBC      249 - 505 mcg/dL 374   Ferritin      11.00 - 207.00 ng/mL 60.90   Vitamin B-12      211 - 946 pg/mL 839   Folate      4.78 - 24.20 ng/mL 13.40     Assessment & Plan    *Malignant neoplasm of the upper outer quadrant of the left breast, ER positive.  · S/p mastectomy.  · S/p Adriamycin and Cytoxan x4-cycle 4 given on 7/21/2022.  · Started weekly Taxol on 8/8/2022.  She received 4 weekly doses so far.  · She is tolerating the treatment except for anemia and neuropathy as below.    *Anemia due to chemotherapy.  · Anemia work-up on 5/6/2022 showed no evidence of vitamin deficiency.  · Hemoglobin is 10.0 today.  · She is reporting worsening fatigue.  · I recommended repeating iron studies since she may have developed iron deficiency since May 2022.    *Leukopenia due to chemotherapy.  · Neutrophil count is 2040 today.  · No problem with neutropenia with  Taxol    *Neuropathy secondary to chemotherapy.  · Patient reports having numbness in the fingers and toes slightly worse than over the past few weeks.    PLAN:    1.  Proceed with week #5 of Taxol today at full dose.  2.  Obtain ferritin and iron panel.  3.  Start vitamin B complex containing vitamin B6 100 mg daily.  4.  Follow-up with Dr. Palacios in 1 week.  She will be scheduled for the next dose of Taxol.      Mich Agudelo MD  09/06/22

## 2022-09-07 LAB
FERRITIN SERPL-MCNC: 94.5 NG/ML (ref 11–207)
IRON 24H UR-MRATE: 45 MCG/DL (ref 37–145)
IRON SATN MFR SERPL: 13 % (ref 14–48)
TIBC SERPL-MCNC: 340 MCG/DL (ref 249–505)
TRANSFERRIN SERPL-MCNC: 243 MG/DL (ref 200–360)

## 2022-09-08 ENCOUNTER — TELEPHONE (OUTPATIENT)
Dept: ONCOLOGY | Facility: CLINIC | Age: 37
End: 2022-09-08

## 2022-09-08 RX ORDER — FERROUS SULFATE 325(65) MG
325 TABLET ORAL
COMMUNITY
Start: 2022-09-08 | End: 2022-09-12

## 2022-09-08 NOTE — TELEPHONE ENCOUNTER
Reviewed Dr. Agudelo's note and instructions with the patient, she prefers to  the supplement OTC. Reviewed side effects and instructions with her, I will also send a [employer] message reviewing this information and what names to look for in the store when picking up the supplement. She was thankful and v/u.

## 2022-09-08 NOTE — TELEPHONE ENCOUNTER
----- Message from Mich Agudelo MD sent at 9/8/2022  7:43 AM EDT -----  Please inform the patient that her labs showed low iron level. I recommend starting ferrous sulfate 325 mg daily.    Thank you

## 2022-09-12 ENCOUNTER — DOCUMENTATION (OUTPATIENT)
Dept: ONCOLOGY | Facility: CLINIC | Age: 37
End: 2022-09-12

## 2022-09-12 ENCOUNTER — INFUSION (OUTPATIENT)
Dept: ONCOLOGY | Facility: HOSPITAL | Age: 37
End: 2022-09-12

## 2022-09-12 ENCOUNTER — TELEMEDICINE (OUTPATIENT)
Dept: ONCOLOGY | Facility: CLINIC | Age: 37
End: 2022-09-12

## 2022-09-12 VITALS
BODY MASS INDEX: 22.16 KG/M2 | OXYGEN SATURATION: 98 % | WEIGHT: 117.4 LBS | TEMPERATURE: 97.1 F | SYSTOLIC BLOOD PRESSURE: 95 MMHG | RESPIRATION RATE: 16 BRPM | DIASTOLIC BLOOD PRESSURE: 65 MMHG | HEIGHT: 61 IN | HEART RATE: 78 BPM

## 2022-09-12 VITALS — HEART RATE: 91 BPM | DIASTOLIC BLOOD PRESSURE: 60 MMHG | SYSTOLIC BLOOD PRESSURE: 95 MMHG

## 2022-09-12 DIAGNOSIS — D69.59 CHEMOTHERAPY-INDUCED THROMBOCYTOPENIA: ICD-10-CM

## 2022-09-12 DIAGNOSIS — D70.1 LEUKOPENIA DUE TO ANTINEOPLASTIC CHEMOTHERAPY: ICD-10-CM

## 2022-09-12 DIAGNOSIS — D64.89 ANEMIA DUE TO OTHER CAUSE, NOT CLASSIFIED: ICD-10-CM

## 2022-09-12 DIAGNOSIS — Z45.2 FITTING AND ADJUSTMENT OF VASCULAR CATHETER: ICD-10-CM

## 2022-09-12 DIAGNOSIS — D64.81 ANEMIA ASSOCIATED WITH CHEMOTHERAPY: ICD-10-CM

## 2022-09-12 DIAGNOSIS — R53.83 CHEMOTHERAPY-INDUCED FATIGUE: ICD-10-CM

## 2022-09-12 DIAGNOSIS — T45.1X5A CHEMOTHERAPY INDUCED NAUSEA AND VOMITING: ICD-10-CM

## 2022-09-12 DIAGNOSIS — T45.1X5A ANEMIA ASSOCIATED WITH CHEMOTHERAPY: ICD-10-CM

## 2022-09-12 DIAGNOSIS — C50.412 MALIGNANT NEOPLASM OF UPPER-OUTER QUADRANT OF LEFT BREAST IN FEMALE, ESTROGEN RECEPTOR POSITIVE: ICD-10-CM

## 2022-09-12 DIAGNOSIS — Z17.0 MALIGNANT NEOPLASM OF UPPER-OUTER QUADRANT OF LEFT BREAST IN FEMALE, ESTROGEN RECEPTOR POSITIVE: Primary | ICD-10-CM

## 2022-09-12 DIAGNOSIS — T45.1X5A CHEMOTHERAPY-INDUCED FATIGUE: ICD-10-CM

## 2022-09-12 DIAGNOSIS — K21.00 PEPTIC REFLUX ESOPHAGITIS: ICD-10-CM

## 2022-09-12 DIAGNOSIS — R11.2 CHEMOTHERAPY INDUCED NAUSEA AND VOMITING: ICD-10-CM

## 2022-09-12 DIAGNOSIS — Z79.899 HIGH RISK MEDICATION USE: ICD-10-CM

## 2022-09-12 DIAGNOSIS — T45.1X5A LEUKOPENIA DUE TO ANTINEOPLASTIC CHEMOTHERAPY: ICD-10-CM

## 2022-09-12 DIAGNOSIS — R04.0 ANTERIOR EPISTAXIS: ICD-10-CM

## 2022-09-12 DIAGNOSIS — Z17.0 MALIGNANT NEOPLASM OF UPPER-OUTER QUADRANT OF LEFT BREAST IN FEMALE, ESTROGEN RECEPTOR POSITIVE: ICD-10-CM

## 2022-09-12 DIAGNOSIS — T45.1X5A CHEMOTHERAPY-INDUCED THROMBOCYTOPENIA: ICD-10-CM

## 2022-09-12 DIAGNOSIS — E53.8 B12 DEFICIENCY: ICD-10-CM

## 2022-09-12 DIAGNOSIS — T45.1X5A CHEMOTHERAPY-INDUCED NEUROPATHY: ICD-10-CM

## 2022-09-12 DIAGNOSIS — G62.0 CHEMOTHERAPY-INDUCED NEUROPATHY: ICD-10-CM

## 2022-09-12 DIAGNOSIS — C50.412 MALIGNANT NEOPLASM OF UPPER-OUTER QUADRANT OF LEFT BREAST IN FEMALE, ESTROGEN RECEPTOR POSITIVE: Primary | ICD-10-CM

## 2022-09-12 LAB
ALBUMIN SERPL-MCNC: 4.4 G/DL (ref 3.5–5.2)
ALBUMIN/GLOB SERPL: 1.7 G/DL (ref 1.1–2.4)
ALP SERPL-CCNC: 81 U/L (ref 38–116)
ALT SERPL W P-5'-P-CCNC: 22 U/L (ref 0–33)
ANION GAP SERPL CALCULATED.3IONS-SCNC: 12 MMOL/L (ref 5–15)
AST SERPL-CCNC: 21 U/L (ref 0–32)
BASOPHILS # BLD AUTO: 0.02 10*3/MM3 (ref 0–0.2)
BASOPHILS NFR BLD AUTO: 0.6 % (ref 0–1.5)
BILIRUB SERPL-MCNC: 0.2 MG/DL (ref 0.2–1.2)
BUN SERPL-MCNC: 16 MG/DL (ref 6–20)
BUN/CREAT SERPL: 28.6 (ref 7.3–30)
CALCIUM SPEC-SCNC: 9.5 MG/DL (ref 8.5–10.2)
CHLORIDE SERPL-SCNC: 104 MMOL/L (ref 98–107)
CO2 SERPL-SCNC: 23 MMOL/L (ref 22–29)
CREAT SERPL-MCNC: 0.56 MG/DL (ref 0.6–1.1)
DEPRECATED RDW RBC AUTO: 43.3 FL (ref 37–54)
EGFRCR SERPLBLD CKD-EPI 2021: 120 ML/MIN/1.73
EOSINOPHIL # BLD AUTO: 0.14 10*3/MM3 (ref 0–0.4)
EOSINOPHIL NFR BLD AUTO: 4.2 % (ref 0.3–6.2)
ERYTHROCYTE [DISTWIDTH] IN BLOOD BY AUTOMATED COUNT: 13.1 % (ref 12.3–15.4)
GLOBULIN UR ELPH-MCNC: 2.6 GM/DL (ref 1.8–3.5)
GLUCOSE SERPL-MCNC: 90 MG/DL (ref 74–124)
HCT VFR BLD AUTO: 31.1 % (ref 34–46.6)
HGB BLD-MCNC: 10.4 G/DL (ref 12–15.9)
IMM GRANULOCYTES # BLD AUTO: 0.01 10*3/MM3 (ref 0–0.05)
IMM GRANULOCYTES NFR BLD AUTO: 0.3 % (ref 0–0.5)
LYMPHOCYTES # BLD AUTO: 0.63 10*3/MM3 (ref 0.7–3.1)
LYMPHOCYTES NFR BLD AUTO: 19.1 % (ref 19.6–45.3)
MCH RBC QN AUTO: 30.8 PG (ref 26.6–33)
MCHC RBC AUTO-ENTMCNC: 33.4 G/DL (ref 31.5–35.7)
MCV RBC AUTO: 92 FL (ref 79–97)
MONOCYTES # BLD AUTO: 0.22 10*3/MM3 (ref 0.1–0.9)
MONOCYTES NFR BLD AUTO: 6.7 % (ref 5–12)
NEUTROPHILS NFR BLD AUTO: 2.28 10*3/MM3 (ref 1.7–7)
NEUTROPHILS NFR BLD AUTO: 69.1 % (ref 42.7–76)
NRBC BLD AUTO-RTO: 0 /100 WBC (ref 0–0.2)
PLATELET # BLD AUTO: 194 10*3/MM3 (ref 140–450)
PMV BLD AUTO: 9.8 FL (ref 6–12)
POTASSIUM SERPL-SCNC: 4 MMOL/L (ref 3.5–4.7)
PROT SERPL-MCNC: 7 G/DL (ref 6.3–8)
RBC # BLD AUTO: 3.38 10*6/MM3 (ref 3.77–5.28)
SODIUM SERPL-SCNC: 139 MMOL/L (ref 134–145)
WBC NRBC COR # BLD: 3.3 10*3/MM3 (ref 3.4–10.8)

## 2022-09-12 PROCEDURE — 25010000002 CYANOCOBALAMIN PER 1000 MCG: Performed by: INTERNAL MEDICINE

## 2022-09-12 PROCEDURE — 25010000002 ONDANSETRON PER 1 MG: Performed by: INTERNAL MEDICINE

## 2022-09-12 PROCEDURE — 99214 OFFICE O/P EST MOD 30 MIN: CPT | Performed by: INTERNAL MEDICINE

## 2022-09-12 PROCEDURE — 80053 COMPREHEN METABOLIC PANEL: CPT

## 2022-09-12 PROCEDURE — 25010000002 HEPARIN LOCK FLUSH PER 10 UNITS: Performed by: INTERNAL MEDICINE

## 2022-09-12 PROCEDURE — 85025 COMPLETE CBC W/AUTO DIFF WBC: CPT

## 2022-09-12 PROCEDURE — 96413 CHEMO IV INFUSION 1 HR: CPT

## 2022-09-12 PROCEDURE — 96375 TX/PRO/DX INJ NEW DRUG ADDON: CPT

## 2022-09-12 PROCEDURE — 25010000002 PACLITAXEL PER 1 MG: Performed by: INTERNAL MEDICINE

## 2022-09-12 PROCEDURE — 96372 THER/PROPH/DIAG INJ SC/IM: CPT

## 2022-09-12 PROCEDURE — 25010000002 DEXAMETHASONE SODIUM PHOSPHATE 100 MG/10ML SOLUTION: Performed by: INTERNAL MEDICINE

## 2022-09-12 PROCEDURE — 25010000002 LORAZEPAM PER 2 MG: Performed by: INTERNAL MEDICINE

## 2022-09-12 PROCEDURE — 25010000002 DIPHENHYDRAMINE PER 50 MG: Performed by: INTERNAL MEDICINE

## 2022-09-12 RX ORDER — ONDANSETRON 2 MG/ML
8 INJECTION INTRAMUSCULAR; INTRAVENOUS ONCE
Status: COMPLETED | OUTPATIENT
Start: 2022-09-12 | End: 2022-09-12

## 2022-09-12 RX ORDER — ONDANSETRON 2 MG/ML
8 INJECTION INTRAMUSCULAR; INTRAVENOUS ONCE
Status: CANCELLED | OUTPATIENT
Start: 2022-09-13 | End: 2022-09-13

## 2022-09-12 RX ORDER — CYANOCOBALAMIN 1000 UG/ML
1000 INJECTION, SOLUTION INTRAMUSCULAR; SUBCUTANEOUS ONCE
Status: COMPLETED | OUTPATIENT
Start: 2022-09-12 | End: 2022-09-12

## 2022-09-12 RX ORDER — DIPHENHYDRAMINE HYDROCHLORIDE 50 MG/ML
50 INJECTION INTRAMUSCULAR; INTRAVENOUS AS NEEDED
Status: CANCELLED | OUTPATIENT
Start: 2022-09-13

## 2022-09-12 RX ORDER — HEPARIN SODIUM (PORCINE) LOCK FLUSH IV SOLN 100 UNIT/ML 100 UNIT/ML
500 SOLUTION INTRAVENOUS AS NEEDED
Status: DISCONTINUED | OUTPATIENT
Start: 2022-09-12 | End: 2022-09-12 | Stop reason: HOSPADM

## 2022-09-12 RX ORDER — SODIUM CHLORIDE 9 MG/ML
250 INJECTION, SOLUTION INTRAVENOUS ONCE
Status: COMPLETED | OUTPATIENT
Start: 2022-09-12 | End: 2022-09-12

## 2022-09-12 RX ORDER — LORAZEPAM 2 MG/ML
0.25 INJECTION INTRAMUSCULAR AS NEEDED
Status: CANCELLED | OUTPATIENT
Start: 2022-09-13

## 2022-09-12 RX ORDER — HEPARIN SODIUM (PORCINE) LOCK FLUSH IV SOLN 100 UNIT/ML 100 UNIT/ML
500 SOLUTION INTRAVENOUS AS NEEDED
Status: CANCELLED | OUTPATIENT
Start: 2022-09-12

## 2022-09-12 RX ORDER — SODIUM CHLORIDE 0.9 % (FLUSH) 0.9 %
10 SYRINGE (ML) INJECTION AS NEEDED
Status: DISCONTINUED | OUTPATIENT
Start: 2022-09-12 | End: 2022-09-12 | Stop reason: HOSPADM

## 2022-09-12 RX ORDER — FAMOTIDINE 10 MG/ML
20 INJECTION, SOLUTION INTRAVENOUS ONCE
Status: COMPLETED | OUTPATIENT
Start: 2022-09-12 | End: 2022-09-12

## 2022-09-12 RX ORDER — FAMOTIDINE 10 MG/ML
20 INJECTION, SOLUTION INTRAVENOUS ONCE
Status: CANCELLED | OUTPATIENT
Start: 2022-09-13

## 2022-09-12 RX ORDER — SODIUM CHLORIDE 0.9 % (FLUSH) 0.9 %
10 SYRINGE (ML) INJECTION AS NEEDED
Status: CANCELLED | OUTPATIENT
Start: 2022-09-12

## 2022-09-12 RX ORDER — FAMOTIDINE 10 MG/ML
20 INJECTION, SOLUTION INTRAVENOUS AS NEEDED
Status: CANCELLED | OUTPATIENT
Start: 2022-09-13

## 2022-09-12 RX ORDER — LORAZEPAM 2 MG/ML
0.25 INJECTION INTRAMUSCULAR AS NEEDED
Status: DISCONTINUED | OUTPATIENT
Start: 2022-09-12 | End: 2022-09-12 | Stop reason: HOSPADM

## 2022-09-12 RX ORDER — SODIUM CHLORIDE 9 MG/ML
250 INJECTION, SOLUTION INTRAVENOUS ONCE
Status: CANCELLED | OUTPATIENT
Start: 2022-09-13

## 2022-09-12 RX ADMIN — SODIUM CHLORIDE 250 ML: 9 INJECTION, SOLUTION INTRAVENOUS at 09:24

## 2022-09-12 RX ADMIN — DEXAMETHASONE SODIUM PHOSPHATE 12 MG: 10 INJECTION, SOLUTION INTRAMUSCULAR; INTRAVENOUS at 09:28

## 2022-09-12 RX ADMIN — ONDANSETRON 8 MG: 2 INJECTION, SOLUTION INTRAMUSCULAR; INTRAVENOUS at 09:26

## 2022-09-12 RX ADMIN — Medication 10 ML: at 11:34

## 2022-09-12 RX ADMIN — PACLITAXEL 90 MG: 6 INJECTION, SOLUTION INTRAVENOUS at 10:31

## 2022-09-12 RX ADMIN — Medication 500 UNITS: at 11:35

## 2022-09-12 RX ADMIN — FAMOTIDINE 20 MG: 10 INJECTION, SOLUTION INTRAVENOUS at 09:25

## 2022-09-12 RX ADMIN — DIPHENHYDRAMINE HYDROCHLORIDE 25 MG: 50 INJECTION, SOLUTION INTRAMUSCULAR; INTRAVENOUS at 09:45

## 2022-09-12 RX ADMIN — CYANOCOBALAMIN 1000 MCG: 1000 INJECTION, SOLUTION INTRAMUSCULAR at 09:41

## 2022-09-12 RX ADMIN — LORAZEPAM 0.25 MG: 2 INJECTION INTRAMUSCULAR; INTRAVENOUS at 09:27

## 2022-09-12 NOTE — PROGRESS NOTES
Case Management/[facility]:      Patient requested a letter of verification to be written on behalf on her father-in-law and mother-in-law who visited from [place] to help care for her during treatment. OSW composed the letter and it signed it. OSw scanned signed letter to Chelsea Abbott RN to sign and provide to patient at the [facility] infusion location.    BEN Greco  Oncology Social Worker   [place]

## 2022-09-12 NOTE — PROGRESS NOTES
Subjective       DURING THE VISIT WITH THE PATIENT TODAY , PATIENT HAD FACE MASK, MY MEDICAL ASSISTANT AND I  HAD PROPPER PROTECTIVE EQUIPMENT, AND I DID HAND HYGIENE WITH SOAP AND WATER BEFORE AND AFTER THE VISIT.     REASON FOR FOLLOW UP:  LOBULAR CARCINOMA OF THE LEFT BREAST, 70 MM IN SIZE, MARGINS OF RESECTION NEGATIVE AFTER MASTECTOMY, GRADE 2, ER POSITIVE OR POSITIVE, HER 2 NEGATIVE BY IHC,KI 67 39% ,ONE POSITIVE AXILLARY LYMPH NODE OUT OF 4 NODES REMOVAL, PREMENOPAUSAL .INVITAE PANEL NEGATIVE.ONCOTYPE 18        HISTORY OF PRESENT ILLNESS:    This patient has been seen by video medicine visit on 09/12/2022 in regard to continue her chemotherapy administration in the adjuvant therapy of her breast cancer on the left. The patient at this time is undergoing weekly Taxol. Unfortunately she has developed further peripheral neuropathy in the tips of her fingers and her toes to the point that she feels sensation of itching more than pain all of the time and this is disrupting her life and her functionality very substantially. She has to use ice that gives her some relief of the symptom at least 4-6 times a day. It takes at least 10 minutes for the symptoms to improve but they come back in a question of hours. The patient is not having any difficulty with ambulation but the more walking that she does the more discomfort that she develops. The same is applicable to her hands. Her appetite has improved, she has gained weight, she has some degree of constipation associated with chemotherapy administration relieved with flax seed oil. Her bowel function now is normal, urination is normal. She has not had any menstrual flow since 06/2022. She has not had any nausea or vomiting. No cardiovascular or respiratory issues.     The patient otherwise has not had any new problems. No fever or infections. She has had resolution of her epistaxis.           ONCOLOGIC HISTORY:  The patient is a 37 y.o. year old female who is here  for an opinion about the above issue.  I had the opportunity to see this delightful Russian female, 37 years old, mother of 2, who is premenopausal who has found abnormalities in the left breast since 12/2021, having sensation of fullness in the breast and discomfort and occasional pain. After this complaint she was seen by her primary physician and she initiated a process of mammograms and ultrasounds that culminated with the diagnosis of lobular carcinoma of the breast, invasive, 7 cm in size. The patient completed a mastectomy and sentinel lymph nodes almost 3 weeks ago and she is here for review and advice in regard to adjuvant therapy. The patient is still having pain at the surgical site, especially since yesterday she had injection of saline in the expander that was placed after the mastectomy. She also has neuropathic pain in the inner aspect of the left arm that is bothering her a lot through the day and sometimes almost driving her crazy. She has no motor deficit into the left upper extremity. She denies any fevers or chills or bone pain. Her appetite is acceptable but she has changed her diet and she is almost going into vegetarian with the exception of some fish. She has eliminated all the dairy products and she has eliminated mostly carbohydrates as well. She has lost some weight. The patient otherwise feels well. She has normal bowel activity, normal urination. No skeletal pain or joint pain. No cough, sputum production, shortness of breath or palpitations. She is extremely anxious about this visit today. She is in company of her .    The patient returned to the office on 05/20/2022 in preparation for initiation of chemotherapy. In the meantime she has undergone a port placement in right subclavian position with no difficulties and an echocardiogram that will be discussed below. The patient has had very significant upper respiratory allergies. She has been tested this week for COVID being  negative. She is not running any fever. Most of the symptoms in the respiratory tract include sneezing, itching, itchy eyes and rhinorrhea. No sore throat, no alteration in taste or smell. No cough, shortness of breath, pleuritic pain or hemoptysis.     The surgical site from the left breast expander is not painful. She has healed extremely well. The same is applicable to her port that was placed a week ago.  The patient returned to the office on 05/26/2022 for an interim assessment. Since the chemotherapy administration last week the patient had persistent nausea and vomiting at least for 4 days to the point that she spent most of the time sleeping because of the effect of the Zyprexa but no resolution of her nausea and vomiting. She has vomited so much that now she has significant esophagitis with heartburn and indigestion. She has not had any hematemesis or melena. She just started to eat yesterday rice and potatoes. She was able to handle this and she has been able to handle liquids. She also has had discoloration of the urine that originally was pink and subsequently now has normalized. She had no burning sensation upon urination or vaginal bleeding. She has been very fatigued and tired. On top of that Neulasta triggers significant pain in the cranium, in the pelvic bone and in her femurs and she has the feeling that she has the flu. Again, she has not had any fever. Today is the best day that she has had since the chemotherapy administration. Obviously given the symptoms and this toxicity assessment, the patient will require radical change in her treatment as will be described below.  On 06/23/2022, the patient returns after she has completed 2 cycles of AC. The first cycle was dramatically cumbersome because of the tremendous amount of side effects including persistent nausea and vomiting after chemotherapy that required IV fluids and modification in her nausea regimen. She also developed neutropenic fever  and ended up in the hospital. After 2nd cycle with dose adjustment the patient handled the treatment much better and the miracle medicine in regard to nausea control was Ativan. Phenergan and Compazine triggered tremendous degree of confusion. Therefore, for the subsequent cycle 3 and 4, Ativan will be the main medicine to utilize. Zofran also will be utilized.     Physically the patient feels much better today. The patient has had appetite to eat. The heartburn and indigestion have mostly subsided. Her nutrition and hydration are much better. She is keeping her weight and she has no pain. Bowel activity with occasional constipation. Urination is normal. No cardiovascular or respiratory issues. Energy level much better.     She had significant pain with Neulasta use 4 days after the treatment requiring Zyrtec and significant Tylenol utilization.            Past Medical History:   Diagnosis Date    Anemia     Anesthesia complication     hypotension with last C section    Breast cancer (HCC) 2022    left sided, stage 3, ER/RI+, Her2-, s/p mastectomy with plans for AC/Taxol and radiation    History of COVID-19     2021 AND 2022    Hyperlipidemia     PONV (postoperative nausea and vomiting)     Seasonal allergies         Past Surgical History:   Procedure Laterality Date    APPENDECTOMY      BREAST BIOPSY Left     BREAST RECONSTRUCTION Left 2022    Procedure: LEFT BREAST 1ST STAGE RECONSTRUCTION WITH INSERTION OF TISSUE EXPANDER AND BIOLOGIC;  Surgeon: Hermelinda Johnson MD;  Location: [facility] OR;  Service: Plastics;  Laterality: Left;     SECTION       SECTION N/A 12/10/2018    Procedure:  SECTION REPEAT;  Surgeon: Heidy Gutierrez MD;  Location: [facility] LABOR DELIVERY;  Service: Obstetrics/Gynecology    D & C HYSTEROSCOPY N/A 2021    Procedure: HYSTEROSCOPY REMOVAL INTRAUTERINE DEVICE WILL NEED ULTRASOUND IN ROOM;  Surgeon: Heidy Gutierrez MD;  [facility];  Service: Obstetrics/Gynecology;  Laterality: N/A;    KNEE ARTHROSCOPY Right     MASTECTOMY W/ SENTINEL NODE BIOPSY Left 04/13/2022    Procedure: left total mastectomy with left axillary sentinel lymph node biopsy;  Surgeon: Monisha Escudero MD;  Location: [facility];  Service: General;  Laterality: Left;    VENOUS ACCESS DEVICE (PORT) INSERTION Right 5/13/2022    Procedure: right port placement;  Surgeon: Monisha Escudero MD;  Location: [facility];  Service: General;  Laterality: Right;        Current Outpatient Medications on File Prior to Visit   Medication Sig Dispense Refill    Cetirizine HCl (ZYRTEC PO) Take  by mouth Daily.      ferrous sulfate 325 (65 FE) MG tablet Take 325 mg by mouth Daily With Breakfast.      LORazepam (ATIVAN) 0.5 MG tablet Take 1 tablet by mouth Every 12 (Twelve) Hours As Needed (Nausea). To be taken every 12 hours as needed for up to one week after chemotherapy 28 tablet 0    ondansetron (Zofran) 8 MG tablet Take 1 tablet by mouth Every 8 (Eight) Hours As Needed for Nausea or Vomiting. 30 tablet 5    prochlorperazine (COMPAZINE) 10 MG tablet Take 1 tablet by mouth Every 6 (Six) Hours As Needed for Nausea or Vomiting. 30 tablet 5    B Complex Vitamins (Vitamin-B Complex) tablet Take 1 tablet by mouth Daily.  0    omeprazole (priLOSEC) 20 MG capsule Take 1 capsule by mouth 2 (Two) Times a Day. 60 capsule 3     No current facility-administered medications on file prior to visit.        ALLERGIES:    Allergies   Allergen Reactions    Asa [Aspirin] Anaphylaxis and Shortness Of Breath        Social History     Socioeconomic History    Marital status:    Tobacco Use    Smoking status: Never Smoker    Smokeless tobacco: Never Used   Vaping Use    Vaping Use: Never used   Substance and Sexual Activity    Alcohol use: Not Currently     Alcohol/week: 5.0 standard drinks     Types: 5 Shots of liquor per week    Drug use: Never    Sexual activity: Defer     "    Family History   Problem Relation Age of Onset    Melanoma Paternal Aunt     Diabetes Other     Malig Hyperthermia Neg Hx           Objective     Vitals:    09/12/22 0839   BP: 95/65   Pulse: 78   Resp: 16   Temp: 97.1 °F (36.2 °C)   TempSrc: Temporal   SpO2: 98%   Weight: 53.3 kg (117 lb 6.4 oz)   Height: 154.9 cm (60.98\")   PainSc: 0-No pain     Current Status 9/12/2022   ECOG score 0       Physical Exam            She looks normal            RECENT LABS:  Hematology WBC   Date Value Ref Range Status   09/06/2022 3.26 (L) 3.40 - 10.80 10*3/mm3 Final     RBC   Date Value Ref Range Status   09/06/2022 3.24 (L) 3.77 - 5.28 10*6/mm3 Final     Hemoglobin   Date Value Ref Range Status   09/06/2022 10.0 (L) 12.0 - 15.9 g/dL Final     Hematocrit   Date Value Ref Range Status   09/06/2022 30.2 (L) 34.0 - 46.6 % Final     Platelets   Date Value Ref Range Status   09/06/2022 191 140 - 450 10*3/mm3 Final       CBC:    WBC   Date Value Ref Range Status   09/06/2022 3.26 (L) 3.40 - 10.80 10*3/mm3 Final     RBC   Date Value Ref Range Status   09/06/2022 3.24 (L) 3.77 - 5.28 10*6/mm3 Final     Hemoglobin   Date Value Ref Range Status   09/06/2022 10.0 (L) 12.0 - 15.9 g/dL Final     Hematocrit   Date Value Ref Range Status   09/06/2022 30.2 (L) 34.0 - 46.6 % Final     MCV   Date Value Ref Range Status   09/06/2022 93.2 79.0 - 97.0 fL Final     MCH   Date Value Ref Range Status   09/06/2022 30.9 26.6 - 33.0 pg Final     MCHC   Date Value Ref Range Status   09/06/2022 33.1 31.5 - 35.7 g/dL Final     RDW   Date Value Ref Range Status   09/06/2022 13.5 12.3 - 15.4 % Final     RDW-SD   Date Value Ref Range Status   09/06/2022 46.1 37.0 - 54.0 fl Final     MPV   Date Value Ref Range Status   09/06/2022 10.2 6.0 - 12.0 fL Final     Platelets   Date Value Ref Range Status   09/06/2022 191 140 - 450 10*3/mm3 Final     Neutrophil %   Date Value Ref Range Status   09/06/2022 62.6 42.7 - 76.0 % Final     Lymphocyte %   Date Value Ref " Range Status   09/06/2022 21.2 19.6 - 45.3 % Final     Monocyte %   Date Value Ref Range Status   09/06/2022 9.2 5.0 - 12.0 % Final     Eosinophil %   Date Value Ref Range Status   09/06/2022 5.8 0.3 - 6.2 % Final     Basophil %   Date Value Ref Range Status   09/06/2022 0.6 0.0 - 1.5 % Final     Immature Grans %   Date Value Ref Range Status   09/06/2022 0.6 (H) 0.0 - 0.5 % Final     Neutrophils, Absolute   Date Value Ref Range Status   09/06/2022 2.04 1.70 - 7.00 10*3/mm3 Final     Lymphocytes, Absolute   Date Value Ref Range Status   09/06/2022 0.69 (L) 0.70 - 3.10 10*3/mm3 Final     Monocytes, Absolute   Date Value Ref Range Status   09/06/2022 0.30 0.10 - 0.90 10*3/mm3 Final     Eosinophils, Absolute   Date Value Ref Range Status   09/06/2022 0.19 0.00 - 0.40 10*3/mm3 Final     Basophils, Absolute   Date Value Ref Range Status   09/06/2022 0.02 0.00 - 0.20 10*3/mm3 Final     Immature Grans, Absolute   Date Value Ref Range Status   09/06/2022 0.02 0.00 - 0.05 10*3/mm3 Final     nRBC   Date Value Ref Range Status   09/06/2022 0.0 0.0 - 0.2 /100 WBC Final        CMP:    Glucose   Date Value Ref Range Status   09/06/2022 121 74 - 124 mg/dL Final     BUN   Date Value Ref Range Status   09/06/2022 13 6 - 20 mg/dL Final     Creatinine   Date Value Ref Range Status   09/06/2022 0.57 (L) 0.60 - 1.10 mg/dL Final     Sodium   Date Value Ref Range Status   09/06/2022 139 134 - 145 mmol/L Final     Potassium   Date Value Ref Range Status   09/06/2022 3.6 3.5 - 4.7 mmol/L Final     Chloride   Date Value Ref Range Status   09/06/2022 104 98 - 107 mmol/L Final     CO2   Date Value Ref Range Status   09/06/2022 22.5 22.0 - 29.0 mmol/L Final     Calcium   Date Value Ref Range Status   09/06/2022 9.5 8.5 - 10.2 mg/dL Final     Total Protein   Date Value Ref Range Status   09/06/2022 6.8 6.3 - 8.0 g/dL Final     Albumin   Date Value Ref Range Status   09/06/2022 4.30 3.50 - 5.20 g/dL Final     ALT (SGPT)   Date Value Ref Range  Status   09/06/2022 21 0 - 33 U/L Final     AST (SGOT)   Date Value Ref Range Status   09/06/2022 21 0 - 32 U/L Final     Alkaline Phosphatase   Date Value Ref Range Status   09/06/2022 76 38 - 116 U/L Final     Total Bilirubin   Date Value Ref Range Status   09/06/2022 0.2 0.2 - 1.2 mg/dL Final     Globulin   Date Value Ref Range Status   09/06/2022 2.5 1.8 - 3.5 gm/dL Final     A/G Ratio   Date Value Ref Range Status   09/06/2022 1.7 1.1 - 2.4 g/dL Final     BUN/Creatinine Ratio   Date Value Ref Range Status   09/06/2022 22.8 7.3 - 30.0 Final     Anion Gap   Date Value Ref Range Status   09/06/2022 12.5 5.0 - 15.0 mmol/L Final                     Assessment & Plan     Diagnoses and all orders for this visit:    1. Malignant neoplasm of upper-outer quadrant of left breast in female, estrogen receptor positive (HCC) (Primary)    2. Chemotherapy induced nausea and vomiting    3. Peptic reflux esophagitis    4. Leukopenia due to antineoplastic chemotherapy (HCC)    5. Anemia due to other cause, not classified    6. Chemotherapy-induced thrombocytopenia    7. Fitting and adjustment of vascular catheter    8. Chemotherapy-induced neuropathy (HCC)    9. High risk medication use    10. Anterior epistaxis    11. Chemotherapy-induced fatigue       In summary this 37-year-old  female originally from [place] noticed abnormalities in the left breast in 12/2021, sensation of pressure, minor pain, sensation of fullness. She looked for help. Further mammogram and ultrasound documented abnormalities in the left breast that were consistent with breast cancer. Since then the patient has undergone breast MRI. Breast biopsies documented invasive lobular carcinoma. All this culminated with a left-sided mastectomy and sentinel lymph node sampling. The pathology has been posted above. She had a lobular carcinoma, grade 2, margins of resection negative, Ki-67 at 39%, ER positive, IN positive, HER2/leonie negative. She had 1 positive  sentinel lymph node for malignancy with no extracapsular invasion. There was no evidence of lymphovascular invasion.     Invitae panel was negative for any genetic alteration.     The patient has had an expander placed at the mastectomy site. She has discomfort and pain after this was instillated with saline solution yesterday.     The other phenomenon that is happening to the patient is significant pain and discomfort in the inner aspect of the left arm. Sounds neuropathic related to her recent sentinel lymph node sampling. The patient is taking Aleve or ibuprofen with not too much benefit.     The patient also has a minimal component of anemia that will require assessment with ferritin, iron, TIBC, B12, folic acid levels.     RECOMMENDATIONS:  I have advised this patient and  today that she needs to receive adjuvant chemotherapy in the form of AC x4 and subsequently Taxol x12. Oncotype DX in this patient shows intermediate risk of recurrence. Also not only the chemotherapy will be necessary but we need to make her postmenopausal in the next several weeks or months. This will bring in a lot of benefit for her in the long run to minimize any recurrent disease. She is extremely young. She has 2 young children and we have to do as much as we can in this patient to minimize any potentiality for recurrent disease.     I discussed briefly with her side effects of the treatment including alopecia, cardiac toxicity, anemia, leukopenia, thrombocytopenia, mucositis among others and peripheral neuropathy in the Taxol part of the treatment along with leukopenia, anemia, thrombocytopenia and allergic reaction to the medicine. I discussed with her that the whole treatment is going to take at least 24-26 weeks, in other words 6 months, and thereafter the patient will be hopefully postmenopausal. If she does not become postmenopausal she could qualify for a clinical trial or we could ask Dr. Heidy Gutierrez to do  bilateral oophorectomy in this patient.     Eventually the patient will require endocrine adjuvant therapy after she becomes postmenopausal hopefully with letrozole.     I discussed all these facts with the patient and her . I spent 1 hour and 30 minutes with her going through the details of all this but she was able to grasp a lot of information. I appreciated highly the visit with her  and I pointed out to them that they need to work as a team. The patient has 2 young children. She is the only one taking care of the kids. The patient's mother is living with her, coming from [place] after all these issues. I asked her to ask her mother to stay for the period of time that she receives chemotherapy. If we need to do any services for her in regard to visa requirements or status, we will be glad to incorporate our  into this process.     Finally, I discussed with the patient the need for her to proceed with cardiac assessment. I made a referral to be seen by Cardiology and scheduled her to have an echocardiogram and I sent her back to see Dr. Escudero in order to proceed with the placement of a port.     In order to control the pain in the inner aspect of her arm, I asked her to use Voltaren topically 4 times a day. It will be perfectly fine for her to use ibuprofen and I sent Neurontin 100 mg to take at bedtime. Hopefully this will be a transitory issue.     Dr. Johnson eventually will be in charge of deciding the time for the patient to initiate physical therapy for her left shoulder.     I reviewed all the pertinent records on this patient and I posted the information out of the pathology and radiology as above. I discussed the case with my partner, Dr. Landeros, who agrees with my plan of care.    Finally, I also posted to the patient today that will require followup in the future in regard to her right breast not only with mammogram but also ultrasound. I discussed with her the fact that  sometimes lobular carcinoma likes to be a bilateral disease. There are minimal abnormalities in the mammogram in the right breast as well as in the MRI. This will require to be followed up in the future. I did not recommend a 2nd mastectomy prophylactically on her even though she asked the question today.     The patient was reviewed on 2022. She has had her port placed in right subclavian position a week ago. She is healing perfectly fine and her surgical site on the left mastectomy and the expander looks perfectly normal with healing. No infection, erythema or discharge.     The patient since then has undergone formal education and consent for chemotherapy administration. Her echocardiogram even though technically difficult documented a normal left ventricular ejection fraction. No pericardial effusion and no valvular dysfunction.     Her white count, hemoglobin and platelets are normal today.     Her chemistry profile, CA 15-3 as well as ferritin, iron, TIBC, B12 and folic acid levels were all normal.     Therefore under the present circumstances I advised the patient and  the followin. For the treatment of her breast cancer the patient will initiate today chemotherapy with dose dense AC that she will receive every 2 weeks supported with Neulasta. The side effects of this already were discussed with her including nausea, vomiting, alopecia, anemia, leukopenia, thrombocytopenia and cardiac dysfunction among others. The patient was ready to proceed.   2. The patient will be utilizing Neulasta On-body to minimize hematological toxicity and be able to keep up on the schedule in regard to her treatment. I advised her that this can produce significant bone pain in the sternum, in the rib cage, in the pelvic bone and she could use antihistaminic that she is already taking in the first place and also she can use either Aleve or Tylenol or a hot shower if necessary.   3. I encouraged her to remain on  her nausea medicine, ondansetron, during the next 3 days to minimize any nausea or vomiting.   4. I advised her to stay away from smelling foods like hot soup or fried foods in the next 3 days given the significant increase in smell that patients receiving AC typically have. This will minimize the potential for nausea and vomiting.   5. The patient will require toxicity assessment in a week along with blood WORK CBC.   6. The patient once completes 4 cycles of AC will initiate weekly cycles of Taxol x12. Upon completion of that the patient will initiate a plan of radiation therapy and she already has been seen by Radiation Oncology.     The patient will participate in a clinical trial that we have in the office and she already has been consented for this in regard to the utilization of antiestrogen medicine upon completion of chemotherapy administration.     The patient will continue using her other medications on routine basis that include the Zyprexa and she will use the EMLA cream to minimize pain at the port site at the time of accessing.     Finally, I advised the patient and the  present in the room that they at any cost had to minimize the risk of pregnancy. In fact a pregnancy test today was negative. I advised them to use a condom.     The patient was ready to proceed.    All the discussion of all these issues took place in [place].  The patient returned to the office on 05/26/2022. Since the previous visit a week ago the patient had chemotherapy administration and she developed very significant nausea and vomiting nonstoppable for almost 5 days. Yesterday was the first day that she was able to have some rice and potatoes. Because she has thrown up so much she has developed significant heartburn and indigestion indicating probably bile gastritis, esophagitis and acid esophagitis. The patient has not had any hematemesis or melena. The other phenomenon that she had was tremendous somnolence due to the  use of Zyprexa at the dose of 5 mg every night. She spent almost 4 days in bed sleepy but still with nausea and vomiting.     On top of things, she has very significant pain in her muscles, in her thighs, in her scalp and in her pelvic bone associated with Neulasta use. She is taking Tylenol for this with some improvement.     The patient has been able to reach hydration since yesterday, drinking proper liquids, and she is urinating okay. Therefore, the patient has multiple issues and on top of that she has now leukopenia associated with chemotherapy. The hemoglobin is stable. The platelet count has dropped but is not dramatically or risky for bleeding.     Given all the above circumstances I proposed her the following changes in regard to plan of chemotherapy for the next cycle.     1. We will modify her plan of chemotherapy, decreasing the doses for all the medications by 20%.   2. The patient will space out the chemotherapy treatments to every 3 weeks for the AC part of the treatment until completion of 4 cycles.   3. The patient will be supported with Neulasta injection. She will utilize Tylenol for pain.   4. The patient will continue using chemotherapy administration, anti-nausea medication and she will receive Emend on day 2 in the office and Emend on day 3 at home. Pharmacist will take care of this issue.   5. The patient will decrease Zyprexa from 5 mg a day to 2.5 mg in the evening to minimize somnolence.   6. The patient was advised to use omeprazole at the dose of 20 mg twice a day to minimize esophageal irritation associated with so much nausea and vomiting.   7. The patient will require still laboratory parameters the week after each one of the cycles of chemotherapy.     I feel the obligation to make all these changes; otherwise this patient will quit her treatment and that is the worst thing that could happen under the present scenario. She agreed with all the changes made. I explained to her all  these changes in [place]ian to make her life a little bit more comfortable and easy to handle. She understands at the time that she returns in 2 weeks she probably will start to develop alopecia and she already has discussed this with her mother who will help through the process of shaving her head.  On 2022, the patient had dramatic improvement in regard to nausea control after dose adjustment for the 2nd cycle of chemotherapy and also modification of the nausea regimen. We tried to deliver Compazine and Phenergan. These medicines were tremendously tolling on her in regard to confusion and inability to function. Ativan was the miracle medicine. Therefore for the next 2 cycles Zofran and Ativan will be the ideal combination and she will require also IV fluids on day 2 of each one of the next cycles of AC.     In regard to pain associated with Neulasta, she required Tylenol around-the-clock for 4 days after each one of the treatments and now she has no discomfort whatsoever.     Today her white count is normal, hemoglobin improved to 10.7, platelet count with minor decrease, no clinical bleeding.    The patient feels substantially better. She is able to function at home. She is able to be with her family. She is able to attend her children and she has been able to walk 2 miles a day late in the evening. Encouraged her to continue doing this.     I suggested for her the followin. She will return in a week to proceed with cycle 3 of AC at the previous dose and supported with Neulasta.   2. She will be supported on day 2 with IV fluids and nausea medication.   3. We will discontinue Phenergan and/or Compazine and she will remain on Zofran and Ativan for nausea control.   4. She will continue PPI to minimize reflux symptomatology, still minimal problem but dramatically better than before.   5. She will use Milk of Magnesia for constipation. Another measure could be prunes and dates.     The patient will complete  the total 4 cycles of AC and then will move into the Taxol arena. I pointed out to her that we will have the discussion about how to handle this medicine after the completion of the next 2 cycles.     She also raised the question in regard what to do upon completion of chemotherapy. I advised her that eventually she will have radiological assessment as a baseline and we will proceed with PATRICIA blood sampling for evaluation of minimal residual disease.     The patient was ready to proceed now that she feels dramatically better. What a difference it makes in regard to adjustment of medicines and trying to adjust nausea medication accordingly.  The patient returned to the office on 07/21/2022. In regard to her chemotherapy treatment, she is ready to proceed with her 4th or last cycle of AC today. Her ANC is a little bit on the low side but the patient will be receiving Neulasta, therefore this will be taking care of this problem.     Her hemoglobin remains stable. This is anemia associated with chemotherapy administration. She also had a menstrual flow that was very abundant a few weeks ago. She is not taking any vitamins at this time and I have not advised to take any given concerning data about supplements in the background of adjuvant therapy for breast cancer.     In regard to the sensation of numbness in the left upper extremity arm, I think this is natural after mastectomy more than expected and I pointed out to her that this does not signify anything and does not require any therapy. This is extremely common in many women and it has the tendency to go away spontaneously.     In regard to the abnormality in the left hand there is no evidence of lymphedema. I think she could have a minimal element of tendonitis. I advised her to use topical anti-inflammatory medication or ice the hand. She has not received any IV sites for blood draws in this area.     In regard to future visits, the patient would like to  continue her chemotherapy treatments on Mondays because her children will be going back to school and she wants to have a weekend with them as much as she can. I find no problem seeing her in 3 weeks from now on Monday to proceed with initiation of Taxol. I pointed out to her that Taxol will be easier to handle a lower dose, less toxicity in regard to hematological parameters and hopefully no issues in regard to neuropathy.     I also advised her that we will continue monitoring the status of menstrual flow upon completion of chemotherapy. She still has 12 Taxol treatments to go and we do not know if the chemotherapy treatment is going to put her in menopause or not. Depending on the final analysis of this upon completion of chemotherapy and before initiation of hormonal therapy, we will need to do hormone levels.     Finally, the patient has had a lot of issues between her insurance company and the billing system at [facility]. I asked the billing system to give her a call and relate this to the insurance company. The specific question is about the cost and the utilization of PEGylated Neulasta or Neulasta On-body. Hopefully this will be happening to settle down the situation. Each one of these medicines have been billed in thousands of dollars and she already will complete 4 bills of this medication. She is extremely anxious about this situation. This is called economic toxicity of chemotherapy administration and we need to influence this situation as well on her to give her the benefit of settling down once and for all.    This patient already has enough stressors on the plate to add another one that is economical stressor of her treatment.  On 08/22/2022, the patient's white count, hemoglobin and platelets are stable. Tolerance to Taxol is much better than AC. She has some occasional nausea, some joint pain, some pruritus in the absence of any rash on her hands and her feet and I think this all is side  effect of Taxol. I advised her to go ahead and proceed with her Taxol today and I advised her to go back to taking her Zyrtec on a regular basis to try to minimize any itching in her hands and her feet.     I also advised her to continue using Ativan that has been terrific in regard to control of nausea and vomiting and she can use 2-3 doses today and tomorrow to minimize the symptoms. It will be perfectly okay as well for her to take Tylenol tonight and tomorrow to minimize joint pain associated with Taxol utilization.     In regard to her epistaxis, it is very obvious that the nasal passages on the right side are different than the ones on the left. She has very significant deviation of the septum. This triggers airflow currents that are abnormal and capillarity in the anterior part of the septum that is very abnormal and is the source of bleeding. If this continues she will require referral for ENT for cauterization. Her platelet count is normal.     The patient has not had any menstrual flow now for 2 months and will continue asking this question because theoretically the chemotherapy should make her menopausal. Upon completion of Taxol we will need to do endocrine testing in this regard.     Overall I think the patient is doing much better than before and she will be advised to continue her weekly Taxol along with weekly laboratory testing and weekly nurse practitioner visit and visit with me in 1 month.   On 09/12/2022 the patient continues complaining of significant sensation of itching and discomfort in the tips of her fingers and her toes to the point that she has difficulty doing housework and difficulty walking with this. This is a different way of manifestation of toxicity associated with Taxol administration. She probably has a grade 2 peripheral neuropathy associated with this. The symptoms are not permanent, they come and go but this will obligate me to adjust her Taxol dose today by 25-30%. She has  been receiving 120 mg of Taxol per dose. Her dose from now on will be 90 mg.     The patient will receive a B12 injection today and I advised her to take B complex vitamin.    In regard to her anemia associated with chemotherapy administration she was advised by my partner, Mich Agudelo MD, last week to take oral iron therapy. Unfortunately the patient became ill in regard to constipation and cramping and she has stopped the iron therapy.     In regard to her leukopenia associated with chemotherapy administration her white count and ANC are appropriate today and she will be able to proceed with her Taxol administration today. She has not developed any fever or infection.     In regard to her reflux symptomatology, nausea and vomiting the symptoms pertinent to his are resolved now that she is no longer receiving AC chemotherapy. She remains on Ativan and Compazine on prn basis and she remains on PPI that has corrected her acid issues altogether.    In regard how to monitor her peripheral neuropathy I asked her to call my nurse, Justine Abbott on weekly basis, notify her of the status of her symptoms and if they get any worse or permanent I think we will need to discontinue the Taxol permanently and move into the next stage of therapy. She will require visit with me in 3 weeks.     Duration of the video visit today 20 minutes.

## 2022-09-13 ENCOUNTER — APPOINTMENT (OUTPATIENT)
Dept: ONCOLOGY | Facility: HOSPITAL | Age: 37
End: 2022-09-13

## 2022-09-16 ENCOUNTER — TELEPHONE (OUTPATIENT)
Dept: ONCOLOGY | Facility: CLINIC | Age: 37
End: 2022-09-16

## 2022-09-16 NOTE — TELEPHONE ENCOUNTER
Returned call to patient who needs to speak with Kaykay, she spoke with Dr. Palacios on Monday and he asked her to call Kaykay today to discuss her Neuropathy.

## 2022-09-16 NOTE — TELEPHONE ENCOUNTER
"Pt still having neuropathy daily. Pt is using ice on feet and hands. Worse on the hands. Pt experiencing at least once/day. When patient starts walking or doing something with her hands she experiences this \"cramping\" in her hands and \"itching\" sensation in her feet. Pt states ice is helping. Pt states it has improved since Monday in her feet, but worsened since Monday in her hands. All of this began 4 weeks ago. Will relay update to Dr. Palacios and make pt aware of any changes or additions to her plan of care. Chelsea Abbott RN   "

## 2022-09-19 ENCOUNTER — APPOINTMENT (OUTPATIENT)
Dept: ONCOLOGY | Facility: HOSPITAL | Age: 37
End: 2022-09-19

## 2022-09-19 PROBLEM — T45.1X5A NEUROPATHY DUE TO CHEMOTHERAPEUTIC DRUG: Status: ACTIVE | Noted: 2022-09-19

## 2022-09-19 PROBLEM — G62.0 NEUROPATHY DUE TO CHEMOTHERAPEUTIC DRUG (HCC): Status: ACTIVE | Noted: 2022-09-19

## 2022-09-21 ENCOUNTER — TELEPHONE (OUTPATIENT)
Dept: ONCOLOGY | Facility: CLINIC | Age: 37
End: 2022-09-21

## 2022-09-21 NOTE — TELEPHONE ENCOUNTER
----- Message from Marysol Latham sent at 9/20/2022  2:06 PM EDT -----  When I called her about the schedule, she is telling me the pharmacy never called her about the prescription for the cream. Can you give her a call please.  ----- Message -----  From: Chelsea Abbott RN  Sent: 9/16/2022   4:18 PM EDT  To: Imani Zavaleta    We're stopping taxol for right now. She will see Dr. Palacios back in two weeks. Thanks!

## 2022-09-21 NOTE — TELEPHONE ENCOUNTER
Called [facility] to check on status of neuropathy cream. Per [facility] She is on the list to be called today. They did receive prescription. Informed pt of such. Pt v/u. Chelsea Abbott RN

## 2022-09-23 ENCOUNTER — TELEPHONE (OUTPATIENT)
Dept: ONCOLOGY | Facility: CLINIC | Age: 37
End: 2022-09-23

## 2022-09-23 NOTE — TELEPHONE ENCOUNTER
Returned call to patient who is stating that she has not gotten a phone call from [facility] regarding the cream that Dr. Palacios sent in for the patient.  I gave her the direct phone number for [facility], since Kaykay had spoken to them on Tuesday of this week.  Patient will call them and if any problems, she will call our office back.

## 2022-09-26 ENCOUNTER — APPOINTMENT (OUTPATIENT)
Dept: ONCOLOGY | Facility: HOSPITAL | Age: 37
End: 2022-09-26

## 2022-10-03 ENCOUNTER — LAB (OUTPATIENT)
Dept: LAB | Facility: HOSPITAL | Age: 37
End: 2022-10-03

## 2022-10-03 ENCOUNTER — OFFICE VISIT (OUTPATIENT)
Dept: ONCOLOGY | Facility: CLINIC | Age: 37
End: 2022-10-03

## 2022-10-03 ENCOUNTER — INFUSION (OUTPATIENT)
Dept: ONCOLOGY | Facility: HOSPITAL | Age: 37
End: 2022-10-03

## 2022-10-03 VITALS
OXYGEN SATURATION: 97 % | HEIGHT: 61 IN | DIASTOLIC BLOOD PRESSURE: 60 MMHG | TEMPERATURE: 97.7 F | SYSTOLIC BLOOD PRESSURE: 97 MMHG | HEART RATE: 80 BPM | BODY MASS INDEX: 22.39 KG/M2 | WEIGHT: 118.6 LBS

## 2022-10-03 DIAGNOSIS — D70.1 LEUKOPENIA DUE TO ANTINEOPLASTIC CHEMOTHERAPY: ICD-10-CM

## 2022-10-03 DIAGNOSIS — D69.59 CHEMOTHERAPY-INDUCED THROMBOCYTOPENIA: ICD-10-CM

## 2022-10-03 DIAGNOSIS — T45.1X5A LEUKOPENIA DUE TO ANTINEOPLASTIC CHEMOTHERAPY: ICD-10-CM

## 2022-10-03 DIAGNOSIS — K21.00 PEPTIC REFLUX ESOPHAGITIS: ICD-10-CM

## 2022-10-03 DIAGNOSIS — T45.1X5A CHEMOTHERAPY-INDUCED NEUROPATHY: ICD-10-CM

## 2022-10-03 DIAGNOSIS — D64.81 ANEMIA ASSOCIATED WITH CHEMOTHERAPY: ICD-10-CM

## 2022-10-03 DIAGNOSIS — D64.89 ANEMIA DUE TO OTHER CAUSE, NOT CLASSIFIED: ICD-10-CM

## 2022-10-03 DIAGNOSIS — Z17.0 MALIGNANT NEOPLASM OF UPPER-OUTER QUADRANT OF LEFT BREAST IN FEMALE, ESTROGEN RECEPTOR POSITIVE: Primary | ICD-10-CM

## 2022-10-03 DIAGNOSIS — T45.1X5A CHEMOTHERAPY-INDUCED THROMBOCYTOPENIA: ICD-10-CM

## 2022-10-03 DIAGNOSIS — C50.412 MALIGNANT NEOPLASM OF UPPER-OUTER QUADRANT OF LEFT BREAST IN FEMALE, ESTROGEN RECEPTOR POSITIVE: Primary | ICD-10-CM

## 2022-10-03 DIAGNOSIS — Z45.2 FITTING AND ADJUSTMENT OF VASCULAR CATHETER: ICD-10-CM

## 2022-10-03 DIAGNOSIS — R53.83 CHEMOTHERAPY-INDUCED FATIGUE: ICD-10-CM

## 2022-10-03 DIAGNOSIS — G62.0 NEUROPATHY DUE TO CHEMOTHERAPEUTIC DRUG: ICD-10-CM

## 2022-10-03 DIAGNOSIS — T45.1X5A ANEMIA ASSOCIATED WITH CHEMOTHERAPY: ICD-10-CM

## 2022-10-03 DIAGNOSIS — T45.1X5A CHEMOTHERAPY INDUCED NAUSEA AND VOMITING: ICD-10-CM

## 2022-10-03 DIAGNOSIS — T45.1X5A NEUROPATHY DUE TO CHEMOTHERAPEUTIC DRUG: ICD-10-CM

## 2022-10-03 DIAGNOSIS — R11.2 CHEMOTHERAPY INDUCED NAUSEA AND VOMITING: ICD-10-CM

## 2022-10-03 DIAGNOSIS — T45.1X5A CHEMOTHERAPY-INDUCED FATIGUE: ICD-10-CM

## 2022-10-03 DIAGNOSIS — R04.0 ANTERIOR EPISTAXIS: ICD-10-CM

## 2022-10-03 DIAGNOSIS — G62.0 CHEMOTHERAPY-INDUCED NEUROPATHY: ICD-10-CM

## 2022-10-03 DIAGNOSIS — Z79.899 HIGH RISK MEDICATION USE: ICD-10-CM

## 2022-10-03 DIAGNOSIS — C50.412 MALIGNANT NEOPLASM OF UPPER-OUTER QUADRANT OF LEFT BREAST IN FEMALE, ESTROGEN RECEPTOR POSITIVE: ICD-10-CM

## 2022-10-03 DIAGNOSIS — Z17.0 MALIGNANT NEOPLASM OF UPPER-OUTER QUADRANT OF LEFT BREAST IN FEMALE, ESTROGEN RECEPTOR POSITIVE: ICD-10-CM

## 2022-10-03 LAB
ALBUMIN SERPL-MCNC: 4.6 G/DL (ref 3.5–5.2)
ALBUMIN SERPL-MCNC: 4.6 G/DL (ref 3.5–5.2)
ALBUMIN/GLOB SERPL: 1.6 G/DL (ref 1.1–2.4)
ALBUMIN/GLOB SERPL: 1.6 G/DL (ref 1.1–2.4)
ALP SERPL-CCNC: 76 U/L (ref 38–116)
ALP SERPL-CCNC: 79 U/L (ref 38–116)
ALT SERPL W P-5'-P-CCNC: 18 U/L (ref 0–33)
ALT SERPL W P-5'-P-CCNC: 19 U/L (ref 0–33)
ANION GAP SERPL CALCULATED.3IONS-SCNC: 11.5 MMOL/L (ref 5–15)
ANION GAP SERPL CALCULATED.3IONS-SCNC: 12.3 MMOL/L (ref 5–15)
AST SERPL-CCNC: 23 U/L (ref 0–32)
AST SERPL-CCNC: 23 U/L (ref 0–32)
BASOPHILS # BLD AUTO: 0.03 10*3/MM3 (ref 0–0.2)
BASOPHILS NFR BLD AUTO: 0.8 % (ref 0–1.5)
BILIRUB SERPL-MCNC: 0.2 MG/DL (ref 0.2–1.2)
BILIRUB SERPL-MCNC: 0.2 MG/DL (ref 0.2–1.2)
BUN SERPL-MCNC: 15 MG/DL (ref 6–20)
BUN SERPL-MCNC: 16 MG/DL (ref 6–20)
BUN/CREAT SERPL: 25 (ref 7.3–30)
BUN/CREAT SERPL: 26.3 (ref 7.3–30)
CALCIUM SPEC-SCNC: 10 MG/DL (ref 8.5–10.2)
CALCIUM SPEC-SCNC: 9.8 MG/DL (ref 8.5–10.2)
CHLORIDE SERPL-SCNC: 102 MMOL/L (ref 98–107)
CHLORIDE SERPL-SCNC: 103 MMOL/L (ref 98–107)
CO2 SERPL-SCNC: 23.5 MMOL/L (ref 22–29)
CO2 SERPL-SCNC: 24.7 MMOL/L (ref 22–29)
CREAT SERPL-MCNC: 0.57 MG/DL (ref 0.6–1.1)
CREAT SERPL-MCNC: 0.64 MG/DL (ref 0.6–1.1)
DEPRECATED RDW RBC AUTO: 41 FL (ref 37–54)
EGFRCR SERPLBLD CKD-EPI 2021: 116.2 ML/MIN/1.73
EGFRCR SERPLBLD CKD-EPI 2021: 119.5 ML/MIN/1.73
EOSINOPHIL # BLD AUTO: 0.24 10*3/MM3 (ref 0–0.4)
EOSINOPHIL NFR BLD AUTO: 6.2 % (ref 0.3–6.2)
ERYTHROCYTE [DISTWIDTH] IN BLOOD BY AUTOMATED COUNT: 12.3 % (ref 12.3–15.4)
ESTRADIOL SERPL HS-MCNC: <5 PG/ML
FERRITIN SERPL-MCNC: 49.4 NG/ML (ref 11–207)
FSH SERPL-ACNC: 105 MIU/ML
GLOBULIN UR ELPH-MCNC: 2.8 GM/DL (ref 1.8–3.5)
GLOBULIN UR ELPH-MCNC: 2.9 GM/DL (ref 1.8–3.5)
GLUCOSE SERPL-MCNC: 109 MG/DL (ref 74–124)
GLUCOSE SERPL-MCNC: 89 MG/DL (ref 74–124)
HCT VFR BLD AUTO: 35.9 % (ref 34–46.6)
HGB BLD-MCNC: 11.9 G/DL (ref 12–15.9)
HGB RETIC QN AUTO: 34.7 PG (ref 29.8–36.1)
IMM GRANULOCYTES # BLD AUTO: 0.01 10*3/MM3 (ref 0–0.05)
IMM GRANULOCYTES NFR BLD AUTO: 0.3 % (ref 0–0.5)
IMM RETICS NFR: 6.7 % (ref 3–15.8)
IRON 24H UR-MRATE: 73 MCG/DL (ref 37–145)
IRON SATN MFR SERPL: 18 % (ref 14–48)
LH SERPL-ACNC: 49.1 MIU/ML
LYMPHOCYTES # BLD AUTO: 0.95 10*3/MM3 (ref 0.7–3.1)
LYMPHOCYTES NFR BLD AUTO: 24.7 % (ref 19.6–45.3)
MCH RBC QN AUTO: 30.1 PG (ref 26.6–33)
MCHC RBC AUTO-ENTMCNC: 33.1 G/DL (ref 31.5–35.7)
MCV RBC AUTO: 90.9 FL (ref 79–97)
MONOCYTES # BLD AUTO: 0.36 10*3/MM3 (ref 0.1–0.9)
MONOCYTES NFR BLD AUTO: 9.4 % (ref 5–12)
NEUTROPHILS NFR BLD AUTO: 2.26 10*3/MM3 (ref 1.7–7)
NEUTROPHILS NFR BLD AUTO: 58.6 % (ref 42.7–76)
NRBC BLD AUTO-RTO: 0 /100 WBC (ref 0–0.2)
PLATELET # BLD AUTO: 208 10*3/MM3 (ref 140–450)
PMV BLD AUTO: 10.3 FL (ref 6–12)
POTASSIUM SERPL-SCNC: 4 MMOL/L (ref 3.5–4.7)
POTASSIUM SERPL-SCNC: 4.1 MMOL/L (ref 3.5–4.7)
PROT SERPL-MCNC: 7.4 G/DL (ref 6.3–8)
PROT SERPL-MCNC: 7.5 G/DL (ref 6.3–8)
RBC # BLD AUTO: 3.95 10*6/MM3 (ref 3.77–5.28)
RETICS # AUTO: 0.06 10*6/MM3 (ref 0.02–0.13)
RETICS/RBC NFR AUTO: 1.5 % (ref 0.7–1.9)
SODIUM SERPL-SCNC: 138 MMOL/L (ref 134–145)
SODIUM SERPL-SCNC: 139 MMOL/L (ref 134–145)
TIBC SERPL-MCNC: 399 MCG/DL (ref 249–505)
TRANSFERRIN SERPL-MCNC: 285 MG/DL (ref 200–360)
TSH SERPL DL<=0.05 MIU/L-ACNC: 0.91 UIU/ML (ref 0.27–4.2)
WBC NRBC COR # BLD: 3.85 10*3/MM3 (ref 3.4–10.8)

## 2022-10-03 PROCEDURE — 85046 RETICYTE/HGB CONCENTRATE: CPT | Performed by: INTERNAL MEDICINE

## 2022-10-03 PROCEDURE — 82728 ASSAY OF FERRITIN: CPT | Performed by: INTERNAL MEDICINE

## 2022-10-03 PROCEDURE — 83001 ASSAY OF GONADOTROPIN (FSH): CPT | Performed by: INTERNAL MEDICINE

## 2022-10-03 PROCEDURE — 84466 ASSAY OF TRANSFERRIN: CPT | Performed by: INTERNAL MEDICINE

## 2022-10-03 PROCEDURE — 99214 OFFICE O/P EST MOD 30 MIN: CPT | Performed by: INTERNAL MEDICINE

## 2022-10-03 PROCEDURE — 83540 ASSAY OF IRON: CPT | Performed by: INTERNAL MEDICINE

## 2022-10-03 PROCEDURE — 80053 COMPREHEN METABOLIC PANEL: CPT

## 2022-10-03 PROCEDURE — 82670 ASSAY OF TOTAL ESTRADIOL: CPT | Performed by: INTERNAL MEDICINE

## 2022-10-03 PROCEDURE — 80050 GENERAL HEALTH PANEL: CPT

## 2022-10-03 PROCEDURE — 25010000002 HEPARIN LOCK FLUSH PER 10 UNITS: Performed by: INTERNAL MEDICINE

## 2022-10-03 PROCEDURE — 83002 ASSAY OF GONADOTROPIN (LH): CPT | Performed by: INTERNAL MEDICINE

## 2022-10-03 PROCEDURE — 36591 DRAW BLOOD OFF VENOUS DEVICE: CPT

## 2022-10-03 PROCEDURE — 36415 COLL VENOUS BLD VENIPUNCTURE: CPT

## 2022-10-03 RX ORDER — SODIUM CHLORIDE 0.9 % (FLUSH) 0.9 %
10 SYRINGE (ML) INJECTION AS NEEDED
Status: CANCELLED | OUTPATIENT
Start: 2022-10-03

## 2022-10-03 RX ORDER — SODIUM CHLORIDE 0.9 % (FLUSH) 0.9 %
10 SYRINGE (ML) INJECTION AS NEEDED
Status: DISCONTINUED | OUTPATIENT
Start: 2022-10-03 | End: 2022-10-03 | Stop reason: HOSPADM

## 2022-10-03 RX ORDER — HEPARIN SODIUM (PORCINE) LOCK FLUSH IV SOLN 100 UNIT/ML 100 UNIT/ML
500 SOLUTION INTRAVENOUS AS NEEDED
Status: DISCONTINUED | OUTPATIENT
Start: 2022-10-03 | End: 2022-10-03 | Stop reason: HOSPADM

## 2022-10-03 RX ORDER — HEPARIN SODIUM (PORCINE) LOCK FLUSH IV SOLN 100 UNIT/ML 100 UNIT/ML
500 SOLUTION INTRAVENOUS AS NEEDED
Status: CANCELLED | OUTPATIENT
Start: 2022-10-03

## 2022-10-03 RX ADMIN — Medication 500 UNITS: at 10:08

## 2022-10-03 RX ADMIN — Medication 10 ML: at 10:08

## 2022-10-03 NOTE — PROGRESS NOTES
Subjective       DURING THE VISIT WITH THE PATIENT TODAY , PATIENT HAD FACE MASK, MY MEDICAL ASSISTANT AND I  HAD PROPPER PROTECTIVE EQUIPMENT, AND I DID HAND HYGIENE WITH SOAP AND WATER BEFORE AND AFTER THE VISIT.     REASON FOR FOLLOW UP:  LOBULAR CARCINOMA OF THE LEFT BREAST, 70 MM IN SIZE, MARGINS OF RESECTION NEGATIVE AFTER MASTECTOMY, GRADE 2, ER POSITIVE IL POSITIVE, HER 2 NEGATIVE BY IHC,KI 67 39% ,ONE POSITIVE AXILLARY LYMPH NODE OUT OF 4 NODES REMOVAL, PREMENOPAUSAL .INVITAE PANEL NEGATIVE.ONCOTYPE 18        HISTORY OF PRESENT ILLNESS:      On 10/03/2022 I had the opportunity to see this 37-year-old  female who is undergoing therapy for her lobular breast cancer and is here today after we decided to stop the Taxol chemotherapy administration given the progressive degree of peripheral neuropathy in her hands and her feet that was interfering with her normal activities.  The patient could not walk because the pain and burning sensation in her feet and the utilization of her hands was limited including washing dishes and so forth.  Since the discontinuation of the medicine it has made a big difference in her even though she still has some difficulty walking it is a lot better than before and the utilization of her hands is not a problem anymore.  Besides this, the patient feels well.  Her appetite is good.  Her bowel activity is normal.  Urination is normal.  She has not had any further menstrual flow since June of this year.  She has not had any skeletal pain.  No cardiovascular or respiratory symptomatology.  She still feels fatigued and tired though.  She is experiencing some hot flashes and she has a fog in her brain with difficulty with memory and concentration.                ONCOLOGIC HISTORY:  The patient is a 37 y.o. year old female who is here for an opinion about the above issue.  I had the opportunity to see this delightful [place] female, 37 years old, mother of 2, who is premenopausal  who has found abnormalities in the left breast since 12/2021, having sensation of fullness in the breast and discomfort and occasional pain. After this complaint she was seen by her primary physician and she initiated a process of mammograms and ultrasounds that culminated with the diagnosis of lobular carcinoma of the breast, invasive, 7 cm in size. The patient completed a mastectomy and sentinel lymph nodes almost 3 weeks ago and she is here for review and advice in regard to adjuvant therapy. The patient is still having pain at the surgical site, especially since yesterday she had injection of saline in the expander that was placed after the mastectomy. She also has neuropathic pain in the inner aspect of the left arm that is bothering her a lot through the day and sometimes almost driving her crazy. She has no motor deficit into the left upper extremity. She denies any fevers or chills or bone pain. Her appetite is acceptable but she has changed her diet and she is almost going into vegetarian with the exception of some fish. She has eliminated all the dairy products and she has eliminated mostly carbohydrates as well. She has lost some weight. The patient otherwise feels well. She has normal bowel activity, normal urination. No skeletal pain or joint pain. No cough, sputum production, shortness of breath or palpitations. She is extremely anxious about this visit today. She is in company of her .    • The patient returned to the office on 05/20/2022 in preparation for initiation of chemotherapy. In the meantime she has undergone a port placement in right subclavian position with no difficulties and an echocardiogram that will be discussed below. The patient has had very significant upper respiratory allergies. She has been tested this week for COVID being negative. She is not running any fever. Most of the symptoms in the respiratory tract include sneezing, itching, itchy eyes and rhinorrhea. No sore  throat, no alteration in taste or smell. No cough, shortness of breath, pleuritic pain or hemoptysis.     The surgical site from the left breast expander is not painful. She has healed extremely well. The same is applicable to her port that was placed a week ago.  • The patient returned to the office on 05/26/2022 for an interim assessment. Since the chemotherapy administration last week the patient had persistent nausea and vomiting at least for 4 days to the point that she spent most of the time sleeping because of the effect of the Zyprexa but no resolution of her nausea and vomiting. She has vomited so much that now she has significant esophagitis with heartburn and indigestion. She has not had any hematemesis or melena. She just started to eat yesterday rice and potatoes. She was able to handle this and she has been able to handle liquids. She also has had discoloration of the urine that originally was pink and subsequently now has normalized. She had no burning sensation upon urination or vaginal bleeding. She has been very fatigued and tired. On top of that Neulasta triggers significant pain in the cranium, in the pelvic bone and in her femurs and she has the feeling that she has the flu. Again, she has not had any fever. Today is the best day that she has had since the chemotherapy administration. Obviously given the symptoms and this toxicity assessment, the patient will require radical change in her treatment as will be described below.  On 06/23/2022, the patient returns after she has completed 2 cycles of AC. The first cycle was dramatically cumbersome because of the tremendous amount of side effects including persistent nausea and vomiting after chemotherapy that required IV fluids and modification in her nausea regimen. She also developed neutropenic fever and ended up in the hospital. After 2nd cycle with dose adjustment the patient handled the treatment much better and the miracle medicine in regard  to nausea control was Ativan. Phenergan and Compazine triggered tremendous degree of confusion. Therefore, for the subsequent cycle 3 and 4, Ativan will be the main medicine to utilize. Zofran also will be utilized.     Physically the patient feels much better today. The patient has had appetite to eat. The heartburn and indigestion have mostly subsided. Her nutrition and hydration are much better. She is keeping her weight and she has no pain. Bowel activity with occasional constipation. Urination is normal. No cardiovascular or respiratory issues. Energy level much better.     She had significant pain with Neulasta use 4 days after the treatment requiring Zyrtec and significant Tylenol utilization.  •   This patient has been seen by video medicine visit on 09/12/2022 in regard to continue her chemotherapy administration in the adjuvant therapy of her breast cancer on the left. The patient at this time is undergoing weekly Taxol. Unfortunately she has developed further peripheral neuropathy in the tips of her fingers and her toes to the point that she feels sensation of itching more than pain all of the time and this is disrupting her life and her functionality very substantially. She has to use ice that gives her some relief of the symptom at least 4-6 times a day. It takes at least 10 minutes for the symptoms to improve but they come back in a question of hours. The patient is not having any difficulty with ambulation but the more walking that she does the more discomfort that she develops. The same is applicable to her hands. Her appetite has improved, she has gained weight, she has some degree of constipation associated with chemotherapy administration relieved with flax seed oil. Her bowel function now is normal, urination is normal. She has not had any menstrual flow since 06/2022. She has not had any nausea or vomiting. No cardiovascular or respiratory issues.     The patient otherwise has not had any new  problems. No fever or infections. She has had resolution of her epistaxis.   •   •   ·     Past Medical History:   Diagnosis Date   • Anemia    • Anesthesia complication     hypotension with last C section   • Breast cancer (HCC) 2022    left sided, stage 3, ER/CT+, Her2-, s/p mastectomy with plans for AC/Taxol and radiation   • History of COVID-19     2021 AND 2022   • Hyperlipidemia    • PONV (postoperative nausea and vomiting)    • Seasonal allergies         Past Surgical History:   Procedure Laterality Date   • APPENDECTOMY     • BREAST BIOPSY Left    • BREAST RECONSTRUCTION Left 2022    Procedure: LEFT BREAST 1ST STAGE RECONSTRUCTION WITH INSERTION OF TISSUE EXPANDER AND BIOLOGIC;  Surgeon: Hermelinda Johnson MD;  Location: [facility] OR;  Service: Plastics;  Laterality: Left;   •  SECTION     •  SECTION N/A 12/10/2018    Procedure:  SECTION REPEAT;  Surgeon: Heidy Gutierrez MD;  Location: [facility] LABOR DELIVERY;  Service: Obstetrics/Gynecology   • D & C HYSTEROSCOPY N/A 2021    Procedure: HYSTEROSCOPY REMOVAL INTRAUTERINE DEVICE WILL NEED ULTRASOUND IN ROOM;  Surgeon: Heidy Gutierrez MD;  Location: [facility] OSC;  Service: Obstetrics/Gynecology;  Laterality: N/A;   • KNEE ARTHROSCOPY Right    • MASTECTOMY W/ SENTINEL NODE BIOPSY Left 2022    Procedure: left total mastectomy with left axillary sentinel lymph node biopsy;  Surgeon: Monisha Escudero MD;  Location: [facility];  Service: General;  Laterality: Left;   • VENOUS ACCESS DEVICE (PORT) INSERTION Right 2022    Procedure: right port placement;  Surgeon: Monisha Escudero MD;  Location: [facility];  Service: General;  Laterality: Right;        Current Outpatient Medications on File Prior to Visit   Medication Sig Dispense Refill   • B Complex Vitamins (Vitamin-B Complex) tablet Take 1 tablet by mouth Daily.  0   • Cetirizine HCl (ZYRTEC PO) Take  by mouth Daily.     •  "omeprazole (priLOSEC) 20 MG capsule Take 1 capsule by mouth 2 (Two) Times a Day. 60 capsule 3   • ondansetron (Zofran) 8 MG tablet Take 1 tablet by mouth Every 8 (Eight) Hours As Needed for Nausea or Vomiting. 30 tablet 5   • LORazepam (ATIVAN) 0.5 MG tablet Take 1 tablet by mouth Every 12 (Twelve) Hours As Needed (Nausea). To be taken every 12 hours as needed for up to one week after chemotherapy 28 tablet 0   • prochlorperazine (COMPAZINE) 10 MG tablet Take 1 tablet by mouth Every 6 (Six) Hours As Needed for Nausea or Vomiting. 30 tablet 5     No current facility-administered medications on file prior to visit.        ALLERGIES:    Allergies   Allergen Reactions   • Asa [Aspirin] Anaphylaxis and Shortness Of Breath        Social History     Socioeconomic History   • Marital status:    Tobacco Use   • Smoking status: Never Smoker   • Smokeless tobacco: Never Used   Vaping Use   • Vaping Use: Never used   Substance and Sexual Activity   • Alcohol use: Not Currently     Alcohol/week: 5.0 standard drinks     Types: 5 Shots of liquor per week   • Drug use: Never   • Sexual activity: Defer        Family History   Problem Relation Age of Onset   • Melanoma Paternal Aunt    • Diabetes Other    • Malig Hyperthermia Neg Hx           Objective     Vitals:    10/03/22 0842   BP: 97/60   Pulse: 80   Temp: 97.7 °F (36.5 °C)   TempSrc: Temporal   SpO2: 97%   Weight: 53.8 kg (118 lb 9.6 oz)   Height: 154.9 cm (60.98\")   PainSc: 0-No pain     Current Status 10/3/2022   ECOG score 0       Physical Exam              GENERAL:  Well-developed, well-nourished  Patient  in no acute distress.   SKIN:  Warm, dry ,NO purpura ,no rash.  HEENT:  Pupils were equal and reactive to light and accomodation, conjunctivae noninjected, normal extraocular movements, normal visual acuity.   NECK:  Supple with good range of motion; no thyromegaly , no JVD or bruits,.No carotid artery pain, no carotid abnormal pulsation   LYMPHATICS:  No " cervical, NO supraclavicular, NO axillary, NO inguinal adenopathies.  CARDIAC   normal rate , regular rhythm, without murmur,NO rubs NO S3 NO S4   LUNGS: normal breath sounds bilateral, no wheezing, NO rhonchi, NO crackles ,NO rubs.  VASCULAR VENOUS: no cyanosis, NO collateral circulation, NO varicosities, NO edema, NO palpable cords, NO pain,NO erythema, NO pigmentation of the skin.  ABDOMEN:  Soft, NO pain,no hepatomegaly, no splenomegaly,no masses, no ascites, no collateral circulation,no distention.  EXTREMITIES  AND SPINE:  No clubbing, no cyanosis ,no deformities , no pain .No kyphosis,  no pain in spine, no pain in ribs , no pain in pelvic bone.  NEUROLOGICAL:  Patient was awake, alert, oriented to time, person and place.A detailed neurological exam disclosed normal strength in upper and lower extremities.  Normal ability to walk.   was 3+ in her hands.  Careful attention to sensory modalities pinprick and proprioception was normal.  She has normal vibratory sensation and she had no areas of numbness in her fingers or her toes or legs or hands whatsoever.  She had normal reflexes 2+ throughout, bicipital, tricipital, patellar and Achilles. Temperature discrimination was normal in upper and lower extremities.                  RECENT LABS:  Hematology WBC   Date Value Ref Range Status   10/03/2022 3.85 3.40 - 10.80 10*3/mm3 Final     RBC   Date Value Ref Range Status   10/03/2022 3.95 3.77 - 5.28 10*6/mm3 Final     Hemoglobin   Date Value Ref Range Status   10/03/2022 11.9 (L) 12.0 - 15.9 g/dL Final     Hematocrit   Date Value Ref Range Status   10/03/2022 35.9 34.0 - 46.6 % Final     Platelets   Date Value Ref Range Status   10/03/2022 208 140 - 450 10*3/mm3 Final       CBC:    WBC   Date Value Ref Range Status   10/03/2022 3.85 3.40 - 10.80 10*3/mm3 Final     RBC   Date Value Ref Range Status   10/03/2022 3.95 3.77 - 5.28 10*6/mm3 Final     Hemoglobin   Date Value Ref Range Status   10/03/2022 11.9 (L)  12.0 - 15.9 g/dL Final     Hematocrit   Date Value Ref Range Status   10/03/2022 35.9 34.0 - 46.6 % Final     MCV   Date Value Ref Range Status   10/03/2022 90.9 79.0 - 97.0 fL Final     MCH   Date Value Ref Range Status   10/03/2022 30.1 26.6 - 33.0 pg Final     MCHC   Date Value Ref Range Status   10/03/2022 33.1 31.5 - 35.7 g/dL Final     RDW   Date Value Ref Range Status   10/03/2022 12.3 12.3 - 15.4 % Final     RDW-SD   Date Value Ref Range Status   10/03/2022 41.0 37.0 - 54.0 fl Final     MPV   Date Value Ref Range Status   10/03/2022 10.3 6.0 - 12.0 fL Final     Platelets   Date Value Ref Range Status   10/03/2022 208 140 - 450 10*3/mm3 Final     Neutrophil %   Date Value Ref Range Status   10/03/2022 58.6 42.7 - 76.0 % Final     Lymphocyte %   Date Value Ref Range Status   10/03/2022 24.7 19.6 - 45.3 % Final     Monocyte %   Date Value Ref Range Status   10/03/2022 9.4 5.0 - 12.0 % Final     Eosinophil %   Date Value Ref Range Status   10/03/2022 6.2 0.3 - 6.2 % Final     Basophil %   Date Value Ref Range Status   10/03/2022 0.8 0.0 - 1.5 % Final     Immature Grans %   Date Value Ref Range Status   10/03/2022 0.3 0.0 - 0.5 % Final     Neutrophils, Absolute   Date Value Ref Range Status   10/03/2022 2.26 1.70 - 7.00 10*3/mm3 Final     Lymphocytes, Absolute   Date Value Ref Range Status   10/03/2022 0.95 0.70 - 3.10 10*3/mm3 Final     Monocytes, Absolute   Date Value Ref Range Status   10/03/2022 0.36 0.10 - 0.90 10*3/mm3 Final     Eosinophils, Absolute   Date Value Ref Range Status   10/03/2022 0.24 0.00 - 0.40 10*3/mm3 Final     Basophils, Absolute   Date Value Ref Range Status   10/03/2022 0.03 0.00 - 0.20 10*3/mm3 Final     Immature Grans, Absolute   Date Value Ref Range Status   10/03/2022 0.01 0.00 - 0.05 10*3/mm3 Final     nRBC   Date Value Ref Range Status   10/03/2022 0.0 0.0 - 0.2 /100 WBC Final        CMP:    Glucose   Date Value Ref Range Status   09/12/2022 90 74 - 124 mg/dL Final     BUN   Date  Value Ref Range Status   09/12/2022 16 6 - 20 mg/dL Final     Creatinine   Date Value Ref Range Status   09/12/2022 0.56 (L) 0.60 - 1.10 mg/dL Final     Sodium   Date Value Ref Range Status   09/12/2022 139 134 - 145 mmol/L Final     Potassium   Date Value Ref Range Status   09/12/2022 4.0 3.5 - 4.7 mmol/L Final     Chloride   Date Value Ref Range Status   09/12/2022 104 98 - 107 mmol/L Final     CO2   Date Value Ref Range Status   09/12/2022 23.0 22.0 - 29.0 mmol/L Final     Calcium   Date Value Ref Range Status   09/12/2022 9.5 8.5 - 10.2 mg/dL Final     Total Protein   Date Value Ref Range Status   09/12/2022 7.0 6.3 - 8.0 g/dL Final     Albumin   Date Value Ref Range Status   09/12/2022 4.40 3.50 - 5.20 g/dL Final     ALT (SGPT)   Date Value Ref Range Status   09/12/2022 22 0 - 33 U/L Final     AST (SGOT)   Date Value Ref Range Status   09/12/2022 21 0 - 32 U/L Final     Alkaline Phosphatase   Date Value Ref Range Status   09/12/2022 81 38 - 116 U/L Final     Total Bilirubin   Date Value Ref Range Status   09/12/2022 0.2 0.2 - 1.2 mg/dL Final     Globulin   Date Value Ref Range Status   09/12/2022 2.6 1.8 - 3.5 gm/dL Final     A/G Ratio   Date Value Ref Range Status   09/12/2022 1.7 1.1 - 2.4 g/dL Final     BUN/Creatinine Ratio   Date Value Ref Range Status   09/12/2022 28.6 7.3 - 30.0 Final     Anion Gap   Date Value Ref Range Status   09/12/2022 12.0 5.0 - 15.0 mmol/L Final                     Assessment & Plan     Diagnoses and all orders for this visit:    1. Malignant neoplasm of upper-outer quadrant of left breast in female, estrogen receptor positive (HCC) (Primary)    2. Peptic reflux esophagitis    3. Chemotherapy induced nausea and vomiting    4. Leukopenia due to antineoplastic chemotherapy (HCC)    5. Anemia associated with chemotherapy    6. Chemotherapy-induced thrombocytopenia    7. Neuropathy due to chemotherapeutic drug (HCC)       In summary this 37-year-old  female originally from  [place] noticed abnormalities in the left breast in 12/2021, sensation of pressure, minor pain, sensation of fullness. She looked for help. Further mammogram and ultrasound documented abnormalities in the left breast that were consistent with breast cancer. Since then the patient has undergone breast MRI. Breast biopsies documented invasive lobular carcinoma. All this culminated with a left-sided mastectomy and sentinel lymph node sampling. The pathology has been posted above. She had a lobular carcinoma, grade 2, margins of resection negative, Ki-67 at 39%, ER positive, ID positive, HER2/leonie negative. She had 1 positive sentinel lymph node for malignancy with no extracapsular invasion. There was no evidence of lymphovascular invasion.     Invitae panel was negative for any genetic alteration.     The patient has had an expander placed at the mastectomy site. She has discomfort and pain after this was instillated with saline solution yesterday.     The other phenomenon that is happening to the patient is significant pain and discomfort in the inner aspect of the left arm. Sounds neuropathic related to her recent sentinel lymph node sampling. The patient is taking Aleve or ibuprofen with not too much benefit.     The patient also has a minimal component of anemia that will require assessment with ferritin, iron, TIBC, B12, folic acid levels.     RECOMMENDATIONS:  I have advised this patient and  today that she needs to receive adjuvant chemotherapy in the form of AC x4 and subsequently Taxol x12. Oncotype DX in this patient shows intermediate risk of recurrence. Also not only the chemotherapy will be necessary but we need to make her postmenopausal in the next several weeks or months. This will bring in a lot of benefit for her in the long run to minimize any recurrent disease. She is extremely young. She has 2 young children and we have to do as much as we can in this patient to minimize any potentiality for  recurrent disease.     I discussed briefly with her side effects of the treatment including alopecia, cardiac toxicity, anemia, leukopenia, thrombocytopenia, mucositis among others and peripheral neuropathy in the Taxol part of the treatment along with leukopenia, anemia, thrombocytopenia and allergic reaction to the medicine. I discussed with her that the whole treatment is going to take at least 24-26 weeks, in other words 6 months, and thereafter the patient will be hopefully postmenopausal. If she does not become postmenopausal she could qualify for a clinical trial or we could ask Dr. Heidy Gutierrez to do bilateral oophorectomy in this patient.     Eventually the patient will require endocrine adjuvant therapy after she becomes postmenopausal hopefully with letrozole.     I discussed all these facts with the patient and her . I spent 1 hour and 30 minutes with her going through the details of all this but she was able to grasp a lot of information. I appreciated highly the visit with her  and I pointed out to them that they need to work as a team. The patient has 2 young children. She is the only one taking care of the kids. The patient's mother is living with her, coming from [place] after all these issues. I asked her to ask her mother to stay for the period of time that she receives chemotherapy. If we need to do any services for her in regard to visa requirements or status, we will be glad to incorporate our  into this process.     Finally, I discussed with the patient the need for her to proceed with cardiac assessment. I made a referral to be seen by Cardiology and scheduled her to have an echocardiogram and I sent her back to see Dr. Escudero in order to proceed with the placement of a port.     In order to control the pain in the inner aspect of her arm, I asked her to use Voltaren topically 4 times a day. It will be perfectly fine for her to use ibuprofen and I sent  Neurontin 100 mg to take at bedtime. Hopefully this will be a transitory issue.     Dr. Johnson eventually will be in charge of deciding the time for the patient to initiate physical therapy for her left shoulder.     I reviewed all the pertinent records on this patient and I posted the information out of the pathology and radiology as above. I discussed the case with my partner, Dr. Landeros, who agrees with my plan of care.    Finally, I also posted to the patient today that will require followup in the future in regard to her right breast not only with mammogram but also ultrasound. I discussed with her the fact that sometimes lobular carcinoma likes to be a bilateral disease. There are minimal abnormalities in the mammogram in the right breast as well as in the MRI. This will require to be followed up in the future. I did not recommend a 2nd mastectomy prophylactically on her even though she asked the question today.    •  The patient was reviewed on 2022. She has had her port placed in right subclavian position a week ago. She is healing perfectly fine and her surgical site on the left mastectomy and the expander looks perfectly normal with healing. No infection, erythema or discharge.     The patient since then has undergone formal education and consent for chemotherapy administration. Her echocardiogram even though technically difficult documented a normal left ventricular ejection fraction. No pericardial effusion and no valvular dysfunction.     Her white count, hemoglobin and platelets are normal today.     Her chemistry profile, CA 15-3 as well as ferritin, iron, TIBC, B12 and folic acid levels were all normal.     Therefore under the present circumstances I advised the patient and  the followin. For the treatment of her breast cancer the patient will initiate today chemotherapy with dose dense AC that she will receive every 2 weeks supported with Neulasta. The side effects of this already  were discussed with her including nausea, vomiting, alopecia, anemia, leukopenia, thrombocytopenia and cardiac dysfunction among others. The patient was ready to proceed.   2. The patient will be utilizing Neulasta On-body to minimize hematological toxicity and be able to keep up on the schedule in regard to her treatment. I advised her that this can produce significant bone pain in the sternum, in the rib cage, in the pelvic bone and she could use antihistaminic that she is already taking in the first place and also she can use either Aleve or Tylenol or a hot shower if necessary.   3. I encouraged her to remain on her nausea medicine, ondansetron, during the next 3 days to minimize any nausea or vomiting.   4. I advised her to stay away from smelling foods like hot soup or fried foods in the next 3 days given the significant increase in smell that patients receiving AC typically have. This will minimize the potential for nausea and vomiting.   5. The patient will require toxicity assessment in a week along with blood WORK CBC.   6. The patient once completes 4 cycles of AC will initiate weekly cycles of Taxol x12. Upon completion of that the patient will initiate a plan of radiation therapy and she already has been seen by Radiation Oncology.     The patient will participate in a clinical trial that we have in the office and she already has been consented for this in regard to the utilization of antiestrogen medicine upon completion of chemotherapy administration.     The patient will continue using her other medications on routine basis that include the Zyprexa and she will use the EMLA cream to minimize pain at the port site at the time of accessing.     Finally, I advised the patient and the  present in the room that they at any cost had to minimize the risk of pregnancy. In fact a pregnancy test today was negative. I advised them to use a condom.     The patient was ready to proceed.    All the  discussion of all these issues took place in [place].  • The patient returned to the office on 05/26/2022. Since the previous visit a week ago the patient had chemotherapy administration and she developed very significant nausea and vomiting nonstoppable for almost 5 days. Yesterday was the first day that she was able to have some rice and potatoes. Because she has thrown up so much she has developed significant heartburn and indigestion indicating probably bile gastritis, esophagitis and acid esophagitis. The patient has not had any hematemesis or melena. The other phenomenon that she had was tremendous somnolence due to the use of Zyprexa at the dose of 5 mg every night. She spent almost 4 days in bed sleepy but still with nausea and vomiting.     On top of things, she has very significant pain in her muscles, in her thighs, in her scalp and in her pelvic bone associated with Neulasta use. She is taking Tylenol for this with some improvement.     The patient has been able to reach hydration since yesterday, drinking proper liquids, and she is urinating okay. Therefore, the patient has multiple issues and on top of that she has now leukopenia associated with chemotherapy. The hemoglobin is stable. The platelet count has dropped but is not dramatically or risky for bleeding.     Given all the above circumstances I proposed her the following changes in regard to plan of chemotherapy for the next cycle.     1. We will modify her plan of chemotherapy, decreasing the doses for all the medications by 20%.   2. The patient will space out the chemotherapy treatments to every 3 weeks for the AC part of the treatment until completion of 4 cycles.   3. The patient will be supported with Neulasta injection. She will utilize Tylenol for pain.   4. The patient will continue using chemotherapy administration, anti-nausea medication and she will receive Emend on day 2 in the office and Emend on day 3 at home. Pharmacist will take  care of this issue.   5. The patient will decrease Zyprexa from 5 mg a day to 2.5 mg in the evening to minimize somnolence.   6. The patient was advised to use omeprazole at the dose of 20 mg twice a day to minimize esophageal irritation associated with so much nausea and vomiting.   7. The patient will require still laboratory parameters the week after each one of the cycles of chemotherapy.     I feel the obligation to make all these changes; otherwise this patient will quit her treatment and that is the worst thing that could happen under the present scenario. She agreed with all the changes made. I explained to her all these changes in [place] to make her life a little bit more comfortable and easy to handle. She understands at the time that she returns in 2 weeks she probably will start to develop alopecia and she already has discussed this with her mother who will help through the process of shaving her head.  On 06/23/2022, the patient had dramatic improvement in regard to nausea control after dose adjustment for the 2nd cycle of chemotherapy and also modification of the nausea regimen. We tried to deliver Compazine and Phenergan. These medicines were tremendously tolling on her in regard to confusion and inability to function. Ativan was the miracle medicine. Therefore for the next 2 cycles Zofran and Ativan will be the ideal combination and she will require also IV fluids on day 2 of each one of the next cycles of AC.     In regard to pain associated with Neulasta, she required Tylenol around-the-clock for 4 days after each one of the treatments and now she has no discomfort whatsoever.     Today her white count is normal, hemoglobin improved to 10.7, platelet count with minor decrease, no clinical bleeding.    The patient feels substantially better. She is able to function at home. She is able to be with her family. She is able to attend her children and she has been able to walk 2 miles a day late in the  evening. Encouraged her to continue doing this.     I suggested for her the followin. She will return in a week to proceed with cycle 3 of AC at the previous dose and supported with Neulasta.   2. She will be supported on day 2 with IV fluids and nausea medication.   3. We will discontinue Phenergan and/or Compazine and she will remain on Zofran and Ativan for nausea control.   4. She will continue PPI to minimize reflux symptomatology, still minimal problem but dramatically better than before.   5. She will use Milk of Magnesia for constipation. Another measure could be prunes and dates.     The patient will complete the total 4 cycles of AC and then will move into the Taxol arena. I pointed out to her that we will have the discussion about how to handle this medicine after the completion of the next 2 cycles.     She also raised the question in regard what to do upon completion of chemotherapy. I advised her that eventually she will have radiological assessment as a baseline and we will proceed with PATRICIA blood sampling for evaluation of minimal residual disease.     The patient was ready to proceed now that she feels dramatically better. What a difference it makes in regard to adjustment of medicines and trying to adjust nausea medication accordingly.  • The patient returned to the office on 2022. In regard to her chemotherapy treatment, she is ready to proceed with her 4th or last cycle of AC today. Her ANC is a little bit on the low side but the patient will be receiving Neulasta, therefore this will be taking care of this problem.     Her hemoglobin remains stable. This is anemia associated with chemotherapy administration. She also had a menstrual flow that was very abundant a few weeks ago. She is not taking any vitamins at this time and I have not advised to take any given concerning data about supplements in the background of adjuvant therapy for breast cancer.     In regard to the sensation  of numbness in the left upper extremity arm, I think this is natural after mastectomy more than expected and I pointed out to her that this does not signify anything and does not require any therapy. This is extremely common in many women and it has the tendency to go away spontaneously.     In regard to the abnormality in the left hand there is no evidence of lymphedema. I think she could have a minimal element of tendonitis. I advised her to use topical anti-inflammatory medication or ice the hand. She has not received any IV sites for blood draws in this area.     In regard to future visits, the patient would like to continue her chemotherapy treatments on Mondays because her children will be going back to school and she wants to have a weekend with them as much as she can. I find no problem seeing her in 3 weeks from now on Monday to proceed with initiation of Taxol. I pointed out to her that Taxol will be easier to handle a lower dose, less toxicity in regard to hematological parameters and hopefully no issues in regard to neuropathy.     I also advised her that we will continue monitoring the status of menstrual flow upon completion of chemotherapy. She still has 12 Taxol treatments to go and we do not know if the chemotherapy treatment is going to put her in menopause or not. Depending on the final analysis of this upon completion of chemotherapy and before initiation of hormonal therapy, we will need to do hormone levels.     Finally, the patient has had a lot of issues between her insurance company and the billing system at [facility]. I asked the billing system to give her a call and relate this to the insurance company. The specific question is about the cost and the utilization of PEGylated Neulasta or Neulasta On-body. Hopefully this will be happening to settle down the situation. Each one of these medicines have been billed in thousands of dollars and she already will complete 4 bills of this  medication. She is extremely anxious about this situation. This is called economic toxicity of chemotherapy administration and we need to influence this situation as well on her to give her the benefit of settling down once and for all.    This patient already has enough stressors on the plate to add another one that is economical stressor of her treatment.  • On 08/22/2022, the patient's white count, hemoglobin and platelets are stable. Tolerance to Taxol is much better than AC. She has some occasional nausea, some joint pain, some pruritus in the absence of any rash on her hands and her feet and I think this all is side effect of Taxol. I advised her to go ahead and proceed with her Taxol today and I advised her to go back to taking her Zyrtec on a regular basis to try to minimize any itching in her hands and her feet.     I also advised her to continue using Ativan that has been terrific in regard to control of nausea and vomiting and she can use 2-3 doses today and tomorrow to minimize the symptoms. It will be perfectly okay as well for her to take Tylenol tonight and tomorrow to minimize joint pain associated with Taxol utilization.     In regard to her epistaxis, it is very obvious that the nasal passages on the right side are different than the ones on the left. She has very significant deviation of the septum. This triggers airflow currents that are abnormal and capillarity in the anterior part of the septum that is very abnormal and is the source of bleeding. If this continues she will require referral for ENT for cauterization. Her platelet count is normal.     The patient has not had any menstrual flow now for 2 months and will continue asking this question because theoretically the chemotherapy should make her menopausal. Upon completion of Taxol we will need to do endocrine testing in this regard.     Overall I think the patient is doing much better than before and she will be advised to continue her  weekly Taxol along with weekly laboratory testing and weekly nurse practitioner visit and visit with me in 1 month.   • On 09/12/2022 the patient continues complaining of significant sensation of itching and discomfort in the tips of her fingers and her toes to the point that she has difficulty doing housework and difficulty walking with this. This is a different way of manifestation of toxicity associated with Taxol administration. She probably has a grade 2 peripheral neuropathy associated with this. The symptoms are not permanent, they come and go but this will obligate me to adjust her Taxol dose today by 25-30%. She has been receiving 120 mg of Taxol per dose. Her dose from now on will be 90 mg.   •   • The patient will receive a B12 injection today and I advised her to take B complex vitamin.  •   • In regard to her anemia associated with chemotherapy administration she was advised by my partner, Mich Agudelo MD, last week to take oral iron therapy. Unfortunately the patient became ill in regard to constipation and cramping and she has stopped the iron therapy.   •   • In regard to her leukopenia associated with chemotherapy administration her white count and ANC are appropriate today and she will be able to proceed with her Taxol administration today. She has not developed any fever or infection.   •   • In regard to her reflux symptomatology, nausea and vomiting the symptoms pertinent to his are resolved now that she is no longer receiving AC chemotherapy. She remains on Ativan and Compazine on prn basis and she remains on PPI that has corrected her acid issues altogether.  •   • In regard how to monitor her peripheral neuropathy I asked her to call my nurse, Justine Abbott on weekly basis, notify her of the status of her symptoms and if they get any worse or permanent I think we will need to discontinue the Taxol permanently and move into the next stage of therapy. She will require visit with me in 3  weeks.   •   • Duration of the video visit today 20 minutes.   •   On 10/03/2022 the patient came back to the office for follow up. Since the previous visit she called stating that she could not handle the peripheral neuropathy in her hands and her feet anymore because it is disrupting her lifestyle and keeping her from doing anything around the house and we discontinued the Taxol altogether. Since then she has had very significant recovery especially in her hands, still has discomfort in her feet and she is not able to walk long distances because of the stimulation of the activity in the plantar aspect of her feet triggers discomfort as well in the toes. A detailed neurological exam today disclosed no major abnormalities, temperature discrimination, pinprick vibration, reflexes, strength and so forth is very much unremarkable.    Today her white count, hemoglobin and platelets are normal.     From my point of view I advised the patient the following.     1. She has completed her plan of chemotherapy, we cannot push anymore Taxol on her given her sensory neuropathy that will become permanent.     2. I discussed with the patient options to make her postmenopausal either hormonal therapy goserelin for example or Lupron, another way to do this will be ovary removal. I have placed a phone call to talk with Heidy Gutierrez MD, the patient's Gynecologist to discuss this with her. My request to Dr. Gutierrez to perform bilateral oophorectomy. The patient is in agreement with that. This with the need for the patient to initiate at some point her adjuvant hormonal therapy if the patient is postmenopausal in the next visit after oophorectomy she will initiate letrozole therapy 2.5 mg a day for the time being. If the patient rejects the possibility of an oophorectomy she will require pharmacological removal of ovarian function and she will require also further adjuvant hormonal therapy.    3. Finally she already has been  seen by radiation oncology after her diagnosis. I made the referral for her to be seen by them because from my point of view she is ready for initiation of therapy at this point.     4. The patient also complains of fatigue. We will measure a TSH, ferritin, iron profile, reticulated hemoglobin today. We want to be sure that her iron stores and thyroid function are normal. I think this is leftover from chemotherapy administration.    5. The patient complains of foggy brain. Memory and ability to function is limited. It is very likely effect of chemotherapy administration on her. I asked her to trigger a lot of brain stimulation reading books, chatting with people, working on activities with the kids and doing more physical activity.     6. Port. The patient will require port maintenance for the time being. It will be flushed today and it will be reflushed again in 6 weeks when she returns.    7. I will continue monitoring laboratory parameters periodically.    8. I went ahead and obtained an estradiol, FSH and LH today. She has not had any menstrual flow since 06/2022.     •   •   •   •   •   ·

## 2022-10-12 ENCOUNTER — NURSE NAVIGATOR (OUTPATIENT)
Dept: OTHER | Facility: HOSPITAL | Age: 37
End: 2022-10-12

## 2022-10-12 ENCOUNTER — APPOINTMENT (OUTPATIENT)
Dept: RADIATION ONCOLOGY | Facility: HOSPITAL | Age: 37
End: 2022-10-12

## 2022-10-12 ENCOUNTER — OFFICE VISIT (OUTPATIENT)
Dept: RADIATION ONCOLOGY | Facility: HOSPITAL | Age: 37
End: 2022-10-12

## 2022-10-12 VITALS
OXYGEN SATURATION: 99 % | DIASTOLIC BLOOD PRESSURE: 69 MMHG | HEART RATE: 70 BPM | SYSTOLIC BLOOD PRESSURE: 104 MMHG | WEIGHT: 120 LBS | BODY MASS INDEX: 22.69 KG/M2

## 2022-10-12 DIAGNOSIS — C50.412 MALIGNANT NEOPLASM OF UPPER-OUTER QUADRANT OF LEFT BREAST IN FEMALE, ESTROGEN RECEPTOR POSITIVE: Primary | ICD-10-CM

## 2022-10-12 DIAGNOSIS — Z17.0 MALIGNANT NEOPLASM OF UPPER-OUTER QUADRANT OF LEFT BREAST IN FEMALE, ESTROGEN RECEPTOR POSITIVE: Primary | ICD-10-CM

## 2022-10-12 PROCEDURE — G0463 HOSPITAL OUTPT CLINIC VISIT: HCPCS

## 2022-10-12 PROCEDURE — 99214 OFFICE O/P EST MOD 30 MIN: CPT | Performed by: RADIOLOGY

## 2022-10-12 NOTE — PROGRESS NOTES
Nurse Navigator F/U Visit:  Met with patient and spouse along with Dr. Anguiano in radiation oncology center. C/O slight soreness left breast and some fatigue but overall she feels good. States tingling in her hands and feet are improving. She has completed chemotherapy and is ready to start XRT. Dr. Anguiano explained treatment regimen (Mon-Fri x5 weeks), outcomes and potential side effects (fatigue and skin changes). Patient and spouse verbalized understanding. She is scheduled tomorrow for simulation and will begin treatment on 10/24/22. No needs identified. Encouraged patient to call with any questions or concerns. Will continue to follow.....Sharon Ashley RN, Oncology Nurse Navigator

## 2022-10-12 NOTE — PROGRESS NOTES
FOLLOW-UP NOTE    Name: Maribell Encarnacion  YOB: 1985  MRN #: [id number]  Date of Service: 10/12/2022  Referring Provider: Chau Palacios MD  [address] [address]  Primary Care Provider: Natalie Berumen PA    DIAGNOSIS: [id number];  pT2N0(i+) ILC of the L breast, ER/ID+[id number]    REASON FOR VISIT: Post-chemo assessment       HISTORY OF PRESENT ILLNESS: The patient is a 37 y.o. year old female who was seen in consult on 05/12/22 by my colleague Dr. Lopes and recommended adjuvant L CW and comprehensive tahir XRT after completion of chemotherapy. They had questions about this recommendation and wanted to further discuss today.     Briefly, her medical history is for noticing a mass in her left breast in December 2021.  She had a bilateral diagnostic mammogram and ultrasound on 2/9/22 which showed a new area of architectural distortion in the upper outer left breast corresponding to palpable mass at 2:30, a new focal asymmetry just superior to this, and a thrid focal asymmetry in uoq left breast.  Ultrasound confirmed a hypoechoic mass 17mm x 20mm x 16mm at 2:30 left breast, 6cm from nipple, a hypoechoic mass 11mm at 1:30 position, a 4mm mass at 1 oclock left breast, stable mass right breast considered benign.  She had an ultrasound guided biopsy on 2/17/22 with pathology revealing invasive lobular carcinoma at 1:30 left breast and at 2:30 left breast, ER ID pos Her 2 neg and ki67 39%.  Biopsy from 1 olcok was benign.       She then had a breast mri on 3/4/22 which showed a suspicious mass with nomass enhancement measuring up to 7.1cm outer middle and posterior left breast with clip.  She had a right breast u/s on 4/11/22 which showed a complex cyst benign.       She underwent a left total mastectomy and sentinel node biopsy on 4/13/21 with pathology revealing invasive lobular caricnoma itnermediate grade, 5cm in size with negative margins and one of 4 lymph  nodes involved with isolated tumor cells.  Her pathologic stage was pT2N0(i+).  Her oncotype score returned at 18.        Including the path from the biopsy, the total tumor size was greater than 5 cm.     She met with Dr. Palacios on May 6 and he has recommended dose dense AC follwed by weekly Taxol x 12.       She is  with menarche age 11 and first childbirth age 28.  She breast fed for 10 months and has never taken hormones.     She is premenopausal and takes no hormones. She has no family hx of breast or ovarian cancer. She had invitae genetic testing which was negative.     Interval update:   She completed her 4th cycle of AC on 2022; Paclitaxel started which she did not tolerate as well with neuropathy.  She was able to complete this therapy as planned up to 6 cycles, with last on 2022.  She has been referred her for planned adjuvant XRT as outlayed earlier this summer.      The following portions of the patient's history were reviewed and updated as appropriate: allergies, current medications, past family history, past medical history, past social history, past surgical history and problem list. Reviewed with the patient and remain unchanged.    PAST MEDICAL HISTORY:  she  has a past medical history of Anemia, Anesthesia complication, Breast cancer (HCC) (2022), History of COVID-19, Hyperlipidemia, PONV (postoperative nausea and vomiting), and Seasonal allergies.  MEDICATIONS:   Current Outpatient Medications:   •  B Complex Vitamins (Vitamin-B Complex) tablet, Take 1 tablet by mouth Daily., Disp: , Rfl: 0  •  Cetirizine HCl (ZYRTEC PO), Take  by mouth Daily., Disp: , Rfl:   •  LORazepam (ATIVAN) 0.5 MG tablet, Take 1 tablet by mouth Every 12 (Twelve) Hours As Needed (Nausea). To be taken every 12 hours as needed for up to one week after chemotherapy, Disp: 28 tablet, Rfl: 0  •  omeprazole (priLOSEC) 20 MG capsule, Take 1 capsule by mouth 2 (Two) Times a Day., Disp: 60 capsule, Rfl: 3  •   ondansetron (Zofran) 8 MG tablet, Take 1 tablet by mouth Every 8 (Eight) Hours As Needed for Nausea or Vomiting., Disp: 30 tablet, Rfl: 5  •  prochlorperazine (COMPAZINE) 10 MG tablet, Take 1 tablet by mouth Every 6 (Six) Hours As Needed for Nausea or Vomiting., Disp: 30 tablet, Rfl: 5  ALLERGIES:   Allergies   Allergen Reactions   • Asa [Aspirin] Anaphylaxis and Shortness Of Breath     PAST SURGICAL HISTORY: she has a past surgical history that includes  section; Appendectomy; Knee arthroscopy (Right);  section (N/A, 12/10/2018); d & c hysteroscopy (N/A, 2021); Breast biopsy (Left, ); Mastectomy w/ sentinel node biopsy (Left, 2022); Breast reconstruction (Left, 2022); and Venous Access Device (Port) (Right, 2022).  PREVIOUS RADIOTHERAPY OR CHEMOTHERAPY: chemo as noted.  FAMILY HISTORY: her family history includes Diabetes in an other family member; Melanoma in her paternal aunt.  SOCIAL HISTORY: she  reports that she has never smoked. She has never used smokeless tobacco. She reports that she does not currently use alcohol after a past usage of about 5.0 standard drinks per week. She reports that she does not use drugs.  PAIN AND PAIN MANAGEMENT: no pain.  Vitals:    10/12/22 0913   BP: 104/69   Pulse: 70   SpO2: 99%   Weight: 54.4 kg (120 lb)   PainSc: 0-No pain     NUTRITIONAL STATUS:    no issues  KPS: 90        Review of Systems:   General: No fevers, chills, weight change, or drenching night sweats. Skin: No rashes or jaundice.  HEENT: No change in vision or hearing, no headaches.  Neck: No dysphagia or masses.  Heme/Lymph: No easy bruising or bleeding.  Respiratory System: No shortness of breath or cough.  Cardiovascular: No chest pain, palpitations, or dyspnea on exertion.  - Pacemaker. GI: No nausea, vomiting, diarrhea, melena, or hematochezia.  : No dysuria or hematuria.  Endocrine: No heat or cold intolerance. Musculoskeletal: No myalgias or arthralgias.   Neuro: No weakness, numbness, syncope, or seizures. Psych: No mood changes or depression. Ext: Denies swelling.        Objective     Vitals:  Vitals:    10/12/22 0913   BP: 104/69   Pulse: 70   SpO2: 99%   Weight: 54.4 kg (120 lb)   PainSc: 0-No pain       PHYSICAL EXAM:  GENERAL: in no apparent distress, sitting comfortably in room.    HEENT: normocephalic, atraumatic. Pupils are equal, round, reactive to light. Sclera anicteric. Conjunctiva not injected. Oropharynx without erythema, ulcerations or thrush.   NECK: Supple with no masses.  LYMPHATIC: no cervical, supraclavicular or axillary adenopathy appreciated bilaterally.   CARDIOVASCULAR: S1 & S2 detected; no murmurs, rubs or gallops.  CHEST: clear to auscultation bilaterally; no wheezes, crackles or rubs. Work of breathing normal.  ABDOMEN: bowel sounds present. Abdomen is soft, nontender, nondistended.   MUSCULOSKELETAL: no tenderness to palpation along the spine or scapulae. Normal range of motion.  EXTREMITIES: no clubbing, cyanosis, edema.  SKIN: no erythema, rashes, ulcerations noted.   NEUROLOGIC: cranial nerves II-XII grossly intact bilaterally. No focal neurologic deficits.  PSYCHIATRIC:  alert, aware, and appropriate.  BREASTS: Left CW with no dominant masses, skin changes, discharge or pain; Right breast unremarkable with no dominant masses, skin changes, discharge or pain.      PERTINENT IMAGING/PATHOLOGY/LABS (Medical Decision Making):     COORDINATION OF CARE: A copy of this note is sent to the referring provider.    PATHOLOGY (Reviewed):     IMAGING (Reviewed):     LABS (Reviewed):  Hematology WBC   Date Value Ref Range Status   10/03/2022 3.85 3.40 - 10.80 10*3/mm3 Final     RBC   Date Value Ref Range Status   10/03/2022 3.95 3.77 - 5.28 10*6/mm3 Final     Hemoglobin   Date Value Ref Range Status   10/03/2022 11.9 (L) 12.0 - 15.9 g/dL Final     Hematocrit   Date Value Ref Range Status   10/03/2022 35.9 34.0 - 46.6 % Final     Platelets   Date  Value Ref Range Status   10/03/2022 208 140 - 450 10*3/mm3 Final      Chemistry   Lab Results   Component Value Date    GLUCOSE 89 10/03/2022    BUN 15 10/03/2022    CREATININE 0.57 (L) 10/03/2022    BCR 26.3 10/03/2022    K 4.1 10/03/2022    CO2 23.5 10/03/2022    CALCIUM 9.8 10/03/2022    ALBUMIN 4.60 10/03/2022    AST 23 10/03/2022    ALT 18 10/03/2022         Assessment & Plan     ASSESSMENT AND PLAN:    L Breast Cancer  -Completed AC, then completed 6 of 12 planned paclitaxel cycles, stopped early due to neuropathy.  -Plan has been to follow this with adjuvant XRT; CW + comprehensive RNI.  -CT sim is next step.  -Scheduled for 50 Gy in 25 fractions, CT sim will be DIBH on 10/13/2022.    All questions answered; inform consent today.      This assessment comes from my review of the imaging, pathology, physician notes and other pertinent information as mentioned.    DISPOSITION: CT sim.        TIME SPENT WITH PATIENT:   I spent 21 minutes caring for Maribell on this date of service. This time includes time spent by me in the following activities: preparing for the visit, reviewing tests, obtaining and/or reviewing a separately obtained history, performing a medically appropriate examination and/or evaluation, referring and communicating with other health care professionals, documenting information in the medical record and care coordination      CC: MD Chau Sharif MD John A Cox, MD  10/12/2022  9:19 AM EDT

## 2022-10-13 PROCEDURE — 77334 RADIATION TREATMENT AID(S): CPT | Performed by: RADIOLOGY

## 2022-10-13 PROCEDURE — 77290 THER RAD SIMULAJ FIELD CPLX: CPT | Performed by: RADIOLOGY

## 2022-10-13 PROCEDURE — 77263 THER RADIOLOGY TX PLNG CPLX: CPT | Performed by: RADIOLOGY

## 2022-10-23 PROCEDURE — 77295 3-D RADIOTHERAPY PLAN: CPT | Performed by: RADIOLOGY

## 2022-10-23 PROCEDURE — 77300 RADIATION THERAPY DOSE PLAN: CPT | Performed by: RADIOLOGY

## 2022-10-23 PROCEDURE — 77334 RADIATION TREATMENT AID(S): CPT | Performed by: RADIOLOGY

## 2022-10-23 PROCEDURE — 77293 RESPIRATOR MOTION MGMT SIMUL: CPT | Performed by: RADIOLOGY

## 2022-10-25 PROCEDURE — 77387 GUIDANCE FOR RADJ TX DLVR: CPT | Performed by: RADIOLOGY

## 2022-10-25 PROCEDURE — 77280 THER RAD SIMULAJ FIELD SMPL: CPT | Performed by: STUDENT IN AN ORGANIZED HEALTH CARE EDUCATION/TRAINING PROGRAM

## 2022-10-25 PROCEDURE — 77412 RADIATION TX DELIVERY LVL 3: CPT | Performed by: STUDENT IN AN ORGANIZED HEALTH CARE EDUCATION/TRAINING PROGRAM

## 2022-10-25 PROCEDURE — 77427 RADIATION TX MANAGEMENT X5: CPT | Performed by: STUDENT IN AN ORGANIZED HEALTH CARE EDUCATION/TRAINING PROGRAM

## 2022-10-26 ENCOUNTER — TREATMENT (OUTPATIENT)
Dept: RADIATION ONCOLOGY | Facility: HOSPITAL | Age: 37
End: 2022-10-26

## 2022-10-26 LAB
RAD ONC ARIA COURSE ID: NORMAL
RAD ONC ARIA COURSE INTENT: NORMAL
RAD ONC ARIA COURSE LAST TREATMENT DATE: NORMAL
RAD ONC ARIA COURSE START DATE: NORMAL
RAD ONC ARIA COURSE TREATMENT ELAPSED DAYS: 1
RAD ONC ARIA FIRST TREATMENT DATE: NORMAL
RAD ONC ARIA PLAN FRACTIONS TREATED TO DATE: 2
RAD ONC ARIA PLAN FRACTIONS TREATED TO DATE: 2
RAD ONC ARIA PLAN ID: NORMAL
RAD ONC ARIA PLAN ID: NORMAL
RAD ONC ARIA PLAN PRESCRIBED DOSE PER FRACTION: 2 GY
RAD ONC ARIA PLAN PRESCRIBED DOSE PER FRACTION: 2 GY
RAD ONC ARIA PLAN PRIMARY REFERENCE POINT: NORMAL
RAD ONC ARIA PLAN PRIMARY REFERENCE POINT: NORMAL
RAD ONC ARIA PLAN TOTAL FRACTIONS PRESCRIBED: 25
RAD ONC ARIA PLAN TOTAL FRACTIONS PRESCRIBED: 25
RAD ONC ARIA PLAN TOTAL PRESCRIBED DOSE: 5000 CGY
RAD ONC ARIA PLAN TOTAL PRESCRIBED DOSE: 5000 CGY
RAD ONC ARIA REFERENCE POINT DOSAGE GIVEN TO DATE: 3.04 GY
RAD ONC ARIA REFERENCE POINT DOSAGE GIVEN TO DATE: 4 GY
RAD ONC ARIA REFERENCE POINT ID: NORMAL
RAD ONC ARIA REFERENCE POINT SESSION DOSAGE GIVEN: 1.52 GY
RAD ONC ARIA REFERENCE POINT SESSION DOSAGE GIVEN: 2 GY

## 2022-10-26 PROCEDURE — 77387 GUIDANCE FOR RADJ TX DLVR: CPT | Performed by: RADIOLOGY

## 2022-10-26 PROCEDURE — 77412 RADIATION TX DELIVERY LVL 3: CPT | Performed by: RADIOLOGY

## 2022-10-27 ENCOUNTER — TREATMENT (OUTPATIENT)
Dept: RADIATION ONCOLOGY | Facility: HOSPITAL | Age: 37
End: 2022-10-27

## 2022-10-27 ENCOUNTER — RADIATION ONCOLOGY WEEKLY ASSESSMENT (OUTPATIENT)
Dept: RADIATION ONCOLOGY | Facility: HOSPITAL | Age: 37
End: 2022-10-27

## 2022-10-27 VITALS — HEART RATE: 78 BPM | TEMPERATURE: 97.3 F | OXYGEN SATURATION: 98 % | BODY MASS INDEX: 22.65 KG/M2 | WEIGHT: 119.8 LBS

## 2022-10-27 DIAGNOSIS — C50.412 MALIGNANT NEOPLASM OF UPPER-OUTER QUADRANT OF LEFT BREAST IN FEMALE, ESTROGEN RECEPTOR POSITIVE: Primary | ICD-10-CM

## 2022-10-27 DIAGNOSIS — Z17.0 MALIGNANT NEOPLASM OF UPPER-OUTER QUADRANT OF LEFT BREAST IN FEMALE, ESTROGEN RECEPTOR POSITIVE: Primary | ICD-10-CM

## 2022-10-27 LAB
RAD ONC ARIA COURSE ID: NORMAL
RAD ONC ARIA COURSE INTENT: NORMAL
RAD ONC ARIA COURSE LAST TREATMENT DATE: NORMAL
RAD ONC ARIA COURSE START DATE: NORMAL
RAD ONC ARIA COURSE TREATMENT ELAPSED DAYS: 2
RAD ONC ARIA FIRST TREATMENT DATE: NORMAL
RAD ONC ARIA PLAN FRACTIONS TREATED TO DATE: 3
RAD ONC ARIA PLAN FRACTIONS TREATED TO DATE: 3
RAD ONC ARIA PLAN ID: NORMAL
RAD ONC ARIA PLAN ID: NORMAL
RAD ONC ARIA PLAN PRESCRIBED DOSE PER FRACTION: 2 GY
RAD ONC ARIA PLAN PRESCRIBED DOSE PER FRACTION: 2 GY
RAD ONC ARIA PLAN PRIMARY REFERENCE POINT: NORMAL
RAD ONC ARIA PLAN PRIMARY REFERENCE POINT: NORMAL
RAD ONC ARIA PLAN TOTAL FRACTIONS PRESCRIBED: 25
RAD ONC ARIA PLAN TOTAL FRACTIONS PRESCRIBED: 25
RAD ONC ARIA PLAN TOTAL PRESCRIBED DOSE: 5000 CGY
RAD ONC ARIA PLAN TOTAL PRESCRIBED DOSE: 5000 CGY
RAD ONC ARIA REFERENCE POINT DOSAGE GIVEN TO DATE: 4.56 GY
RAD ONC ARIA REFERENCE POINT DOSAGE GIVEN TO DATE: 6 GY
RAD ONC ARIA REFERENCE POINT ID: NORMAL
RAD ONC ARIA REFERENCE POINT SESSION DOSAGE GIVEN: 1.52 GY
RAD ONC ARIA REFERENCE POINT SESSION DOSAGE GIVEN: 2 GY

## 2022-10-27 PROCEDURE — FACE2FACE: Performed by: RADIOLOGY

## 2022-10-27 PROCEDURE — 77387 GUIDANCE FOR RADJ TX DLVR: CPT | Performed by: RADIOLOGY

## 2022-10-27 PROCEDURE — 77412 RADIATION TX DELIVERY LVL 3: CPT | Performed by: RADIOLOGY

## 2022-10-27 PROCEDURE — 77336 RADIATION PHYSICS CONSULT: CPT | Performed by: RADIOLOGY

## 2022-10-27 NOTE — PROGRESS NOTES
Patient Name: Maribell Encarnacion Date: 10/27/2022       : 1985        MRN #: [id number] Diagnosis:   Encounter Diagnosis   Name Primary?   • Malignant neoplasm of upper-outer quadrant of left breast in female, estrogen receptor positive (HCC) Yes                     RADIATION ON TREATMENT VISIT NOTE - BREAST    Treatment Summary      Treatment Site Ref. ID Energy Dose/Fx (cGy) #Fx Dose Correction (cGy) Total Dose (cGy) Start Date End Date Elapsed Days   L SCV DIBH Rx: L SCV 18X/6X 200 3 / 25 0 600 10/25/2022 10/27/2022 2   L CW/IMC_DIBH Rx: L CW 18X/6X 200 3 / 25 0 600 10/25/2022 10/27/2022 2     Intent: Adjuvant  General:           Review of Systems    [x] No new complaints   [] Skin itching   [] Skin breakdown  [] Breast swelling   [] Dysphagia   [] Dry cough  [] Productive cough   [] Fever/chills  [x] Skin soreness with pain, severity: 0   [] Breast pain,  severity: ----------------, location:   [x] Fatigue,  severity: 0 None      Skin regimen: Aquaphor     [x] Patient given standard skin care instructions for breast cancer treatment including no aluminum containing antiperspirants & no unapproved lotions or soaps.  Patient informed to notify radiation staff if irritation/itching occurs.    Comments/Notes:  Tolerating well  Using Tylenol for shoulder discomfort    KPS: 90%       Vital Signs: Pulse 78   Temp 97.3 °F (36.3 °C)   Wt 54.3 kg (119 lb 12.8 oz)   SpO2 98%   BMI 22.65 kg/m²     Weight:   Wt Readings from Last 3 Encounters:   10/27/22 54.3 kg (119 lb 12.8 oz)   10/12/22 54.4 kg (120 lb)   10/03/22 53.8 kg (118 lb 9.6 oz)       Medication:   Current Outpatient Medications:   •  B Complex Vitamins (Vitamin-B Complex) tablet, Take 1 tablet by mouth Daily., Disp: , Rfl: 0  •  Cetirizine HCl (ZYRTEC PO), Take  by mouth Daily., Disp: , Rfl:   •  LORazepam (ATIVAN) 0.5 MG tablet, Take 1 tablet by mouth Every 12 (Twelve) Hours As Needed (Nausea). To be taken every 12 hours as needed for up  to one week after chemotherapy, Disp: 28 tablet, Rfl: 0  •  omeprazole (priLOSEC) 20 MG capsule, Take 1 capsule by mouth 2 (Two) Times a Day., Disp: 60 capsule, Rfl: 3  •  ondansetron (Zofran) 8 MG tablet, Take 1 tablet by mouth Every 8 (Eight) Hours As Needed for Nausea or Vomiting., Disp: 30 tablet, Rfl: 5  •  prochlorperazine (COMPAZINE) 10 MG tablet, Take 1 tablet by mouth Every 6 (Six) Hours As Needed for Nausea or Vomiting., Disp: 30 tablet, Rfl: 5       LABS (Reviewed):  Hematology WBC   Date Value Ref Range Status   10/03/2022 3.85 3.40 - 10.80 10*3/mm3 Final     RBC   Date Value Ref Range Status   10/03/2022 3.95 3.77 - 5.28 10*6/mm3 Final     Hemoglobin   Date Value Ref Range Status   10/03/2022 11.9 (L) 12.0 - 15.9 g/dL Final     Hematocrit   Date Value Ref Range Status   10/03/2022 35.9 34.0 - 46.6 % Final     Platelets   Date Value Ref Range Status   10/03/2022 208 140 - 450 10*3/mm3 Final      Chemistry   Lab Results   Component Value Date    GLUCOSE 89 10/03/2022    BUN 15 10/03/2022    CREATININE 0.57 (L) 10/03/2022    BCR 26.3 10/03/2022    K 4.1 10/03/2022    CO2 23.5 10/03/2022    CALCIUM 9.8 10/03/2022    ALBUMIN 4.60 10/03/2022    AST 23 10/03/2022    ALT 18 10/03/2022         Physical Exam:           Neck: [] Lymphadenopathy  Lungs: [x] Clear to auscultation  CVS: [x] Regular rate & rhythm  Skin: [x] stGstrstastdstest:st st1st Comments/Notes:     [x] Review of labs, images, dosimetry, dose delivered, & treatment parameters.    Comments:     [x] Patient treatment setup reviewed.    Comments:     Recommendations: continue XRT   Skin care and supportive measures discussed.    [x] Continue RT  [] Change RT Plan [] Hold RT, length:        Approved Electronically By:  Lan Anguiano MD, 10/27/2022, 14:00 EDT          Confidentiality of this medical record shall be maintained except when use or disclosure is required or permitted by law, regulation or written authorization by the patient.

## 2022-10-28 ENCOUNTER — TREATMENT (OUTPATIENT)
Dept: RADIATION ONCOLOGY | Facility: HOSPITAL | Age: 37
End: 2022-10-28

## 2022-10-28 LAB
RAD ONC ARIA COURSE ID: NORMAL
RAD ONC ARIA COURSE INTENT: NORMAL
RAD ONC ARIA COURSE LAST TREATMENT DATE: NORMAL
RAD ONC ARIA COURSE START DATE: NORMAL
RAD ONC ARIA COURSE TREATMENT ELAPSED DAYS: 3
RAD ONC ARIA FIRST TREATMENT DATE: NORMAL
RAD ONC ARIA PLAN FRACTIONS TREATED TO DATE: 4
RAD ONC ARIA PLAN FRACTIONS TREATED TO DATE: 4
RAD ONC ARIA PLAN ID: NORMAL
RAD ONC ARIA PLAN ID: NORMAL
RAD ONC ARIA PLAN PRESCRIBED DOSE PER FRACTION: 2 GY
RAD ONC ARIA PLAN PRESCRIBED DOSE PER FRACTION: 2 GY
RAD ONC ARIA PLAN PRIMARY REFERENCE POINT: NORMAL
RAD ONC ARIA PLAN PRIMARY REFERENCE POINT: NORMAL
RAD ONC ARIA PLAN TOTAL FRACTIONS PRESCRIBED: 25
RAD ONC ARIA PLAN TOTAL FRACTIONS PRESCRIBED: 25
RAD ONC ARIA PLAN TOTAL PRESCRIBED DOSE: 5000 CGY
RAD ONC ARIA PLAN TOTAL PRESCRIBED DOSE: 5000 CGY
RAD ONC ARIA REFERENCE POINT DOSAGE GIVEN TO DATE: 6.08 GY
RAD ONC ARIA REFERENCE POINT DOSAGE GIVEN TO DATE: 8 GY
RAD ONC ARIA REFERENCE POINT ID: NORMAL
RAD ONC ARIA REFERENCE POINT SESSION DOSAGE GIVEN: 1.52 GY
RAD ONC ARIA REFERENCE POINT SESSION DOSAGE GIVEN: 2 GY

## 2022-10-28 PROCEDURE — 77387 GUIDANCE FOR RADJ TX DLVR: CPT | Performed by: RADIOLOGY

## 2022-10-28 PROCEDURE — 77412 RADIATION TX DELIVERY LVL 3: CPT | Performed by: RADIOLOGY

## 2022-10-31 ENCOUNTER — OFFICE VISIT (OUTPATIENT)
Dept: ONCOLOGY | Facility: CLINIC | Age: 37
End: 2022-10-31

## 2022-10-31 ENCOUNTER — INFUSION (OUTPATIENT)
Dept: ONCOLOGY | Facility: HOSPITAL | Age: 37
End: 2022-10-31

## 2022-10-31 VITALS
OXYGEN SATURATION: 99 % | RESPIRATION RATE: 16 BRPM | HEART RATE: 89 BPM | SYSTOLIC BLOOD PRESSURE: 104 MMHG | BODY MASS INDEX: 22.64 KG/M2 | HEIGHT: 61 IN | DIASTOLIC BLOOD PRESSURE: 68 MMHG | WEIGHT: 119.9 LBS | TEMPERATURE: 97.3 F

## 2022-10-31 DIAGNOSIS — Z45.2 FITTING AND ADJUSTMENT OF VASCULAR CATHETER: ICD-10-CM

## 2022-10-31 DIAGNOSIS — T45.1X5A ANEMIA ASSOCIATED WITH CHEMOTHERAPY: ICD-10-CM

## 2022-10-31 DIAGNOSIS — Z17.0 MALIGNANT NEOPLASM OF UPPER-OUTER QUADRANT OF LEFT BREAST IN FEMALE, ESTROGEN RECEPTOR POSITIVE: Primary | ICD-10-CM

## 2022-10-31 DIAGNOSIS — T45.1X5A CHEMOTHERAPY-INDUCED THROMBOCYTOPENIA: ICD-10-CM

## 2022-10-31 DIAGNOSIS — C50.412 MALIGNANT NEOPLASM OF UPPER-OUTER QUADRANT OF LEFT BREAST IN FEMALE, ESTROGEN RECEPTOR POSITIVE: Primary | ICD-10-CM

## 2022-10-31 DIAGNOSIS — T45.1X5A NEUROPATHY DUE TO CHEMOTHERAPEUTIC DRUG: ICD-10-CM

## 2022-10-31 DIAGNOSIS — K21.00 PEPTIC REFLUX ESOPHAGITIS: ICD-10-CM

## 2022-10-31 DIAGNOSIS — G62.0 NEUROPATHY DUE TO CHEMOTHERAPEUTIC DRUG: ICD-10-CM

## 2022-10-31 DIAGNOSIS — D64.81 ANEMIA ASSOCIATED WITH CHEMOTHERAPY: ICD-10-CM

## 2022-10-31 DIAGNOSIS — D70.1 LEUKOPENIA DUE TO ANTINEOPLASTIC CHEMOTHERAPY: ICD-10-CM

## 2022-10-31 DIAGNOSIS — T45.1X5A CHEMOTHERAPY INDUCED NAUSEA AND VOMITING: ICD-10-CM

## 2022-10-31 DIAGNOSIS — D69.59 CHEMOTHERAPY-INDUCED THROMBOCYTOPENIA: ICD-10-CM

## 2022-10-31 DIAGNOSIS — R11.2 CHEMOTHERAPY INDUCED NAUSEA AND VOMITING: ICD-10-CM

## 2022-10-31 DIAGNOSIS — T45.1X5A LEUKOPENIA DUE TO ANTINEOPLASTIC CHEMOTHERAPY: ICD-10-CM

## 2022-10-31 LAB
ALBUMIN SERPL-MCNC: 4.5 G/DL (ref 3.5–5.2)
ALBUMIN/GLOB SERPL: 1.7 G/DL (ref 1.1–2.4)
ALP SERPL-CCNC: 77 U/L (ref 38–116)
ALT SERPL W P-5'-P-CCNC: 18 U/L (ref 0–33)
ANION GAP SERPL CALCULATED.3IONS-SCNC: 13.2 MMOL/L (ref 5–15)
AST SERPL-CCNC: 25 U/L (ref 0–32)
BASOPHILS # BLD AUTO: 0.03 10*3/MM3 (ref 0–0.2)
BASOPHILS NFR BLD AUTO: 0.8 % (ref 0–1.5)
BILIRUB SERPL-MCNC: 0.3 MG/DL (ref 0.2–1.2)
BUN SERPL-MCNC: 13 MG/DL (ref 6–20)
BUN/CREAT SERPL: 24.1 (ref 7.3–30)
CALCIUM SPEC-SCNC: 9.6 MG/DL (ref 8.5–10.2)
CHLORIDE SERPL-SCNC: 106 MMOL/L (ref 98–107)
CO2 SERPL-SCNC: 23.8 MMOL/L (ref 22–29)
CREAT SERPL-MCNC: 0.54 MG/DL (ref 0.6–1.1)
DEPRECATED RDW RBC AUTO: 37.6 FL (ref 37–54)
EGFRCR SERPLBLD CKD-EPI 2021: 121 ML/MIN/1.73
EOSINOPHIL # BLD AUTO: 0.28 10*3/MM3 (ref 0–0.4)
EOSINOPHIL NFR BLD AUTO: 7.6 % (ref 0.3–6.2)
ERYTHROCYTE [DISTWIDTH] IN BLOOD BY AUTOMATED COUNT: 11.8 % (ref 12.3–15.4)
GLOBULIN UR ELPH-MCNC: 2.7 GM/DL (ref 1.8–3.5)
GLUCOSE SERPL-MCNC: 103 MG/DL (ref 74–124)
HCT VFR BLD AUTO: 34.4 % (ref 34–46.6)
HGB BLD-MCNC: 11.8 G/DL (ref 12–15.9)
IMM GRANULOCYTES # BLD AUTO: 0.01 10*3/MM3 (ref 0–0.05)
IMM GRANULOCYTES NFR BLD AUTO: 0.3 % (ref 0–0.5)
LYMPHOCYTES # BLD AUTO: 0.91 10*3/MM3 (ref 0.7–3.1)
LYMPHOCYTES NFR BLD AUTO: 24.8 % (ref 19.6–45.3)
MCH RBC QN AUTO: 30.1 PG (ref 26.6–33)
MCHC RBC AUTO-ENTMCNC: 34.3 G/DL (ref 31.5–35.7)
MCV RBC AUTO: 87.8 FL (ref 79–97)
MONOCYTES # BLD AUTO: 0.3 10*3/MM3 (ref 0.1–0.9)
MONOCYTES NFR BLD AUTO: 8.2 % (ref 5–12)
NEUTROPHILS NFR BLD AUTO: 2.14 10*3/MM3 (ref 1.7–7)
NEUTROPHILS NFR BLD AUTO: 58.3 % (ref 42.7–76)
NRBC BLD AUTO-RTO: 0 /100 WBC (ref 0–0.2)
PLATELET # BLD AUTO: 164 10*3/MM3 (ref 140–450)
PMV BLD AUTO: 10.2 FL (ref 6–12)
POTASSIUM SERPL-SCNC: 3.8 MMOL/L (ref 3.5–4.7)
PROT SERPL-MCNC: 7.2 G/DL (ref 6.3–8)
RAD ONC ARIA COURSE ID: NORMAL
RAD ONC ARIA COURSE INTENT: NORMAL
RAD ONC ARIA COURSE LAST TREATMENT DATE: NORMAL
RAD ONC ARIA COURSE START DATE: NORMAL
RAD ONC ARIA COURSE TREATMENT ELAPSED DAYS: 6
RAD ONC ARIA FIRST TREATMENT DATE: NORMAL
RAD ONC ARIA PLAN FRACTIONS TREATED TO DATE: 5
RAD ONC ARIA PLAN FRACTIONS TREATED TO DATE: 5
RAD ONC ARIA PLAN ID: NORMAL
RAD ONC ARIA PLAN ID: NORMAL
RAD ONC ARIA PLAN PRESCRIBED DOSE PER FRACTION: 2 GY
RAD ONC ARIA PLAN PRESCRIBED DOSE PER FRACTION: 2 GY
RAD ONC ARIA PLAN PRIMARY REFERENCE POINT: NORMAL
RAD ONC ARIA PLAN PRIMARY REFERENCE POINT: NORMAL
RAD ONC ARIA PLAN TOTAL FRACTIONS PRESCRIBED: 25
RAD ONC ARIA PLAN TOTAL FRACTIONS PRESCRIBED: 25
RAD ONC ARIA PLAN TOTAL PRESCRIBED DOSE: 5000 CGY
RAD ONC ARIA PLAN TOTAL PRESCRIBED DOSE: 5000 CGY
RAD ONC ARIA REFERENCE POINT DOSAGE GIVEN TO DATE: 10 GY
RAD ONC ARIA REFERENCE POINT DOSAGE GIVEN TO DATE: 7.6 GY
RAD ONC ARIA REFERENCE POINT ID: NORMAL
RAD ONC ARIA REFERENCE POINT SESSION DOSAGE GIVEN: 1.52 GY
RAD ONC ARIA REFERENCE POINT SESSION DOSAGE GIVEN: 2 GY
RBC # BLD AUTO: 3.92 10*6/MM3 (ref 3.77–5.28)
SODIUM SERPL-SCNC: 143 MMOL/L (ref 134–145)
WBC NRBC COR # BLD: 3.67 10*3/MM3 (ref 3.4–10.8)

## 2022-10-31 PROCEDURE — 85025 COMPLETE CBC W/AUTO DIFF WBC: CPT

## 2022-10-31 PROCEDURE — 99214 OFFICE O/P EST MOD 30 MIN: CPT | Performed by: INTERNAL MEDICINE

## 2022-10-31 PROCEDURE — 77412 RADIATION TX DELIVERY LVL 3: CPT | Performed by: RADIOLOGY

## 2022-10-31 PROCEDURE — 80053 COMPREHEN METABOLIC PANEL: CPT

## 2022-10-31 PROCEDURE — 77387 GUIDANCE FOR RADJ TX DLVR: CPT | Performed by: RADIOLOGY

## 2022-10-31 PROCEDURE — 25010000002 HEPARIN LOCK FLUSH PER 10 UNITS: Performed by: INTERNAL MEDICINE

## 2022-10-31 PROCEDURE — 36591 DRAW BLOOD OFF VENOUS DEVICE: CPT

## 2022-10-31 RX ORDER — HEPARIN SODIUM (PORCINE) LOCK FLUSH IV SOLN 100 UNIT/ML 100 UNIT/ML
500 SOLUTION INTRAVENOUS AS NEEDED
Status: CANCELLED | OUTPATIENT
Start: 2022-10-31

## 2022-10-31 RX ORDER — SODIUM CHLORIDE 0.9 % (FLUSH) 0.9 %
10 SYRINGE (ML) INJECTION AS NEEDED
Status: CANCELLED | OUTPATIENT
Start: 2022-10-31

## 2022-10-31 RX ORDER — SODIUM CHLORIDE 0.9 % (FLUSH) 0.9 %
10 SYRINGE (ML) INJECTION AS NEEDED
Status: DISCONTINUED | OUTPATIENT
Start: 2022-10-31 | End: 2022-10-31 | Stop reason: HOSPADM

## 2022-10-31 RX ORDER — HEPARIN SODIUM (PORCINE) LOCK FLUSH IV SOLN 100 UNIT/ML 100 UNIT/ML
500 SOLUTION INTRAVENOUS AS NEEDED
Status: DISCONTINUED | OUTPATIENT
Start: 2022-10-31 | End: 2022-10-31 | Stop reason: HOSPADM

## 2022-10-31 RX ADMIN — Medication 500 UNITS: at 09:21

## 2022-10-31 RX ADMIN — Medication 10 ML: at 09:22

## 2022-11-01 ENCOUNTER — APPOINTMENT (OUTPATIENT)
Dept: RADIATION ONCOLOGY | Facility: HOSPITAL | Age: 37
End: 2022-11-01

## 2022-11-01 LAB
RAD ONC ARIA COURSE ID: NORMAL
RAD ONC ARIA COURSE INTENT: NORMAL
RAD ONC ARIA COURSE LAST TREATMENT DATE: NORMAL
RAD ONC ARIA COURSE START DATE: NORMAL
RAD ONC ARIA COURSE TREATMENT ELAPSED DAYS: 7
RAD ONC ARIA FIRST TREATMENT DATE: NORMAL
RAD ONC ARIA PLAN FRACTIONS TREATED TO DATE: 6
RAD ONC ARIA PLAN FRACTIONS TREATED TO DATE: 6
RAD ONC ARIA PLAN ID: NORMAL
RAD ONC ARIA PLAN ID: NORMAL
RAD ONC ARIA PLAN PRESCRIBED DOSE PER FRACTION: 2 GY
RAD ONC ARIA PLAN PRESCRIBED DOSE PER FRACTION: 2 GY
RAD ONC ARIA PLAN PRIMARY REFERENCE POINT: NORMAL
RAD ONC ARIA PLAN PRIMARY REFERENCE POINT: NORMAL
RAD ONC ARIA PLAN TOTAL FRACTIONS PRESCRIBED: 25
RAD ONC ARIA PLAN TOTAL FRACTIONS PRESCRIBED: 25
RAD ONC ARIA PLAN TOTAL PRESCRIBED DOSE: 5000 CGY
RAD ONC ARIA PLAN TOTAL PRESCRIBED DOSE: 5000 CGY
RAD ONC ARIA REFERENCE POINT DOSAGE GIVEN TO DATE: 12 GY
RAD ONC ARIA REFERENCE POINT DOSAGE GIVEN TO DATE: 9.13 GY
RAD ONC ARIA REFERENCE POINT ID: NORMAL
RAD ONC ARIA REFERENCE POINT SESSION DOSAGE GIVEN: 1.52 GY
RAD ONC ARIA REFERENCE POINT SESSION DOSAGE GIVEN: 2 GY

## 2022-11-01 PROCEDURE — 77412 RADIATION TX DELIVERY LVL 3: CPT | Performed by: STUDENT IN AN ORGANIZED HEALTH CARE EDUCATION/TRAINING PROGRAM

## 2022-11-01 PROCEDURE — 77427 RADIATION TX MANAGEMENT X5: CPT | Performed by: STUDENT IN AN ORGANIZED HEALTH CARE EDUCATION/TRAINING PROGRAM

## 2022-11-01 PROCEDURE — 77387 GUIDANCE FOR RADJ TX DLVR: CPT | Performed by: STUDENT IN AN ORGANIZED HEALTH CARE EDUCATION/TRAINING PROGRAM

## 2022-11-02 ENCOUNTER — RADIATION ONCOLOGY WEEKLY ASSESSMENT (OUTPATIENT)
Dept: RADIATION ONCOLOGY | Facility: HOSPITAL | Age: 37
End: 2022-11-02

## 2022-11-02 VITALS
SYSTOLIC BLOOD PRESSURE: 93 MMHG | HEART RATE: 75 BPM | TEMPERATURE: 97.8 F | WEIGHT: 119 LBS | OXYGEN SATURATION: 99 % | BODY MASS INDEX: 22.5 KG/M2 | DIASTOLIC BLOOD PRESSURE: 67 MMHG

## 2022-11-02 DIAGNOSIS — C50.412 MALIGNANT NEOPLASM OF UPPER-OUTER QUADRANT OF LEFT BREAST IN FEMALE, ESTROGEN RECEPTOR POSITIVE: Primary | ICD-10-CM

## 2022-11-02 DIAGNOSIS — Z17.0 MALIGNANT NEOPLASM OF UPPER-OUTER QUADRANT OF LEFT BREAST IN FEMALE, ESTROGEN RECEPTOR POSITIVE: Primary | ICD-10-CM

## 2022-11-02 LAB
RAD ONC ARIA COURSE ID: NORMAL
RAD ONC ARIA COURSE INTENT: NORMAL
RAD ONC ARIA COURSE LAST TREATMENT DATE: NORMAL
RAD ONC ARIA COURSE START DATE: NORMAL
RAD ONC ARIA COURSE TREATMENT ELAPSED DAYS: 8
RAD ONC ARIA FIRST TREATMENT DATE: NORMAL
RAD ONC ARIA PLAN FRACTIONS TREATED TO DATE: 7
RAD ONC ARIA PLAN FRACTIONS TREATED TO DATE: 7
RAD ONC ARIA PLAN ID: NORMAL
RAD ONC ARIA PLAN ID: NORMAL
RAD ONC ARIA PLAN PRESCRIBED DOSE PER FRACTION: 2 GY
RAD ONC ARIA PLAN PRESCRIBED DOSE PER FRACTION: 2 GY
RAD ONC ARIA PLAN PRIMARY REFERENCE POINT: NORMAL
RAD ONC ARIA PLAN PRIMARY REFERENCE POINT: NORMAL
RAD ONC ARIA PLAN TOTAL FRACTIONS PRESCRIBED: 25
RAD ONC ARIA PLAN TOTAL FRACTIONS PRESCRIBED: 25
RAD ONC ARIA PLAN TOTAL PRESCRIBED DOSE: 5000 CGY
RAD ONC ARIA PLAN TOTAL PRESCRIBED DOSE: 5000 CGY
RAD ONC ARIA REFERENCE POINT DOSAGE GIVEN TO DATE: 10.65 GY
RAD ONC ARIA REFERENCE POINT DOSAGE GIVEN TO DATE: 14 GY
RAD ONC ARIA REFERENCE POINT ID: NORMAL
RAD ONC ARIA REFERENCE POINT SESSION DOSAGE GIVEN: 1.52 GY
RAD ONC ARIA REFERENCE POINT SESSION DOSAGE GIVEN: 2 GY

## 2022-11-02 PROCEDURE — 77387 GUIDANCE FOR RADJ TX DLVR: CPT | Performed by: RADIOLOGY

## 2022-11-02 PROCEDURE — 77412 RADIATION TX DELIVERY LVL 3: CPT | Performed by: RADIOLOGY

## 2022-11-02 PROCEDURE — FACE2FACE: Performed by: RADIOLOGY

## 2022-11-02 NOTE — PROGRESS NOTES
Patient Name: Maribell Encarnacion Date: 2022       : 1985        MRN #: [id number] Diagnosis:   Encounter Diagnosis   Name Primary?   • Malignant neoplasm of upper-outer quadrant of left breast in female, estrogen receptor positive (HCC) Yes                     RADIATION ON TREATMENT VISIT NOTE - BREAST    Treatment Summary    Treatment Site Ref. ID Energy Dose/Fx (cGy) #Fx Dose Correction (cGy) Total Dose (cGy) Start Date End Date Elapsed Days   L SCV DIBH Rx: L SCV 18X/6X 200 7 /  0 1,400 10/25/2022 2022 8   L CW/IMC_DIBH Rx: L CW 18X/6X 200  0 1,400 10/25/2022 2022 8     DIBH adjuvant XRT      General:           Review of Systems    [x] No new complaints   [] Skin itching   [] Skin breakdown  [] Breast swelling   [] Dysphagia   [] Dry cough  [] Productive cough   [] Fever/chills  [x] Skin soreness with pain, severity: 0   [x] Breast pain,  severity: 0, location:   [x] Fatigue,  severity: 0 None      Skin regimen: Aquaphor     [x] Patient given standard skin care instructions for breast cancer treatment including no aluminum containing antiperspirants & no unapproved lotions or soaps.  Patient informed to notify radiation staff if irritation/itching occurs.    Comments/Notes:  Tolerating therapy well  Tolerating treatment position well now.    KPS: 100%       Vital Signs: BP 93/67   Pulse 75   Temp 97.8 °F (36.6 °C)   Wt 54 kg (119 lb)   SpO2 99%   BMI 22.50 kg/m²     Weight:   Wt Readings from Last 3 Encounters:   22 54 kg (119 lb)   10/31/22 54.4 kg (119 lb 14.4 oz)   10/27/22 54.3 kg (119 lb 12.8 oz)       Medication:   Current Outpatient Medications:   •  B Complex Vitamins (Vitamin-B Complex) tablet, Take 1 tablet by mouth Daily., Disp: , Rfl: 0  •  Cetirizine HCl (ZYRTEC PO), Take  by mouth Daily., Disp: , Rfl:   •  LORazepam (ATIVAN) 0.5 MG tablet, Take 1 tablet by mouth Every 12 (Twelve) Hours As Needed (Nausea). To be taken every 12 hours as needed for up  to one week after chemotherapy, Disp: 28 tablet, Rfl: 0  •  omeprazole (priLOSEC) 20 MG capsule, Take 1 capsule by mouth 2 (Two) Times a Day., Disp: 60 capsule, Rfl: 3  •  ondansetron (Zofran) 8 MG tablet, Take 1 tablet by mouth Every 8 (Eight) Hours As Needed for Nausea or Vomiting., Disp: 30 tablet, Rfl: 5  •  prochlorperazine (COMPAZINE) 10 MG tablet, Take 1 tablet by mouth Every 6 (Six) Hours As Needed for Nausea or Vomiting., Disp: 30 tablet, Rfl: 5       LABS (Reviewed):  Hematology WBC   Date Value Ref Range Status   10/31/2022 3.67 3.40 - 10.80 10*3/mm3 Final     RBC   Date Value Ref Range Status   10/31/2022 3.92 3.77 - 5.28 10*6/mm3 Final     Hemoglobin   Date Value Ref Range Status   10/31/2022 11.8 (L) 12.0 - 15.9 g/dL Final     Hematocrit   Date Value Ref Range Status   10/31/2022 34.4 34.0 - 46.6 % Final     Platelets   Date Value Ref Range Status   10/31/2022 164 140 - 450 10*3/mm3 Final      Chemistry   Lab Results   Component Value Date    GLUCOSE 103 10/31/2022    BUN 13 10/31/2022    CREATININE 0.54 (L) 10/31/2022    BCR 24.1 10/31/2022    K 3.8 10/31/2022    CO2 23.8 10/31/2022    CALCIUM 9.6 10/31/2022    ALBUMIN 4.50 10/31/2022    AST 25 10/31/2022    ALT 18 10/31/2022         Physical Exam:           Neck: [] Lymphadenopathy  Lungs: [x] Clear to auscultation  CVS: [x] Regular rate & rhythm  Skin: [x] stGstrstastdstest:st st1st Comments/Notes: no issues    [x] Review of labs, images, dosimetry, dose delivered, & treatment parameters.    Comments:     [x] Patient treatment setup reviewed.    Comments:     Recommendations: continue XRT and supportive measures    [x] Continue RT  [] Change RT Plan [] Hold RT, length:        Approved Electronically By:  Lan Anguiano MD, 11/2/2022, 11:33 EDT          Confidentiality of this medical record shall be maintained except when use or disclosure is required or permitted by law, regulation or written authorization by the patient.

## 2022-11-03 LAB
RAD ONC ARIA COURSE ID: NORMAL
RAD ONC ARIA COURSE INTENT: NORMAL
RAD ONC ARIA COURSE LAST TREATMENT DATE: NORMAL
RAD ONC ARIA COURSE START DATE: NORMAL
RAD ONC ARIA COURSE TREATMENT ELAPSED DAYS: 9
RAD ONC ARIA FIRST TREATMENT DATE: NORMAL
RAD ONC ARIA PLAN FRACTIONS TREATED TO DATE: 8
RAD ONC ARIA PLAN FRACTIONS TREATED TO DATE: 8
RAD ONC ARIA PLAN ID: NORMAL
RAD ONC ARIA PLAN ID: NORMAL
RAD ONC ARIA PLAN PRESCRIBED DOSE PER FRACTION: 2 GY
RAD ONC ARIA PLAN PRESCRIBED DOSE PER FRACTION: 2 GY
RAD ONC ARIA PLAN PRIMARY REFERENCE POINT: NORMAL
RAD ONC ARIA PLAN PRIMARY REFERENCE POINT: NORMAL
RAD ONC ARIA PLAN TOTAL FRACTIONS PRESCRIBED: 25
RAD ONC ARIA PLAN TOTAL FRACTIONS PRESCRIBED: 25
RAD ONC ARIA PLAN TOTAL PRESCRIBED DOSE: 5000 CGY
RAD ONC ARIA PLAN TOTAL PRESCRIBED DOSE: 5000 CGY
RAD ONC ARIA REFERENCE POINT DOSAGE GIVEN TO DATE: 12.17 GY
RAD ONC ARIA REFERENCE POINT DOSAGE GIVEN TO DATE: 16 GY
RAD ONC ARIA REFERENCE POINT ID: NORMAL
RAD ONC ARIA REFERENCE POINT SESSION DOSAGE GIVEN: 1.52 GY
RAD ONC ARIA REFERENCE POINT SESSION DOSAGE GIVEN: 2 GY

## 2022-11-03 PROCEDURE — 77412 RADIATION TX DELIVERY LVL 3: CPT | Performed by: STUDENT IN AN ORGANIZED HEALTH CARE EDUCATION/TRAINING PROGRAM

## 2022-11-03 PROCEDURE — 77336 RADIATION PHYSICS CONSULT: CPT | Performed by: RADIOLOGY

## 2022-11-03 PROCEDURE — 77387 GUIDANCE FOR RADJ TX DLVR: CPT | Performed by: STUDENT IN AN ORGANIZED HEALTH CARE EDUCATION/TRAINING PROGRAM

## 2022-11-04 LAB
RAD ONC ARIA COURSE ID: NORMAL
RAD ONC ARIA COURSE INTENT: NORMAL
RAD ONC ARIA COURSE LAST TREATMENT DATE: NORMAL
RAD ONC ARIA COURSE START DATE: NORMAL
RAD ONC ARIA COURSE TREATMENT ELAPSED DAYS: 10
RAD ONC ARIA FIRST TREATMENT DATE: NORMAL
RAD ONC ARIA PLAN FRACTIONS TREATED TO DATE: 9
RAD ONC ARIA PLAN FRACTIONS TREATED TO DATE: 9
RAD ONC ARIA PLAN ID: NORMAL
RAD ONC ARIA PLAN ID: NORMAL
RAD ONC ARIA PLAN PRESCRIBED DOSE PER FRACTION: 2 GY
RAD ONC ARIA PLAN PRESCRIBED DOSE PER FRACTION: 2 GY
RAD ONC ARIA PLAN PRIMARY REFERENCE POINT: NORMAL
RAD ONC ARIA PLAN PRIMARY REFERENCE POINT: NORMAL
RAD ONC ARIA PLAN TOTAL FRACTIONS PRESCRIBED: 25
RAD ONC ARIA PLAN TOTAL FRACTIONS PRESCRIBED: 25
RAD ONC ARIA PLAN TOTAL PRESCRIBED DOSE: 5000 CGY
RAD ONC ARIA PLAN TOTAL PRESCRIBED DOSE: 5000 CGY
RAD ONC ARIA REFERENCE POINT DOSAGE GIVEN TO DATE: 13.69 GY
RAD ONC ARIA REFERENCE POINT DOSAGE GIVEN TO DATE: 18 GY
RAD ONC ARIA REFERENCE POINT ID: NORMAL
RAD ONC ARIA REFERENCE POINT SESSION DOSAGE GIVEN: 1.52 GY
RAD ONC ARIA REFERENCE POINT SESSION DOSAGE GIVEN: 2 GY

## 2022-11-04 PROCEDURE — 77387 GUIDANCE FOR RADJ TX DLVR: CPT | Performed by: STUDENT IN AN ORGANIZED HEALTH CARE EDUCATION/TRAINING PROGRAM

## 2022-11-04 PROCEDURE — 77412 RADIATION TX DELIVERY LVL 3: CPT | Performed by: STUDENT IN AN ORGANIZED HEALTH CARE EDUCATION/TRAINING PROGRAM

## 2022-11-07 ENCOUNTER — TELEPHONE (OUTPATIENT)
Dept: ONCOLOGY | Facility: CLINIC | Age: 37
End: 2022-11-07

## 2022-11-07 LAB
RAD ONC ARIA COURSE ID: NORMAL
RAD ONC ARIA COURSE INTENT: NORMAL
RAD ONC ARIA COURSE LAST TREATMENT DATE: NORMAL
RAD ONC ARIA COURSE START DATE: NORMAL
RAD ONC ARIA COURSE TREATMENT ELAPSED DAYS: 13
RAD ONC ARIA FIRST TREATMENT DATE: NORMAL
RAD ONC ARIA PLAN FRACTIONS TREATED TO DATE: 10
RAD ONC ARIA PLAN FRACTIONS TREATED TO DATE: 10
RAD ONC ARIA PLAN ID: NORMAL
RAD ONC ARIA PLAN ID: NORMAL
RAD ONC ARIA PLAN PRESCRIBED DOSE PER FRACTION: 2 GY
RAD ONC ARIA PLAN PRESCRIBED DOSE PER FRACTION: 2 GY
RAD ONC ARIA PLAN PRIMARY REFERENCE POINT: NORMAL
RAD ONC ARIA PLAN PRIMARY REFERENCE POINT: NORMAL
RAD ONC ARIA PLAN TOTAL FRACTIONS PRESCRIBED: 25
RAD ONC ARIA PLAN TOTAL FRACTIONS PRESCRIBED: 25
RAD ONC ARIA PLAN TOTAL PRESCRIBED DOSE: 5000 CGY
RAD ONC ARIA PLAN TOTAL PRESCRIBED DOSE: 5000 CGY
RAD ONC ARIA REFERENCE POINT DOSAGE GIVEN TO DATE: 15.21 GY
RAD ONC ARIA REFERENCE POINT DOSAGE GIVEN TO DATE: 20 GY
RAD ONC ARIA REFERENCE POINT ID: NORMAL
RAD ONC ARIA REFERENCE POINT SESSION DOSAGE GIVEN: 1.52 GY
RAD ONC ARIA REFERENCE POINT SESSION DOSAGE GIVEN: 2 GY

## 2022-11-07 PROCEDURE — 77412 RADIATION TX DELIVERY LVL 3: CPT | Performed by: STUDENT IN AN ORGANIZED HEALTH CARE EDUCATION/TRAINING PROGRAM

## 2022-11-07 PROCEDURE — 77387 GUIDANCE FOR RADJ TX DLVR: CPT | Performed by: STUDENT IN AN ORGANIZED HEALTH CARE EDUCATION/TRAINING PROGRAM

## 2022-11-08 ENCOUNTER — TELEPHONE (OUTPATIENT)
Dept: ONCOLOGY | Facility: CLINIC | Age: 37
End: 2022-11-08

## 2022-11-08 LAB
RAD ONC ARIA COURSE ID: NORMAL
RAD ONC ARIA COURSE INTENT: NORMAL
RAD ONC ARIA COURSE LAST TREATMENT DATE: NORMAL
RAD ONC ARIA COURSE START DATE: NORMAL
RAD ONC ARIA COURSE TREATMENT ELAPSED DAYS: 14
RAD ONC ARIA FIRST TREATMENT DATE: NORMAL
RAD ONC ARIA PLAN FRACTIONS TREATED TO DATE: 11
RAD ONC ARIA PLAN FRACTIONS TREATED TO DATE: 11
RAD ONC ARIA PLAN ID: NORMAL
RAD ONC ARIA PLAN ID: NORMAL
RAD ONC ARIA PLAN PRESCRIBED DOSE PER FRACTION: 2 GY
RAD ONC ARIA PLAN PRESCRIBED DOSE PER FRACTION: 2 GY
RAD ONC ARIA PLAN PRIMARY REFERENCE POINT: NORMAL
RAD ONC ARIA PLAN PRIMARY REFERENCE POINT: NORMAL
RAD ONC ARIA PLAN TOTAL FRACTIONS PRESCRIBED: 25
RAD ONC ARIA PLAN TOTAL FRACTIONS PRESCRIBED: 25
RAD ONC ARIA PLAN TOTAL PRESCRIBED DOSE: 5000 CGY
RAD ONC ARIA PLAN TOTAL PRESCRIBED DOSE: 5000 CGY
RAD ONC ARIA REFERENCE POINT DOSAGE GIVEN TO DATE: 16.73 GY
RAD ONC ARIA REFERENCE POINT DOSAGE GIVEN TO DATE: 22 GY
RAD ONC ARIA REFERENCE POINT ID: NORMAL
RAD ONC ARIA REFERENCE POINT SESSION DOSAGE GIVEN: 1.52 GY
RAD ONC ARIA REFERENCE POINT SESSION DOSAGE GIVEN: 2 GY

## 2022-11-08 PROCEDURE — 77427 RADIATION TX MANAGEMENT X5: CPT | Performed by: RADIOLOGY

## 2022-11-08 PROCEDURE — 77412 RADIATION TX DELIVERY LVL 3: CPT | Performed by: RADIOLOGY

## 2022-11-08 PROCEDURE — 77387 GUIDANCE FOR RADJ TX DLVR: CPT | Performed by: RADIOLOGY

## 2022-11-08 NOTE — TELEPHONE ENCOUNTER
Caller: Maribell Encarnacion    Relationship: Self    Best call back number: [phone]    What is the best time to reach you: ANYTIME    Who are you requesting to speak with (clinical staff, provider,  specific staff member): JEAN PAUL PRADHAN RN     What was the call regarding: PT WOULD LIKE TO SPEAK WITH JEAN PAUL AGAIN - PLEASE RETURN PTS CALL.     Do you require a callback: YES

## 2022-11-08 NOTE — TELEPHONE ENCOUNTER
Spoke w/ Ivonne ROPER at [facility] whom will discuss w/ both Dr. Cardozo and surgery scheduler to see when she might be able to move patient up on surgery schedule. Chelsea Abbott RN

## 2022-11-08 NOTE — TELEPHONE ENCOUNTER
Received voicemail from Ivonne ROPER. They will be trying to move up patient's surgery to this month or latest next. Relayed this message to patient as well. Pt v/u. Chelsea Abbott RN

## 2022-11-08 NOTE — TELEPHONE ENCOUNTER
Pt has scheduled her surgery on 11/28. She will miss radiation that day as well. She will inform the radiation center. Pt wanted you to be aware.

## 2022-11-08 NOTE — TELEPHONE ENCOUNTER
Called pt to let her know we would reach out to Dr. Cardozo's office to discuss. Pt v/u. Chelsea Abbott RN

## 2022-11-09 ENCOUNTER — RADIATION ONCOLOGY WEEKLY ASSESSMENT (OUTPATIENT)
Dept: RADIATION ONCOLOGY | Facility: HOSPITAL | Age: 37
End: 2022-11-09

## 2022-11-09 VITALS
SYSTOLIC BLOOD PRESSURE: 91 MMHG | BODY MASS INDEX: 22.27 KG/M2 | WEIGHT: 117.8 LBS | HEART RATE: 88 BPM | DIASTOLIC BLOOD PRESSURE: 61 MMHG | OXYGEN SATURATION: 98 %

## 2022-11-09 DIAGNOSIS — C50.412 MALIGNANT NEOPLASM OF UPPER-OUTER QUADRANT OF LEFT BREAST IN FEMALE, ESTROGEN RECEPTOR POSITIVE: Primary | ICD-10-CM

## 2022-11-09 DIAGNOSIS — Z17.0 MALIGNANT NEOPLASM OF UPPER-OUTER QUADRANT OF LEFT BREAST IN FEMALE, ESTROGEN RECEPTOR POSITIVE: Primary | ICD-10-CM

## 2022-11-09 LAB
RAD ONC ARIA COURSE ID: NORMAL
RAD ONC ARIA COURSE INTENT: NORMAL
RAD ONC ARIA COURSE LAST TREATMENT DATE: NORMAL
RAD ONC ARIA COURSE START DATE: NORMAL
RAD ONC ARIA COURSE TREATMENT ELAPSED DAYS: 15
RAD ONC ARIA FIRST TREATMENT DATE: NORMAL
RAD ONC ARIA PLAN FRACTIONS TREATED TO DATE: 12
RAD ONC ARIA PLAN FRACTIONS TREATED TO DATE: 12
RAD ONC ARIA PLAN ID: NORMAL
RAD ONC ARIA PLAN ID: NORMAL
RAD ONC ARIA PLAN PRESCRIBED DOSE PER FRACTION: 2 GY
RAD ONC ARIA PLAN PRESCRIBED DOSE PER FRACTION: 2 GY
RAD ONC ARIA PLAN PRIMARY REFERENCE POINT: NORMAL
RAD ONC ARIA PLAN PRIMARY REFERENCE POINT: NORMAL
RAD ONC ARIA PLAN TOTAL FRACTIONS PRESCRIBED: 25
RAD ONC ARIA PLAN TOTAL FRACTIONS PRESCRIBED: 25
RAD ONC ARIA PLAN TOTAL PRESCRIBED DOSE: 5000 CGY
RAD ONC ARIA PLAN TOTAL PRESCRIBED DOSE: 5000 CGY
RAD ONC ARIA REFERENCE POINT DOSAGE GIVEN TO DATE: 18.25 GY
RAD ONC ARIA REFERENCE POINT DOSAGE GIVEN TO DATE: 24 GY
RAD ONC ARIA REFERENCE POINT ID: NORMAL
RAD ONC ARIA REFERENCE POINT SESSION DOSAGE GIVEN: 1.52 GY
RAD ONC ARIA REFERENCE POINT SESSION DOSAGE GIVEN: 2 GY

## 2022-11-09 PROCEDURE — 77412 RADIATION TX DELIVERY LVL 3: CPT | Performed by: RADIOLOGY

## 2022-11-09 PROCEDURE — FACE2FACE: Performed by: RADIOLOGY

## 2022-11-09 PROCEDURE — 77387 GUIDANCE FOR RADJ TX DLVR: CPT | Performed by: RADIOLOGY

## 2022-11-09 NOTE — PROGRESS NOTES
Patient Name: Maribell Encarncaion Date: 2022       : 1985        MRN #: [id number] Diagnosis:   Encounter Diagnosis   Name Primary?   • Malignant neoplasm of upper-outer quadrant of left breast in female, estrogen receptor positive (HCC) Yes                     RADIATION ON TREATMENT VISIT NOTE - LEFT CHESTWALL    Treatment Summary    Treatment Site Ref. ID Energy Dose/Fx (cGy) #Fx Dose Correction (cGy) Total Dose (cGy) Start Date End Date Elapsed Days   L SCV DIBH Rx: L SCV 18X/6X 200  0 2,400 10/25/2022 2022 15   L CW/IMC_DIBH Rx: L CW 18X/6X 200  0 2,400 10/25/2022 2022 15       Intent:adjuvant  General:           Review of Systems    [x] No new complaints   [] Skin itching   [] Skin breakdown  [] Breast swelling   [] Dysphagia   [] Dry cough  [] Productive cough   [] Fever/chills  [x] Skin soreness with pain, severity: 0   [x] Chest pain,  severity: 0  [x] Fatigue,  severity: 1 Relief w/ Rest       Skin regimen: Aquaphor     [] Patient given standard skin care instructions for breast cancer treatment including no aluminum containing antiperspirants & no unapproved lotions or soaps.  Patient informed to notify radiation staff if irritation/itching occurs.    Comments/Notes:  No CW or skin issues  Denies pain.    KPS: 90%       Vital Signs: BP 91/61   Pulse 88   Wt 53.4 kg (117 lb 12.8 oz)   SpO2 98%   BMI 22.27 kg/m²     Weight:   Wt Readings from Last 3 Encounters:   22 53.4 kg (117 lb 12.8 oz)   22 54 kg (119 lb)   10/31/22 54.4 kg (119 lb 14.4 oz)       Medication:   Current Outpatient Medications:   •  B Complex Vitamins (Vitamin-B Complex) tablet, Take 1 tablet by mouth Daily., Disp: , Rfl: 0  •  Cetirizine HCl (ZYRTEC PO), Take  by mouth Daily., Disp: , Rfl:   •  LORazepam (ATIVAN) 0.5 MG tablet, Take 1 tablet by mouth Every 12 (Twelve) Hours As Needed (Nausea). To be taken every 12 hours as needed for up to one week after chemotherapy, Disp: 28  tablet, Rfl: 0  •  omeprazole (priLOSEC) 20 MG capsule, Take 1 capsule by mouth 2 (Two) Times a Day., Disp: 60 capsule, Rfl: 3  •  ondansetron (Zofran) 8 MG tablet, Take 1 tablet by mouth Every 8 (Eight) Hours As Needed for Nausea or Vomiting., Disp: 30 tablet, Rfl: 5  •  prochlorperazine (COMPAZINE) 10 MG tablet, Take 1 tablet by mouth Every 6 (Six) Hours As Needed for Nausea or Vomiting., Disp: 30 tablet, Rfl: 5       LABS (Reviewed):  Hematology WBC   Date Value Ref Range Status   10/31/2022 3.67 3.40 - 10.80 10*3/mm3 Final     RBC   Date Value Ref Range Status   10/31/2022 3.92 3.77 - 5.28 10*6/mm3 Final     Hemoglobin   Date Value Ref Range Status   10/31/2022 11.8 (L) 12.0 - 15.9 g/dL Final     Hematocrit   Date Value Ref Range Status   10/31/2022 34.4 34.0 - 46.6 % Final     Platelets   Date Value Ref Range Status   10/31/2022 164 140 - 450 10*3/mm3 Final      Chemistry   Lab Results   Component Value Date    GLUCOSE 103 10/31/2022    BUN 13 10/31/2022    CREATININE 0.54 (L) 10/31/2022    BCR 24.1 10/31/2022    K 3.8 10/31/2022    CO2 23.8 10/31/2022    CALCIUM 9.6 10/31/2022    ALBUMIN 4.50 10/31/2022    AST 25 10/31/2022    ALT 18 10/31/2022         Physical Exam:           Neck: [] Lymphadenopathy  Lungs: [x] Clear to auscultation  CVS: [x] Regular rate & rhythm  Skin: [x] ndGndrndanddndend:nd nd2nd Comments/Notes:     [x] Review of labs, images, dosimetry, dose delivered, & treatment parameters.    Comments:     [x] Patient treatment setup reviewed.    Comments:     Recommendations: continue XRT and supportive measures    [x] Continue RT  [] Change RT Plan [] Hold RT, length:        Approved Electronically By:  Lan Anguiano MD, 11/9/2022, 10:52 EST          Confidentiality of this medical record shall be maintained except when use or disclosure is required or permitted by law, regulation or written authorization by the patient.

## 2022-11-10 LAB
RAD ONC ARIA COURSE ID: NORMAL
RAD ONC ARIA COURSE INTENT: NORMAL
RAD ONC ARIA COURSE LAST TREATMENT DATE: NORMAL
RAD ONC ARIA COURSE START DATE: NORMAL
RAD ONC ARIA COURSE TREATMENT ELAPSED DAYS: 16
RAD ONC ARIA FIRST TREATMENT DATE: NORMAL
RAD ONC ARIA PLAN FRACTIONS TREATED TO DATE: 13
RAD ONC ARIA PLAN FRACTIONS TREATED TO DATE: 13
RAD ONC ARIA PLAN ID: NORMAL
RAD ONC ARIA PLAN ID: NORMAL
RAD ONC ARIA PLAN PRESCRIBED DOSE PER FRACTION: 2 GY
RAD ONC ARIA PLAN PRESCRIBED DOSE PER FRACTION: 2 GY
RAD ONC ARIA PLAN PRIMARY REFERENCE POINT: NORMAL
RAD ONC ARIA PLAN PRIMARY REFERENCE POINT: NORMAL
RAD ONC ARIA PLAN TOTAL FRACTIONS PRESCRIBED: 25
RAD ONC ARIA PLAN TOTAL FRACTIONS PRESCRIBED: 25
RAD ONC ARIA PLAN TOTAL PRESCRIBED DOSE: 5000 CGY
RAD ONC ARIA PLAN TOTAL PRESCRIBED DOSE: 5000 CGY
RAD ONC ARIA REFERENCE POINT DOSAGE GIVEN TO DATE: 19.77 GY
RAD ONC ARIA REFERENCE POINT DOSAGE GIVEN TO DATE: 26 GY
RAD ONC ARIA REFERENCE POINT ID: NORMAL
RAD ONC ARIA REFERENCE POINT SESSION DOSAGE GIVEN: 1.52 GY
RAD ONC ARIA REFERENCE POINT SESSION DOSAGE GIVEN: 2 GY

## 2022-11-10 PROCEDURE — 77412 RADIATION TX DELIVERY LVL 3: CPT | Performed by: RADIOLOGY

## 2022-11-10 PROCEDURE — 77387 GUIDANCE FOR RADJ TX DLVR: CPT | Performed by: RADIOLOGY

## 2022-11-10 PROCEDURE — 77336 RADIATION PHYSICS CONSULT: CPT | Performed by: RADIOLOGY

## 2022-11-11 LAB
RAD ONC ARIA COURSE ID: NORMAL
RAD ONC ARIA COURSE INTENT: NORMAL
RAD ONC ARIA COURSE LAST TREATMENT DATE: NORMAL
RAD ONC ARIA COURSE START DATE: NORMAL
RAD ONC ARIA COURSE TREATMENT ELAPSED DAYS: 17
RAD ONC ARIA FIRST TREATMENT DATE: NORMAL
RAD ONC ARIA PLAN FRACTIONS TREATED TO DATE: 14
RAD ONC ARIA PLAN FRACTIONS TREATED TO DATE: 14
RAD ONC ARIA PLAN ID: NORMAL
RAD ONC ARIA PLAN ID: NORMAL
RAD ONC ARIA PLAN PRESCRIBED DOSE PER FRACTION: 2 GY
RAD ONC ARIA PLAN PRESCRIBED DOSE PER FRACTION: 2 GY
RAD ONC ARIA PLAN PRIMARY REFERENCE POINT: NORMAL
RAD ONC ARIA PLAN PRIMARY REFERENCE POINT: NORMAL
RAD ONC ARIA PLAN TOTAL FRACTIONS PRESCRIBED: 25
RAD ONC ARIA PLAN TOTAL FRACTIONS PRESCRIBED: 25
RAD ONC ARIA PLAN TOTAL PRESCRIBED DOSE: 5000 CGY
RAD ONC ARIA PLAN TOTAL PRESCRIBED DOSE: 5000 CGY
RAD ONC ARIA REFERENCE POINT DOSAGE GIVEN TO DATE: 21.29 GY
RAD ONC ARIA REFERENCE POINT DOSAGE GIVEN TO DATE: 28 GY
RAD ONC ARIA REFERENCE POINT ID: NORMAL
RAD ONC ARIA REFERENCE POINT SESSION DOSAGE GIVEN: 1.52 GY
RAD ONC ARIA REFERENCE POINT SESSION DOSAGE GIVEN: 2 GY

## 2022-11-11 PROCEDURE — 77387 GUIDANCE FOR RADJ TX DLVR: CPT | Performed by: STUDENT IN AN ORGANIZED HEALTH CARE EDUCATION/TRAINING PROGRAM

## 2022-11-11 PROCEDURE — 77412 RADIATION TX DELIVERY LVL 3: CPT | Performed by: STUDENT IN AN ORGANIZED HEALTH CARE EDUCATION/TRAINING PROGRAM

## 2022-11-14 LAB
RAD ONC ARIA COURSE ID: NORMAL
RAD ONC ARIA COURSE INTENT: NORMAL
RAD ONC ARIA COURSE LAST TREATMENT DATE: NORMAL
RAD ONC ARIA COURSE START DATE: NORMAL
RAD ONC ARIA COURSE TREATMENT ELAPSED DAYS: 20
RAD ONC ARIA FIRST TREATMENT DATE: NORMAL
RAD ONC ARIA PLAN FRACTIONS TREATED TO DATE: 15
RAD ONC ARIA PLAN FRACTIONS TREATED TO DATE: 15
RAD ONC ARIA PLAN ID: NORMAL
RAD ONC ARIA PLAN ID: NORMAL
RAD ONC ARIA PLAN PRESCRIBED DOSE PER FRACTION: 2 GY
RAD ONC ARIA PLAN PRESCRIBED DOSE PER FRACTION: 2 GY
RAD ONC ARIA PLAN PRIMARY REFERENCE POINT: NORMAL
RAD ONC ARIA PLAN PRIMARY REFERENCE POINT: NORMAL
RAD ONC ARIA PLAN TOTAL FRACTIONS PRESCRIBED: 25
RAD ONC ARIA PLAN TOTAL FRACTIONS PRESCRIBED: 25
RAD ONC ARIA PLAN TOTAL PRESCRIBED DOSE: 5000 CGY
RAD ONC ARIA PLAN TOTAL PRESCRIBED DOSE: 5000 CGY
RAD ONC ARIA REFERENCE POINT DOSAGE GIVEN TO DATE: 22.81 GY
RAD ONC ARIA REFERENCE POINT DOSAGE GIVEN TO DATE: 30 GY
RAD ONC ARIA REFERENCE POINT ID: NORMAL
RAD ONC ARIA REFERENCE POINT SESSION DOSAGE GIVEN: 1.52 GY
RAD ONC ARIA REFERENCE POINT SESSION DOSAGE GIVEN: 2 GY

## 2022-11-14 PROCEDURE — 77412 RADIATION TX DELIVERY LVL 3: CPT | Performed by: STUDENT IN AN ORGANIZED HEALTH CARE EDUCATION/TRAINING PROGRAM

## 2022-11-14 PROCEDURE — 77387 GUIDANCE FOR RADJ TX DLVR: CPT | Performed by: STUDENT IN AN ORGANIZED HEALTH CARE EDUCATION/TRAINING PROGRAM

## 2022-11-15 LAB
RAD ONC ARIA COURSE ID: NORMAL
RAD ONC ARIA COURSE INTENT: NORMAL
RAD ONC ARIA COURSE LAST TREATMENT DATE: NORMAL
RAD ONC ARIA COURSE START DATE: NORMAL
RAD ONC ARIA COURSE TREATMENT ELAPSED DAYS: 21
RAD ONC ARIA FIRST TREATMENT DATE: NORMAL
RAD ONC ARIA PLAN FRACTIONS TREATED TO DATE: 16
RAD ONC ARIA PLAN FRACTIONS TREATED TO DATE: 16
RAD ONC ARIA PLAN ID: NORMAL
RAD ONC ARIA PLAN ID: NORMAL
RAD ONC ARIA PLAN PRESCRIBED DOSE PER FRACTION: 2 GY
RAD ONC ARIA PLAN PRESCRIBED DOSE PER FRACTION: 2 GY
RAD ONC ARIA PLAN PRIMARY REFERENCE POINT: NORMAL
RAD ONC ARIA PLAN PRIMARY REFERENCE POINT: NORMAL
RAD ONC ARIA PLAN TOTAL FRACTIONS PRESCRIBED: 25
RAD ONC ARIA PLAN TOTAL FRACTIONS PRESCRIBED: 25
RAD ONC ARIA PLAN TOTAL PRESCRIBED DOSE: 5000 CGY
RAD ONC ARIA PLAN TOTAL PRESCRIBED DOSE: 5000 CGY
RAD ONC ARIA REFERENCE POINT DOSAGE GIVEN TO DATE: 24.33 GY
RAD ONC ARIA REFERENCE POINT DOSAGE GIVEN TO DATE: 32 GY
RAD ONC ARIA REFERENCE POINT ID: NORMAL
RAD ONC ARIA REFERENCE POINT SESSION DOSAGE GIVEN: 1.52 GY
RAD ONC ARIA REFERENCE POINT SESSION DOSAGE GIVEN: 2 GY

## 2022-11-15 PROCEDURE — 77427 RADIATION TX MANAGEMENT X5: CPT | Performed by: RADIOLOGY

## 2022-11-15 PROCEDURE — 77387 GUIDANCE FOR RADJ TX DLVR: CPT | Performed by: RADIOLOGY

## 2022-11-15 PROCEDURE — 77412 RADIATION TX DELIVERY LVL 3: CPT | Performed by: RADIOLOGY

## 2022-11-16 ENCOUNTER — RADIATION ONCOLOGY WEEKLY ASSESSMENT (OUTPATIENT)
Dept: RADIATION ONCOLOGY | Facility: HOSPITAL | Age: 37
End: 2022-11-16

## 2022-11-16 VITALS
TEMPERATURE: 98.5 F | DIASTOLIC BLOOD PRESSURE: 70 MMHG | HEART RATE: 62 BPM | BODY MASS INDEX: 22.19 KG/M2 | SYSTOLIC BLOOD PRESSURE: 101 MMHG | OXYGEN SATURATION: 94 % | WEIGHT: 117.4 LBS

## 2022-11-16 DIAGNOSIS — C50.412 MALIGNANT NEOPLASM OF UPPER-OUTER QUADRANT OF LEFT BREAST IN FEMALE, ESTROGEN RECEPTOR POSITIVE: Primary | ICD-10-CM

## 2022-11-16 DIAGNOSIS — Z17.0 MALIGNANT NEOPLASM OF UPPER-OUTER QUADRANT OF LEFT BREAST IN FEMALE, ESTROGEN RECEPTOR POSITIVE: Primary | ICD-10-CM

## 2022-11-16 LAB
RAD ONC ARIA COURSE ID: NORMAL
RAD ONC ARIA COURSE INTENT: NORMAL
RAD ONC ARIA COURSE LAST TREATMENT DATE: NORMAL
RAD ONC ARIA COURSE START DATE: NORMAL
RAD ONC ARIA COURSE TREATMENT ELAPSED DAYS: 22
RAD ONC ARIA FIRST TREATMENT DATE: NORMAL
RAD ONC ARIA PLAN FRACTIONS TREATED TO DATE: 17
RAD ONC ARIA PLAN FRACTIONS TREATED TO DATE: 17
RAD ONC ARIA PLAN ID: NORMAL
RAD ONC ARIA PLAN ID: NORMAL
RAD ONC ARIA PLAN PRESCRIBED DOSE PER FRACTION: 2 GY
RAD ONC ARIA PLAN PRESCRIBED DOSE PER FRACTION: 2 GY
RAD ONC ARIA PLAN PRIMARY REFERENCE POINT: NORMAL
RAD ONC ARIA PLAN PRIMARY REFERENCE POINT: NORMAL
RAD ONC ARIA PLAN TOTAL FRACTIONS PRESCRIBED: 25
RAD ONC ARIA PLAN TOTAL FRACTIONS PRESCRIBED: 25
RAD ONC ARIA PLAN TOTAL PRESCRIBED DOSE: 5000 CGY
RAD ONC ARIA PLAN TOTAL PRESCRIBED DOSE: 5000 CGY
RAD ONC ARIA REFERENCE POINT DOSAGE GIVEN TO DATE: 25.86 GY
RAD ONC ARIA REFERENCE POINT DOSAGE GIVEN TO DATE: 34 GY
RAD ONC ARIA REFERENCE POINT ID: NORMAL
RAD ONC ARIA REFERENCE POINT SESSION DOSAGE GIVEN: 1.52 GY
RAD ONC ARIA REFERENCE POINT SESSION DOSAGE GIVEN: 2 GY

## 2022-11-16 PROCEDURE — 77412 RADIATION TX DELIVERY LVL 3: CPT | Performed by: RADIOLOGY

## 2022-11-16 PROCEDURE — 77387 GUIDANCE FOR RADJ TX DLVR: CPT | Performed by: RADIOLOGY

## 2022-11-16 PROCEDURE — FACE2FACE: Performed by: RADIOLOGY

## 2022-11-16 NOTE — PROGRESS NOTES
Patient Name: Maribell Romero Date: 2022       : 1985        MRN #: [id number] Diagnosis:   Encounter Diagnosis   Name Primary?   • Malignant neoplasm of upper-outer quadrant of left breast in female, estrogen receptor positive (HCC) Yes                 RADIATION ON TREATMENT VISIT NOTE - Left Chestwall    Treatment Summary     Treatment Site Ref. ID Energy Dose/Fx (cGy) #Fx Dose Correction (cGy) Total Dose (cGy) Start Date End Date Elapsed Days   L SCV DIBH Rx: L SCV 18X/6X 200  0 3,400 10/25/2022 2022 22   L CW/IMC_DIBH Rx: L CW 18X/6X 200  0 3,400 10/25/2022 2022 22       General:           Review of Systems    [x] No new complaints   [] Skin itching   [] Skin breakdown  [] Breast swelling   [] Dysphagia   [] Dry cough  [] Productive cough   [] Fever/chills  [x] Skin soreness with pain, severity: 0   [x] Breast pain,  severity: 0, location:   [x] Fatigue,  severity: 0 None      Skin regimen: Aquaphor     [] Patient given standard skin care instructions for breast cancer treatment including no aluminum containing antiperspirants & no unapproved lotions or soaps.  Patient informed to notify radiation staff if irritation/itching occurs.    Comments/Notes:  No issues with therapy    KPS: 90%       Vital Signs: /70   Pulse 62   Temp 98.5 °F (36.9 °C)   Wt 53.3 kg (117 lb 6.4 oz)   SpO2 94%   BMI 22.19 kg/m²     Weight:   Wt Readings from Last 3 Encounters:   22 53.3 kg (117 lb 6.4 oz)   22 53.4 kg (117 lb 12.8 oz)   22 54 kg (119 lb)       Medication:   Current Outpatient Medications:   •  B Complex Vitamins (Vitamin-B Complex) tablet, Take 1 tablet by mouth Daily., Disp: , Rfl: 0  •  Cetirizine HCl (ZYRTEC PO), Take  by mouth Daily., Disp: , Rfl:   •  LORazepam (ATIVAN) 0.5 MG tablet, Take 1 tablet by mouth Every 12 (Twelve) Hours As Needed (Nausea). To be taken every 12 hours as needed for up to one week after chemotherapy, Disp: 28  tablet, Rfl: 0  •  omeprazole (priLOSEC) 20 MG capsule, Take 1 capsule by mouth 2 (Two) Times a Day., Disp: 60 capsule, Rfl: 3  •  ondansetron (Zofran) 8 MG tablet, Take 1 tablet by mouth Every 8 (Eight) Hours As Needed for Nausea or Vomiting., Disp: 30 tablet, Rfl: 5  •  prochlorperazine (COMPAZINE) 10 MG tablet, Take 1 tablet by mouth Every 6 (Six) Hours As Needed for Nausea or Vomiting., Disp: 30 tablet, Rfl: 5       LABS (Reviewed):  Hematology WBC   Date Value Ref Range Status   10/31/2022 3.67 3.40 - 10.80 10*3/mm3 Final     RBC   Date Value Ref Range Status   10/31/2022 3.92 3.77 - 5.28 10*6/mm3 Final     Hemoglobin   Date Value Ref Range Status   10/31/2022 11.8 (L) 12.0 - 15.9 g/dL Final     Hematocrit   Date Value Ref Range Status   10/31/2022 34.4 34.0 - 46.6 % Final     Platelets   Date Value Ref Range Status   10/31/2022 164 140 - 450 10*3/mm3 Final      Chemistry   Lab Results   Component Value Date    GLUCOSE 103 10/31/2022    BUN 13 10/31/2022    CREATININE 0.54 (L) 10/31/2022    BCR 24.1 10/31/2022    K 3.8 10/31/2022    CO2 23.8 10/31/2022    CALCIUM 9.6 10/31/2022    ALBUMIN 4.50 10/31/2022    AST 25 10/31/2022    ALT 18 10/31/2022         Physical Exam:           Neck: [] Lymphadenopathy  Lungs: [x] Clear to auscultation  CVS: [x] Regular rate & rhythm  Skin: [x] ndGndrndanddndend:nd nd2nd Comments/Notes: very faint hyperpigmentation.    [x] Review of labs, images, dosimetry, dose delivered, & treatment parameters.    Comments:     [x] Patient treatment setup reviewed.    Comments:     Recommendations: continue XRT and supportive measures  Continue skin care    [x] Continue RT  [] Change RT Plan [] Hold RT, length:        Approved Electronically By:  Lan Anguiano MD, 11/16/2022, 14:41 EST          Confidentiality of this medical record shall be maintained except when use or disclosure is required or permitted by law, regulation or written authorization by the patient.

## 2022-11-17 LAB
RAD ONC ARIA COURSE ID: NORMAL
RAD ONC ARIA COURSE INTENT: NORMAL
RAD ONC ARIA COURSE LAST TREATMENT DATE: NORMAL
RAD ONC ARIA COURSE START DATE: NORMAL
RAD ONC ARIA COURSE TREATMENT ELAPSED DAYS: 23
RAD ONC ARIA FIRST TREATMENT DATE: NORMAL
RAD ONC ARIA PLAN FRACTIONS TREATED TO DATE: 18
RAD ONC ARIA PLAN FRACTIONS TREATED TO DATE: 18
RAD ONC ARIA PLAN ID: NORMAL
RAD ONC ARIA PLAN ID: NORMAL
RAD ONC ARIA PLAN PRESCRIBED DOSE PER FRACTION: 2 GY
RAD ONC ARIA PLAN PRESCRIBED DOSE PER FRACTION: 2 GY
RAD ONC ARIA PLAN PRIMARY REFERENCE POINT: NORMAL
RAD ONC ARIA PLAN PRIMARY REFERENCE POINT: NORMAL
RAD ONC ARIA PLAN TOTAL FRACTIONS PRESCRIBED: 25
RAD ONC ARIA PLAN TOTAL FRACTIONS PRESCRIBED: 25
RAD ONC ARIA PLAN TOTAL PRESCRIBED DOSE: 5000 CGY
RAD ONC ARIA PLAN TOTAL PRESCRIBED DOSE: 5000 CGY
RAD ONC ARIA REFERENCE POINT DOSAGE GIVEN TO DATE: 27.38 GY
RAD ONC ARIA REFERENCE POINT DOSAGE GIVEN TO DATE: 36 GY
RAD ONC ARIA REFERENCE POINT ID: NORMAL
RAD ONC ARIA REFERENCE POINT SESSION DOSAGE GIVEN: 1.52 GY
RAD ONC ARIA REFERENCE POINT SESSION DOSAGE GIVEN: 2 GY

## 2022-11-17 PROCEDURE — 77412 RADIATION TX DELIVERY LVL 3: CPT | Performed by: STUDENT IN AN ORGANIZED HEALTH CARE EDUCATION/TRAINING PROGRAM

## 2022-11-17 PROCEDURE — 77387 GUIDANCE FOR RADJ TX DLVR: CPT | Performed by: STUDENT IN AN ORGANIZED HEALTH CARE EDUCATION/TRAINING PROGRAM

## 2022-11-17 PROCEDURE — 77336 RADIATION PHYSICS CONSULT: CPT | Performed by: RADIOLOGY

## 2022-11-18 LAB
RAD ONC ARIA COURSE ID: NORMAL
RAD ONC ARIA COURSE INTENT: NORMAL
RAD ONC ARIA COURSE LAST TREATMENT DATE: NORMAL
RAD ONC ARIA COURSE START DATE: NORMAL
RAD ONC ARIA COURSE TREATMENT ELAPSED DAYS: 24
RAD ONC ARIA FIRST TREATMENT DATE: NORMAL
RAD ONC ARIA PLAN FRACTIONS TREATED TO DATE: 19
RAD ONC ARIA PLAN FRACTIONS TREATED TO DATE: 19
RAD ONC ARIA PLAN ID: NORMAL
RAD ONC ARIA PLAN ID: NORMAL
RAD ONC ARIA PLAN PRESCRIBED DOSE PER FRACTION: 2 GY
RAD ONC ARIA PLAN PRESCRIBED DOSE PER FRACTION: 2 GY
RAD ONC ARIA PLAN PRIMARY REFERENCE POINT: NORMAL
RAD ONC ARIA PLAN PRIMARY REFERENCE POINT: NORMAL
RAD ONC ARIA PLAN TOTAL FRACTIONS PRESCRIBED: 25
RAD ONC ARIA PLAN TOTAL FRACTIONS PRESCRIBED: 25
RAD ONC ARIA PLAN TOTAL PRESCRIBED DOSE: 5000 CGY
RAD ONC ARIA PLAN TOTAL PRESCRIBED DOSE: 5000 CGY
RAD ONC ARIA REFERENCE POINT DOSAGE GIVEN TO DATE: 28.9 GY
RAD ONC ARIA REFERENCE POINT DOSAGE GIVEN TO DATE: 38 GY
RAD ONC ARIA REFERENCE POINT ID: NORMAL
RAD ONC ARIA REFERENCE POINT SESSION DOSAGE GIVEN: 1.52 GY
RAD ONC ARIA REFERENCE POINT SESSION DOSAGE GIVEN: 2 GY

## 2022-11-18 PROCEDURE — 77387 GUIDANCE FOR RADJ TX DLVR: CPT | Performed by: RADIOLOGY

## 2022-11-18 PROCEDURE — 77412 RADIATION TX DELIVERY LVL 3: CPT | Performed by: RADIOLOGY

## 2022-11-20 ENCOUNTER — TREATMENT (OUTPATIENT)
Dept: RADIATION ONCOLOGY | Facility: HOSPITAL | Age: 37
End: 2022-11-20

## 2022-11-20 LAB
RAD ONC ARIA COURSE ID: NORMAL
RAD ONC ARIA COURSE INTENT: NORMAL
RAD ONC ARIA COURSE LAST TREATMENT DATE: NORMAL
RAD ONC ARIA COURSE START DATE: NORMAL
RAD ONC ARIA COURSE TREATMENT ELAPSED DAYS: 26
RAD ONC ARIA FIRST TREATMENT DATE: NORMAL
RAD ONC ARIA PLAN FRACTIONS TREATED TO DATE: 20
RAD ONC ARIA PLAN FRACTIONS TREATED TO DATE: 20
RAD ONC ARIA PLAN ID: NORMAL
RAD ONC ARIA PLAN ID: NORMAL
RAD ONC ARIA PLAN PRESCRIBED DOSE PER FRACTION: 2 GY
RAD ONC ARIA PLAN PRESCRIBED DOSE PER FRACTION: 2 GY
RAD ONC ARIA PLAN PRIMARY REFERENCE POINT: NORMAL
RAD ONC ARIA PLAN PRIMARY REFERENCE POINT: NORMAL
RAD ONC ARIA PLAN TOTAL FRACTIONS PRESCRIBED: 25
RAD ONC ARIA PLAN TOTAL FRACTIONS PRESCRIBED: 25
RAD ONC ARIA PLAN TOTAL PRESCRIBED DOSE: 5000 CGY
RAD ONC ARIA PLAN TOTAL PRESCRIBED DOSE: 5000 CGY
RAD ONC ARIA REFERENCE POINT DOSAGE GIVEN TO DATE: 30.42 GY
RAD ONC ARIA REFERENCE POINT DOSAGE GIVEN TO DATE: 40 GY
RAD ONC ARIA REFERENCE POINT ID: NORMAL
RAD ONC ARIA REFERENCE POINT SESSION DOSAGE GIVEN: 1.52 GY
RAD ONC ARIA REFERENCE POINT SESSION DOSAGE GIVEN: 2 GY

## 2022-11-20 PROCEDURE — 77412 RADIATION TX DELIVERY LVL 3: CPT | Performed by: RADIOLOGY

## 2022-11-20 PROCEDURE — 77427 RADIATION TX MANAGEMENT X5: CPT | Performed by: RADIOLOGY

## 2022-11-20 PROCEDURE — 77387 GUIDANCE FOR RADJ TX DLVR: CPT | Performed by: RADIOLOGY

## 2022-11-21 ENCOUNTER — HOSPITAL ENCOUNTER (OUTPATIENT)
Dept: CARDIOLOGY | Facility: HOSPITAL | Age: 37
Discharge: HOME | End: 2022-11-21

## 2022-11-21 ENCOUNTER — PRE-ADMISSION TESTING (OUTPATIENT)
Dept: PREADMISSION TESTING | Facility: HOSPITAL | Age: 37
End: 2022-11-21

## 2022-11-21 VITALS
SYSTOLIC BLOOD PRESSURE: 100 MMHG | BODY MASS INDEX: 21.09 KG/M2 | WEIGHT: 119 LBS | HEIGHT: 63 IN | DIASTOLIC BLOOD PRESSURE: 66 MMHG | RESPIRATION RATE: 16 BRPM | OXYGEN SATURATION: 98 % | HEART RATE: 79 BPM | TEMPERATURE: 97.7 F

## 2022-11-21 VITALS
WEIGHT: 117 LBS | BODY MASS INDEX: 22.09 KG/M2 | HEART RATE: 74 BPM | SYSTOLIC BLOOD PRESSURE: 110 MMHG | DIASTOLIC BLOOD PRESSURE: 60 MMHG | HEIGHT: 61 IN

## 2022-11-21 DIAGNOSIS — Z09 CHEMOTHERAPY FOLLOW-UP EXAMINATION: ICD-10-CM

## 2022-11-21 DIAGNOSIS — Z17.0 MALIGNANT NEOPLASM OF LEFT BREAST IN FEMALE, ESTROGEN RECEPTOR POSITIVE, UNSPECIFIED SITE OF BREAST: ICD-10-CM

## 2022-11-21 DIAGNOSIS — C50.912 MALIGNANT NEOPLASM OF LEFT BREAST IN FEMALE, ESTROGEN RECEPTOR POSITIVE, UNSPECIFIED SITE OF BREAST: ICD-10-CM

## 2022-11-21 LAB
ABO GROUP BLD: NORMAL
BASOPHILS # BLD AUTO: 0.02 10*3/MM3 (ref 0–0.2)
BASOPHILS NFR BLD AUTO: 0.5 % (ref 0–1.5)
BH CV ECHO LEFT VENTRICLE GLOBAL LONGITUDINAL STRAIN: -24.1 %
BH CV ECHO MEAS - EDV(CUBED): 49.6 ML
BH CV ECHO MEAS - EDV(MOD-SP2): 80 ML
BH CV ECHO MEAS - EDV(MOD-SP4): 83 ML
BH CV ECHO MEAS - EF(MOD-BP): 60.7 %
BH CV ECHO MEAS - EF(MOD-SP2): 61.3 %
BH CV ECHO MEAS - EF(MOD-SP4): 62.7 %
BH CV ECHO MEAS - EF_3D-VOL: 66 %
BH CV ECHO MEAS - ESV(CUBED): 10.7 ML
BH CV ECHO MEAS - ESV(MOD-SP2): 31 ML
BH CV ECHO MEAS - ESV(MOD-SP4): 31 ML
BH CV ECHO MEAS - FS: 40 %
BH CV ECHO MEAS - IVS/LVPW: 1.03 CM
BH CV ECHO MEAS - IVSD: 0.97 CM
BH CV ECHO MEAS - LA 3D VOL INDEX: 30
BH CV ECHO MEAS - LAT PEAK E' VEL: 17 CM/SEC
BH CV ECHO MEAS - LV DIASTOLIC VOL/BSA (35-75): 55.2 CM2
BH CV ECHO MEAS - LV MASS(C)D: 103.8 GRAMS
BH CV ECHO MEAS - LV SYSTOLIC VOL/BSA (12-30): 20.6 CM2
BH CV ECHO MEAS - LVIDD: 3.7 CM
BH CV ECHO MEAS - LVIDS: 2.2 CM
BH CV ECHO MEAS - LVPWD: 0.94 CM
BH CV ECHO MEAS - MED PEAK E' VEL: 13.3 CM/SEC
BH CV ECHO MEAS - MV A DUR: 0.16 SEC
BH CV ECHO MEAS - MV A MAX VEL: 95.1 CM/SEC
BH CV ECHO MEAS - MV DEC SLOPE: 775.7 CM/SEC2
BH CV ECHO MEAS - MV DEC TIME: 0.19 MSEC
BH CV ECHO MEAS - MV E MAX VEL: 102 CM/SEC
BH CV ECHO MEAS - MV E/A: 1.07
BH CV ECHO MEAS - MV MAX PG: 7.4 MMHG
BH CV ECHO MEAS - MV MEAN PG: 2.6 MMHG
BH CV ECHO MEAS - MV P1/2T: 50.7 MSEC
BH CV ECHO MEAS - MV V2 VTI: 29.2 CM
BH CV ECHO MEAS - MVA(P1/2T): 4.3 CM2
BH CV ECHO MEAS - PULM A REVS DUR: 0.15 SEC
BH CV ECHO MEAS - PULM A REVS VEL: 24.9 CM/SEC
BH CV ECHO MEAS - PULM DIAS VEL: 35.6 CM/SEC
BH CV ECHO MEAS - PULM S/D: 0.8
BH CV ECHO MEAS - PULM SYS VEL: 28.3 CM/SEC
BH CV ECHO MEAS - SI(MOD-SP2): 32.6 ML/M2
BH CV ECHO MEAS - SI(MOD-SP4): 34.6 ML/M2
BH CV ECHO MEAS - SV(MOD-SP2): 49 ML
BH CV ECHO MEAS - SV(MOD-SP4): 52 ML
BH CV ECHO MEASUREMENTS AVERAGE E/E' RATIO: 6.73
BLD GP AB SCN SERPL QL: NEGATIVE
DEPRECATED RDW RBC AUTO: 37.1 FL (ref 37–54)
EOSINOPHIL # BLD AUTO: 0.28 10*3/MM3 (ref 0–0.4)
EOSINOPHIL NFR BLD AUTO: 6.8 % (ref 0.3–6.2)
ERYTHROCYTE [DISTWIDTH] IN BLOOD BY AUTOMATED COUNT: 11.7 % (ref 12.3–15.4)
HCT VFR BLD AUTO: 35.1 % (ref 34–46.6)
HGB BLD-MCNC: 11.9 G/DL (ref 12–15.9)
IMM GRANULOCYTES # BLD AUTO: 0.02 10*3/MM3 (ref 0–0.05)
IMM GRANULOCYTES NFR BLD AUTO: 0.5 % (ref 0–0.5)
LEFT ATRIUM VOLUME INDEX: 15.1 ML/M2
LYMPHOCYTES # BLD AUTO: 0.59 10*3/MM3 (ref 0.7–3.1)
LYMPHOCYTES NFR BLD AUTO: 14.3 % (ref 19.6–45.3)
MAXIMAL PREDICTED HEART RATE: 182 BPM
MCH RBC QN AUTO: 29.2 PG (ref 26.6–33)
MCHC RBC AUTO-ENTMCNC: 33.9 G/DL (ref 31.5–35.7)
MCV RBC AUTO: 86 FL (ref 79–97)
MONOCYTES # BLD AUTO: 0.44 10*3/MM3 (ref 0.1–0.9)
MONOCYTES NFR BLD AUTO: 10.6 % (ref 5–12)
NEUTROPHILS NFR BLD AUTO: 2.79 10*3/MM3 (ref 1.7–7)
NEUTROPHILS NFR BLD AUTO: 67.3 % (ref 42.7–76)
NRBC BLD AUTO-RTO: 0 /100 WBC (ref 0–0.2)
PLATELET # BLD AUTO: 158 10*3/MM3 (ref 140–450)
PMV BLD AUTO: 10.5 FL (ref 6–12)
RAD ONC ARIA COURSE ID: NORMAL
RAD ONC ARIA COURSE INTENT: NORMAL
RAD ONC ARIA COURSE LAST TREATMENT DATE: NORMAL
RAD ONC ARIA COURSE START DATE: NORMAL
RAD ONC ARIA COURSE TREATMENT ELAPSED DAYS: 27
RAD ONC ARIA FIRST TREATMENT DATE: NORMAL
RAD ONC ARIA PLAN FRACTIONS TREATED TO DATE: 21
RAD ONC ARIA PLAN FRACTIONS TREATED TO DATE: 21
RAD ONC ARIA PLAN ID: NORMAL
RAD ONC ARIA PLAN ID: NORMAL
RAD ONC ARIA PLAN PRESCRIBED DOSE PER FRACTION: 2 GY
RAD ONC ARIA PLAN PRESCRIBED DOSE PER FRACTION: 2 GY
RAD ONC ARIA PLAN PRIMARY REFERENCE POINT: NORMAL
RAD ONC ARIA PLAN PRIMARY REFERENCE POINT: NORMAL
RAD ONC ARIA PLAN TOTAL FRACTIONS PRESCRIBED: 25
RAD ONC ARIA PLAN TOTAL FRACTIONS PRESCRIBED: 25
RAD ONC ARIA PLAN TOTAL PRESCRIBED DOSE: 5000 CGY
RAD ONC ARIA PLAN TOTAL PRESCRIBED DOSE: 5000 CGY
RAD ONC ARIA REFERENCE POINT DOSAGE GIVEN TO DATE: 31.94 GY
RAD ONC ARIA REFERENCE POINT DOSAGE GIVEN TO DATE: 42 GY
RAD ONC ARIA REFERENCE POINT ID: NORMAL
RAD ONC ARIA REFERENCE POINT SESSION DOSAGE GIVEN: 1.52 GY
RAD ONC ARIA REFERENCE POINT SESSION DOSAGE GIVEN: 2 GY
RBC # BLD AUTO: 4.08 10*6/MM3 (ref 3.77–5.28)
RH BLD: POSITIVE
STRESS TARGET HR: 155 BPM
T&S EXPIRATION DATE: NORMAL
WBC NRBC COR # BLD: 4.14 10*3/MM3 (ref 3.4–10.8)

## 2022-11-21 PROCEDURE — 85025 COMPLETE CBC W/AUTO DIFF WBC: CPT

## 2022-11-21 PROCEDURE — 93321 DOPPLER ECHO F-UP/LMTD STD: CPT

## 2022-11-21 PROCEDURE — 93308 TTE F-UP OR LMTD: CPT | Performed by: INTERNAL MEDICINE

## 2022-11-21 PROCEDURE — 36415 COLL VENOUS BLD VENIPUNCTURE: CPT

## 2022-11-21 PROCEDURE — 93356 MYOCRD STRAIN IMG SPCKL TRCK: CPT

## 2022-11-21 PROCEDURE — 93325 DOPPLER ECHO COLOR FLOW MAPG: CPT | Performed by: INTERNAL MEDICINE

## 2022-11-21 PROCEDURE — 77412 RADIATION TX DELIVERY LVL 3: CPT | Performed by: STUDENT IN AN ORGANIZED HEALTH CARE EDUCATION/TRAINING PROGRAM

## 2022-11-21 PROCEDURE — 86901 BLOOD TYPING SEROLOGIC RH(D): CPT

## 2022-11-21 PROCEDURE — 93325 DOPPLER ECHO COLOR FLOW MAPG: CPT

## 2022-11-21 PROCEDURE — 93308 TTE F-UP OR LMTD: CPT

## 2022-11-21 PROCEDURE — 86900 BLOOD TYPING SEROLOGIC ABO: CPT

## 2022-11-21 PROCEDURE — 93356 MYOCRD STRAIN IMG SPCKL TRCK: CPT | Performed by: INTERNAL MEDICINE

## 2022-11-21 PROCEDURE — 77387 GUIDANCE FOR RADJ TX DLVR: CPT | Performed by: STUDENT IN AN ORGANIZED HEALTH CARE EDUCATION/TRAINING PROGRAM

## 2022-11-21 PROCEDURE — 25010000002 PERFLUTREN (DEFINITY) 8.476 MG IN SODIUM CHLORIDE (PF) 0.9 % 10 ML INJECTION: Performed by: INTERNAL MEDICINE

## 2022-11-21 PROCEDURE — 86850 RBC ANTIBODY SCREEN: CPT

## 2022-11-21 PROCEDURE — 93321 DOPPLER ECHO F-UP/LMTD STD: CPT | Performed by: INTERNAL MEDICINE

## 2022-11-21 RX ORDER — ACETAMINOPHEN 325 MG/1
650 TABLET ORAL EVERY 6 HOURS PRN
COMMUNITY
End: 2023-04-03

## 2022-11-21 RX ORDER — ATORVASTATIN CALCIUM 10 MG/1
10 TABLET, FILM COATED ORAL NIGHTLY
COMMUNITY
Start: 2022-10-28

## 2022-11-21 RX ADMIN — PERFLUTREN 2 ML: 6.52 INJECTION, SUSPENSION INTRAVENOUS at 13:02

## 2022-11-21 NOTE — PROGRESS NOTES
Please let her know that her echo looks great.  There is no evidence that the chemotherapy has been affecting her heart function.  She is to keep her appointment with [facility]TW[facility] in December, and we can order a limited chocardiogram for February during that visit.    González blair

## 2022-11-21 NOTE — DISCHARGE INSTRUCTIONS
Take the following medications the morning of surgery:  OMEPRAZOLE    Arrive to hospital on your day of surgery at  5:15 AM.      If you are on prescription narcotic pain medication to control your pain you may also take that medication the morning of surgery.    General Instructions:  Do not eat solid food after midnight the night before surgery.  You may drink clear liquids day of surgery but must stop at least one hour before your hospital arrival time.  It is beneficial for you to have a clear drink that contains carbohydrates the day of surgery.  We suggest a 12 to 20 ounce bottle of Gatorade or Powerade for non-diabetic patients or a 12 to 20 ounce bottle of G2 or Powerade Zero for diabetic patients. (Pediatric patients, are not advised to drink a 12 to 20 ounce carbohydrate drink)    Clear liquids are liquids you can see through.  Nothing red in color.     Plain water                               Sports drinks  Sodas                                   Gelatin (Jell-O)  Fruit juices without pulp such as white grape juice and apple juice  Popsicles that contain no fruit or yogurt  Tea or coffee (no cream or milk added)  Gatorade / Powerade  G2 / Powerade Zero    Infants may have breast milk up to four hours before surgery.  Infants drinking formula may drink formula up to six hours before surgery.   Patients who avoid smoking, chewing tobacco and alcohol for 4 weeks prior to surgery have a reduced risk of post-operative complications.  Quit smoking as many days before surgery as you can.  Do not smoke, use chewing tobacco or drink alcohol the day of surgery.   If applicable bring your C-PAP/ BI-PAP machine.  Bring any papers given to you in the doctor’s office.  Wear clean comfortable clothes.  Do not wear contact lenses, false eyelashes or make-up.  Bring a case for your glasses.   Bring crutches or walker if applicable.  Remove all piercings.  Leave jewelry and any other valuables at home.  Hair extensions  with metal clips must be removed prior to surgery.  The Pre-Admission Testing nurse will instruct you to bring medications if unable to obtain an accurate list in Pre-Admission Testing.        If you were given a blood bank ID arm band remember to bring it with you the day of surgery.    Preventing a Surgical Site Infection:  For 2 to 3 days before surgery, avoid shaving with a razor because the razor can irritate skin and make it easier to develop an infection.    Any areas of open skin can increase the risk of a post-operative wound infection by allowing bacteria to enter and travel throughout the body.  Notify your surgeon if you have any skin wounds / rashes even if it is not near the expected surgical site.  The area will need assessed to determine if surgery should be delayed until it is healed.  The night prior to surgery shower using a fresh bar of anti-bacterial soap (such as Dial) and clean washcloth.  Sleep in a clean bed with clean clothing.  Do not allow pets to sleep with you.  Shower on the morning of surgery using a fresh bar of anti-bacterial soap (such as Dial) and clean washcloth.  Dry with a clean towel and dress in clean clothing.  Ask your surgeon if you will be receiving antibiotics prior to surgery.  Make sure you, your family, and all healthcare providers clean their hands with soap and water or an alcohol based hand  before caring for you or your wound.    Day of surgery:  Your arrival time is approximately two hours before your scheduled surgery time.  Upon arrival, a Pre-op nurse and Anesthesiologist will review your health history, obtain vital signs, and answer questions you may have.  The only belongings needed at this time will be a list of your home medications and if applicable your C-PAP/BI-PAP machine.  A Pre-op nurse will start an IV and you may receive medication in preparation for surgery, including something to help you relax.     Please be aware that surgery does  come with discomfort.  We want to make every effort to control your discomfort so please discuss any uncontrolled symptoms with your nurse.   Your doctor will most likely have prescribed pain medications.      If you are going home after surgery you will receive individualized written care instructions before being discharged.  A responsible adult must drive you to and from the hospital on the day of your surgery and stay with you for 24 hours.  Discharge prescriptions can be filled by the hospital pharmacy during regular pharmacy hours.  If you are having surgery late in the day/evening your prescription may be e-prescribed to your pharmacy.  Please verify your pharmacy hours or chose a 24 hour pharmacy to avoid not having access to your prescription because your pharmacy has closed for the day.    If you are staying overnight following surgery, you will be transported to your hospital room following the recovery period.  [facility] has all private rooms.    If you have any questions please call Pre-Admission Testing at [phone].  Deductibles and co-payments are collected on the day of service. Please be prepared to pay the required co-pay, deductible or deposit on the day of service as defined by your plan.    Call your surgeon immediately if you experience any of the following symptoms:  Sore Throat  Shortness of Breath or difficulty breathing  Cough  Chills  Body soreness or muscle pain  Headache  Fever  New loss of taste or smell  Do not arrive for your surgery ill.  Your procedure will need to be rescheduled to another time.  You will need to call your physician before the day of surgery to avoid any unnecessary exposure to hospital staff as well as other patients.

## 2022-11-22 ENCOUNTER — TELEPHONE (OUTPATIENT)
Dept: ONCOLOGY | Facility: CLINIC | Age: 37
End: 2022-11-22

## 2022-11-22 ENCOUNTER — TELEPHONE (OUTPATIENT)
Dept: CARDIOLOGY | Facility: CLINIC | Age: 37
End: 2022-11-22

## 2022-11-22 LAB
RAD ONC ARIA COURSE ID: NORMAL
RAD ONC ARIA COURSE INTENT: NORMAL
RAD ONC ARIA COURSE LAST TREATMENT DATE: NORMAL
RAD ONC ARIA COURSE START DATE: NORMAL
RAD ONC ARIA COURSE TREATMENT ELAPSED DAYS: 28
RAD ONC ARIA FIRST TREATMENT DATE: NORMAL
RAD ONC ARIA PLAN FRACTIONS TREATED TO DATE: 22
RAD ONC ARIA PLAN FRACTIONS TREATED TO DATE: 22
RAD ONC ARIA PLAN ID: NORMAL
RAD ONC ARIA PLAN ID: NORMAL
RAD ONC ARIA PLAN PRESCRIBED DOSE PER FRACTION: 2 GY
RAD ONC ARIA PLAN PRESCRIBED DOSE PER FRACTION: 2 GY
RAD ONC ARIA PLAN PRIMARY REFERENCE POINT: NORMAL
RAD ONC ARIA PLAN PRIMARY REFERENCE POINT: NORMAL
RAD ONC ARIA PLAN TOTAL FRACTIONS PRESCRIBED: 25
RAD ONC ARIA PLAN TOTAL FRACTIONS PRESCRIBED: 25
RAD ONC ARIA PLAN TOTAL PRESCRIBED DOSE: 5000 CGY
RAD ONC ARIA PLAN TOTAL PRESCRIBED DOSE: 5000 CGY
RAD ONC ARIA REFERENCE POINT DOSAGE GIVEN TO DATE: 33.46 GY
RAD ONC ARIA REFERENCE POINT DOSAGE GIVEN TO DATE: 44 GY
RAD ONC ARIA REFERENCE POINT ID: NORMAL
RAD ONC ARIA REFERENCE POINT SESSION DOSAGE GIVEN: 1.52 GY
RAD ONC ARIA REFERENCE POINT SESSION DOSAGE GIVEN: 2 GY

## 2022-11-22 PROCEDURE — 77412 RADIATION TX DELIVERY LVL 3: CPT | Performed by: RADIOLOGY

## 2022-11-22 PROCEDURE — 77387 GUIDANCE FOR RADJ TX DLVR: CPT | Performed by: RADIOLOGY

## 2022-11-22 NOTE — TELEPHONE ENCOUNTER
Spoke w/ patient who is asking when she will have bone density and CT scan. According to Dr. Palacios' note pt will have both of these tests after completion of radiation. Pt v/u. Chelsea Abbott RN

## 2022-11-22 NOTE — TELEPHONE ENCOUNTER
Notified patient of results, patient verbalizes understanding.  Patient is aware of her upcoming appt in December.    Chloe Scott RN  [facility] Triage  11/22/22 09:09 EST

## 2022-11-22 NOTE — TELEPHONE ENCOUNTER
----- Message from Ananda Airta MD sent at 11/21/2022  4:09 PM EST -----  Please let her know that her echo looks great.  There is no evidence that the chemotherapy has been affecting her heart function.  She is to keep her appointment with [facility]TW[facility] in December, and we can order a limited chocardiogram for February during that visit.    González blair

## 2022-11-23 ENCOUNTER — RADIATION ONCOLOGY WEEKLY ASSESSMENT (OUTPATIENT)
Dept: RADIATION ONCOLOGY | Facility: HOSPITAL | Age: 37
End: 2022-11-23

## 2022-11-23 ENCOUNTER — DOCUMENTATION (OUTPATIENT)
Dept: RADIATION ONCOLOGY | Facility: HOSPITAL | Age: 37
End: 2022-11-23

## 2022-11-23 VITALS
TEMPERATURE: 97.3 F | HEART RATE: 77 BPM | DIASTOLIC BLOOD PRESSURE: 73 MMHG | WEIGHT: 117.2 LBS | BODY MASS INDEX: 20.76 KG/M2 | OXYGEN SATURATION: 98 % | SYSTOLIC BLOOD PRESSURE: 106 MMHG

## 2022-11-23 DIAGNOSIS — C50.412 MALIGNANT NEOPLASM OF UPPER-OUTER QUADRANT OF LEFT BREAST IN FEMALE, ESTROGEN RECEPTOR POSITIVE: Primary | ICD-10-CM

## 2022-11-23 DIAGNOSIS — Z17.0 MALIGNANT NEOPLASM OF UPPER-OUTER QUADRANT OF LEFT BREAST IN FEMALE, ESTROGEN RECEPTOR POSITIVE: Primary | ICD-10-CM

## 2022-11-23 LAB
RAD ONC ARIA COURSE ID: NORMAL
RAD ONC ARIA COURSE INTENT: NORMAL
RAD ONC ARIA COURSE LAST TREATMENT DATE: NORMAL
RAD ONC ARIA COURSE START DATE: NORMAL
RAD ONC ARIA COURSE TREATMENT ELAPSED DAYS: 29
RAD ONC ARIA FIRST TREATMENT DATE: NORMAL
RAD ONC ARIA PLAN FRACTIONS TREATED TO DATE: 23
RAD ONC ARIA PLAN FRACTIONS TREATED TO DATE: 23
RAD ONC ARIA PLAN ID: NORMAL
RAD ONC ARIA PLAN ID: NORMAL
RAD ONC ARIA PLAN PRESCRIBED DOSE PER FRACTION: 2 GY
RAD ONC ARIA PLAN PRESCRIBED DOSE PER FRACTION: 2 GY
RAD ONC ARIA PLAN PRIMARY REFERENCE POINT: NORMAL
RAD ONC ARIA PLAN PRIMARY REFERENCE POINT: NORMAL
RAD ONC ARIA PLAN TOTAL FRACTIONS PRESCRIBED: 25
RAD ONC ARIA PLAN TOTAL FRACTIONS PRESCRIBED: 25
RAD ONC ARIA PLAN TOTAL PRESCRIBED DOSE: 5000 CGY
RAD ONC ARIA PLAN TOTAL PRESCRIBED DOSE: 5000 CGY
RAD ONC ARIA REFERENCE POINT DOSAGE GIVEN TO DATE: 34.98 GY
RAD ONC ARIA REFERENCE POINT DOSAGE GIVEN TO DATE: 46 GY
RAD ONC ARIA REFERENCE POINT ID: NORMAL
RAD ONC ARIA REFERENCE POINT SESSION DOSAGE GIVEN: 1.52 GY
RAD ONC ARIA REFERENCE POINT SESSION DOSAGE GIVEN: 2 GY

## 2022-11-23 PROCEDURE — 77336 RADIATION PHYSICS CONSULT: CPT | Performed by: RADIOLOGY

## 2022-11-23 PROCEDURE — 77412 RADIATION TX DELIVERY LVL 3: CPT | Performed by: RADIOLOGY

## 2022-11-23 PROCEDURE — FACE2FACE: Performed by: RADIOLOGY

## 2022-11-23 PROCEDURE — 77387 GUIDANCE FOR RADJ TX DLVR: CPT | Performed by: RADIOLOGY

## 2022-11-23 NOTE — PROGRESS NOTES
Per Dr. Anguiano, called in Violeta's Magic Mouthwash to [facility] with 1 refill.

## 2022-11-23 NOTE — PROGRESS NOTES
Patient Name: Maribell Romero Date: 2022       : 1985        MRN #: [id number] Diagnosis:   Encounter Diagnosis   Name Primary?   • Malignant neoplasm of upper-outer quadrant of left breast in female, estrogen receptor positive (HCC) Yes                  RADIATION ON TREATMENT VISIT NOTE - Left Chestwall    Treatment Summary    Treatment Site Ref. ID Energy Dose/Fx (cGy) #Fx Dose Correction (cGy) Total Dose (cGy) Start Date End Date Elapsed Days   L SCV DIBH Rx: L SCV 18X/6X 200  0 4,600 10/25/2022 2022 29   L CW/IMC_DIBH Rx: L CW 18X/6X 200  0 4,600 10/25/2022 2022 29         General:           Review of Systems    [] No new complaints   [] Skin itching   [] Skin breakdown  [] Breast swelling   [x] Dysphagia--early esophagitis   [] Dry cough  [] Productive cough   [] Fever/chills  [] Skin soreness with pain, severity: ----------------   [x] Breast pain,  severity: 2, location: inframammary fold/Left axillary region--Will start Silvadenee  [x] Fatigue,  severity: 1 Relief w/ Rest      Skin regimen: Aquaphor     [] Patient given standard skin care instructions for breast cancer treatment including no aluminum containing antiperspirants & no unapproved lotions or soaps.  Patient informed to notify radiation staff if irritation/itching occurs.    Comments/Notes:  Discussed early esophagitis symptoms.    KPS: 80%       Vital Signs: /73   Pulse 77   Temp 97.3 °F (36.3 °C)   Wt 53.2 kg (117 lb 3.2 oz)   SpO2 98%   BMI 20.76 kg/m²     Weight:   Wt Readings from Last 3 Encounters:   22 53.2 kg (117 lb 3.2 oz)   22 53.1 kg (117 lb)   22 54 kg (119 lb)       Medication:   Current Outpatient Medications:   •  acetaminophen (TYLENOL) 325 MG tablet, Take 650 mg by mouth Every 6 (Six) Hours As Needed for Mild Pain., Disp: , Rfl:   •  atorvastatin (LIPITOR) 10 MG tablet, Take 10 mg by mouth Every Night., Disp: , Rfl:   •  B Complex Vitamins  (Vitamin-B Complex) tablet, Take 1 tablet by mouth Daily. (Patient taking differently: Take 1 tablet by mouth Daily. HELD FOR OR), Disp: , Rfl: 0  •  Cetirizine HCl (ZYRTEC PO), Take 10 mg by mouth As Needed., Disp: , Rfl:   •  nystatin-diphenhydrAMINE-Lidocaine Viscous HCl in aluminum-magnesium hydroxide-simethicone suspension, Swish and swallow 5 mL 4 (Four) Times a Day., Disp: 410 mL, Rfl: 1  •  omeprazole (priLOSEC) 20 MG capsule, Take 1 capsule by mouth 2 (Two) Times a Day. (Patient taking differently: Take 20 mg by mouth As Needed.), Disp: 60 capsule, Rfl: 3  •  prochlorperazine (COMPAZINE) 10 MG tablet, Take 1 tablet by mouth Every 6 (Six) Hours As Needed for Nausea or Vomiting., Disp: 30 tablet, Rfl: 5  •  silver sulfadiazine (SILVADENE, SSD) 1 % cream, Apply 1 application topically to the appropriate area as directed 2 (Two) Times a Day., Disp: 50 g, Rfl: 3       LABS (Reviewed):  Hematology WBC   Date Value Ref Range Status   11/21/2022 4.14 3.40 - 10.80 10*3/mm3 Final     RBC   Date Value Ref Range Status   11/21/2022 4.08 3.77 - 5.28 10*6/mm3 Final     Hemoglobin   Date Value Ref Range Status   11/21/2022 11.9 (L) 12.0 - 15.9 g/dL Final     Hematocrit   Date Value Ref Range Status   11/21/2022 35.1 34.0 - 46.6 % Final     Platelets   Date Value Ref Range Status   11/21/2022 158 140 - 450 10*3/mm3 Final      Chemistry   Lab Results   Component Value Date    GLUCOSE 103 10/31/2022    BUN 13 10/31/2022    CREATININE 0.54 (L) 10/31/2022    BCR 24.1 10/31/2022    K 3.8 10/31/2022    CO2 23.8 10/31/2022    CALCIUM 9.6 10/31/2022    ALBUMIN 4.50 10/31/2022    AST 25 10/31/2022    ALT 18 10/31/2022         Physical Exam:           Neck: [] Lymphadenopathy  Lungs: [x] Clear to auscultation  CVS: [x] Regular rate & rhythm  Skin: [x] ndGndrndanddndend:nd nd2nd Comments/Notes:     [x] Review of labs, images, dosimetry, dose delivered, & treatment parameters.    Comments:     [x] Patient treatment setup reviewed.    Comments:      Recommendations: Rx for mometasone called in  Patient to continue XRT as planned with skin care cream/regimen.    [x] Continue RT  [] Change RT Plan [] Hold RT, length:        Approved Electronically By:  Lan Anguiano MD, 11/23/2022, 11:40 EST          Confidentiality of this medical record shall be maintained except when use or disclosure is required or permitted by law, regulation or written authorization by the patient.

## 2022-11-28 ENCOUNTER — ANESTHESIA (OUTPATIENT)
Dept: PERIOP | Facility: HOSPITAL | Age: 37
End: 2022-11-28

## 2022-11-28 ENCOUNTER — HOSPITAL ENCOUNTER (OUTPATIENT)
Facility: HOSPITAL | Age: 37
Setting detail: HOSPITAL OUTPATIENT SURGERY
Discharge: HOME | End: 2022-11-28
Attending: OBSTETRICS & GYNECOLOGY | Admitting: OBSTETRICS & GYNECOLOGY

## 2022-11-28 ENCOUNTER — ANESTHESIA EVENT (OUTPATIENT)
Dept: PERIOP | Facility: HOSPITAL | Age: 37
End: 2022-11-28

## 2022-11-28 VITALS
HEART RATE: 63 BPM | RESPIRATION RATE: 18 BRPM | OXYGEN SATURATION: 100 % | SYSTOLIC BLOOD PRESSURE: 92 MMHG | DIASTOLIC BLOOD PRESSURE: 64 MMHG | TEMPERATURE: 97.6 F

## 2022-11-28 DIAGNOSIS — C50.919 BREAST CANCER: ICD-10-CM

## 2022-11-28 LAB
B-HCG UR QL: NEGATIVE
EXPIRATION DATE: NORMAL
INTERNAL NEGATIVE CONTROL: NORMAL
INTERNAL POSITIVE CONTROL: NORMAL
Lab: NORMAL

## 2022-11-28 PROCEDURE — 25010000002 MAGNESIUM SULFATE PER 500 MG OF MAGNESIUM: Performed by: NURSE ANESTHETIST, CERTIFIED REGISTERED

## 2022-11-28 PROCEDURE — 88305 TISSUE EXAM BY PATHOLOGIST: CPT | Performed by: OBSTETRICS & GYNECOLOGY

## 2022-11-28 PROCEDURE — 25010000002 PROPOFOL 10 MG/ML EMULSION: Performed by: NURSE ANESTHETIST, CERTIFIED REGISTERED

## 2022-11-28 PROCEDURE — 25010000002 ONDANSETRON PER 1 MG: Performed by: NURSE ANESTHETIST, CERTIFIED REGISTERED

## 2022-11-28 PROCEDURE — 25010000002 MIDAZOLAM PER 1 MG: Performed by: ANESTHESIOLOGY

## 2022-11-28 PROCEDURE — 0 LIDOCAINE 1 % SOLUTION 20 ML VIAL: Performed by: OBSTETRICS & GYNECOLOGY

## 2022-11-28 PROCEDURE — 25010000002 CEFAZOLIN IN DEXTROSE 2-4 GM/100ML-% SOLUTION: Performed by: OBSTETRICS & GYNECOLOGY

## 2022-11-28 PROCEDURE — 81025 URINE PREGNANCY TEST: CPT | Performed by: OBSTETRICS & GYNECOLOGY

## 2022-11-28 PROCEDURE — 25010000002 HYDROMORPHONE 1 MG/ML SOLUTION: Performed by: NURSE ANESTHETIST, CERTIFIED REGISTERED

## 2022-11-28 PROCEDURE — 25010000002 FENTANYL CITRATE (PF) 50 MCG/ML SOLUTION: Performed by: NURSE ANESTHETIST, CERTIFIED REGISTERED

## 2022-11-28 PROCEDURE — 25010000002 DEXAMETHASONE SODIUM PHOSPHATE 20 MG/5ML SOLUTION: Performed by: NURSE ANESTHETIST, CERTIFIED REGISTERED

## 2022-11-28 PROCEDURE — 25010000002 ROPIVACAINE PER 1 MG: Performed by: OBSTETRICS & GYNECOLOGY

## 2022-11-28 RX ORDER — ONDANSETRON 2 MG/ML
INJECTION INTRAMUSCULAR; INTRAVENOUS AS NEEDED
Status: DISCONTINUED | OUTPATIENT
Start: 2022-11-28 | End: 2022-11-28 | Stop reason: SURG

## 2022-11-28 RX ORDER — DEXMEDETOMIDINE HYDROCHLORIDE 100 UG/ML
INJECTION, SOLUTION INTRAVENOUS AS NEEDED
Status: DISCONTINUED | OUTPATIENT
Start: 2022-11-28 | End: 2022-11-28 | Stop reason: SURG

## 2022-11-28 RX ORDER — HYDRALAZINE HYDROCHLORIDE 20 MG/ML
5 INJECTION INTRAMUSCULAR; INTRAVENOUS
Status: DISCONTINUED | OUTPATIENT
Start: 2022-11-28 | End: 2022-11-28 | Stop reason: HOSPADM

## 2022-11-28 RX ORDER — LIDOCAINE HYDROCHLORIDE 20 MG/ML
INJECTION, SOLUTION EPIDURAL; INFILTRATION; INTRACAUDAL; PERINEURAL AS NEEDED
Status: DISCONTINUED | OUTPATIENT
Start: 2022-11-28 | End: 2022-11-28 | Stop reason: SURG

## 2022-11-28 RX ORDER — MIDAZOLAM HYDROCHLORIDE 1 MG/ML
1 INJECTION INTRAMUSCULAR; INTRAVENOUS
Status: DISCONTINUED | OUTPATIENT
Start: 2022-11-28 | End: 2022-11-28 | Stop reason: HOSPADM

## 2022-11-28 RX ORDER — PROMETHAZINE HYDROCHLORIDE 25 MG/1
25 SUPPOSITORY RECTAL ONCE AS NEEDED
Status: DISCONTINUED | OUTPATIENT
Start: 2022-11-28 | End: 2022-11-28 | Stop reason: HOSPADM

## 2022-11-28 RX ORDER — FAMOTIDINE 10 MG/ML
20 INJECTION, SOLUTION INTRAVENOUS ONCE
Status: COMPLETED | OUTPATIENT
Start: 2022-11-28 | End: 2022-11-28

## 2022-11-28 RX ORDER — HYDROCODONE BITARTRATE AND ACETAMINOPHEN 7.5; 325 MG/1; MG/1
1 TABLET ORAL ONCE AS NEEDED
Status: DISCONTINUED | OUTPATIENT
Start: 2022-11-28 | End: 2022-11-28 | Stop reason: HOSPADM

## 2022-11-28 RX ORDER — NALOXONE HCL 0.4 MG/ML
0.2 VIAL (ML) INJECTION AS NEEDED
Status: DISCONTINUED | OUTPATIENT
Start: 2022-11-28 | End: 2022-11-28 | Stop reason: HOSPADM

## 2022-11-28 RX ORDER — MAGNESIUM SULFATE HEPTAHYDRATE 500 MG/ML
INJECTION, SOLUTION INTRAMUSCULAR; INTRAVENOUS AS NEEDED
Status: DISCONTINUED | OUTPATIENT
Start: 2022-11-28 | End: 2022-11-28 | Stop reason: SURG

## 2022-11-28 RX ORDER — FENTANYL CITRATE 50 UG/ML
50 INJECTION, SOLUTION INTRAMUSCULAR; INTRAVENOUS
Status: DISCONTINUED | OUTPATIENT
Start: 2022-11-28 | End: 2022-11-28 | Stop reason: HOSPADM

## 2022-11-28 RX ORDER — OXYCODONE HYDROCHLORIDE AND ACETAMINOPHEN 5; 325 MG/1; MG/1
1 TABLET ORAL EVERY 6 HOURS PRN
Qty: 30 TABLET | Refills: 0 | Status: SHIPPED | OUTPATIENT
Start: 2022-11-28 | End: 2023-04-03

## 2022-11-28 RX ORDER — HYDROMORPHONE HYDROCHLORIDE 1 MG/ML
0.5 INJECTION, SOLUTION INTRAMUSCULAR; INTRAVENOUS; SUBCUTANEOUS
Status: DISCONTINUED | OUTPATIENT
Start: 2022-11-28 | End: 2022-11-28 | Stop reason: HOSPADM

## 2022-11-28 RX ORDER — SODIUM CHLORIDE 0.9 % (FLUSH) 0.9 %
3-10 SYRINGE (ML) INJECTION AS NEEDED
Status: DISCONTINUED | OUTPATIENT
Start: 2022-11-28 | End: 2022-11-28 | Stop reason: HOSPADM

## 2022-11-28 RX ORDER — CEFAZOLIN SODIUM 2 G/100ML
2 INJECTION, SOLUTION INTRAVENOUS ONCE
Status: COMPLETED | OUTPATIENT
Start: 2022-11-28 | End: 2022-11-28

## 2022-11-28 RX ORDER — SCOLOPAMINE TRANSDERMAL SYSTEM 1 MG/1
1 PATCH, EXTENDED RELEASE TRANSDERMAL ONCE
Status: DISCONTINUED | OUTPATIENT
Start: 2022-11-28 | End: 2022-11-28 | Stop reason: HOSPADM

## 2022-11-28 RX ORDER — SODIUM CHLORIDE, SODIUM LACTATE, POTASSIUM CHLORIDE, CALCIUM CHLORIDE 600; 310; 30; 20 MG/100ML; MG/100ML; MG/100ML; MG/100ML
9 INJECTION, SOLUTION INTRAVENOUS CONTINUOUS
Status: DISCONTINUED | OUTPATIENT
Start: 2022-11-28 | End: 2022-11-28 | Stop reason: HOSPADM

## 2022-11-28 RX ORDER — OXYCODONE AND ACETAMINOPHEN 7.5; 325 MG/1; MG/1
1 TABLET ORAL EVERY 4 HOURS PRN
Status: DISCONTINUED | OUTPATIENT
Start: 2022-11-28 | End: 2022-11-28 | Stop reason: HOSPADM

## 2022-11-28 RX ORDER — DIPHENHYDRAMINE HCL 25 MG
25 CAPSULE ORAL
Status: DISCONTINUED | OUTPATIENT
Start: 2022-11-28 | End: 2022-11-28 | Stop reason: HOSPADM

## 2022-11-28 RX ORDER — PROPOFOL 10 MG/ML
VIAL (ML) INTRAVENOUS AS NEEDED
Status: DISCONTINUED | OUTPATIENT
Start: 2022-11-28 | End: 2022-11-28 | Stop reason: SURG

## 2022-11-28 RX ORDER — DIPHENHYDRAMINE HYDROCHLORIDE 50 MG/ML
12.5 INJECTION INTRAMUSCULAR; INTRAVENOUS
Status: DISCONTINUED | OUTPATIENT
Start: 2022-11-28 | End: 2022-11-28 | Stop reason: HOSPADM

## 2022-11-28 RX ORDER — PROMETHAZINE HYDROCHLORIDE 25 MG/1
25 TABLET ORAL ONCE AS NEEDED
Status: DISCONTINUED | OUTPATIENT
Start: 2022-11-28 | End: 2022-11-28 | Stop reason: HOSPADM

## 2022-11-28 RX ORDER — DEXAMETHASONE SODIUM PHOSPHATE 4 MG/ML
INJECTION, SOLUTION INTRA-ARTICULAR; INTRALESIONAL; INTRAMUSCULAR; INTRAVENOUS; SOFT TISSUE AS NEEDED
Status: DISCONTINUED | OUTPATIENT
Start: 2022-11-28 | End: 2022-11-28 | Stop reason: SURG

## 2022-11-28 RX ORDER — SODIUM CHLORIDE 9 MG/ML
INJECTION, SOLUTION INTRAVENOUS AS NEEDED
Status: DISCONTINUED | OUTPATIENT
Start: 2022-11-28 | End: 2022-11-28 | Stop reason: HOSPADM

## 2022-11-28 RX ORDER — FENTANYL CITRATE 50 UG/ML
INJECTION, SOLUTION INTRAMUSCULAR; INTRAVENOUS AS NEEDED
Status: DISCONTINUED | OUTPATIENT
Start: 2022-11-28 | End: 2022-11-28 | Stop reason: SURG

## 2022-11-28 RX ORDER — LABETALOL HYDROCHLORIDE 5 MG/ML
5 INJECTION, SOLUTION INTRAVENOUS
Status: DISCONTINUED | OUTPATIENT
Start: 2022-11-28 | End: 2022-11-28 | Stop reason: HOSPADM

## 2022-11-28 RX ORDER — SODIUM CHLORIDE 0.9 % (FLUSH) 0.9 %
3 SYRINGE (ML) INJECTION EVERY 12 HOURS SCHEDULED
Status: DISCONTINUED | OUTPATIENT
Start: 2022-11-28 | End: 2022-11-28 | Stop reason: HOSPADM

## 2022-11-28 RX ORDER — FLUMAZENIL 0.1 MG/ML
0.2 INJECTION INTRAVENOUS AS NEEDED
Status: DISCONTINUED | OUTPATIENT
Start: 2022-11-28 | End: 2022-11-28 | Stop reason: HOSPADM

## 2022-11-28 RX ORDER — ONDANSETRON 2 MG/ML
4 INJECTION INTRAMUSCULAR; INTRAVENOUS ONCE AS NEEDED
Status: COMPLETED | OUTPATIENT
Start: 2022-11-28 | End: 2022-11-28

## 2022-11-28 RX ORDER — SODIUM CHLORIDE, SODIUM LACTATE, POTASSIUM CHLORIDE, CALCIUM CHLORIDE 600; 310; 30; 20 MG/100ML; MG/100ML; MG/100ML; MG/100ML
INJECTION, SOLUTION INTRAVENOUS CONTINUOUS PRN
Status: DISCONTINUED | OUTPATIENT
Start: 2022-11-28 | End: 2022-11-28 | Stop reason: SURG

## 2022-11-28 RX ORDER — EPHEDRINE SULFATE 50 MG/ML
5 INJECTION, SOLUTION INTRAVENOUS ONCE AS NEEDED
Status: DISCONTINUED | OUTPATIENT
Start: 2022-11-28 | End: 2022-11-28 | Stop reason: HOSPADM

## 2022-11-28 RX ORDER — ROCURONIUM BROMIDE 10 MG/ML
INJECTION, SOLUTION INTRAVENOUS AS NEEDED
Status: DISCONTINUED | OUTPATIENT
Start: 2022-11-28 | End: 2022-11-28 | Stop reason: SURG

## 2022-11-28 RX ORDER — LIDOCAINE HYDROCHLORIDE 10 MG/ML
0.5 INJECTION, SOLUTION EPIDURAL; INFILTRATION; INTRACAUDAL; PERINEURAL ONCE AS NEEDED
Status: DISCONTINUED | OUTPATIENT
Start: 2022-11-28 | End: 2022-11-28 | Stop reason: HOSPADM

## 2022-11-28 RX ADMIN — SCOPALAMINE 1 PATCH: 1 PATCH, EXTENDED RELEASE TRANSDERMAL at 06:27

## 2022-11-28 RX ADMIN — PROPOFOL 150 MG: 10 INJECTION, EMULSION INTRAVENOUS at 07:14

## 2022-11-28 RX ADMIN — MAGNESIUM SULFATE HEPTAHYDRATE 2 G: 500 INJECTION, SOLUTION INTRAMUSCULAR; INTRAVENOUS at 07:08

## 2022-11-28 RX ADMIN — MIDAZOLAM 1 MG: 1 INJECTION INTRAMUSCULAR; INTRAVENOUS at 06:24

## 2022-11-28 RX ADMIN — ONDANSETRON 4 MG: 2 INJECTION INTRAMUSCULAR; INTRAVENOUS at 08:57

## 2022-11-28 RX ADMIN — CEFAZOLIN SODIUM 2 G: 2 INJECTION, SOLUTION INTRAVENOUS at 06:55

## 2022-11-28 RX ADMIN — SODIUM CHLORIDE, POTASSIUM CHLORIDE, SODIUM LACTATE AND CALCIUM CHLORIDE: 600; 310; 30; 20 INJECTION, SOLUTION INTRAVENOUS at 07:00

## 2022-11-28 RX ADMIN — HYDROMORPHONE HYDROCHLORIDE 0.5 MG: 1 INJECTION, SOLUTION INTRAMUSCULAR; INTRAVENOUS; SUBCUTANEOUS at 08:21

## 2022-11-28 RX ADMIN — DEXAMETHASONE SODIUM PHOSPHATE 8 MG: 4 INJECTION, SOLUTION INTRAMUSCULAR; INTRAVENOUS at 07:21

## 2022-11-28 RX ADMIN — LIDOCAINE HYDROCHLORIDE 100 MG: 20 INJECTION, SOLUTION EPIDURAL; INFILTRATION; INTRACAUDAL; PERINEURAL at 07:14

## 2022-11-28 RX ADMIN — FENTANYL CITRATE 50 MCG: 50 INJECTION, SOLUTION INTRAMUSCULAR; INTRAVENOUS at 07:08

## 2022-11-28 RX ADMIN — ONDANSETRON 4 MG: 2 INJECTION INTRAMUSCULAR; INTRAVENOUS at 08:05

## 2022-11-28 RX ADMIN — SUGAMMADEX 200 MG: 100 INJECTION, SOLUTION INTRAVENOUS at 08:12

## 2022-11-28 RX ADMIN — SODIUM CHLORIDE, POTASSIUM CHLORIDE, SODIUM LACTATE AND CALCIUM CHLORIDE 9 ML/HR: 600; 310; 30; 20 INJECTION, SOLUTION INTRAVENOUS at 06:27

## 2022-11-28 RX ADMIN — ROCURONIUM BROMIDE 50 MG: 10 INJECTION, SOLUTION INTRAVENOUS at 07:15

## 2022-11-28 RX ADMIN — DEXMEDETOMIDINE HCL 10 MCG: 100 INJECTION INTRAVENOUS at 08:26

## 2022-11-28 RX ADMIN — DEXMEDETOMIDINE HCL 10 MCG: 100 INJECTION INTRAVENOUS at 08:21

## 2022-11-28 RX ADMIN — OXYCODONE HYDROCHLORIDE AND ACETAMINOPHEN 1 TABLET: 7.5; 325 TABLET ORAL at 10:00

## 2022-11-28 RX ADMIN — FAMOTIDINE 20 MG: 10 INJECTION INTRAVENOUS at 06:25

## 2022-11-28 RX ADMIN — PROPOFOL 50 MCG/KG/MIN: 10 INJECTION, EMULSION INTRAVENOUS at 07:43

## 2022-11-28 NOTE — ANESTHESIA PREPROCEDURE EVALUATION
Anesthesia Evaluation     Patient summary reviewed and Nursing notes reviewed   history of anesthetic complications: PONV  NPO Solid Status: > 8 hours  NPO Liquid Status: > 2 hours           Airway   Mallampati: II  Dental - normal exam     Pulmonary - negative pulmonary ROS and normal exam   Cardiovascular - normal exam    (+) hyperlipidemia,       Neuro/Psych- negative ROS  GI/Hepatic/Renal/Endo - negative ROS     Musculoskeletal     Abdominal    Substance History      OB/GYN          Other      history of cancer remission                    Anesthesia Plan    ASA 2     general     intravenous induction     Anesthetic plan, risks, benefits, and alternatives have been provided, discussed and informed consent has been obtained with: patient.        CODE STATUS:

## 2022-11-28 NOTE — ANESTHESIA POSTPROCEDURE EVALUATION
Patient: Maribell Romero    Procedure Summary     Date: 11/28/22 Room / Location: [facility] OR  / [facility] MAIN OR    Anesthesia Start: 0700 Anesthesia Stop: 0838    Procedure: DAVINCI ASSISTED BILATERAL SALPINGO-OOPHORECTOMY (Bilateral: Abdomen) Diagnosis:     Surgeons: Heidy Gutierrez MD Provider: Jose M Santa MD    Anesthesia Type: general ASA Status: 2          Anesthesia Type: general    Vitals  Vitals Value Taken Time   BP 91/61 11/28/22 1031   Temp 36.4 °C (97.6 °F) 11/28/22 0834   Pulse 74 11/28/22 1035   Resp 16 11/28/22 1030   SpO2 98 % 11/28/22 1035   Vitals shown include unvalidated device data.        Post Anesthesia Care and Evaluation    Patient location during evaluation: PACU  Patient participation: complete - patient participated  Level of consciousness: awake and alert  Pain management: adequate    Airway patency: patent  Anesthetic complications: No anesthetic complications    Cardiovascular status: acceptable  Respiratory status: acceptable  Hydration status: acceptable    Comments: --------------------            11/28/22               1030     --------------------   BP:       91/61      Pulse:      66       Resp:       16       Temp:                SpO2:      98%      --------------------

## 2022-11-28 NOTE — ANESTHESIA PROCEDURE NOTES
Airway  Urgency: elective    Date/Time: 11/28/2022 7:17 AM  Airway not difficult    General Information and Staff    Patient location during procedure: OR  Anesthesiologist: Jose M Santa MD  CRNA/CAA: Veronica Camargo CRNA    Indications and Patient Condition  Indications for airway management: airway protection    Preoxygenated: yes  Mask difficulty assessment: 1 - vent by mask    Final Airway Details  Final airway type: endotracheal airway      Successful airway: ETT  Cuffed: yes   Successful intubation technique: direct laryngoscopy  Facilitating devices/methods: intubating stylet and cricoid pressure  Endotracheal tube insertion site: oral  Blade: Glaser  Blade size: 2  ETT size (mm): 7.0  Cormack-Lehane Classification: grade I - full view of glottis  Placement verified by: chest auscultation and capnometry   Cuff volume (mL): 3  Measured from: lips  ETT/EBT  to lips (cm): 20  Number of attempts at approach: 1  Assessment: lips, teeth, and gum same as pre-op    Additional Comments  EBBS: ETCO2+: Atraumatic intubation

## 2022-11-29 ENCOUNTER — PATIENT OUTREACH (OUTPATIENT)
Dept: OTHER | Facility: HOSPITAL | Age: 37
End: 2022-11-29

## 2022-11-29 LAB
LAB AP CASE REPORT: NORMAL
PATH REPORT.FINAL DX SPEC: NORMAL
PATH REPORT.GROSS SPEC: NORMAL

## 2022-12-01 ENCOUNTER — APPOINTMENT (OUTPATIENT)
Dept: RADIATION ONCOLOGY | Facility: HOSPITAL | Age: 37
End: 2022-12-01
Payer: COMMERCIAL

## 2022-12-01 LAB
RAD ONC ARIA COURSE ID: NORMAL
RAD ONC ARIA COURSE INTENT: NORMAL
RAD ONC ARIA COURSE LAST TREATMENT DATE: NORMAL
RAD ONC ARIA COURSE START DATE: NORMAL
RAD ONC ARIA COURSE TREATMENT ELAPSED DAYS: 37
RAD ONC ARIA FIRST TREATMENT DATE: NORMAL
RAD ONC ARIA PLAN FRACTIONS TREATED TO DATE: 24
RAD ONC ARIA PLAN FRACTIONS TREATED TO DATE: 24
RAD ONC ARIA PLAN ID: NORMAL
RAD ONC ARIA PLAN ID: NORMAL
RAD ONC ARIA PLAN PRESCRIBED DOSE PER FRACTION: 2 GY
RAD ONC ARIA PLAN PRESCRIBED DOSE PER FRACTION: 2 GY
RAD ONC ARIA PLAN PRIMARY REFERENCE POINT: NORMAL
RAD ONC ARIA PLAN PRIMARY REFERENCE POINT: NORMAL
RAD ONC ARIA PLAN TOTAL FRACTIONS PRESCRIBED: 25
RAD ONC ARIA PLAN TOTAL FRACTIONS PRESCRIBED: 25
RAD ONC ARIA PLAN TOTAL PRESCRIBED DOSE: 5000 CGY
RAD ONC ARIA PLAN TOTAL PRESCRIBED DOSE: 5000 CGY
RAD ONC ARIA REFERENCE POINT DOSAGE GIVEN TO DATE: 36.5 GY
RAD ONC ARIA REFERENCE POINT DOSAGE GIVEN TO DATE: 48 GY
RAD ONC ARIA REFERENCE POINT ID: NORMAL
RAD ONC ARIA REFERENCE POINT SESSION DOSAGE GIVEN: 1.52 GY
RAD ONC ARIA REFERENCE POINT SESSION DOSAGE GIVEN: 2 GY

## 2022-12-01 PROCEDURE — 77412 RADIATION TX DELIVERY LVL 3: CPT | Performed by: STUDENT IN AN ORGANIZED HEALTH CARE EDUCATION/TRAINING PROGRAM

## 2022-12-01 PROCEDURE — 77387 GUIDANCE FOR RADJ TX DLVR: CPT | Performed by: STUDENT IN AN ORGANIZED HEALTH CARE EDUCATION/TRAINING PROGRAM

## 2022-12-02 LAB
RAD ONC ARIA COURSE END DATE: NORMAL
RAD ONC ARIA COURSE ID: NORMAL
RAD ONC ARIA COURSE ID: NORMAL
RAD ONC ARIA COURSE INTENT: NORMAL
RAD ONC ARIA COURSE INTENT: NORMAL
RAD ONC ARIA COURSE LAST TREATMENT DATE: NORMAL
RAD ONC ARIA COURSE LAST TREATMENT DATE: NORMAL
RAD ONC ARIA COURSE START DATE: NORMAL
RAD ONC ARIA COURSE START DATE: NORMAL
RAD ONC ARIA COURSE TREATMENT ELAPSED DAYS: 38
RAD ONC ARIA COURSE TREATMENT ELAPSED DAYS: 38
RAD ONC ARIA FIRST TREATMENT DATE: NORMAL
RAD ONC ARIA FIRST TREATMENT DATE: NORMAL
RAD ONC ARIA PLAN FRACTIONS TREATED TO DATE: 25
RAD ONC ARIA PLAN ID: NORMAL
RAD ONC ARIA PLAN NAME: NORMAL
RAD ONC ARIA PLAN NAME: NORMAL
RAD ONC ARIA PLAN PRESCRIBED DOSE PER FRACTION: 2 GY
RAD ONC ARIA PLAN PRIMARY REFERENCE POINT: NORMAL
RAD ONC ARIA PLAN TOTAL FRACTIONS PRESCRIBED: 25
RAD ONC ARIA PLAN TOTAL PRESCRIBED DOSE: 5000 CGY
RAD ONC ARIA REFERENCE POINT DOSAGE GIVEN TO DATE: 38.02 GY
RAD ONC ARIA REFERENCE POINT DOSAGE GIVEN TO DATE: 38.02 GY
RAD ONC ARIA REFERENCE POINT DOSAGE GIVEN TO DATE: 50 GY
RAD ONC ARIA REFERENCE POINT ID: NORMAL
RAD ONC ARIA REFERENCE POINT SESSION DOSAGE GIVEN: 1.52 GY
RAD ONC ARIA REFERENCE POINT SESSION DOSAGE GIVEN: 2 GY

## 2022-12-02 PROCEDURE — 77412 RADIATION TX DELIVERY LVL 3: CPT | Performed by: RADIOLOGY

## 2022-12-02 PROCEDURE — 77387 GUIDANCE FOR RADJ TX DLVR: CPT | Performed by: RADIOLOGY

## 2022-12-15 ENCOUNTER — INFUSION (OUTPATIENT)
Dept: ONCOLOGY | Facility: HOSPITAL | Age: 37
End: 2022-12-15

## 2022-12-15 ENCOUNTER — OFFICE VISIT (OUTPATIENT)
Dept: ONCOLOGY | Facility: CLINIC | Age: 37
End: 2022-12-15

## 2022-12-15 ENCOUNTER — PATIENT OUTREACH (OUTPATIENT)
Dept: OTHER | Facility: HOSPITAL | Age: 37
End: 2022-12-15

## 2022-12-15 VITALS
HEART RATE: 75 BPM | TEMPERATURE: 97.5 F | OXYGEN SATURATION: 99 % | HEIGHT: 63 IN | DIASTOLIC BLOOD PRESSURE: 62 MMHG | RESPIRATION RATE: 18 BRPM | SYSTOLIC BLOOD PRESSURE: 94 MMHG | BODY MASS INDEX: 20.98 KG/M2 | WEIGHT: 118.4 LBS

## 2022-12-15 DIAGNOSIS — C50.412 MALIGNANT NEOPLASM OF UPPER-OUTER QUADRANT OF LEFT BREAST IN FEMALE, ESTROGEN RECEPTOR POSITIVE: ICD-10-CM

## 2022-12-15 DIAGNOSIS — E89.40 SURGICAL MENOPAUSE: ICD-10-CM

## 2022-12-15 DIAGNOSIS — C50.412 MALIGNANT NEOPLASM OF UPPER-OUTER QUADRANT OF LEFT BREAST IN FEMALE, ESTROGEN RECEPTOR POSITIVE: Primary | ICD-10-CM

## 2022-12-15 DIAGNOSIS — Z17.0 MALIGNANT NEOPLASM OF UPPER-OUTER QUADRANT OF LEFT BREAST IN FEMALE, ESTROGEN RECEPTOR POSITIVE: ICD-10-CM

## 2022-12-15 DIAGNOSIS — Z17.0 MALIGNANT NEOPLASM OF UPPER-OUTER QUADRANT OF LEFT BREAST IN FEMALE, ESTROGEN RECEPTOR POSITIVE: Primary | ICD-10-CM

## 2022-12-15 DIAGNOSIS — Z45.2 FITTING AND ADJUSTMENT OF VASCULAR CATHETER: Primary | ICD-10-CM

## 2022-12-15 PROBLEM — D70.9 FEBRILE NEUTROPENIA (HCC): Status: RESOLVED | Noted: 2022-05-27 | Resolved: 2022-12-15

## 2022-12-15 PROBLEM — T45.1X5A CHEMOTHERAPY INDUCED NAUSEA AND VOMITING: Status: RESOLVED | Noted: 2022-05-26 | Resolved: 2022-12-15

## 2022-12-15 PROBLEM — R11.2 CHEMOTHERAPY INDUCED NAUSEA AND VOMITING: Status: RESOLVED | Noted: 2022-05-26 | Resolved: 2022-12-15

## 2022-12-15 PROBLEM — R04.0 ANTERIOR EPISTAXIS: Status: RESOLVED | Noted: 2022-08-22 | Resolved: 2022-12-15

## 2022-12-15 PROBLEM — T45.1X5A LEUKOPENIA DUE TO ANTINEOPLASTIC CHEMOTHERAPY (HCC): Status: RESOLVED | Noted: 2022-05-26 | Resolved: 2022-12-15

## 2022-12-15 PROBLEM — D70.1 LEUKOPENIA DUE TO ANTINEOPLASTIC CHEMOTHERAPY: Status: RESOLVED | Noted: 2022-05-26 | Resolved: 2022-12-15

## 2022-12-15 PROBLEM — R50.81 FEBRILE NEUTROPENIA (HCC): Status: RESOLVED | Noted: 2022-05-27 | Resolved: 2022-12-15

## 2022-12-15 LAB
ALBUMIN SERPL-MCNC: 4.3 G/DL (ref 3.5–5.2)
ALBUMIN/GLOB SERPL: 1.5 G/DL (ref 1.1–2.4)
ALP SERPL-CCNC: 77 U/L (ref 38–116)
ALT SERPL W P-5'-P-CCNC: 17 U/L (ref 0–33)
ANION GAP SERPL CALCULATED.3IONS-SCNC: 10.5 MMOL/L (ref 5–15)
AST SERPL-CCNC: 22 U/L (ref 0–32)
BASOPHILS # BLD AUTO: 0.02 10*3/MM3 (ref 0–0.2)
BASOPHILS NFR BLD AUTO: 0.6 % (ref 0–1.5)
BILIRUB SERPL-MCNC: 0.3 MG/DL (ref 0.2–1.2)
BUN SERPL-MCNC: 15 MG/DL (ref 6–20)
BUN/CREAT SERPL: 18.1 (ref 7.3–30)
CALCIUM SPEC-SCNC: 9.5 MG/DL (ref 8.5–10.2)
CHLORIDE SERPL-SCNC: 104 MMOL/L (ref 98–107)
CHOLEST SERPL-MCNC: 147 MG/DL (ref 0–200)
CO2 SERPL-SCNC: 25.5 MMOL/L (ref 22–29)
CREAT SERPL-MCNC: 0.83 MG/DL (ref 0.6–1.1)
DEPRECATED RDW RBC AUTO: 38 FL (ref 37–54)
EGFRCR SERPLBLD CKD-EPI 2021: 92.7 ML/MIN/1.73
EOSINOPHIL # BLD AUTO: 0.24 10*3/MM3 (ref 0–0.4)
EOSINOPHIL NFR BLD AUTO: 6.8 % (ref 0.3–6.2)
ERYTHROCYTE [DISTWIDTH] IN BLOOD BY AUTOMATED COUNT: 12.2 % (ref 12.3–15.4)
ESTRADIOL SERPL HS-MCNC: <5 PG/ML
FSH SERPL-ACNC: 94.1 MIU/ML
GLOBULIN UR ELPH-MCNC: 2.8 GM/DL (ref 1.8–3.5)
GLUCOSE SERPL-MCNC: 107 MG/DL (ref 74–124)
HCT VFR BLD AUTO: 32.5 % (ref 34–46.6)
HDLC SERPL-MCNC: 56 MG/DL (ref 40–60)
HGB BLD-MCNC: 11.4 G/DL (ref 12–15.9)
IMM GRANULOCYTES # BLD AUTO: 0.01 10*3/MM3 (ref 0–0.05)
IMM GRANULOCYTES NFR BLD AUTO: 0.3 % (ref 0–0.5)
LDLC SERPL CALC-MCNC: 72 MG/DL (ref 0–100)
LDLC/HDLC SERPL: 1.25 {RATIO}
LYMPHOCYTES # BLD AUTO: 0.66 10*3/MM3 (ref 0.7–3.1)
LYMPHOCYTES NFR BLD AUTO: 18.6 % (ref 19.6–45.3)
MCH RBC QN AUTO: 30.1 PG (ref 26.6–33)
MCHC RBC AUTO-ENTMCNC: 35.1 G/DL (ref 31.5–35.7)
MCV RBC AUTO: 85.8 FL (ref 79–97)
MONOCYTES # BLD AUTO: 0.43 10*3/MM3 (ref 0.1–0.9)
MONOCYTES NFR BLD AUTO: 12.1 % (ref 5–12)
NEUTROPHILS NFR BLD AUTO: 2.18 10*3/MM3 (ref 1.7–7)
NEUTROPHILS NFR BLD AUTO: 61.6 % (ref 42.7–76)
NRBC BLD AUTO-RTO: 0 /100 WBC (ref 0–0.2)
PLATELET # BLD AUTO: 158 10*3/MM3 (ref 140–450)
PMV BLD AUTO: 9.6 FL (ref 6–12)
POTASSIUM SERPL-SCNC: 4.2 MMOL/L (ref 3.5–4.7)
PROT SERPL-MCNC: 7.1 G/DL (ref 6.3–8)
RBC # BLD AUTO: 3.79 10*6/MM3 (ref 3.77–5.28)
SODIUM SERPL-SCNC: 140 MMOL/L (ref 134–145)
TRIGL SERPL-MCNC: 104 MG/DL (ref 0–150)
VLDLC SERPL-MCNC: 19 MG/DL (ref 5–40)
WBC NRBC COR # BLD: 3.54 10*3/MM3 (ref 3.4–10.8)

## 2022-12-15 PROCEDURE — 36591 DRAW BLOOD OFF VENOUS DEVICE: CPT

## 2022-12-15 PROCEDURE — 82670 ASSAY OF TOTAL ESTRADIOL: CPT | Performed by: INTERNAL MEDICINE

## 2022-12-15 PROCEDURE — 80053 COMPREHEN METABOLIC PANEL: CPT

## 2022-12-15 PROCEDURE — 83001 ASSAY OF GONADOTROPIN (FSH): CPT | Performed by: INTERNAL MEDICINE

## 2022-12-15 PROCEDURE — 80061 LIPID PANEL: CPT | Performed by: INTERNAL MEDICINE

## 2022-12-15 PROCEDURE — 85025 COMPLETE CBC W/AUTO DIFF WBC: CPT

## 2022-12-15 PROCEDURE — 99214 OFFICE O/P EST MOD 30 MIN: CPT | Performed by: INTERNAL MEDICINE

## 2022-12-15 PROCEDURE — 36415 COLL VENOUS BLD VENIPUNCTURE: CPT | Performed by: INTERNAL MEDICINE

## 2022-12-15 PROCEDURE — 25010000002 HEPARIN LOCK FLUSH PER 10 UNITS: Performed by: INTERNAL MEDICINE

## 2022-12-15 RX ORDER — SODIUM CHLORIDE 0.9 % (FLUSH) 0.9 %
10 SYRINGE (ML) INJECTION AS NEEDED
Status: CANCELLED | OUTPATIENT
Start: 2022-12-15

## 2022-12-15 RX ORDER — HEPARIN SODIUM (PORCINE) LOCK FLUSH IV SOLN 100 UNIT/ML 100 UNIT/ML
500 SOLUTION INTRAVENOUS AS NEEDED
Status: DISCONTINUED | OUTPATIENT
Start: 2022-12-15 | End: 2022-12-15 | Stop reason: HOSPADM

## 2022-12-15 RX ORDER — LETROZOLE 2.5 MG/1
2.5 TABLET, FILM COATED ORAL DAILY
Qty: 30 TABLET | Refills: 3 | Status: SHIPPED | OUTPATIENT
Start: 2022-12-15 | End: 2023-03-09 | Stop reason: SINTOL

## 2022-12-15 RX ORDER — HEPARIN SODIUM (PORCINE) LOCK FLUSH IV SOLN 100 UNIT/ML 100 UNIT/ML
500 SOLUTION INTRAVENOUS AS NEEDED
Status: CANCELLED | OUTPATIENT
Start: 2022-12-15

## 2022-12-15 RX ORDER — SODIUM CHLORIDE 0.9 % (FLUSH) 0.9 %
10 SYRINGE (ML) INJECTION AS NEEDED
Status: DISCONTINUED | OUTPATIENT
Start: 2022-12-15 | End: 2022-12-15 | Stop reason: HOSPADM

## 2022-12-15 RX ADMIN — Medication 10 ML: at 13:32

## 2022-12-15 RX ADMIN — Medication 500 UNITS: at 13:32

## 2022-12-15 NOTE — PROGRESS NOTES
Subjective       DURING THE VISIT WITH THE PATIENT TODAY , PATIENT HAD FACE MASK, MY MEDICAL ASSISTANT AND I  HAD PROPPER PROTECTIVE EQUIPMENT, AND I DID HAND HYGIENE WITH SOAP AND WATER BEFORE AND AFTER THE VISIT.     REASON FOR FOLLOW UP:  LOBULAR CARCINOMA OF THE LEFT BREAST, 70 MM IN SIZE, MARGINS OF RESECTION NEGATIVE AFTER MASTECTOMY, GRADE 2, ER POSITIVE NM POSITIVE, HER 2 NEGATIVE BY IHC,KI 67 39% ,ONE POSITIVE AXILLARY LYMPH NODE OUT OF 4 NODES REMOVAL, PREMENOPAUSAL .INVITAE PANEL NEGATIVE.ONCOTYPE 18: chemotherapy AC X 4 AND TAXOL X 6, STOPPED BECAUSE SEVERE NEUROPATHY IN FEET AND HANDS.         HISTORY OF PRESENT ILLNESS:      On 12/15/2022 this 37-year-old  female returns today to the office for followup after she has completed her plan of chemotherapy for her treatment of her breast cancer. She has had a mastectomy along with expander planned for definitive implant in the summer of 2023. The patient was still premenopausal according to clinical and biochemical status and I advised Gynecology to proceed with an oophorectomy in this patient through laparoscopy. This was carried out on 11/28/2022. The patient has expected pelvic pain and discomfort but otherwise she has required minimal Tylenol and the surgical sites have healed extremely well. She is not having any fever or chills. Bowel activity is normal. Urination is normal. She has not had any hot flashes. Her appetite is good and she is no longer taking any PPI. She is staying away from spicy foods. She is having more activity at home in the kitchen and more personal activity of her own. She physically feels well even though she is very sad because her father-in-law has been diagnosed with colon cancer in [place] and they are going to travel to see him.              ONCOLOGIC HISTORY:  The patient is a 37 y.o. year old female who is here for an opinion about the above issue.  I had the opportunity to see this delightful [place] female, 37  years old, mother of 2, who is premenopausal who has found abnormalities in the left breast since 12/2021, having sensation of fullness in the breast and discomfort and occasional pain. After this complaint she was seen by her primary physician and she initiated a process of mammograms and ultrasounds that culminated with the diagnosis of lobular carcinoma of the breast, invasive, 7 cm in size. The patient completed a mastectomy and sentinel lymph nodes almost 3 weeks ago and she is here for review and advice in regard to adjuvant therapy. The patient is still having pain at the surgical site, especially since yesterday she had injection of saline in the expander that was placed after the mastectomy. She also has neuropathic pain in the inner aspect of the left arm that is bothering her a lot through the day and sometimes almost driving her crazy. She has no motor deficit into the left upper extremity. She denies any fevers or chills or bone pain. Her appetite is acceptable but she has changed her diet and she is almost going into vegetarian with the exception of some fish. She has eliminated all the dairy products and she has eliminated mostly carbohydrates as well. She has lost some weight. The patient otherwise feels well. She has normal bowel activity, normal urination. No skeletal pain or joint pain. No cough, sputum production, shortness of breath or palpitations. She is extremely anxious about this visit today. She is in company of her .    • The patient returned to the office on 05/20/2022 in preparation for initiation of chemotherapy. In the meantime she has undergone a port placement in right subclavian position with no difficulties and an echocardiogram that will be discussed below. The patient has had very significant upper respiratory allergies. She has been tested this week for COVID being negative. She is not running any fever. Most of the symptoms in the respiratory tract include sneezing,  itching, itchy eyes and rhinorrhea. No sore throat, no alteration in taste or smell. No cough, shortness of breath, pleuritic pain or hemoptysis.     The surgical site from the left breast expander is not painful. She has healed extremely well. The same is applicable to her port that was placed a week ago.  • The patient returned to the office on 05/26/2022 for an interim assessment. Since the chemotherapy administration last week the patient had persistent nausea and vomiting at least for 4 days to the point that she spent most of the time sleeping because of the effect of the Zyprexa but no resolution of her nausea and vomiting. She has vomited so much that now she has significant esophagitis with heartburn and indigestion. She has not had any hematemesis or melena. She just started to eat yesterday rice and potatoes. She was able to handle this and she has been able to handle liquids. She also has had discoloration of the urine that originally was pink and subsequently now has normalized. She had no burning sensation upon urination or vaginal bleeding. She has been very fatigued and tired. On top of that Neulasta triggers significant pain in the cranium, in the pelvic bone and in her femurs and she has the feeling that she has the flu. Again, she has not had any fever. Today is the best day that she has had since the chemotherapy administration. Obviously given the symptoms and this toxicity assessment, the patient will require radical change in her treatment as will be described below.  On 06/23/2022, the patient returns after she has completed 2 cycles of AC. The first cycle was dramatically cumbersome because of the tremendous amount of side effects including persistent nausea and vomiting after chemotherapy that required IV fluids and modification in her nausea regimen. She also developed neutropenic fever and ended up in the hospital. After 2nd cycle with dose adjustment the patient handled the treatment  much better and the miracle medicine in regard to nausea control was Ativan. Phenergan and Compazine triggered tremendous degree of confusion. Therefore, for the subsequent cycle 3 and 4, Ativan will be the main medicine to utilize. Zofran also will be utilized.     Physically the patient feels much better today. The patient has had appetite to eat. The heartburn and indigestion have mostly subsided. Her nutrition and hydration are much better. She is keeping her weight and she has no pain. Bowel activity with occasional constipation. Urination is normal. No cardiovascular or respiratory issues. Energy level much better.     She had significant pain with Neulasta use 4 days after the treatment requiring Zyrtec and significant Tylenol utilization.  •   This patient has been seen by video medicine visit on 09/12/2022 in regard to continue her chemotherapy administration in the adjuvant therapy of her breast cancer on the left. The patient at this time is undergoing weekly Taxol. Unfortunately she has developed further peripheral neuropathy in the tips of her fingers and her toes to the point that she feels sensation of itching more than pain all of the time and this is disrupting her life and her functionality very substantially. She has to use ice that gives her some relief of the symptom at least 4-6 times a day. It takes at least 10 minutes for the symptoms to improve but they come back in a question of hours. The patient is not having any difficulty with ambulation but the more walking that she does the more discomfort that she develops. The same is applicable to her hands. Her appetite has improved, she has gained weight, she has some degree of constipation associated with chemotherapy administration relieved with flax seed oil. Her bowel function now is normal, urination is normal. She has not had any menstrual flow since 06/2022. She has not had any nausea or vomiting. No cardiovascular or respiratory issues.      The patient otherwise has not had any new problems. No fever or infections. She has had resolution of her epistaxis.   Surgical menopause with bilateral oophorectomy on 2022. Patient initiated Femara on 2022 for a total of 5 years. Baseline echocardiogram after completion of chemotherapy disclosed normal ejection fraction. We will proceed with radiological assessment of her chest, abdomen and pelvis baseline for the future and eventually she will proceed also with bone density test.    •   •   ·     Past Medical History:   Diagnosis Date   • Anemia    • Anesthesia complication     hypotension with last C section   • Breast cancer (HCC) 2022    left sided, stage 3, ER/OH+, Her2-, s/p mastectomy, LAST CHEMO 10/3/22, CURRENTLY RECEIVING RADIATION   • History of COVID-19     2021 AND 2022   • Hyperlipidemia    • PONV (postoperative nausea and vomiting)    • Seasonal allergies         Past Surgical History:   Procedure Laterality Date   • APPENDECTOMY     • BREAST BIOPSY Left    • BREAST RECONSTRUCTION Left 2022    Procedure: LEFT BREAST 1ST STAGE RECONSTRUCTION WITH INSERTION OF TISSUE EXPANDER AND BIOLOGIC;  Surgeon: Hermelinda Johnson MD;  Location: [facility];  Service: Plastics;  Laterality: Left;   •  SECTION     •  SECTION N/A 12/10/2018    Procedure:  SECTION REPEAT;  Surgeon: Heidy Gutierrez MD;  Location: [facility] LABOR DELIVERY;  Service: Obstetrics/Gynecology   • D & C HYSTEROSCOPY N/A 2021    Procedure: HYSTEROSCOPY REMOVAL INTRAUTERINE DEVICE WILL NEED ULTRASOUND IN ROOM;  Surgeon: Heidy Gutierrez MD;  Location: [facility];  Service: Obstetrics/Gynecology;  Laterality: N/A;   • DIAGNOSTIC LAPAROSCOPY Bilateral 2022    Procedure: DAVINCI ASSISTED BILATERAL SALPINGO-OOPHORECTOMY;  Surgeon: Heidy Gutierrez MD;  Location: [facility] OR;  Service: Robotics - DaVinci;  Laterality: Bilateral;   • KNEE ARTHROSCOPY Right     • MASTECTOMY W/ SENTINEL NODE BIOPSY Left 04/13/2022    Procedure: left total mastectomy with left axillary sentinel lymph node biopsy;  Surgeon: Monisha Escudero MD;  Location: [facility] OR;  Service: General;  Laterality: Left;   • VENOUS ACCESS DEVICE (PORT) INSERTION Right 5/13/2022    Procedure: right port placement;  Surgeon: Monisha Escudero MD;  Location: [facility] OR;  Service: General;  Laterality: Right;        Current Outpatient Medications on File Prior to Visit   Medication Sig Dispense Refill   • acetaminophen (TYLENOL) 325 MG tablet Take 650 mg by mouth Every 6 (Six) Hours As Needed for Mild Pain.     • atorvastatin (LIPITOR) 10 MG tablet Take 10 mg by mouth Every Night.     • B Complex Vitamins (Vitamin-B Complex) tablet Take 1 tablet by mouth Daily.  0   • Cetirizine HCl (ZYRTEC PO) Take 10 mg by mouth As Needed.     • nystatin-diphenhydrAMINE-Lidocaine Viscous HCl in aluminum-magnesium hydroxide-simethicone suspension Swish and swallow 5 mL 4 (Four) Times a Day before meals and at bedtime. Use 10-15 minutes before eating or drinking 410 mL 1   • omeprazole (priLOSEC) 20 MG capsule Take 1 capsule by mouth 2 (Two) Times a Day. 60 capsule 3   • oxyCODONE-acetaminophen (Percocet) 5-325 MG per tablet Take 1 tablet by mouth Every 6 (Six) Hours As Needed for Moderate Pain. 30 tablet 0   • prochlorperazine (COMPAZINE) 10 MG tablet Take 1 tablet by mouth Every 6 (Six) Hours As Needed for Nausea or Vomiting. 30 tablet 5     No current facility-administered medications on file prior to visit.        ALLERGIES:    Allergies   Allergen Reactions   • Asa [Aspirin] Anaphylaxis and Shortness Of Breath   • Adhesive Tape Dermatitis     Paper tape okay        Social History     Socioeconomic History   • Marital status:    Tobacco Use   • Smoking status: Never   • Smokeless tobacco: Never   Vaping Use   • Vaping Use: Never used   Substance and Sexual Activity   • Alcohol use: Not Currently      "Alcohol/week: 5.0 standard drinks     Types: 5 Shots of liquor per week   • Drug use: Never   • Sexual activity: Defer        Family History   Problem Relation Age of Onset   • Melanoma Paternal Aunt    • Diabetes Other    • Malig Hyperthermia Neg Hx           Objective     Vitals:    12/15/22 1404   BP: 94/62   Pulse: 75   Resp: 18   Temp: 97.5 °F (36.4 °C)   TempSrc: Temporal   SpO2: 99%   Weight: 53.7 kg (118 lb 6.4 oz)   Height: 160 cm (62.99\")   PainSc: 0-No pain     Current Status 10/31/2022   ECOG score 0       Physical Exam                      GENERAL:  Well-developed, well-nourished  Patient  in no acute distress.   SKIN:  Warm, dry ,NO purpura ,no rash.  HEENT:  Pupils were equal and reactive to light and accomodation, conjunctivae noninjected, normal extraocular movements, normal visual acuity.   NECK:  Supple with good range of motion; no thyromegaly , no JVD or bruits,.No carotid artery pain, no carotid abnormal pulsation   LYMPHATICS:  No cervical, NO supraclavicular, NO axillary, NO inguinal adenopathies.  CARDIAC   normal rate , regular rhythm, without murmur,NO rubs NO S3 NO S4   LUNGS: normal breath sounds bilateral, no wheezing, NO rhonchi, NO crackles ,NO rubs.  VASCULAR VENOUS: no cyanosis, NO collateral circulation, NO varicosities, NO edema, NO palpable cords, NO pain,NO erythema, NO pigmentation of the skin.  ABDOMEN:  Soft, NO pain,no hepatomegaly, no splenomegaly,no masses, no ascites, no collateral circulation,no distention.Surgical sites in the abdomen perfectly well healed. There is no hematoma or infection formation in any of them. She has expected minimal suprapubic pain. No rebound.      EXTREMITIES  AND SPINE:  No clubbing, no cyanosis ,no deformities , no pain .No kyphosis,  no pain in spine, no pain in ribs , no pain in pelvic bone.  NEUROLOGICAL:  Patient was awake, alert, oriented to time, person and place.              RECENT LABS:  Hematology WBC   Date Value Ref Range Status "   12/15/2022 3.54 3.40 - 10.80 10*3/mm3 Final     RBC   Date Value Ref Range Status   12/15/2022 3.79 3.77 - 5.28 10*6/mm3 Final     Hemoglobin   Date Value Ref Range Status   12/15/2022 11.4 (L) 12.0 - 15.9 g/dL Final     Hematocrit   Date Value Ref Range Status   12/15/2022 32.5 (L) 34.0 - 46.6 % Final     Platelets   Date Value Ref Range Status   12/15/2022 158 140 - 450 10*3/mm3 Final       CBC:    WBC   Date Value Ref Range Status   12/15/2022 3.54 3.40 - 10.80 10*3/mm3 Final     RBC   Date Value Ref Range Status   12/15/2022 3.79 3.77 - 5.28 10*6/mm3 Final     Hemoglobin   Date Value Ref Range Status   12/15/2022 11.4 (L) 12.0 - 15.9 g/dL Final     Hematocrit   Date Value Ref Range Status   12/15/2022 32.5 (L) 34.0 - 46.6 % Final     MCV   Date Value Ref Range Status   12/15/2022 85.8 79.0 - 97.0 fL Final     MCH   Date Value Ref Range Status   12/15/2022 30.1 26.6 - 33.0 pg Final     MCHC   Date Value Ref Range Status   12/15/2022 35.1 31.5 - 35.7 g/dL Final     RDW   Date Value Ref Range Status   12/15/2022 12.2 (L) 12.3 - 15.4 % Final     RDW-SD   Date Value Ref Range Status   12/15/2022 38.0 37.0 - 54.0 fl Final     MPV   Date Value Ref Range Status   12/15/2022 9.6 6.0 - 12.0 fL Final     Platelets   Date Value Ref Range Status   12/15/2022 158 140 - 450 10*3/mm3 Final     Neutrophil %   Date Value Ref Range Status   12/15/2022 61.6 42.7 - 76.0 % Final     Lymphocyte %   Date Value Ref Range Status   12/15/2022 18.6 (L) 19.6 - 45.3 % Final     Monocyte %   Date Value Ref Range Status   12/15/2022 12.1 (H) 5.0 - 12.0 % Final     Eosinophil %   Date Value Ref Range Status   12/15/2022 6.8 (H) 0.3 - 6.2 % Final     Basophil %   Date Value Ref Range Status   12/15/2022 0.6 0.0 - 1.5 % Final     Immature Grans %   Date Value Ref Range Status   12/15/2022 0.3 0.0 - 0.5 % Final     Neutrophils, Absolute   Date Value Ref Range Status   12/15/2022 2.18 1.70 - 7.00 10*3/mm3 Final     Lymphocytes, Absolute   Date  Value Ref Range Status   12/15/2022 0.66 (L) 0.70 - 3.10 10*3/mm3 Final     Monocytes, Absolute   Date Value Ref Range Status   12/15/2022 0.43 0.10 - 0.90 10*3/mm3 Final     Eosinophils, Absolute   Date Value Ref Range Status   12/15/2022 0.24 0.00 - 0.40 10*3/mm3 Final     Basophils, Absolute   Date Value Ref Range Status   12/15/2022 0.02 0.00 - 0.20 10*3/mm3 Final     Immature Grans, Absolute   Date Value Ref Range Status   12/15/2022 0.01 0.00 - 0.05 10*3/mm3 Final     nRBC   Date Value Ref Range Status   12/15/2022 0.0 0.0 - 0.2 /100 WBC Final        CMP:    Glucose   Date Value Ref Range Status   10/31/2022 103 74 - 124 mg/dL Final     BUN   Date Value Ref Range Status   10/31/2022 13 6 - 20 mg/dL Final     Creatinine   Date Value Ref Range Status   10/31/2022 0.54 (L) 0.60 - 1.10 mg/dL Final     Sodium   Date Value Ref Range Status   10/31/2022 143 134 - 145 mmol/L Final     Potassium   Date Value Ref Range Status   10/31/2022 3.8 3.5 - 4.7 mmol/L Final     Chloride   Date Value Ref Range Status   10/31/2022 106 98 - 107 mmol/L Final     CO2   Date Value Ref Range Status   10/31/2022 23.8 22.0 - 29.0 mmol/L Final     Calcium   Date Value Ref Range Status   10/31/2022 9.6 8.5 - 10.2 mg/dL Final     Total Protein   Date Value Ref Range Status   10/31/2022 7.2 6.3 - 8.0 g/dL Final     Albumin   Date Value Ref Range Status   10/31/2022 4.50 3.50 - 5.20 g/dL Final     ALT (SGPT)   Date Value Ref Range Status   10/31/2022 18 0 - 33 U/L Final     AST (SGOT)   Date Value Ref Range Status   10/31/2022 25 0 - 32 U/L Final     Alkaline Phosphatase   Date Value Ref Range Status   10/31/2022 77 38 - 116 U/L Final     Total Bilirubin   Date Value Ref Range Status   10/31/2022 0.3 0.2 - 1.2 mg/dL Final     Globulin   Date Value Ref Range Status   10/31/2022 2.7 1.8 - 3.5 gm/dL Final     A/G Ratio   Date Value Ref Range Status   10/31/2022 1.7 1.1 - 2.4 g/dL Final     BUN/Creatinine Ratio   Date Value Ref Range Status    10/31/2022 24.1 7.3 - 30.0 Final     Anion Gap   Date Value Ref Range Status   10/31/2022 13.2 5.0 - 15.0 mmol/L Final           Tissue Pathology Exam: [id number]  Order: [id number]   Status: Final result      Visible to patient: Yes (seen)      Next appt: 12/29/2022 at 10:00 AM in Cardiology (Kori Salas, JACQUELIN)      Dx: Breast cancer (HCC)     Specimen Information: Ovaries, Bilateral with Fallopian Tubes; Tissue    0 Result Notes  Component    Case Report   Surgical Pathology Report                         Case: [id number]                                   Authorizing Provider:  Heidy Gutierrez MD    Collected:           11/28/2022 07:54 AM           Ordering Location:     [facility]  Received:            11/28/2022 09:06 AM                                  MAIN OR                                                                       Pathologist:           Flavia Varghese MD                                                           Specimen:    Ovaries, Bilateral with Fallopian Tubes, Bilateral fallopian tubes and bilateral                     ovaries, for permanent                                                                     Final Diagnosis   1. Ovary and Fallopian Tubes, Bilateral, Bilateral Salpingo-Oophorectomy:               A. Bilateral ovaries with cystic follicles.               B. Unoriented fallopian tube with paratubal cysts; Other unoriented fallopian tube with no significant        histopathologic changes.                       Assessment & Plan     Diagnoses and all orders for this visit:    1. Malignant neoplasm of upper-outer quadrant of left breast in female, estrogen receptor positive (HCC) (Primary)       In summary this 37-year-old  female originally from [place] noticed abnormalities in the left breast in 12/2021, sensation of pressure, minor pain, sensation of fullness. She looked for help. Further mammogram and ultrasound documented abnormalities in  the left breast that were consistent with breast cancer. Since then the patient has undergone breast MRI. Breast biopsies documented invasive lobular carcinoma. All this culminated with a left-sided mastectomy and sentinel lymph node sampling. The pathology has been posted above. She had a lobular carcinoma, grade 2, margins of resection negative, Ki-67 at 39%, ER positive, [place] positive, HER2/leonie negative. She had 1 positive sentinel lymph node for malignancy with no extracapsular invasion. There was no evidence of lymphovascular invasion.     Invitae panel was negative for any genetic alteration.     The patient has had an expander placed at the mastectomy site. She has discomfort and pain after this was instillated with saline solution yesterday.     The other phenomenon that is happening to the patient is significant pain and discomfort in the inner aspect of the left arm. Sounds neuropathic related to her recent sentinel lymph node sampling. The patient is taking Aleve or ibuprofen with not too much benefit.     The patient also has a minimal component of anemia that will require assessment with ferritin, iron, TIBC, B12, folic acid levels.     RECOMMENDATIONS:  I have advised this patient and  today that she needs to receive adjuvant chemotherapy in the form of AC x4 and subsequently Taxol x12. Oncotype DX in this patient shows intermediate risk of recurrence. Also not only the chemotherapy will be necessary but we need to make her postmenopausal in the next several weeks or months. This will bring in a lot of benefit for her in the long run to minimize any recurrent disease. She is extremely young. She has 2 young children and we have to do as much as we can in this patient to minimize any potentiality for recurrent disease.     I discussed briefly with her side effects of the treatment including alopecia, cardiac toxicity, anemia, leukopenia, thrombocytopenia, mucositis among others and peripheral  neuropathy in the Taxol part of the treatment along with leukopenia, anemia, thrombocytopenia and allergic reaction to the medicine. I discussed with her that the whole treatment is going to take at least 24-26 weeks, in other words 6 months, and thereafter the patient will be hopefully postmenopausal. If she does not become postmenopausal she could qualify for a clinical trial or we could ask Dr. Hediy Gutierrez to do bilateral oophorectomy in this patient.     Eventually the patient will require endocrine adjuvant therapy after she becomes postmenopausal hopefully with letrozole.     I discussed all these facts with the patient and her . I spent 1 hour and 30 minutes with her going through the details of all this but she was able to grasp a lot of information. I appreciated highly the visit with her  and I pointed out to them that they need to work as a team. The patient has 2 young children. She is the only one taking care of the kids. The patient's mother is living with her, coming from [place] after all these issues. I asked her to ask her mother to stay for the period of time that she receives chemotherapy. If we need to do any services for her in regard to visa requirements or status, we will be glad to incorporate our  into this process.     Finally, I discussed with the patient the need for her to proceed with cardiac assessment. I made a referral to be seen by Cardiology and scheduled her to have an echocardiogram and I sent her back to see Dr. Escudero in order to proceed with the placement of a port.     In order to control the pain in the inner aspect of her arm, I asked her to use Voltaren topically 4 times a day. It will be perfectly fine for her to use ibuprofen and I sent Neurontin 100 mg to take at bedtime. Hopefully this will be a transitory issue.     Dr. Johnson eventually will be in charge of deciding the time for the patient to initiate physical therapy for her  left shoulder.     I reviewed all the pertinent records on this patient and I posted the information out of the pathology and radiology as above. I discussed the case with my partner, Dr. Landeros, who agrees with my plan of care.    Finally, I also posted to the patient today that will require followup in the future in regard to her right breast not only with mammogram but also ultrasound. I discussed with her the fact that sometimes lobular carcinoma likes to be a bilateral disease. There are minimal abnormalities in the mammogram in the right breast as well as in the MRI. This will require to be followed up in the future. I did not recommend a 2nd mastectomy prophylactically on her even though she asked the question today.    •  The patient was reviewed on 2022. She has had her port placed in right subclavian position a week ago. She is healing perfectly fine and her surgical site on the left mastectomy and the expander looks perfectly normal with healing. No infection, erythema or discharge.     The patient since then has undergone formal education and consent for chemotherapy administration. Her echocardiogram even though technically difficult documented a normal left ventricular ejection fraction. No pericardial effusion and no valvular dysfunction.     Her white count, hemoglobin and platelets are normal today.     Her chemistry profile, CA 15-3 as well as ferritin, iron, TIBC, B12 and folic acid levels were all normal.     Therefore under the present circumstances I advised the patient and  the followin. For the treatment of her breast cancer the patient will initiate today chemotherapy with dose dense AC that she will receive every 2 weeks supported with Neulasta. The side effects of this already were discussed with her including nausea, vomiting, alopecia, anemia, leukopenia, thrombocytopenia and cardiac dysfunction among others. The patient was ready to proceed.   2. The patient will be  utilizing Neulasta On-body to minimize hematological toxicity and be able to keep up on the schedule in regard to her treatment. I advised her that this can produce significant bone pain in the sternum, in the rib cage, in the pelvic bone and she could use antihistaminic that she is already taking in the first place and also she can use either Aleve or Tylenol or a hot shower if necessary.   3. I encouraged her to remain on her nausea medicine, ondansetron, during the next 3 days to minimize any nausea or vomiting.   4. I advised her to stay away from smelling foods like hot soup or fried foods in the next 3 days given the significant increase in smell that patients receiving AC typically have. This will minimize the potential for nausea and vomiting.   5. The patient will require toxicity assessment in a week along with blood WORK CBC.   6. The patient once completes 4 cycles of AC will initiate weekly cycles of Taxol x12. Upon completion of that the patient will initiate a plan of radiation therapy and she already has been seen by Radiation Oncology.     The patient will participate in a clinical trial that we have in the office and she already has been consented for this in regard to the utilization of antiestrogen medicine upon completion of chemotherapy administration.     The patient will continue using her other medications on routine basis that include the Zyprexa and she will use the EMLA cream to minimize pain at the port site at the time of accessing.     Finally, I advised the patient and the  present in the room that they at any cost had to minimize the risk of pregnancy. In fact a pregnancy test today was negative. I advised them to use a condom.     The patient was ready to proceed.    All the discussion of all these issues took place in Khmer.  • The patient returned to the office on 05/26/2022. Since the previous visit a week ago the patient had chemotherapy administration and she developed  very significant nausea and vomiting nonstoppable for almost 5 days. Yesterday was the first day that she was able to have some rice and potatoes. Because she has thrown up so much she has developed significant heartburn and indigestion indicating probably bile gastritis, esophagitis and acid esophagitis. The patient has not had any hematemesis or melena. The other phenomenon that she had was tremendous somnolence due to the use of Zyprexa at the dose of 5 mg every night. She spent almost 4 days in bed sleepy but still with nausea and vomiting.     On top of things, she has very significant pain in her muscles, in her thighs, in her scalp and in her pelvic bone associated with Neulasta use. She is taking Tylenol for this with some improvement.     The patient has been able to reach hydration since yesterday, drinking proper liquids, and she is urinating okay. Therefore, the patient has multiple issues and on top of that she has now leukopenia associated with chemotherapy. The hemoglobin is stable. The platelet count has dropped but is not dramatically or risky for bleeding.     Given all the above circumstances I proposed her the following changes in regard to plan of chemotherapy for the next cycle.     1. We will modify her plan of chemotherapy, decreasing the doses for all the medications by 20%.   2. The patient will space out the chemotherapy treatments to every 3 weeks for the AC part of the treatment until completion of 4 cycles.   3. The patient will be supported with Neulasta injection. She will utilize Tylenol for pain.   4. The patient will continue using chemotherapy administration, anti-nausea medication and she will receive Emend on day 2 in the office and Emend on day 3 at home. Pharmacist will take care of this issue.   5. The patient will decrease Zyprexa from 5 mg a day to 2.5 mg in the evening to minimize somnolence.   6. The patient was advised to use omeprazole at the dose of 20 mg twice a day  to minimize esophageal irritation associated with so much nausea and vomiting.   7. The patient will require still laboratory parameters the week after each one of the cycles of chemotherapy.     I feel the obligation to make all these changes; otherwise this patient will quit her treatment and that is the worst thing that could happen under the present scenario. She agreed with all the changes made. I explained to her all these changes in Romanian to make her life a little bit more comfortable and easy to handle. She understands at the time that she returns in 2 weeks she probably will start to develop alopecia and she already has discussed this with her mother who will help through the process of shaving her head.  On 2022, the patient had dramatic improvement in regard to nausea control after dose adjustment for the 2nd cycle of chemotherapy and also modification of the nausea regimen. We tried to deliver Compazine and Phenergan. These medicines were tremendously tolling on her in regard to confusion and inability to function. Ativan was the miracle medicine. Therefore for the next 2 cycles Zofran and Ativan will be the ideal combination and she will require also IV fluids on day 2 of each one of the next cycles of AC.     In regard to pain associated with Neulasta, she required Tylenol around-the-clock for 4 days after each one of the treatments and now she has no discomfort whatsoever.     Today her white count is normal, hemoglobin improved to 10.7, platelet count with minor decrease, no clinical bleeding.    The patient feels substantially better. She is able to function at home. She is able to be with her family. She is able to attend her children and she has been able to walk 2 miles a day late in the evening. Encouraged her to continue doing this.     I suggested for her the followin. She will return in a week to proceed with cycle 3 of AC at the previous dose and supported with Neulasta.   2.  She will be supported on day 2 with IV fluids and nausea medication.   3. We will discontinue Phenergan and/or Compazine and she will remain on Zofran and Ativan for nausea control.   4. She will continue PPI to minimize reflux symptomatology, still minimal problem but dramatically better than before.   5. She will use Milk of Magnesia for constipation. Another measure could be prunes and dates.     The patient will complete the total 4 cycles of AC and then will move into the Taxol arena. I pointed out to her that we will have the discussion about how to handle this medicine after the completion of the next 2 cycles.     She also raised the question in regard what to do upon completion of chemotherapy. I advised her that eventually she will have radiological assessment as a baseline and we will proceed with PATRICIA blood sampling for evaluation of minimal residual disease.     The patient was ready to proceed now that she feels dramatically better. What a difference it makes in regard to adjustment of medicines and trying to adjust nausea medication accordingly.  • The patient returned to the office on 07/21/2022. In regard to her chemotherapy treatment, she is ready to proceed with her 4th or last cycle of AC today. Her ANC is a little bit on the low side but the patient will be receiving Neulasta, therefore this will be taking care of this problem.     Her hemoglobin remains stable. This is anemia associated with chemotherapy administration. She also had a menstrual flow that was very abundant a few weeks ago. She is not taking any vitamins at this time and I have not advised to take any given concerning data about supplements in the background of adjuvant therapy for breast cancer.     In regard to the sensation of numbness in the left upper extremity arm, I think this is natural after mastectomy more than expected and I pointed out to her that this does not signify anything and does not require any therapy. This  is extremely common in many women and it has the tendency to go away spontaneously.     In regard to the abnormality in the left hand there is no evidence of lymphedema. I think she could have a minimal element of tendonitis. I advised her to use topical anti-inflammatory medication or ice the hand. She has not received any IV sites for blood draws in this area.     In regard to future visits, the patient would like to continue her chemotherapy treatments on Mondays because her children will be going back to school and she wants to have a weekend with them as much as she can. I find no problem seeing her in 3 weeks from now on Monday to proceed with initiation of Taxol. I pointed out to her that Taxol will be easier to handle a lower dose, less toxicity in regard to hematological parameters and hopefully no issues in regard to neuropathy.     I also advised her that we will continue monitoring the status of menstrual flow upon completion of chemotherapy. She still has 12 Taxol treatments to go and we do not know if the chemotherapy treatment is going to put her in menopause or not. Depending on the final analysis of this upon completion of chemotherapy and before initiation of hormonal therapy, we will need to do hormone levels.     Finally, the patient has had a lot of issues between her insurance company and the billing system at [facility]. I asked the billing system to give her a call and relate this to the insurance company. The specific question is about the cost and the utilization of PEGylated Neulasta or Neulasta On-body. Hopefully this will be happening to settle down the situation. Each one of these medicines have been billed in thousands of dollars and she already will complete 4 bills of this medication. She is extremely anxious about this situation. This is called economic toxicity of chemotherapy administration and we need to influence this situation as well on her to give her the benefit of  settling down once and for all.    This patient already has enough stressors on the plate to add another one that is economical stressor of her treatment.  • On 08/22/2022, the patient's white count, hemoglobin and platelets are stable. Tolerance to Taxol is much better than AC. She has some occasional nausea, some joint pain, some pruritus in the absence of any rash on her hands and her feet and I think this all is side effect of Taxol. I advised her to go ahead and proceed with her Taxol today and I advised her to go back to taking her Zyrtec on a regular basis to try to minimize any itching in her hands and her feet.     I also advised her to continue using Ativan that has been terrific in regard to control of nausea and vomiting and she can use 2-3 doses today and tomorrow to minimize the symptoms. It will be perfectly okay as well for her to take Tylenol tonight and tomorrow to minimize joint pain associated with Taxol utilization.     In regard to her epistaxis, it is very obvious that the nasal passages on the right side are different than the ones on the left. She has very significant deviation of the septum. This triggers airflow currents that are abnormal and capillarity in the anterior part of the septum that is very abnormal and is the source of bleeding. If this continues she will require referral for ENT for cauterization. Her platelet count is normal.     The patient has not had any menstrual flow now for 2 months and will continue asking this question because theoretically the chemotherapy should make her menopausal. Upon completion of Taxol we will need to do endocrine testing in this regard.     Overall I think the patient is doing much better than before and she will be advised to continue her weekly Taxol along with weekly laboratory testing and weekly nurse practitioner visit and visit with me in 1 month.   • On 09/12/2022 the patient continues complaining of significant sensation of itching and  discomfort in the tips of her fingers and her toes to the point that she has difficulty doing housework and difficulty walking with this. This is a different way of manifestation of toxicity associated with Taxol administration. She probably has a grade 2 peripheral neuropathy associated with this. The symptoms are not permanent, they come and go but this will obligate me to adjust her Taxol dose today by 25-30%. She has been receiving 120 mg of Taxol per dose. Her dose from now on will be 90 mg.   •   • The patient will receive a B12 injection today and I advised her to take B complex vitamin.  •   • In regard to her anemia associated with chemotherapy administration she was advised by my partner, Mich Agudelo MD, last week to take oral iron therapy. Unfortunately the patient became ill in regard to constipation and cramping and she has stopped the iron therapy.   •   • In regard to her leukopenia associated with chemotherapy administration her white count and ANC are appropriate today and she will be able to proceed with her Taxol administration today. She has not developed any fever or infection.   •   • In regard to her reflux symptomatology, nausea and vomiting the symptoms pertinent to his are resolved now that she is no longer receiving AC chemotherapy. She remains on Ativan and Compazine on prn basis and she remains on PPI that has corrected her acid issues altogether.  •   • In regard how to monitor her peripheral neuropathy I asked her to call my nurse, Justine Abbott on weekly basis, notify her of the status of her symptoms and if they get any worse or permanent I think we will need to discontinue the Taxol permanently and move into the next stage of therapy. She will require visit with me in 3 weeks.   •   • Duration of the video visit today 20 minutes.   •   • On 10/03/2022 the patient came back to the office for follow up. Since the previous visit she called stating that she could not handle the  peripheral neuropathy in her hands and her feet anymore because it is disrupting her lifestyle and keeping her from doing anything around the house and we discontinued the Taxol altogether. Since then she has had very significant recovery especially in her hands, still has discomfort in her feet and she is not able to walk long distances because of the stimulation of the activity in the plantar aspect of her feet triggers discomfort as well in the toes. A detailed neurological exam today disclosed no major abnormalities, temperature discrimination, pinprick vibration, reflexes, strength and so forth is very much unremarkable.    Today her white count, hemoglobin and platelets are normal.     From my point of view I advised the patient the following.     1. She has completed her plan of chemotherapy, we cannot push anymore Taxol on her given her sensory neuropathy that will become permanent.     2. I discussed with the patient options to make her postmenopausal either hormonal therapy goserelin for example or Lupron, another way to do this will be ovary removal. I have placed a phone call to talk with Heidy Gutierrez MD, the patient's Gynecologist to discuss this with her. My request to Dr. Gutierrez to perform bilateral oophorectomy. The patient is in agreement with that. This with the need for the patient to initiate at some point her adjuvant hormonal therapy if the patient is postmenopausal in the next visit after oophorectomy she will initiate letrozole therapy 2.5 mg a day for the time being. If the patient rejects the possibility of an oophorectomy she will require pharmacological removal of ovarian function and she will require also further adjuvant hormonal therapy.    3. Finally she already has been seen by radiation oncology after her diagnosis. I made the referral for her to be seen by them because from my point of view she is ready for initiation of therapy at this point.     4. The patient also  complains of fatigue. We will measure a TSH, ferritin, iron profile, reticulated hemoglobin today. We want to be sure that her iron stores and thyroid function are normal. I think this is leftover from chemotherapy administration.    5. The patient complains of foggy brain. Memory and ability to function is limited. It is very likely effect of chemotherapy administration on her. I asked her to trigger a lot of brain stimulation reading books, chatting with people, working on activities with the kids and doing more physical activity.     6. Port. The patient will require port maintenance for the time being. It will be flushed today and it will be reflushed again in 6 weeks when she returns.    7. I will continue monitoring laboratory parameters periodically.    8. I went ahead and obtained an estradiol, FSH and LH today. She has not had any menstrual flow since 06/2022.   On 10/31/2022 the patient believes that she is going to have menstrual flow this week. She has the sensation of the premenstrual syndrome. During the previous visit I measured estradiol, FSH, LH; those numbers were consistent with postmenopausal status but the patient believes that she is going to have menstrual flow very soon this week.     From my point of view the patient is already undergoing radiation therapy for breast cancer. She already has completed 4 treatments and she expects to complete all her treatments by November 30th.    It has been impossible to talk with Dr. Heidy Gutierrez in regard to the need for this patient to have oophorectomy to make her postmenopausal and eventually initiate her hormonal therapy. I will talk with Dr. Gutierrez hopefully today and that way the patient is put on the schedule to have an oophorectomy in the 1st week in December after completion of radiation therapy and thereafter the patient will be able to initiate her adjuvant hormonal therapy with Femara. At that point also I would expect to proceed with  radiological assessment after completion of her radiation treatment that will include a CT scan of the chest, abdomen and pelvis and a bone density test.     I pointed out to the patient that I would like for her to keep her port in place at least for a year. She agrees with that.     Otherwise, the patient has been given proper information by me in regard to physical activity to improve range of motion at the left shoulder that is not limiting according to my clinical examination on her today. She says that she has a little bit of pull and discomfort upon extension and elevation of her upper extremities expected from the surgery and the expander in the left chest wall.     Otherwise, the patient will return to see us back in a few weeks.    Her white count, hemoglobin and platelets are normal. Her chemistry profile is normal. Her hair is growing back. Her eyebrows, eyelashes are growing back and the patient feels substantially better from this point of view.  • ADDENDUM: DISCUSSED WITH DR COBB WHO WILL PLAN BILATERAL OOPHORECTOMY IN FEW WEEKS  •   On 12/15/2022 the patient returned after having bilateral oophorectomy through laparoscopy on 11/28/2022. Therefore officially now she is considered postmenopausal. The surgical sites are healing fine and she is going to follow up with Dr. Gutierrez in a few days. She has not encountered the need for any pain medication. Her bowel activity and urination are fine and she is eating better and functioning better at home. She has had near complete resolution of her neuropathy associated with Taxol administration and she has not had any consequences of anthracycline administration through her echocardiogram recently done.     Her clinical examination today is otherwise benign. Her heart function, lung function, abdominal function disclosed no abnormalities. Her white count, hemoglobin and platelets are fine still with minimal leukopenia and minimal anemia that will get over  in the next several weeks. Considering that now the patient is postmenopausal I am going to go ahead and proceed with prescription of Femara at the dose of 2.5 mg a day. She will need to take this medicine for the next 5 years. I discussed with her side effects of the Femara including occasional joint pain that can be controlled with Tylenol and physical activity. Also this medicine can raise cholesterol level and I will obtain a new lipid profile baseline today. This will be repeated every 6 months.     Eventually the patient also will proceed with bone density. I made her aware that sometimes these medicines also can favor osteopenia or osteoporosis and we will need to request a bone density test more or less in 6 months from now.     The patient will proceed with radiological assessment of CT scan of the chest, abdomen and pelvis baseline after completion of all her therapy now and before initiation of the Femara. She will not require any further radiological assessment in the future unless that she has any clinical symptomatology to suggest any recurrence.     The patient will require visit with me in 6 weeks. On that occasion her port the week before will be flushed. We will keep the port for the next couple of years.     Finally the patient is very sad because her father-in-law was diagnosed with colon cancer in [place] and they are going to need to travel to see him.     I discussed all these facts with nurse navigator in the room. Also the patient was advised to proceed with survivorship assessment the same day that she will be seeing me in 6 weeks.     We are glad that she completed all this and many things are behind. She is in remission now and she cried with lam about this issue today.    •   •   •   •   •   ·

## 2022-12-16 NOTE — PROGRESS NOTES
Nurse Navigator F/U Visit: Met with patient in the oncology visit along with Dr. Palacios for f/u visit. History of S/P left total mastectomy with left axillary sentinel lymph node biopsy and left breast 1st stage reconstruction with insertion of tissue expanders. She is scheduled to complete breast reconstruction next summer. States she completed adjuvant chemotherapy (AC x4 and Taxol x6) and XRT x25 fractions. C/O slight pain RLQ abdomen. S/P bilateral laparoscopic salpingoophorectomy on 11/28/22. Patient says appetite is good. No reports of sleep disturbance voiced. States neuropathy in hands and feet is much improved. Mood and affect “good”. Dr. Palacios discussed plan of care. He prescribed Femera 2.5 mg daily for the next 5 years. Informed her of possible side effects such as occasional joint pain and hot flashes. Instructed to take Tylenol as needed for pain/discomfort. Explained survivorship program to patient and asked if she was interested in referral. She is scheduled to see Rebecca/JACQUELIN on the same day as her next f/u with Dr. Palacios. Patient able to verbalize understanding regarding plan of care. No needs noted at this time. Encouraged patient to call with questions or concerns. Will sign off…..Sharon Ashley RN, Oncology Nurse Navigator

## 2023-01-11 ENCOUNTER — OFFICE VISIT (OUTPATIENT)
Dept: CARDIOLOGY | Facility: CLINIC | Age: 38
End: 2023-01-11
Payer: COMMERCIAL

## 2023-01-11 VITALS
SYSTOLIC BLOOD PRESSURE: 124 MMHG | HEART RATE: 75 BPM | OXYGEN SATURATION: 97 % | HEIGHT: 62 IN | DIASTOLIC BLOOD PRESSURE: 68 MMHG | BODY MASS INDEX: 22.08 KG/M2 | WEIGHT: 120 LBS

## 2023-01-11 DIAGNOSIS — Z92.21 PERSONAL HISTORY OF ANTINEOPLASTIC CHEMOTHERAPY: ICD-10-CM

## 2023-01-11 DIAGNOSIS — Z17.0 MALIGNANT NEOPLASM OF UPPER-OUTER QUADRANT OF LEFT BREAST IN FEMALE, ESTROGEN RECEPTOR POSITIVE: Primary | ICD-10-CM

## 2023-01-11 DIAGNOSIS — C50.412 MALIGNANT NEOPLASM OF UPPER-OUTER QUADRANT OF LEFT BREAST IN FEMALE, ESTROGEN RECEPTOR POSITIVE: Primary | ICD-10-CM

## 2023-01-11 PROBLEM — C50.919 BREAST CANCER: Status: RESOLVED | Noted: 2022-03-01 | Resolved: 2023-01-11

## 2023-01-11 PROCEDURE — 99214 OFFICE O/P EST MOD 30 MIN: CPT | Performed by: NURSE PRACTITIONER

## 2023-01-11 PROCEDURE — 93000 ELECTROCARDIOGRAM COMPLETE: CPT | Performed by: NURSE PRACTITIONER

## 2023-01-11 NOTE — PROGRESS NOTES
Date of Office Visit: 2023  Encounter Provider: JACQUELIN Villar  Place of Service: [facility] CARDIOLOGY  Patient Name: Maribell Romero  :1985  Primary Cardiologist: Dr. Ananda Arita    Chief Complaint   Patient presents with   • Cardio-Oncology Visit   • Follow-up   :     HPI: Maribell Romero is a 37 y.o. female who presents today for a follow-up cardio oncology visit.  She is a new patient to me and I have reviewed her medical records.    She has been diagnosed with lobular carcinoma of the left breast, grade 2, ER positive IA positive, HER2 negative and one positive lymph node.  She underwent left-sided mastectomy and placement of tissue expanders.  She received 4 treatments of doxorubicin/cyclophosphamide.  She was then started on paclitaxel and this was discontinued due to peripheral neuropathy in 2022.  She underwent oophorectomy in 2022.  She was started on letrozole in 2022 and was recommended to take the medication for 5 years.  Plans for reconstruction in the summer 2023.    In May 2022, she was referred to Dr. Ananda Arita for a cardio oncology visit.  Her echocardiogram was performed prior to her first dose of doxorubicin; it was an extremely difficult study due pain from her recent mastectomy.  Echo showed EF 64.9%.  Repeat echo in May 2022 showed EF 60% and GLS -22.8%.    In 2022, echocardiogram showed EF 63% and GLS -22.9%.  In 2022, EF 61-65% and GLS -24.1%.  Dr. Arita recommended repeating a limited echocardiogram in 2023.    She presents today for follow-up visit.  She says she is now done with chemotherapy and taking letrozole (aromatase inhibitor).  She reports and an arm joint/muscle pain from the medication.  She denies chest pain, shortness of air, palpitations, edema, dizziness, syncope, or bleeding.      Past Medical History:   Diagnosis Date   • Anemia    •  Anesthesia complication     hypotension with last C section   • Breast cancer (HCC) 2022    left sided, stage 3, ER/NM+, Her2-, s/p mastectomy, LAST CHEMO 10/3/22, CURRENTLY RECEIVING RADIATION   • History of COVID-19     2021 AND 2022   • Hyperlipidemia    • PONV (postoperative nausea and vomiting)    • Seasonal allergies        Past Surgical History:   Procedure Laterality Date   • APPENDECTOMY     • BREAST BIOPSY Left    • BREAST RECONSTRUCTION Left 2022    Procedure: LEFT BREAST 1ST STAGE RECONSTRUCTION WITH INSERTION OF TISSUE EXPANDER AND BIOLOGIC;  Surgeon: Hermelinda Johnson MD;  Location: [facility] OR;  Service: Plastics;  Laterality: Left;   •  SECTION     •  SECTION N/A 12/10/2018    Procedure:  SECTION REPEAT;  Surgeon: Heidy Gutierrez MD;  Location: [facility] LABOR DELIVERY;  Service: Obstetrics/Gynecology   • D & C HYSTEROSCOPY N/A 2021    Procedure: HYSTEROSCOPY REMOVAL INTRAUTERINE DEVICE WILL NEED ULTRASOUND IN ROOM;  Surgeon: Heidy Gutierrez MD;  Location: [facility]SC;  Service: Obstetrics/Gynecology;  Laterality: N/A;   • DIAGNOSTIC LAPAROSCOPY Bilateral 2022    Procedure: DAVINCI ASSISTED BILATERAL SALPINGO-OOPHORECTOMY;  Surgeon: Heidy Gutierrez MD;  Location: [facility] OR;  Service: Robotics - DaVinci;  Laterality: Bilateral;   • KNEE ARTHROSCOPY Right    • MASTECTOMY W/ SENTINEL NODE BIOPSY Left 2022    Procedure: left total mastectomy with left axillary sentinel lymph node biopsy;  Surgeon: Monisha Escudero MD;  Location: [facility] OR;  Service: General;  Laterality: Left;   • VENOUS ACCESS DEVICE (PORT) INSERTION Right 2022    Procedure: right port placement;  Surgeon: Monisha Escudero MD;  Location: [facility] OR;  Service: General;  Laterality: Right;       Social History     Socioeconomic History   • Marital status:    Tobacco Use   • Smoking status: Never   • Smokeless tobacco: Never    Vaping Use   • Vaping Use: Never used   Substance and Sexual Activity   • Alcohol use: Not Currently     Alcohol/week: 5.0 standard drinks     Types: 5 Shots of liquor per week   • Drug use: Never   • Sexual activity: Defer       Family History   Problem Relation Age of Onset   • Melanoma Paternal Aunt    • Diabetes Other    • Malig Hyperthermia Neg Hx        The following portion of the patient's history were reviewed and updated as appropriate: past medical history, past surgical history, past social history, past family history, allergies, current medications, and problem list.    Review of Systems   Constitutional: Negative.   Cardiovascular: Negative.    Respiratory: Negative.    Musculoskeletal: Positive for joint pain and myalgias.       Allergies   Allergen Reactions   • Asa [Aspirin] Anaphylaxis and Shortness Of Breath   • Adhesive Tape Dermatitis     Paper tape okay         Current Outpatient Medications:   •  acetaminophen (TYLENOL) 325 MG tablet, Take 650 mg by mouth Every 6 (Six) Hours As Needed for Mild Pain., Disp: , Rfl:   •  atorvastatin (LIPITOR) 10 MG tablet, Take 10 mg by mouth Every Night., Disp: , Rfl:   •  B Complex Vitamins (Vitamin-B Complex) tablet, Take 1 tablet by mouth Daily., Disp: , Rfl: 0  •  letrozole (FEMARA) 2.5 MG tablet, Take 1 tablet by mouth Daily., Disp: 30 tablet, Rfl: 3  •  omeprazole (priLOSEC) 20 MG capsule, Take 1 capsule by mouth 2 (Two) Times a Day., Disp: 60 capsule, Rfl: 3  •  Cetirizine HCl (ZYRTEC PO), Take 10 mg by mouth As Needed., Disp: , Rfl:   •  nystatin-diphenhydrAMINE-Lidocaine Viscous HCl in aluminum-magnesium hydroxide-simethicone suspension, Swish and swallow 5 mL 4 (Four) Times a Day before meals and at bedtime. Use 10-15 minutes before eating or drinking, Disp: 410 mL, Rfl: 1  •  oxyCODONE-acetaminophen (Percocet) 5-325 MG per tablet, Take 1 tablet by mouth Every 6 (Six) Hours As Needed for Moderate Pain., Disp: 30 tablet, Rfl: 0  •  prochlorperazine  "(COMPAZINE) 10 MG tablet, Take 1 tablet by mouth Every 6 (Six) Hours As Needed for Nausea or Vomiting., Disp: 30 tablet, Rfl: 5         Objective:     Vitals:    01/11/23 1344   BP: 124/68   Pulse: 75   SpO2: 97%   Weight: 54.4 kg (120 lb)   Height: 157.5 cm (62\")     Body mass index is 21.95 kg/m².    PHYSICAL EXAM:    Vitals Reviewed.   General Appearance: No acute distress, well developed and well nourished.  Thin.    Eyes: Conjunctiva and lids: No erythema, swelling, or discharge. Sclera non-icteric.    HENT: Atraumatic, normocephalic. External eyes, ears, and nose normal. No hearing loss noted. Mucous membranes normal. Lips not cyanotic. Neck supple with no tenderness. Wearing mask.   Respiratory: No signs of respiratory distress. Respiration rhythm and depth normal.   Clear to auscultation. No rales, crackles, rhonchi, or wheezing auscultated.   Cardiovascular:  Jugular Venous Pressure: Normal  Heart Rate and Rhythm: Normal, Heart Sounds: Normal S1 and S2. No S3 or S4 noted.  Murmurs: No murmurs noted. No rubs, thrills, or gallops.   Lower Extremities: No edema noted.  Gastrointestinal:  Abdomen soft, non-distended, non-tender.    Musculoskeletal: Normal movement of extremities.  Skin and Nails: General appearance normal. No pallor, cyanosis, diaphoresis. Skin temperature normal. No clubbing of fingernails.   Psychiatric: Patient alert and oriented to person, place, and time. Speech and behavior appropriate. Normal mood and affect.       ECG 12 Lead    Date/Time: 1/11/2023 1:44 PM  Performed by: Kori Salas APRN  Authorized by: Kori Salas APRN   Comparison: compared with previous ECG from 5/27/2022  Similar to previous ECG  Rhythm: sinus rhythm  Rate: normal  BPM: 69  Conduction: conduction normal  ST Segments: ST segments normal  T Waves: T waves normal  QRS axis: normal    Clinical impression: normal ECG              Assessment:       Diagnosis Plan   1. Malignant neoplasm of upper-outer " quadrant of left breast in female, estrogen receptor positive (HCC)        2. Personal history of antineoplastic chemotherapy  Adult Transthoracic Echo Limited W/ Cont if Necessary Per Protocol             Plan:       She has been diagnosed with left-sided breast cancer and underwent chemotherapy/radiation/mastectomy and oophorectomy.  Her baseline echocardiogram and to serial follow-up echocardiograms have shown normal EF and GLS with no changes.    She currently is not receiving any chemotherapy agent.  In October 2022, she took her last dose of paclitaxel.  She is due for repeat limited echocardiogram in February 2023.  After that echocardiogram, I will discuss with Dr. Arita if she needs to continue with serial echocardiograms in 2023.  She has been recommended to have a yearly echocardiogram x5 years after treatment of anthracycline.  She also needs reassessment of ischemic heart disease risk on a yearly basis per Dr. Arita's previous note.    She denies any heart failure symptoms today.  Further recommendations to follow.    ADDENDUM:  · I reviewed with Dr. Arita.  She recommended a limited echocardiogram in February 2023, full echocardiogram in February 2024 and that needs to be done yearly x5 years.  Patient informed.      As always, it has been a pleasure to participate in your patient's care. Thank you.         Sincerely,         JACQUELIN Valle  [facility] Cardiology      · Dictated utilizing Dragon Dictation  · COVID-19 Precautions - Patient was compliant in wearing a mask. When I saw the patient, I used appropriate personal protective equipment (PPE) including mask and eye shield (standard procedure).  Additionally, I used gown and gloves if indicated.  Hand hygiene was completed before and after seeing the patient.  · I spent 30 minutes reviewing her medical records/testing/previous office notes/labs, face-to-face interaction with patient, physical examination, formulating the  plan of care, and discussion of plan of care with patient.

## 2023-01-13 ENCOUNTER — TELEPHONE (OUTPATIENT)
Dept: CARDIOLOGY | Facility: CLINIC | Age: 38
End: 2023-01-13
Payer: COMMERCIAL

## 2023-01-13 NOTE — TELEPHONE ENCOUNTER
I reviewed with Dr. Arita.  She recommended a limited echocardiogram in February 2023, full echocardiogram in February 2024 and that needs to be done yearly x5 years.  Patient informed.

## 2023-01-13 NOTE — TELEPHONE ENCOUNTER
Patient is scheduled for a limited echocardiogram in February 2023.  Please keep that appointment.    She also needs a 1 year follow-up visit with Dr. Arita and same-day echocardiogram in February 2024.  Please call patient to schedule.  Keep   Last appt 07/03/2018 - Next appt 10/17/2018    Tramadol last filled 08/13/2018 per PDMP - She is to take one tablet in the Evening, it would be too early to refill  I did not katie this medication up for you

## 2023-01-16 ENCOUNTER — HOSPITAL ENCOUNTER (OUTPATIENT)
Dept: CT IMAGING | Facility: HOSPITAL | Age: 38
Discharge: HOME | End: 2023-01-16
Admitting: INTERNAL MEDICINE
Payer: COMMERCIAL

## 2023-01-16 ENCOUNTER — INFUSION (OUTPATIENT)
Dept: ONCOLOGY | Facility: HOSPITAL | Age: 38
End: 2023-01-16
Payer: COMMERCIAL

## 2023-01-16 DIAGNOSIS — Z17.0 MALIGNANT NEOPLASM OF UPPER-OUTER QUADRANT OF LEFT BREAST IN FEMALE, ESTROGEN RECEPTOR POSITIVE: ICD-10-CM

## 2023-01-16 DIAGNOSIS — E89.40 SURGICAL MENOPAUSE: ICD-10-CM

## 2023-01-16 DIAGNOSIS — C50.412 MALIGNANT NEOPLASM OF UPPER-OUTER QUADRANT OF LEFT BREAST IN FEMALE, ESTROGEN RECEPTOR POSITIVE: ICD-10-CM

## 2023-01-16 PROCEDURE — 71260 CT THORAX DX C+: CPT

## 2023-01-16 PROCEDURE — 25010000002 IOPAMIDOL 61 % SOLUTION: Performed by: INTERNAL MEDICINE

## 2023-01-16 PROCEDURE — 63710000001 DIPHENHYDRAMINE PER 50 MG: Performed by: FAMILY MEDICINE

## 2023-01-16 PROCEDURE — 74177 CT ABD & PELVIS W/CONTRAST: CPT

## 2023-01-16 RX ORDER — DIPHENHYDRAMINE HCL 50 MG
50 CAPSULE ORAL ONCE
Status: COMPLETED | OUTPATIENT
Start: 2023-01-16 | End: 2023-01-16

## 2023-01-16 RX ADMIN — IOPAMIDOL 100 ML: 612 INJECTION, SOLUTION INTRAVENOUS at 13:12

## 2023-01-16 RX ADMIN — DIPHENHYDRAMINE HYDROCHLORIDE 50 MG: 50 CAPSULE ORAL at 13:30

## 2023-01-23 NOTE — PROGRESS NOTES
[facility] MULTIDISCIPLINARY CLINIC  SURVIVORSHIP VISIT: IN CLINIC  Survivorship Treatment Summary Initial Visit        Maribell Romero is a pleasant 37 y.o. female being followed by Chau Palacios MD for left breast lobular carcinoma. Reviewed today in Multidisciplinary Clinic, for initial survivorship treatment summary visit.     HPI    Tosin 37-year-old patient who is status post left breast mastectomy with 4 sentinel lymph nodes 1 with isolated tumor cells.  This was completed 4/13/2022.  Adjuvantly the patient received doxorubicin and cyclophosphamide followed by 6 cycles of Taxol having to discontinue early due to development of peripheral neuropathy.  She completed a course of radiation to the left chest wall and nodes on 12/2/2022.  She started letrozole 12/15/2022.    The patient did undergo bilateral salpingo-oophorectomy on 11/28/2022, which she feels she has recovered from.    She reports generally doing quite well.  Her biggest concern right now are generalized arthralgias secondary to letrozole.  She feels this is worse in the morning time especially feeling this in her hands.  She will see Dr. Palacios today for follow-up to discuss.    She has no residual peripheral neuropathy.  Her appetite is good.  Denies constipation or diarrhea.  Has had initial evaluation with the lymphedema clinic.  She has the occasional rare hot flash but these have not been intolerable and are much improved from what she experienced during chemotherapy.  She reports some tenderness in her right chest wall clinic today.      TREATMENT HISTORY:     Oncology/Hematology History   Malignant neoplasm of upper-outer quadrant of left breast in female, estrogen receptor positive (HCC)   2/17/2022 Initial Diagnosis    Malignant neoplasm of upper-outer quadrant of left breast in female, estrogen receptor positive (HCC)     2/17/2022 Biopsy    DIAGNOSIS  1.  Breast, left, 1:00, biopsy:  Benign breast tissue  with complex sclerosing lesion.    2.  Breast, left, 130 o'clock, biopsy:  Invasive lobular carcinoma, histologic grade 2 (tubules = 3, nuclei = 2, mitosis = 1), largest focus measures 9 mm.    3.  Breast, left, 230 o'clock, biopsy:  Invasive lobular carcinoma, histologic grade 2 (tubules = 3, nuclei = 2, mitosis = 1), largest focus measures 10 mm.    Biomarker reporting: Lt 2, Invasive Lobular Ca, Grade 2:  Estrogen receptor: 97.01%, positive  Progesterone receptor: 94.9%, positive  HER2 status: 1+, negative IHC  Ki 67: 32.71%    Biomarker reporting: Lt 3B, Invasive Lobular Ca, Grade 2:  Estrogen receptor: 89.94%, positive  Progesterone receptor: 95.74%, positive  HER2 status: 0, negative IHC  Ki 67: 39.2%       4/13/2022 Surgery    Final Diagnosis   1. Lymph Node, Left Elco #1, Excision (H&E, AE1/AE3):               A. One lymph node with isolated tumor cells.     2. Lymph Node, Left Elco #2, Excision (H&E, AE1/AE3):               A. One benign lymph node (0/1).     3. Lymph Node, Left Elco #3, Excision (H&E, AE1/AE3):               A. Two benign lymph nodes (0/2).     4. Breast, Left, Mastectomy (655 grams):               A. Invasive lobular carcinoma, Shazia histologic grade II (tubules = 3, nuclei = 2, mitoses = 1) measuring        50 mm maximally.   B. Lobular carcinoma in situ, measuring 70 mm maximally.  C. All margins are free of in situ and invasive malignancy.  D. See synoptic template for complete tumor details.        REGIONAL LYMPH NODES   Regional Lymph Node Status  Tumor present in regional lymph node(s)    Number of Lymph Nodes with Macrometastases  0    Number of Lymph Nodes with Micrometastases  0    Number of Lymph Nodes with Isolated Tumor Cells  1    Size of Largest Krystin Metastatic Deposit  0.1 mm   Extranodal Extension  Not identified    Total Number of Lymph Nodes Examined (sentinel and non-sentinel)  4    Number of Elco Nodes Examined  4      Oncotype score:  18  Pathologic stage: pT2 pN0     2022 Cancer Staged    Staging form: Breast, AJCC 8th Edition  - Clinical stage from 2022: Stage IIA (cT3, cN1(sn), cM0, G2, ER+, CA+, HER2-) - Signed by Chau Palacios MD on 2022 - 2022 Chemotherapy    OP BREAST AC DD DOXOrubicin / Cyclophosphamide     2022 - 2022 Chemotherapy    OP BREAST PACLitaxel   Received 6 cycles        10/21/2022 - 2022 Radiation    Radiation OncologyTreatment Course:  Maribell Romero received 5000 cGy in 25 fractions to left chest wall and nodes.     2022 Surgery    Final Diagnosis   1. Ovary and Fallopian Tubes, Bilateral, Bilateral Salpingo-Oophorectomy:               A. Bilateral ovaries with cystic follicles.               B. Unoriented fallopian tube with paratubal cysts; Other unoriented fallopian tube with no significant        histopathologic changes.          12/15/2022 -  Hormonal Therapy    Letrozole          Past Medical History:   Diagnosis Date   • Anemia    • Anesthesia complication     hypotension with last C section   • Breast cancer (HCC) 2022    left sided, stage 3, ER/CA+, Her2-, s/p mastectomy, LAST CHEMO 10/3/22, CURRENTLY RECEIVING RADIATION   • History of COVID-19     2021 AND 2022   • Hyperlipidemia    • PONV (postoperative nausea and vomiting)    • Seasonal allergies        Past Surgical History:   Procedure Laterality Date   • APPENDECTOMY     • BREAST BIOPSY Left    • BREAST RECONSTRUCTION Left 2022    Procedure: LEFT BREAST 1ST STAGE RECONSTRUCTION WITH INSERTION OF TISSUE EXPANDER AND BIOLOGIC;  Surgeon: Hermelinda Johnson MD;  Location: [facility] OR;  Service: Plastics;  Laterality: Left;   •  SECTION     •  SECTION N/A 12/10/2018    Procedure:  SECTION REPEAT;  Surgeon: Heidy Gutierrez MD;  Location: [facility] LABOR DELIVERY;  Service: Obstetrics/Gynecology   • D & C HYSTEROSCOPY N/A 2021    Procedure:  HYSTEROSCOPY REMOVAL INTRAUTERINE DEVICE WILL NEED ULTRASOUND IN ROOM;  Surgeon: Heidy Gutierrez MD;  Location: [facility] OR OSC;  Service: Obstetrics/Gynecology;  Laterality: N/A;   • DIAGNOSTIC LAPAROSCOPY Bilateral 11/28/2022    Procedure: DAVINCI ASSISTED BILATERAL SALPINGO-OOPHORECTOMY;  Surgeon: Heidy Gutierrez MD;  Location: [facility] OR;  Service: Robotics - DaVinci;  Laterality: Bilateral;   • KNEE ARTHROSCOPY Right    • MASTECTOMY W/ SENTINEL NODE BIOPSY Left 04/13/2022    Procedure: left total mastectomy with left axillary sentinel lymph node biopsy;  Surgeon: Monisha Escudero MD;  Location: [facility] OR;  Service: General;  Laterality: Left;   • VENOUS ACCESS DEVICE (PORT) INSERTION Right 5/13/2022    Procedure: right port placement;  Surgeon: Monisha Escudero MD;  Location: [facility] OR;  Service: General;  Laterality: Right;       Social History     Socioeconomic History   • Marital status:    Tobacco Use   • Smoking status: Never   • Smokeless tobacco: Never   Vaping Use   • Vaping Use: Never used   Substance and Sexual Activity   • Alcohol use: Not Currently     Alcohol/week: 5.0 standard drinks     Types: 5 Shots of liquor per week   • Drug use: Never   • Sexual activity: Defer         LDH   Date Value Ref Range Status   05/06/2022 188 99 - 259 U/L Final       Lab Results   Component Value Date    GLUCOSE 107 12/15/2022    BUN 15 12/15/2022    CREATININE 0.83 12/15/2022    BCR 18.1 12/15/2022    K 4.2 12/15/2022    CO2 25.5 12/15/2022    CALCIUM 9.5 12/15/2022    ALBUMIN 4.30 12/15/2022    AST 22 12/15/2022    ALT 17 12/15/2022       CBC w/diff    CBC w/Diff 10/31/22 11/21/22 12/15/22   WBC 3.67 4.14 3.54   RBC 3.92 4.08 3.79   Hemoglobin 11.8 (A) 11.9 (A) 11.4 (A)   Hematocrit 34.4 35.1 32.5 (A)   MCV 87.8 86.0 85.8   MCH 30.1 29.2 30.1   MCHC 34.3 33.9 35.1   RDW 11.8 (A) 11.7 (A) 12.2 (A)   Platelets 164 158 158   Neutrophil Rel % 58.3 67.3 61.6   Immature Granulocyte  Rel % 0.3 0.5 0.3   Lymphocyte Rel % 24.8 14.3 (A) 18.6 (A)   Monocyte Rel % 8.2 10.6 12.1 (A)   Eosinophil Rel % 7.6 (A) 6.8 (A) 6.8 (A)   Basophil Rel % 0.8 0.5 0.6   (A) Abnormal value              Allergies as of 01/24/2023 - Reviewed 01/24/2023   Allergen Reaction Noted   • Asa [aspirin] Anaphylaxis and Shortness Of Breath 12/02/2018   • Adhesive tape Dermatitis 11/28/2022   • Iopamidol Hives 01/16/2023   • Ct contrast Rash 01/24/2023       MEDICATIONS:  Medication list reviewed today    Review of Systems   Constitutional: Positive for fatigue. Negative for activity change and appetite change.   Respiratory: Negative for chest tightness and shortness of breath.    Cardiovascular: Negative for chest pain and leg swelling.   Gastrointestinal: Negative for constipation and diarrhea.   Genitourinary: Negative for difficulty urinating and dysuria.   Musculoskeletal: Positive for arthralgias (generalized joint pain). Negative for myalgias.        Right chest port tenderness     Skin: Negative for rash and wound.   Neurological: Negative for dizziness, light-headedness and numbness.   Psychiatric/Behavioral: Positive for sleep disturbance. Negative for decreased concentration and dysphoric mood. The patient is not nervous/anxious.        /73   Pulse 74   Resp 18   Wt 53.9 kg (118 lb 12.8 oz)   SpO2 99% Comment: room air  BMI 21.73 kg/m²     Wt Readings from Last 3 Encounters:   01/24/23 54.1 kg (119 lb 3.2 oz)   01/24/23 53.9 kg (118 lb 12.8 oz)   01/11/23 54.4 kg (120 lb)        Pain Score    01/24/23 1306   PainSc: 0-No pain       PHQ-9 Total Score: 2   GAD7 Total Score: 1   Distress Score: 2   Fatigue Severity Scale: 1.8      Physical Exam  Constitutional:       Appearance: Normal appearance. She is well-groomed.   HENT:      Head: Normocephalic and atraumatic.   Cardiovascular:      Rate and Rhythm: Normal rate and regular rhythm.   Pulmonary:      Effort: Pulmonary effort is normal. No respiratory  distress.   Abdominal:      General: Abdomen is flat. There is no distension.   Skin:     General: Skin is warm and dry.   Neurological:      Mental Status: She is alert and oriented to person, place, and time.   Psychiatric:         Attention and Perception: Attention and perception normal.         Mood and Affect: Mood and affect normal.         Speech: Speech normal.         Behavior: Behavior normal. Behavior is cooperative.         Thought Content: Thought content normal.         Cognition and Memory: Cognition normal.         Judgment: Judgment normal.           Advance Care Planning     Patient does not have advance care planning complete, we do not have a copy on file    Patient has not designated a healthcare surrogate:     Written information provided regarding advance care planning and appropriateness for all healthy adults, choosing a healthcare surrogate,  Information provided on upcoming Advance Care Planning classes offered virtually through [facility].           DISCUSSION HELD TODAY:     Discussed NCCN recommendations for all cancer survivors of 150 minutes/week moderate intensity exercise, achieve and maintain a healthy weight, plants-based whole-foods diet, avoid tobacco and second hand smoke, avoid alcohol or minimize alcohol intake - no more than 1 drink in a day for adults.     A copy of the Survivorship Treatment Summary & Care Plan for Ms. Eddie Romero was provided to and forwarded to the providers identified on the care team.    Problems identified:  1. Lymphedema: Reviewed lymphedema causes, early signs and symptoms, prevention and management.  She has had initial evaluation with the lymphedema clinic.  She has full range of motion of her upper extremities.  She has not been evaluated postradiation and I will send referral message over.  2. Letrozole: Generalized arthralgias mainly focused in the hands this is worse in the morning.  She feels like the aches and stiffness get better as  she gets moving during the day.  She will see Dr. Palacios today to review the side effects.  Her hot flashes have been minimal since completion of chemo.  3. Fatigue: She is still struggling with some fatigue.  However she stays very active raising her 2 children which are 9 and 4 years old.  We talked about the importance of physical activity including programs at the [facility] and [employer].  4. Surveillance: We discussed she will continue annual mammography and her next right breast mammogram is scheduled per Dr. Escudero's office.  She is a never smoker.  We discussed that age 45 is the age to begin and screening colonoscopies for people of average risk.  We discussed importance of continuing cervical cancer screening with Pap testing no less than once every 2 years.  5. Psychosocial: Screens negative for symptoms of depression and anxiety today.  Has good family support and her parents were here from [place] for most of her treatment and they just returned home after the holidays.  We did discuss Ni[place]an-language resources available through the [employer] and new [place]an language support group available at [employer]      Plan and recommendations:  1. Referral to lymphedema clinic for follow-up post radiation  2. Continue physical activity as tolerated  3. Reviewed exercise available at the [facility], [employer]  4. Ni[place]an-language resources from [employer], [place]an language support group and [employer]  5. I have asked her to discuss the right chest port discomfort with her team at her med onc appointment today  6. Surveillance as above  7. Cardio-Oncology follow up as directed  8. Call my office as needed at [phone] for additional information, resources or support.      Diagnoses and all orders for this visit:    1. Malignant neoplasm of upper-outer quadrant of left breast in female, estrogen receptor positive (HCC) (Primary)  -     Ambulatory Referral to Lymphedema  Clinic            I spent 40 minutes caring for this patient on this date of service by face-to-face counseling. This time includes time spent by me in the following activities: preparing for the visit, reviewing tests, obtaining and/or reviewing a separately obtained history, performing a medically appropriate examination and/or evaluation, counseling and educating the patient/family/caregiver, referring and communicating with other health care professionals, documenting information in the medical record and care coordination

## 2023-01-24 ENCOUNTER — OFFICE VISIT (OUTPATIENT)
Dept: OTHER | Facility: HOSPITAL | Age: 38
End: 2023-01-24
Payer: COMMERCIAL

## 2023-01-24 ENCOUNTER — OFFICE VISIT (OUTPATIENT)
Dept: ONCOLOGY | Facility: CLINIC | Age: 38
End: 2023-01-24
Payer: COMMERCIAL

## 2023-01-24 VITALS
DIASTOLIC BLOOD PRESSURE: 73 MMHG | HEART RATE: 74 BPM | OXYGEN SATURATION: 99 % | RESPIRATION RATE: 18 BRPM | BODY MASS INDEX: 21.73 KG/M2 | WEIGHT: 118.8 LBS | SYSTOLIC BLOOD PRESSURE: 103 MMHG

## 2023-01-24 VITALS
WEIGHT: 119.2 LBS | RESPIRATION RATE: 18 BRPM | BODY MASS INDEX: 21.94 KG/M2 | HEART RATE: 72 BPM | TEMPERATURE: 97.1 F | DIASTOLIC BLOOD PRESSURE: 68 MMHG | HEIGHT: 62 IN | OXYGEN SATURATION: 99 % | SYSTOLIC BLOOD PRESSURE: 102 MMHG

## 2023-01-24 DIAGNOSIS — C50.412 MALIGNANT NEOPLASM OF UPPER-OUTER QUADRANT OF LEFT BREAST IN FEMALE, ESTROGEN RECEPTOR POSITIVE: Primary | ICD-10-CM

## 2023-01-24 DIAGNOSIS — Z17.0 MALIGNANT NEOPLASM OF UPPER-OUTER QUADRANT OF LEFT BREAST IN FEMALE, ESTROGEN RECEPTOR POSITIVE: Primary | ICD-10-CM

## 2023-01-24 DIAGNOSIS — G62.0 NEUROPATHY DUE TO CHEMOTHERAPEUTIC DRUG: ICD-10-CM

## 2023-01-24 DIAGNOSIS — D61.810 PANCYTOPENIA DUE TO ANTINEOPLASTIC CHEMOTHERAPY: ICD-10-CM

## 2023-01-24 DIAGNOSIS — T45.1X5A PANCYTOPENIA DUE TO ANTINEOPLASTIC CHEMOTHERAPY: ICD-10-CM

## 2023-01-24 DIAGNOSIS — T45.1X5A NEUROPATHY DUE TO CHEMOTHERAPEUTIC DRUG: ICD-10-CM

## 2023-01-24 DIAGNOSIS — K21.00 PEPTIC REFLUX ESOPHAGITIS: ICD-10-CM

## 2023-01-24 DIAGNOSIS — E89.40 SURGICAL MENOPAUSE: ICD-10-CM

## 2023-01-24 DIAGNOSIS — E53.8 B12 DEFICIENCY: ICD-10-CM

## 2023-01-24 PROBLEM — D69.59 CHEMOTHERAPY-INDUCED THROMBOCYTOPENIA: Status: RESOLVED | Noted: 2022-05-28 | Resolved: 2023-01-24

## 2023-01-24 PROCEDURE — G0463 HOSPITAL OUTPT CLINIC VISIT: HCPCS

## 2023-01-24 PROCEDURE — 99215 OFFICE O/P EST HI 40 MIN: CPT | Performed by: NURSE PRACTITIONER

## 2023-01-24 PROCEDURE — 99214 OFFICE O/P EST MOD 30 MIN: CPT | Performed by: INTERNAL MEDICINE

## 2023-01-24 NOTE — LETTER
January 27, 2023     Chau Palacios MD  [address]  [facility] [address]  [place] [id number]    Patient: Maribell Romero   YOB: 1985   Date of Visit: 1/24/2023       Dear Chau Palacios MD,    Thank you for referring Maribell Romero to me for evaluation. Below is a copy of my consult note.    If you have questions, please do not hesitate to call me. I look forward to following Maribell along with you.         Sincerely,        JACQUELIN Concepcion        CC: ANG Reyes MD John A Cox, MD    [facility] MULTIDISCIPLINARY CLINIC  SURVIVORSHIP VISIT: IN CLINIC  Survivorship Treatment Summary Initial Visit        Maribell Romero is a pleasant 37 y.o. female being followed by Chau Palacios MD for left breast lobular carcinoma. Reviewed today in Multidisciplinary Clinic, for initial survivorship treatment summary visit.     HPI    Tosin 37-year-old patient who is status post left breast mastectomy with 4 sentinel lymph nodes 1 with isolated tumor cells.  This was completed 4/13/2022.  Adjuvantly the patient received doxorubicin and cyclophosphamide followed by 6 cycles of Taxol having to discontinue early due to development of peripheral neuropathy.  She completed a course of radiation to the left chest wall and nodes on 12/2/2022.  She started letrozole 12/15/2022.    The patient did undergo bilateral salpingo-oophorectomy on 11/28/2022, which she feels she has recovered from.    She reports generally doing quite well.  Her biggest concern right now are generalized arthralgias secondary to letrozole.  She feels this is worse in the morning time especially feeling this in her hands.  She will see Dr. Palacios today for follow-up to discuss.    She has no residual peripheral neuropathy.  Her appetite is good.  Denies constipation or diarrhea.  Has had initial evaluation with the lymphedema clinic.  She has the occasional rare hot flash  but these have not been intolerable and are much improved from what she experienced during chemotherapy.  She reports some tenderness in her right chest wall clinic today.      TREATMENT HISTORY:     Oncology/Hematology History   Malignant neoplasm of upper-outer quadrant of left breast in female, estrogen receptor positive (HCC)   2/17/2022 Initial Diagnosis    Malignant neoplasm of upper-outer quadrant of left breast in female, estrogen receptor positive (HCC)     2/17/2022 Biopsy    DIAGNOSIS  1.  Breast, left, 1:00, biopsy:  Benign breast tissue with complex sclerosing lesion.    2.  Breast, left, 130 o'clock, biopsy:  Invasive lobular carcinoma, histologic grade 2 (tubules = 3, nuclei = 2, mitosis = 1), largest focus measures 9 mm.    3.  Breast, left, 230 o'clock, biopsy:  Invasive lobular carcinoma, histologic grade 2 (tubules = 3, nuclei = 2, mitosis = 1), largest focus measures 10 mm.    Biomarker reporting: Lt 2, Invasive Lobular Ca, Grade 2:  Estrogen receptor: 97.01%, positive  Progesterone receptor: 94.9%, positive  HER2 status: 1+, negative IHC  Ki 67: 32.71%    Biomarker reporting: Lt 3B, Invasive Lobular Ca, Grade 2:  Estrogen receptor: 89.94%, positive  Progesterone receptor: 95.74%, positive  HER2 status: 0, negative IHC  Ki 67: 39.2%       4/13/2022 Surgery    Final Diagnosis   1. Lymph Node, Left Star #1, Excision (H&E, AE1/AE3):               A. One lymph node with isolated tumor cells.     2. Lymph Node, Left Star #2, Excision (H&E, AE1/AE3):               A. One benign lymph node (0/1).     3. Lymph Node, Left Star #3, Excision (H&E, AE1/AE3):               A. Two benign lymph nodes (0/2).     4. Breast, Left, Mastectomy (655 grams):               A. Invasive lobular carcinoma, Shazia histologic grade II (tubules = 3, nuclei = 2, mitoses = 1) measuring        50 mm maximally.   B. Lobular carcinoma in situ, measuring 70 mm maximally.  C. All margins are free of in situ and  invasive malignancy.  D. See synoptic template for complete tumor details.        REGIONAL LYMPH NODES   Regional Lymph Node Status  Tumor present in regional lymph node(s)    Number of Lymph Nodes with Macrometastases  0    Number of Lymph Nodes with Micrometastases  0    Number of Lymph Nodes with Isolated Tumor Cells  1    Size of Largest Krystin Metastatic Deposit  0.1 mm   Extranodal Extension  Not identified    Total Number of Lymph Nodes Examined (sentinel and non-sentinel)  4    Number of [place] Nodes Examined  4      Oncotype score: 18  Pathologic stage: pT2 pN0     5/5/2022 Cancer Staged    Staging form: Breast, AJCC 8th Edition  - Clinical stage from 5/5/2022: Stage IIA (cT3, cN1(sn), cM0, G2, ER+, AZ+, HER2-) - Signed by Chau Palacios MD on 5/5/2022 5/20/2022 - 7/22/2022 Chemotherapy    OP BREAST AC DD DOXOrubicin / Cyclophosphamide     8/8/2022 - 9/12/2022 Chemotherapy    OP BREAST PACLitaxel   Received 6 cycles        10/21/2022 - 12/2/2022 Radiation    Radiation OncologyTreatment Course:  Maribell Romero received 5000 cGy in 25 fractions to left chest wall and nodes.     11/28/2022 Surgery    Final Diagnosis   1. Ovary and Fallopian Tubes, Bilateral, Bilateral Salpingo-Oophorectomy:               A. Bilateral ovaries with cystic follicles.               B. Unoriented fallopian tube with paratubal cysts; Other unoriented fallopian tube with no significant        histopathologic changes.          12/15/2022 -  Hormonal Therapy    Letrozole          Past Medical History:   Diagnosis Date   • Anemia    • Anesthesia complication     hypotension with last C section   • Breast cancer (HCC) 03/2022    left sided, stage 3, ER/AZ+, Her2-, s/p mastectomy, LAST CHEMO 10/3/22, CURRENTLY RECEIVING RADIATION   • History of COVID-19     1/2021 AND 2/8/2022   • Hyperlipidemia    • PONV (postoperative nausea and vomiting)    • Seasonal allergies        Past Surgical History:   Procedure Laterality  Date   • APPENDECTOMY     • BREAST BIOPSY Left    • BREAST RECONSTRUCTION Left 2022    Procedure: LEFT BREAST 1ST STAGE RECONSTRUCTION WITH INSERTION OF TISSUE EXPANDER AND BIOLOGIC;  Surgeon: Hermelinda Johnson MD;  Location: [facility] OR;  Service: Plastics;  Laterality: Left;   •  SECTION     •  SECTION N/A 12/10/2018    Procedure:  SECTION REPEAT;  Surgeon: Heidy Gutierrez MD;  Location: [facility] LABOR DELIVERY;  Service: Obstetrics/Gynecology   • D & C HYSTEROSCOPY N/A 2021    Procedure: HYSTEROSCOPY REMOVAL INTRAUTERINE DEVICE WILL NEED ULTRASOUND IN ROOM;  Surgeon: Heidy Gutierrez MD;  Location: [facility] OSC;  Service: Obstetrics/Gynecology;  Laterality: N/A;   • DIAGNOSTIC LAPAROSCOPY Bilateral 2022    Procedure: DAVINCI ASSISTED BILATERAL SALPINGO-OOPHORECTOMY;  Surgeon: Heidy Gutierrez MD;  Location: [facility];  Service: Robotics - DaVinci;  Laterality: Bilateral;   • KNEE ARTHROSCOPY Right    • MASTECTOMY W/ SENTINEL NODE BIOPSY Left 2022    Procedure: left total mastectomy with left axillary sentinel lymph node biopsy;  Surgeon: Monisha Escudero MD;  Location: [facility];  Service: General;  Laterality: Left;   • VENOUS ACCESS DEVICE (PORT) INSERTION Right 2022    Procedure: right port placement;  Surgeon: Monisha Escudero MD;  Location: [facility];  Service: General;  Laterality: Right;       Social History     Socioeconomic History   • Marital status:    Tobacco Use   • Smoking status: Never   • Smokeless tobacco: Never   Vaping Use   • Vaping Use: Never used   Substance and Sexual Activity   • Alcohol use: Not Currently     Alcohol/week: 5.0 standard drinks     Types: 5 Shots of liquor per week   • Drug use: Never   • Sexual activity: Defer         LDH   Date Value Ref Range Status   2022 188 99 - 259 U/L Final       Lab Results   Component Value Date    GLUCOSE 107 12/15/2022    BUN 15  12/15/2022    CREATININE 0.83 12/15/2022    BCR 18.1 12/15/2022    K 4.2 12/15/2022    CO2 25.5 12/15/2022    CALCIUM 9.5 12/15/2022    ALBUMIN 4.30 12/15/2022    AST 22 12/15/2022    ALT 17 12/15/2022       CBC w/diff    CBC w/Diff 10/31/22 11/21/22 12/15/22   WBC 3.67 4.14 3.54   RBC 3.92 4.08 3.79   Hemoglobin 11.8 (A) 11.9 (A) 11.4 (A)   Hematocrit 34.4 35.1 32.5 (A)   MCV 87.8 86.0 85.8   MCH 30.1 29.2 30.1   MCHC 34.3 33.9 35.1   RDW 11.8 (A) 11.7 (A) 12.2 (A)   Platelets 164 158 158   Neutrophil Rel % 58.3 67.3 61.6   Immature Granulocyte Rel % 0.3 0.5 0.3   Lymphocyte Rel % 24.8 14.3 (A) 18.6 (A)   Monocyte Rel % 8.2 10.6 12.1 (A)   Eosinophil Rel % 7.6 (A) 6.8 (A) 6.8 (A)   Basophil Rel % 0.8 0.5 0.6   (A) Abnormal value              Allergies as of 01/24/2023 - Reviewed 01/24/2023   Allergen Reaction Noted   • Asa [aspirin] Anaphylaxis and Shortness Of Breath 12/02/2018   • Adhesive tape Dermatitis 11/28/2022   • Iopamidol Hives 01/16/2023   • Ct contrast Rash 01/24/2023       MEDICATIONS:  Medication list reviewed today    Review of Systems   Constitutional: Positive for fatigue. Negative for activity change and appetite change.   Respiratory: Negative for chest tightness and shortness of breath.    Cardiovascular: Negative for chest pain and leg swelling.   Gastrointestinal: Negative for constipation and diarrhea.   Genitourinary: Negative for difficulty urinating and dysuria.   Musculoskeletal: Positive for arthralgias (generalized joint pain). Negative for myalgias.        Right chest port tenderness     Skin: Negative for rash and wound.   Neurological: Negative for dizziness, light-headedness and numbness.   Psychiatric/Behavioral: Positive for sleep disturbance. Negative for decreased concentration and dysphoric mood. The patient is not nervous/anxious.        /73   Pulse 74   Resp 18   Wt 53.9 kg (118 lb 12.8 oz)   SpO2 99% Comment: room air  BMI 21.73 kg/m²     Wt Readings from Last 3  Encounters:   01/24/23 54.1 kg (119 lb 3.2 oz)   01/24/23 53.9 kg (118 lb 12.8 oz)   01/11/23 54.4 kg (120 lb)        Pain Score    01/24/23 1306   PainSc: 0-No pain       PHQ-9 Total Score: 2   GAD7 Total Score: 1   Distress Score: 2   Fatigue Severity Scale: 1.8      Physical Exam  Constitutional:       Appearance: Normal appearance. She is well-groomed.   HENT:      Head: Normocephalic and atraumatic.   Cardiovascular:      Rate and Rhythm: Normal rate and regular rhythm.   Pulmonary:      Effort: Pulmonary effort is normal. No respiratory distress.   Abdominal:      General: Abdomen is flat. There is no distension.   Skin:     General: Skin is warm and dry.   Neurological:      Mental Status: She is alert and oriented to person, place, and time.   Psychiatric:         Attention and Perception: Attention and perception normal.         Mood and Affect: Mood and affect normal.         Speech: Speech normal.         Behavior: Behavior normal. Behavior is cooperative.         Thought Content: Thought content normal.         Cognition and Memory: Cognition normal.         Judgment: Judgment normal.           Advance Care Planning     Patient does not have advance care planning complete, we do not have a copy on file    Patient has not designated a healthcare surrogate:     Written information provided regarding advance care planning and appropriateness for all healthy adults, choosing a healthcare surrogate,  Information provided on upcoming Advance Care Planning classes offered virtually through [facility].          DISCUSSION HELD TODAY:     Discussed NCCN recommendations for all cancer survivors of 150 minutes/week moderate intensity exercise, achieve and maintain a healthy weight, plants-based whole-foods diet, avoid tobacco and second hand smoke, avoid alcohol or minimize alcohol intake - no more than 1 drink in a day for adults.     A copy of the Survivorship Treatment Summary & Care Plan for Ms. Morgan  Haether was provided to and forwarded to the providers identified on the care team.    Problems identified:  1. Lymphedema: Reviewed lymphedema causes, early signs and symptoms, prevention and management.  She has had initial evaluation with the lymphedema clinic.  She has full range of motion of her upper extremities.  She has not been evaluated postradiation and I will send referral message over.  2. Letrozole: Generalized arthralgias mainly focused in the hands this is worse in the morning.  She feels like the aches and stiffness get better as she gets moving during the day.  She will see Dr. Palacios today to review the side effects.  Her hot flashes have been minimal since completion of chemo.  3. Fatigue: She is still struggling with some fatigue.  However she stays very active raising her 2 children which are 9 and 4 years old.  We talked about the importance of physical activity including programs at the [employer] and [employer].  4. Surveillance: We discussed she will continue annual mammography and her next right breast mammogram is scheduled per Dr. Escudero's office.  She is a never smoker.  We discussed that age 45 is the age to begin and screening colonoscopies for people of average risk.  We discussed importance of continuing cervical cancer screening with Pap testing no less than once every 2 years.  5. Psychosocial: Screens negative for symptoms of depression and anxiety today.  Has good family support and her parents were here from [place] for most of her treatment and they just returned home after the holidays.  We did discuss Mexican-language resources available through the [employer] and new [place] language support group available at [employer]      Plan and recommendations:  1. Referral to lymphedema clinic for follow-up post radiation  2. Continue physical activity as tolerated  3. Reviewed exercise available at the [employer], [employer]  4. Mexican-language resources from [employer], [place] language support group and [employer]  5. I have asked her to discuss the right chest port discomfort with her team at her med onc appointment today  6. Surveillance as above  7. Cardio-Oncology follow up as directed  8. Call my office as needed at [phone] for additional information, resources or support.      Diagnoses and all orders for this visit:    1. Malignant neoplasm of upper-outer quadrant of left breast in female, estrogen receptor positive (HCC) (Primary)  -     Ambulatory Referral to Lymphedema Clinic            I spent 40 minutes caring for this patient on this date of service by face-to-face counseling. This time includes time spent by me in the following activities: preparing for the visit, reviewing tests, obtaining and/or reviewing a separately obtained history, performing a medically appropriate examination and/or evaluation, counseling and educating the patient/family/caregiver, referring and communicating with other health care professionals, documenting information in the medical record and care coordination

## 2023-01-24 NOTE — PROGRESS NOTES
Subjective       DURING THE VISIT WITH THE PATIENT TODAY , PATIENT HAD FACE MASK, MY MEDICAL ASSISTANT AND I  HAD PROPPER PROTECTIVE EQUIPMENT, AND I DID HAND HYGIENE WITH SOAP AND WATER BEFORE AND AFTER THE VISIT.     REASON FOR FOLLOW UP:  LOBULAR CARCINOMA OF THE LEFT BREAST, 70 MM IN SIZE, MARGINS OF RESECTION NEGATIVE AFTER MASTECTOMY, GRADE 2, ER POSITIVE TX POSITIVE, HER 2 NEGATIVE BY IHC,KI 67 39% ,ONE POSITIVE AXILLARY LYMPH NODE OUT OF 4 NODES REMOVAL, PREMENOPAUSAL .INVITAE PANEL NEGATIVE.ONCOTYPE 18: chemotherapy AC X 4 AND TAXOL X 6, STOPPED BECAUSE SEVERE NEUROPATHY IN FEET AND HANDS. Oophorectomy in 11/22, FEMARA INITIATED AT THAT TIME. COMPLETED RADIATION THERAPY AFTER TAXOL COMPLETION.        HISTORY OF PRESENT ILLNESS:    On 01/24/2023 this 37-year-old  female returns to the office for followup of her history of breast cancer on the left. She has been seen today in order to review her radiological assessment of her CT scan of the chest, abdomen and pelvis after she has completed her initial plan of chemotherapy, radiation treatment and completed also oophorectomy in 11/2022 and made her menopausal as per my request and initiated letrozole adjuvant hormonal therapy at that time. The patient at this time complains of achiness in her joints in her hands associated with letrozole therapy with stiffness in the morning and no local inflammation. She is doing exercises and the physical activity has been very good otherwise. Her appetite and taste for food has improved. She has gained a few pounds. Her bowel activity and urination are normal. No cardiovascular or respiratory issues. She has been seen by Cardio-Oncology.     Besides this the patient is planning to see her plastic reconstructive surgeon at some point in 04/2023 for further discussion of issues pertinent to the care of her implant in the left breast area.     Besides the above symptoms the patient has multiple questions in regard to  diet, activity and alcohol ingestion.                ONCOLOGIC HISTORY:  The patient is a 37 y.o. year old female who is here for an opinion about the above issue.  I had the opportunity to see this delightful Filipino female, 37 years old, mother of 2, who is premenopausal who has found abnormalities in the left breast since 12/2021, having sensation of fullness in the breast and discomfort and occasional pain. After this complaint she was seen by her primary physician and she initiated a process of mammograms and ultrasounds that culminated with the diagnosis of lobular carcinoma of the breast, invasive, 7 cm in size. The patient completed a mastectomy and sentinel lymph nodes almost 3 weeks ago and she is here for review and advice in regard to adjuvant therapy. The patient is still having pain at the surgical site, especially since yesterday she had injection of saline in the expander that was placed after the mastectomy. She also has neuropathic pain in the inner aspect of the left arm that is bothering her a lot through the day and sometimes almost driving her crazy. She has no motor deficit into the left upper extremity. She denies any fevers or chills or bone pain. Her appetite is acceptable but she has changed her diet and she is almost going into vegetarian with the exception of some fish. She has eliminated all the dairy products and she has eliminated mostly carbohydrates as well. She has lost some weight. The patient otherwise feels well. She has normal bowel activity, normal urination. No skeletal pain or joint pain. No cough, sputum production, shortness of breath or palpitations. She is extremely anxious about this visit today. She is in company of her .    • The patient returned to the office on 05/20/2022 in preparation for initiation of chemotherapy. In the meantime she has undergone a port placement in right subclavian position with no difficulties and an echocardiogram that will be  discussed below. The patient has had very significant upper respiratory allergies. She has been tested this week for COVID being negative. She is not running any fever. Most of the symptoms in the respiratory tract include sneezing, itching, itchy eyes and rhinorrhea. No sore throat, no alteration in taste or smell. No cough, shortness of breath, pleuritic pain or hemoptysis.     The surgical site from the left breast expander is not painful. She has healed extremely well. The same is applicable to her port that was placed a week ago.  • The patient returned to the office on 05/26/2022 for an interim assessment. Since the chemotherapy administration last week the patient had persistent nausea and vomiting at least for 4 days to the point that she spent most of the time sleeping because of the effect of the Zyprexa but no resolution of her nausea and vomiting. She has vomited so much that now she has significant esophagitis with heartburn and indigestion. She has not had any hematemesis or melena. She just started to eat yesterday rice and potatoes. She was able to handle this and she has been able to handle liquids. She also has had discoloration of the urine that originally was pink and subsequently now has normalized. She had no burning sensation upon urination or vaginal bleeding. She has been very fatigued and tired. On top of that Neulasta triggers significant pain in the cranium, in the pelvic bone and in her femurs and she has the feeling that she has the flu. Again, she has not had any fever. Today is the best day that she has had since the chemotherapy administration. Obviously given the symptoms and this toxicity assessment, the patient will require radical change in her treatment as will be described below.  On 06/23/2022, the patient returns after she has completed 2 cycles of AC. The first cycle was dramatically cumbersome because of the tremendous amount of side effects including persistent nausea and  vomiting after chemotherapy that required IV fluids and modification in her nausea regimen. She also developed neutropenic fever and ended up in the hospital. After 2nd cycle with dose adjustment the patient handled the treatment much better and the miracle medicine in regard to nausea control was Ativan. Phenergan and Compazine triggered tremendous degree of confusion. Therefore, for the subsequent cycle 3 and 4, Ativan will be the main medicine to utilize. Zofran also will be utilized.     Physically the patient feels much better today. The patient has had appetite to eat. The heartburn and indigestion have mostly subsided. Her nutrition and hydration are much better. She is keeping her weight and she has no pain. Bowel activity with occasional constipation. Urination is normal. No cardiovascular or respiratory issues. Energy level much better.     She had significant pain with Neulasta use 4 days after the treatment requiring Zyrtec and significant Tylenol utilization.  •   This patient has been seen by video medicine visit on 09/12/2022 in regard to continue her chemotherapy administration in the adjuvant therapy of her breast cancer on the left. The patient at this time is undergoing weekly Taxol. Unfortunately she has developed further peripheral neuropathy in the tips of her fingers and her toes to the point that she feels sensation of itching more than pain all of the time and this is disrupting her life and her functionality very substantially. She has to use ice that gives her some relief of the symptom at least 4-6 times a day. It takes at least 10 minutes for the symptoms to improve but they come back in a question of hours. The patient is not having any difficulty with ambulation but the more walking that she does the more discomfort that she develops. The same is applicable to her hands. Her appetite has improved, she has gained weight, she has some degree of constipation associated with chemotherapy  administration relieved with flax seed oil. Her bowel function now is normal, urination is normal. She has not had any menstrual flow since 2022. She has not had any nausea or vomiting. No cardiovascular or respiratory issues.     The patient otherwise has not had any new problems. No fever or infections. She has had resolution of her epistaxis.   Surgical menopause with bilateral oophorectomy on 2022. Patient initiated Femara on 2022 for a total of 5 years. Baseline echocardiogram after completion of chemotherapy disclosed normal ejection fraction. We will proceed with radiological assessment of her chest, abdomen and pelvis baseline for the future and eventually she will proceed also with bone density test.    •   •   ·     Past Medical History:   Diagnosis Date   • Anemia    • Anesthesia complication     hypotension with last C section   • Breast cancer (HCC) 2022    left sided, stage 3, ER/NC+, Her2-, s/p mastectomy, LAST CHEMO 10/3/22, CURRENTLY RECEIVING RADIATION   • History of COVID-19     2021 AND 2022   • Hyperlipidemia    • PONV (postoperative nausea and vomiting)    • Seasonal allergies         Past Surgical History:   Procedure Laterality Date   • APPENDECTOMY     • BREAST BIOPSY Left    • BREAST RECONSTRUCTION Left 2022    Procedure: LEFT BREAST 1ST STAGE RECONSTRUCTION WITH INSERTION OF TISSUE EXPANDER AND BIOLOGIC;  Surgeon: Hermelinda Johnson MD;  Location: [facility];  Service: Plastics;  Laterality: Left;   •  SECTION     •  SECTION N/A 12/10/2018    Procedure:  SECTION REPEAT;  Surgeon: Heidy Gutierrez MD;  Location: [facility] LABOR DELIVERY;  Service: Obstetrics/Gynecology   • D & C HYSTEROSCOPY N/A 2021    Procedure: HYSTEROSCOPY REMOVAL INTRAUTERINE DEVICE WILL NEED ULTRASOUND IN ROOM;  Surgeon: Heidy Gutierrez MD;  Location: [facility];  Service: Obstetrics/Gynecology;  Laterality: N/A;   • DIAGNOSTIC LAPAROSCOPY  Bilateral 11/28/2022    Procedure: DAVINCI ASSISTED BILATERAL SALPINGO-OOPHORECTOMY;  Surgeon: Heidy Gutierrez MD;  Location: [facility];  Service: Robotics - DaVinci;  Laterality: Bilateral;   • KNEE ARTHROSCOPY Right    • MASTECTOMY W/ SENTINEL NODE BIOPSY Left 04/13/2022    Procedure: left total mastectomy with left axillary sentinel lymph node biopsy;  Surgeon: Monisha Escudero MD;  Location: [facility];  Service: General;  Laterality: Left;   • VENOUS ACCESS DEVICE (PORT) INSERTION Right 5/13/2022    Procedure: right port placement;  Surgeon: Monisha Escudero MD;  Location: [facility];  Service: General;  Laterality: Right;        Current Outpatient Medications on File Prior to Visit   Medication Sig Dispense Refill   • acetaminophen (TYLENOL) 325 MG tablet Take 650 mg by mouth Every 6 (Six) Hours As Needed for Mild Pain.     • atorvastatin (LIPITOR) 10 MG tablet Take 10 mg by mouth Every Night.     • B Complex Vitamins (Vitamin-B Complex) tablet Take 1 tablet by mouth Daily.  0   • Cetirizine HCl (ZYRTEC PO) Take 10 mg by mouth As Needed.     • letrozole (FEMARA) 2.5 MG tablet Take 1 tablet by mouth Daily. 30 tablet 3   • nystatin-diphenhydrAMINE-Lidocaine Viscous HCl in aluminum-magnesium hydroxide-simethicone suspension Swish and swallow 5 mL 4 (Four) Times a Day before meals and at bedtime. Use 10-15 minutes before eating or drinking 410 mL 1   • omeprazole (priLOSEC) 20 MG capsule Take 1 capsule by mouth 2 (Two) Times a Day. 60 capsule 3   • oxyCODONE-acetaminophen (Percocet) 5-325 MG per tablet Take 1 tablet by mouth Every 6 (Six) Hours As Needed for Moderate Pain. 30 tablet 0   • prochlorperazine (COMPAZINE) 10 MG tablet Take 1 tablet by mouth Every 6 (Six) Hours As Needed for Nausea or Vomiting. 30 tablet 5     No current facility-administered medications on file prior to visit.        ALLERGIES:    Allergies   Allergen Reactions   • Asa [Aspirin] Anaphylaxis and Shortness Of  "Breath   • Adhesive Tape Dermatitis     Paper tape okay   • Iopamidol Hives     AKA IV CONTRAST   • Ct Contrast Rash     Pt HAD A RASH AND COUGHING        Social History     Socioeconomic History   • Marital status:    Tobacco Use   • Smoking status: Never   • Smokeless tobacco: Never   Vaping Use   • Vaping Use: Never used   Substance and Sexual Activity   • Alcohol use: Not Currently     Alcohol/week: 5.0 standard drinks     Types: 5 Shots of liquor per week   • Drug use: Never   • Sexual activity: Defer        Family History   Problem Relation Age of Onset   • Melanoma Paternal Aunt    • Diabetes Other    • Malig Hyperthermia Neg Hx           Objective     Vitals:    01/24/23 1546   BP: 102/68   Pulse: 72   Resp: 18   Temp: 97.1 °F (36.2 °C)   TempSrc: Temporal   SpO2: 99%   Weight: 54.1 kg (119 lb 3.2 oz)   Height: 157.5 cm (62.01\")   PainSc:   3   PainLoc: Generalized  Comment: Pt has joint pain, and its the worst in the morning     Current Status 1/24/2023   ECOG score 0       Physical Exam        GENERAL:  Well-developed, Patient  in no acute distress.   SKIN:  Warm, dry ,NO purpura ,no rash.  HEENT:  Pupils were equal and reactive to light and accomodation, conjunctivae noninjected,  normal visual acuity.   NECK:  Supple with good range of motion; no thyromegaly , no JVD or bruits,.No carotid artery pain, no carotid abnormal pulsation   LYMPHATICS:  No cervical, NO supraclavicular, NO axillary, NO inguinal adenopathies.  CARDIAC   normal rate , regular rhythm, without murmur,NO rubs NO S3 NO S4   LUNGS: normal breath sounds bilateral, no wheezing, NO rhonchi, NO crackles ,NO rubs.  VASCULAR VENOUS: no cyanosis, NO collateral circulation, NO varicosities, NO edema, NO palpable cords, NO pain,NO erythema, NO pigmentation of the skin.  ABDOMEN:  Soft, NO pain,no hepatomegaly, no splenomegaly,no masses, no ascites, no collateral circulation,no distention.  EXTREMITIES  AND SPINE:  No clubbing, no cyanosis " ,no deformities , MINIMAL PIP  AND DIP pain .No kyphosis,  no pain in spine, no pain in ribs , no pain in pelvic bone.  NEUROLOGICAL:  Patient was awake, alert, oriented to time, person and place.                RECENT LABS:  Hematology WBC   Date Value Ref Range Status   12/15/2022 3.54 3.40 - 10.80 10*3/mm3 Final     RBC   Date Value Ref Range Status   12/15/2022 3.79 3.77 - 5.28 10*6/mm3 Final     Hemoglobin   Date Value Ref Range Status   12/15/2022 11.4 (L) 12.0 - 15.9 g/dL Final     Hematocrit   Date Value Ref Range Status   12/15/2022 32.5 (L) 34.0 - 46.6 % Final     Platelets   Date Value Ref Range Status   12/15/2022 158 140 - 450 10*3/mm3 Final       CBC:    WBC   Date Value Ref Range Status   12/15/2022 3.54 3.40 - 10.80 10*3/mm3 Final     RBC   Date Value Ref Range Status   12/15/2022 3.79 3.77 - 5.28 10*6/mm3 Final     Hemoglobin   Date Value Ref Range Status   12/15/2022 11.4 (L) 12.0 - 15.9 g/dL Final     Hematocrit   Date Value Ref Range Status   12/15/2022 32.5 (L) 34.0 - 46.6 % Final     MCV   Date Value Ref Range Status   12/15/2022 85.8 79.0 - 97.0 fL Final     MCH   Date Value Ref Range Status   12/15/2022 30.1 26.6 - 33.0 pg Final     MCHC   Date Value Ref Range Status   12/15/2022 35.1 31.5 - 35.7 g/dL Final     RDW   Date Value Ref Range Status   12/15/2022 12.2 (L) 12.3 - 15.4 % Final     RDW-SD   Date Value Ref Range Status   12/15/2022 38.0 37.0 - 54.0 fl Final     MPV   Date Value Ref Range Status   12/15/2022 9.6 6.0 - 12.0 fL Final     Platelets   Date Value Ref Range Status   12/15/2022 158 140 - 450 10*3/mm3 Final     Neutrophil %   Date Value Ref Range Status   12/15/2022 61.6 42.7 - 76.0 % Final     Lymphocyte %   Date Value Ref Range Status   12/15/2022 18.6 (L) 19.6 - 45.3 % Final     Monocyte %   Date Value Ref Range Status   12/15/2022 12.1 (H) 5.0 - 12.0 % Final     Eosinophil %   Date Value Ref Range Status   12/15/2022 6.8 (H) 0.3 - 6.2 % Final     Basophil %   Date Value Ref  Range Status   12/15/2022 0.6 0.0 - 1.5 % Final     Immature Grans %   Date Value Ref Range Status   12/15/2022 0.3 0.0 - 0.5 % Final     Neutrophils, Absolute   Date Value Ref Range Status   12/15/2022 2.18 1.70 - 7.00 10*3/mm3 Final     Lymphocytes, Absolute   Date Value Ref Range Status   12/15/2022 0.66 (L) 0.70 - 3.10 10*3/mm3 Final     Monocytes, Absolute   Date Value Ref Range Status   12/15/2022 0.43 0.10 - 0.90 10*3/mm3 Final     Eosinophils, Absolute   Date Value Ref Range Status   12/15/2022 0.24 0.00 - 0.40 10*3/mm3 Final     Basophils, Absolute   Date Value Ref Range Status   12/15/2022 0.02 0.00 - 0.20 10*3/mm3 Final     Immature Grans, Absolute   Date Value Ref Range Status   12/15/2022 0.01 0.00 - 0.05 10*3/mm3 Final     nRBC   Date Value Ref Range Status   12/15/2022 0.0 0.0 - 0.2 /100 WBC Final        CMP:    Glucose   Date Value Ref Range Status   12/15/2022 107 74 - 124 mg/dL Final     BUN   Date Value Ref Range Status   12/15/2022 15 6 - 20 mg/dL Final     Creatinine   Date Value Ref Range Status   12/15/2022 0.83 0.60 - 1.10 mg/dL Final     Sodium   Date Value Ref Range Status   12/15/2022 140 134 - 145 mmol/L Final     Potassium   Date Value Ref Range Status   12/15/2022 4.2 3.5 - 4.7 mmol/L Final     Chloride   Date Value Ref Range Status   12/15/2022 104 98 - 107 mmol/L Final     CO2   Date Value Ref Range Status   12/15/2022 25.5 22.0 - 29.0 mmol/L Final     Calcium   Date Value Ref Range Status   12/15/2022 9.5 8.5 - 10.2 mg/dL Final     Total Protein   Date Value Ref Range Status   12/15/2022 7.1 6.3 - 8.0 g/dL Final     Albumin   Date Value Ref Range Status   12/15/2022 4.30 3.50 - 5.20 g/dL Final     ALT (SGPT)   Date Value Ref Range Status   12/15/2022 17 0 - 33 U/L Final     AST (SGOT)   Date Value Ref Range Status   12/15/2022 22 0 - 32 U/L Final     Alkaline Phosphatase   Date Value Ref Range Status   12/15/2022 77 38 - 116 U/L Final     Total Bilirubin   Date Value Ref Range Status    12/15/2022 0.3 0.2 - 1.2 mg/dL Final     Globulin   Date Value Ref Range Status   12/15/2022 2.8 1.8 - 3.5 gm/dL Final     A/G Ratio   Date Value Ref Range Status   12/15/2022 1.5 1.1 - 2.4 g/dL Final     BUN/Creatinine Ratio   Date Value Ref Range Status   12/15/2022 18.1 7.3 - 30.0 Final     Anion Gap   Date Value Ref Range Status   12/15/2022 10.5 5.0 - 15.0 mmol/L Final                           Assessment & Plan     Diagnoses and all orders for this visit:    1. Malignant neoplasm of upper-outer quadrant of left breast in female, estrogen receptor positive (HCC) (Primary)    2. Peptic reflux esophagitis    3. Surgical menopause    4. Pancytopenia due to antineoplastic chemotherapy (HCC)    5. B12 deficiency    6. Neuropathy due to chemotherapeutic drug (HCC)       In summary this 37-year-old  female originally from [place] noticed abnormalities in the left breast in 12/2021, sensation of pressure, minor pain, sensation of fullness. She looked for help. Further mammogram and ultrasound documented abnormalities in the left breast that were consistent with breast cancer. Since then the patient has undergone breast MRI. Breast biopsies documented invasive lobular carcinoma. All this culminated with a left-sided mastectomy and sentinel lymph node sampling. The pathology has been posted above. She had a lobular carcinoma, grade 2, margins of resection negative, Ki-67 at 39%, ER positive, CT positive, HER2/leonie negative. She had 1 positive sentinel lymph node for malignancy with no extracapsular invasion. There was no evidence of lymphovascular invasion.     Invitae panel was negative for any genetic alteration.     The patient has had an expander placed at the mastectomy site. She has discomfort and pain after this was instillated with saline solution yesterday.     The other phenomenon that is happening to the patient is significant pain and discomfort in the inner aspect of the left arm. Sounds neuropathic  related to her recent sentinel lymph node sampling. The patient is taking Aleve or ibuprofen with not too much benefit.     The patient also has a minimal component of anemia that will require assessment with ferritin, iron, TIBC, B12, folic acid levels.     RECOMMENDATIONS:  I have advised this patient and  today that she needs to receive adjuvant chemotherapy in the form of AC x4 and subsequently Taxol x12. Oncotype DX in this patient shows intermediate risk of recurrence. Also not only the chemotherapy will be necessary but we need to make her postmenopausal in the next several weeks or months. This will bring in a lot of benefit for her in the long run to minimize any recurrent disease. She is extremely young. She has 2 young children and we have to do as much as we can in this patient to minimize any potentiality for recurrent disease.     I discussed briefly with her side effects of the treatment including alopecia, cardiac toxicity, anemia, leukopenia, thrombocytopenia, mucositis among others and peripheral neuropathy in the Taxol part of the treatment along with leukopenia, anemia, thrombocytopenia and allergic reaction to the medicine. I discussed with her that the whole treatment is going to take at least 24-26 weeks, in other words 6 months, and thereafter the patient will be hopefully postmenopausal. If she does not become postmenopausal she could qualify for a clinical trial or we could ask Dr. Heidy Gutierrez to do bilateral oophorectomy in this patient.     Eventually the patient will require endocrine adjuvant therapy after she becomes postmenopausal hopefully with letrozole.     I discussed all these facts with the patient and her . I spent 1 hour and 30 minutes with her going through the details of all this but she was able to grasp a lot of information. I appreciated highly the visit with her  and I pointed out to them that they need to work as a team. The patient has 2  young children. She is the only one taking care of the kids. The patient's mother is living with her, coming from [place] after all these issues. I asked her to ask her mother to stay for the period of time that she receives chemotherapy. If we need to do any services for her in regard to visa requirements or status, we will be glad to incorporate our  into this process.     Finally, I discussed with the patient the need for her to proceed with cardiac assessment. I made a referral to be seen by Cardiology and scheduled her to have an echocardiogram and I sent her back to see Dr. Escudero in order to proceed with the placement of a port.     In order to control the pain in the inner aspect of her arm, I asked her to use Voltaren topically 4 times a day. It will be perfectly fine for her to use ibuprofen and I sent Neurontin 100 mg to take at bedtime. Hopefully this will be a transitory issue.     Dr. Johnson eventually will be in charge of deciding the time for the patient to initiate physical therapy for her left shoulder.     I reviewed all the pertinent records on this patient and I posted the information out of the pathology and radiology as above. I discussed the case with my partner, Dr. Landeros, who agrees with my plan of care.    Finally, I also posted to the patient today that will require followup in the future in regard to her right breast not only with mammogram but also ultrasound. I discussed with her the fact that sometimes lobular carcinoma likes to be a bilateral disease. There are minimal abnormalities in the mammogram in the right breast as well as in the MRI. This will require to be followed up in the future. I did not recommend a 2nd mastectomy prophylactically on her even though she asked the question today.    •  The patient was reviewed on 05/20/2022. She has had her port placed in right subclavian position a week ago. She is healing perfectly fine and her surgical site on the left  mastectomy and the expander looks perfectly normal with healing. No infection, erythema or discharge.     The patient since then has undergone formal education and consent for chemotherapy administration. Her echocardiogram even though technically difficult documented a normal left ventricular ejection fraction. No pericardial effusion and no valvular dysfunction.     Her white count, hemoglobin and platelets are normal today.     Her chemistry profile, CA 15-3 as well as ferritin, iron, TIBC, B12 and folic acid levels were all normal.     Therefore under the present circumstances I advised the patient and  the followin. For the treatment of her breast cancer the patient will initiate today chemotherapy with dose dense AC that she will receive every 2 weeks supported with Neulasta. The side effects of this already were discussed with her including nausea, vomiting, alopecia, anemia, leukopenia, thrombocytopenia and cardiac dysfunction among others. The patient was ready to proceed.   2. The patient will be utilizing Neulasta On-body to minimize hematological toxicity and be able to keep up on the schedule in regard to her treatment. I advised her that this can produce significant bone pain in the sternum, in the rib cage, in the pelvic bone and she could use antihistaminic that she is already taking in the first place and also she can use either Aleve or Tylenol or a hot shower if necessary.   3. I encouraged her to remain on her nausea medicine, ondansetron, during the next 3 days to minimize any nausea or vomiting.   4. I advised her to stay away from smelling foods like hot soup or fried foods in the next 3 days given the significant increase in smell that patients receiving AC typically have. This will minimize the potential for nausea and vomiting.   5. The patient will require toxicity assessment in a week along with blood WORK CBC.   6. The patient once completes 4 cycles of AC will initiate  weekly cycles of Taxol x12. Upon completion of that the patient will initiate a plan of radiation therapy and she already has been seen by Radiation Oncology.     The patient will participate in a clinical trial that we have in the office and she already has been consented for this in regard to the utilization of antiestrogen medicine upon completion of chemotherapy administration.     The patient will continue using her other medications on routine basis that include the Zyprexa and she will use the EMLA cream to minimize pain at the port site at the time of accessing.     Finally, I advised the patient and the  present in the room that they at any cost had to minimize the risk of pregnancy. In fact a pregnancy test today was negative. I advised them to use a condom.     The patient was ready to proceed.    All the discussion of all these issues took place in [place]ian.  • The patient returned to the office on 05/26/2022. Since the previous visit a week ago the patient had chemotherapy administration and she developed very significant nausea and vomiting nonstoppable for almost 5 days. Yesterday was the first day that she was able to have some rice and potatoes. Because she has thrown up so much she has developed significant heartburn and indigestion indicating probably bile gastritis, esophagitis and acid esophagitis. The patient has not had any hematemesis or melena. The other phenomenon that she had was tremendous somnolence due to the use of Zyprexa at the dose of 5 mg every night. She spent almost 4 days in bed sleepy but still with nausea and vomiting.     On top of things, she has very significant pain in her muscles, in her thighs, in her scalp and in her pelvic bone associated with Neulasta use. She is taking Tylenol for this with some improvement.     The patient has been able to reach hydration since yesterday, drinking proper liquids, and she is urinating okay. Therefore, the patient has multiple  issues and on top of that she has now leukopenia associated with chemotherapy. The hemoglobin is stable. The platelet count has dropped but is not dramatically or risky for bleeding.     Given all the above circumstances I proposed her the following changes in regard to plan of chemotherapy for the next cycle.     1. We will modify her plan of chemotherapy, decreasing the doses for all the medications by 20%.   2. The patient will space out the chemotherapy treatments to every 3 weeks for the AC part of the treatment until completion of 4 cycles.   3. The patient will be supported with Neulasta injection. She will utilize Tylenol for pain.   4. The patient will continue using chemotherapy administration, anti-nausea medication and she will receive Emend on day 2 in the office and Emend on day 3 at home. Pharmacist will take care of this issue.   5. The patient will decrease Zyprexa from 5 mg a day to 2.5 mg in the evening to minimize somnolence.   6. The patient was advised to use omeprazole at the dose of 20 mg twice a day to minimize esophageal irritation associated with so much nausea and vomiting.   7. The patient will require still laboratory parameters the week after each one of the cycles of chemotherapy.     I feel the obligation to make all these changes; otherwise this patient will quit her treatment and that is the worst thing that could happen under the present scenario. She agreed with all the changes made. I explained to her all these changes in [place] to make her life a little bit more comfortable and easy to handle. She understands at the time that she returns in 2 weeks she probably will start to develop alopecia and she already has discussed this with her mother who will help through the process of shaving her head.  On 06/23/2022, the patient had dramatic improvement in regard to nausea control after dose adjustment for the 2nd cycle of chemotherapy and also modification of the nausea regimen. We  tried to deliver Compazine and Phenergan. These medicines were tremendously tolling on her in regard to confusion and inability to function. Ativan was the miracle medicine. Therefore for the next 2 cycles Zofran and Ativan will be the ideal combination and she will require also IV fluids on day 2 of each one of the next cycles of AC.     In regard to pain associated with Neulasta, she required Tylenol around-the-clock for 4 days after each one of the treatments and now she has no discomfort whatsoever.     Today her white count is normal, hemoglobin improved to 10.7, platelet count with minor decrease, no clinical bleeding.    The patient feels substantially better. She is able to function at home. She is able to be with her family. She is able to attend her children and she has been able to walk 2 miles a day late in the evening. Encouraged her to continue doing this.     I suggested for her the followin. She will return in a week to proceed with cycle 3 of AC at the previous dose and supported with Neulasta.   2. She will be supported on day 2 with IV fluids and nausea medication.   3. We will discontinue Phenergan and/or Compazine and she will remain on Zofran and Ativan for nausea control.   4. She will continue PPI to minimize reflux symptomatology, still minimal problem but dramatically better than before.   5. She will use Milk of Magnesia for constipation. Another measure could be prunes and dates.     The patient will complete the total 4 cycles of AC and then will move into the Taxol arena. I pointed out to her that we will have the discussion about how to handle this medicine after the completion of the next 2 cycles.     She also raised the question in regard what to do upon completion of chemotherapy. I advised her that eventually she will have radiological assessment as a baseline and we will proceed with PATRICIA blood sampling for evaluation of minimal residual disease.     The patient was ready  to proceed now that she feels dramatically better. What a difference it makes in regard to adjustment of medicines and trying to adjust nausea medication accordingly.  • The patient returned to the office on 07/21/2022. In regard to her chemotherapy treatment, she is ready to proceed with her 4th or last cycle of AC today. Her ANC is a little bit on the low side but the patient will be receiving Neulasta, therefore this will be taking care of this problem.     Her hemoglobin remains stable. This is anemia associated with chemotherapy administration. She also had a menstrual flow that was very abundant a few weeks ago. She is not taking any vitamins at this time and I have not advised to take any given concerning data about supplements in the background of adjuvant therapy for breast cancer.     In regard to the sensation of numbness in the left upper extremity arm, I think this is natural after mastectomy more than expected and I pointed out to her that this does not signify anything and does not require any therapy. This is extremely common in many women and it has the tendency to go away spontaneously.     In regard to the abnormality in the left hand there is no evidence of lymphedema. I think she could have a minimal element of tendonitis. I advised her to use topical anti-inflammatory medication or ice the hand. She has not received any IV sites for blood draws in this area.     In regard to future visits, the patient would like to continue her chemotherapy treatments on Mondays because her children will be going back to school and she wants to have a weekend with them as much as she can. I find no problem seeing her in 3 weeks from now on Monday to proceed with initiation of Taxol. I pointed out to her that Taxol will be easier to handle a lower dose, less toxicity in regard to hematological parameters and hopefully no issues in regard to neuropathy.     I also advised her that we will continue monitoring the  status of menstrual flow upon completion of chemotherapy. She still has 12 Taxol treatments to go and we do not know if the chemotherapy treatment is going to put her in menopause or not. Depending on the final analysis of this upon completion of chemotherapy and before initiation of hormonal therapy, we will need to do hormone levels.     Finally, the patient has had a lot of issues between her insurance company and the billing system at [facility]. I asked the billing system to give her a call and relate this to the insurance company. The specific question is about the cost and the utilization of PEGylated Neulasta or Neulasta On-body. Hopefully this will be happening to settle down the situation. Each one of these medicines have been billed in thousands of dollars and she already will complete 4 bills of this medication. She is extremely anxious about this situation. This is called economic toxicity of chemotherapy administration and we need to influence this situation as well on her to give her the benefit of settling down once and for all.    This patient already has enough stressors on the plate to add another one that is economical stressor of her treatment.  • On 08/22/2022, the patient's white count, hemoglobin and platelets are stable. Tolerance to Taxol is much better than AC. She has some occasional nausea, some joint pain, some pruritus in the absence of any rash on her hands and her feet and I think this all is side effect of Taxol. I advised her to go ahead and proceed with her Taxol today and I advised her to go back to taking her Zyrtec on a regular basis to try to minimize any itching in her hands and her feet.     I also advised her to continue using Ativan that has been terrific in regard to control of nausea and vomiting and she can use 2-3 doses today and tomorrow to minimize the symptoms. It will be perfectly okay as well for her to take Tylenol tonight and tomorrow to minimize joint pain  associated with Taxol utilization.     In regard to her epistaxis, it is very obvious that the nasal passages on the right side are different than the ones on the left. She has very significant deviation of the septum. This triggers airflow currents that are abnormal and capillarity in the anterior part of the septum that is very abnormal and is the source of bleeding. If this continues she will require referral for ENT for cauterization. Her platelet count is normal.     The patient has not had any menstrual flow now for 2 months and will continue asking this question because theoretically the chemotherapy should make her menopausal. Upon completion of Taxol we will need to do endocrine testing in this regard.     Overall I think the patient is doing much better than before and she will be advised to continue her weekly Taxol along with weekly laboratory testing and weekly nurse practitioner visit and visit with me in 1 month.   • On 09/12/2022 the patient continues complaining of significant sensation of itching and discomfort in the tips of her fingers and her toes to the point that she has difficulty doing housework and difficulty walking with this. This is a different way of manifestation of toxicity associated with Taxol administration. She probably has a grade 2 peripheral neuropathy associated with this. The symptoms are not permanent, they come and go but this will obligate me to adjust her Taxol dose today by 25-30%. She has been receiving 120 mg of Taxol per dose. Her dose from now on will be 90 mg.   •   • The patient will receive a B12 injection today and I advised her to take B complex vitamin.  •   • In regard to her anemia associated with chemotherapy administration she was advised by my partner, Mich Agudelo MD, last week to take oral iron therapy. Unfortunately the patient became ill in regard to constipation and cramping and she has stopped the iron therapy.   •   • In regard to her leukopenia  associated with chemotherapy administration her white count and ANC are appropriate today and she will be able to proceed with her Taxol administration today. She has not developed any fever or infection.   •   • In regard to her reflux symptomatology, nausea and vomiting the symptoms pertinent to his are resolved now that she is no longer receiving AC chemotherapy. She remains on Ativan and Compazine on prn basis and she remains on PPI that has corrected her acid issues altogether.  •   • In regard how to monitor her peripheral neuropathy I asked her to call my nurse, Justine Abbott on weekly basis, notify her of the status of her symptoms and if they get any worse or permanent I think we will need to discontinue the Taxol permanently and move into the next stage of therapy. She will require visit with me in 3 weeks.   •   • Duration of the video visit today 20 minutes.   •   • On 10/03/2022 the patient came back to the office for follow up. Since the previous visit she called stating that she could not handle the peripheral neuropathy in her hands and her feet anymore because it is disrupting her lifestyle and keeping her from doing anything around the house and we discontinued the Taxol altogether. Since then she has had very significant recovery especially in her hands, still has discomfort in her feet and she is not able to walk long distances because of the stimulation of the activity in the plantar aspect of her feet triggers discomfort as well in the toes. A detailed neurological exam today disclosed no major abnormalities, temperature discrimination, pinprick vibration, reflexes, strength and so forth is very much unremarkable.    Today her white count, hemoglobin and platelets are normal.     From my point of view I advised the patient the following.     1. She has completed her plan of chemotherapy, we cannot push anymore Taxol on her given her sensory neuropathy that will become permanent.     2. I  discussed with the patient options to make her postmenopausal either hormonal therapy goserelin for example or Lupron, another way to do this will be ovary removal. I have placed a phone call to talk with Heidy Gutierrez MD, the patient's Gynecologist to discuss this with her. My request to Dr. Gutierrez to perform bilateral oophorectomy. The patient is in agreement with that. This with the need for the patient to initiate at some point her adjuvant hormonal therapy if the patient is postmenopausal in the next visit after oophorectomy she will initiate letrozole therapy 2.5 mg a day for the time being. If the patient rejects the possibility of an oophorectomy she will require pharmacological removal of ovarian function and she will require also further adjuvant hormonal therapy.    3. Finally she already has been seen by radiation oncology after her diagnosis. I made the referral for her to be seen by them because from my point of view she is ready for initiation of therapy at this point.     4. The patient also complains of fatigue. We will measure a TSH, ferritin, iron profile, reticulated hemoglobin today. We want to be sure that her iron stores and thyroid function are normal. I think this is leftover from chemotherapy administration.    5. The patient complains of foggy brain. Memory and ability to function is limited. It is very likely effect of chemotherapy administration on her. I asked her to trigger a lot of brain stimulation reading books, chatting with people, working on activities with the kids and doing more physical activity.     6. Port. The patient will require port maintenance for the time being. It will be flushed today and it will be reflushed again in 6 weeks when she returns.    7. I will continue monitoring laboratory parameters periodically.    8. I went ahead and obtained an estradiol, FSH and LH today. She has not had any menstrual flow since 06/2022.   On 10/31/2022 the patient believes  that she is going to have menstrual flow this week. She has the sensation of the premenstrual syndrome. During the previous visit I measured estradiol, FSH, LH; those numbers were consistent with postmenopausal status but the patient believes that she is going to have menstrual flow very soon this week.     From my point of view the patient is already undergoing radiation therapy for breast cancer. She already has completed 4 treatments and she expects to complete all her treatments by November 30th.    It has been impossible to talk with Dr. Heidy Gutierrez in regard to the need for this patient to have oophorectomy to make her postmenopausal and eventually initiate her hormonal therapy. I will talk with Dr. Gutierrez hopefully today and that way the patient is put on the schedule to have an oophorectomy in the 1st week in December after completion of radiation therapy and thereafter the patient will be able to initiate her adjuvant hormonal therapy with Femara. At that point also I would expect to proceed with radiological assessment after completion of her radiation treatment that will include a CT scan of the chest, abdomen and pelvis and a bone density test.     I pointed out to the patient that I would like for her to keep her port in place at least for a year. She agrees with that.     Otherwise, the patient has been given proper information by me in regard to physical activity to improve range of motion at the left shoulder that is not limiting according to my clinical examination on her today. She says that she has a little bit of pull and discomfort upon extension and elevation of her upper extremities expected from the surgery and the expander in the left chest wall.     Otherwise, the patient will return to see us back in a few weeks.    Her white count, hemoglobin and platelets are normal. Her chemistry profile is normal. Her hair is growing back. Her eyebrows, eyelashes are growing back and the  patient feels substantially better from this point of view.  • ADDENDUM: DISCUSSED WITH DR COBB WHO WILL PLAN BILATERAL OOPHORECTOMY IN FEW WEEKS  •   On 12/15/2022 the patient returned after having bilateral oophorectomy through laparoscopy on 11/28/2022. Therefore officially now she is considered postmenopausal. The surgical sites are healing fine and she is going to follow up with Dr. Gutierrez in a few days. She has not encountered the need for any pain medication. Her bowel activity and urination are fine and she is eating better and functioning better at home. She has had near complete resolution of her neuropathy associated with Taxol administration and she has not had any consequences of anthracycline administration through her echocardiogram recently done.     Her clinical examination today is otherwise benign. Her heart function, lung function, abdominal function disclosed no abnormalities. Her white count, hemoglobin and platelets are fine still with minimal leukopenia and minimal anemia that will get over in the next several weeks. Considering that now the patient is postmenopausal I am going to go ahead and proceed with prescription of Femara at the dose of 2.5 mg a day. She will need to take this medicine for the next 5 years. I discussed with her side effects of the Femara including occasional joint pain that can be controlled with Tylenol and physical activity. Also this medicine can raise cholesterol level and I will obtain a new lipid profile baseline today. This will be repeated every 6 months.     Eventually the patient also will proceed with bone density. I made her aware that sometimes these medicines also can favor osteopenia or osteoporosis and we will need to request a bone density test more or less in 6 months from now.     The patient will proceed with radiological assessment of CT scan of the chest, abdomen and pelvis baseline after completion of all her therapy now and before  initiation of the Femara. She will not require any further radiological assessment in the future unless that she has any clinical symptomatology to suggest any recurrence.     The patient will require visit with me in 6 weeks. On that occasion her port the week before will be flushed. We will keep the port for the next couple of years.     Finally the patient is very sad because her father-in-law was diagnosed with colon cancer in [place] and they are going to need to travel to see him.     I discussed all these facts with nurse navigator in the room. Also the patient was advised to proceed with survivorship assessment the same day that she will be seeing me in 6 weeks.     We are glad that she completed all this and many things are behind. She is in remission now and she cried with lam about this issue today.  On 01/24/2023 the patient was further reviewed. Since the previous visit the patient initiated her Femara adjuvant therapy. Unfortunately the medicine is producing some achiness in her joints and I advised her to take a vitamin D supplement 1000 units a day and initiate a tablet of Aleve to be taken at the time that she goes to bed at nighttime. I will ask my nurse, Justine Abbott, to give her a call in a couple of weeks to see if this strategy is having a positive impact on her. If the pain continues, a strong suggestion would be to move her from Femara to Arimidex.     In the meantime we documented that the patient also after post oophorectomy had a very low level of estradiol and her FSH was elevated consistent with menopausal status.     The patient is not experiencing any hot flashes from menopause and she overall is functioning well at home and improving level of energy and activity for all sorts of things.     In the meantime she was seen by Cardio-Oncology. Her EKG and testing was appropriate and she continues taking cholesterol medicine. We will recheck lipid profile in 3 months and we will monitor  this area as well in the future. She will require to be tested by Cardio-Oncology once a year.     From the point of view of her reconstruction of her left breast she will see her plastic surgeon at some point in 04/2023. From my point of view she can proceed with any leftover surgery necessary for this at any time after this period of time. Her radiation therapy side effects are very much over and the patient should be able to undergo her radiation treatment.     In regard to her diet I encouraged her to continue a very healthy diet full of vegetables and fruit. She is going in that agenda anyway and I encouraged her to have a different consumption of red meat only once a week and try to have red meat of organic nature with walking cows. Encouraged her to have also some more vegetarian style meals that will be beneficial to her in the long run. It will be okay to have some occasional sweets here and there. She raised the question in regard to alcohol consumption in regard to a glass of wine here or there. I do not see any problems or difficulties with this whatsoever. She is welcome to have a minimal amount that does not need to become a situation for every day use. She understood this clearly.      From the point of view of fluidization for intercourse I encouraged them to use coconut oil. That will be beneficial for both of them. Any other lubricant will be acceptable.     From the point of view of her port the patient will continue having her port flushed every 6 weeks and she knows that she needs to maintain this for a total of 2 years. She is aware that a high risk of recurrence will happen in this period of time. This will require to be maintained.     From the point of view of vitamin consumption I asked her to take vitamin D 1000 units a day especially knowing that we have been through winter and we have no sun exposure. Eventually in the spring she will need to have some sun exposure like the rest of us  that will be crucial in regard to proper vitamin D synthesis.     In regard to any other need for radiological assessment I find no need for this to happen again unless that clinical characteristics change at some point in the future. We will proceed with a CT scan of the chest, abdomen and pelvis. I personally reviewed this showing no evidence of metastasis at any level in the lungs, liver, bones or any other anatomical site. The spleen, pancreas, kidneys, intestine were all unremarkable. Cardiac anatomy was normal. Pulmonary anatomy was normal with minimal granuloma formation in the right lung.     Therefore, I gave her the good news in regard to this exit CT scan that hopefully will not be required to be done again in the future.     The patient also has been seen by JACQUELIN Xiong, in regard to information pertinent to her future follow ups and plan of care in the past and the future.     I discussed all these facts with the patient and the  in the room.      •   •   •   •   •   ·

## 2023-01-26 NOTE — TELEPHONE ENCOUNTER
Patient is scheduled for echo and 1 year follow up 2/7/2024.     She will plan to keep echo as scheduled this year 2/20/2023.     Thank you,   Stacie

## 2023-02-10 ENCOUNTER — TELEPHONE (OUTPATIENT)
Dept: ONCOLOGY | Facility: CLINIC | Age: 38
End: 2023-02-10
Payer: COMMERCIAL

## 2023-02-10 NOTE — TELEPHONE ENCOUNTER
Called patient to check on joint aching. Per Dr. Palacios if she is only taking aleve/day okay to stay on Femara. Pt states she is only taking at night and was instructed to call us if notices she needs to increase frequency of aleve. Pt v/u. Chelsea Abbott RN

## 2023-02-15 ENCOUNTER — TELEPHONE (OUTPATIENT)
Dept: SURGERY | Facility: CLINIC | Age: 38
End: 2023-02-15
Payer: COMMERCIAL

## 2023-02-15 NOTE — TELEPHONE ENCOUNTER
Pt MMG at [facility] r/s for Mon 2/20/23 at 1:45 pm per Imani.    Called pt and r/s appt with Dr. AVILA Mon 3/20/23 at 9:00 am.    Let pt know we need MMG before seeing Dr. AVILA.    Also let pt know we would move her appt in office up sooner if possible.    Spoke with pt who jess.    During call pt let me know she did not call [facility] to r/s. [facility] called the pt to r/s 2/13/23 appt as machine was down.    MEB

## 2023-02-20 ENCOUNTER — APPOINTMENT (OUTPATIENT)
Dept: WOMENS IMAGING | Facility: HOSPITAL | Age: 38
End: 2023-02-20
Payer: COMMERCIAL

## 2023-02-20 ENCOUNTER — TELEPHONE (OUTPATIENT)
Dept: ONCOLOGY | Facility: CLINIC | Age: 38
End: 2023-02-20
Payer: COMMERCIAL

## 2023-02-20 ENCOUNTER — HOSPITAL ENCOUNTER (OUTPATIENT)
Dept: CARDIOLOGY | Facility: HOSPITAL | Age: 38
Discharge: HOME | End: 2023-02-20
Admitting: NURSE PRACTITIONER
Payer: COMMERCIAL

## 2023-02-20 VITALS
OXYGEN SATURATION: 99 % | BODY MASS INDEX: 21.9 KG/M2 | WEIGHT: 119 LBS | HEIGHT: 62 IN | DIASTOLIC BLOOD PRESSURE: 78 MMHG | SYSTOLIC BLOOD PRESSURE: 110 MMHG | HEART RATE: 86 BPM

## 2023-02-20 DIAGNOSIS — Z92.21 PERSONAL HISTORY OF ANTINEOPLASTIC CHEMOTHERAPY: ICD-10-CM

## 2023-02-20 PROCEDURE — 93325 DOPPLER ECHO COLOR FLOW MAPG: CPT | Performed by: INTERNAL MEDICINE

## 2023-02-20 PROCEDURE — 93321 DOPPLER ECHO F-UP/LMTD STD: CPT | Performed by: INTERNAL MEDICINE

## 2023-02-20 PROCEDURE — 77063 BREAST TOMOSYNTHESIS BI: CPT | Performed by: RADIOLOGY

## 2023-02-20 PROCEDURE — 93308 TTE F-UP OR LMTD: CPT

## 2023-02-20 PROCEDURE — 93356 MYOCRD STRAIN IMG SPCKL TRCK: CPT

## 2023-02-20 PROCEDURE — 77067 SCR MAMMO BI INCL CAD: CPT | Performed by: RADIOLOGY

## 2023-02-20 PROCEDURE — 93356 MYOCRD STRAIN IMG SPCKL TRCK: CPT | Performed by: INTERNAL MEDICINE

## 2023-02-20 PROCEDURE — 25010000002 PERFLUTREN (DEFINITY) 8.476 MG IN SODIUM CHLORIDE (PF) 0.9 % 10 ML INJECTION: Performed by: NURSE PRACTITIONER

## 2023-02-20 PROCEDURE — 93321 DOPPLER ECHO F-UP/LMTD STD: CPT

## 2023-02-20 PROCEDURE — 93325 DOPPLER ECHO COLOR FLOW MAPG: CPT

## 2023-02-20 PROCEDURE — 93308 TTE F-UP OR LMTD: CPT | Performed by: INTERNAL MEDICINE

## 2023-02-20 RX ADMIN — PERFLUTREN 1.5 ML: 6.52 INJECTION, SUSPENSION INTRAVENOUS at 10:43

## 2023-02-20 NOTE — TELEPHONE ENCOUNTER
Returned call to patient who is reporting that she has been experiencing pain in her arms and hands since starting the Letrozole.  She was told that she could take Aleve once a day for the pain, but her pain has been getting worse.  She wants to know if she can take an Aleve twice a day.  I explained based on her most recent lab results that her kidney function is normal and since Dr. Palacios is out of the office today, I will send a message to him and his nurse that she will take Aleve twice a day today and tomorrow, till I can review with Dr. Palacios tomorrow.  She v/u.

## 2023-02-21 LAB
BH CV ECHO LEFT VENTRICLE GLOBAL LONGITUDINAL STRAIN: -24.6 %
BH CV ECHO MEAS - EDV(CUBED): 63.9 ML
BH CV ECHO MEAS - EDV(MOD-SP2): 83 ML
BH CV ECHO MEAS - EDV(MOD-SP4): 80 ML
BH CV ECHO MEAS - EF(MOD-BP): 65 %
BH CV ECHO MEAS - EF(MOD-SP2): 68.7 %
BH CV ECHO MEAS - EF(MOD-SP4): 63.7 %
BH CV ECHO MEAS - EF_3D-VOL: 65 %
BH CV ECHO MEAS - ESV(CUBED): 14 ML
BH CV ECHO MEAS - ESV(MOD-SP2): 26 ML
BH CV ECHO MEAS - ESV(MOD-SP4): 29 ML
BH CV ECHO MEAS - FS: 39.7 %
BH CV ECHO MEAS - IVS/LVPW: 1 CM
BH CV ECHO MEAS - IVSD: 0.91 CM
BH CV ECHO MEAS - LA 3D VOL INDEX: 22
BH CV ECHO MEAS - LAT PEAK E' VEL: 13.8 CM/SEC
BH CV ECHO MEAS - LV DIASTOLIC VOL/BSA (35-75): 52.2 CM2
BH CV ECHO MEAS - LV MASS(C)D: 111.9 GRAMS
BH CV ECHO MEAS - LV SYSTOLIC VOL/BSA (12-30): 18.9 CM2
BH CV ECHO MEAS - LVIDD: 4 CM
BH CV ECHO MEAS - LVIDS: 2.41 CM
BH CV ECHO MEAS - LVPWD: 0.91 CM
BH CV ECHO MEAS - MED PEAK E' VEL: 10 CM/SEC
BH CV ECHO MEAS - MV A DUR: 0.11 SEC
BH CV ECHO MEAS - MV A MAX VEL: 83.6 CM/SEC
BH CV ECHO MEAS - MV DEC TIME: 0.26 MSEC
BH CV ECHO MEAS - MV E MAX VEL: 92.1 CM/SEC
BH CV ECHO MEAS - MV E/A: 1.1
BH CV ECHO MEAS - PULM A REVS DUR: 0.1 SEC
BH CV ECHO MEAS - PULM A REVS VEL: 47.6 CM/SEC
BH CV ECHO MEAS - PULM DIAS VEL: 47.6 CM/SEC
BH CV ECHO MEAS - PULM S/D: 0.65
BH CV ECHO MEAS - PULM SYS VEL: 30.8 CM/SEC
BH CV ECHO MEAS - SI(MOD-SP2): 37.2 ML/M2
BH CV ECHO MEAS - SI(MOD-SP4): 33.3 ML/M2
BH CV ECHO MEAS - SV(MOD-SP2): 57 ML
BH CV ECHO MEAS - SV(MOD-SP4): 51 ML
BH CV ECHO MEASUREMENTS AVERAGE E/E' RATIO: 7.74
BH CV XLRA - TDI S': 11.9 CM/SEC
LEFT ATRIUM VOLUME INDEX: 12.9 ML/M2
MAXIMAL PREDICTED HEART RATE: 181 BPM
STRESS TARGET HR: 154 BPM

## 2023-02-21 NOTE — TELEPHONE ENCOUNTER
Returned call to patient. Asked patient to continue calling if she notices she is needing to take more Aleve. Once or twice/day okay per Dr. Palacios, but we need to know if this pain is increasing. Pt v/u and will back if pain worsens/if she is needing to take aleve more than BID. Chelsea Abbott, RN

## 2023-02-21 NOTE — PROGRESS NOTES
Please call patient.  Echocardiogram unchanged-normal EF and GLS.  Keep scheduled appointments for 2024.  Thank you

## 2023-02-22 ENCOUNTER — HOSPITAL ENCOUNTER (OUTPATIENT)
Dept: PHYSICAL THERAPY | Facility: HOSPITAL | Age: 38
Setting detail: THERAPIES SERIES
Discharge: HOME | End: 2023-02-22
Payer: COMMERCIAL

## 2023-02-22 DIAGNOSIS — C50.412 MALIGNANT NEOPLASM OF UPPER-OUTER QUADRANT OF LEFT BREAST IN FEMALE, ESTROGEN RECEPTOR POSITIVE: Primary | ICD-10-CM

## 2023-02-22 DIAGNOSIS — Z90.12 S/P LEFT MASTECTOMY: ICD-10-CM

## 2023-02-22 DIAGNOSIS — Z17.0 MALIGNANT NEOPLASM OF UPPER-OUTER QUADRANT OF LEFT BREAST IN FEMALE, ESTROGEN RECEPTOR POSITIVE: Primary | ICD-10-CM

## 2023-02-22 DIAGNOSIS — Z91.89 AT RISK FOR LYMPHEDEMA: ICD-10-CM

## 2023-02-22 PROCEDURE — 97535 SELF CARE MNGMENT TRAINING: CPT

## 2023-02-22 PROCEDURE — 97164 PT RE-EVAL EST PLAN CARE: CPT

## 2023-02-22 PROCEDURE — 93702 BIS XTRACELL FLUID ANALYSIS: CPT

## 2023-02-22 NOTE — THERAPY RE-EVALUATION
Physical Therapy Lymphedema Re-Evaluation  [facility]     Patient Name: Maribell Romero  : 1985  MRN: [id number]  Today's Date: 2023      Visit Date: 2023    Visit Dx:    ICD-10-CM ICD-9-CM   1. Malignant neoplasm of upper-outer quadrant of left breast in female, estrogen receptor positive (HCC)  C50.412 174.4    Z17.0 V86.0   2. At risk for lymphedema  Z91.89 V49.89   3. S/P left mastectomy  Z90.12 V45.71       Patient Active Problem List   Diagnosis   • Other specified anemias   • Malignant neoplasm of upper-outer quadrant of left breast in female, estrogen receptor positive (HCC)   • Peptic reflux esophagitis   • Pancytopenia due to antineoplastic chemotherapy (HCC)   • Anemia associated with chemotherapy   • Fitting and adjustment of vascular catheter   • B12 deficiency   • Neuropathy due to chemotherapeutic drug (HCC)   • Surgical menopause        Past Medical History:   Diagnosis Date   • Anemia    • Anesthesia complication     hypotension with last C section   • Breast cancer (HCC) 2022    left sided, stage 3, ER/[place]+, Her2-, s/p mastectomy, LAST CHEMO 10/3/22, CURRENTLY RECEIVING RADIATION   • History of COVID-19     2021 AND 2022   • Hyperlipidemia    • PONV (postoperative nausea and vomiting)    • Seasonal allergies         Past Surgical History:   Procedure Laterality Date   • APPENDECTOMY     • BREAST BIOPSY Left    • BREAST RECONSTRUCTION Left 2022    Procedure: LEFT BREAST 1ST STAGE RECONSTRUCTION WITH INSERTION OF TISSUE EXPANDER AND BIOLOGIC;  Surgeon: Hermelinda Johnson MD;  Location: [facility] MAIN OR;  Service: Plastics;  Laterality: Left;   •  SECTION     •  SECTION N/A 12/10/2018    Procedure:  SECTION REPEAT;  Surgeon: Heidy Gutierrez MD;  Location: [facility] LABOR DELIVERY;  Service: Obstetrics/Gynecology   • D & C HYSTEROSCOPY N/A 2021    Procedure: HYSTEROSCOPY REMOVAL INTRAUTERINE DEVICE WILL NEED  ULTRASOUND IN ROOM;  Surgeon: Heidy Gutierrez MD;  Location: [facility];  Service: Obstetrics/Gynecology;  Laterality: N/A;   • DIAGNOSTIC LAPAROSCOPY Bilateral 11/28/2022    Procedure: DAVINCI ASSISTED BILATERAL SALPINGO-OOPHORECTOMY;  Surgeon: Heidy Gutierrez MD;  Location: [facility] OR;  Service: Robotics - DaVinci;  Laterality: Bilateral;   • KNEE ARTHROSCOPY Right    • MASTECTOMY W/ SENTINEL NODE BIOPSY Left 04/13/2022    Procedure: left total mastectomy with left axillary sentinel lymph node biopsy;  Surgeon: Monisha Escudero MD;  Location: [facility] OR;  Service: General;  Laterality: Left;   • VENOUS ACCESS DEVICE (PORT) INSERTION Right 5/13/2022    Procedure: right port placement;  Surgeon: Monisha Esucdero MD;  Location: [facility];  Service: General;  Laterality: Right;       Visit Dx:    ICD-10-CM ICD-9-CM   1. Malignant neoplasm of upper-outer quadrant of left breast in female, estrogen receptor positive (HCC)  C50.412 174.4    Z17.0 V86.0   2. At risk for lymphedema  Z91.89 V49.89   3. S/P left mastectomy  Z90.12 V45.71        Patient History     Row Name 02/22/23 0800             History    Chief Complaint Pain  -LB      Type of Pain Other pain  multi joint  -LB      Other Type of Pain generalized joint pain  -LB      Date Current Problem(s) Began 04/13/22  -LB      Brief Description of Current Complaint Pt now s/p L mastectomy, SLNB 1/4 LNs removed, expander placement with Dr. Escudero/[facility] 4/13/22. She is R handed and has 4 year old daughter. She has completed chemotherapy and radiation and states she has recovered well. Upcoming plastic surgery appt. in April to schedule implant exchange surgery. She complains of BUE joint pain and discomfort. She has B hand swelling/stiffness in AM. She has been taking Letrazole for 3 months. She is doing pilates and walking when the weather is warm. She is doing UE stretches.  -LB      Occupation/sports/leisure activities pilates,  walking  -LB         Pain     Pain Location Arm;Shoulder;Hand  -LB      Pain at Present 4  -LB      Pain at Best 4  -LB      Pain at Worst 4  -LB      Pain Frequency Constant/continuous  -LB      Pain Description Tightness;Aching  worse in AM  -LB      What Performance Factors Make the Current Problem(s) WORSE? AM  -LB      What Performance Factors Make the Current Problem(s) BETTER? movement  -LB      Pain Comments I feel like I have no  strength in the mornings.  -LB      Is your sleep disturbed? No  -LB      Difficulties at work? Pt does not work.  -LB      Difficulties with ADL's? Difficulty with fine motor tasks in the AM.  -LB      Difficulties with recreational activities? Pt tolerating pilates well.  -LB         Fall Risk Assessment    Any falls in the past year: No  -LB         Services    Prior Rehab/Home Health Experiences No  -LB      Are you currently receiving Home Health services No  -LB      Do you plan to receive Home Health services in the near future No  -LB         Daily Activities    Primary Language English  -LB      Pt Participated in POC and Goals Yes  -LB         Safety    Are you being hurt, hit, or frightened by anyone at home or in your life? No  -LB      Are you being neglected by a caregiver No  -LB      Have you had any of the following issues with N/A  -LB            User Key  (r) = Recorded By, (t) = Taken By, (c) = Cosigned By    Initials Name Provider Type    LB Carissa Funes PT Physical Therapist                 Lymphedema     Row Name 02/22/23 0800             Subjective Pain    Able to rate subjective pain? yes  -LB      Pre-Treatment Pain Level 4  -LB         Subjective Comments    Subjective Comments Both of my hands, shoulders ache, hurt especially in the AM.  -LB         Lymphedema Assessment    Lymphedema Classification LUE:;at risk/stage 0  -LB      Lymphedema Cancer Related Sx left;modified radical mastectomy;sentinel node biopsy  -LB      Lymphedema Surgery Comments  4/13/22  -LB      Lymph Nodes Removed # 4  -LB      Positive Lymph Nodes # 1  -LB      Chemo Received yes  -LB      Chemo Treatments #/Timeframe completed  -LB      Radiation Therapy Received yes  -LB      Radiation Treatments #/Timeframe completed  -LB         Posture/Observations    Posture/Observations Comments WNL  -LB         General ROM    GENERAL ROM COMMENTS BUE WFL  -LB         MMT (Manual Muscle Testing)    General MMT Comments BUE WFL  -LB         Lymphedema Edema Assessment    Edema Assessment Comment no obvious edema  -LB         Skin Changes/Observations    Skin Observations Comment normal healing  -LB         Lymphedema Sensation    Lymphedema Sensation Comments normal  -LB         Lymphedema Pulses/Capillary Refill    Lymph Pulses Capillary Refill Comments intact  -LB         LUE Quick Girth (cm)    Axilla 27 cm  -LB      Mid upper arm 23.5 cm  -LB      Elbow 21.8 cm  -LB      Mid forearm 19.2 cm  -LB      Wrist crease 14.2 cm  -LB      LUE Quick Girth Total 105.7  -LB         RUE Quick Girth (cm)    Axilla 26.8 cm  -LB      Mid upper arm 22.5 cm  -LB      Elbow 21.6 cm  -LB      Mid forearm 19.5 cm  -LB      Wrist crease 14.1 cm  -LB      RUE Quick Girth Total 104.5  -LB         L-Dex Bioimpedence Screening    L-Dex Measurement Extremity LUE  -LB      L-Dex Patient Position Standing  -LB      L-Dex UE Dominate Side Right  -LB      L-Dex UE At Risk Side Left  -LB      L-Dex UE Pre Surgical Value No  -LB      L-Dex UE Score 6.4  -LB      L-Dex UE Baseline Score 4.5  -LB      L-Dex UE Value Change 1.9  -LB      L-Dex UE Comment WNL; slightly higher than baseline  -LB      $ L-Dex Charge yes  -LB            User Key  (r) = Recorded By, (t) = Taken By, (c) = Cosigned By    Initials Name Provider Type    Carissa Steel PT Physical Therapist                                Therapy Education  Education Details: discussed exercise recommendations, bioimpedance results and interpretation, lymphedema s/s,  steps to prevention, possible compression depending on LDex score in 3 months, aquatic exercise, hand/ strengthening exercises  Given: Symptoms/condition management, Edema management, Mobility training  Program: Reinforced  How Provided: Demonstration, Verbal  Provided to: Patient  Level of Understanding: Teach back education performed, Verbalized, Demonstrated  21415 - PT Self Care/Mgmt Minutes: 15       OP Exercises     Row Name 02/22/23 0800             Subjective Comments    Subjective Comments Both of my hands, shoulders ache, hurt especially in the AM.  -LB         Subjective Pain    Able to rate subjective pain? yes  -LB      Pre-Treatment Pain Level 4  -LB            User Key  (r) = Recorded By, (t) = Taken By, (c) = Cosigned By    Initials Name Provider Type    Carissa Steel, PT Physical Therapist                             PT OP Goals     Row Name 02/22/23 0900          Long Term Goals    LTG Date to Achieve 07/10/22  -LB     LTG 1 Pt will maintain LDex bioimpedance score WNL.  -LB     LTG 1 Progress Ongoing  -LB     LTG 1 Progress Comments WNL 6.4 today  -LB     LTG 2 Pt will verbalize s/s of lymphedema and steps to prevention.  -LB     LTG 2 Progress New  -LB     LTG 3 Pt will report 50% improvement in joint pain with continued exercise program and joint mobility strategies.  -LB     LTG 3 Progress New  -LB           User Key  (r) = Recorded By, (t) = Taken By, (c) = Cosigned By    Initials Name Provider Type    Carissa Steel, PT Physical Therapist                 PT Assessment/Plan     Row Name 02/22/23 0906          PT Assessment    Functional Limitations Limitations in functional capacity and performance;Limitation in home management;Performance in leisure activities;Performance in sport activities  -LB     Impairments Edema;Impaired flexibility;Joint mobility;Muscle strength;Pain;Poor body mechanics  -LB     Assessment Comments Pt returns for post-operative examination. She is now s/p L  mastectomy, SLNB (1/4+ LNs removed), expander placed (next plastic sx appt in April, chemotherapy and radiation now completed. She denies s/s of lymphedema, however, greatest complaint is BUE joint pain, dec  strength, difficulty with fine motor tasks, and B hand swelling that is worse in AM. Repeat LDex bioimpedance testing today with slight inc but remains WNL at 6.4. B circumferential measurements are equal and no obvious swelling is present today. Pt is doing well with exercise program. Suggested hand/ strengthening exercises and consideration of aquatic exercise program to address joint pain and strength deficits. Pt remains appropriate for continued skilled PT services to address above impairments and continue to monitor bioimpedance.  -LB     Rehab Potential Good  -LB     Patient/caregiver participated in establishment of treatment plan and goals Yes  -LB     Patient would benefit from skilled therapy intervention Yes  -LB        PT Plan    PT Frequency Other (comment)  -LB     Predicted Duration of Therapy Intervention (PT) repeat bioimpedance in 3 months  -LB     Planned CPT's? PT EVAL LOW COMPLEXITY: 30946;PT RE-EVAL: 03012;PT THER PROC EA 15 MIN: 79639;PT THER ACT EA 15 MIN: 61916;PT MANUAL THERAPY EA 15 MIN: 81254;PT SELF CARE/HOME MGMT/TRAIN EA 15: 83046;PT BIS XTRACELL FLUID ANALYSIS: 06235  -LB     PT Plan Comments repeat bioimpedance, if stable reduce frequency, joint pain  -LB           User Key  (r) = Recorded By, (t) = Taken By, (c) = Cosigned By    Initials Name Provider Type    Carissa Steel, PT Physical Therapist                   Outcome Measure Options: Quick DASH  Quick DASH  Open a tight or new jar.: Moderate Difficulty  Do heavy household chores (e.g., wash walls, wash floors): Mild Difficulty  Carry a shopping bag or briefcase: Mild Difficulty  Wash your back: No Difficulty  Use a knife to cut food: Mild Difficulty  Recreational activities in which you take some force or impact  through your arm, should or hand (e.g. golf, hammering, tennis, etc.): Moderate Difficulty  During the past week, to what extent has your arm, shoulder, or hand problem interfered with your normal social activites with family, friends, neighbors or groups?: Not at all  During the past week, were you limited in your work or other regular daily activities as a result of your arm, shoulder or hand problem?: Moderately Limited  Arm, Shoulder, or hand pain: Moderate  Tingling (pins and needles) in your arm, shoulder, or hand: Mild  During the past week, how much difficulty have you had sleeping because of the pain in your arm, shoulder or hand?: Mild Difficulty  Number of Questions Answered: 11  Quick DASH Score: 29.55         Time Calculation:   Start Time: 0830  Stop Time: 0915  Time Calculation (min): 45 min  Total Timed Code Minutes- PT: 15 minute(s)  Timed Charges  64298 - PT Self Care/Mgmt Minutes: 15  Total Minutes  Timed Charges Total Minutes: 15   Total Minutes: 15   Therapy Charges for Today     Code Description Service Date Service Provider Modifiers Qty    90674995893 HC PT BIS XTRACELL FLUID ANALYSIS 2/22/2023 Carissa Funes, PT  1    14457848174 HC PT SELF CARE/MGMT/TRAIN EA 15 MIN 2/22/2023 Carissa Funes, PT GP 1    84624157123 HC PT RE-EVAL ESTABLISHED PLAN 2 2/22/2023 Carissa Funes, PT GP 1          PT G-Codes  Outcome Measure Options: Quick DASH  Quick DASH Score: 29.55         Carissa Funes PT  2/22/2023

## 2023-02-22 NOTE — TELEPHONE ENCOUNTER
Called patient and relayed message to stop Femara for two week. Set reminder to call the patient pack in 2 weeks. If symptoms have improved we will start on Arimidex 1m/daily. Pt v/u. Chelsea Abbott RN

## 2023-02-27 ENCOUNTER — TELEPHONE (OUTPATIENT)
Dept: SURGERY | Facility: CLINIC | Age: 38
End: 2023-02-27
Payer: COMMERCIAL

## 2023-02-27 ENCOUNTER — INFUSION (OUTPATIENT)
Dept: ONCOLOGY | Facility: HOSPITAL | Age: 38
End: 2023-02-27
Payer: COMMERCIAL

## 2023-02-27 DIAGNOSIS — Z45.2 FITTING AND ADJUSTMENT OF VASCULAR CATHETER: Primary | ICD-10-CM

## 2023-02-27 PROCEDURE — 25010000002 HEPARIN LOCK FLUSH PER 10 UNITS: Performed by: INTERNAL MEDICINE

## 2023-02-27 PROCEDURE — 96523 IRRIG DRUG DELIVERY DEVICE: CPT

## 2023-02-27 RX ORDER — HEPARIN SODIUM (PORCINE) LOCK FLUSH IV SOLN 100 UNIT/ML 100 UNIT/ML
500 SOLUTION INTRAVENOUS AS NEEDED
Status: DISCONTINUED | OUTPATIENT
Start: 2023-02-27 | End: 2023-02-27 | Stop reason: HOSPADM

## 2023-02-27 RX ORDER — SODIUM CHLORIDE 0.9 % (FLUSH) 0.9 %
10 SYRINGE (ML) INJECTION AS NEEDED
Status: CANCELLED | OUTPATIENT
Start: 2023-02-27

## 2023-02-27 RX ORDER — SODIUM CHLORIDE 0.9 % (FLUSH) 0.9 %
10 SYRINGE (ML) INJECTION AS NEEDED
Status: DISCONTINUED | OUTPATIENT
Start: 2023-02-27 | End: 2023-02-27 | Stop reason: HOSPADM

## 2023-02-27 RX ORDER — HEPARIN SODIUM (PORCINE) LOCK FLUSH IV SOLN 100 UNIT/ML 100 UNIT/ML
500 SOLUTION INTRAVENOUS AS NEEDED
Status: CANCELLED | OUTPATIENT
Start: 2023-02-27

## 2023-02-27 RX ADMIN — Medication 500 UNITS: at 14:16

## 2023-02-27 RX ADMIN — Medication 10 ML: at 14:16

## 2023-02-27 NOTE — TELEPHONE ENCOUNTER
[facility] 20, 2023 right screening mammogram with tomosynthesis.  The breast is extremely dense.  Stable 12 mm mass right breast 3:00 consistent with hyalinized fibroadenoma BI-RADS 2

## 2023-03-02 ENCOUNTER — TELEPHONE (OUTPATIENT)
Dept: SURGERY | Facility: CLINIC | Age: 38
End: 2023-03-02
Payer: COMMERCIAL

## 2023-03-02 NOTE — TELEPHONE ENCOUNTER
Patient's apt with Dr. Escudero scheduled 3/20/23 had to be moved due to a scheduling conflict. This apt was changed to 4/20/23

## 2023-03-08 ENCOUNTER — TELEPHONE (OUTPATIENT)
Dept: ONCOLOGY | Facility: CLINIC | Age: 38
End: 2023-03-08
Payer: COMMERCIAL

## 2023-03-08 NOTE — TELEPHONE ENCOUNTER
Patient has been having joint pain and swelling in hands. Patient reports it is less than when taking letrozole, but still present. Taking Aleve still about once/day. Will discuss with Dr. Palacios. Chelsea Abbott RN

## 2023-03-09 RX ORDER — ANASTROZOLE 1 MG/1
1 TABLET ORAL DAILY
Qty: 30 TABLET | Refills: 2 | Status: SHIPPED | OUTPATIENT
Start: 2023-03-09

## 2023-03-09 NOTE — TELEPHONE ENCOUNTER
Discussed w/ Dr. Palacios and relayed message to patient. She will start on Arimidex 1mg/day. Script sent. Med list updated. Pt v/u. Chelsea Abbott RN

## 2023-04-03 ENCOUNTER — OFFICE VISIT (OUTPATIENT)
Dept: ONCOLOGY | Facility: CLINIC | Age: 38
End: 2023-04-03
Payer: COMMERCIAL

## 2023-04-03 ENCOUNTER — TELEPHONE (OUTPATIENT)
Dept: SURGERY | Facility: CLINIC | Age: 38
End: 2023-04-03
Payer: COMMERCIAL

## 2023-04-03 ENCOUNTER — HOSPITAL ENCOUNTER (OUTPATIENT)
Dept: GENERAL RADIOLOGY | Facility: HOSPITAL | Age: 38
Discharge: HOME | End: 2023-04-03
Admitting: INTERNAL MEDICINE
Payer: COMMERCIAL

## 2023-04-03 ENCOUNTER — TELEPHONE (OUTPATIENT)
Dept: ONCOLOGY | Facility: CLINIC | Age: 38
End: 2023-04-03
Payer: COMMERCIAL

## 2023-04-03 ENCOUNTER — INFUSION (OUTPATIENT)
Dept: ONCOLOGY | Facility: HOSPITAL | Age: 38
End: 2023-04-03
Payer: COMMERCIAL

## 2023-04-03 VITALS
TEMPERATURE: 97.1 F | OXYGEN SATURATION: 100 % | BODY MASS INDEX: 22.26 KG/M2 | RESPIRATION RATE: 18 BRPM | HEIGHT: 62 IN | DIASTOLIC BLOOD PRESSURE: 65 MMHG | HEART RATE: 74 BPM | SYSTOLIC BLOOD PRESSURE: 96 MMHG | WEIGHT: 121 LBS

## 2023-04-03 DIAGNOSIS — Z17.0 MALIGNANT NEOPLASM OF UPPER-OUTER QUADRANT OF LEFT BREAST IN FEMALE, ESTROGEN RECEPTOR POSITIVE: ICD-10-CM

## 2023-04-03 DIAGNOSIS — M65.4 TENOSYNOVITIS, DE QUERVAIN: ICD-10-CM

## 2023-04-03 DIAGNOSIS — T45.1X5A NEUROPATHY DUE TO CHEMOTHERAPEUTIC DRUG: ICD-10-CM

## 2023-04-03 DIAGNOSIS — M25.541 ARTHRALGIA OF BOTH HANDS: ICD-10-CM

## 2023-04-03 DIAGNOSIS — G62.0 NEUROPATHY DUE TO CHEMOTHERAPEUTIC DRUG: ICD-10-CM

## 2023-04-03 DIAGNOSIS — D61.810 PANCYTOPENIA DUE TO ANTINEOPLASTIC CHEMOTHERAPY: ICD-10-CM

## 2023-04-03 DIAGNOSIS — E89.40 SURGICAL MENOPAUSE: ICD-10-CM

## 2023-04-03 DIAGNOSIS — K21.00 PEPTIC REFLUX ESOPHAGITIS: ICD-10-CM

## 2023-04-03 DIAGNOSIS — Z17.0 MALIGNANT NEOPLASM OF UPPER-OUTER QUADRANT OF LEFT BREAST IN FEMALE, ESTROGEN RECEPTOR POSITIVE: Primary | ICD-10-CM

## 2023-04-03 DIAGNOSIS — M19.041 PRIMARY OSTEOARTHRITIS OF BOTH HANDS: ICD-10-CM

## 2023-04-03 DIAGNOSIS — Z45.2 FITTING AND ADJUSTMENT OF VASCULAR CATHETER: Primary | ICD-10-CM

## 2023-04-03 DIAGNOSIS — C50.412 MALIGNANT NEOPLASM OF UPPER-OUTER QUADRANT OF LEFT BREAST IN FEMALE, ESTROGEN RECEPTOR POSITIVE: ICD-10-CM

## 2023-04-03 DIAGNOSIS — M25.60 MORNING STIFFNESS OF JOINTS: ICD-10-CM

## 2023-04-03 DIAGNOSIS — T45.1X5A PANCYTOPENIA DUE TO ANTINEOPLASTIC CHEMOTHERAPY: ICD-10-CM

## 2023-04-03 DIAGNOSIS — M25.542 ARTHRALGIA OF BOTH HANDS: ICD-10-CM

## 2023-04-03 DIAGNOSIS — C50.412 MALIGNANT NEOPLASM OF UPPER-OUTER QUADRANT OF LEFT BREAST IN FEMALE, ESTROGEN RECEPTOR POSITIVE: Primary | ICD-10-CM

## 2023-04-03 DIAGNOSIS — E53.8 B12 DEFICIENCY: ICD-10-CM

## 2023-04-03 DIAGNOSIS — M19.042 PRIMARY OSTEOARTHRITIS OF BOTH HANDS: ICD-10-CM

## 2023-04-03 DIAGNOSIS — Z45.2 FITTING AND ADJUSTMENT OF VASCULAR CATHETER: ICD-10-CM

## 2023-04-03 PROBLEM — D64.81 ANEMIA ASSOCIATED WITH CHEMOTHERAPY: Status: RESOLVED | Noted: 2022-05-28 | Resolved: 2023-04-03

## 2023-04-03 LAB
25(OH)D3 SERPL-MCNC: 23.2 NG/ML (ref 30–100)
ALBUMIN SERPL-MCNC: 4.4 G/DL (ref 3.5–5.2)
ALBUMIN/GLOB SERPL: 1.6 G/DL (ref 1.1–2.4)
ALP SERPL-CCNC: 104 U/L (ref 38–116)
ALT SERPL W P-5'-P-CCNC: 19 U/L (ref 0–33)
ANION GAP SERPL CALCULATED.3IONS-SCNC: 11.7 MMOL/L (ref 5–15)
AST SERPL-CCNC: 26 U/L (ref 0–32)
BASOPHILS # BLD AUTO: 0.02 10*3/MM3 (ref 0–0.2)
BASOPHILS NFR BLD AUTO: 0.5 % (ref 0–1.5)
BILIRUB SERPL-MCNC: 0.3 MG/DL (ref 0.2–1.2)
BUN SERPL-MCNC: 17 MG/DL (ref 6–20)
BUN/CREAT SERPL: 28.3 (ref 7.3–30)
CALCIUM SPEC-SCNC: 9.7 MG/DL (ref 8.5–10.2)
CHLORIDE SERPL-SCNC: 105 MMOL/L (ref 98–107)
CHROMATIN AB SERPL-ACNC: <10 IU/ML (ref 0–14)
CO2 SERPL-SCNC: 25.3 MMOL/L (ref 22–29)
CREAT SERPL-MCNC: 0.6 MG/DL (ref 0.6–1.1)
CRP SERPL-MCNC: <0.3 MG/DL (ref 0–0.5)
DEPRECATED RDW RBC AUTO: 39 FL (ref 37–54)
EGFRCR SERPLBLD CKD-EPI 2021: 117.3 ML/MIN/1.73
EOSINOPHIL # BLD AUTO: 0.43 10*3/MM3 (ref 0–0.4)
EOSINOPHIL NFR BLD AUTO: 11.2 % (ref 0.3–6.2)
ERYTHROCYTE [DISTWIDTH] IN BLOOD BY AUTOMATED COUNT: 12.1 % (ref 12.3–15.4)
ERYTHROCYTE [SEDIMENTATION RATE] IN BLOOD: 16 MM/HR (ref 0–20)
GLOBULIN UR ELPH-MCNC: 2.8 GM/DL (ref 1.8–3.5)
GLUCOSE SERPL-MCNC: 141 MG/DL (ref 74–124)
HCT VFR BLD AUTO: 35 % (ref 34–46.6)
HGB BLD-MCNC: 11.9 G/DL (ref 12–15.9)
IMM GRANULOCYTES # BLD AUTO: 0.01 10*3/MM3 (ref 0–0.05)
IMM GRANULOCYTES NFR BLD AUTO: 0.3 % (ref 0–0.5)
LYMPHOCYTES # BLD AUTO: 0.99 10*3/MM3 (ref 0.7–3.1)
LYMPHOCYTES NFR BLD AUTO: 25.7 % (ref 19.6–45.3)
MCH RBC QN AUTO: 29.7 PG (ref 26.6–33)
MCHC RBC AUTO-ENTMCNC: 34 G/DL (ref 31.5–35.7)
MCV RBC AUTO: 87.3 FL (ref 79–97)
MONOCYTES # BLD AUTO: 0.26 10*3/MM3 (ref 0.1–0.9)
MONOCYTES NFR BLD AUTO: 6.8 % (ref 5–12)
NEUTROPHILS NFR BLD AUTO: 2.14 10*3/MM3 (ref 1.7–7)
NEUTROPHILS NFR BLD AUTO: 55.5 % (ref 42.7–76)
NRBC BLD AUTO-RTO: 0 /100 WBC (ref 0–0.2)
PLATELET # BLD AUTO: 162 10*3/MM3 (ref 140–450)
PMV BLD AUTO: 9.9 FL (ref 6–12)
POTASSIUM SERPL-SCNC: 3.9 MMOL/L (ref 3.5–4.7)
PROT SERPL-MCNC: 7.2 G/DL (ref 6.3–8)
RBC # BLD AUTO: 4.01 10*6/MM3 (ref 3.77–5.28)
SODIUM SERPL-SCNC: 142 MMOL/L (ref 134–145)
WBC NRBC COR # BLD: 3.85 10*3/MM3 (ref 3.4–10.8)

## 2023-04-03 PROCEDURE — 25010000002 HEPARIN LOCK FLUSH PER 10 UNITS: Performed by: INTERNAL MEDICINE

## 2023-04-03 PROCEDURE — 99215 OFFICE O/P EST HI 40 MIN: CPT | Performed by: INTERNAL MEDICINE

## 2023-04-03 PROCEDURE — 82306 VITAMIN D 25 HYDROXY: CPT | Performed by: INTERNAL MEDICINE

## 2023-04-03 PROCEDURE — 86431 RHEUMATOID FACTOR QUANT: CPT | Performed by: INTERNAL MEDICINE

## 2023-04-03 PROCEDURE — 36591 DRAW BLOOD OFF VENOUS DEVICE: CPT

## 2023-04-03 PROCEDURE — 73130 X-RAY EXAM OF HAND: CPT

## 2023-04-03 PROCEDURE — 85025 COMPLETE CBC W/AUTO DIFF WBC: CPT

## 2023-04-03 PROCEDURE — 36415 COLL VENOUS BLD VENIPUNCTURE: CPT

## 2023-04-03 PROCEDURE — 85652 RBC SED RATE AUTOMATED: CPT | Performed by: INTERNAL MEDICINE

## 2023-04-03 PROCEDURE — 80053 COMPREHEN METABOLIC PANEL: CPT

## 2023-04-03 PROCEDURE — 86140 C-REACTIVE PROTEIN: CPT | Performed by: INTERNAL MEDICINE

## 2023-04-03 RX ORDER — SODIUM CHLORIDE 0.9 % (FLUSH) 0.9 %
10 SYRINGE (ML) INJECTION AS NEEDED
OUTPATIENT
Start: 2023-04-03

## 2023-04-03 RX ORDER — HEPARIN SODIUM (PORCINE) LOCK FLUSH IV SOLN 100 UNIT/ML 100 UNIT/ML
500 SOLUTION INTRAVENOUS AS NEEDED
Status: DISCONTINUED | OUTPATIENT
Start: 2023-04-03 | End: 2023-04-03 | Stop reason: HOSPADM

## 2023-04-03 RX ORDER — HEPARIN SODIUM (PORCINE) LOCK FLUSH IV SOLN 100 UNIT/ML 100 UNIT/ML
500 SOLUTION INTRAVENOUS AS NEEDED
OUTPATIENT
Start: 2023-04-03

## 2023-04-03 RX ORDER — SODIUM CHLORIDE 0.9 % (FLUSH) 0.9 %
10 SYRINGE (ML) INJECTION AS NEEDED
Status: DISCONTINUED | OUTPATIENT
Start: 2023-04-03 | End: 2023-04-03 | Stop reason: HOSPADM

## 2023-04-03 RX ADMIN — Medication 10 ML: at 10:08

## 2023-04-03 RX ADMIN — Medication 500 UNITS: at 10:08

## 2023-04-03 NOTE — TELEPHONE ENCOUNTER
Left message for pt to call back to ask if she would like to move up her appt with Dr. Escudero. She is currently scheduled on 04/21/2023 we can move her to 04/05/2023 if she would like.

## 2023-04-03 NOTE — PROGRESS NOTES
Subjective       DURING THE VISIT WITH THE PATIENT TODAY , PATIENT HAD FACE MASK, MY MEDICAL ASSISTANT AND I  HAD PROPPER PROTECTIVE EQUIPMENT, AND I DID HAND HYGIENE WITH SOAP AND WATER BEFORE AND AFTER THE VISIT.     REASON FOR FOLLOW UP:  LOBULAR CARCINOMA OF THE LEFT BREAST, 70 MM IN SIZE, MARGINS OF RESECTION NEGATIVE AFTER MASTECTOMY, GRADE 2, ER POSITIVE GA POSITIVE, HER 2 NEGATIVE BY IHC,KI 67 39% ,ONE POSITIVE AXILLARY LYMPH NODE OUT OF 4 NODES REMOVAL, PREMENOPAUSAL .INVITAE PANEL NEGATIVE.ONCOTYPE 18: chemotherapy AC X 4 AND TAXOL X 6, STOPPED BECAUSE SEVERE NEUROPATHY IN FEET AND HANDS. Oophorectomy in 11/22, FEMARA INITIATED AT THAT TIME. COMPLETED RADIATION THERAPY AFTER TAXOL COMPLETION.        HISTORY OF PRESENT ILLNESS:      On 04/03/2023 this 38-year-old  female returns to the office for follow up in regard to her previous history of left breast cancer status post adjuvant therapy and now undergoing adjuvant aromatase inhibitor after she was placed in surgical menopause as per my request. During the previous visit the patient in 01/2023 was having significant difficulty with joint pain in her hands and Femara was discontinued and she had a time out of Femara utilization with some improvement in her joint symptoms and she was placed on Arimidex 1 mg a day. Today she continues complaining of pain in her hands especially in the thumb in the DIP joint, PIP joint and also in the metacarpophalangeal joint in both of them. She has significant morning stiffness for her hands and most of the pain also is happening in the distal interphalangeal joints with sensation of fullness with no local inflammation. The use of the right hand has become compromised to the point that she is barely able to trim her nails on her right hand because she is not able to use the clipper appropriately. She also has occasional pain in the knees when she is doing yoga exercises to the point that she has pain trying to  standup and move. She has occasional pain in her shoulders, no pain in her elbows, no pain in her hips, no pain in her ankles. She has not developed any inflammatory symptoms in either hand. The morning stiffness typically lasts for 2-3 hours and is relentless. She also has pain at nighttime. She is taking some Aleve at nighttime 1 tablet that gives her mild degree of benefit in the morning. Besides this she has gained some weight, she has modified her diet now to try to have more vegetables, more fruit and less carbohydrate consumption. She denies any cough, sputum production or shortness of breath, she has proper bowel activity, proper urination. Obviously she has no menstruation anymore. She is taking her Arimidex 1 mg a day but she is worried about this medicine also producing some of the symptoms related to her arthritic manifestations described above. Furthermore I questioned if she has any family history of arthritis, osteoarthritis, rheumatoid arthritis or lupus, none of those things call her attention.              ONCOLOGIC HISTORY:  The patient is a 38 y.o. year old female who is here for an opinion about the above issue.  I had the opportunity to see this delightful [place] female, 37 years old, mother of 2, who is premenopausal who has found abnormalities in the left breast since 12/2021, having sensation of fullness in the breast and discomfort and occasional pain. After this complaint she was seen by her primary physician and she initiated a process of mammograms and ultrasounds that culminated with the diagnosis of lobular carcinoma of the breast, invasive, 7 cm in size. The patient completed a mastectomy and sentinel lymph nodes almost 3 weeks ago and she is here for review and advice in regard to adjuvant therapy. The patient is still having pain at the surgical site, especially since yesterday she had injection of saline in the expander that was placed after the mastectomy. She also has  neuropathic pain in the inner aspect of the left arm that is bothering her a lot through the day and sometimes almost driving her crazy. She has no motor deficit into the left upper extremity. She denies any fevers or chills or bone pain. Her appetite is acceptable but she has changed her diet and she is almost going into vegetarian with the exception of some fish. She has eliminated all the dairy products and she has eliminated mostly carbohydrates as well. She has lost some weight. The patient otherwise feels well. She has normal bowel activity, normal urination. No skeletal pain or joint pain. No cough, sputum production, shortness of breath or palpitations. She is extremely anxious about this visit today. She is in company of her .    • The patient returned to the office on 05/20/2022 in preparation for initiation of chemotherapy. In the meantime she has undergone a port placement in right subclavian position with no difficulties and an echocardiogram that will be discussed below. The patient has had very significant upper respiratory allergies. She has been tested this week for COVID being negative. She is not running any fever. Most of the symptoms in the respiratory tract include sneezing, itching, itchy eyes and rhinorrhea. No sore throat, no alteration in taste or smell. No cough, shortness of breath, pleuritic pain or hemoptysis.     The surgical site from the left breast expander is not painful. She has healed extremely well. The same is applicable to her port that was placed a week ago.  • The patient returned to the office on 05/26/2022 for an interim assessment. Since the chemotherapy administration last week the patient had persistent nausea and vomiting at least for 4 days to the point that she spent most of the time sleeping because of the effect of the Zyprexa but no resolution of her nausea and vomiting. She has vomited so much that now she has significant esophagitis with heartburn and  indigestion. She has not had any hematemesis or melena. She just started to eat yesterday rice and potatoes. She was able to handle this and she has been able to handle liquids. She also has had discoloration of the urine that originally was pink and subsequently now has normalized. She had no burning sensation upon urination or vaginal bleeding. She has been very fatigued and tired. On top of that Neulasta triggers significant pain in the cranium, in the pelvic bone and in her femurs and she has the feeling that she has the flu. Again, she has not had any fever. Today is the best day that she has had since the chemotherapy administration. Obviously given the symptoms and this toxicity assessment, the patient will require radical change in her treatment as will be described below.  On 06/23/2022, the patient returns after she has completed 2 cycles of AC. The first cycle was dramatically cumbersome because of the tremendous amount of side effects including persistent nausea and vomiting after chemotherapy that required IV fluids and modification in her nausea regimen. She also developed neutropenic fever and ended up in the hospital. After 2nd cycle with dose adjustment the patient handled the treatment much better and the miracle medicine in regard to nausea control was Ativan. Phenergan and Compazine triggered tremendous degree of confusion. Therefore, for the subsequent cycle 3 and 4, Ativan will be the main medicine to utilize. Zofran also will be utilized.     Physically the patient feels much better today. The patient has had appetite to eat. The heartburn and indigestion have mostly subsided. Her nutrition and hydration are much better. She is keeping her weight and she has no pain. Bowel activity with occasional constipation. Urination is normal. No cardiovascular or respiratory issues. Energy level much better.     She had significant pain with Neulasta use 4 days after the treatment requiring Zyrtec and  significant Tylenol utilization.  •   This patient has been seen by video medicine visit on 09/12/2022 in regard to continue her chemotherapy administration in the adjuvant therapy of her breast cancer on the left. The patient at this time is undergoing weekly Taxol. Unfortunately she has developed further peripheral neuropathy in the tips of her fingers and her toes to the point that she feels sensation of itching more than pain all of the time and this is disrupting her life and her functionality very substantially. She has to use ice that gives her some relief of the symptom at least 4-6 times a day. It takes at least 10 minutes for the symptoms to improve but they come back in a question of hours. The patient is not having any difficulty with ambulation but the more walking that she does the more discomfort that she develops. The same is applicable to her hands. Her appetite has improved, she has gained weight, she has some degree of constipation associated with chemotherapy administration relieved with flax seed oil. Her bowel function now is normal, urination is normal. She has not had any menstrual flow since 06/2022. She has not had any nausea or vomiting. No cardiovascular or respiratory issues.     The patient otherwise has not had any new problems. No fever or infections. She has had resolution of her epistaxis.   Surgical menopause with bilateral oophorectomy on 11/28/2022. Patient initiated Femara on 12/16/2022 for a total of 5 years. Baseline echocardiogram after completion of chemotherapy disclosed normal ejection fraction. We will proceed with radiological assessment of her chest, abdomen and pelvis baseline for the future and eventually she will proceed also with bone density test.    •   •   ·     Past Medical History:   Diagnosis Date   • Anemia    • Anesthesia complication     hypotension with last C section   • Breast cancer 03/2022    left sided, stage 3, ER/WV+, Her2-, s/p mastectomy, LAST  CHEMO 10/3/22, CURRENTLY RECEIVING RADIATION   • History of COVID-19     2021 AND 2022   • Hyperlipidemia    • PONV (postoperative nausea and vomiting)    • Seasonal allergies         Past Surgical History:   Procedure Laterality Date   • APPENDECTOMY     • BREAST BIOPSY Left    • BREAST RECONSTRUCTION Left 2022    Procedure: LEFT BREAST 1ST STAGE RECONSTRUCTION WITH INSERTION OF TISSUE EXPANDER AND BIOLOGIC;  Surgeon: Hermelinda Johnson MD;  Location: [facility] OR;  Service: Plastics;  Laterality: Left;   •  SECTION     •  SECTION N/A 12/10/2018    Procedure:  SECTION REPEAT;  Surgeon: Heidy Gutierrez MD;  Location: [facility] LABOR DELIVERY;  Service: Obstetrics/Gynecology   • D & C HYSTEROSCOPY N/A 2021    Procedure: HYSTEROSCOPY REMOVAL INTRAUTERINE DEVICE WILL NEED ULTRASOUND IN ROOM;  Surgeon: Heidy Gutierrez MD;  Location: [facility] OR OSC;  Service: Obstetrics/Gynecology;  Laterality: N/A;   • DIAGNOSTIC LAPAROSCOPY Bilateral 2022    Procedure: DAVINCI ASSISTED BILATERAL SALPINGO-OOPHORECTOMY;  Surgeon: Heidy Gutierrez MD;  Location: [facility] OR;  Service: Robotics - DaVinci;  Laterality: Bilateral;   • KNEE ARTHROSCOPY Right    • MASTECTOMY W/ SENTINEL NODE BIOPSY Left 2022    Procedure: left total mastectomy with left axillary sentinel lymph node biopsy;  Surgeon: Monisha Escudero MD;  Location: [facility] OR;  Service: General;  Laterality: Left;   • VENOUS ACCESS DEVICE (PORT) INSERTION Right 2022    Procedure: right port placement;  Surgeon: Monisha Escudero MD;  Location: [facility] OR;  Service: General;  Laterality: Right;        Current Outpatient Medications on File Prior to Visit   Medication Sig Dispense Refill   • anastrozole (Arimidex) 1 MG tablet Take 1 tablet by mouth Daily. 30 tablet 2   • atorvastatin (LIPITOR) 10 MG tablet Take 1 tablet by mouth Every Night.     • B Complex Vitamins (Vitamin-B Complex)  "tablet Take 1 tablet by mouth Daily.  0   • Cetirizine HCl (ZYRTEC PO) Take 10 mg by mouth As Needed.     • nystatin-diphenhydrAMINE-Lidocaine Viscous HCl in aluminum-magnesium hydroxide-simethicone suspension Swish and swallow 5 mL 4 (Four) Times a Day before meals and at bedtime. Use 10-15 minutes before eating or drinking 410 mL 1   • omeprazole (priLOSEC) 20 MG capsule Take 1 capsule by mouth 2 (Two) Times a Day. 60 capsule 3   • prochlorperazine (COMPAZINE) 10 MG tablet Take 1 tablet by mouth Every 6 (Six) Hours As Needed for Nausea or Vomiting. 30 tablet 5   • [DISCONTINUED] acetaminophen (TYLENOL) 325 MG tablet Take 2 tablets by mouth Every 6 (Six) Hours As Needed for Mild Pain.     • [DISCONTINUED] oxyCODONE-acetaminophen (Percocet) 5-325 MG per tablet Take 1 tablet by mouth Every 6 (Six) Hours As Needed for Moderate Pain. 30 tablet 0     No current facility-administered medications on file prior to visit.        ALLERGIES:    Allergies   Allergen Reactions   • Asa [Aspirin] Anaphylaxis and Shortness Of Breath   • Adhesive Tape Dermatitis     Paper tape okay   • Iopamidol Hives     AKA IV CONTRAST   • Ct Contrast Rash     Pt HAD A RASH AND COUGHING        Social History     Socioeconomic History   • Marital status:    Tobacco Use   • Smoking status: Never   • Smokeless tobacco: Never   Vaping Use   • Vaping Use: Never used   Substance and Sexual Activity   • Alcohol use: Not Currently     Alcohol/week: 5.0 standard drinks     Types: 5 Shots of liquor per week   • Drug use: Never   • Sexual activity: Defer        Family History   Problem Relation Age of Onset   • Melanoma Paternal Aunt    • Diabetes Other    • Malig Hyperthermia Neg Hx           Objective     Vitals:    04/03/23 1018   BP: 96/65   Pulse: 74   Resp: 18   Temp: 97.1 °F (36.2 °C)   TempSrc: Temporal   SpO2: 100%   Weight: 54.9 kg (121 lb)   Height: 157.5 cm (62.01\")   PainSc:   4   PainLoc: Hand  Comment: Pt has pain in her arms, hands " and knees     Current Status 4/3/2023   ECOG score 0       Physical Exam              GENERAL:  Well-developed, Patient  in no acute distress.   SKIN:  Warm, dry ,NO purpura ,no rash.  HEENT:  Pupils were equal and reactive to light and accomodation, conjunctivae noninjected,  normal visual acuity.   NECK:  Supple with good range of motion; no thyromegaly , no JVD or bruits,.No carotid artery pain, no carotid abnormal pulsation   LYMPHATICS:  No cervical, NO supraclavicular, NO axillary, NO inguinal adenopathies.  CARDIAC   normal rate , regular rhythm, without murmur,NO rubs NO S3 NO S4   LUNGS: normal breath sounds bilateral, no wheezing, NO rhonchi, NO crackles ,NO rubs.  VASCULAR VENOUS: no cyanosis, NO collateral circulation, NO varicosities, NO edema, NO palpable cords, NO pain,NO erythema, NO pigmentation of the skin.  ABDOMEN:  Soft, NO pain,no hepatomegaly, no splenomegaly,no masses, no ascites, no collateral circulation,no distention.  EXTREMITIES  AND SPINE:  No clubbing, no cyanosis .No kyphosis,  no pain in spine, no pain in ribs , no pain in pelvic bone.Examining both hands she has sensation of stiffness in both of them with no inflammation but she has obvious osteoarthritic deformities in the first MP joints in the thumbs and also some deformities osteoarthritic in the DIP in both thumbs. She has suggestion of either trigger finger in the left thumb. The rest of the joints have minimal osteoarthritic deformities. Most of the pain is in the distal interphalangeal joints. Also it is very obvious that the patient has a deQuervain tendonitis in the right hand. The typical maneuvers for this were positive. She has no evidence of carpal tunnel. Sensory modalities were normal, strength and  were decreased in both hands. She had no tenderness in the shoulders, elbows, knees, hips, spine or ankles.       NEUROLOGICAL:  Patient was awake, alert, oriented to time, person and place.                RECENT  LABS:  Hematology WBC   Date Value Ref Range Status   04/03/2023 3.85 3.40 - 10.80 10*3/mm3 Final     RBC   Date Value Ref Range Status   04/03/2023 4.01 3.77 - 5.28 10*6/mm3 Final     Hemoglobin   Date Value Ref Range Status   04/03/2023 11.9 (L) 12.0 - 15.9 g/dL Final     Hematocrit   Date Value Ref Range Status   04/03/2023 35.0 34.0 - 46.6 % Final     Platelets   Date Value Ref Range Status   04/03/2023 162 140 - 450 10*3/mm3 Final       CBC:    WBC   Date Value Ref Range Status   04/03/2023 3.85 3.40 - 10.80 10*3/mm3 Final     RBC   Date Value Ref Range Status   04/03/2023 4.01 3.77 - 5.28 10*6/mm3 Final     Hemoglobin   Date Value Ref Range Status   04/03/2023 11.9 (L) 12.0 - 15.9 g/dL Final     Hematocrit   Date Value Ref Range Status   04/03/2023 35.0 34.0 - 46.6 % Final     MCV   Date Value Ref Range Status   04/03/2023 87.3 79.0 - 97.0 fL Final     MCH   Date Value Ref Range Status   04/03/2023 29.7 26.6 - 33.0 pg Final     MCHC   Date Value Ref Range Status   04/03/2023 34.0 31.5 - 35.7 g/dL Final     RDW   Date Value Ref Range Status   04/03/2023 12.1 (L) 12.3 - 15.4 % Final     RDW-SD   Date Value Ref Range Status   04/03/2023 39.0 37.0 - 54.0 fl Final     MPV   Date Value Ref Range Status   04/03/2023 9.9 6.0 - 12.0 fL Final     Platelets   Date Value Ref Range Status   04/03/2023 162 140 - 450 10*3/mm3 Final     Neutrophil %   Date Value Ref Range Status   04/03/2023 55.5 42.7 - 76.0 % Final     Lymphocyte %   Date Value Ref Range Status   04/03/2023 25.7 19.6 - 45.3 % Final     Monocyte %   Date Value Ref Range Status   04/03/2023 6.8 5.0 - 12.0 % Final     Eosinophil %   Date Value Ref Range Status   04/03/2023 11.2 (H) 0.3 - 6.2 % Final     Basophil %   Date Value Ref Range Status   04/03/2023 0.5 0.0 - 1.5 % Final     Immature Grans %   Date Value Ref Range Status   04/03/2023 0.3 0.0 - 0.5 % Final     Neutrophils, Absolute   Date Value Ref Range Status   04/03/2023 2.14 1.70 - 7.00 10*3/mm3 Final      Lymphocytes, Absolute   Date Value Ref Range Status   04/03/2023 0.99 0.70 - 3.10 10*3/mm3 Final     Monocytes, Absolute   Date Value Ref Range Status   04/03/2023 0.26 0.10 - 0.90 10*3/mm3 Final     Eosinophils, Absolute   Date Value Ref Range Status   04/03/2023 0.43 (H) 0.00 - 0.40 10*3/mm3 Final     Basophils, Absolute   Date Value Ref Range Status   04/03/2023 0.02 0.00 - 0.20 10*3/mm3 Final     Immature Grans, Absolute   Date Value Ref Range Status   04/03/2023 0.01 0.00 - 0.05 10*3/mm3 Final     nRBC   Date Value Ref Range Status   04/03/2023 0.0 0.0 - 0.2 /100 WBC Final        CMP:    Glucose   Date Value Ref Range Status   04/03/2023 141 (H) 74 - 124 mg/dL Final     BUN   Date Value Ref Range Status   04/03/2023 17 6 - 20 mg/dL Final     Creatinine   Date Value Ref Range Status   04/03/2023 0.60 0.60 - 1.10 mg/dL Final     Sodium   Date Value Ref Range Status   04/03/2023 142 134 - 145 mmol/L Final     Potassium   Date Value Ref Range Status   04/03/2023 3.9 3.5 - 4.7 mmol/L Final     Chloride   Date Value Ref Range Status   04/03/2023 105 98 - 107 mmol/L Final     CO2   Date Value Ref Range Status   04/03/2023 25.3 22.0 - 29.0 mmol/L Final     Calcium   Date Value Ref Range Status   04/03/2023 9.7 8.5 - 10.2 mg/dL Final     Total Protein   Date Value Ref Range Status   04/03/2023 7.2 6.3 - 8.0 g/dL Final     Albumin   Date Value Ref Range Status   04/03/2023 4.4 3.5 - 5.2 g/dL Final     ALT (SGPT)   Date Value Ref Range Status   04/03/2023 19 0 - 33 U/L Final     AST (SGOT)   Date Value Ref Range Status   04/03/2023 26 0 - 32 U/L Final     Alkaline Phosphatase   Date Value Ref Range Status   04/03/2023 104 38 - 116 U/L Final     Total Bilirubin   Date Value Ref Range Status   04/03/2023 0.3 0.2 - 1.2 mg/dL Final     Globulin   Date Value Ref Range Status   04/03/2023 2.8 1.8 - 3.5 gm/dL Final     A/G Ratio   Date Value Ref Range Status   04/03/2023 1.6 1.1 - 2.4 g/dL Final     BUN/Creatinine Ratio    Date Value Ref Range Status   04/03/2023 28.3 7.3 - 30.0 Final     Anion Gap   Date Value Ref Range Status   04/03/2023 11.7 5.0 - 15.0 mmol/L Final                           Assessment & Plan     Diagnoses and all orders for this visit:    1. Malignant neoplasm of upper-outer quadrant of left breast in female, estrogen receptor positive (Primary)  -     CBC & Differential; Future  -     Comprehensive Metabolic Panel; Future  -     Vitamin D 25 Hydroxy; Future  -     Cyclic Citrul Peptide Antibody, IgG / IgA; Future  -     Rheumatoid Factor, Quant; Future  -     MISAEL Comprehensive Panel; Future  -     Sedimentation Rate  -     C-reactive Protein; Future  -     XR Hand 2 View Bilateral; Future    2. Surgical menopause  -     CBC & Differential; Future  -     Comprehensive Metabolic Panel; Future  -     Vitamin D 25 Hydroxy; Future  -     Cyclic Citrul Peptide Antibody, IgG / IgA; Future  -     Rheumatoid Factor, Quant; Future  -     MISAEL Comprehensive Panel; Future  -     Sedimentation Rate  -     C-reactive Protein; Future  -     XR Hand 2 View Bilateral; Future    3. Neuropathy due to chemotherapeutic drug  -     CBC & Differential; Future  -     Comprehensive Metabolic Panel; Future  -     Vitamin D 25 Hydroxy; Future  -     Cyclic Citrul Peptide Antibody, IgG / IgA; Future  -     Rheumatoid Factor, Quant; Future  -     MISAEL Comprehensive Panel; Future  -     Sedimentation Rate  -     C-reactive Protein; Future  -     XR Hand 2 View Bilateral; Future    4. Fitting and adjustment of vascular catheter  -     CBC & Differential; Future  -     Comprehensive Metabolic Panel; Future  -     Vitamin D 25 Hydroxy; Future  -     Cyclic Citrul Peptide Antibody, IgG / IgA; Future  -     Rheumatoid Factor, Quant; Future  -     MISAEL Comprehensive Panel; Future  -     Sedimentation Rate  -     C-reactive Protein; Future  -     XR Hand 2 View Bilateral; Future    5. Arthralgia of both hands  -     CBC & Differential; Future  -      Comprehensive Metabolic Panel; Future  -     Vitamin D 25 Hydroxy; Future  -     Cyclic Citrul Peptide Antibody, IgG / IgA; Future  -     Rheumatoid Factor, Quant; Future  -     MISAEL Comprehensive Panel; Future  -     Sedimentation Rate  -     C-reactive Protein; Future  -     XR Hand 2 View Bilateral; Future    6. Tenosynovitis, de Quervain    7. Primary osteoarthritis of both hands    8. Morning stiffness of joints    9. Peptic reflux esophagitis       In summary this 37-year-old  female originally from [place] noticed abnormalities in the left breast in 12/2021, sensation of pressure, minor pain, sensation of fullness. She looked for help. Further mammogram and ultrasound documented abnormalities in the left breast that were consistent with breast cancer. Since then the patient has undergone breast MRI. Breast biopsies documented invasive lobular carcinoma. All this culminated with a left-sided mastectomy and sentinel lymph node sampling. The pathology has been posted above. She had a lobular carcinoma, grade 2, margins of resection negative, Ki-67 at 39%, ER positive, [place] positive, HER2/leonie negative. She had 1 positive sentinel lymph node for malignancy with no extracapsular invasion. There was no evidence of lymphovascular invasion.     Invitae panel was negative for any genetic alteration.     The patient has had an expander placed at the mastectomy site. She has discomfort and pain after this was instillated with saline solution yesterday.     The other phenomenon that is happening to the patient is significant pain and discomfort in the inner aspect of the left arm. Sounds neuropathic related to her recent sentinel lymph node sampling. The patient is taking Aleve or ibuprofen with not too much benefit.     The patient also has a minimal component of anemia that will require assessment with ferritin, iron, TIBC, B12, folic acid levels.     RECOMMENDATIONS:  I have advised this patient and  today  that she needs to receive adjuvant chemotherapy in the form of AC x4 and subsequently Taxol x12. Oncotype DX in this patient shows intermediate risk of recurrence. Also not only the chemotherapy will be necessary but we need to make her postmenopausal in the next several weeks or months. This will bring in a lot of benefit for her in the long run to minimize any recurrent disease. She is extremely young. She has 2 young children and we have to do as much as we can in this patient to minimize any potentiality for recurrent disease.     I discussed briefly with her side effects of the treatment including alopecia, cardiac toxicity, anemia, leukopenia, thrombocytopenia, mucositis among others and peripheral neuropathy in the Taxol part of the treatment along with leukopenia, anemia, thrombocytopenia and allergic reaction to the medicine. I discussed with her that the whole treatment is going to take at least 24-26 weeks, in other words 6 months, and thereafter the patient will be hopefully postmenopausal. If she does not become postmenopausal she could qualify for a clinical trial or we could ask Dr. Heidy Gutierrez to do bilateral oophorectomy in this patient.     Eventually the patient will require endocrine adjuvant therapy after she becomes postmenopausal hopefully with letrozole.     I discussed all these facts with the patient and her . I spent 1 hour and 30 minutes with her going through the details of all this but she was able to grasp a lot of information. I appreciated highly the visit with her  and I pointed out to them that they need to work as a team. The patient has 2 young children. She is the only one taking care of the kids. The patient's mother is living with her, coming from [place] after all these issues. I asked her to ask her mother to stay for the period of time that she receives chemotherapy. If we need to do any services for her in regard to visa requirements or status, we will  be glad to incorporate our  into this process.     Finally, I discussed with the patient the need for her to proceed with cardiac assessment. I made a referral to be seen by Cardiology and scheduled her to have an echocardiogram and I sent her back to see Dr. Escudero in order to proceed with the placement of a port.     In order to control the pain in the inner aspect of her arm, I asked her to use Voltaren topically 4 times a day. It will be perfectly fine for her to use ibuprofen and I sent Neurontin 100 mg to take at bedtime. Hopefully this will be a transitory issue.     Dr. Johnson eventually will be in charge of deciding the time for the patient to initiate physical therapy for her left shoulder.     I reviewed all the pertinent records on this patient and I posted the information out of the pathology and radiology as above. I discussed the case with my partner, Dr. Landeros, who agrees with my plan of care.    Finally, I also posted to the patient today that will require followup in the future in regard to her right breast not only with mammogram but also ultrasound. I discussed with her the fact that sometimes lobular carcinoma likes to be a bilateral disease. There are minimal abnormalities in the mammogram in the right breast as well as in the MRI. This will require to be followed up in the future. I did not recommend a 2nd mastectomy prophylactically on her even though she asked the question today.    •  The patient was reviewed on 05/20/2022. She has had her port placed in right subclavian position a week ago. She is healing perfectly fine and her surgical site on the left mastectomy and the expander looks perfectly normal with healing. No infection, erythema or discharge.     The patient since then has undergone formal education and consent for chemotherapy administration. Her echocardiogram even though technically difficult documented a normal left ventricular ejection fraction. No  pericardial effusion and no valvular dysfunction.     Her white count, hemoglobin and platelets are normal today.     Her chemistry profile, CA 15-3 as well as ferritin, iron, TIBC, B12 and folic acid levels were all normal.     Therefore under the present circumstances I advised the patient and  the followin. For the treatment of her breast cancer the patient will initiate today chemotherapy with dose dense AC that she will receive every 2 weeks supported with Neulasta. The side effects of this already were discussed with her including nausea, vomiting, alopecia, anemia, leukopenia, thrombocytopenia and cardiac dysfunction among others. The patient was ready to proceed.   2. The patient will be utilizing Neulasta On-body to minimize hematological toxicity and be able to keep up on the schedule in regard to her treatment. I advised her that this can produce significant bone pain in the sternum, in the rib cage, in the pelvic bone and she could use antihistaminic that she is already taking in the first place and also she can use either Aleve or Tylenol or a hot shower if necessary.   3. I encouraged her to remain on her nausea medicine, ondansetron, during the next 3 days to minimize any nausea or vomiting.   4. I advised her to stay away from smelling foods like hot soup or fried foods in the next 3 days given the significant increase in smell that patients receiving AC typically have. This will minimize the potential for nausea and vomiting.   5. The patient will require toxicity assessment in a week along with blood WORK CBC.   6. The patient once completes 4 cycles of AC will initiate weekly cycles of Taxol x12. Upon completion of that the patient will initiate a plan of radiation therapy and she already has been seen by Radiation Oncology.     The patient will participate in a clinical trial that we have in the office and she already has been consented for this in regard to the utilization of  antiestrogen medicine upon completion of chemotherapy administration.     The patient will continue using her other medications on routine basis that include the Zyprexa and she will use the EMLA cream to minimize pain at the port site at the time of accessing.     Finally, I advised the patient and the  present in the room that they at any cost had to minimize the risk of pregnancy. In fact a pregnancy test today was negative. I advised them to use a condom.     The patient was ready to proceed.    All the discussion of all these issues took place in Czech.  • The patient returned to the office on 05/26/2022. Since the previous visit a week ago the patient had chemotherapy administration and she developed very significant nausea and vomiting nonstoppable for almost 5 days. Yesterday was the first day that she was able to have some rice and potatoes. Because she has thrown up so much she has developed significant heartburn and indigestion indicating probably bile gastritis, esophagitis and acid esophagitis. The patient has not had any hematemesis or melena. The other phenomenon that she had was tremendous somnolence due to the use of Zyprexa at the dose of 5 mg every night. She spent almost 4 days in bed sleepy but still with nausea and vomiting.     On top of things, she has very significant pain in her muscles, in her thighs, in her scalp and in her pelvic bone associated with Neulasta use. She is taking Tylenol for this with some improvement.     The patient has been able to reach hydration since yesterday, drinking proper liquids, and she is urinating okay. Therefore, the patient has multiple issues and on top of that she has now leukopenia associated with chemotherapy. The hemoglobin is stable. The platelet count has dropped but is not dramatically or risky for bleeding.     Given all the above circumstances I proposed her the following changes in regard to plan of chemotherapy for the next cycle.      1. We will modify her plan of chemotherapy, decreasing the doses for all the medications by 20%.   2. The patient will space out the chemotherapy treatments to every 3 weeks for the AC part of the treatment until completion of 4 cycles.   3. The patient will be supported with Neulasta injection. She will utilize Tylenol for pain.   4. The patient will continue using chemotherapy administration, anti-nausea medication and she will receive Emend on day 2 in the office and Emend on day 3 at home. Pharmacist will take care of this issue.   5. The patient will decrease Zyprexa from 5 mg a day to 2.5 mg in the evening to minimize somnolence.   6. The patient was advised to use omeprazole at the dose of 20 mg twice a day to minimize esophageal irritation associated with so much nausea and vomiting.   7. The patient will require still laboratory parameters the week after each one of the cycles of chemotherapy.     I feel the obligation to make all these changes; otherwise this patient will quit her treatment and that is the worst thing that could happen under the present scenario. She agreed with all the changes made. I explained to her all these changes in Somali to make her life a little bit more comfortable and easy to handle. She understands at the time that she returns in 2 weeks she probably will start to develop alopecia and she already has discussed this with her mother who will help through the process of shaving her head.  On 06/23/2022, the patient had dramatic improvement in regard to nausea control after dose adjustment for the 2nd cycle of chemotherapy and also modification of the nausea regimen. We tried to deliver Compazine and Phenergan. These medicines were tremendously tolling on her in regard to confusion and inability to function. Ativan was the miracle medicine. Therefore for the next 2 cycles Zofran and Ativan will be the ideal combination and she will require also IV fluids on day 2 of each one  of the next cycles of AC.     In regard to pain associated with Neulasta, she required Tylenol around-the-clock for 4 days after each one of the treatments and now she has no discomfort whatsoever.     Today her white count is normal, hemoglobin improved to 10.7, platelet count with minor decrease, no clinical bleeding.    The patient feels substantially better. She is able to function at home. She is able to be with her family. She is able to attend her children and she has been able to walk 2 miles a day late in the evening. Encouraged her to continue doing this.     I suggested for her the followin. She will return in a week to proceed with cycle 3 of AC at the previous dose and supported with Neulasta.   2. She will be supported on day 2 with IV fluids and nausea medication.   3. We will discontinue Phenergan and/or Compazine and she will remain on Zofran and Ativan for nausea control.   4. She will continue PPI to minimize reflux symptomatology, still minimal problem but dramatically better than before.   5. She will use Milk of Magnesia for constipation. Another measure could be prunes and dates.     The patient will complete the total 4 cycles of AC and then will move into the Taxol arena. I pointed out to her that we will have the discussion about how to handle this medicine after the completion of the next 2 cycles.     She also raised the question in regard what to do upon completion of chemotherapy. I advised her that eventually she will have radiological assessment as a baseline and we will proceed with PATRICIA blood sampling for evaluation of minimal residual disease.     The patient was ready to proceed now that she feels dramatically better. What a difference it makes in regard to adjustment of medicines and trying to adjust nausea medication accordingly.  • The patient returned to the office on 2022. In regard to her chemotherapy treatment, she is ready to proceed with her 4th or last  cycle of AC today. Her ANC is a little bit on the low side but the patient will be receiving Neulasta, therefore this will be taking care of this problem.     Her hemoglobin remains stable. This is anemia associated with chemotherapy administration. She also had a menstrual flow that was very abundant a few weeks ago. She is not taking any vitamins at this time and I have not advised to take any given concerning data about supplements in the background of adjuvant therapy for breast cancer.     In regard to the sensation of numbness in the left upper extremity arm, I think this is natural after mastectomy more than expected and I pointed out to her that this does not signify anything and does not require any therapy. This is extremely common in many women and it has the tendency to go away spontaneously.     In regard to the abnormality in the left hand there is no evidence of lymphedema. I think she could have a minimal element of tendonitis. I advised her to use topical anti-inflammatory medication or ice the hand. She has not received any IV sites for blood draws in this area.     In regard to future visits, the patient would like to continue her chemotherapy treatments on Mondays because her children will be going back to school and she wants to have a weekend with them as much as she can. I find no problem seeing her in 3 weeks from now on Monday to proceed with initiation of Taxol. I pointed out to her that Taxol will be easier to handle a lower dose, less toxicity in regard to hematological parameters and hopefully no issues in regard to neuropathy.     I also advised her that we will continue monitoring the status of menstrual flow upon completion of chemotherapy. She still has 12 Taxol treatments to go and we do not know if the chemotherapy treatment is going to put her in menopause or not. Depending on the final analysis of this upon completion of chemotherapy and before initiation of hormonal therapy, we  will need to do hormone levels.     Finally, the patient has had a lot of issues between her insurance company and the billing system at [facility]. I asked the billing system to give her a call and relate this to the insurance company. The specific question is about the cost and the utilization of PEGylated Neulasta or Neulasta On-body. Hopefully this will be happening to settle down the situation. Each one of these medicines have been billed in thousands of dollars and she already will complete 4 bills of this medication. She is extremely anxious about this situation. This is called economic toxicity of chemotherapy administration and we need to influence this situation as well on her to give her the benefit of settling down once and for all.    This patient already has enough stressors on the plate to add another one that is economical stressor of her treatment.  • On 08/22/2022, the patient's white count, hemoglobin and platelets are stable. Tolerance to Taxol is much better than AC. She has some occasional nausea, some joint pain, some pruritus in the absence of any rash on her hands and her feet and I think this all is side effect of Taxol. I advised her to go ahead and proceed with her Taxol today and I advised her to go back to taking her Zyrtec on a regular basis to try to minimize any itching in her hands and her feet.     I also advised her to continue using Ativan that has been terrific in regard to control of nausea and vomiting and she can use 2-3 doses today and tomorrow to minimize the symptoms. It will be perfectly okay as well for her to take Tylenol tonight and tomorrow to minimize joint pain associated with Taxol utilization.     In regard to her epistaxis, it is very obvious that the nasal passages on the right side are different than the ones on the left. She has very significant deviation of the septum. This triggers airflow currents that are abnormal and capillarity in the anterior part  of the septum that is very abnormal and is the source of bleeding. If this continues she will require referral for ENT for cauterization. Her platelet count is normal.     The patient has not had any menstrual flow now for 2 months and will continue asking this question because theoretically the chemotherapy should make her menopausal. Upon completion of Taxol we will need to do endocrine testing in this regard.     Overall I think the patient is doing much better than before and she will be advised to continue her weekly Taxol along with weekly laboratory testing and weekly nurse practitioner visit and visit with me in 1 month.   • On 09/12/2022 the patient continues complaining of significant sensation of itching and discomfort in the tips of her fingers and her toes to the point that she has difficulty doing housework and difficulty walking with this. This is a different way of manifestation of toxicity associated with Taxol administration. She probably has a grade 2 peripheral neuropathy associated with this. The symptoms are not permanent, they come and go but this will obligate me to adjust her Taxol dose today by 25-30%. She has been receiving 120 mg of Taxol per dose. Her dose from now on will be 90 mg.   •   • The patient will receive a B12 injection today and I advised her to take B complex vitamin.  •   • In regard to her anemia associated with chemotherapy administration she was advised by my partner, Mich Agudelo MD, last week to take oral iron therapy. Unfortunately the patient became ill in regard to constipation and cramping and she has stopped the iron therapy.   •   • In regard to her leukopenia associated with chemotherapy administration her white count and ANC are appropriate today and she will be able to proceed with her Taxol administration today. She has not developed any fever or infection.   •   • In regard to her reflux symptomatology, nausea and vomiting the symptoms pertinent to his  are resolved now that she is no longer receiving AC chemotherapy. She remains on Ativan and Compazine on prn basis and she remains on PPI that has corrected her acid issues altogether.  •   • In regard how to monitor her peripheral neuropathy I asked her to call my nurse, Justine Abbott on weekly basis, notify her of the status of her symptoms and if they get any worse or permanent I think we will need to discontinue the Taxol permanently and move into the next stage of therapy. She will require visit with me in 3 weeks.   •   • Duration of the video visit today 20 minutes.   •   • On 10/03/2022 the patient came back to the office for follow up. Since the previous visit she called stating that she could not handle the peripheral neuropathy in her hands and her feet anymore because it is disrupting her lifestyle and keeping her from doing anything around the house and we discontinued the Taxol altogether. Since then she has had very significant recovery especially in her hands, still has discomfort in her feet and she is not able to walk long distances because of the stimulation of the activity in the plantar aspect of her feet triggers discomfort as well in the toes. A detailed neurological exam today disclosed no major abnormalities, temperature discrimination, pinprick vibration, reflexes, strength and so forth is very much unremarkable.    Today her white count, hemoglobin and platelets are normal.     From my point of view I advised the patient the following.     1. She has completed her plan of chemotherapy, we cannot push anymore Taxol on her given her sensory neuropathy that will become permanent.     2. I discussed with the patient options to make her postmenopausal either hormonal therapy goserelin for example or Lupron, another way to do this will be ovary removal. I have placed a phone call to talk with Heidy Gutierrez MD, the patient's Gynecologist to discuss this with her. My request to Dr. Gutierrez  to perform bilateral oophorectomy. The patient is in agreement with that. This with the need for the patient to initiate at some point her adjuvant hormonal therapy if the patient is postmenopausal in the next visit after oophorectomy she will initiate letrozole therapy 2.5 mg a day for the time being. If the patient rejects the possibility of an oophorectomy she will require pharmacological removal of ovarian function and she will require also further adjuvant hormonal therapy.    3. Finally she already has been seen by radiation oncology after her diagnosis. I made the referral for her to be seen by them because from my point of view she is ready for initiation of therapy at this point.     4. The patient also complains of fatigue. We will measure a TSH, ferritin, iron profile, reticulated hemoglobin today. We want to be sure that her iron stores and thyroid function are normal. I think this is leftover from chemotherapy administration.    5. The patient complains of foggy brain. Memory and ability to function is limited. It is very likely effect of chemotherapy administration on her. I asked her to trigger a lot of brain stimulation reading books, chatting with people, working on activities with the kids and doing more physical activity.     6. Port. The patient will require port maintenance for the time being. It will be flushed today and it will be reflushed again in 6 weeks when she returns.    7. I will continue monitoring laboratory parameters periodically.    8. I went ahead and obtained an estradiol, FSH and LH today. She has not had any menstrual flow since 06/2022.   On 10/31/2022 the patient believes that she is going to have menstrual flow this week. She has the sensation of the premenstrual syndrome. During the previous visit I measured estradiol, FSH, LH; those numbers were consistent with postmenopausal status but the patient believes that she is going to have menstrual flow very soon this week.      From my point of view the patient is already undergoing radiation therapy for breast cancer. She already has completed 4 treatments and she expects to complete all her treatments by November 30th.    It has been impossible to talk with Dr. Heidy Gutierrez in regard to the need for this patient to have oophorectomy to make her postmenopausal and eventually initiate her hormonal therapy. I will talk with Dr. Gutierrez hopefully today and that way the patient is put on the schedule to have an oophorectomy in the 1st week in December after completion of radiation therapy and thereafter the patient will be able to initiate her adjuvant hormonal therapy with Femara. At that point also I would expect to proceed with radiological assessment after completion of her radiation treatment that will include a CT scan of the chest, abdomen and pelvis and a bone density test.     I pointed out to the patient that I would like for her to keep her port in place at least for a year. She agrees with that.     Otherwise, the patient has been given proper information by me in regard to physical activity to improve range of motion at the left shoulder that is not limiting according to my clinical examination on her today. She says that she has a little bit of pull and discomfort upon extension and elevation of her upper extremities expected from the surgery and the expander in the left chest wall.     Otherwise, the patient will return to see us back in a few weeks.    Her white count, hemoglobin and platelets are normal. Her chemistry profile is normal. Her hair is growing back. Her eyebrows, eyelashes are growing back and the patient feels substantially better from this point of view.  • ADDENDUM: DISCUSSED WITH DR COBB WHO WILL PLAN BILATERAL OOPHORECTOMY IN FEW WEEKS  •   On 12/15/2022 the patient returned after having bilateral oophorectomy through laparoscopy on 11/28/2022. Therefore officially now she is considered  postmenopausal. The surgical sites are healing fine and she is going to follow up with Dr. Gutierrez in a few days. She has not encountered the need for any pain medication. Her bowel activity and urination are fine and she is eating better and functioning better at home. She has had near complete resolution of her neuropathy associated with Taxol administration and she has not had any consequences of anthracycline administration through her echocardiogram recently done.     Her clinical examination today is otherwise benign. Her heart function, lung function, abdominal function disclosed no abnormalities. Her white count, hemoglobin and platelets are fine still with minimal leukopenia and minimal anemia that will get over in the next several weeks. Considering that now the patient is postmenopausal I am going to go ahead and proceed with prescription of Femara at the dose of 2.5 mg a day. She will need to take this medicine for the next 5 years. I discussed with her side effects of the Femara including occasional joint pain that can be controlled with Tylenol and physical activity. Also this medicine can raise cholesterol level and I will obtain a new lipid profile baseline today. This will be repeated every 6 months.     Eventually the patient also will proceed with bone density. I made her aware that sometimes these medicines also can favor osteopenia or osteoporosis and we will need to request a bone density test more or less in 6 months from now.     The patient will proceed with radiological assessment of CT scan of the chest, abdomen and pelvis baseline after completion of all her therapy now and before initiation of the Femara. She will not require any further radiological assessment in the future unless that she has any clinical symptomatology to suggest any recurrence.     The patient will require visit with me in 6 weeks. On that occasion her port the week before will be flushed. We will keep the port for  the next couple of years.     Finally the patient is very sad because her father-in-law was diagnosed with colon cancer in [place] and they are going to need to travel to see him.     I discussed all these facts with nurse navigator in the room. Also the patient was advised to proceed with survivorship assessment the same day that she will be seeing me in 6 weeks.     We are glad that she completed all this and many things are behind. She is in remission now and she cried with lam about this issue today.  On 01/24/2023 the patient was further reviewed. Since the previous visit the patient initiated her Femara adjuvant therapy. Unfortunately the medicine is producing some achiness in her joints and I advised her to take a vitamin D supplement 1000 units a day and initiate a tablet of Aleve to be taken at the time that she goes to bed at nighttime. I will ask my nurse, Justine Abbott, to give her a call in a couple of weeks to see if this strategy is having a positive impact on her. If the pain continues, a strong suggestion would be to move her from Femara to Arimidex.     In the meantime we documented that the patient also after post oophorectomy had a very low level of estradiol and her FSH was elevated consistent with menopausal status.     The patient is not experiencing any hot flashes from menopause and she overall is functioning well at home and improving level of energy and activity for all sorts of things.     In the meantime she was seen by Cardio-Oncology. Her EKG and testing was appropriate and she continues taking cholesterol medicine. We will recheck lipid profile in 3 months and we will monitor this area as well in the future. She will require to be tested by Cardio-Oncology once a year.     From the point of view of her reconstruction of her left breast she will see her plastic surgeon at some point in 04/2023. From my point of view she can proceed with any leftover surgery necessary for this at any  time after this period of time. Her radiation therapy side effects are very much over and the patient should be able to undergo her radiation treatment.     In regard to her diet I encouraged her to continue a very healthy diet full of vegetables and fruit. She is going in that agenda anyway and I encouraged her to have a different consumption of red meat only once a week and try to have red meat of organic nature with walking cows. Encouraged her to have also some more vegetarian style meals that will be beneficial to her in the long run. It will be okay to have some occasional sweets here and there. She raised the question in regard to alcohol consumption in regard to a glass of wine here or there. I do not see any problems or difficulties with this whatsoever. She is welcome to have a minimal amount that does not need to become a situation for every day use. She understood this clearly.      From the point of view of fluidization for intercourse I encouraged them to use coconut oil. That will be beneficial for both of them. Any other lubricant will be acceptable.     From the point of view of her port the patient will continue having her port flushed every 6 weeks and she knows that she needs to maintain this for a total of 2 years. She is aware that a high risk of recurrence will happen in this period of time. This will require to be maintained.     From the point of view of vitamin consumption I asked her to take vitamin D 1000 units a day especially knowing that we have been through winter and we have no sun exposure. Eventually in the spring she will need to have some sun exposure like the rest of us that will be crucial in regard to proper vitamin D synthesis.     In regard to any other need for radiological assessment I find no need for this to happen again unless that clinical characteristics change at some point in the future. We will proceed with a CT scan of the chest, abdomen and pelvis. I personally  reviewed this showing no evidence of metastasis at any level in the lungs, liver, bones or any other anatomical site. The spleen, pancreas, kidneys, intestine were all unremarkable. Cardiac anatomy was normal. Pulmonary anatomy was normal with minimal granuloma formation in the right lung.     Therefore, I gave her the good news in regard to this exit CT scan that hopefully will not be required to be done again in the future.     The patient also has been seen by JACQUELIN Xiong, in regard to information pertinent to her future follow ups and plan of care in the past and the future.     I discussed all these facts with the patient and the  in the room.  On 04/03/2023 in spite of switching her from Femara to Arimidex many weeks ago and giving her a break from any aromatase inhibitor that decreased the amount of joint pain the patient continues having joint pain. 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 She has morning stiffness that lasts for 2-3 hours. Most of the joint pain is in the distal interphalangeal joints.     Given the persistency of these symptoms raises the question in absence of any family history of osteoarthritis, lupus, rheumatoid arthritis if she has some other modality of arthritis that has nothing to do with her aromatase inhibitor medication. I went ahead and scheduled her to have an x-ray of both hands, sent her back to the lab to obtain a rheumatoid factor, MISAEL, sedimentation rate, C-reactive protein and CCP antibodies.     Once that all of this information is available I will further discuss with her how to proceed. Independent of all of this eventually she will require a referral to have a deQuervain tendonitis injection anyway.     I encouraged her to use 2 Aleve to be taken at nighttime and she knows that she needs to take  this with some food in the stomach to minimize any gastric irritation. She will remain on PPI for the time being.     I advised her to remain on her Arimidex but maybe in the long run I will need to switch this medicine to Aromasin, we have to see. Maybe also in the long run if these aromatase inhibitors are not the medicines for her we will need to go back onto tamoxifen or Evista in the adjuvant setting.     In the meantime the patient will continue her Arimidex 1 mg a day. I advised her to try foods that contain pigments that could be antiinflammatory and advised her to try to minimize the consumption of carbohydrates and that way she minimizes weight gain associated with menopause.     So far I find no evidence of breast cancer recurrence on her and she has not completed all of her left breast reconstruction. Appointment will be made for her to complete this at some point in the summer.     I did discuss all of these issues with her in detail. I did a very detailed examination of her joints and the above is my conclusion in regard all of these issues. Tentative appointment to come back and see us in 3 months. Her port has been flushed today, will be flushed in 6 weeks and 3 months but depending on the telephone conversation we will need to make referrals very likely and maybe see her sooner than anticipated.   The joint issues in this patient are becoming a factor that is limiting her normal ability to function as a human being, as a mother and as a wife at home barely able to do her work because of discomfort and tenderness. This is a new problem that is severe and requires a quick answer and proper therapy.          •   •   •   •   •   ·

## 2023-04-04 LAB
CCP IGA+IGG SERPL IA-ACNC: 4 UNITS (ref 0–19)
CENTROMERE B AB SER-ACNC: <0.2 AI (ref 0–0.9)
CHROMATIN AB SERPL-ACNC: <0.2 AI (ref 0–0.9)
DSDNA AB SER-ACNC: <1 IU/ML (ref 0–9)
ENA JO1 AB SER-ACNC: <0.2 AI (ref 0–0.9)
ENA RNP AB SER-ACNC: <0.2 AI (ref 0–0.9)
ENA SCL70 AB SER-ACNC: <0.2 AI (ref 0–0.9)
ENA SM AB SER-ACNC: <0.2 AI (ref 0–0.9)
ENA SS-A AB SER-ACNC: <0.2 AI (ref 0–0.9)
ENA SS-B AB SER-ACNC: <0.2 AI (ref 0–0.9)
Lab: NORMAL

## 2023-04-05 ENCOUNTER — TELEPHONE (OUTPATIENT)
Dept: SURGERY | Facility: CLINIC | Age: 38
End: 2023-04-05
Payer: COMMERCIAL

## 2023-04-05 ENCOUNTER — TELEPHONE (OUTPATIENT)
Dept: ONCOLOGY | Facility: CLINIC | Age: 38
End: 2023-04-05
Payer: COMMERCIAL

## 2023-04-05 ENCOUNTER — OFFICE VISIT (OUTPATIENT)
Dept: SURGERY | Facility: CLINIC | Age: 38
End: 2023-04-05
Payer: COMMERCIAL

## 2023-04-05 VITALS
OXYGEN SATURATION: 97 % | SYSTOLIC BLOOD PRESSURE: 100 MMHG | HEART RATE: 83 BPM | BODY MASS INDEX: 22.08 KG/M2 | HEIGHT: 62 IN | DIASTOLIC BLOOD PRESSURE: 64 MMHG | WEIGHT: 120 LBS

## 2023-04-05 DIAGNOSIS — M77.9 TENDINITIS: Primary | ICD-10-CM

## 2023-04-05 DIAGNOSIS — C50.412 MALIGNANT NEOPLASM OF UPPER-OUTER QUADRANT OF LEFT BREAST IN FEMALE, ESTROGEN RECEPTOR POSITIVE: Primary | ICD-10-CM

## 2023-04-05 DIAGNOSIS — Z17.0 MALIGNANT NEOPLASM OF UPPER-OUTER QUADRANT OF LEFT BREAST IN FEMALE, ESTROGEN RECEPTOR POSITIVE: Primary | ICD-10-CM

## 2023-04-05 DIAGNOSIS — C77.3 SECONDARY MALIGNANT NEOPLASM OF AXILLARY LYMPH NODES: ICD-10-CM

## 2023-04-05 DIAGNOSIS — R92.8 ABNORMAL MRI, BREAST: ICD-10-CM

## 2023-04-05 PROCEDURE — 99214 OFFICE O/P EST MOD 30 MIN: CPT | Performed by: SURGERY

## 2023-04-05 NOTE — PROGRESS NOTES
Chief Complaint: Kylah Romero is a 38 y.o. female who was seen in consultation at the request of Heidy Gutierrez MD  for newly diagnosed breast cancer and a followup visit    History of Present Illness:  Patient presents with newly diagnosed breast cancer.  She noted a left breast mass in December.  No associated pain or skin changes or nipple discharge.  She noted no other new masses, skin changes, nipple discharge, nipple changes prior to her most recent imaging.  Her most recent imaging includes the following:  10/04/2017   BILATERAL DIAGNOSTIC MAMMO & BILATERAL BREAST US   [facility]   KYLAH JOYCE   No family history. Baseline study.  MMG:  Heterogeneously dense.  Finding 1: Area of asymmetric breast tissue seen in the left breast in the 1:30 o’clock region and in the upper outer region.  US:  Finding 1: Elongated isoechoic area 3 cm and is most consistent with a prominent lobule. This correlates to the palpable area.  Finding 2: Two oval complicated cyst versus solid masses right breast. This is an incidental finding.  IMPRESSION:  Finding 1: Ultrasound of the left breast in 6 months.  Finding 2: Ultrasound of the right breast in 6 months is recommended.  BI-RADS Category 3: Probably Benign.    02/09/2022   BILATERAL DIAGNOSTIC MAMMO WITH MARY & RIGHT LIMITED/LEFT COMPLETE BREAST US   [facility]   KYLAH JOYCE   Palpable lump or thickening in the left breast.  MMG:  Heterogeneously dense.  Finding 1: There is a new area of architectural distortion seen in the left upper outer breast. This correlates with the pinpointed palpable area at 2:30-3:00. The extent is difficult to measure mammographically.  Finding 2: New focal asymmetry seen in the left upper outer breast, just superior and lateral to the above finding.  Finding 3: There is a new focal asymmetry seen in the left breast upper outer quadrant.  US:  Finding 1: Irregular hypoechoic area versus non-parallel  hypoechoic mass with posterior acoustic shadowing and indistinct and spiculated margins measuring 17 x 20 x 16 mm, in the 2:30 o’clock region of the left breast, 6 centimeters from the nipple.  Finding 2: Irregular non-parallel hypoechoic mass 11 x 10 x 11 mm seen in the 1:30 o’clock region of the left breast located 6 centimeters from the nipple.  Finding 3: Oval parallel hypoechoic mass 4 x 3 x 4 mm seen in the left breast at 1 o’clock located 4 centimeters from the nipple.  Finding 4: Stable oval parallel mass with circumscribed margins measuring 12 x 6 x 8 mm seen in the right breast at 3 o’clock located 5 centimeters from the nipple.  Finding 5: Oval mass measuring 8 x 5 x 7 mm seen in the sub-areolar region of the right breast. Finding has decreased in size.  IMPRESSION:  Finding 1: New palpable architectural distortion and mass in the 2:30 o’clock region of the left breast located 6 centimeters from the nipple is highly suggestive of malignancy.  Finding 2: Mass in the 1:30 o’clock region of the left breast located 6 centimeters from the nipple is suspicious.  Finding 3: Mass in the left breast at 1 o’clock located 4 centimeters from the nipple is suspicious.  Finding 4: Stable mass in the right breast since 2017 is considered benign.  Finding 5: Considered benign.  BI-RADS Category 5.      She had a biopsy on the following day that showed:   02/17/2022 LEFT US GUIDED BIOPSY     [facility]     KYLAH DEL PILAR GERALD LUIS  Let breast 1:00. 12 gauge Suros. 7 cores. [place] shaped s-pravin tissue marker was placed. Clip in the expected position. Pathology returned as benign breast tissue with complex sclerosing lesion. Pathology high risk and concordant.  Left breast 1:30. 12 gauge. 9 cores were obtained. A tophat shaped s-pravin tissue marker was placed. Clip in the expected position. Invasive lobular carcinoma. Malignant and concordant.  Left breast at the 2:30 position. 12 gauge core needle Suros. 14 cores were  obtained. A ilohoark Pro Q shaped tissue marker was placed at the biopsy site. Clip in the expected position. Invasive lobular carcinoma.  PATHOLOGY:  1. Breast, left, 1 o’clock, biopsy:  Benign breast tissue with complex sclerosing lesion.  2. Breast, left, 1:30 o’clock, biopsy:  Invasive lobular carcinoma, histologic grade 2 (tubules = 3, nuclei = 2, mitosis = 1), largest focus measures 9 mm.  ER: 97.01%  NM: 94.9%  HER2: 1+  Ki-67: 32.71%  3. Breast, left, 2:30 o’clock, biopsy:  Invasive lobular carcinoma, histologic grade 2 (tubules = 3, nuclei = 2, mitosis = 1), largest focus measures 10 mm.  ER: 89.94%  NM: 95.74%  HER2: Score 0  Ki-67: 39.2%    We then arranged for a breast MRI:    03/04/22 [facility] MRI BREAST BILATERAL   KYLAH HENSLEY   1.  Suspicious mass and nonmass enhancement measuring up to 7.1 cm in the outer middle and posterior left breast encompasses 2 sites of biopsy-proven malignancy marked with Q and top hat clips. A biopsy clip (mini cork) with adjacent faint ill-defined nonmass enhancement inferior to the biopsy clip in the outer left breast represents a site of biopsy-proven complex sclerosing lesion. Surgical management isrecommended.  2.  No MRI evidence of malignancy in the right breast.   BI-RADS Category 6: Known biopsy-proven malignancy        She has not had a breast biopsy in the past.  She has her uterus and ovaries, is premenopausal, and takes nor hormones. She had a mirena removed in June 2021 and uses no birth control  Her family history includes the following: He has 1 daughter, 1 son, 1 sister, 2 maternal aunts, one paternal aunt, no family history of breast or ovarian cancer.  She does have a paternal aunt who had melanoma.      [facility] genetic testing panel March 4, 2022 breast and gynecology add on melanoma returned as negative for mutation.    Right breast ultrasound April 11, 2022 [facility] ultrasound retroareolar right breast 7-8 o'clock there is  an 8 mm hypoechoic mass immediately deep to the skin.  Consistent with that of a benign complex cyst.  This corresponds to the nonenhancing lesion seen on the breast MRI consistent with a benign etiology.    Pathology from 4/13/21 left total mastectomy and sentinel lymph node biopsy followed by reconstruction returned as:  1 of 4 lymph nodes with breast cancer.  Isolated tumor cells.  Invasive lobular carcinoma, intermediate grade, 3, 2, 1, 5 cm, lobular carcinoma in situ, measuring 7 cm.  All margins are clear with the closest being 10 mm from the posterior margin.  The size of the greatest deposit in the lymph node is 0.1 mm or isolated tumor cells.  Pathologic stage T2N0 (i+), stage 2A.  Estrogen , progesterone , HER-2/leonie 1+, Ki-67 39%.        Oncotype DX April 26, 2022 for patients with micro mets and nodes +1-30 returned as recurrence score of 18 with a breast cancer specific survival at 9 years of 97%.  Risk of distant recurrence at 9 years on a hormone blocker alone is 16%.  Chemotherapy for this group as benefit cannot be excluded.    She is here for review.  She states that her drains have come out and she is started her fill.  She sees Dr. Johnson today for another follow-up.      INterval HIstory:  In the interim,  Maribell Romero  has done well.  We placed a right sided port May 13, 2022 she tells me that this is functioning well.  She took with Dr. Palacios chemotherapy starting in May 2022 AC x4 followed by 6 doses of Taxol which she completed around October.  Dr. Lan Anguiano then treated her chest wall and regional lymphatics from October 25, 2022 through November 23, 2022.  She then had a bilateral salpingo-oophorectomy December 15, 2022 with Dr. Gutierrez.  She then started Femara December 2022 but had significant arthralgias so was switched over in January 2023 to Arimidex.    She has had 2 bioimpedance measurements.      She has noted no changes in her breast exam. No new  masses, skin changes, nipple changes, nipple discharge RIGHT breast.   She denies headache, bone pain, belly pain, cough, changes in vision or gait.    Her most recent imaging includes the following:  [facility] screening [facility] with tomosynthesis.  The breast is extremely dense.  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  BI-RADS 2    She is here to review.  Review of Systems:  Review of Systems   All other systems reviewed and are negative.       Past Medical and Surgical History:  Breast Biopsy History:  Patient had not had a breast biopsy prior to her cancer diagnosis.  Breast Cancer HIstory:  Patient does not have a past medical history of breast cancer.  Breast Operations, and year:  NONE   Obstetric/Gynecologic History:  Age menstrual periods began: 11  Patient is premenopausal, first day of last period: 21  Number of pregnancies:2  Number of live births: 2  Number of abortions or miscarriages: 0  Age of delivery of first child: 28  Patient breast fed, for the following lenth of time: 10 MONTHS   Length of time taking birth control pills: N/A   Patient has never taken hormone replacement    PATIENT HAS BOTH OVARIES AND UTERUS  Past Surgical History:   Procedure Laterality Date   • APPENDECTOMY     • BREAST BIOPSY Left    • BREAST RECONSTRUCTION Left 2022    Procedure: LEFT BREAST 1ST STAGE RECONSTRUCTION WITH INSERTION OF TISSUE EXPANDER AND BIOLOGIC;  Surgeon: Hermelinda Johnson MD;  Location: [facility];  Service: Plastics;  Laterality: Left;   •  SECTION     •  SECTION N/A 12/10/2018    Procedure:  SECTION REPEAT;  Surgeon: Heidy Gutierrez MD;  Location: [facility] LABOR DELIVERY;  Service: Obstetrics/Gynecology   • D & C HYSTEROSCOPY N/A 2021    Procedure: HYSTEROSCOPY REMOVAL INTRAUTERINE DEVICE WILL NEED ULTRASOUND IN ROOM;  Surgeon: Heidy Gutierrez MD;  Location: [facility]  Service:  "Obstetrics/Gynecology;  Laterality: N/A;   • DIAGNOSTIC LAPAROSCOPY Bilateral 11/28/2022    Procedure: DAVINCI ASSISTED BILATERAL SALPINGO-OOPHORECTOMY;  Surgeon: Heidy Gutierrez MD;  Location: [facility];  Service: Robotics - DaVinci;  Laterality: Bilateral;   • KNEE ARTHROSCOPY Right    • MASTECTOMY W/ SENTINEL NODE BIOPSY Left 04/13/2022    Procedure: left total mastectomy with left axillary sentinel lymph node biopsy;  Surgeon: Monisha Escudero MD;  Location: [facility];  Service: General;  Laterality: Left;   • VENOUS ACCESS DEVICE (PORT) INSERTION Right 5/13/2022    Procedure: right port placement;  Surgeon: Monisha Escudero MD;  Location: [facility];  Service: General;  Laterality: Right;     Past Medical History:   Diagnosis Date   • Anemia    • Anesthesia complication     hypotension with last C section   • Breast cancer 03/2022    left sided, stage 3, ER/AK+, Her2-, s/p mastectomy, LAST CHEMO 10/3/22, CURRENTLY RECEIVING RADIATION   • History of COVID-19     1/2021 AND 2/8/2022   • Hyperlipidemia    • PONV (postoperative nausea and vomiting)    • Seasonal allergies        Prior Hospitalizations, other than for surgery or childbirth, and year:  NONE     Social History     Socioeconomic History   • Marital status:    Tobacco Use   • Smoking status: Never   • Smokeless tobacco: Never   Vaping Use   • Vaping Use: Never used   Substance and Sexual Activity   • Alcohol use: Not Currently     Alcohol/week: 5.0 standard drinks     Types: 5 Shots of liquor per week   • Drug use: Never   • Sexual activity: Defer     Patient is .  Patient is a homemaker.  Patient drinks 1 servings of caffeine per day.    Family History:  Family History   Problem Relation Age of Onset   • Melanoma Paternal Aunt    • Diabetes Other    • Malig Hyperthermia Neg Hx        Vital Signs:  /64   Pulse 83   Ht 157.5 cm (62.01\")   Wt 54.4 kg (120 lb)   LMP  (LMP Unknown)   SpO2 97%   BMI 21.94 " "kg/m²      Medications:    Current Outpatient Medications:   •  anastrozole (Arimidex) 1 MG tablet, Take 1 tablet by mouth Daily., Disp: 30 tablet, Rfl: 2  •  atorvastatin (LIPITOR) 10 MG tablet, Take 1 tablet by mouth Every Night., Disp: , Rfl:   •  Cetirizine HCl (ZYRTEC PO), Take 10 mg by mouth As Needed., Disp: , Rfl:      Allergies:  Allergies   Allergen Reactions   • Asa [Aspirin] Anaphylaxis and Shortness Of Breath   • Adhesive Tape Dermatitis     Paper tape okay   • Iopamidol Hives     AKA IV CONTRAST   • Ct Contrast Rash     Pt HAD A RASH AND COUGHING       Physical Examination:  /64   Pulse 83   Ht 157.5 cm (62.01\")   Wt 54.4 kg (120 lb)   LMP  (LMP Unknown)   SpO2 97%   BMI 21.94 kg/m²   General Appearance:  Patient is in no distress.  She is well kept and has an average build.   Psychiatric:  Patient with appropriate mood and affect. Alert and oriented to self, time, and place.    Breast, RIGHT:  large sized, 34C,asymmetric with the contralateral surgically absent side.  Breast skin is without erythema, edema, rashes.  There are no visible abnormalities upon inspection during the arm-raising maneuver or with hands on hips in the sitting position. There is no nipple retraction, discharge or nipple/areolar skin changes.There are no masses palpable in the sitting or supine positions.    Breast, LEFT:  Surgically absent with a  filled tissue expander in place.  Well-healed transverse mastectomy incision.  No skin nodules or discolorations.      Lymphatic:  There is no axillary, cervical, infraclavicular, or supraclavicular adenopathy bilaterally.  Eyes:  Pupils are round and reactive to light.  Cardiovascular:  Heart rate and rhythm are regular.  Respiratory:  Lungs are clear bilaterally with no crackles or wheezes in any lung field.  Gastrointestinal:  Abdomen is soft, nondistended, and nontender    Musculoskeletal:  Good strength in all 4 extremities.   There is good range of motion in both " shoulders.    Skin:  No new skin lesions or rashes on the skin excluding the breast (see breast exam above).        Imaging:  10/04/2017   BILATERAL DIAGNOSTIC MAMMO & BILATERAL BREAST US   [facility]   KYLAH JOYCE   No family history. Baseline study.  MMG:  Heterogeneously dense.  Finding 1: Area of asymmetric breast tissue seen in the left breast in the 1:30 o’clock region and in the upper outer region.  US:  Finding 1: Elongated isoechoic area 3 cm and is most consistent with a prominent lobule. This correlates to the palpable area.  Finding 2: Two oval complicated cyst versus solid masses right breast. This is an incidental finding.  IMPRESSION:  Finding 1: Ultrasound of the left breast in 6 months.  Finding 2: Ultrasound of the right breast in 6 months is recommended.  BI-RADS Category 3: Probably Benign.    02/09/2022   BILATERAL DIAGNOSTIC MAMMO WITH MARY & RIGHT LIMITED[facility]   Palpable lump or thickening in the left breast.  MMG:  Heterogeneously dense.  Finding 1: There is a new area of architectural distortion seen in the left upper outer breast. This correlates with the pinpointed palpable area at 2:30-3:00. The extent is difficult to measure mammographically.  Finding 2: New focal asymmetry seen in the left upper outer breast, just superior and lateral to the above finding.  Finding 3: There is a new focal asymmetry seen in the left breast upper outer quadrant.  US:  Finding 1: Irregular hypoechoic area versus non-parallel hypoechoic mass with posterior acoustic shadowing and indistinct and spiculated margins measuring 17 x 20 x 16 mm, in the 2:30 o’clock region of the left breast, 6 centimeters from the nipple.  Finding 2: Irregular non-parallel hypoechoic mass 11 x 10 x 11 mm seen in the 1:30 o’clock region of the left breast located 6 centimeters from the nipple.  Finding 3: Oval parallel hypoechoic mass 4 x 3 x 4 mm seen in the  left breast at 1 o’clock located 4 centimeters from the nipple.  Finding 4: Stable oval parallel mass with circumscribed margins measuring 12 x 6 x 8 mm seen in the right breast at 3 o’clock located 5 centimeters from the nipple.  Finding 5: Oval mass measuring 8 x 5 x 7 mm seen in the sub-areolar region of the right breast. Finding has decreased in size.  IMPRESSION:  Finding 1: New palpable architectural distortion and mass in the 2:30 o’clock region of the left breast located 6 centimeters from the nipple is highly suggestive of malignancy.  Finding 2: Mass in the 1:30 o’clock region of the left breast located 6 centimeters from the nipple is suspicious.  Finding 3: Mass in the left breast at 1 o’clock located 4 centimeters from the nipple is suspicious.  Finding 4: Stable mass in the right breast since 2017 is considered benign.  Finding 5: Considered benign.  BI-RADS Category 5.    03/04/22 [facility] MRI BREAST BILATERAL   KYLAH HENSLEY   1.  Suspicious mass and nonmass enhancement measuring up to 7.1 cm in the outer middle and posterior left breast encompasses 2 sites of biopsy-proven malignancy marked with Q and top hat clips. A biopsy clip (mini cork) with adjacent faint ill-defined nonmass enhancement inferior to the biopsy clip in the outer left breast represents a site of biopsy-proven complex sclerosing lesion. Surgical management isrecommended.  2.  No MRI evidence of malignancy in the right breast.   BI-RADS Category 6: Known biopsy-proven malignancy    Right breast ultrasound April 11, 2022 [facility] ultrasound retroareolar right breast 7-8 o'clock there is an 8 mm hypoechoic mass immediately deep to the skin.  Consistent with that of a benign complex cyst.  This corresponds to the nonenhancing lesion seen on the breast MRI consistent with a benign etiology.  We will let her know  [facility], [facility]23 right screening mammogram with tomosynthesis.  The  breast is extremely dense.  Stable 12 mm mass right breast 3:00 consistent with hyalinized fibroadenoma BI-RADS 2    Pathology:  02/17/2022 LEFT US GUIDED BIOPSY     [facility]     KYLAH JOYCE  Let breast 1:00. 12 gauge Suros. 7 cores. [place] shaped s-pravin tissue marker was placed. Clip in the expected position. Pathology returned as benign breast tissue with complex sclerosing lesion. Pathology high risk and concordant.  Left breast 1:30. 12 gauge. 9 cores were obtained. A tophat shaped s-pravin tissue marker was placed. Clip in the expected position. Invasive lobular carcinoma. Malignant and concordant.  Left breast at the 2:30 position. 12 gauge core needle Suros. 14 cores were obtained. A [place] Pro Q shaped tissue marker was placed at the biopsy site. Clip in the expected position. Invasive lobular carcinoma.  PATHOLOGY:  1. Breast, left, 1 o’clock, biopsy:  Benign breast tissue with complex sclerosing lesion.  2. Breast, left, 1:30 o’clock, biopsy:  Invasive lobular carcinoma, histologic grade 2 (tubules = 3, nuclei = 2, mitosis = 1), largest focus measures 9 mm.  ER: 97.01%  MS: 94.9%  HER2: 1+  Ki-67: 32.71%  3. Breast, left, 2:30 o’clock, biopsy:  Invasive lobular carcinoma, histologic grade 2 (tubules = 3, nuclei = 2, mitosis = 1), largest focus measures 10 mm.  ER: 89.94%  MS: 95.74%  HER2: Score 0  Ki-67: 39.2%    Pathology from 4/13/21 left total mastectomy and sentinel lymph node biopsy followed by reconstruction returned as:  1 of 4 lymph nodes with breast cancer.  Isolated tumor cells.  Invasive lobular carcinoma, intermediate grade, 3, 2, 1, 5 cm, lobular carcinoma in situ, measuring 7 cm.  All margins are clear with the closest being 10 mm from the posterior margin.  The size of the greatest deposit in the lymph node is 0.1 mm or isolated tumor cells.  Pathologic stage T2N0 (i+), stage 2A.  Estrogen , progesterone , HER-2/leonie 1+, Ki-67 39%.                           Final  Diagnosis   1. Lymph Node, Left [place] #1, Excision (H&E, AE1/AE3):               A. One lymph node with isolated tumor cells.     2. Lymph Node, Left [place] #2, Excision (H&E, AE1/AE3):               A. One benign lymph node (0/1).     3. Lymph Node, Left [place] #3, Excision (H&E, AE1/AE3):               A. Two benign lymph nodes (0/2).     4. Breast, Left, Mastectomy (655 grams):               A. Invasive lobular carcinoma, Shazia histologic grade II (tubules = 3, nuclei = 2, mitoses = 1) measuring        50 mm maximally.   B. Lobular carcinoma in situ, measuring 70 mm maximally.  C. All margins are free of in situ and invasive malignancy.  D. See synoptic template for complete tumor details.     [facility] [place]    Electronically signed by Alyse Carver MD on 4/14/2022 at 1217   Synoptic Checklist      INVASIVE CARCINOMA OF THE BREAST: Resection  8th Edition - Protocol posted: 12/17/2021  INVASIVE CARCINOMA OF THE BREAST: COMPLETE EXCISION - All Specimens       SPECIMEN   Procedure   Total mastectomy    Specimen Laterality   Left    TUMOR   Tumor Site   Upper outer quadrant        Lower outer quadrant    Histologic Type   Invasive lobular carcinoma    Histologic Grade (Tabor Histologic Score)       Glandular (Acinar) / Tubular Differentiation   Score 3    Nuclear Pleomorphism   Score 2    Mitotic Rate   Score 1    Overall Grade   Grade 2 (scores of 6 or 7)    Tumor Size   Greatest dimension of largest invasive focus (Millimeters): 50 mm   Tumor Focality   Single focus of invasive carcinoma    Ductal Carcinoma In Situ (DCIS)   Not identified    Lobular Carcinoma In Situ (LCIS)   Present            Lymphovascular Invasion   Not identified    Dermal Lymphovascular Invasion   Not identified    Microcalcifications   Not identified    Treatment Effect in the Breast   No known presurgical therapy    MARGINS   Margin Status for Invasive Carcinoma   All margins negative for invasive carcinoma    Distance from  Invasive Carcinoma to Closest Margin   10 mm   Closest Margin(s) to Invasive Carcinoma   Posterior    Distance from Invasive Carcinoma to Anterior Margin   15 mm   Distance from Invasive Carcinoma to Superior Margin   40 mm   Distance from Invasive Carcinoma to Inferior Margin   30 mm   Distance from Invasive Carcinoma to Medial Margin   100 mm   Distance from Invasive Carcinoma to Lateral Margin   20 mm   REGIONAL LYMPH NODES   Regional Lymph Node Status   Tumor present in regional lymph node(s)    Number of Lymph Nodes with Macrometastases   0    Number of Lymph Nodes with Micrometastases   0    Number of Lymph Nodes with Isolated Tumor Cells   1    Size of Largest Krystin Metastatic Deposit   0.1 mm   Extranodal Extension   Not identified    Total Number of Lymph Nodes Examined (sentinel and non-sentinel)   4    Number of [place] Nodes Examined   4    PATHOLOGIC STAGE CLASSIFICATION (pTNM, AJCC 8th Edition)   Reporting of pT, pN, and (when applicable) pM categories is based on information available to the pathologist at the time the report is issued. As per the AJCC (Chapter 1, 8th Ed.) it is the managing physician’s responsibility to establish the final pathologic stage based upon all pertinent information, including but potentially not limited to this pathology report.   pT Category   pT2    Regional Lymph Nodes Modifier   (sn): [place] node(s) evaluated.    pN Category   pN0 (i+)    SPECIAL STUDIES           Estrogen Receptor (ER) Status   Positive (greater than 10% of cells demonstrate nuclear positivity)    Percentage of Cells with Nuclear Positivity   %            Progesterone Receptor (PgR) Status   Positive    Percentage of Cells with Nuclear Positivity   %            HER2 (by immunohistochemistry)   Negative (Score 1+)            Ki-67 Percentage of Positive Nuclei   39 %   Testing Performed on Case Number   [facility] case [id number]    .      Comment     The patient's concurrent left breast core  biopsy material is on file at [facility] ([id number]); those slides are not available for review. Both clips are associated with an invasive lobular carcinoma that is part of one roughly dumb bell shaped mass. Hormone receptors were previously performed at [facility]. The tumor is ER and DC positive, HER2 negative with a Ki-67 measuring up to 39.2% maximally.      mec/pkm             Oncotype DX April 26, 2022 for patients with micro mets and nodes +1-30 returned as recurrence score of 18 with a breast cancer specific survival at 9 years of 97%.  Risk of distant recurrence at 8 years on a hormone blocker alone is 16%.  Chemotherapy for this group as benefit cannot be excluded.        Labs:   [facility] genetic testing panel March 4, 2022 breast and gynecology add on melanoma returned as negative for mutation.  We will let her know        Procedures:      Assessment:   Diagnosis Plan   1. Malignant neoplasm of upper-outer quadrant of left breast in female, estrogen receptor positive  Mammo Screening Modified With Tomosynthesis Right With CAD      2. Secondary malignant neoplasm of axillary lymph nodes        3. Abnormal MRI, breast        1-  LEFT multifocal malignancy  Left breast 130, 6 cm from the nipple, 1.1 cm on ultrasound.  Top- in good position, 1.2 cm on MRI, invasive lobular carcinoma, intermediate grade, 3, 2, 1, 9 mm in a core, estrogen 97, progesterone 95, HER-2/leonie 1+, Ki-67 33%.    Left breast 230, 6 cm from the nipple, 2 cm on ultrasound-palpable lesion, 2 cm on ultrasound, 6.1 cm on MRI.  Q. marker in good position.  Invasive lobular carcinoma, intermediate grade, 3, 2, 1, 1 cm in greatest dimension, estrogen 90, progesterone 96, HER-2/leonie 0, Ki-67 39%.    Clinical stage mT3N0 stage IIb.      [facility] genetic testing panel March 4, 2022 breast and gynecology add on melanoma returned as negative for mutation.        Pathology from 4/13/21 left total mastectomy and sentinel lymph node biopsy followed  by reconstruction returned as:  1 of 4 lymph nodes with breast cancer.  Isolated tumor cells.  Invasive lobular carcinoma, intermediate grade, 3, 2, 1, 5 cm, lobular carcinoma in situ, measuring 7 cm.  All margins are clear with the closest being 10 mm from the posterior margin.  The size of the greatest deposit in the lymph node is 0.1 mm or isolated tumor cells.  Estrogen , progesterone , HER-2/leonie 1+, Ki-67 39%.  Pathologic stage T2N0 (i+), stage 2A.        Oncotype DX April 26, 2022 for patients with micro mets and nodes +1-30 returned as recurrence score of 18 with a breast cancer specific survival at 8 years of 97%.  Risk of distant recurrence at 8 years on a hormone blocker alone is 16%.  Chemotherapy for this group as benefit cannot be excluded.    -I placed a right sided port May 13, 2022  - Dr. Palacios chemotherapy starting in May 2022 AC x4 followed by 6 doses of Taxol which she completed around October.  -Dr. Lan Anguiano then treated her chest wall and regional lymphatics from October 25, 2022 through November 23, 2022.  - bilateral salpingo-oophorectomy December 15, 2022 with Dr. Gutierrez.  - started Femara December 2022 but had significant arthralgias so was switched over in January 2023 to Arimidex.      2-  RIGHT immediate retroareolar 7:-8:00- felt to be sebaceous cyst on MRI.   Right breast ultrasound April 11, 2022 [facility] ultrasound retroareolar right breast 7-8 o'clock there is an 8 mm hypoechoic mass immediately deep to the skin.  Consistent with that of a benign complex cyst.  This corresponds to the nonenhancing lesion seen on the breast MRI consistent with a benign etiology.        Plan:  Maribell is here today for her 1 year follow-up visit postoperatively.  She is doing well.  We reviewed the meticulous skin care with massage and moisturization of the chest wall, lymphatic massage of the left arm and range of motion of the left shoulder.  She has a follow-up with  physical therapy bioimpedance for her third measurement scheduled.  She has an appointment to see Dr. Johnson to discuss plans for reconstruction on the left.  Dr. Plaacios wants to keep her port for another year.  Therefore I will arrange for her February 21, 2024 [facility] and see me back after.  At that time we will discuss removing her right port and we will see her reconstruction..    Monisha Escudero MD    Next Appointment:  Return for Next scheduled follow up, after imaging.    Today I spent 30 minutes doing the following: Reviewing records, labs, outside imaging and reports in preparation for the patient visit; obtaining medical history; performing the physical exam; counseling and educating the patient and any available family or caregivers; ordering medications, tests or procedures; coordinating care with any other physicians on her care team as needed, and documenting all of the above in the medical record as well as sending communications with her other healthcare professionals.      EMR Dragon/transcription disclaimer:    Much of this encounter note is an electronic transcription/translocation of spoken language to printed text.  The electronic translation of spoken language may permit erroneous, or at times, nonsensical words or phrases to be inadvertently transcribed.  Although I have reviewed the note from such areas, some may still exist.

## 2023-04-05 NOTE — TELEPHONE ENCOUNTER
Call her she needs to see any orthopedic doctor in the first floor to infiltrate with steroids for dequervain tendinitis, i called her all her labs are fine and she will increase bit d supplement, she will call you in 3 weeks in regard hand pain. Chau Palacios MD.    Placed referral. Chelsea Abbott RN

## 2023-04-05 NOTE — TELEPHONE ENCOUNTER
Scheduled screening [facility] Tues 2/27/23 12:30 pm.     Follow up with Dr. Escudero Mon 3/4/24 10:30 am. Confirmed.

## 2023-05-15 ENCOUNTER — INFUSION (OUTPATIENT)
Dept: ONCOLOGY | Facility: HOSPITAL | Age: 38
End: 2023-05-15
Payer: COMMERCIAL

## 2023-05-15 DIAGNOSIS — Z45.2 FITTING AND ADJUSTMENT OF VASCULAR CATHETER: Primary | ICD-10-CM

## 2023-05-15 PROCEDURE — 25010000002 HEPARIN LOCK FLUSH PER 10 UNITS: Performed by: INTERNAL MEDICINE

## 2023-05-15 PROCEDURE — 96523 IRRIG DRUG DELIVERY DEVICE: CPT

## 2023-05-15 RX ORDER — HEPARIN SODIUM (PORCINE) LOCK FLUSH IV SOLN 100 UNIT/ML 100 UNIT/ML
500 SOLUTION INTRAVENOUS AS NEEDED
OUTPATIENT
Start: 2023-05-15

## 2023-05-15 RX ORDER — SODIUM CHLORIDE 0.9 % (FLUSH) 0.9 %
10 SYRINGE (ML) INJECTION AS NEEDED
OUTPATIENT
Start: 2023-05-15

## 2023-05-15 RX ORDER — SODIUM CHLORIDE 0.9 % (FLUSH) 0.9 %
10 SYRINGE (ML) INJECTION AS NEEDED
Status: DISCONTINUED | OUTPATIENT
Start: 2023-05-15 | End: 2023-05-15 | Stop reason: HOSPADM

## 2023-05-15 RX ORDER — HEPARIN SODIUM (PORCINE) LOCK FLUSH IV SOLN 100 UNIT/ML 100 UNIT/ML
500 SOLUTION INTRAVENOUS AS NEEDED
Status: DISCONTINUED | OUTPATIENT
Start: 2023-05-15 | End: 2023-05-15 | Stop reason: HOSPADM

## 2023-05-15 RX ADMIN — Medication 10 ML: at 13:53

## 2023-05-15 RX ADMIN — Medication 500 UNITS: at 13:53

## 2023-05-24 ENCOUNTER — HOSPITAL ENCOUNTER (OUTPATIENT)
Dept: PHYSICAL THERAPY | Facility: HOSPITAL | Age: 38
Setting detail: THERAPIES SERIES
Discharge: HOME | End: 2023-05-24
Payer: COMMERCIAL

## 2023-05-24 DIAGNOSIS — Z91.89 AT RISK FOR LYMPHEDEMA: ICD-10-CM

## 2023-05-24 DIAGNOSIS — Z90.12 S/P LEFT MASTECTOMY: ICD-10-CM

## 2023-05-24 DIAGNOSIS — C50.412 MALIGNANT NEOPLASM OF UPPER-OUTER QUADRANT OF LEFT BREAST IN FEMALE, ESTROGEN RECEPTOR POSITIVE: Primary | ICD-10-CM

## 2023-05-24 DIAGNOSIS — Z17.0 MALIGNANT NEOPLASM OF UPPER-OUTER QUADRANT OF LEFT BREAST IN FEMALE, ESTROGEN RECEPTOR POSITIVE: Primary | ICD-10-CM

## 2023-05-24 PROCEDURE — 93702 BIS XTRACELL FLUID ANALYSIS: CPT

## 2023-05-24 NOTE — THERAPY RE-EVALUATION
Physical Therapy Lymphedema Re-Evaluation  [facility]     Patient Name: Maribell Romero  : 1985  MRN: [id number]  Today's Date: 2023      Visit Date: 2023    Visit Dx:    ICD-10-CM ICD-9-CM   1. Malignant neoplasm of upper-outer quadrant of left breast in female, estrogen receptor positive  C50.412 174.4    Z17.0 V86.0   2. At risk for lymphedema  Z91.89 V49.89   3. S/P left mastectomy  Z90.12 V45.71       Patient Active Problem List   Diagnosis   • Other specified anemias   • Malignant neoplasm of upper-outer quadrant of left breast in female, estrogen receptor positive   • Peptic reflux esophagitis   • Fitting and adjustment of vascular catheter   • B12 deficiency   • Neuropathy due to chemotherapeutic drug   • Surgical menopause   • Arthralgia of both hands   • Tenosynovitis, de Quervain   • Primary osteoarthritis of both hands   • Morning stiffness of joints        Past Medical History:   Diagnosis Date   • Anemia    • Anesthesia complication     hypotension with last C section   • Breast cancer 2022    left sided, stage 3, ER/[place]+, Her2-, s/p mastectomy, LAST CHEMO 10/3/22, CURRENTLY RECEIVING RADIATION   • History of COVID-19     2021 AND 2022   • Hyperlipidemia    • PONV (postoperative nausea and vomiting)    • Seasonal allergies         Past Surgical History:   Procedure Laterality Date   • APPENDECTOMY     • BREAST BIOPSY Left    • BREAST RECONSTRUCTION Left 2022    Procedure: LEFT BREAST 1ST STAGE RECONSTRUCTION WITH INSERTION OF TISSUE EXPANDER AND BIOLOGIC;  Surgeon: Hermelinda Johnson MD;  Location: [facility] OR;  Service: Plastics;  Laterality: Left;   •  SECTION     •  SECTION N/A 12/10/2018    Procedure:  SECTION REPEAT;  Surgeon: Heidy Gutierrez MD;  Location: [facility] LABOR DELIVERY;  Service: Obstetrics/Gynecology   • D & C HYSTEROSCOPY N/A 2021    Procedure: HYSTEROSCOPY REMOVAL INTRAUTERINE DEVICE WILL  NEED ULTRASOUND IN ROOM;  Surgeon: Heidy Gutierrez MD;  Location: [facility] OSC;  Service: Obstetrics/Gynecology;  Laterality: N/A;   • DIAGNOSTIC LAPAROSCOPY Bilateral 11/28/2022    Procedure: DAVINCI ASSISTED BILATERAL SALPINGO-OOPHORECTOMY;  Surgeon: Heidy Gutierrez MD;  Location: [facility] OR;  Service: Robotics - DaVinci;  Laterality: Bilateral;   • KNEE ARTHROSCOPY Right    • MASTECTOMY W/ SENTINEL NODE BIOPSY Left 04/13/2022    Procedure: left total mastectomy with left axillary sentinel lymph node biopsy;  Surgeon: Monisha Escudero MD;  Location: [facility] OR;  Service: General;  Laterality: Left;   • VENOUS ACCESS DEVICE (PORT) INSERTION Right 5/13/2022    Procedure: right port placement;  Surgeon: Monisha Escduero MD;  Location: [facility];  Service: General;  Laterality: Right;       Visit Dx:    ICD-10-CM ICD-9-CM   1. Malignant neoplasm of upper-outer quadrant of left breast in female, estrogen receptor positive  C50.412 174.4    Z17.0 V86.0   2. At risk for lymphedema  Z91.89 V49.89   3. S/P left mastectomy  Z90.12 V45.71            Lymphedema     Row Name 05/24/23 0900             Subjective Pain    Able to rate subjective pain? yes  -LB      Pre-Treatment Pain Level 2  -LB         Subjective Comments    Subjective Comments I am doing ok. I had injections in my hand so I am sore but I think they helped. I can move my fingers now.  -LB         Lymphedema Assessment    Lymphedema Classification LUE:;at risk/stage 0  -LB      Lymphedema Cancer Related Sx left;modified radical mastectomy;sentinel node biopsy  -LB      Lymphedema Surgery Comments 4/13/22  -LB      Lymph Nodes Removed # 4  -LB      Positive Lymph Nodes # 1  -LB      Chemo Received yes  -LB      Chemo Treatments #/Timeframe completed  -LB      Radiation Therapy Received yes  -LB      Radiation Treatments #/Timeframe completed  -LB         Posture/Observations    Posture/Observations Comments WNL  -LB         MMT  (Manual Muscle Testing)    General MMT Comments BUE WFL  -LB         Lymphedema Edema Assessment    Edema Assessment Comment no obvious edema  -LB         L-Dex Bioimpedence Screening    L-Dex Measurement Extremity LUE  -LB      L-Dex Patient Position Standing  -LB      L-Dex UE Dominate Side Right  -LB      L-Dex UE At Risk Side Left  -LB      L-Dex UE Pre Surgical Value No  -LB      L-Dex UE Score 4.1  -LB      L-Dex UE Baseline Score 4.5  -LB      L-Dex UE Value Change -0.4  -LB      L-Dex UE Comment WNL  -LB      $ L-Dex Charge yes  -LB            User Key  (r) = Recorded By, (t) = Taken By, (c) = Cosigned By    Initials Name Provider Type    Carissa Steel PT Physical Therapist                                Therapy Education  Education Details: discussed bioimpedance results and interpretation, lymphedema s/s, steps to prevention  Given: Symptoms/condition management  Program: Reinforced  How Provided: Verbal  Provided to: Patient  Level of Understanding: Verbalized       OP Exercises     Row Name 05/24/23 0900             Subjective Comments    Subjective Comments I am doing ok. I had injections in my hand so I am sore but I think they helped. I can move my fingers now.  -LB         Subjective Pain    Able to rate subjective pain? yes  -LB      Pre-Treatment Pain Level 2  -LB            User Key  (r) = Recorded By, (t) = Taken By, (c) = Cosigned By    Initials Name Provider Type    Carissa Steel PT Physical Therapist                             PT OP Goals     Row Name 05/24/23 0900          Long Term Goals    LTG Date to Achieve 07/10/22  -LB     LTG 1 Pt will maintain LDex bioimpedance score WNL.  -LB     LTG 1 Progress Ongoing  -LB     LTG 1 Progress Comments returned to WNL today at 4.1  -LB     LTG 2 Pt will verbalize s/s of lymphedema and steps to prevention.  -LB     LTG 2 Progress Met  -LB     LTG 3 Pt will report 50% improvement in joint pain with continued exercise program and joint mobility  strategies.  -LB     LTG 3 Progress Met  -LB     LTG 3 Progress Comments Pt with recent steroid injection.  -LB           User Key  (r) = Recorded By, (t) = Taken By, (c) = Cosigned By    Initials Name Provider Type    Carissa Steel PT Physical Therapist                 PT Assessment/Plan     Row Name 05/24/23 0927          PT Assessment    Functional Limitations Limitations in functional capacity and performance;Limitation in home management;Performance in leisure activities;Performance in sport activities  -LB     Impairments Edema;Impaired flexibility;Joint mobility;Muscle strength;Pain;Poor body mechanics  -LB     Assessment Comments Pt returns for repeat bioimpedance reporting reduced B hand discomfort with recent steroid injection B. Repeat bioimpedance testing with score of 4.1 which has now returned to baseline and is stable. Due to good results she will return in 6 months for repeat testing.  -LB     Rehab Potential Good  -LB     Patient/caregiver participated in establishment of treatment plan and goals Yes  -LB     Patient would benefit from skilled therapy intervention Yes  -LB        PT Plan    PT Frequency Other (comment)  -LB     Predicted Duration of Therapy Intervention (PT) repeat bioimpedance in 6 months  -LB     Planned CPT's? PT EVAL LOW COMPLEXITY: 06232;PT RE-EVAL: 34381;PT THER PROC EA 15 MIN: 17496;PT THER ACT EA 15 MIN: 06745;PT MANUAL THERAPY EA 15 MIN: 85262;PT SELF CARE/HOME MGMT/TRAIN EA 15: 76557;PT BIS XTRACELL FLUID ANALYSIS: 27910  -LB     PT Plan Comments repeat bioimpedance, hand pain?  -LB           User Key  (r) = Recorded By, (t) = Taken By, (c) = Cosigned By    Initials Name Provider Type    Carissa Steel PT Physical Therapist                             Time Calculation:   Start Time: 0915  Stop Time: 0930  Time Calculation (min): 15 min   Therapy Charges for Today     Code Description Service Date Service Provider Modifiers Qty    [id number]  PT BIS XTRACELL FLUID  ANALYSIS 5/24/2023 Carissa Funes, PT  1                    Carissa Funes, PT  5/24/2023

## 2023-05-30 RX ORDER — ANASTROZOLE 1 MG/1
TABLET ORAL
Qty: 30 TABLET | Refills: 1 | Status: SHIPPED | OUTPATIENT
Start: 2023-05-30

## 2023-08-07 ENCOUNTER — INFUSION (OUTPATIENT)
Dept: ONCOLOGY | Facility: HOSPITAL | Age: 38
End: 2023-08-07
Payer: COMMERCIAL

## 2023-08-07 DIAGNOSIS — Z45.2 FITTING AND ADJUSTMENT OF VASCULAR CATHETER: Primary | ICD-10-CM

## 2023-08-07 PROCEDURE — 96523 IRRIG DRUG DELIVERY DEVICE: CPT

## 2023-08-07 PROCEDURE — 25010000002 HEPARIN LOCK FLUSH PER 10 UNITS: Performed by: INTERNAL MEDICINE

## 2023-08-07 RX ORDER — HEPARIN SODIUM (PORCINE) LOCK FLUSH IV SOLN 100 UNIT/ML 100 UNIT/ML
500 SOLUTION INTRAVENOUS AS NEEDED
Status: DISCONTINUED | OUTPATIENT
Start: 2023-08-07 | End: 2023-08-07 | Stop reason: HOSPADM

## 2023-08-07 RX ORDER — SODIUM CHLORIDE 0.9 % (FLUSH) 0.9 %
10 SYRINGE (ML) INJECTION AS NEEDED
Status: DISCONTINUED | OUTPATIENT
Start: 2023-08-07 | End: 2023-08-07 | Stop reason: HOSPADM

## 2023-08-07 RX ADMIN — Medication 500 UNITS: at 13:23

## 2023-08-07 RX ADMIN — Medication 10 ML: at 13:23

## 2023-09-18 ENCOUNTER — INFUSION (OUTPATIENT)
Dept: ONCOLOGY | Facility: HOSPITAL | Age: 38
End: 2023-09-18
Payer: COMMERCIAL

## 2023-09-18 DIAGNOSIS — Z45.2 FITTING AND ADJUSTMENT OF VASCULAR CATHETER: Primary | ICD-10-CM

## 2023-09-18 PROCEDURE — 96523 IRRIG DRUG DELIVERY DEVICE: CPT

## 2023-09-18 PROCEDURE — 25010000002 HEPARIN LOCK FLUSH PER 10 UNITS: Performed by: INTERNAL MEDICINE

## 2023-09-18 RX ORDER — SODIUM CHLORIDE 0.9 % (FLUSH) 0.9 %
10 SYRINGE (ML) INJECTION AS NEEDED
Status: DISCONTINUED | OUTPATIENT
Start: 2023-09-18 | End: 2023-09-18 | Stop reason: HOSPADM

## 2023-09-18 RX ORDER — HEPARIN SODIUM (PORCINE) LOCK FLUSH IV SOLN 100 UNIT/ML 100 UNIT/ML
500 SOLUTION INTRAVENOUS AS NEEDED
Status: DISCONTINUED | OUTPATIENT
Start: 2023-09-18 | End: 2023-09-18 | Stop reason: HOSPADM

## 2023-09-18 RX ADMIN — Medication 500 UNITS: at 13:50

## 2023-09-18 RX ADMIN — Medication 10 ML: at 13:50

## 2023-10-11 ENCOUNTER — OFFICE VISIT (OUTPATIENT)
Dept: FAMILY MEDICINE CLINIC | Facility: CLINIC | Age: 38
End: 2023-10-11
Payer: COMMERCIAL

## 2023-10-11 VITALS
HEART RATE: 70 BPM | HEIGHT: 62 IN | TEMPERATURE: 97.8 F | SYSTOLIC BLOOD PRESSURE: 90 MMHG | DIASTOLIC BLOOD PRESSURE: 50 MMHG | OXYGEN SATURATION: 97 % | WEIGHT: 125.2 LBS | RESPIRATION RATE: 20 BRPM | BODY MASS INDEX: 23.04 KG/M2

## 2023-10-11 DIAGNOSIS — R74.8 ELEVATED ALKALINE PHOSPHATASE LEVEL: ICD-10-CM

## 2023-10-11 DIAGNOSIS — Z13.1 DIABETES MELLITUS SCREENING: ICD-10-CM

## 2023-10-11 DIAGNOSIS — Z00.00 ENCOUNTER FOR ANNUAL PHYSICAL EXAMINATION EXCLUDING GYNECOLOGICAL EXAMINATION IN A PATIENT OLDER THAN 17 YEARS: Primary | ICD-10-CM

## 2023-10-11 DIAGNOSIS — E78.5 HYPERLIPIDEMIA, UNSPECIFIED HYPERLIPIDEMIA TYPE: ICD-10-CM

## 2023-10-11 DIAGNOSIS — Z11.59 ENCOUNTER FOR HEPATITIS C SCREENING TEST FOR LOW RISK PATIENT: ICD-10-CM

## 2023-10-11 NOTE — PROGRESS NOTES
Subjective   Maribell Romero is a 38 y.o. female who presents for annual female wellness exam.  Chief Complaint   Patient presents with    Annual Exam     38-year-old female with history of left breast lobular carcinoma ER and NV positive, HER negative s/p mastectomy with reconstruction and chemotherapy presents today for routine annual physical.  Patient also s/p salpingo-oophorectomy.    Patient follows with GYN and oncology.  She had her annual GYN exam last week, states she had a Pap smear and DEXA scan performed.  Results are pending    Has HLD: currently on Lipitor for 2-3 years.  Patient denies other chronic medical conditions     Menstrual History: Surgical menopause since   Pregnancy History:   Sexual History: active    Contraception: surgical menaoause  Hormone Replacement Therapy: none  Diet: balanced  Exercise: not routinly  Do you feel safe? yes  Have you ever been abused? no    Mammogram: 22 nex mammo 2024  Pap Smear: last week, pending results. No no hx of abnormal paps  Bone Density: yes last week pending results  Colon Cancer Screening: no    Immunization History   Administered Date(s) Administered    COVID-19 (MODERNA) 1st,2nd,3rd Dose Monovalent 2021, 2021    COVID-19 (MODERNA) Monovalent Original Booster 2021    Flu Vaccine Split Quad 2018    Tdap 2018       The following portions of the patient's history were reviewed and updated as appropriate: allergies, current medications, past family history, past medical history, past social history, past surgical history and problem list.    Past Medical History:   Diagnosis Date    Anemia     Anesthesia complication     hypotension with last C section    Breast cancer 2022    left sided, stage 3, ER/[place]+, Her2-, s/p mastectomy, LAST CHEMO 10/3/22, CURRENTLY RECEIVING RADIATION    History of COVID-19     2021 AND 2022    Hyperlipidemia     PONV (postoperative nausea and vomiting)      Seasonal allergies        Past Surgical History:   Procedure Laterality Date    APPENDECTOMY      BREAST BIOPSY Left     BREAST RECONSTRUCTION Left 2022    Procedure: LEFT BREAST 1ST STAGE RECONSTRUCTION WITH INSERTION OF TISSUE EXPANDER AND BIOLOGIC;  Surgeon: Hermelinda Johnson MD;  Location: [facility] OR;  Service: Plastics;  Laterality: Left;     SECTION       SECTION N/A 12/10/2018    Procedure:  SECTION REPEAT;  Surgeon: Heidy Gutierrez MD;  Location: [facility] LABOR DELIVERY;  Service: Obstetrics/Gynecology    D & C HYSTEROSCOPY N/A 2021    Procedure: HYSTEROSCOPY REMOVAL INTRAUTERINE DEVICE WILL NEED ULTRASOUND IN ROOM;  Surgeon: Heidy Guteirrez MD;  Location: [facility] OSC;  Service: Obstetrics/Gynecology;  Laterality: N/A;    DIAGNOSTIC LAPAROSCOPY Bilateral 2022    Procedure: DAVINCI ASSISTED BILATERAL SALPINGO-OOPHORECTOMY;  Surgeon: Heidy Gutierrez MD;  Location: [facility];  Service: Robotics - DaVinci;  Laterality: Bilateral;    KNEE ARTHROSCOPY Right     MASTECTOMY W/ SENTINEL NODE BIOPSY Left 2022    Procedure: left total mastectomy with left axillary sentinel lymph node biopsy;  Surgeon: Monisha Escudero MD;  Location: [facility] OR;  Service: General;  Laterality: Left;    VENOUS ACCESS DEVICE (PORT) INSERTION Right 2022    Procedure: right port placement;  Surgeon: Monisha Escudero MD;  Location: [facility] OR;  Service: General;  Laterality: Right;       Family History   Problem Relation Age of Onset    No Known Problems Mother     No Known Problems Father     No Known Problems Sister     No Known Problems Brother     No Known Problems Maternal Grandmother     Diabetes Maternal Grandfather     No Known Problems Paternal Grandmother     No Known Problems Paternal Grandfather     Melanoma Paternal Aunt     Diabetes Other     Malig Hyperthermia Neg Hx     Colon cancer Neg Hx        Social History      Socioeconomic History    Marital status:    Tobacco Use    Smoking status: Never    Smokeless tobacco: Never   Vaping Use    Vaping Use: Never used   Substance and Sexual Activity    Alcohol use: Not Currently     Alcohol/week: 5.0 standard drinks of alcohol     Types: 5 Shots of liquor per week    Drug use: Never    Sexual activity: Yes     Partners: Female       Review of Systems   Constitutional:  Negative for chills and fever.   HENT:  Negative for ear discharge, rhinorrhea and sore throat.    Respiratory:  Negative for cough, chest tightness and shortness of breath.    Cardiovascular:  Negative for chest pain, palpitations and leg swelling.   Gastrointestinal:  Negative for abdominal pain, blood in stool, constipation, diarrhea and nausea.   Genitourinary:  Negative for dysuria.   Neurological:  Negative for dizziness, light-headedness, numbness and headaches.   Psychiatric/Behavioral:  Negative for dysphoric mood.        Objective   Vitals:    10/11/23 0759   BP: 90/50   Pulse: 70   Resp: 20   Temp: 97.8 øF (36.6 øC)   SpO2: 97%     Body mass index is 23.04 kg/mý.  Physical Exam  Constitutional:       General: She is not in acute distress.     Appearance: She is not ill-appearing.   HENT:      Head: Normocephalic and atraumatic.      Right Ear: Tympanic membrane, ear canal and external ear normal.      Left Ear: Tympanic membrane, ear canal and external ear normal.      Mouth/Throat:      Mouth: Mucous membranes are moist.   Cardiovascular:      Rate and Rhythm: Normal rate and regular rhythm.      Pulses: Normal pulses.      Heart sounds: No murmur heard.  Pulmonary:      Effort: Pulmonary effort is normal.      Breath sounds: Normal breath sounds.   Abdominal:      General: Abdomen is flat.      Palpations: Abdomen is soft. There is no hepatomegaly or splenomegaly.      Tenderness: There is no abdominal tenderness.   Musculoskeletal:      Right lower leg: No edema.      Left lower leg: No edema.    Lymphadenopathy:      Cervical: No cervical adenopathy.   Skin:     Capillary Refill: Capillary refill takes less than 2 seconds.   Psychiatric:         Mood and Affect: Mood normal.           Assessment & Plan   Diagnoses and all orders for this visit:    1. Encounter for annual physical examination excluding gynecological examination in a patient older than 17 years (Primary)  -     CBC & Differential  -     Comprehensive Metabolic Panel  -     TSH Rfx On Abnormal To Free T4  -     Urinalysis With Culture If Indicated -    2. Hyperlipidemia, unspecified hyperlipidemia type  -     Lipid Panel    3. Diabetes mellitus screening  -     Hemoglobin A1c    4. Encounter for hepatitis C screening test for low risk patient  -     Hepatitis C Antibody    Routine labs today  Continue atorvastatin 10 mg  Diabetes screening  Hep C screening  Patient declined flu vaccine today  Discussed the importance of maintaining a healthy weight and getting regular exercise.  Educated patient on the benefits of healthy diet.  Advise follow-up annually for wellness exams.      Patient Instructions   Ask you oncologist about flu vaccine and pneumonia vaccine.

## 2023-10-12 LAB
ALBUMIN SERPL-MCNC: 5.1 G/DL (ref 3.9–4.9)
ALBUMIN/GLOB SERPL: 1.8 {RATIO} (ref 1.2–2.2)
ALP SERPL-CCNC: 131 IU/L (ref 44–121)
ALT SERPL-CCNC: 17 IU/L (ref 0–32)
AST SERPL-CCNC: 25 IU/L (ref 0–40)
BASOPHILS # BLD AUTO: 0 X10E3/UL (ref 0–0.2)
BASOPHILS NFR BLD AUTO: 1 %
BILIRUB SERPL-MCNC: 0.4 MG/DL (ref 0–1.2)
BUN SERPL-MCNC: 10 MG/DL (ref 6–20)
BUN/CREAT SERPL: 15 (ref 9–23)
CALCIUM SERPL-MCNC: 10 MG/DL (ref 8.7–10.2)
CHLORIDE SERPL-SCNC: 101 MMOL/L (ref 96–106)
CHOLEST SERPL-MCNC: 174 MG/DL (ref 100–199)
CO2 SERPL-SCNC: 24 MMOL/L (ref 20–29)
CREAT SERPL-MCNC: 0.68 MG/DL (ref 0.57–1)
EGFRCR SERPLBLD CKD-EPI 2021: 114 ML/MIN/1.73
EOSINOPHIL # BLD AUTO: 0.2 X10E3/UL (ref 0–0.4)
EOSINOPHIL NFR BLD AUTO: 4 %
ERYTHROCYTE [DISTWIDTH] IN BLOOD BY AUTOMATED COUNT: 12.6 % (ref 11.7–15.4)
GLOBULIN SER CALC-MCNC: 2.8 G/DL (ref 1.5–4.5)
GLUCOSE SERPL-MCNC: 94 MG/DL (ref 70–99)
HBA1C MFR BLD: 5.6 % (ref 4.8–5.6)
HCT VFR BLD AUTO: 39.8 % (ref 34–46.6)
HCV IGG SERPL QL IA: NON REACTIVE
HDLC SERPL-MCNC: 69 MG/DL
HGB BLD-MCNC: 13 G/DL (ref 11.1–15.9)
IMM GRANULOCYTES # BLD AUTO: 0 X10E3/UL (ref 0–0.1)
IMM GRANULOCYTES NFR BLD AUTO: 0 %
LDLC SERPL CALC-MCNC: 89 MG/DL (ref 0–99)
LYMPHOCYTES # BLD AUTO: 1.3 X10E3/UL (ref 0.7–3.1)
LYMPHOCYTES NFR BLD AUTO: 28 %
MCH RBC QN AUTO: 29 PG (ref 26.6–33)
MCHC RBC AUTO-ENTMCNC: 32.7 G/DL (ref 31.5–35.7)
MCV RBC AUTO: 89 FL (ref 79–97)
MONOCYTES # BLD AUTO: 0.3 X10E3/UL (ref 0.1–0.9)
MONOCYTES NFR BLD AUTO: 7 %
NEUTROPHILS # BLD AUTO: 2.7 X10E3/UL (ref 1.4–7)
NEUTROPHILS NFR BLD AUTO: 60 %
PLATELET # BLD AUTO: 188 X10E3/UL (ref 150–450)
POTASSIUM SERPL-SCNC: 4.4 MMOL/L (ref 3.5–5.2)
PROT SERPL-MCNC: 7.9 G/DL (ref 6–8.5)
RBC # BLD AUTO: 4.48 X10E6/UL (ref 3.77–5.28)
SODIUM SERPL-SCNC: 142 MMOL/L (ref 134–144)
TRIGL SERPL-MCNC: 90 MG/DL (ref 0–149)
TSH SERPL DL<=0.005 MIU/L-ACNC: 1.78 UIU/ML (ref 0.45–4.5)
VLDLC SERPL CALC-MCNC: 16 MG/DL (ref 5–40)
WBC # BLD AUTO: 4.6 X10E3/UL (ref 3.4–10.8)

## 2023-10-12 NOTE — PROGRESS NOTES
It is difficult to answer this question and not having a copy of her labs prior to her starting on cholesterol medication.  If she has a copy ask her to provide it to us.

## 2023-10-13 ENCOUNTER — PATIENT ROUNDING (BHMG ONLY) (OUTPATIENT)
Dept: FAMILY MEDICINE CLINIC | Facility: CLINIC | Age: 38
End: 2023-10-13
Payer: COMMERCIAL

## 2023-10-13 NOTE — PROGRESS NOTES
Sent Patient following in [employer] Message:  My name is Radha Redding      I am the Practice Manager with   [facility] [place]  [address]  [place] [address] [phone].      I am messaging to officially welcome you to our practice and ask about your recent visit.     How did you hear about our practice/providers?    Tell me about your visit with us. What things went well?         We're always looking for ways to make our patients' experiences even better. Do you have recommendations on ways we may improve?       Overall were you satisfied with your first visit to our practice?        Is there anything else I can do for you?     You may receive a survey from [employer] via text or email to provide feedback about your visit.  We ask that you please take a few minutes to complete the survey and let us know how we are doing.  If for any reason you feel unable to give us the highest rating please let me know.      Thank you, and have a great day.

## 2023-10-16 ENCOUNTER — OFFICE VISIT (OUTPATIENT)
Dept: ONCOLOGY | Facility: CLINIC | Age: 38
End: 2023-10-16
Payer: COMMERCIAL

## 2023-10-16 ENCOUNTER — TELEPHONE (OUTPATIENT)
Dept: FAMILY MEDICINE CLINIC | Facility: CLINIC | Age: 38
End: 2023-10-16
Payer: COMMERCIAL

## 2023-10-16 ENCOUNTER — INFUSION (OUTPATIENT)
Dept: ONCOLOGY | Facility: HOSPITAL | Age: 38
End: 2023-10-16
Payer: COMMERCIAL

## 2023-10-16 VITALS
BODY MASS INDEX: 23.13 KG/M2 | WEIGHT: 125.7 LBS | TEMPERATURE: 98.6 F | HEIGHT: 62 IN | SYSTOLIC BLOOD PRESSURE: 98 MMHG | OXYGEN SATURATION: 98 % | HEART RATE: 80 BPM | DIASTOLIC BLOOD PRESSURE: 65 MMHG

## 2023-10-16 DIAGNOSIS — T45.1X5A NEUROPATHY DUE TO CHEMOTHERAPEUTIC DRUG: ICD-10-CM

## 2023-10-16 DIAGNOSIS — E89.40 SURGICAL MENOPAUSE: ICD-10-CM

## 2023-10-16 DIAGNOSIS — Z17.0 MALIGNANT NEOPLASM OF UPPER-OUTER QUADRANT OF LEFT BREAST IN FEMALE, ESTROGEN RECEPTOR POSITIVE: Primary | ICD-10-CM

## 2023-10-16 DIAGNOSIS — Z17.0 MALIGNANT NEOPLASM OF UPPER-OUTER QUADRANT OF LEFT BREAST IN FEMALE, ESTROGEN RECEPTOR POSITIVE: ICD-10-CM

## 2023-10-16 DIAGNOSIS — C50.412 MALIGNANT NEOPLASM OF UPPER-OUTER QUADRANT OF LEFT BREAST IN FEMALE, ESTROGEN RECEPTOR POSITIVE: ICD-10-CM

## 2023-10-16 DIAGNOSIS — M25.542 ARTHRALGIA OF BOTH HANDS: ICD-10-CM

## 2023-10-16 DIAGNOSIS — M25.541 ARTHRALGIA OF BOTH HANDS: ICD-10-CM

## 2023-10-16 DIAGNOSIS — C50.412 MALIGNANT NEOPLASM OF UPPER-OUTER QUADRANT OF LEFT BREAST IN FEMALE, ESTROGEN RECEPTOR POSITIVE: Primary | ICD-10-CM

## 2023-10-16 DIAGNOSIS — R74.8 ALKALINE PHOSPHATASE ELEVATION: ICD-10-CM

## 2023-10-16 DIAGNOSIS — Z45.2 FITTING AND ADJUSTMENT OF VASCULAR CATHETER: ICD-10-CM

## 2023-10-16 DIAGNOSIS — Z45.2 FITTING AND ADJUSTMENT OF VASCULAR CATHETER: Primary | ICD-10-CM

## 2023-10-16 DIAGNOSIS — G62.0 NEUROPATHY DUE TO CHEMOTHERAPEUTIC DRUG: ICD-10-CM

## 2023-10-16 DIAGNOSIS — E53.8 B12 DEFICIENCY: ICD-10-CM

## 2023-10-16 LAB
ALBUMIN SERPL-MCNC: 4.3 G/DL (ref 3.5–5.2)
ALBUMIN/GLOB SERPL: 1.7 G/DL
ALP SERPL-CCNC: 120 U/L (ref 39–117)
ALT SERPL W P-5'-P-CCNC: 15 U/L (ref 1–33)
ANION GAP SERPL CALCULATED.3IONS-SCNC: 13.4 MMOL/L (ref 5–15)
APPEARANCE UR: CLEAR
AST SERPL-CCNC: 25 U/L (ref 1–32)
BACTERIA #/AREA URNS HPF: ABNORMAL /[HPF]
BACTERIA UR CULT: ABNORMAL
BASOPHILS # BLD AUTO: 0.02 10*3/MM3 (ref 0–0.2)
BASOPHILS NFR BLD AUTO: 0.3 % (ref 0–1.5)
BILIRUB SERPL-MCNC: 0.3 MG/DL (ref 0–1.2)
BILIRUB UR QL STRIP: NEGATIVE
BUN SERPL-MCNC: 17 MG/DL (ref 6–20)
BUN/CREAT SERPL: 29.8 (ref 7–25)
CALCIUM SPEC-SCNC: 9.3 MG/DL (ref 8.6–10.5)
CASTS URNS QL MICRO: ABNORMAL /LPF
CHLORIDE SERPL-SCNC: 103 MMOL/L (ref 98–107)
CO2 SERPL-SCNC: 23.6 MMOL/L (ref 22–29)
COLOR UR: YELLOW
CREAT SERPL-MCNC: 0.57 MG/DL (ref 0.6–1.1)
DEPRECATED RDW RBC AUTO: 38.5 FL (ref 37–54)
EGFRCR SERPLBLD CKD-EPI 2021: 118.7 ML/MIN/1.73
EOSINOPHIL # BLD AUTO: 0.21 10*3/MM3 (ref 0–0.4)
EOSINOPHIL NFR BLD AUTO: 3.3 % (ref 0.3–6.2)
EPI CELLS #/AREA URNS HPF: ABNORMAL /HPF (ref 0–10)
ERYTHROCYTE [DISTWIDTH] IN BLOOD BY AUTOMATED COUNT: 11.9 % (ref 12.3–15.4)
GGT SERPL-CCNC: 14 IU/L (ref 0–60)
GLOBULIN UR ELPH-MCNC: 2.6 GM/DL
GLUCOSE SERPL-MCNC: 101 MG/DL (ref 65–99)
GLUCOSE UR QL STRIP: NEGATIVE
HCT VFR BLD AUTO: 36.6 % (ref 34–46.6)
HGB BLD-MCNC: 12.4 G/DL (ref 12–15.9)
HGB UR QL STRIP: ABNORMAL
IMM GRANULOCYTES # BLD AUTO: 0.02 10*3/MM3 (ref 0–0.05)
IMM GRANULOCYTES NFR BLD AUTO: 0.3 % (ref 0–0.5)
KETONES UR QL STRIP: NEGATIVE
LEUKOCYTE ESTERASE UR QL STRIP: ABNORMAL
LYMPHOCYTES # BLD AUTO: 1.27 10*3/MM3 (ref 0.7–3.1)
LYMPHOCYTES NFR BLD AUTO: 20.1 % (ref 19.6–45.3)
MCH RBC QN AUTO: 30.1 PG (ref 26.6–33)
MCHC RBC AUTO-ENTMCNC: 33.9 G/DL (ref 31.5–35.7)
MCV RBC AUTO: 88.8 FL (ref 79–97)
MICRO URNS: ABNORMAL
MONOCYTES # BLD AUTO: 0.52 10*3/MM3 (ref 0.1–0.9)
MONOCYTES NFR BLD AUTO: 8.2 % (ref 5–12)
NEUTROPHILS NFR BLD AUTO: 4.28 10*3/MM3 (ref 1.7–7)
NEUTROPHILS NFR BLD AUTO: 67.8 % (ref 42.7–76)
NITRITE UR QL STRIP: POSITIVE
NRBC BLD AUTO-RTO: 0 /100 WBC (ref 0–0.2)
OTHER ANTIBIOTIC SUSC ISLT: ABNORMAL
PH UR STRIP: 5.5 [PH] (ref 5–7.5)
PLATELET # BLD AUTO: 175 10*3/MM3 (ref 140–450)
PMV BLD AUTO: 10.6 FL (ref 6–12)
POTASSIUM SERPL-SCNC: 3.6 MMOL/L (ref 3.5–5.2)
PROT SERPL-MCNC: 6.9 G/DL (ref 6–8.5)
PROT UR QL STRIP: NEGATIVE
RBC # BLD AUTO: 4.12 10*6/MM3 (ref 3.77–5.28)
RBC #/AREA URNS HPF: ABNORMAL /HPF (ref 0–2)
SODIUM SERPL-SCNC: 140 MMOL/L (ref 136–145)
SP GR UR STRIP: 1.02 (ref 1–1.03)
URINALYSIS REFLEX: ABNORMAL
UROBILINOGEN UR STRIP-MCNC: 0.2 MG/DL (ref 0.2–1)
WBC #/AREA URNS HPF: >30 /HPF (ref 0–5)
WBC NRBC COR # BLD: 6.32 10*3/MM3 (ref 3.4–10.8)
WRITTEN AUTHORIZATION: NORMAL

## 2023-10-16 PROCEDURE — 36591 DRAW BLOOD OFF VENOUS DEVICE: CPT

## 2023-10-16 PROCEDURE — 25010000002 HEPARIN LOCK FLUSH PER 10 UNITS: Performed by: INTERNAL MEDICINE

## 2023-10-16 PROCEDURE — 85025 COMPLETE CBC W/AUTO DIFF WBC: CPT

## 2023-10-16 PROCEDURE — 80053 COMPREHEN METABOLIC PANEL: CPT

## 2023-10-16 PROCEDURE — 36415 COLL VENOUS BLD VENIPUNCTURE: CPT | Performed by: INTERNAL MEDICINE

## 2023-10-16 RX ORDER — SODIUM CHLORIDE 0.9 % (FLUSH) 0.9 %
10 SYRINGE (ML) INJECTION AS NEEDED
Status: DISCONTINUED | OUTPATIENT
Start: 2023-10-16 | End: 2023-10-16 | Stop reason: HOSPADM

## 2023-10-16 RX ORDER — HEPARIN SODIUM (PORCINE) LOCK FLUSH IV SOLN 100 UNIT/ML 100 UNIT/ML
500 SOLUTION INTRAVENOUS AS NEEDED
Status: DISCONTINUED | OUTPATIENT
Start: 2023-10-16 | End: 2023-10-16 | Stop reason: HOSPADM

## 2023-10-16 RX ADMIN — Medication 10 ML: at 09:52

## 2023-10-16 RX ADMIN — Medication 500 UNITS: at 09:52

## 2023-10-16 NOTE — TELEPHONE ENCOUNTER
Caller: Maribell Barber    Relationship: Self    Best call back number: [phone]    Caller requesting test results: SELF     What test was performed: BLOOD WORK AND URINE TEST     When was the test performed: 10/11/23     Where was the test performed: IN OFFICE     Additional notes:     PLEASE ADVISE

## 2023-10-16 NOTE — PROGRESS NOTES
Subjective       REASON FOR FOLLOW UP:  LOBULAR CARCINOMA OF THE LEFT BREAST, 70 MM IN SIZE, MARGINS OF RESECTION NEGATIVE AFTER MASTECTOMY, GRADE 2, ER POSITIVE RI POSITIVE, HER 2 NEGATIVE BY IHC,KI 67 39% ,ONE POSITIVE AXILLARY LYMPH NODE OUT OF 4 NODES REMOVAL, PREMENOPAUSAL .INVITAE PANEL NEGATIVE.ONCOTYPE 18: chemotherapy AC X 4 AND TAXOL X 6, STOPPED BECAUSE SEVERE NEUROPATHY IN FEET AND HANDS. Oophorectomy in 11/22, FEMARA INITIATED AT THAT TIME. COMPLETED RADIATION THERAPY AFTER TAXOL COMPLETION.SWITCHED TO ARIMIDEX DUE TO JOINT PAIN, NEGATIVE RHEUMATOLOGIC WORKUP.        HISTORY OF PRESENT ILLNESS:    On 10/16/2023 this 38-year-old  female returns to the office for follow-up in regard to her history of breast cancer. She is still undergoing adjuvant therapy and she is taking Arimidex at this time giving intolerance to Femara given joint pain. She still has an element of discomfort in both thumbs' joints, MP and PIP but no local inflammation. The rest of the joints remain relatively well. Her appetite has increased. She has gained back a lot of her weight. Her weight is now stable. She has proper bowel activity, proper urination. Obviously no menstrual flow. She has had her reconstruction of her breast and she is satisfied with the end products. Also she has had a Pap smear. She has had a bone density test that will be discussed below.     Otherwise, tolerance to the Arimidex is acceptable.                ONCOLOGIC HISTORY:  The patient is a 38 y.o. year old female who is here for an opinion about the above issue.  I had the opportunity to see this delightful Stateless female, 37 years old, mother of 2, who is premenopausal who has found abnormalities in the left breast since 12/2021, having sensation of fullness in the breast and discomfort and occasional pain. After this complaint she was seen by her primary physician and she initiated a process of mammograms and ultrasounds that culminated with  the diagnosis of lobular carcinoma of the breast, invasive, 7 cm in size. The patient completed a mastectomy and sentinel lymph nodes almost 3 weeks ago and she is here for review and advice in regard to adjuvant therapy. The patient is still having pain at the surgical site, especially since yesterday she had injection of saline in the expander that was placed after the mastectomy. She also has neuropathic pain in the inner aspect of the left arm that is bothering her a lot through the day and sometimes almost driving her crazy. She has no motor deficit into the left upper extremity. She denies any fevers or chills or bone pain. Her appetite is acceptable but she has changed her diet and she is almost going into vegetarian with the exception of some fish. She has eliminated all the dairy products and she has eliminated mostly carbohydrates as well. She has lost some weight. The patient otherwise feels well. She has normal bowel activity, normal urination. No skeletal pain or joint pain. No cough, sputum production, shortness of breath or palpitations. She is extremely anxious about this visit today. She is in company of her .    The patient returned to the office on 05/20/2022 in preparation for initiation of chemotherapy. In the meantime she has undergone a port placement in right subclavian position with no difficulties and an echocardiogram that will be discussed below. The patient has had very significant upper respiratory allergies. She has been tested this week for COVID being negative. She is not running any fever. Most of the symptoms in the respiratory tract include sneezing, itching, itchy eyes and rhinorrhea. No sore throat, no alteration in taste or smell. No cough, shortness of breath, pleuritic pain or hemoptysis.     The surgical site from the left breast expander is not painful. She has healed extremely well. The same is applicable to her port that was placed a week ago.  The patient returned  to the office on 05/26/2022 for an interim assessment. Since the chemotherapy administration last week the patient had persistent nausea and vomiting at least for 4 days to the point that she spent most of the time sleeping because of the effect of the Zyprexa but no resolution of her nausea and vomiting. She has vomited so much that now she has significant esophagitis with heartburn and indigestion. She has not had any hematemesis or melena. She just started to eat yesterday rice and potatoes. She was able to handle this and she has been able to handle liquids. She also has had discoloration of the urine that originally was pink and subsequently now has normalized. She had no burning sensation upon urination or vaginal bleeding. She has been very fatigued and tired. On top of that Neulasta triggers significant pain in the cranium, in the pelvic bone and in her femurs and she has the feeling that she has the flu. Again, she has not had any fever. Today is the best day that she has had since the chemotherapy administration. Obviously given the symptoms and this toxicity assessment, the patient will require radical change in her treatment as will be described below.  On 06/23/2022, the patient returns after she has completed 2 cycles of AC. The first cycle was dramatically cumbersome because of the tremendous amount of side effects including persistent nausea and vomiting after chemotherapy that required IV fluids and modification in her nausea regimen. She also developed neutropenic fever and ended up in the hospital. After 2nd cycle with dose adjustment the patient handled the treatment much better and the miracle medicine in regard to nausea control was Ativan. Phenergan and Compazine triggered tremendous degree of confusion. Therefore, for the subsequent cycle 3 and 4, Ativan will be the main medicine to utilize. Zofran also will be utilized.     Physically the patient feels much better today. The patient has had  appetite to eat. The heartburn and indigestion have mostly subsided. Her nutrition and hydration are much better. She is keeping her weight and she has no pain. Bowel activity with occasional constipation. Urination is normal. No cardiovascular or respiratory issues. Energy level much better.     She had significant pain with Neulasta use 4 days after the treatment requiring Zyrtec and significant Tylenol utilization.    This patient has been seen by video medicine visit on 09/12/2022 in regard to continue her chemotherapy administration in the adjuvant therapy of her breast cancer on the left. The patient at this time is undergoing weekly Taxol. Unfortunately she has developed further peripheral neuropathy in the tips of her fingers and her toes to the point that she feels sensation of itching more than pain all of the time and this is disrupting her life and her functionality very substantially. She has to use ice that gives her some relief of the symptom at least 4-6 times a day. It takes at least 10 minutes for the symptoms to improve but they come back in a question of hours. The patient is not having any difficulty with ambulation but the more walking that she does the more discomfort that she develops. The same is applicable to her hands. Her appetite has improved, she has gained weight, she has some degree of constipation associated with chemotherapy administration relieved with flax seed oil. Her bowel function now is normal, urination is normal. She has not had any menstrual flow since 06/2022. She has not had any nausea or vomiting. No cardiovascular or respiratory issues.     The patient otherwise has not had any new problems. No fever or infections. She has had resolution of her epistaxis.   Surgical menopause with bilateral oophorectomy on 11/28/2022. Patient initiated Femara on 12/16/2022 for a total of 5 years. Baseline echocardiogram after completion of chemotherapy disclosed normal ejection  fraction. We will proceed with radiological assessment of her chest, abdomen and pelvis baseline for the future and eventually she will proceed also with bone density test.            Past Medical History:   Diagnosis Date    Anemia     Anesthesia complication     hypotension with last C section    Breast cancer 2022    left sided, stage 3, ER/[place]+, Her2-, s/p mastectomy, LAST CHEMO 10/3/22, CURRENTLY RECEIVING RADIATION    History of COVID-19     2021 AND 2022    Hyperlipidemia     PONV (postoperative nausea and vomiting)     Seasonal allergies         Past Surgical History:   Procedure Laterality Date    APPENDECTOMY      BREAST BIOPSY Left     BREAST RECONSTRUCTION Left 2022    Procedure: LEFT BREAST 1ST STAGE RECONSTRUCTION WITH INSERTION OF TISSUE EXPANDER AND BIOLOGIC;  Surgeon: Hermelinda Johnson MD;  Location: [facility] OR;  Service: Plastics;  Laterality: Left;     SECTION       SECTION N/A 12/10/2018    Procedure:  SECTION REPEAT;  Surgeon: Heidy Gutierrez MD;  Location: [facility] LABOR DELIVERY;  Service: Obstetrics/Gynecology    D & C HYSTEROSCOPY N/A 2021    Procedure: HYSTEROSCOPY REMOVAL INTRAUTERINE DEVICE WILL NEED ULTRASOUND IN ROOM;  Surgeon: Heidy Gutierrez MD;  Location: [facility] OSC;  Service: Obstetrics/Gynecology;  Laterality: N/A;    DIAGNOSTIC LAPAROSCOPY Bilateral 2022    Procedure: DAVINCI ASSISTED BILATERAL SALPINGO-OOPHORECTOMY;  Surgeon: Heidy Gutierrez MD;  Location: [facility] OR;  Service: Robotics - DaVinci;  Laterality: Bilateral;    KNEE ARTHROSCOPY Right     MASTECTOMY W/ SENTINEL NODE BIOPSY Left 2022    Procedure: left total mastectomy with left axillary sentinel lymph node biopsy;  Surgeon: Monisha Escudero MD;  Location: [facility] OR;  Service: General;  Laterality: Left;    VENOUS ACCESS DEVICE (PORT) INSERTION Right 2022    Procedure: right port placement;  Surgeon: Monisha Escudero  MD PHILIPP;  Location: [facility] MAIN OR;  Service: General;  Laterality: Right;        Current Outpatient Medications on File Prior to Visit   Medication Sig Dispense Refill    anastrozole (ARIMIDEX) 1 MG tablet Take 1 tablet by mouth Daily. 90 tablet 1    atorvastatin (LIPITOR) 10 MG tablet Take 1 tablet by mouth Every Night.      Cetirizine HCl (ZYRTEC PO) Take 10 mg by mouth As Needed.       Current Facility-Administered Medications on File Prior to Visit   Medication Dose Route Frequency Provider Last Rate Last Admin    [DISCONTINUED] heparin injection 500 Units  500 Units Intravenous PRN Chau Palacios MD   500 Units at 10/16/23 0952    [DISCONTINUED] sodium chloride 0.9 % flush 10 mL  10 mL Intravenous PRN Chau Palacios MD   10 mL at 10/16/23 0952        ALLERGIES:    Allergies   Allergen Reactions    Asa [Aspirin] Anaphylaxis and Shortness Of Breath    Adhesive Tape Dermatitis     Paper tape okay    Iopamidol Hives     AKA IV CONTRAST    Ct Contrast Rash     Pt HAD A RASH AND COUGHING        Social History     Socioeconomic History    Marital status:    Tobacco Use    Smoking status: Never    Smokeless tobacco: Never   Vaping Use    Vaping Use: Never used   Substance and Sexual Activity    Alcohol use: Not Currently     Alcohol/week: 5.0 standard drinks of alcohol     Types: 5 Shots of liquor per week    Drug use: Never    Sexual activity: Yes     Partners: Female        Family History   Problem Relation Age of Onset    No Known Problems Mother     No Known Problems Father     No Known Problems Sister     No Known Problems Brother     No Known Problems Maternal Grandmother     Diabetes Maternal Grandfather     No Known Problems Paternal Grandmother     No Known Problems Paternal Grandfather     Melanoma Paternal Aunt     Diabetes Other     Malig Hyperthermia Neg Hx     Colon cancer Neg Hx           Objective     Vitals:    10/16/23 1006   BP: 98/65   Pulse: 80   Temp: 98.6 °F (37 °C)   TempSrc: Temporal  "  SpO2: 98%   Weight: 57 kg (125 lb 11.2 oz)   Height: 157 cm (61.81\")   PainSc: 0-No pain           10/16/2023    10:01 AM   Current Status   ECOG score 0       Physical Exam              GENERAL:  Well-developed, Patient  in no acute distress.   SKIN:  Warm, dry ,NO purpura ,no rash.  HEENT:  Pupils were equal and reactive to light and accomodation, conjunctivae noninjected,  normal visual acuity.   NECK:  Supple with good range of motion; no thyromegaly , no JVD or bruits,.No carotid artery pain, no carotid abnormal pulsation   LYMPHATICS:  No cervical, NO supraclavicular, NO axillary, NO inguinal adenopathies.  CARDIAC   normal rate , regular rhythm, without murmur,NO rubs NO S3 NO S4   LUNGS: normal breath sounds bilateral, no wheezing, NO rhonchi, NO crackles ,NO rubs.  VASCULAR VENOUS: no cyanosis, NO collateral circulation, NO varicosities, NO edema, NO palpable cords, NO pain,NO erythema, NO pigmentation of the skin.  ABDOMEN:  Soft, NO pain,no hepatomegaly, no splenomegaly,no masses, no ascites, no collateral circulation,no distention.  EXTREMITIES  AND SPINE:  No clubbing, no cyanosis ,MINIMAL MP AND PIP BOTH THUMBS OA deformities , no pain .No kyphosis,  no pain in spine, no pain in ribs , no pain in pelvic bone.  NEUROLOGICAL:  Patient was awake, alert, oriented to time, person and place.                RECENT LABS:  Hematology WBC   Date Value Ref Range Status   10/16/2023 6.32 3.40 - 10.80 10*3/mm3 Final   10/11/2023 4.6 3.4 - 10.8 x10E3/uL Final     RBC   Date Value Ref Range Status   10/16/2023 4.12 3.77 - 5.28 10*6/mm3 Final   10/11/2023 4.48 3.77 - 5.28 x10E6/uL Final     Hemoglobin   Date Value Ref Range Status   10/16/2023 12.4 12.0 - 15.9 g/dL Final     Hematocrit   Date Value Ref Range Status   10/16/2023 36.6 34.0 - 46.6 % Final     Platelets   Date Value Ref Range Status   10/16/2023 175 140 - 450 10*3/mm3 Final       CBC:    WBC   Date Value Ref Range Status   10/16/2023 6.32 3.40 - 10.80 " 10*3/mm3 Final   10/11/2023 4.6 3.4 - 10.8 x10E3/uL Final     RBC   Date Value Ref Range Status   10/16/2023 4.12 3.77 - 5.28 10*6/mm3 Final   10/11/2023 4.48 3.77 - 5.28 x10E6/uL Final     Hemoglobin   Date Value Ref Range Status   10/16/2023 12.4 12.0 - 15.9 g/dL Final     Hematocrit   Date Value Ref Range Status   10/16/2023 36.6 34.0 - 46.6 % Final     MCV   Date Value Ref Range Status   10/16/2023 88.8 79.0 - 97.0 fL Final     MCH   Date Value Ref Range Status   10/16/2023 30.1 26.6 - 33.0 pg Final     MCHC   Date Value Ref Range Status   10/16/2023 33.9 31.5 - 35.7 g/dL Final     RDW   Date Value Ref Range Status   10/16/2023 11.9 (L) 12.3 - 15.4 % Final     RDW-SD   Date Value Ref Range Status   10/16/2023 38.5 37.0 - 54.0 fl Final     MPV   Date Value Ref Range Status   10/16/2023 10.6 6.0 - 12.0 fL Final     Platelets   Date Value Ref Range Status   10/16/2023 175 140 - 450 10*3/mm3 Final     Neutrophil %   Date Value Ref Range Status   10/16/2023 67.8 42.7 - 76.0 % Final     Lymphocyte %   Date Value Ref Range Status   10/16/2023 20.1 19.6 - 45.3 % Final     Monocyte %   Date Value Ref Range Status   10/16/2023 8.2 5.0 - 12.0 % Final     Eosinophil %   Date Value Ref Range Status   10/16/2023 3.3 0.3 - 6.2 % Final     Basophil %   Date Value Ref Range Status   10/16/2023 0.3 0.0 - 1.5 % Final     Immature Grans %   Date Value Ref Range Status   10/16/2023 0.3 0.0 - 0.5 % Final     Neutrophils, Absolute   Date Value Ref Range Status   10/16/2023 4.28 1.70 - 7.00 10*3/mm3 Final     Lymphocytes, Absolute   Date Value Ref Range Status   10/16/2023 1.27 0.70 - 3.10 10*3/mm3 Final     Monocytes, Absolute   Date Value Ref Range Status   10/16/2023 0.52 0.10 - 0.90 10*3/mm3 Final     Eosinophils, Absolute   Date Value Ref Range Status   10/16/2023 0.21 0.00 - 0.40 10*3/mm3 Final     Basophils, Absolute   Date Value Ref Range Status   10/16/2023 0.02 0.00 - 0.20 10*3/mm3 Final     Immature Grans, Absolute   Date  Value Ref Range Status   10/16/2023 0.02 0.00 - 0.05 10*3/mm3 Final     nRBC   Date Value Ref Range Status   10/16/2023 0.0 0.0 - 0.2 /100 WBC Final        CMP:    Glucose   Date Value Ref Range Status   10/16/2023 101 (H) 65 - 99 mg/dL Final     BUN   Date Value Ref Range Status   10/16/2023 17 6 - 20 mg/dL Final     Creatinine   Date Value Ref Range Status   10/16/2023 0.57 (L) 0.60 - 1.10 mg/dL Final     Sodium   Date Value Ref Range Status   10/16/2023 140 136 - 145 mmol/L Final     Potassium   Date Value Ref Range Status   10/16/2023 3.6 3.5 - 5.2 mmol/L Final     Chloride   Date Value Ref Range Status   10/16/2023 103 98 - 107 mmol/L Final     CO2   Date Value Ref Range Status   10/16/2023 23.6 22.0 - 29.0 mmol/L Final     Calcium   Date Value Ref Range Status   10/16/2023 9.3 8.6 - 10.5 mg/dL Final     Total Protein   Date Value Ref Range Status   10/16/2023 6.9 6.0 - 8.5 g/dL Final     Albumin   Date Value Ref Range Status   10/16/2023 4.3 3.5 - 5.2 g/dL Final     ALT (SGPT)   Date Value Ref Range Status   10/16/2023 15 1 - 33 U/L Final     AST (SGOT)   Date Value Ref Range Status   10/16/2023 25 1 - 32 U/L Final     Alkaline Phosphatase   Date Value Ref Range Status   10/16/2023 120 (H) 39 - 117 U/L Final     Total Bilirubin   Date Value Ref Range Status   10/16/2023 0.3 0.0 - 1.2 mg/dL Final     Globulin   Date Value Ref Range Status   10/16/2023 2.6 gm/dL Final     A/G Ratio   Date Value Ref Range Status   10/16/2023 1.7 g/dL Final     BUN/Creatinine Ratio   Date Value Ref Range Status   10/16/2023 29.8 (H) 7.0 - 25.0 Final     Anion Gap   Date Value Ref Range Status   10/16/2023 13.4 5.0 - 15.0 mmol/L Final                           Assessment & Plan     Diagnoses and all orders for this visit:    1. Malignant neoplasm of upper-outer quadrant of left breast in female, estrogen receptor positive (Primary)    2. Fitting and adjustment of vascular catheter    3. Neuropathy due to chemotherapeutic drug    4.  B12 deficiency    5. Arthralgia of both hands    6. Surgical menopause         In summary this 37-year-old  female originally from [place] noticed abnormalities in the left breast in 12/2021, sensation of pressure, minor pain, sensation of fullness. She looked for help. Further mammogram and ultrasound documented abnormalities in the left breast that were consistent with breast cancer. Since then the patient has undergone breast MRI. Breast biopsies documented invasive lobular carcinoma. All this culminated with a left-sided mastectomy and sentinel lymph node sampling. The pathology has been posted above. She had a lobular carcinoma, grade 2, margins of resection negative, Ki-67 at 39%, ER positive, GA positive, HER2/leonie negative. She had 1 positive sentinel lymph node for malignancy with no extracapsular invasion. There was no evidence of lymphovascular invasion.     Invitae panel was negative for any genetic alteration.     The patient has had an expander placed at the mastectomy site. She has discomfort and pain after this was instillated with saline solution yesterday.     The other phenomenon that is happening to the patient is significant pain and discomfort in the inner aspect of the left arm. Sounds neuropathic related to her recent sentinel lymph node sampling. The patient is taking Aleve or ibuprofen with not too much benefit.     The patient also has a minimal component of anemia that will require assessment with ferritin, iron, TIBC, B12, folic acid levels.     RECOMMENDATIONS:  I have advised this patient and  today that she needs to receive adjuvant chemotherapy in the form of AC x4 and subsequently Taxol x12. Oncotype DX in this patient shows intermediate risk of recurrence. Also not only the chemotherapy will be necessary but we need to make her postmenopausal in the next several weeks or months. This will bring in a lot of benefit for her in the long run to minimize any recurrent  disease. She is extremely young. She has 2 young children and we have to do as much as we can in this patient to minimize any potentiality for recurrent disease.     I discussed briefly with her side effects of the treatment including alopecia, cardiac toxicity, anemia, leukopenia, thrombocytopenia, mucositis among others and peripheral neuropathy in the Taxol part of the treatment along with leukopenia, anemia, thrombocytopenia and allergic reaction to the medicine. I discussed with her that the whole treatment is going to take at least 24-26 weeks, in other words 6 months, and thereafter the patient will be hopefully postmenopausal. If she does not become postmenopausal she could qualify for a clinical trial or we could ask Dr. Heidy Gutierrez to do bilateral oophorectomy in this patient.     Eventually the patient will require endocrine adjuvant therapy after she becomes postmenopausal hopefully with letrozole.     I discussed all these facts with the patient and her . I spent 1 hour and 30 minutes with her going through the details of all this but she was able to grasp a lot of information. I appreciated highly the visit with her  and I pointed out to them that they need to work as a team. The patient has 2 young children. She is the only one taking care of the kids. The patient's mother is living with her, coming from [place] after all these issues. I asked her to ask her mother to stay for the period of time that she receives chemotherapy. If we need to do any services for her in regard to visa requirements or status, we will be glad to incorporate our  into this process.     Finally, I discussed with the patient the need for her to proceed with cardiac assessment. I made a referral to be seen by Cardiology and scheduled her to have an echocardiogram and I sent her back to see Dr. Escudero in order to proceed with the placement of a port.     In order to control the pain in the  inner aspect of her arm, I asked her to use Voltaren topically 4 times a day. It will be perfectly fine for her to use ibuprofen and I sent Neurontin 100 mg to take at bedtime. Hopefully this will be a transitory issue.     Dr. Johnson eventually will be in charge of deciding the time for the patient to initiate physical therapy for her left shoulder.     I reviewed all the pertinent records on this patient and I posted the information out of the pathology and radiology as above. I discussed the case with my partner, Dr. Landeros, who agrees with my plan of care.    Finally, I also posted to the patient today that will require followup in the future in regard to her right breast not only with mammogram but also ultrasound. I discussed with her the fact that sometimes lobular carcinoma likes to be a bilateral disease. There are minimal abnormalities in the mammogram in the right breast as well as in the MRI. This will require to be followed up in the future. I did not recommend a 2nd mastectomy prophylactically on her even though she asked the question today.     The patient was reviewed on 2022. She has had her port placed in right subclavian position a week ago. She is healing perfectly fine and her surgical site on the left mastectomy and the expander looks perfectly normal with healing. No infection, erythema or discharge.     The patient since then has undergone formal education and consent for chemotherapy administration. Her echocardiogram even though technically difficult documented a normal left ventricular ejection fraction. No pericardial effusion and no valvular dysfunction.     Her white count, hemoglobin and platelets are normal today.     Her chemistry profile, CA 15-3 as well as ferritin, iron, TIBC, B12 and folic acid levels were all normal.     Therefore under the present circumstances I advised the patient and  the followin. For the treatment of her breast cancer the patient will  initiate today chemotherapy with dose dense AC that she will receive every 2 weeks supported with Neulasta. The side effects of this already were discussed with her including nausea, vomiting, alopecia, anemia, leukopenia, thrombocytopenia and cardiac dysfunction among others. The patient was ready to proceed.   2. The patient will be utilizing Neulasta On-body to minimize hematological toxicity and be able to keep up on the schedule in regard to her treatment. I advised her that this can produce significant bone pain in the sternum, in the rib cage, in the pelvic bone and she could use antihistaminic that she is already taking in the first place and also she can use either Aleve or Tylenol or a hot shower if necessary.   3. I encouraged her to remain on her nausea medicine, ondansetron, during the next 3 days to minimize any nausea or vomiting.   4. I advised her to stay away from smelling foods like hot soup or fried foods in the next 3 days given the significant increase in smell that patients receiving AC typically have. This will minimize the potential for nausea and vomiting.   5. The patient will require toxicity assessment in a week along with blood WORK CBC.   6. The patient once completes 4 cycles of AC will initiate weekly cycles of Taxol x12. Upon completion of that the patient will initiate a plan of radiation therapy and she already has been seen by Radiation Oncology.     The patient will participate in a clinical trial that we have in the office and she already has been consented for this in regard to the utilization of antiestrogen medicine upon completion of chemotherapy administration.     The patient will continue using her other medications on routine basis that include the Zyprexa and she will use the EMLA cream to minimize pain at the port site at the time of accessing.     Finally, I advised the patient and the  present in the room that they at any cost had to minimize the risk of  pregnancy. In fact a pregnancy test today was negative. I advised them to use a condom.     The patient was ready to proceed.    All the discussion of all these issues took place in Malagasy.  The patient returned to the office on 05/26/2022. Since the previous visit a week ago the patient had chemotherapy administration and she developed very significant nausea and vomiting nonstoppable for almost 5 days. Yesterday was the first day that she was able to have some rice and potatoes. Because she has thrown up so much she has developed significant heartburn and indigestion indicating probably bile gastritis, esophagitis and acid esophagitis. The patient has not had any hematemesis or melena. The other phenomenon that she had was tremendous somnolence due to the use of Zyprexa at the dose of 5 mg every night. She spent almost 4 days in bed sleepy but still with nausea and vomiting.     On top of things, she has very significant pain in her muscles, in her thighs, in her scalp and in her pelvic bone associated with Neulasta use. She is taking Tylenol for this with some improvement.     The patient has been able to reach hydration since yesterday, drinking proper liquids, and she is urinating okay. Therefore, the patient has multiple issues and on top of that she has now leukopenia associated with chemotherapy. The hemoglobin is stable. The platelet count has dropped but is not dramatically or risky for bleeding.     Given all the above circumstances I proposed her the following changes in regard to plan of chemotherapy for the next cycle.     1. We will modify her plan of chemotherapy, decreasing the doses for all the medications by 20%.   2. The patient will space out the chemotherapy treatments to every 3 weeks for the AC part of the treatment until completion of 4 cycles.   3. The patient will be supported with Neulasta injection. She will utilize Tylenol for pain.   4. The patient will continue using chemotherapy  administration, anti-nausea medication and she will receive Emend on day 2 in the office and Emend on day 3 at home. Pharmacist will take care of this issue.   5. The patient will decrease Zyprexa from 5 mg a day to 2.5 mg in the evening to minimize somnolence.   6. The patient was advised to use omeprazole at the dose of 20 mg twice a day to minimize esophageal irritation associated with so much nausea and vomiting.   7. The patient will require still laboratory parameters the week after each one of the cycles of chemotherapy.     I feel the obligation to make all these changes; otherwise this patient will quit her treatment and that is the worst thing that could happen under the present scenario. She agreed with all the changes made. I explained to her all these changes in Thai to make her life a little bit more comfortable and easy to handle. She understands at the time that she returns in 2 weeks she probably will start to develop alopecia and she already has discussed this with her mother who will help through the process of shaving her head.  On 06/23/2022, the patient had dramatic improvement in regard to nausea control after dose adjustment for the 2nd cycle of chemotherapy and also modification of the nausea regimen. We tried to deliver Compazine and Phenergan. These medicines were tremendously tolling on her in regard to confusion and inability to function. Ativan was the miracle medicine. Therefore for the next 2 cycles Zofran and Ativan will be the ideal combination and she will require also IV fluids on day 2 of each one of the next cycles of AC.     In regard to pain associated with Neulasta, she required Tylenol around-the-clock for 4 days after each one of the treatments and now she has no discomfort whatsoever.     Today her white count is normal, hemoglobin improved to 10.7, platelet count with minor decrease, no clinical bleeding.    The patient feels substantially better. She is able to  function at home. She is able to be with her family. She is able to attend her children and she has been able to walk 2 miles a day late in the evening. Encouraged her to continue doing this.     I suggested for her the followin. She will return in a week to proceed with cycle 3 of AC at the previous dose and supported with Neulasta.   2. She will be supported on day 2 with IV fluids and nausea medication.   3. We will discontinue Phenergan and/or Compazine and she will remain on Zofran and Ativan for nausea control.   4. She will continue PPI to minimize reflux symptomatology, still minimal problem but dramatically better than before.   5. She will use Milk of Magnesia for constipation. Another measure could be prunes and dates.     The patient will complete the total 4 cycles of AC and then will move into the Taxol arena. I pointed out to her that we will have the discussion about how to handle this medicine after the completion of the next 2 cycles.     She also raised the question in regard what to do upon completion of chemotherapy. I advised her that eventually she will have radiological assessment as a baseline and we will proceed with PATRICIA blood sampling for evaluation of minimal residual disease.     The patient was ready to proceed now that she feels dramatically better. What a difference it makes in regard to adjustment of medicines and trying to adjust nausea medication accordingly.  The patient returned to the office on 2022. In regard to her chemotherapy treatment, she is ready to proceed with her 4th or last cycle of AC today. Her ANC is a little bit on the low side but the patient will be receiving Neulasta, therefore this will be taking care of this problem.     Her hemoglobin remains stable. This is anemia associated with chemotherapy administration. She also had a menstrual flow that was very abundant a few weeks ago. She is not taking any vitamins at this time and I have not advised  to take any given concerning data about supplements in the background of adjuvant therapy for breast cancer.     In regard to the sensation of numbness in the left upper extremity arm, I think this is natural after mastectomy more than expected and I pointed out to her that this does not signify anything and does not require any therapy. This is extremely common in many women and it has the tendency to go away spontaneously.     In regard to the abnormality in the left hand there is no evidence of lymphedema. I think she could have a minimal element of tendonitis. I advised her to use topical anti-inflammatory medication or ice the hand. She has not received any IV sites for blood draws in this area.     In regard to future visits, the patient would like to continue her chemotherapy treatments on Mondays because her children will be going back to school and she wants to have a weekend with them as much as she can. I find no problem seeing her in 3 weeks from now on Monday to proceed with initiation of Taxol. I pointed out to her that Taxol will be easier to handle a lower dose, less toxicity in regard to hematological parameters and hopefully no issues in regard to neuropathy.     I also advised her that we will continue monitoring the status of menstrual flow upon completion of chemotherapy. She still has 12 Taxol treatments to go and we do not know if the chemotherapy treatment is going to put her in menopause or not. Depending on the final analysis of this upon completion of chemotherapy and before initiation of hormonal therapy, we will need to do hormone levels.     Finally, the patient has had a lot of issues between her insurance company and the billing system at [facility]. I asked the billing system to give her a call and relate this to the insurance company. The specific question is about the cost and the utilization of PEGylated Neulasta or Neulasta On-body. Hopefully this will be happening to settle  down the situation. Each one of these medicines have been billed in thousands of dollars and she already will complete 4 bills of this medication. She is extremely anxious about this situation. This is called economic toxicity of chemotherapy administration and we need to influence this situation as well on her to give her the benefit of settling down once and for all.    This patient already has enough stressors on the plate to add another one that is economical stressor of her treatment.  On 08/22/2022, the patient's white count, hemoglobin and platelets are stable. Tolerance to Taxol is much better than AC. She has some occasional nausea, some joint pain, some pruritus in the absence of any rash on her hands and her feet and I think this all is side effect of Taxol. I advised her to go ahead and proceed with her Taxol today and I advised her to go back to taking her Zyrtec on a regular basis to try to minimize any itching in her hands and her feet.     I also advised her to continue using Ativan that has been terrific in regard to control of nausea and vomiting and she can use 2-3 doses today and tomorrow to minimize the symptoms. It will be perfectly okay as well for her to take Tylenol tonight and tomorrow to minimize joint pain associated with Taxol utilization.     In regard to her epistaxis, it is very obvious that the nasal passages on the right side are different than the ones on the left. She has very significant deviation of the septum. This triggers airflow currents that are abnormal and capillarity in the anterior part of the septum that is very abnormal and is the source of bleeding. If this continues she will require referral for ENT for cauterization. Her platelet count is normal.     The patient has not had any menstrual flow now for 2 months and will continue asking this question because theoretically the chemotherapy should make her menopausal. Upon completion of Taxol we will need to do  endocrine testing in this regard.     Overall I think the patient is doing much better than before and she will be advised to continue her weekly Taxol along with weekly laboratory testing and weekly nurse practitioner visit and visit with me in 1 month.   On 09/12/2022 the patient continues complaining of significant sensation of itching and discomfort in the tips of her fingers and her toes to the point that she has difficulty doing housework and difficulty walking with this. This is a different way of manifestation of toxicity associated with Taxol administration. She probably has a grade 2 peripheral neuropathy associated with this. The symptoms are not permanent, they come and go but this will obligate me to adjust her Taxol dose today by 25-30%. She has been receiving 120 mg of Taxol per dose. Her dose from now on will be 90 mg.     The patient will receive a B12 injection today and I advised her to take B complex vitamin.    In regard to her anemia associated with chemotherapy administration she was advised by my partner, Mich Agudelo MD, last week to take oral iron therapy. Unfortunately the patient became ill in regard to constipation and cramping and she has stopped the iron therapy.     In regard to her leukopenia associated with chemotherapy administration her white count and ANC are appropriate today and she will be able to proceed with her Taxol administration today. She has not developed any fever or infection.     In regard to her reflux symptomatology, nausea and vomiting the symptoms pertinent to his are resolved now that she is no longer receiving AC chemotherapy. She remains on Ativan and Compazine on prn basis and she remains on PPI that has corrected her acid issues altogether.    In regard how to monitor her peripheral neuropathy I asked her to call my nurse, Justine Abbott on weekly basis, notify her of the status of her symptoms and if they get any worse or permanent I think we will need  to discontinue the Taxol permanently and move into the next stage of therapy. She will require visit with me in 3 weeks.     Duration of the video visit today 20 minutes.     On 10/03/2022 the patient came back to the office for follow up. Since the previous visit she called stating that she could not handle the peripheral neuropathy in her hands and her feet anymore because it is disrupting her lifestyle and keeping her from doing anything around the house and we discontinued the Taxol altogether. Since then she has had very significant recovery especially in her hands, still has discomfort in her feet and she is not able to walk long distances because of the stimulation of the activity in the plantar aspect of her feet triggers discomfort as well in the toes. A detailed neurological exam today disclosed no major abnormalities, temperature discrimination, pinprick vibration, reflexes, strength and so forth is very much unremarkable.    Today her white count, hemoglobin and platelets are normal.     From my point of view I advised the patient the following.     1. She has completed her plan of chemotherapy, we cannot push anymore Taxol on her given her sensory neuropathy that will become permanent.     2. I discussed with the patient options to make her postmenopausal either hormonal therapy goserelin for example or Lupron, another way to do this will be ovary removal. I have placed a phone call to talk with Heidy Gutierrez MD, the patient's Gynecologist to discuss this with her. My request to Dr. Gutierrez to perform bilateral oophorectomy. The patient is in agreement with that. This with the need for the patient to initiate at some point her adjuvant hormonal therapy if the patient is postmenopausal in the next visit after oophorectomy she will initiate letrozole therapy 2.5 mg a day for the time being. If the patient rejects the possibility of an oophorectomy she will require pharmacological removal of ovarian  function and she will require also further adjuvant hormonal therapy.    3. Finally she already has been seen by radiation oncology after her diagnosis. I made the referral for her to be seen by them because from my point of view she is ready for initiation of therapy at this point.     4. The patient also complains of fatigue. We will measure a TSH, ferritin, iron profile, reticulated hemoglobin today. We want to be sure that her iron stores and thyroid function are normal. I think this is leftover from chemotherapy administration.    5. The patient complains of foggy brain. Memory and ability to function is limited. It is very likely effect of chemotherapy administration on her. I asked her to trigger a lot of brain stimulation reading books, chatting with people, working on activities with the kids and doing more physical activity.     6. Port. The patient will require port maintenance for the time being. It will be flushed today and it will be reflushed again in 6 weeks when she returns.    7. I will continue monitoring laboratory parameters periodically.    8. I went ahead and obtained an estradiol, FSH and LH today. She has not had any menstrual flow since 06/2022.   On 10/31/2022 the patient believes that she is going to have menstrual flow this week. She has the sensation of the premenstrual syndrome. During the previous visit I measured estradiol, FSH, LH; those numbers were consistent with postmenopausal status but the patient believes that she is going to have menstrual flow very soon this week.     From my point of view the patient is already undergoing radiation therapy for breast cancer. She already has completed 4 treatments and she expects to complete all her treatments by November 30th.    It has been impossible to talk with Dr. Heidy Gutierrez in regard to the need for this patient to have oophorectomy to make her postmenopausal and eventually initiate her hormonal therapy. I will talk with   Brenda hopefully today and that way the patient is put on the schedule to have an oophorectomy in the 1st week in December after completion of radiation therapy and thereafter the patient will be able to initiate her adjuvant hormonal therapy with Femara. At that point also I would expect to proceed with radiological assessment after completion of her radiation treatment that will include a CT scan of the chest, abdomen and pelvis and a bone density test.     I pointed out to the patient that I would like for her to keep her port in place at least for a year. She agrees with that.     Otherwise, the patient has been given proper information by me in regard to physical activity to improve range of motion at the left shoulder that is not limiting according to my clinical examination on her today. She says that she has a little bit of pull and discomfort upon extension and elevation of her upper extremities expected from the surgery and the expander in the left chest wall.     Otherwise, the patient will return to see us back in a few weeks.    Her white count, hemoglobin and platelets are normal. Her chemistry profile is normal. Her hair is growing back. Her eyebrows, eyelashes are growing back and the patient feels substantially better from this point of view.  ADDENDUM: DISCUSSED WITH DR COBB WHO WILL PLAN BILATERAL OOPHORECTOMY IN FEW WEEKS    On 12/15/2022 the patient returned after having bilateral oophorectomy through laparoscopy on 11/28/2022. Therefore officially now she is considered postmenopausal. The surgical sites are healing fine and she is going to follow up with Dr. Gutierrez in a few days. She has not encountered the need for any pain medication. Her bowel activity and urination are fine and she is eating better and functioning better at home. She has had near complete resolution of her neuropathy associated with Taxol administration and she has not had any consequences of anthracycline  administration through her echocardiogram recently done.     Her clinical examination today is otherwise benign. Her heart function, lung function, abdominal function disclosed no abnormalities. Her white count, hemoglobin and platelets are fine still with minimal leukopenia and minimal anemia that will get over in the next several weeks. Considering that now the patient is postmenopausal I am going to go ahead and proceed with prescription of Femara at the dose of 2.5 mg a day. She will need to take this medicine for the next 5 years. I discussed with her side effects of the Femara including occasional joint pain that can be controlled with Tylenol and physical activity. Also this medicine can raise cholesterol level and I will obtain a new lipid profile baseline today. This will be repeated every 6 months.     Eventually the patient also will proceed with bone density. I made her aware that sometimes these medicines also can favor osteopenia or osteoporosis and we will need to request a bone density test more or less in 6 months from now.     The patient will proceed with radiological assessment of CT scan of the chest, abdomen and pelvis baseline after completion of all her therapy now and before initiation of the Femara. She will not require any further radiological assessment in the future unless that she has any clinical symptomatology to suggest any recurrence.     The patient will require visit with me in 6 weeks. On that occasion her port the week before will be flushed. We will keep the port for the next couple of years.     Finally the patient is very sad because her father-in-law was diagnosed with colon cancer in [place] and they are going to need to travel to see him.     I discussed all these facts with nurse navigator in the room. Also the patient was advised to proceed with survivorship assessment the same day that she will be seeing me in 6 weeks.     We are glad that she completed all this and many  things are behind. She is in remission now and she cried with lam about this issue today.  On 01/24/2023 the patient was further reviewed. Since the previous visit the patient initiated her Femara adjuvant therapy. Unfortunately the medicine is producing some achiness in her joints and I advised her to take a vitamin D supplement 1000 units a day and initiate a tablet of Aleve to be taken at the time that she goes to bed at nighttime. I will ask my nurse, Justine Abbott, to give her a call in a couple of weeks to see if this strategy is having a positive impact on her. If the pain continues, a strong suggestion would be to move her from Femara to Arimidex.     In the meantime we documented that the patient also after post oophorectomy had a very low level of estradiol and her FSH was elevated consistent with menopausal status.     The patient is not experiencing any hot flashes from menopause and she overall is functioning well at home and improving level of energy and activity for all sorts of things.     In the meantime she was seen by Cardio-Oncology. Her EKG and testing was appropriate and she continues taking cholesterol medicine. We will recheck lipid profile in 3 months and we will monitor this area as well in the future. She will require to be tested by Cardio-Oncology once a year.     From the point of view of her reconstruction of her left breast she will see her plastic surgeon at some point in 04/2023. From my point of view she can proceed with any leftover surgery necessary for this at any time after this period of time. Her radiation therapy side effects are very much over and the patient should be able to undergo her radiation treatment.     In regard to her diet I encouraged her to continue a very healthy diet full of vegetables and fruit. She is going in that agenda anyway and I encouraged her to have a different consumption of red meat only once a week and try to have red meat of organic nature  with walking cows. Encouraged her to have also some more vegetarian style meals that will be beneficial to her in the long run. It will be okay to have some occasional sweets here and there. She raised the question in regard to alcohol consumption in regard to a glass of wine here or there. I do not see any problems or difficulties with this whatsoever. She is welcome to have a minimal amount that does not need to become a situation for every day use. She understood this clearly.      From the point of view of fluidization for intercourse I encouraged them to use coconut oil. That will be beneficial for both of them. Any other lubricant will be acceptable.     From the point of view of her port the patient will continue having her port flushed every 6 weeks and she knows that she needs to maintain this for a total of 2 years. She is aware that a high risk of recurrence will happen in this period of time. This will require to be maintained.     From the point of view of vitamin consumption I asked her to take vitamin D 1000 units a day especially knowing that we have been through winter and we have no sun exposure. Eventually in the spring she will need to have some sun exposure like the rest of us that will be crucial in regard to proper vitamin D synthesis.     In regard to any other need for radiological assessment I find no need for this to happen again unless that clinical characteristics change at some point in the future. We will proceed with a CT scan of the chest, abdomen and pelvis. I personally reviewed this showing no evidence of metastasis at any level in the lungs, liver, bones or any other anatomical site. The spleen, pancreas, kidneys, intestine were all unremarkable. Cardiac anatomy was normal. Pulmonary anatomy was normal with minimal granuloma formation in the right lung.     Therefore, I gave her the good news in regard to this exit CT scan that hopefully will not be required to be done again in  the future.     The patient also has been seen by JACQUELIN Xiong, in regard to information pertinent to her future follow ups and plan of care in the past and the future.     I discussed all these facts with the patient and the  in the room.  On 04/03/2023 in spite of switching her from Femara to Arimidex many weeks ago and giving her a break from any aromatase inhibitor that decreased the amount of joint pain the patient continues having joint pain. 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 She has morning stiffness that lasts for 2-3 hours. Most of the joint pain is in the distal interphalangeal joints.     Given the persistency of these symptoms raises the question in absence of any family history of osteoarthritis, lupus, rheumatoid arthritis if she has some other modality of arthritis that has nothing to do with her aromatase inhibitor medication. I went ahead and scheduled her to have an x-ray of both hands, sent her back to the lab to obtain a rheumatoid factor, MISAEL, sedimentation rate, C-reactive protein and CCP antibodies.     Once that all of this information is available I will further discuss with her how to proceed. Independent of all of this eventually she will require a referral to have a deQuervain tendonitis injection anyway.     I encouraged her to use 2 Aleve to be taken at nighttime and she knows that she needs to take this with some food in the stomach to minimize any gastric irritation. She will remain on PPI for the time being.     I advised her to remain on her Arimidex but maybe in the long run I will need to switch this medicine to Aromasin, we have to see. Maybe also in the long run if these aromatase inhibitors are not the medicines for her we will need to go back onto tamoxifen or Evista in the adjuvant setting.     In  the meantime the patient will continue her Arimidex 1 mg a day. I advised her to try foods that contain pigments that could be antiinflammatory and advised her to try to minimize the consumption of carbohydrates and that way she minimizes weight gain associated with menopause.     So far I find no evidence of breast cancer recurrence on her and she has not completed all of her left breast reconstruction. Appointment will be made for her to complete this at some point in the summer.     Patient seen back 6/26/2023 in follow-up having seen Dr. CARR with hand surgery and injections performed which is greatly improved her range of motion.  Patient reports she is has full functionality of her hands at this point.  She continues on Aromasin and states she does have mild achiness in her knees but this is manageable.  She has not utilizing any Aleve currently.  We did discuss the possibility of using tart cherry concentrate 1 ounce every night before bed to see if this helps with any arthralgias in her knees on the aromatase inhibitors.  Her weight is stable today from the last visit.  She will return in 6 weeks for port flush and then again in 3 months with Dr. Palacios for review.  On 10/16/2023 the patient returns to the office. The clinical examination is remarkable for a good cosmetic reconstruction for both breasts. This happened 6 weeks ago and she is still healing and having an element of pain and discomfort. She has had nipple sparing on the right side and she will have tattoo nipple on left side. This eventually will happen in the next several months. Encouraged her to do more activity with her shoulders to minimize frozen shoulders. I insisted in regard to physical activity to do and showed her some exercises.     In regard to her breast cancer per se, I do not see anything to suggest recurrent disease clinically or by symptomatology. Her exam is very benign. The liver is not palpable. The patient has gained back her  weight that she had before chemotherapy initiation. She feels well in the way that she has right now and is stable.     Arimidex tolerance is much better than Femara. Still having occasional pain the MPs and PIPs in both thumbs. There is minimal deformity suggesting maybe an element of osteoarthritis especially in the MP joints. I encouraged her to use topical Voltaren for this. In the meantime, I advised her to continue Arimidex for which she has 100% compliance, 1 mg a day. I would like for her to come and see us back in 4 months.     Her white count, hemoglobin and platelets are normal. Her white count differential is normal. Her chemistry profile discloses a normal blood sugar, electrolytes. Her alkaline phosphatase is 120. Recent laboratory evaluation disclosed alkaline phosphatases as high as in the 130s. It is very likely that the recovery of the weight has triggered a little bit of fatty liver that is now improving given the fact that she has gained more mobility. I do not believe that we need to do any other testing and I do not believe she needs to have radiological assessment of her liver.     I went ahead and ordered a CA 15-3 for completeness anyway.     In regard to her bone density, she has had some decrease in bone mass but no osteopenia. I encouraged her to walk at least 7500 steps a day, continue taking her vitamin D. Dancing and high impact exercise will be applicable once that she is able to do this after the recent surgery.     I will see her back in 4 months with repeat CBC, CMP. We will monitor again the alkaline phosphatase.

## 2023-10-16 NOTE — PROGRESS NOTES
Since she is asymptomatic today we will hold on antibiotics.  If she developed any symptoms later, she should call us.

## 2023-10-16 NOTE — TELEPHONE ENCOUNTER
I just spoke with Maribell Romero and informed her with Dr Miller's message regarding her labs results.

## 2023-10-17 ENCOUNTER — TELEPHONE (OUTPATIENT)
Dept: ONCOLOGY | Facility: CLINIC | Age: 38
End: 2023-10-17
Payer: COMMERCIAL

## 2023-10-17 DIAGNOSIS — E78.5 HYPERLIPIDEMIA, UNSPECIFIED HYPERLIPIDEMIA TYPE: Primary | ICD-10-CM

## 2023-10-17 LAB — CANCER AG15-3 SERPL-ACNC: 11.5 U/ML (ref 0–25)

## 2023-10-17 RX ORDER — ATORVASTATIN CALCIUM 10 MG/1
10 TABLET, FILM COATED ORAL NIGHTLY
Qty: 90 TABLET | Refills: 3 | Status: SHIPPED | OUTPATIENT
Start: 2023-10-17

## 2023-10-17 NOTE — TELEPHONE ENCOUNTER
----- Message from Chau Palacios MD sent at 10/16/2023  1:45 PM EDT -----  CALL HER ALK PHOS IS BETTER NO CHANGES IN MEDS OR FOLLOW UP

## 2023-11-27 ENCOUNTER — HOSPITAL ENCOUNTER (OUTPATIENT)
Dept: PHYSICAL THERAPY | Facility: HOSPITAL | Age: 38
Setting detail: THERAPIES SERIES
Discharge: HOME | End: 2023-11-27
Payer: COMMERCIAL

## 2023-11-27 DIAGNOSIS — C50.412 MALIGNANT NEOPLASM OF UPPER-OUTER QUADRANT OF LEFT BREAST IN FEMALE, ESTROGEN RECEPTOR POSITIVE: Primary | ICD-10-CM

## 2023-11-27 DIAGNOSIS — Z17.0 MALIGNANT NEOPLASM OF UPPER-OUTER QUADRANT OF LEFT BREAST IN FEMALE, ESTROGEN RECEPTOR POSITIVE: Primary | ICD-10-CM

## 2023-11-27 DIAGNOSIS — Z91.89 AT RISK FOR LYMPHEDEMA: ICD-10-CM

## 2023-11-27 DIAGNOSIS — Z90.12 S/P LEFT MASTECTOMY: ICD-10-CM

## 2023-11-27 PROCEDURE — 97535 SELF CARE MNGMENT TRAINING: CPT

## 2023-11-27 PROCEDURE — 93702 BIS XTRACELL FLUID ANALYSIS: CPT

## 2023-11-27 NOTE — THERAPY RE-EVALUATION
Physical Therapy Lymphedema Re-Evaluation  [facility]     Patient Name: Maribell Romero  : 1985  MRN: [id number]  Today's Date: 2023      Visit Date: 2023    Visit Dx:    ICD-10-CM ICD-9-CM   1. Malignant neoplasm of upper-outer quadrant of left breast in female, estrogen receptor positive  C50.412 174.4    Z17.0 V86.0   2. At risk for lymphedema  Z91.89 V49.89   3. S/P left mastectomy  Z90.12 V45.71       Patient Active Problem List   Diagnosis    Other specified anemias    Malignant neoplasm of upper-outer quadrant of left breast in female, estrogen receptor positive    Peptic reflux esophagitis    Fitting and adjustment of vascular catheter    B12 deficiency    Neuropathy due to chemotherapeutic drug    Surgical menopause    Arthralgia of both hands    Tenosynovitis, de Quervain    Primary osteoarthritis of both hands    Morning stiffness of joints    Alkaline phosphatase elevation    Hyperlipidemia        Past Medical History:   Diagnosis Date    Anemia     Anesthesia complication     hypotension with last C section    Breast cancer 2022    left sided, stage 3, ER/RI+, Her2-, s/p mastectomy, LAST CHEMO 10/3/22, CURRENTLY RECEIVING RADIATION    History of COVID-19     2021 AND 2022    Hyperlipidemia     PONV (postoperative nausea and vomiting)     Seasonal allergies         Past Surgical History:   Procedure Laterality Date    APPENDECTOMY      BREAST BIOPSY Left     BREAST RECONSTRUCTION Left 2022    Procedure: LEFT BREAST 1ST STAGE RECONSTRUCTION WITH INSERTION OF TISSUE EXPANDER AND BIOLOGIC;  Surgeon: Hermelinda Johnson MD;  Location: [facility] OR;  Service: Plastics;  Laterality: Left;     SECTION       SECTION N/A 12/10/2018    Procedure:  SECTION REPEAT;  Surgeon: Heidy Gutierrez MD;  Location: [facility] LABOR DELIVERY;  Service: Obstetrics/Gynecology    D & C HYSTEROSCOPY N/A 2021    Procedure: HYSTEROSCOPY  REMOVAL INTRAUTERINE DEVICE WILL NEED ULTRASOUND IN ROOM;  Surgeon: Heidy Gutierrez MD;  Location: [facility];  Service: Obstetrics/Gynecology;  Laterality: N/A;    DIAGNOSTIC LAPAROSCOPY Bilateral 11/28/2022    Procedure: DAVINCI ASSISTED BILATERAL SALPINGO-OOPHORECTOMY;  Surgeon: Heidy Gutierrez MD;  Location: [facility];  Service: Robotics - DaVinci;  Laterality: Bilateral;    KNEE ARTHROSCOPY Right     MASTECTOMY W/ SENTINEL NODE BIOPSY Left 04/13/2022    Procedure: left total mastectomy with left axillary sentinel lymph node biopsy;  Surgeon: Monisha Escudero MD;  Location: [facility] OR;  Service: General;  Laterality: Left;    VENOUS ACCESS DEVICE (PORT) INSERTION Right 5/13/2022    Procedure: right port placement;  Surgeon: Monisha Escudero MD;  Location: [facility];  Service: General;  Laterality: Right;       Visit Dx:    ICD-10-CM ICD-9-CM   1. Malignant neoplasm of upper-outer quadrant of left breast in female, estrogen receptor positive  C50.412 174.4    Z17.0 V86.0   2. At risk for lymphedema  Z91.89 V49.89   3. S/P left mastectomy  Z90.12 V45.71            Lymphedema       Row Name 11/27/23 1000             Subjective Pain    Able to rate subjective pain? yes  -LB      Pre-Treatment Pain Level 2  -LB         Subjective    Subjective Comments I still have hand pain and multiple joint pain in all of my joints when I wake up.  -LB         Lymphedema Assessment    Lymphedema Classification LUE:;at risk/stage 0  -LB      Lymphedema Cancer Related Sx left;modified radical mastectomy;sentinel node biopsy  -LB      Lymphedema Surgery Comments 4/13/22  -LB      Lymph Nodes Removed # 4  -LB      Positive Lymph Nodes # 1  -LB      Chemo Received yes  -LB      Chemo Treatments #/Timeframe completed  -LB      Radiation Therapy Received yes  -LB      Radiation Treatments #/Timeframe completed  -LB         Posture/Observations    Posture/Observations Comments WNL  -LB         MMT  (Manual Muscle Testing)    General MMT Comments BUE WFL  -LB         Lymphedema Edema Assessment    Edema Assessment Comment no obvious edema  -LB         L-Dex Bioimpedence Screening    L-Dex Measurement Extremity LUE  -LB      L-Dex Patient Position Standing  -LB      L-Dex UE Dominate Side Right  -LB      L-Dex UE At Risk Side Left  -LB      L-Dex UE Pre Surgical Value No  -LB      L-Dex UE Score 3.8  -LB      L-Dex UE Baseline Score 4.5  -LB      L-Dex UE Value Change -0.7  -LB      L-Dex UE Comment WNL; stable  -LB      $ L-Dex Charge yes  -LB                User Key  (r) = Recorded By, (t) = Taken By, (c) = Cosigned By      Initials Name Provider Type    Carissa Steel, WAN Physical Therapist                                    Therapy Education  Education Details: reviewed s/s of lymphedema, steps to prevention, bioimpedance results and interpretation, consdering heat for joint pain in AM  Given: Symptoms/condition management, Edema management  Program: Reinforced  How Provided: Verbal  Provided to: Patient  Level of Understanding: Verbalized  47994 - PT Self Care/Mgmt Minutes: 10       OP Exercises       Row Name 11/27/23 1000             Subjective    Subjective Comments I still have hand pain and multiple joint pain in all of my joints when I wake up.  -LB         Subjective Pain    Able to rate subjective pain? yes  -LB      Pre-Treatment Pain Level 2  -LB                User Key  (r) = Recorded By, (t) = Taken By, (c) = Cosigned By      Initials Name Provider Type    Carissa Steel, PT Physical Therapist                                 PT OP Goals       Row Name 11/27/23 1000          Long Term Goals    LTG Date to Achieve 07/10/22  -LB     LTG 1 Pt will maintain LDex bioimpedance score WNL.  -LB     LTG 1 Progress Ongoing  -LB     LTG 1 Progress Comments stable and WNL at 3.8  -LB     LTG 2 Pt will verbalize s/s of lymphedema and steps to prevention.  -LB     LTG 2 Progress Met  -LB     LTG 3 Pt will  report 50% improvement in joint pain with continued exercise program and joint mobility strategies.  -LB     LTG 3 Progress Met  -LB               User Key  (r) = Recorded By, (t) = Taken By, (c) = Cosigned By      Initials Name Provider Type    Carissa Steel PT Physical Therapist                     PT Assessment/Plan       Row Name 11/27/23 1019          PT Assessment    Functional Limitations Limitations in functional capacity and performance;Limitation in home management;Performance in leisure activities;Performance in sport activities  -LB     Impairments Impaired flexibility;Joint mobility;Pain  -LB     Assessment Comments Pt returns for repeat 6 month bioimpedance testing reporting continued widespread joint pain, especially in AM. Continued B hand pain. Repeat bioimpedance testing with score of 3.8 which is stable and WNL. Due to good results she will return in 6 months for repeat testing.  -LB     Rehab Potential Good  -LB     Patient/caregiver participated in establishment of treatment plan and goals Yes  -LB     Patient would benefit from skilled therapy intervention Yes  -LB        PT Plan    PT Frequency Other (comment)  -LB     Predicted Duration of Therapy Intervention (PT) repeat bioimpedance in 6 months  -LB     PT Plan Comments repeat bioimpedance in 6 months  -LB               User Key  (r) = Recorded By, (t) = Taken By, (c) = Cosigned By      Initials Name Provider Type    Carissa Steel PT Physical Therapist                                 Time Calculation:   Start Time: 1000  Stop Time: 1015  Time Calculation (min): 15 min  Timed Charges  68749 - PT Self Care/Mgmt Minutes: 10  Total Minutes  Timed Charges Total Minutes: 10   Total Minutes: 10   Therapy Charges for Today       Code Description Service Date Service Provider Modifiers Qty    30688188872 HC PT BIS XTRACELL FLUID ANALYSIS 11/27/2023 Carissa Funes, PT  1    32137841908 HC PT SELF CARE/MGMT/TRAIN EA 15 MIN 11/27/2023 Shantel  Carissa, PT GP 1                      Carissa Funes, PT  11/27/2023

## 2023-12-04 ENCOUNTER — INFUSION (OUTPATIENT)
Dept: ONCOLOGY | Facility: HOSPITAL | Age: 38
End: 2023-12-04
Payer: COMMERCIAL

## 2023-12-04 DIAGNOSIS — Z45.2 FITTING AND ADJUSTMENT OF VASCULAR CATHETER: Primary | ICD-10-CM

## 2023-12-04 PROCEDURE — 96523 IRRIG DRUG DELIVERY DEVICE: CPT

## 2023-12-04 PROCEDURE — 25010000002 HEPARIN LOCK FLUSH PER 10 UNITS: Performed by: INTERNAL MEDICINE

## 2023-12-04 RX ORDER — HEPARIN SODIUM (PORCINE) LOCK FLUSH IV SOLN 100 UNIT/ML 100 UNIT/ML
500 SOLUTION INTRAVENOUS AS NEEDED
Status: DISCONTINUED | OUTPATIENT
Start: 2023-12-04 | End: 2023-12-04 | Stop reason: HOSPADM

## 2023-12-04 RX ORDER — SODIUM CHLORIDE 0.9 % (FLUSH) 0.9 %
10 SYRINGE (ML) INJECTION AS NEEDED
Status: DISCONTINUED | OUTPATIENT
Start: 2023-12-04 | End: 2023-12-04 | Stop reason: HOSPADM

## 2023-12-04 RX ADMIN — Medication 10 ML: at 12:58

## 2023-12-04 RX ADMIN — Medication 500 UNITS: at 12:58

## 2023-12-13 ENCOUNTER — OFFICE VISIT (OUTPATIENT)
Dept: FAMILY MEDICINE CLINIC | Facility: CLINIC | Age: 38
End: 2023-12-13
Payer: COMMERCIAL

## 2023-12-13 VITALS
HEIGHT: 62 IN | DIASTOLIC BLOOD PRESSURE: 60 MMHG | HEART RATE: 83 BPM | SYSTOLIC BLOOD PRESSURE: 98 MMHG | TEMPERATURE: 98 F | BODY MASS INDEX: 23.04 KG/M2 | OXYGEN SATURATION: 99 % | WEIGHT: 125.2 LBS | RESPIRATION RATE: 20 BRPM

## 2023-12-13 DIAGNOSIS — B97.89 ACUTE VIRAL SINUSITIS: ICD-10-CM

## 2023-12-13 DIAGNOSIS — J01.90 ACUTE VIRAL SINUSITIS: ICD-10-CM

## 2023-12-13 DIAGNOSIS — H92.02 EARACHE ON LEFT: ICD-10-CM

## 2023-12-13 DIAGNOSIS — R05.1 ACUTE COUGH: Primary | ICD-10-CM

## 2023-12-13 LAB
EXPIRATION DATE: NORMAL
EXPIRATION DATE: NORMAL
FLUAV AG UPPER RESP QL IA.RAPID: NOT DETECTED
FLUBV AG UPPER RESP QL IA.RAPID: NOT DETECTED
INTERNAL CONTROL: NORMAL
INTERNAL CONTROL: NORMAL
Lab: NORMAL
Lab: NORMAL
S PYO AG THROAT QL: NEGATIVE
SARS-COV-2 AG UPPER RESP QL IA.RAPID: NOT DETECTED

## 2023-12-13 RX ORDER — FLUTICASONE PROPIONATE 50 MCG
1 SPRAY, SUSPENSION (ML) NASAL 2 TIMES DAILY
Qty: 9.9 ML | Refills: 0 | Status: SHIPPED | OUTPATIENT
Start: 2023-12-13

## 2023-12-13 NOTE — PATIENT INSTRUCTIONS
Start over-the-counter nasal saline rinses 2 times daily, apply before Flonase  Start over-the-counter Flonase 1 puff in each nostril 2 times daily for 2 weeks, apply after saline rinse  Start over-the-counter Zyrtec 10 mg at bedtime  Call back if you have any worsening symptoms, fever 100.4 or above

## 2023-12-13 NOTE — PROGRESS NOTES
Chief Complaint  Cough and Earache (Pain behind the left ear. )    Subjective         Maribell Vazquezlarashaad Eddie Romero presents to [facility]  History of Present Illness      38-year-old female complains of left ear ache and pain behind her left ear and cough.  Patient states symptoms started about 5 days ago with a sore throat, she started having cough yesterday and left ear ache today.  Patient also states that she when she blows her nose she sees green secretions.  Patient states the pain behind her left ear resolved    Patient denies fever, chills, sinus congestion, chest pain, shortness of breath, body aches.      Objective     Review of Systems     Past Medical History:   Diagnosis Date    Anemia     Anesthesia complication     hypotension with last C section    Breast cancer 03/2022    left sided, stage 3, ER/FL+, Her2-, s/p mastectomy, LAST CHEMO 10/3/22, CURRENTLY RECEIVING RADIATION    History of COVID-19     1/2021 AND 2/8/2022    Hyperlipidemia     PONV (postoperative nausea and vomiting)     Seasonal allergies         Current Outpatient Medications:     anastrozole (ARIMIDEX) 1 MG tablet, Take 1 tablet by mouth Daily., Disp: 90 tablet, Rfl: 1    atorvastatin (LIPITOR) 10 MG tablet, Take 1 tablet by mouth Every Night., Disp: 90 tablet, Rfl: 3    Cetirizine HCl (ZYRTEC PO), Take 10 mg by mouth As Needed., Disp: , Rfl:     fluticasone (FLONASE) 50 MCG/ACT nasal spray, 1 spray into the nostril(s) as directed by provider 2 (Two) Times a Day., Disp: 9.9 mL, Rfl: 0   Social History     Socioeconomic History    Marital status:    Tobacco Use    Smoking status: Never    Smokeless tobacco: Never   Vaping Use    Vaping Use: Never used   Substance and Sexual Activity    Alcohol use: Not Currently     Alcohol/week: 5.0 standard drinks of alcohol     Types: 5 Shots of liquor per week    Drug use: Never    Sexual activity: Yes     Partners: Female      Vital Signs:   BP 98/60    "Pulse 83   Temp 98 °F (36.7 °C)   Resp 20   Ht 157 cm (61.81\")   Wt 56.8 kg (125 lb 3.2 oz)   SpO2 99%   BMI 23.04 kg/m²       Physical Exam  Constitutional:       Appearance: Normal appearance.   HENT:      Right Ear: Tympanic membrane, ear canal and external ear normal. Tympanic membrane is not perforated, erythematous, retracted or bulging.      Left Ear: Tympanic membrane, ear canal and external ear normal. Tympanic membrane is not perforated, erythematous, retracted or bulging.      Nose: Congestion present.      Right Turbinates: Swollen.      Left Turbinates: Swollen.      Right Sinus: No maxillary sinus tenderness or frontal sinus tenderness.      Left Sinus: No maxillary sinus tenderness or frontal sinus tenderness.      Mouth/Throat:      Mouth: Mucous membranes are moist.      Pharynx: Uvula midline. Posterior oropharyngeal erythema present. No pharyngeal swelling, oropharyngeal exudate or uvula swelling.   Cardiovascular:      Rate and Rhythm: Normal rate and regular rhythm.      Pulses: Normal pulses.      Heart sounds: No murmur heard.  Pulmonary:      Effort: Pulmonary effort is normal.      Breath sounds: Normal breath sounds.   Lymphadenopathy:      Cervical: No cervical adenopathy.   Neurological:      Mental Status: She is alert.          Result Review :                 Assessment and Plan    Diagnoses and all orders for this visit:    1. Acute cough (Primary)    2. Earache on left    3. Acute viral sinusitis  -     fluticasone (FLONASE) 50 MCG/ACT nasal spray; 1 spray into the nostril(s) as directed by provider 2 (Two) Times a Day.  Dispense: 9.9 mL; Refill: 0      Rapid COVID, flu and strep test negative  I believe patient has viral sinusitis, will start her on symptomatic sinusitis treatment with nasal saline rinse, Flonase and Zyrtec at bedtime  Advised patient to call back if she has any worsening symptoms  Patient verbalized understanding and agreement with the plan above      Follow " Up   No follow-ups on file.  Patient was given instructions and counseling regarding her condition or for health maintenance advice. Please see specific information pulled into the AVS if appropriate.

## 2024-01-10 RX ORDER — ANASTROZOLE 1 MG/1
1 TABLET ORAL DAILY
Qty: 90 TABLET | Refills: 1 | Status: SHIPPED | OUTPATIENT
Start: 2024-01-10

## 2024-01-16 ENCOUNTER — TELEPHONE (OUTPATIENT)
Dept: CARDIOLOGY | Facility: CLINIC | Age: 39
End: 2024-01-16
Payer: COMMERCIAL

## 2024-01-16 NOTE — TELEPHONE ENCOUNTER
----- Message from Ananda Arita MD sent at 1/16/2024 10:47 AM EST -----  Regarding: RE: R/S  You can keep her on the schedule where she is.    Jdk    ----- Message -----  From: Caitlin Duarte MA  Sent: 1/16/2024  10:44 AM EST  To: Ananda Arita MD; #  Subject: RE: R/S                                          Pt is a cardio oncology pt and has an echo the same day.  Please advise.  This is a [facility] day.  I was going to say keep her scheduled; however I wanted to make sure this was okay with you.    ----- Message -----  From: Mary Lord RegSched Rep  Sent: 1/15/2024   2:47 PM EST  To: Caitlin Duarte MA  Subject: R/S                                              Chuck Tucker is on the r/s list for upcoming appt. Can you find out if JK would like to keep her appt where it is or where I can move her to?      Thanks,  Mary

## 2024-02-05 NOTE — PROGRESS NOTES
RM:________     PCP: Maximilian Miller MD    : 1985  AGE: 39 y.o.  EST PATIENT     REASON FOR VISIT/  CC:        BP Readings from Last 3 Encounters:   23 98/60   10/16/23 98/65   10/11/23 90/50      Wt Readings from Last 3 Encounters:   23 56.8 kg (125 lb 3.2 oz)   10/16/23 57 kg (125 lb 11.2 oz)   10/11/23 56.8 kg (125 lb 3.2 oz)        WT: ____________ BP: __________L __________R HR______    CHEST PAIN: _____________    SOA: _____________PALPS: _______________     LIGHTHEADED: ___________FATIGUE: ________________ EDEMA __________    ALLERGIES:Asa [aspirin], Adhesive tape, Iopamidol, and Ct contrast SMOKING HISTORY:  Social History     Tobacco Use    Smoking status: Never    Smokeless tobacco: Never   Vaping Use    Vaping Use: Never used   Substance Use Topics    Alcohol use: Not Currently     Alcohol/week: 5.0 standard drinks of alcohol     Types: 5 Shots of liquor per week    Drug use: Never     CAFFEINE USE_________________  ALCOHOL ______________________

## 2024-02-06 ENCOUNTER — OFFICE VISIT (OUTPATIENT)
Dept: ONCOLOGY | Facility: CLINIC | Age: 39
End: 2024-02-06
Payer: COMMERCIAL

## 2024-02-06 ENCOUNTER — INFUSION (OUTPATIENT)
Dept: ONCOLOGY | Facility: HOSPITAL | Age: 39
End: 2024-02-06
Payer: COMMERCIAL

## 2024-02-06 VITALS
DIASTOLIC BLOOD PRESSURE: 66 MMHG | HEART RATE: 83 BPM | HEIGHT: 66 IN | RESPIRATION RATE: 18 BRPM | TEMPERATURE: 98.2 F | WEIGHT: 128 LBS | OXYGEN SATURATION: 99 % | BODY MASS INDEX: 20.57 KG/M2 | SYSTOLIC BLOOD PRESSURE: 97 MMHG

## 2024-02-06 DIAGNOSIS — G43.001 MIGRAINE WITHOUT AURA AND WITH STATUS MIGRAINOSUS, NOT INTRACTABLE: ICD-10-CM

## 2024-02-06 DIAGNOSIS — Z45.2 FITTING AND ADJUSTMENT OF VASCULAR CATHETER: Primary | ICD-10-CM

## 2024-02-06 DIAGNOSIS — R74.8 ALKALINE PHOSPHATASE ELEVATION: ICD-10-CM

## 2024-02-06 DIAGNOSIS — G62.0 NEUROPATHY DUE TO CHEMOTHERAPEUTIC DRUG: ICD-10-CM

## 2024-02-06 DIAGNOSIS — M25.542 ARTHRALGIA OF BOTH HANDS: ICD-10-CM

## 2024-02-06 DIAGNOSIS — Z45.2 FITTING AND ADJUSTMENT OF VASCULAR CATHETER: ICD-10-CM

## 2024-02-06 DIAGNOSIS — C50.412 MALIGNANT NEOPLASM OF UPPER-OUTER QUADRANT OF LEFT BREAST IN FEMALE, ESTROGEN RECEPTOR POSITIVE: Primary | ICD-10-CM

## 2024-02-06 DIAGNOSIS — E89.40 SURGICAL MENOPAUSE: ICD-10-CM

## 2024-02-06 DIAGNOSIS — T45.1X5A NEUROPATHY DUE TO CHEMOTHERAPEUTIC DRUG: ICD-10-CM

## 2024-02-06 DIAGNOSIS — M65.4 TENOSYNOVITIS, DE QUERVAIN: ICD-10-CM

## 2024-02-06 DIAGNOSIS — E53.8 B12 DEFICIENCY: ICD-10-CM

## 2024-02-06 DIAGNOSIS — Z17.0 MALIGNANT NEOPLASM OF UPPER-OUTER QUADRANT OF LEFT BREAST IN FEMALE, ESTROGEN RECEPTOR POSITIVE: ICD-10-CM

## 2024-02-06 DIAGNOSIS — Z17.0 MALIGNANT NEOPLASM OF UPPER-OUTER QUADRANT OF LEFT BREAST IN FEMALE, ESTROGEN RECEPTOR POSITIVE: Primary | ICD-10-CM

## 2024-02-06 DIAGNOSIS — M25.541 ARTHRALGIA OF BOTH HANDS: ICD-10-CM

## 2024-02-06 DIAGNOSIS — C50.412 MALIGNANT NEOPLASM OF UPPER-OUTER QUADRANT OF LEFT BREAST IN FEMALE, ESTROGEN RECEPTOR POSITIVE: ICD-10-CM

## 2024-02-06 LAB
ALBUMIN SERPL-MCNC: 4.2 G/DL (ref 3.5–5.2)
ALBUMIN/GLOB SERPL: 1.6 G/DL
ALP SERPL-CCNC: 121 U/L (ref 39–117)
ALT SERPL W P-5'-P-CCNC: 22 U/L (ref 1–33)
ANION GAP SERPL CALCULATED.3IONS-SCNC: 10.9 MMOL/L (ref 5–15)
AST SERPL-CCNC: 31 U/L (ref 1–32)
BASOPHILS # BLD AUTO: 0.02 10*3/MM3 (ref 0–0.2)
BASOPHILS NFR BLD AUTO: 0.4 % (ref 0–1.5)
BILIRUB SERPL-MCNC: 0.4 MG/DL (ref 0–1.2)
BUN SERPL-MCNC: 11 MG/DL (ref 6–20)
BUN/CREAT SERPL: 16.7 (ref 7–25)
CALCIUM SPEC-SCNC: 9.3 MG/DL (ref 8.6–10.5)
CHLORIDE SERPL-SCNC: 107 MMOL/L (ref 98–107)
CO2 SERPL-SCNC: 26.1 MMOL/L (ref 22–29)
CREAT SERPL-MCNC: 0.66 MG/DL (ref 0.57–1)
DEPRECATED RDW RBC AUTO: 40.7 FL (ref 37–54)
EGFRCR SERPLBLD CKD-EPI 2021: 113.9 ML/MIN/1.73
EOSINOPHIL # BLD AUTO: 0.21 10*3/MM3 (ref 0–0.4)
EOSINOPHIL NFR BLD AUTO: 4.7 % (ref 0.3–6.2)
ERYTHROCYTE [DISTWIDTH] IN BLOOD BY AUTOMATED COUNT: 12.4 % (ref 12.3–15.4)
GLOBULIN UR ELPH-MCNC: 2.6 GM/DL
GLUCOSE SERPL-MCNC: 85 MG/DL (ref 65–99)
HCT VFR BLD AUTO: 35.9 % (ref 34–46.6)
HGB BLD-MCNC: 12.1 G/DL (ref 12–15.9)
IMM GRANULOCYTES # BLD AUTO: 0.01 10*3/MM3 (ref 0–0.05)
IMM GRANULOCYTES NFR BLD AUTO: 0.2 % (ref 0–0.5)
LYMPHOCYTES # BLD AUTO: 1.66 10*3/MM3 (ref 0.7–3.1)
LYMPHOCYTES NFR BLD AUTO: 37 % (ref 19.6–45.3)
MCH RBC QN AUTO: 30.2 PG (ref 26.6–33)
MCHC RBC AUTO-ENTMCNC: 33.7 G/DL (ref 31.5–35.7)
MCV RBC AUTO: 89.5 FL (ref 79–97)
MONOCYTES # BLD AUTO: 0.31 10*3/MM3 (ref 0.1–0.9)
MONOCYTES NFR BLD AUTO: 6.9 % (ref 5–12)
NEUTROPHILS NFR BLD AUTO: 2.28 10*3/MM3 (ref 1.7–7)
NEUTROPHILS NFR BLD AUTO: 50.8 % (ref 42.7–76)
NRBC BLD AUTO-RTO: 0 /100 WBC (ref 0–0.2)
PLATELET # BLD AUTO: 185 10*3/MM3 (ref 140–450)
PMV BLD AUTO: 10.4 FL (ref 6–12)
POTASSIUM SERPL-SCNC: 4.2 MMOL/L (ref 3.5–5.2)
PROT SERPL-MCNC: 6.8 G/DL (ref 6–8.5)
RBC # BLD AUTO: 4.01 10*6/MM3 (ref 3.77–5.28)
SODIUM SERPL-SCNC: 144 MMOL/L (ref 136–145)
WBC NRBC COR # BLD AUTO: 4.49 10*3/MM3 (ref 3.4–10.8)

## 2024-02-06 PROCEDURE — 25010000002 HEPARIN LOCK FLUSH PER 10 UNITS: Performed by: INTERNAL MEDICINE

## 2024-02-06 PROCEDURE — 36591 DRAW BLOOD OFF VENOUS DEVICE: CPT

## 2024-02-06 PROCEDURE — 85025 COMPLETE CBC W/AUTO DIFF WBC: CPT

## 2024-02-06 PROCEDURE — 80053 COMPREHEN METABOLIC PANEL: CPT

## 2024-02-06 RX ORDER — SODIUM CHLORIDE 0.9 % (FLUSH) 0.9 %
10 SYRINGE (ML) INJECTION AS NEEDED
Status: DISCONTINUED | OUTPATIENT
Start: 2024-02-06 | End: 2024-02-06 | Stop reason: HOSPADM

## 2024-02-06 RX ORDER — HEPARIN SODIUM (PORCINE) LOCK FLUSH IV SOLN 100 UNIT/ML 100 UNIT/ML
500 SOLUTION INTRAVENOUS AS NEEDED
Status: DISCONTINUED | OUTPATIENT
Start: 2024-02-06 | End: 2024-02-06 | Stop reason: HOSPADM

## 2024-02-06 RX ADMIN — Medication 10 ML: at 10:40

## 2024-02-06 RX ADMIN — Medication 500 UNITS: at 10:40

## 2024-02-06 NOTE — PROGRESS NOTES
Subjective       REASON FOR FOLLOW UP:  LOBULAR CARCINOMA OF THE LEFT BREAST, 70 MM IN SIZE, MARGINS OF RESECTION NEGATIVE AFTER MASTECTOMY, GRADE 2, ER POSITIVE HI POSITIVE, HER 2 NEGATIVE BY IHC,KI 67 39% ,ONE POSITIVE AXILLARY LYMPH NODE OUT OF 4 NODES REMOVAL, PREMENOPAUSAL .INVITAE PANEL NEGATIVE.ONCOTYPE 18: chemotherapy AC X 4 AND TAXOL X 6, STOPPED BECAUSE SEVERE NEUROPATHY IN FEET AND HANDS. Oophorectomy in 11/22, FEMARA INITIATED AT THAT TIME. COMPLETED RADIATION THERAPY AFTER TAXOL COMPLETION.SWITCHED TO ARIMIDEX DUE TO JOINT PAIN, NEGATIVE RHEUMATOLOGIC WORKUP.        HISTORY OF PRESENT ILLNESS:      On 02/06/2024 this 39-year-old female returns to the office for follow up of her previous history of breast cancer on the left. At this time the only new symptom is that she has developed migraines especially on the left side with no obvious triggering factor. She feels the headache coming on, some minimal visual aura and then some nausea and vomiting. This has happened on at least 4-5 occasions in the last several weeks. There is no obvious triggering factor like coffee, emotions, chocolate food or anything of this nature. There is no other neurological deficit when this happens. She never had migraines before but her mother has history of migraines. In any event besides this she has seen that her left breast prosthesis has decreased in size in comparison with the one on the right and she will require being seen by plastic surgery. She has not noticed any irritation, erythema or alteration otherwise in the skin surrounding the left breast implant. Her appetite is very good, her weight is stable, her bowel function and urination are normal. Minimal joint pain at this time with minimal morning stiffness. No local joint inflammation or functional limitation. Bowel activity and urination are normal. No skeletal pain. No skin lesions. She denies any cough, shortness of breath or cardiac irregularity. She is  fully functional and feeling well. Tolerance to Arimidex is very good at this time.                   ONCOLOGIC HISTORY:  The patient is a 39 y.o. year old female who is here for an opinion about the above issue.  I had the opportunity to see this delightful [place] female, 37 years old, mother of 2, who is premenopausal who has found abnormalities in the left breast since 12/2021, having sensation of fullness in the breast and discomfort and occasional pain. After this complaint she was seen by her primary physician and she initiated a process of mammograms and ultrasounds that culminated with the diagnosis of lobular carcinoma of the breast, invasive, 7 cm in size. The patient completed a mastectomy and sentinel lymph nodes almost 3 weeks ago and she is here for review and advice in regard to adjuvant therapy. The patient is still having pain at the surgical site, especially since yesterday she had injection of saline in the expander that was placed after the mastectomy. She also has neuropathic pain in the inner aspect of the left arm that is bothering her a lot through the day and sometimes almost driving her crazy. She has no motor deficit into the left upper extremity. She denies any fevers or chills or bone pain. Her appetite is acceptable but she has changed her diet and she is almost going into vegetarian with the exception of some fish. She has eliminated all the dairy products and she has eliminated mostly carbohydrates as well. She has lost some weight. The patient otherwise feels well. She has normal bowel activity, normal urination. No skeletal pain or joint pain. No cough, sputum production, shortness of breath or palpitations. She is extremely anxious about this visit today. She is in company of her .    The patient returned to the office on 05/20/2022 in preparation for initiation of chemotherapy. In the meantime she has undergone a port placement in right subclavian position with no  difficulties and an echocardiogram that will be discussed below. The patient has had very significant upper respiratory allergies. She has been tested this week for COVID being negative. She is not running any fever. Most of the symptoms in the respiratory tract include sneezing, itching, itchy eyes and rhinorrhea. No sore throat, no alteration in taste or smell. No cough, shortness of breath, pleuritic pain or hemoptysis.     The surgical site from the left breast expander is not painful. She has healed extremely well. The same is applicable to her port that was placed a week ago.  The patient returned to the office on 05/26/2022 for an interim assessment. Since the chemotherapy administration last week the patient had persistent nausea and vomiting at least for 4 days to the point that she spent most of the time sleeping because of the effect of the Zyprexa but no resolution of her nausea and vomiting. She has vomited so much that now she has significant esophagitis with heartburn and indigestion. She has not had any hematemesis or melena. She just started to eat yesterday rice and potatoes. She was able to handle this and she has been able to handle liquids. She also has had discoloration of the urine that originally was pink and subsequently now has normalized. She had no burning sensation upon urination or vaginal bleeding. She has been very fatigued and tired. On top of that Neulasta triggers significant pain in the cranium, in the pelvic bone and in her femurs and she has the feeling that she has the flu. Again, she has not had any fever. Today is the best day that she has had since the chemotherapy administration. Obviously given the symptoms and this toxicity assessment, the patient will require radical change in her treatment as will be described below.  On 06/23/2022, the patient returns after she has completed 2 cycles of AC. The first cycle was dramatically cumbersome because of the tremendous amount of  side effects including persistent nausea and vomiting after chemotherapy that required IV fluids and modification in her nausea regimen. She also developed neutropenic fever and ended up in the hospital. After 2nd cycle with dose adjustment the patient handled the treatment much better and the miracle medicine in regard to nausea control was Ativan. Phenergan and Compazine triggered tremendous degree of confusion. Therefore, for the subsequent cycle 3 and 4, Ativan will be the main medicine to utilize. Zofran also will be utilized.     Physically the patient feels much better today. The patient has had appetite to eat. The heartburn and indigestion have mostly subsided. Her nutrition and hydration are much better. She is keeping her weight and she has no pain. Bowel activity with occasional constipation. Urination is normal. No cardiovascular or respiratory issues. Energy level much better.     She had significant pain with Neulasta use 4 days after the treatment requiring Zyrtec and significant Tylenol utilization.    This patient has been seen by video medicine visit on 09/12/2022 in regard to continue her chemotherapy administration in the adjuvant therapy of her breast cancer on the left. The patient at this time is undergoing weekly Taxol. Unfortunately she has developed further peripheral neuropathy in the tips of her fingers and her toes to the point that she feels sensation of itching more than pain all of the time and this is disrupting her life and her functionality very substantially. She has to use ice that gives her some relief of the symptom at least 4-6 times a day. It takes at least 10 minutes for the symptoms to improve but they come back in a question of hours. The patient is not having any difficulty with ambulation but the more walking that she does the more discomfort that she develops. The same is applicable to her hands. Her appetite has improved, she has gained weight, she has some degree of  constipation associated with chemotherapy administration relieved with flax seed oil. Her bowel function now is normal, urination is normal. She has not had any menstrual flow since 2022. She has not had any nausea or vomiting. No cardiovascular or respiratory issues.     The patient otherwise has not had any new problems. No fever or infections. She has had resolution of her epistaxis.   Surgical menopause with bilateral oophorectomy on 2022. Patient initiated Femara on 2022 for a total of 5 years. Baseline echocardiogram after completion of chemotherapy disclosed normal ejection fraction. We will proceed with radiological assessment of her chest, abdomen and pelvis baseline for the future and eventually she will proceed also with bone density test.            Past Medical History:   Diagnosis Date    Anemia     Anesthesia complication     hypotension with last C section    Breast cancer 2022    left sided, stage 3, ER/AR+, Her2-, s/p mastectomy, LAST CHEMO 10/3/22, CURRENTLY RECEIVING RADIATION    History of COVID-19     2021 AND 2022    Hyperlipidemia     PONV (postoperative nausea and vomiting)     Seasonal allergies         Past Surgical History:   Procedure Laterality Date    APPENDECTOMY      BREAST BIOPSY Left     BREAST RECONSTRUCTION Left 2022    Procedure: LEFT BREAST 1ST STAGE RECONSTRUCTION WITH INSERTION OF TISSUE EXPANDER AND BIOLOGIC;  Surgeon: Hermelinda Johnson MD;  Location: [facility] OR;  Service: Plastics;  Laterality: Left;     SECTION       SECTION N/A 12/10/2018    Procedure:  SECTION REPEAT;  Surgeon: Heidy Gutierrez MD;  Location: [facility] LABOR DELIVERY;  Service: Obstetrics/Gynecology    D & C HYSTEROSCOPY N/A 2021    Procedure: HYSTEROSCOPY REMOVAL INTRAUTERINE DEVICE WILL NEED ULTRASOUND IN ROOM;  Surgeon: Heidy Gutierrez MD;  Location: [facility] OR OSC;  Service: Obstetrics/Gynecology;  Laterality: N/A;     DIAGNOSTIC LAPAROSCOPY Bilateral 11/28/2022    Procedure: DAVINCI ASSISTED BILATERAL SALPINGO-OOPHORECTOMY;  Surgeon: Heidy Gutierrez MD;  Location: [facility];  Service: Robotics - DaVinci;  Laterality: Bilateral;    KNEE ARTHROSCOPY Right     MASTECTOMY W/ SENTINEL NODE BIOPSY Left 04/13/2022    Procedure: left total mastectomy with left axillary sentinel lymph node biopsy;  Surgeon: Monisha Escudero MD;  Location: [facility];  Service: General;  Laterality: Left;    VENOUS ACCESS DEVICE (PORT) INSERTION Right 5/13/2022    Procedure: right port placement;  Surgeon: Monisha Escudero MD;  Location: [facility];  Service: General;  Laterality: Right;        Current Outpatient Medications on File Prior to Visit   Medication Sig Dispense Refill    anastrozole (ARIMIDEX) 1 MG tablet Take 1 tablet by mouth Daily. 90 tablet 1    atorvastatin (LIPITOR) 10 MG tablet Take 1 tablet by mouth Every Night. 90 tablet 3    Cetirizine HCl (ZYRTEC PO) Take 10 mg by mouth As Needed.      fluticasone (FLONASE) 50 MCG/ACT nasal spray 1 spray into the nostril(s) as directed by provider 2 (Two) Times a Day. 9.9 mL 0     Current Facility-Administered Medications on File Prior to Visit   Medication Dose Route Frequency Provider Last Rate Last Admin    [DISCONTINUED] heparin injection 500 Units  500 Units Intravenous PRN Chau Palacios MD   500 Units at 02/06/24 1040    [DISCONTINUED] sodium chloride 0.9 % flush 10 mL  10 mL Intravenous PRN Chau Palacios MD   10 mL at 02/06/24 1040        ALLERGIES:    Allergies   Allergen Reactions    Asa [Aspirin] Anaphylaxis and Shortness Of Breath    Adhesive Tape Dermatitis     Paper tape okay    Iopamidol Hives     AKA IV CONTRAST    Ct Contrast Rash     Pt HAD A RASH AND COUGHING        Social History     Socioeconomic History    Marital status:    Tobacco Use    Smoking status: Never    Smokeless tobacco: Never   Vaping Use    Vaping Use: Never used   Substance  "and Sexual Activity    Alcohol use: Not Currently     Alcohol/week: 5.0 standard drinks of alcohol     Types: 5 Shots of liquor per week    Drug use: Never    Sexual activity: Yes     Partners: Female        Family History   Problem Relation Age of Onset    No Known Problems Mother     No Known Problems Father     No Known Problems Sister     No Known Problems Brother     No Known Problems Maternal Grandmother     Diabetes Maternal Grandfather     No Known Problems Paternal Grandmother     No Known Problems Paternal Grandfather     Melanoma Paternal Aunt     Diabetes Other     Malig Hyperthermia Neg Hx     Colon cancer Neg Hx           Objective     Vitals:    02/06/24 1050   BP: 97/66   Pulse: 83   Resp: 18   Temp: 98.2 °F (36.8 °C)   TempSrc: Temporal   SpO2: 99%   Weight: 58.1 kg (128 lb)   Height: 167 cm (65.75\")   PainSc: 0-No pain           2/6/2024    10:57 AM   Current Status   ECOG score 0       Physical Exam              GENERAL:  Well-developed, Patient  in no acute distress.   SKIN:  Warm, dry ,NO purpura ,no rash.  HEENT:  Pupils were equal and reactive to light and accomodation, conjunctivae noninjected,  normal visual acuity.   NECK:  Supple with good range of motion; no thyromegaly , no JVD or bruits,.No carotid artery pain, no carotid abnormal pulsation   LYMPHATICS:  No cervical, NO supraclavicular, NO axillary, NO inguinal adenopathies.  CARDIAC   normal rate , regular rhythm, without murmur,NO rubs NO S3 NO S4   LUNGS: normal breath sounds bilateral, no wheezing, NO rhonchi, NO crackles ,NO rubs.  VASCULAR VENOUS: no cyanosis, NO collateral circulation, NO varicosities, NO edema, NO palpable cords, NO pain,NO erythema, NO pigmentation of the skin.  ABDOMEN:  Soft, NO pain,no hepatomegaly, no splenomegaly,no masses, no ascites, no collateral circulation,no distention.  EXTREMITIES  AND SPINE:  No clubbing, no cyanosis ,MINIMAL MP AND PIP BOTH THUMBS OA deformities , no pain .No kyphosis,  no pain " in spine, no pain in ribs , no pain in pelvic bone.  NEUROLOGICAL:  Patient was awake, alert, oriented to time, person and place.    Comparing the anatomy of her right breast with the implant of the left breast, indeed her left breast implant is anatomically smaller than the right breast site. There is no induration, erythema or alterations in the skin otherwise and the left axilla was normal. No lymphedema in either extremity.                RECENT LABS:  Hematology WBC   Date Value Ref Range Status   02/06/2024 4.49 3.40 - 10.80 10*3/mm3 Final   10/11/2023 4.6 3.4 - 10.8 x10E3/uL Final     RBC   Date Value Ref Range Status   02/06/2024 4.01 3.77 - 5.28 10*6/mm3 Final   10/11/2023 4.48 3.77 - 5.28 x10E6/uL Final     Hemoglobin   Date Value Ref Range Status   02/06/2024 12.1 12.0 - 15.9 g/dL Final     Hematocrit   Date Value Ref Range Status   02/06/2024 35.9 34.0 - 46.6 % Final     Platelets   Date Value Ref Range Status   02/06/2024 185 140 - 450 10*3/mm3 Final       CBC:    WBC   Date Value Ref Range Status   02/06/2024 4.49 3.40 - 10.80 10*3/mm3 Final   10/11/2023 4.6 3.4 - 10.8 x10E3/uL Final     RBC   Date Value Ref Range Status   02/06/2024 4.01 3.77 - 5.28 10*6/mm3 Final   10/11/2023 4.48 3.77 - 5.28 x10E6/uL Final     Hemoglobin   Date Value Ref Range Status   02/06/2024 12.1 12.0 - 15.9 g/dL Final     Hematocrit   Date Value Ref Range Status   02/06/2024 35.9 34.0 - 46.6 % Final     MCV   Date Value Ref Range Status   02/06/2024 89.5 79.0 - 97.0 fL Final     MCH   Date Value Ref Range Status   02/06/2024 30.2 26.6 - 33.0 pg Final     MCHC   Date Value Ref Range Status   02/06/2024 33.7 31.5 - 35.7 g/dL Final     RDW   Date Value Ref Range Status   02/06/2024 12.4 12.3 - 15.4 % Final     RDW-SD   Date Value Ref Range Status   02/06/2024 40.7 37.0 - 54.0 fl Final     MPV   Date Value Ref Range Status   02/06/2024 10.4 6.0 - 12.0 fL Final     Platelets   Date Value Ref Range Status   02/06/2024 185 140 - 450  10*3/mm3 Final     Neutrophil %   Date Value Ref Range Status   02/06/2024 50.8 42.7 - 76.0 % Final     Lymphocyte %   Date Value Ref Range Status   02/06/2024 37.0 19.6 - 45.3 % Final     Monocyte %   Date Value Ref Range Status   02/06/2024 6.9 5.0 - 12.0 % Final     Eosinophil %   Date Value Ref Range Status   02/06/2024 4.7 0.3 - 6.2 % Final     Basophil %   Date Value Ref Range Status   02/06/2024 0.4 0.0 - 1.5 % Final     Immature Grans %   Date Value Ref Range Status   02/06/2024 0.2 0.0 - 0.5 % Final     Neutrophils, Absolute   Date Value Ref Range Status   02/06/2024 2.28 1.70 - 7.00 10*3/mm3 Final     Lymphocytes, Absolute   Date Value Ref Range Status   02/06/2024 1.66 0.70 - 3.10 10*3/mm3 Final     Monocytes, Absolute   Date Value Ref Range Status   02/06/2024 0.31 0.10 - 0.90 10*3/mm3 Final     Eosinophils, Absolute   Date Value Ref Range Status   02/06/2024 0.21 0.00 - 0.40 10*3/mm3 Final     Basophils, Absolute   Date Value Ref Range Status   02/06/2024 0.02 0.00 - 0.20 10*3/mm3 Final     Immature Grans, Absolute   Date Value Ref Range Status   02/06/2024 0.01 0.00 - 0.05 10*3/mm3 Final     nRBC   Date Value Ref Range Status   02/06/2024 0.0 0.0 - 0.2 /100 WBC Final        CMP:    Glucose   Date Value Ref Range Status   02/06/2024 85 65 - 99 mg/dL Final     BUN   Date Value Ref Range Status   02/06/2024 11 6 - 20 mg/dL Final     Creatinine   Date Value Ref Range Status   02/06/2024 0.66 0.57 - 1.00 mg/dL Final     Sodium   Date Value Ref Range Status   02/06/2024 144 136 - 145 mmol/L Final     Potassium   Date Value Ref Range Status   02/06/2024 4.2 3.5 - 5.2 mmol/L Final     Chloride   Date Value Ref Range Status   02/06/2024 107 98 - 107 mmol/L Final     CO2   Date Value Ref Range Status   02/06/2024 26.1 22.0 - 29.0 mmol/L Final     Calcium   Date Value Ref Range Status   02/06/2024 9.3 8.6 - 10.5 mg/dL Final     Total Protein   Date Value Ref Range Status   02/06/2024 6.8 6.0 - 8.5 g/dL Final      Albumin   Date Value Ref Range Status   02/06/2024 4.2 3.5 - 5.2 g/dL Final     ALT (SGPT)   Date Value Ref Range Status   02/06/2024 22 1 - 33 U/L Final     AST (SGOT)   Date Value Ref Range Status   02/06/2024 31 1 - 32 U/L Final     Alkaline Phosphatase   Date Value Ref Range Status   02/06/2024 121 (H) 39 - 117 U/L Final     Total Bilirubin   Date Value Ref Range Status   02/06/2024 0.4 0.0 - 1.2 mg/dL Final     Globulin   Date Value Ref Range Status   02/06/2024 2.6 gm/dL Final     A/G Ratio   Date Value Ref Range Status   02/06/2024 1.6 g/dL Final     BUN/Creatinine Ratio   Date Value Ref Range Status   02/06/2024 16.7 7.0 - 25.0 Final     Anion Gap   Date Value Ref Range Status   02/06/2024 10.9 5.0 - 15.0 mmol/L Final                           Assessment & Plan     Diagnoses and all orders for this visit:    1. Malignant neoplasm of upper-outer quadrant of left breast in female, estrogen receptor positive (Primary)  -     CBC & Differential; Future  -     Comprehensive Metabolic Panel; Future  -     Cancer Antigen 15-3; Future  -     Cancer Antigen 15-3; Future    2. Fitting and adjustment of vascular catheter  -     CBC & Differential; Future  -     Comprehensive Metabolic Panel; Future  -     Cancer Antigen 15-3; Future  -     Cancer Antigen 15-3; Future    3. B12 deficiency  -     CBC & Differential; Future  -     Comprehensive Metabolic Panel; Future  -     Cancer Antigen 15-3; Future  -     Cancer Antigen 15-3; Future    4. Neuropathy due to chemotherapeutic drug  -     CBC & Differential; Future  -     Comprehensive Metabolic Panel; Future  -     Cancer Antigen 15-3; Future  -     Cancer Antigen 15-3; Future    5. Tenosynovitis, de Quervain  -     CBC & Differential; Future  -     Comprehensive Metabolic Panel; Future  -     Cancer Antigen 15-3; Future  -     Cancer Antigen 15-3; Future    6. Surgical menopause  -     CBC & Differential; Future  -     Comprehensive Metabolic Panel; Future  -      Cancer Antigen 15-3; Future  -     Cancer Antigen 15-3; Future    7. Migraine without aura and with status migrainosus, not intractable  -     Ambulatory Referral to Neurology         In summary this 37-year-old  female originally from [place] noticed abnormalities in the left breast in 12/2021, sensation of pressure, minor pain, sensation of fullness. She looked for help. Further mammogram and ultrasound documented abnormalities in the left breast that were consistent with breast cancer. Since then the patient has undergone breast MRI. Breast biopsies documented invasive lobular carcinoma. All this culminated with a left-sided mastectomy and sentinel lymph node sampling. The pathology has been posted above. She had a lobular carcinoma, grade 2, margins of resection negative, Ki-67 at 39%, ER positive, TX positive, HER2/leonie negative. She had 1 positive sentinel lymph node for malignancy with no extracapsular invasion. There was no evidence of lymphovascular invasion.     Invitae panel was negative for any genetic alteration.     The patient has had an expander placed at the mastectomy site. She has discomfort and pain after this was instillated with saline solution yesterday.     The other phenomenon that is happening to the patient is significant pain and discomfort in the inner aspect of the left arm. Sounds neuropathic related to her recent sentinel lymph node sampling. The patient is taking Aleve or ibuprofen with not too much benefit.     The patient also has a minimal component of anemia that will require assessment with ferritin, iron, TIBC, B12, folic acid levels.     RECOMMENDATIONS:  I have advised this patient and  today that she needs to receive adjuvant chemotherapy in the form of AC x4 and subsequently Taxol x12. Oncotype DX in this patient shows intermediate risk of recurrence. Also not only the chemotherapy will be necessary but we need to make her postmenopausal in the next several  weeks or months. This will bring in a lot of benefit for her in the long run to minimize any recurrent disease. She is extremely young. She has 2 young children and we have to do as much as we can in this patient to minimize any potentiality for recurrent disease.     I discussed briefly with her side effects of the treatment including alopecia, cardiac toxicity, anemia, leukopenia, thrombocytopenia, mucositis among others and peripheral neuropathy in the Taxol part of the treatment along with leukopenia, anemia, thrombocytopenia and allergic reaction to the medicine. I discussed with her that the whole treatment is going to take at least 24-26 weeks, in other words 6 months, and thereafter the patient will be hopefully postmenopausal. If she does not become postmenopausal she could qualify for a clinical trial or we could ask Dr. Heidy Gutierrez to do bilateral oophorectomy in this patient.     Eventually the patient will require endocrine adjuvant therapy after she becomes postmenopausal hopefully with letrozole.     I discussed all these facts with the patient and her . I spent 1 hour and 30 minutes with her going through the details of all this but she was able to grasp a lot of information. I appreciated highly the visit with her  and I pointed out to them that they need to work as a team. The patient has 2 young children. She is the only one taking care of the kids. The patient's mother is living with her, coming from [place] after all these issues. I asked her to ask her mother to stay for the period of time that she receives chemotherapy. If we need to do any services for her in regard to visa requirements or status, we will be glad to incorporate our  into this process.     Finally, I discussed with the patient the need for her to proceed with cardiac assessment. I made a referral to be seen by Cardiology and scheduled her to have an echocardiogram and I sent her back to see   Kena in order to proceed with the placement of a port.     In order to control the pain in the inner aspect of her arm, I asked her to use Voltaren topically 4 times a day. It will be perfectly fine for her to use ibuprofen and I sent Neurontin 100 mg to take at bedtime. Hopefully this will be a transitory issue.     Dr. Johnson eventually will be in charge of deciding the time for the patient to initiate physical therapy for her left shoulder.     I reviewed all the pertinent records on this patient and I posted the information out of the pathology and radiology as above. I discussed the case with my partner, Dr. Landeros, who agrees with my plan of care.    Finally, I also posted to the patient today that will require followup in the future in regard to her right breast not only with mammogram but also ultrasound. I discussed with her the fact that sometimes lobular carcinoma likes to be a bilateral disease. There are minimal abnormalities in the mammogram in the right breast as well as in the MRI. This will require to be followed up in the future. I did not recommend a 2nd mastectomy prophylactically on her even though she asked the question today.     The patient was reviewed on 05/20/2022. She has had her port placed in right subclavian position a week ago. She is healing perfectly fine and her surgical site on the left mastectomy and the expander looks perfectly normal with healing. No infection, erythema or discharge.     The patient since then has undergone formal education and consent for chemotherapy administration. Her echocardiogram even though technically difficult documented a normal left ventricular ejection fraction. No pericardial effusion and no valvular dysfunction.     Her white count, hemoglobin and platelets are normal today.     Her chemistry profile, CA 15-3 as well as ferritin, iron, TIBC, B12 and folic acid levels were all normal.     Therefore under the present circumstances I advised the  patient and  the followin. For the treatment of her breast cancer the patient will initiate today chemotherapy with dose dense AC that she will receive every 2 weeks supported with Neulasta. The side effects of this already were discussed with her including nausea, vomiting, alopecia, anemia, leukopenia, thrombocytopenia and cardiac dysfunction among others. The patient was ready to proceed.   2. The patient will be utilizing Neulasta On-body to minimize hematological toxicity and be able to keep up on the schedule in regard to her treatment. I advised her that this can produce significant bone pain in the sternum, in the rib cage, in the pelvic bone and she could use antihistaminic that she is already taking in the first place and also she can use either Aleve or Tylenol or a hot shower if necessary.   3. I encouraged her to remain on her nausea medicine, ondansetron, during the next 3 days to minimize any nausea or vomiting.   4. I advised her to stay away from smelling foods like hot soup or fried foods in the next 3 days given the significant increase in smell that patients receiving AC typically have. This will minimize the potential for nausea and vomiting.   5. The patient will require toxicity assessment in a week along with blood WORK CBC.   6. The patient once completes 4 cycles of AC will initiate weekly cycles of Taxol x12. Upon completion of that the patient will initiate a plan of radiation therapy and she already has been seen by Radiation Oncology.     The patient will participate in a clinical trial that we have in the office and she already has been consented for this in regard to the utilization of antiestrogen medicine upon completion of chemotherapy administration.     The patient will continue using her other medications on routine basis that include the Zyprexa and she will use the EMLA cream to minimize pain at the port site at the time of accessing.     Finally, I advised the  patient and the  present in the room that they at any cost had to minimize the risk of pregnancy. In fact a pregnancy test today was negative. I advised them to use a condom.     The patient was ready to proceed.    All the discussion of all these issues took place in [place].  The patient returned to the office on 05/26/2022. Since the previous visit a week ago the patient had chemotherapy administration and she developed very significant nausea and vomiting nonstoppable for almost 5 days. Yesterday was the first day that she was able to have some rice and potatoes. Because she has thrown up so much she has developed significant heartburn and indigestion indicating probably bile gastritis, esophagitis and acid esophagitis. The patient has not had any hematemesis or melena. The other phenomenon that she had was tremendous somnolence due to the use of Zyprexa at the dose of 5 mg every night. She spent almost 4 days in bed sleepy but still with nausea and vomiting.     On top of things, she has very significant pain in her muscles, in her thighs, in her scalp and in her pelvic bone associated with Neulasta use. She is taking Tylenol for this with some improvement.     The patient has been able to reach hydration since yesterday, drinking proper liquids, and she is urinating okay. Therefore, the patient has multiple issues and on top of that she has now leukopenia associated with chemotherapy. The hemoglobin is stable. The platelet count has dropped but is not dramatically or risky for bleeding.     Given all the above circumstances I proposed her the following changes in regard to plan of chemotherapy for the next cycle.     1. We will modify her plan of chemotherapy, decreasing the doses for all the medications by 20%.   2. The patient will space out the chemotherapy treatments to every 3 weeks for the AC part of the treatment until completion of 4 cycles.   3. The patient will be supported with Neulasta  injection. She will utilize Tylenol for pain.   4. The patient will continue using chemotherapy administration, anti-nausea medication and she will receive Emend on day 2 in the office and Emend on day 3 at home. Pharmacist will take care of this issue.   5. The patient will decrease Zyprexa from 5 mg a day to 2.5 mg in the evening to minimize somnolence.   6. The patient was advised to use omeprazole at the dose of 20 mg twice a day to minimize esophageal irritation associated with so much nausea and vomiting.   7. The patient will require still laboratory parameters the week after each one of the cycles of chemotherapy.     I feel the obligation to make all these changes; otherwise this patient will quit her treatment and that is the worst thing that could happen under the present scenario. She agreed with all the changes made. I explained to her all these changes in Hungarian to make her life a little bit more comfortable and easy to handle. She understands at the time that she returns in 2 weeks she probably will start to develop alopecia and she already has discussed this with her mother who will help through the process of shaving her head.  On 06/23/2022, the patient had dramatic improvement in regard to nausea control after dose adjustment for the 2nd cycle of chemotherapy and also modification of the nausea regimen. We tried to deliver Compazine and Phenergan. These medicines were tremendously tolling on her in regard to confusion and inability to function. Ativan was the miracle medicine. Therefore for the next 2 cycles Zofran and Ativan will be the ideal combination and she will require also IV fluids on day 2 of each one of the next cycles of AC.     In regard to pain associated with Neulasta, she required Tylenol around-the-clock for 4 days after each one of the treatments and now she has no discomfort whatsoever.     Today her white count is normal, hemoglobin improved to 10.7, platelet count with minor  decrease, no clinical bleeding.    The patient feels substantially better. She is able to function at home. She is able to be with her family. She is able to attend her children and she has been able to walk 2 miles a day late in the evening. Encouraged her to continue doing this.     I suggested for her the followin. She will return in a week to proceed with cycle 3 of AC at the previous dose and supported with Neulasta.   2. She will be supported on day 2 with IV fluids and nausea medication.   3. We will discontinue Phenergan and/or Compazine and she will remain on Zofran and Ativan for nausea control.   4. She will continue PPI to minimize reflux symptomatology, still minimal problem but dramatically better than before.   5. She will use Milk of Magnesia for constipation. Another measure could be prunes and dates.     The patient will complete the total 4 cycles of AC and then will move into the Taxol arena. I pointed out to her that we will have the discussion about how to handle this medicine after the completion of the next 2 cycles.     She also raised the question in regard what to do upon completion of chemotherapy. I advised her that eventually she will have radiological assessment as a baseline and we will proceed with PATRICIA blood sampling for evaluation of minimal residual disease.     The patient was ready to proceed now that she feels dramatically better. What a difference it makes in regard to adjustment of medicines and trying to adjust nausea medication accordingly.  The patient returned to the office on 2022. In regard to her chemotherapy treatment, she is ready to proceed with her 4th or last cycle of AC today. Her ANC is a little bit on the low side but the patient will be receiving Neulasta, therefore this will be taking care of this problem.     Her hemoglobin remains stable. This is anemia associated with chemotherapy administration. She also had a menstrual flow that was very  abundant a few weeks ago. She is not taking any vitamins at this time and I have not advised to take any given concerning data about supplements in the background of adjuvant therapy for breast cancer.     In regard to the sensation of numbness in the left upper extremity arm, I think this is natural after mastectomy more than expected and I pointed out to her that this does not signify anything and does not require any therapy. This is extremely common in many women and it has the tendency to go away spontaneously.     In regard to the abnormality in the left hand there is no evidence of lymphedema. I think she could have a minimal element of tendonitis. I advised her to use topical anti-inflammatory medication or ice the hand. She has not received any IV sites for blood draws in this area.     In regard to future visits, the patient would like to continue her chemotherapy treatments on Mondays because her children will be going back to school and she wants to have a weekend with them as much as she can. I find no problem seeing her in 3 weeks from now on Monday to proceed with initiation of Taxol. I pointed out to her that Taxol will be easier to handle a lower dose, less toxicity in regard to hematological parameters and hopefully no issues in regard to neuropathy.     I also advised her that we will continue monitoring the status of menstrual flow upon completion of chemotherapy. She still has 12 Taxol treatments to go and we do not know if the chemotherapy treatment is going to put her in menopause or not. Depending on the final analysis of this upon completion of chemotherapy and before initiation of hormonal therapy, we will need to do hormone levels.     Finally, the patient has had a lot of issues between her insurance company and the billing system at [facility]. I asked the billing system to give her a call and relate this to the insurance company. The specific question is about the cost and the  utilization of PEGylated Neulasta or Neulasta On-body. Hopefully this will be happening to settle down the situation. Each one of these medicines have been billed in thousands of dollars and she already will complete 4 bills of this medication. She is extremely anxious about this situation. This is called economic toxicity of chemotherapy administration and we need to influence this situation as well on her to give her the benefit of settling down once and for all.    This patient already has enough stressors on the plate to add another one that is economical stressor of her treatment.  On 08/22/2022, the patient's white count, hemoglobin and platelets are stable. Tolerance to Taxol is much better than AC. She has some occasional nausea, some joint pain, some pruritus in the absence of any rash on her hands and her feet and I think this all is side effect of Taxol. I advised her to go ahead and proceed with her Taxol today and I advised her to go back to taking her Zyrtec on a regular basis to try to minimize any itching in her hands and her feet.     I also advised her to continue using Ativan that has been terrific in regard to control of nausea and vomiting and she can use 2-3 doses today and tomorrow to minimize the symptoms. It will be perfectly okay as well for her to take Tylenol tonight and tomorrow to minimize joint pain associated with Taxol utilization.     In regard to her epistaxis, it is very obvious that the nasal passages on the right side are different than the ones on the left. She has very significant deviation of the septum. This triggers airflow currents that are abnormal and capillarity in the anterior part of the septum that is very abnormal and is the source of bleeding. If this continues she will require referral for ENT for cauterization. Her platelet count is normal.     The patient has not had any menstrual flow now for 2 months and will continue asking this question because theoretically  the chemotherapy should make her menopausal. Upon completion of Taxol we will need to do endocrine testing in this regard.     Overall I think the patient is doing much better than before and she will be advised to continue her weekly Taxol along with weekly laboratory testing and weekly nurse practitioner visit and visit with me in 1 month.   On 09/12/2022 the patient continues complaining of significant sensation of itching and discomfort in the tips of her fingers and her toes to the point that she has difficulty doing housework and difficulty walking with this. This is a different way of manifestation of toxicity associated with Taxol administration. She probably has a grade 2 peripheral neuropathy associated with this. The symptoms are not permanent, they come and go but this will obligate me to adjust her Taxol dose today by 25-30%. She has been receiving 120 mg of Taxol per dose. Her dose from now on will be 90 mg.     The patient will receive a B12 injection today and I advised her to take B complex vitamin.    In regard to her anemia associated with chemotherapy administration she was advised by my partner, Mich Agudelo MD, last week to take oral iron therapy. Unfortunately the patient became ill in regard to constipation and cramping and she has stopped the iron therapy.     In regard to her leukopenia associated with chemotherapy administration her white count and ANC are appropriate today and she will be able to proceed with her Taxol administration today. She has not developed any fever or infection.     In regard to her reflux symptomatology, nausea and vomiting the symptoms pertinent to his are resolved now that she is no longer receiving AC chemotherapy. She remains on Ativan and Compazine on prn basis and she remains on PPI that has corrected her acid issues altogether.    In regard how to monitor her peripheral neuropathy I asked her to call my nurse, Justine Abbott on weekly basis, notify her  of the status of her symptoms and if they get any worse or permanent I think we will need to discontinue the Taxol permanently and move into the next stage of therapy. She will require visit with me in 3 weeks.     Duration of the video visit today 20 minutes.     On 10/03/2022 the patient came back to the office for follow up. Since the previous visit she called stating that she could not handle the peripheral neuropathy in her hands and her feet anymore because it is disrupting her lifestyle and keeping her from doing anything around the house and we discontinued the Taxol altogether. Since then she has had very significant recovery especially in her hands, still has discomfort in her feet and she is not able to walk long distances because of the stimulation of the activity in the plantar aspect of her feet triggers discomfort as well in the toes. A detailed neurological exam today disclosed no major abnormalities, temperature discrimination, pinprick vibration, reflexes, strength and so forth is very much unremarkable.    Today her white count, hemoglobin and platelets are normal.     From my point of view I advised the patient the following.     1. She has completed her plan of chemotherapy, we cannot push anymore Taxol on her given her sensory neuropathy that will become permanent.     2. I discussed with the patient options to make her postmenopausal either hormonal therapy goserelin for example or Lupron, another way to do this will be ovary removal. I have placed a phone call to talk with Heidy Gutierrez MD, the patient's Gynecologist to discuss this with her. My request to Dr. Gutierrez to perform bilateral oophorectomy. The patient is in agreement with that. This with the need for the patient to initiate at some point her adjuvant hormonal therapy if the patient is postmenopausal in the next visit after oophorectomy she will initiate letrozole therapy 2.5 mg a day for the time being. If the patient  rejects the possibility of an oophorectomy she will require pharmacological removal of ovarian function and she will require also further adjuvant hormonal therapy.    3. Finally she already has been seen by radiation oncology after her diagnosis. I made the referral for her to be seen by them because from my point of view she is ready for initiation of therapy at this point.     4. The patient also complains of fatigue. We will measure a TSH, ferritin, iron profile, reticulated hemoglobin today. We want to be sure that her iron stores and thyroid function are normal. I think this is leftover from chemotherapy administration.    5. The patient complains of foggy brain. Memory and ability to function is limited. It is very likely effect of chemotherapy administration on her. I asked her to trigger a lot of brain stimulation reading books, chatting with people, working on activities with the kids and doing more physical activity.     6. Port. The patient will require port maintenance for the time being. It will be flushed today and it will be reflushed again in 6 weeks when she returns.    7. I will continue monitoring laboratory parameters periodically.    8. I went ahead and obtained an estradiol, FSH and LH today. She has not had any menstrual flow since 06/2022.   On 10/31/2022 the patient believes that she is going to have menstrual flow this week. She has the sensation of the premenstrual syndrome. During the previous visit I measured estradiol, FSH, LH; those numbers were consistent with postmenopausal status but the patient believes that she is going to have menstrual flow very soon this week.     From my point of view the patient is already undergoing radiation therapy for breast cancer. She already has completed 4 treatments and she expects to complete all her treatments by November 30th.    It has been impossible to talk with Dr. Heidy Gutierrez in regard to the need for this patient to have oophorectomy  to make her postmenopausal and eventually initiate her hormonal therapy. I will talk with Dr. Gutierrez hopefully today and that way the patient is put on the schedule to have an oophorectomy in the 1st week in December after completion of radiation therapy and thereafter the patient will be able to initiate her adjuvant hormonal therapy with Femara. At that point also I would expect to proceed with radiological assessment after completion of her radiation treatment that will include a CT scan of the chest, abdomen and pelvis and a bone density test.     I pointed out to the patient that I would like for her to keep her port in place at least for a year. She agrees with that.     Otherwise, the patient has been given proper information by me in regard to physical activity to improve range of motion at the left shoulder that is not limiting according to my clinical examination on her today. She says that she has a little bit of pull and discomfort upon extension and elevation of her upper extremities expected from the surgery and the expander in the left chest wall.     Otherwise, the patient will return to see us back in a few weeks.    Her white count, hemoglobin and platelets are normal. Her chemistry profile is normal. Her hair is growing back. Her eyebrows, eyelashes are growing back and the patient feels substantially better from this point of view.  ADDENDUM: DISCUSSED WITH DR COBB WHO WILL PLAN BILATERAL OOPHORECTOMY IN FEW WEEKS    On 12/15/2022 the patient returned after having bilateral oophorectomy through laparoscopy on 11/28/2022. Therefore officially now she is considered postmenopausal. The surgical sites are healing fine and she is going to follow up with Dr. Gutierrez in a few days. She has not encountered the need for any pain medication. Her bowel activity and urination are fine and she is eating better and functioning better at home. She has had near complete resolution of her neuropathy  associated with Taxol administration and she has not had any consequences of anthracycline administration through her echocardiogram recently done.     Her clinical examination today is otherwise benign. Her heart function, lung function, abdominal function disclosed no abnormalities. Her white count, hemoglobin and platelets are fine still with minimal leukopenia and minimal anemia that will get over in the next several weeks. Considering that now the patient is postmenopausal I am going to go ahead and proceed with prescription of Femara at the dose of 2.5 mg a day. She will need to take this medicine for the next 5 years. I discussed with her side effects of the Femara including occasional joint pain that can be controlled with Tylenol and physical activity. Also this medicine can raise cholesterol level and I will obtain a new lipid profile baseline today. This will be repeated every 6 months.     Eventually the patient also will proceed with bone density. I made her aware that sometimes these medicines also can favor osteopenia or osteoporosis and we will need to request a bone density test more or less in 6 months from now.     The patient will proceed with radiological assessment of CT scan of the chest, abdomen and pelvis baseline after completion of all her therapy now and before initiation of the Femara. She will not require any further radiological assessment in the future unless that she has any clinical symptomatology to suggest any recurrence.     The patient will require visit with me in 6 weeks. On that occasion her port the week before will be flushed. We will keep the port for the next couple of years.     Finally the patient is very sad because her father-in-law was diagnosed with colon cancer in [place] and they are going to need to travel to see him.     I discussed all these facts with nurse navigator in the room. Also the patient was advised to proceed with survivorship assessment the same day  that she will be seeing me in 6 weeks.     We are glad that she completed all this and many things are behind. She is in remission now and she cried with lam about this issue today.  On 01/24/2023 the patient was further reviewed. Since the previous visit the patient initiated her Femara adjuvant therapy. Unfortunately the medicine is producing some achiness in her joints and I advised her to take a vitamin D supplement 1000 units a day and initiate a tablet of Aleve to be taken at the time that she goes to bed at nighttime. I will ask my nurse, Justine Abbott, to give her a call in a couple of weeks to see if this strategy is having a positive impact on her. If the pain continues, a strong suggestion would be to move her from Femara to Arimidex.     In the meantime we documented that the patient also after post oophorectomy had a very low level of estradiol and her FSH was elevated consistent with menopausal status.     The patient is not experiencing any hot flashes from menopause and she overall is functioning well at home and improving level of energy and activity for all sorts of things.     In the meantime she was seen by Cardio-Oncology. Her EKG and testing was appropriate and she continues taking cholesterol medicine. We will recheck lipid profile in 3 months and we will monitor this area as well in the future. She will require to be tested by Cardio-Oncology once a year.     From the point of view of her reconstruction of her left breast she will see her plastic surgeon at some point in 04/2023. From my point of view she can proceed with any leftover surgery necessary for this at any time after this period of time. Her radiation therapy side effects are very much over and the patient should be able to undergo her radiation treatment.     In regard to her diet I encouraged her to continue a very healthy diet full of vegetables and fruit. She is going in that agenda anyway and I encouraged her to have a  different consumption of red meat only once a week and try to have red meat of organic nature with walking cows. Encouraged her to have also some more vegetarian style meals that will be beneficial to her in the long run. It will be okay to have some occasional sweets here and there. She raised the question in regard to alcohol consumption in regard to a glass of wine here or there. I do not see any problems or difficulties with this whatsoever. She is welcome to have a minimal amount that does not need to become a situation for every day use. She understood this clearly.      From the point of view of fluidization for intercourse I encouraged them to use coconut oil. That will be beneficial for both of them. Any other lubricant will be acceptable.     From the point of view of her port the patient will continue having her port flushed every 6 weeks and she knows that she needs to maintain this for a total of 2 years. She is aware that a high risk of recurrence will happen in this period of time. This will require to be maintained.     From the point of view of vitamin consumption I asked her to take vitamin D 1000 units a day especially knowing that we have been through winter and we have no sun exposure. Eventually in the spring she will need to have some sun exposure like the rest of us that will be crucial in regard to proper vitamin D synthesis.     In regard to any other need for radiological assessment I find no need for this to happen again unless that clinical characteristics change at some point in the future. We will proceed with a CT scan of the chest, abdomen and pelvis. I personally reviewed this showing no evidence of metastasis at any level in the lungs, liver, bones or any other anatomical site. The spleen, pancreas, kidneys, intestine were all unremarkable. Cardiac anatomy was normal. Pulmonary anatomy was normal with minimal granuloma formation in the right lung.     Therefore, I gave her the good  news in regard to this exit CT scan that hopefully will not be required to be done again in the future.     The patient also has been seen by JACQUELIN Xiong, in regard to information pertinent to her future follow ups and plan of care in the past and the future.     I discussed all these facts with the patient and the  in the room.  On 04/03/2023 in spite of switching her from Femara to Arimidex many weeks ago and giving her a break from any aromatase inhibitor that decreased the amount of joint pain the patient continues having joint pain. 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 She has morning stiffness that lasts for 2-3 hours. Most of the joint pain is in the distal interphalangeal joints.     Given the persistency of these symptoms raises the question in absence of any family history of osteoarthritis, lupus, rheumatoid arthritis if she has some other modality of arthritis that has nothing to do with her aromatase inhibitor medication. I went ahead and scheduled her to have an x-ray of both hands, sent her back to the lab to obtain a rheumatoid factor, MISAEL, sedimentation rate, C-reactive protein and CCP antibodies.     Once that all of this information is available I will further discuss with her how to proceed. Independent of all of this eventually she will require a referral to have a deQuervain tendonitis injection anyway.     I encouraged her to use 2 Aleve to be taken at nighttime and she knows that she needs to take this with some food in the stomach to minimize any gastric irritation. She will remain on PPI for the time being.     I advised her to remain on her Arimidex but maybe in the long run I will need to switch this medicine to Aromasin, we have to see. Maybe also in the long run if these aromatase inhibitors are not the medicines  for her we will need to go back onto tamoxifen or Evista in the adjuvant setting.     In the meantime the patient will continue her Arimidex 1 mg a day. I advised her to try foods that contain pigments that could be antiinflammatory and advised her to try to minimize the consumption of carbohydrates and that way she minimizes weight gain associated with menopause.     So far I find no evidence of breast cancer recurrence on her and she has not completed all of her left breast reconstruction. Appointment will be made for her to complete this at some point in the summer.     Patient seen back 6/26/2023 in follow-up having seen Dr. CARR with hand surgery and injections performed which is greatly improved her range of motion.  Patient reports she is has full functionality of her hands at this point.  She continues on Aromasin and states she does have mild achiness in her knees but this is manageable.  She has not utilizing any Aleve currently.  We did discuss the possibility of using tart cherry concentrate 1 ounce every night before bed to see if this helps with any arthralgias in her knees on the aromatase inhibitors.  Her weight is stable today from the last visit.  She will return in 6 weeks for port flush and then again in 3 months with Dr. Palacios for review.  On 10/16/2023 the patient returns to the office. The clinical examination is remarkable for a good cosmetic reconstruction for both breasts. This happened 6 weeks ago and she is still healing and having an element of pain and discomfort. She has had nipple sparing on the right side and she will have tattoo nipple on left side. This eventually will happen in the next several months. Encouraged her to do more activity with her shoulders to minimize frozen shoulders. I insisted in regard to physical activity to do and showed her some exercises.     In regard to her breast cancer per se, I do not see anything to suggest recurrent disease clinically or by  symptomatology. Her exam is very benign. The liver is not palpable. The patient has gained back her weight that she had before chemotherapy initiation. She feels well in the way that she has right now and is stable.     Arimidex tolerance is much better than Femara. Still having occasional pain the MPs and PIPs in both thumbs. There is minimal deformity suggesting maybe an element of osteoarthritis especially in the MP joints. I encouraged her to use topical Voltaren for this. In the meantime, I advised her to continue Arimidex for which she has 100% compliance, 1 mg a day. I would like for her to come and see us back in 4 months.     Her white count, hemoglobin and platelets are normal. Her white count differential is normal. Her chemistry profile discloses a normal blood sugar, electrolytes. Her alkaline phosphatase is 120. Recent laboratory evaluation disclosed alkaline phosphatases as high as in the 130s. It is very likely that the recovery of the weight has triggered a little bit of fatty liver that is now improving given the fact that she has gained more mobility. I do not believe that we need to do any other testing and I do not believe she needs to have radiological assessment of her liver.     I went ahead and ordered a CA 15-3 for completeness anyway.     In regard to her bone density, she has had some decrease in bone mass but no osteopenia. I encouraged her to walk at least 7500 steps a day, continue taking her vitamin D. Dancing and high impact exercise will be applicable once that she is able to do this after the recent surgery.     I will see her back in 4 months with repeat CBC, CMP. We will monitor again the alkaline phosphatase.  On 02/06/2024 the patient has been reviewed. She has no symptoms or signs of breast cancer recurrence. She remains on Arimidex adjuvant therapy and this medicine gives her minimal degree of joint discomfort. Mostly the discomfort happens early in the morning, goes away in  question of 1/2 hour. She has no functional limitation otherwise. She has no pain in any other joints, shoulders, elbows, wrists, knees, hips or ankles. No back pain. The clinical examination is very much benign. Her white count, hemoglobin and platelets are normal, her chemistry profile is normal.     I do believe that from the point of view of her breast cancer her adjuvant therapy is still ongoing and it will be perfectly fine for her to remain on her Arimidex 1 mg a day. I do believe that she has had enough time with her port and it will be perfectly fine for the port to be removed by, Monisha Escudero MD, in the close future. No more port flushes will be scheduled at this time.     From the point of view of her arthritis I asked her to take still some Tylenol arthritis on prn basis and be sure that she takes her vitamin D supplement.     For her recent migraine issues I advised her to take some Tylenol along with caffeine and I went ahead and scheduled her an appointment to be seen by neurology to further document how to handle migraines with maintenance medicine for avoidance of the attacks and also medicines for acute status.     I asked her to pay attention to triggering factors that could play a role into this issue.    We will review her back in 4 months with repeat CBC and CMP.    Otherwise no other issues pertinent to her care at this point.

## 2024-02-07 ENCOUNTER — OFFICE VISIT (OUTPATIENT)
Dept: CARDIOLOGY | Facility: CLINIC | Age: 39
End: 2024-02-07
Payer: COMMERCIAL

## 2024-02-07 ENCOUNTER — HOSPITAL ENCOUNTER (OUTPATIENT)
Dept: CARDIOLOGY | Facility: HOSPITAL | Age: 39
Discharge: HOME | End: 2024-02-07
Admitting: NURSE PRACTITIONER
Payer: COMMERCIAL

## 2024-02-07 VITALS
DIASTOLIC BLOOD PRESSURE: 60 MMHG | SYSTOLIC BLOOD PRESSURE: 102 MMHG | OXYGEN SATURATION: 99 % | WEIGHT: 128 LBS | BODY MASS INDEX: 21.33 KG/M2 | HEIGHT: 65 IN | HEART RATE: 72 BPM

## 2024-02-07 VITALS
DIASTOLIC BLOOD PRESSURE: 72 MMHG | HEART RATE: 68 BPM | WEIGHT: 127.4 LBS | HEIGHT: 65 IN | SYSTOLIC BLOOD PRESSURE: 108 MMHG | BODY MASS INDEX: 21.23 KG/M2

## 2024-02-07 DIAGNOSIS — Z92.21 PERSONAL HISTORY OF ANTINEOPLASTIC CHEMOTHERAPY: ICD-10-CM

## 2024-02-07 DIAGNOSIS — Z79.811 AROMATASE INHIBITOR USE: ICD-10-CM

## 2024-02-07 DIAGNOSIS — Z17.0 MALIGNANT NEOPLASM OF UPPER-OUTER QUADRANT OF LEFT BREAST IN FEMALE, ESTROGEN RECEPTOR POSITIVE: ICD-10-CM

## 2024-02-07 DIAGNOSIS — C50.412 MALIGNANT NEOPLASM OF UPPER-OUTER QUADRANT OF LEFT BREAST IN FEMALE, ESTROGEN RECEPTOR POSITIVE: Primary | ICD-10-CM

## 2024-02-07 DIAGNOSIS — Z17.0 MALIGNANT NEOPLASM OF UPPER-OUTER QUADRANT OF LEFT BREAST IN FEMALE, ESTROGEN RECEPTOR POSITIVE: Primary | ICD-10-CM

## 2024-02-07 DIAGNOSIS — Z92.3 HISTORY OF RADIATION THERAPY: ICD-10-CM

## 2024-02-07 DIAGNOSIS — Z92.21 HISTORY OF CHEMOTHERAPY: ICD-10-CM

## 2024-02-07 DIAGNOSIS — C50.412 MALIGNANT NEOPLASM OF UPPER-OUTER QUADRANT OF LEFT BREAST IN FEMALE, ESTROGEN RECEPTOR POSITIVE: ICD-10-CM

## 2024-02-07 LAB
AORTIC ARCH: 2.5 CM
AORTIC DIMENSIONLESS INDEX: 0.6 (DI)
ASCENDING AORTA: 2.1 CM
BH CV ECHO LEFT VENTRICLE GLOBAL LONGITUDINAL STRAIN: -22.5 %
BH CV ECHO MEAS - ACS: 1.58 CM
BH CV ECHO MEAS - AO MAX PG: 9.2 MMHG
BH CV ECHO MEAS - AO MEAN PG: 5 MMHG
BH CV ECHO MEAS - AO ROOT DIAM: 2.39 CM
BH CV ECHO MEAS - AO V2 MAX: 152 CM/SEC
BH CV ECHO MEAS - AO V2 VTI: 30.8 CM
BH CV ECHO MEAS - AVA(I,D): 1.77 CM2
BH CV ECHO MEAS - EDV(CUBED): 98.9 ML
BH CV ECHO MEAS - EDV(MOD-SP2): 96 ML
BH CV ECHO MEAS - EDV(MOD-SP4): 94 ML
BH CV ECHO MEAS - EF(MOD-BP): 58.7 %
BH CV ECHO MEAS - EF(MOD-SP2): 61.5 %
BH CV ECHO MEAS - EF(MOD-SP4): 58.5 %
BH CV ECHO MEAS - EF_3D-VOL: 59 %
BH CV ECHO MEAS - ESV(CUBED): 17 ML
BH CV ECHO MEAS - ESV(MOD-SP2): 37 ML
BH CV ECHO MEAS - ESV(MOD-SP4): 39 ML
BH CV ECHO MEAS - FS: 44.4 %
BH CV ECHO MEAS - IVS/LVPW: 1.02 CM
BH CV ECHO MEAS - IVSD: 0.8 CM
BH CV ECHO MEAS - LAT PEAK E' VEL: 17.3 CM/SEC
BH CV ECHO MEAS - LV DIASTOLIC VOL/BSA (35-75): 64.2 CM2
BH CV ECHO MEAS - LV MASS(C)D: 117.4 GRAMS
BH CV ECHO MEAS - LV MAX PG: 3.7 MMHG
BH CV ECHO MEAS - LV MEAN PG: 2 MMHG
BH CV ECHO MEAS - LV SYSTOLIC VOL/BSA (12-30): 26.6 CM2
BH CV ECHO MEAS - LV V1 MAX: 95.6 CM/SEC
BH CV ECHO MEAS - LV V1 VTI: 19.3 CM
BH CV ECHO MEAS - LVIDD: 4.6 CM
BH CV ECHO MEAS - LVIDS: 2.6 CM
BH CV ECHO MEAS - LVOT AREA: 2.8 CM2
BH CV ECHO MEAS - LVOT DIAM: 1.9 CM
BH CV ECHO MEAS - LVPWD: 0.78 CM
BH CV ECHO MEAS - MED PEAK E' VEL: 15.7 CM/SEC
BH CV ECHO MEAS - MV A DUR: 0.14 SEC
BH CV ECHO MEAS - MV A MAX VEL: 79.1 CM/SEC
BH CV ECHO MEAS - MV DEC SLOPE: 466 CM/SEC2
BH CV ECHO MEAS - MV DEC TIME: 0.19 SEC
BH CV ECHO MEAS - MV E MAX VEL: 114 CM/SEC
BH CV ECHO MEAS - MV E/A: 1.44
BH CV ECHO MEAS - MV MAX PG: 5.8 MMHG
BH CV ECHO MEAS - MV MEAN PG: 3 MMHG
BH CV ECHO MEAS - MV P1/2T: 75.9 MSEC
BH CV ECHO MEAS - MV V2 VTI: 33.7 CM
BH CV ECHO MEAS - MVA(P1/2T): 2.9 CM2
BH CV ECHO MEAS - MVA(VTI): 1.62 CM2
BH CV ECHO MEAS - PA ACC TIME: 0.19 SEC
BH CV ECHO MEAS - PA V2 MAX: 96.4 CM/SEC
BH CV ECHO MEAS - PI END-D VEL: 65.2 CM/SEC
BH CV ECHO MEAS - PULM A REVS DUR: 0.16 SEC
BH CV ECHO MEAS - PULM A REVS VEL: 23.8 CM/SEC
BH CV ECHO MEAS - PULM DIAS VEL: 34.4 CM/SEC
BH CV ECHO MEAS - PULM S/D: 0.83
BH CV ECHO MEAS - PULM SYS VEL: 28.6 CM/SEC
BH CV ECHO MEAS - QP/QS: 0.41
BH CV ECHO MEAS - RV MAX PG: 1.08 MMHG
BH CV ECHO MEAS - RV V1 MAX: 51.8 CM/SEC
BH CV ECHO MEAS - RV V1 VTI: 12.4 CM
BH CV ECHO MEAS - RVOT DIAM: 1.51 CM
BH CV ECHO MEAS - SI(MOD-SP2): 40.3 ML/M2
BH CV ECHO MEAS - SI(MOD-SP4): 37.5 ML/M2
BH CV ECHO MEAS - SV(LVOT): 54.6 ML
BH CV ECHO MEAS - SV(MOD-SP2): 59 ML
BH CV ECHO MEAS - SV(MOD-SP4): 55 ML
BH CV ECHO MEAS - SV(RVOT): 22.2 ML
BH CV ECHO MEAS - TAPSE (>1.6): 2.24 CM
BH CV ECHO MEASUREMENTS AVERAGE E/E' RATIO: 6.91
BH CV XLRA - RV BASE: 3 CM
BH CV XLRA - RV LENGTH: 7.7 CM
BH CV XLRA - RV MID: 2.39 CM
BH CV XLRA - TDI S': 11.5 CM/SEC
LEFT ATRIUM VOLUME INDEX: 18.3 ML/M2
LV EF 2D ECHO EST: 60 %
SINUS: 2.6 CM
STJ: 1.88 CM

## 2024-02-07 PROCEDURE — 93306 TTE W/DOPPLER COMPLETE: CPT

## 2024-02-07 PROCEDURE — 99214 OFFICE O/P EST MOD 30 MIN: CPT | Performed by: INTERNAL MEDICINE

## 2024-02-07 PROCEDURE — 93000 ELECTROCARDIOGRAM COMPLETE: CPT | Performed by: INTERNAL MEDICINE

## 2024-02-07 PROCEDURE — 25510000001 PERFLUTREN (DEFINITY) 8.476 MG IN SODIUM CHLORIDE (PF) 0.9 % 10 ML INJECTION: Performed by: NURSE PRACTITIONER

## 2024-02-07 PROCEDURE — 93356 MYOCRD STRAIN IMG SPCKL TRCK: CPT

## 2024-02-07 RX ADMIN — PERFLUTREN 2 ML: 6.52 INJECTION, SUSPENSION INTRAVENOUS at 11:44

## 2024-02-07 NOTE — PROGRESS NOTES
Date of Office Visit: 24  Encounter Provider: Ananda Arita MD  Place of Service: [facility] CARDIOLOGY  Patient Name: Maribell Romero  :1985    Chief Complaint   Patient presents with    CARDIO ONCOLOGY VISIT    :     HPI:     Ms. Eddie Romero is 39 y.o. and presents today in cardio-oncology follow up. I have reviewed prior notes and there are no changes except for any new updates described below. I have also reviewed any information entered into the medical record by the patient or by ancillary staff.     She is a healthy young woman with no previous chronic medical conditions.  She has had 2 uncomplicated pregnancies and deliveries.  She has never had any history of pregnancy-induced hypertension, diabetes, or preeclampsia. In 2021, she palpated a left breast mass.  Work-up ultimately revealed a stage III breast cancer, ER/SC positive, HER2/leonie negative.  She is status post left-sided mastectomy and reconstruction. She completed treatment with doxorubicin/cyclophosphamide/paclitaxel. She received a total of 208.5 mg/m2 doxorubicin. She received left sided radiation (mean heart dose 6.3 cGy). She is s/p oophorectomy. She was been on letrozole, which was switched to anastrozole, with plans to continue this until .     Baseline echocardiography was normal. Studies were performed every three months while undergoing treatment with doxorubicin and are now being performed yearly. Her EF has been 60-65%. Diastolic function has remained normal, and GLS has been -22% to -24%.     She denies any cardiac symptoms. She has recovered very nicely.    Past Medical History:   Diagnosis Date    Anemia     Anesthesia complication     hypotension with last C section    Breast cancer 2022    left sided, stage 3, ER/SC+, Her2-, s/p mastectomy, LAST CHEMO 10/3/22, CURRENTLY RECEIVING RADIATION    History of COVID-19     2021 AND 2022    Hyperlipidemia      PONV (postoperative nausea and vomiting)     Seasonal allergies        Past Surgical History:   Procedure Laterality Date    APPENDECTOMY      BREAST BIOPSY Left     BREAST RECONSTRUCTION Left 2022    Procedure: LEFT BREAST 1ST STAGE RECONSTRUCTION WITH INSERTION OF TISSUE EXPANDER AND BIOLOGIC;  Surgeon: Hermelinda Johnson MD;  Location: [facility] OR;  Service: Plastics;  Laterality: Left;     SECTION       SECTION N/A 12/10/2018    Procedure:  SECTION REPEAT;  Surgeon: Heidy Gutierrez MD;  Location: [facility] LABOR DELIVERY;  Service: Obstetrics/Gynecology    D & C HYSTEROSCOPY N/A 2021    Procedure: HYSTEROSCOPY REMOVAL INTRAUTERINE DEVICE WILL NEED ULTRASOUND IN ROOM;  Surgeon: Heidy Gutierrez MD;  Location: [facility] OSC;  Service: Obstetrics/Gynecology;  Laterality: N/A;    DIAGNOSTIC LAPAROSCOPY Bilateral 2022    Procedure: DAVINCI ASSISTED BILATERAL SALPINGO-OOPHORECTOMY;  Surgeon: Heidy Gutierrez MD;  Location: [facility] OR;  Service: Robotics - DaVinci;  Laterality: Bilateral;    KNEE ARTHROSCOPY Right     MASTECTOMY W/ SENTINEL NODE BIOPSY Left 2022    Procedure: left total mastectomy with left axillary sentinel lymph node biopsy;  Surgeon: Monisha Escudero MD;  Location: [facility] OR;  Service: General;  Laterality: Left;    VENOUS ACCESS DEVICE (PORT) INSERTION Right 2022    Procedure: right port placement;  Surgeon: Monisha Escudero MD;  Location: [facility] OR;  Service: General;  Laterality: Right;       Social History     Socioeconomic History    Marital status:    Tobacco Use    Smoking status: Never     Passive exposure: Never    Smokeless tobacco: Never   Vaping Use    Vaping Use: Never used   Substance and Sexual Activity    Alcohol use: Not Currently     Alcohol/week: 5.0 standard drinks of alcohol    Drug use: Never    Sexual activity: Yes     Partners: Male     Birth control/protection: Bilateral  "salpingectomy        Family History   Problem Relation Age of Onset    No Known Problems Mother     No Known Problems Father     No Known Problems Sister     No Known Problems Brother     No Known Problems Maternal Grandmother     Diabetes Maternal Grandfather     No Known Problems Paternal Grandmother     No Known Problems Paternal Grandfather     Melanoma Paternal Aunt     Diabetes Other     Malig Hyperthermia Neg Hx     Colon cancer Neg Hx        Review of Systems   Gastrointestinal:  Positive for nausea.   Neurological:  Positive for headaches.   All other systems reviewed and are negative.      Allergies   Allergen Reactions    Asa [Aspirin] Anaphylaxis and Shortness Of Breath    Adhesive Tape Dermatitis     Paper tape okay    Iopamidol Hives     AKA IV CONTRAST    Ct Contrast Rash     Pt HAD A RASH AND COUGHING         Current Outpatient Medications:     anastrozole (ARIMIDEX) 1 MG tablet, Take 1 tablet by mouth Daily., Disp: 90 tablet, Rfl: 1    atorvastatin (LIPITOR) 10 MG tablet, Take 1 tablet by mouth Every Night., Disp: 90 tablet, Rfl: 3    Cetirizine HCl (ZYRTEC PO), Take 10 mg by mouth As Needed., Disp: , Rfl:     fluticasone (FLONASE) 50 MCG/ACT nasal spray, 1 spray into the nostril(s) as directed by provider 2 (Two) Times a Day., Disp: 9.9 mL, Rfl: 0  No current facility-administered medications for this visit.      Objective:          Vitals:    02/07/24 1145   BP: 108/72   BP Location: Right arm   Pulse: 68   Weight: 57.8 kg (127 lb 6.4 oz)   Height: 165.1 cm (65\")     Body mass index is 21.2 kg/m².    Vitals reviewed.   Constitutional:       Appearance: Well-developed and not in distress.   Eyes:      Conjunctiva/sclera: Conjunctivae normal.   HENT:      Head: Normocephalic.      Nose: Nose normal.   Neck:      Vascular: No JVD. JVD normal.      Lymphadenopathy: No cervical adenopathy.   Pulmonary:      Effort: Pulmonary effort is normal.      Breath sounds: Normal breath sounds.   Cardiovascular: "      Normal rate. Regular rhythm.      Murmurs: There is no murmur.   Pulses:     Intact distal pulses.   Edema:     Peripheral edema absent.   Abdominal:      Palpations: Abdomen is soft.      Tenderness: There is no abdominal tenderness.   Musculoskeletal: Normal range of motion.      Cervical back: Normal range of motion. Skin:     General: Skin is warm and dry.   Neurological:      General: No focal deficit present.      Mental Status: Alert and oriented to person, place, and time.      Cranial Nerves: No cranial nerve deficit.   Psychiatric:         Behavior: Behavior normal.         Thought Content: Thought content normal.         Judgment: Judgment normal.           ECG 12 Lead    Date/Time: 2/7/2024 12:13 PM  Performed by: Ananda Arita MD    Authorized by: Ananda Arita MD  Comparison: compared with previous ECG   Similar to previous ECG  Rhythm: sinus rhythm  Conduction: incomplete right bundle branch block  ST Segments: ST segments normal  T Waves: T waves normal  QRS axis: normal  Other: no other findings    Clinical impression: non-specific ECG          Assessment:       Diagnosis Plan   1. Malignant neoplasm of upper-outer quadrant of left breast in female, estrogen receptor positive  Adult Transthoracic Echo Complete W/ Cont if Necessary Per Protocol      2. History of chemotherapy  Adult Transthoracic Echo Complete W/ Cont if Necessary Per Protocol      3. History of radiation therapy  Adult Transthoracic Echo Complete W/ Cont if Necessary Per Protocol      4. Aromatase inhibitor use  Adult Transthoracic Echo Complete W/ Cont if Necessary Per Protocol          Plan:       Mrs Eddie Romero is a previously healthy woman who was diagnosed with breast cancer in 2022.  She is status post mastectomy and reconstruction.  She received doxorubicin, 208.5 mg/m².  She received left-sided chest radiation, with a mean heart dose of 6.3 cGy.    Serial echocardiograms have revealed stable left ventricular  systolic function, diastolic function, and strain.  Today study shows an ejection fraction of 60%, normal diastolic function, and GLS -22.5%.  She will require a yearly echocardiogram until 2028.  After that, she will require an echocardiogram every 5 years to both follow for potential long-term effects of anthracycline treatment, as well as to rule out radiation-induced valvular disease.    She is on anastrozole.  She is not diabetic and does not smoke.  She has very mild hyperlipidemia which is well-managed on atorvastatin.  She is not considered to be at high risk for ASCVD.    Sincerely,       Ananda Arita MD

## 2024-02-22 ENCOUNTER — APPOINTMENT (OUTPATIENT)
Dept: WOMENS IMAGING | Facility: HOSPITAL | Age: 39
End: 2024-02-22
Payer: COMMERCIAL

## 2024-02-22 PROCEDURE — 77067 SCR MAMMO BI INCL CAD: CPT | Performed by: RADIOLOGY

## 2024-02-22 PROCEDURE — 77063 BREAST TOMOSYNTHESIS BI: CPT | Performed by: RADIOLOGY

## 2024-02-28 ENCOUNTER — TELEPHONE (OUTPATIENT)
Dept: SURGERY | Facility: CLINIC | Age: 39
End: 2024-02-28
Payer: COMMERCIAL

## 2024-02-28 NOTE — TELEPHONE ENCOUNTER
2-22-24 [facility] RIGHT screening mammo with 3D- extremely dense. Stable small mass medial RIGHT B2

## 2024-03-04 ENCOUNTER — OFFICE VISIT (OUTPATIENT)
Dept: SURGERY | Facility: CLINIC | Age: 39
End: 2024-03-04
Payer: COMMERCIAL

## 2024-03-04 ENCOUNTER — HOSPITAL ENCOUNTER (OUTPATIENT)
Dept: PHYSICAL THERAPY | Facility: HOSPITAL | Age: 39
Setting detail: THERAPIES SERIES
Discharge: HOME | End: 2024-03-04
Payer: COMMERCIAL

## 2024-03-04 ENCOUNTER — PREP FOR SURGERY (OUTPATIENT)
Dept: OTHER | Facility: HOSPITAL | Age: 39
End: 2024-03-04
Payer: COMMERCIAL

## 2024-03-04 VITALS
OXYGEN SATURATION: 99 % | HEART RATE: 85 BPM | WEIGHT: 127 LBS | BODY MASS INDEX: 21.16 KG/M2 | DIASTOLIC BLOOD PRESSURE: 64 MMHG | HEIGHT: 65 IN | SYSTOLIC BLOOD PRESSURE: 108 MMHG

## 2024-03-04 DIAGNOSIS — C50.412 MALIGNANT NEOPLASM OF UPPER-OUTER QUADRANT OF LEFT BREAST IN FEMALE, ESTROGEN RECEPTOR POSITIVE: Primary | ICD-10-CM

## 2024-03-04 DIAGNOSIS — Z17.0 MALIGNANT NEOPLASM OF UPPER-OUTER QUADRANT OF LEFT BREAST IN FEMALE, ESTROGEN RECEPTOR POSITIVE: Primary | ICD-10-CM

## 2024-03-04 DIAGNOSIS — C77.3 SECONDARY MALIGNANT NEOPLASM OF AXILLARY LYMPH NODES: ICD-10-CM

## 2024-03-04 DIAGNOSIS — Z90.12 S/P LEFT MASTECTOMY: ICD-10-CM

## 2024-03-04 DIAGNOSIS — R92.8 ABNORMAL MRI, BREAST: ICD-10-CM

## 2024-03-04 DIAGNOSIS — Z91.89 AT RISK FOR LYMPHEDEMA: ICD-10-CM

## 2024-03-04 DIAGNOSIS — L90.5 SCAR ADHERENT: ICD-10-CM

## 2024-03-04 PROCEDURE — 97535 SELF CARE MNGMENT TRAINING: CPT

## 2024-03-04 PROCEDURE — 93702 BIS XTRACELL FLUID ANALYSIS: CPT

## 2024-03-04 PROCEDURE — 99215 OFFICE O/P EST HI 40 MIN: CPT | Performed by: SURGERY

## 2024-03-04 RX ORDER — SODIUM CHLORIDE, SODIUM LACTATE, POTASSIUM CHLORIDE, CALCIUM CHLORIDE 600; 310; 30; 20 MG/100ML; MG/100ML; MG/100ML; MG/100ML
100 INJECTION, SOLUTION INTRAVENOUS CONTINUOUS
Status: CANCELLED | OUTPATIENT
Start: 2024-03-15

## 2024-03-04 NOTE — THERAPY RE-EVALUATION
Physical Therapy Lymphedema Re-Evaluation/Discharge Note  [facility]     Patient Name: Maribell Romero  : 1985  MRN: [id number]  Today's Date: 3/4/2024      Visit Date: 2024    Visit Dx:    ICD-10-CM ICD-9-CM   1. Malignant neoplasm of upper-outer quadrant of left breast in female, estrogen receptor positive  C50.412 174.4    Z17.0 V86.0   2. At risk for lymphedema  Z91.89 V49.89   3. S/P left mastectomy  Z90.12 V45.71       Patient Active Problem List   Diagnosis    Other specified anemias    Malignant neoplasm of upper-outer quadrant of left breast in female, estrogen receptor positive    Peptic reflux esophagitis    Fitting and adjustment of vascular catheter    B12 deficiency    Neuropathy due to chemotherapeutic drug    Surgical menopause    Arthralgia of both hands    Tenosynovitis, de Quervain    Primary osteoarthritis of both hands    Morning stiffness of joints    Alkaline phosphatase elevation    Hyperlipidemia        Past Medical History:   Diagnosis Date    Anemia     Anesthesia complication     hypotension with last C section    Breast cancer 2022    left sided, stage 3, ER/ND+, Her2-, s/p mastectomy, LAST CHEMO 10/3/22, CURRENTLY RECEIVING RADIATION    History of COVID-19     2021 AND 2022    Hyperlipidemia     PONV (postoperative nausea and vomiting)     Seasonal allergies         Past Surgical History:   Procedure Laterality Date    APPENDECTOMY      BREAST BIOPSY Left     BREAST RECONSTRUCTION Left 2022    Procedure: LEFT BREAST 1ST STAGE RECONSTRUCTION WITH INSERTION OF TISSUE EXPANDER AND BIOLOGIC;  Surgeon: Hermelinda Johnson MD;  Location: [facility] OR;  Service: Plastics;  Laterality: Left;     SECTION       SECTION N/A 12/10/2018    Procedure:  SECTION REPEAT;  Surgeon: Heidy Gutierrez MD;  Location: [facility] LABOR DELIVERY;  Service: Obstetrics/Gynecology    D & C HYSTEROSCOPY N/A 2021    Procedure:  HYSTEROSCOPY REMOVAL INTRAUTERINE DEVICE WILL NEED ULTRASOUND IN ROOM;  Surgeon: Heidy Gutierrez MD;  Location: [facility];  Service: Obstetrics/Gynecology;  Laterality: N/A;    DIAGNOSTIC LAPAROSCOPY Bilateral 11/28/2022    Procedure: DAVINCI ASSISTED BILATERAL SALPINGO-OOPHORECTOMY;  Surgeon: Heidy Gutierrez MD;  Location: [facility] OR;  Service: Robotics - DaVinci;  Laterality: Bilateral;    KNEE ARTHROSCOPY Right     MASTECTOMY W/ SENTINEL NODE BIOPSY Left 04/13/2022    Procedure: left total mastectomy with left axillary sentinel lymph node biopsy;  Surgeon: Monisha Escudero MD;  Location: [facility] OR;  Service: General;  Laterality: Left;    VENOUS ACCESS DEVICE (PORT) INSERTION Right 05/13/2022    Procedure: right port placement;  Surgeon: Monisha Escudero MD;  Location: [facility] OR;  Service: General;  Laterality: Right;       Visit Dx:    ICD-10-CM ICD-9-CM   1. Malignant neoplasm of upper-outer quadrant of left breast in female, estrogen receptor positive  C50.412 174.4    Z17.0 V86.0   2. At risk for lymphedema  Z91.89 V49.89   3. S/P left mastectomy  Z90.12 V45.71            Lymphedema       Row Name 03/04/24 1018             Subjective Pain    Able to rate subjective pain? yes  -LB      Pre-Treatment Pain Level 2  -LB         Subjective    Subjective Comments I am doing well, some days are better than others. I am exercising more so that is helping.  -LB         Lymphedema Assessment    Lymphedema Classification LUE:;at risk/stage 0  -LB      Lymphedema Cancer Related Sx left;modified radical mastectomy;sentinel node biopsy  -LB      Lymphedema Surgery Comments 4/13/22  -LB      Lymph Nodes Removed # 4  -LB      Positive Lymph Nodes # 1  -LB      Chemo Received yes  -LB      Chemo Treatments #/Timeframe completed  -LB      Radiation Therapy Received yes  -LB      Radiation Treatments #/Timeframe completed  -LB         Posture/Observations    Posture/Observations Comments WNL   -LB         MMT (Manual Muscle Testing)    General MMT Comments BUE WFL  -LB         Lymphedema Edema Assessment    Edema Assessment Comment no obvious edema  -LB         L-Dex Bioimpedence Screening    L-Dex Measurement Extremity LUE  -LB      L-Dex Patient Position Standing  -LB      L-Dex UE Dominate Side Right  -LB      L-Dex UE At Risk Side Left  -LB      L-Dex UE Pre Surgical Value No  -LB      L-Dex UE Score 4.9  -LB      L-Dex UE Baseline Score 4.5  -LB      L-Dex UE Value Change 0.4  -LB      L-Dex UE Comment WNL; stable  -LB      $ L-Dex Charge yes  -LB                User Key  (r) = Recorded By, (t) = Taken By, (c) = Cosigned By      Initials Name Provider Type    Carissa Steel, WAN Physical Therapist                                    Therapy Education  Education Details: reviewed s/s of lymphedema, steps to prevention, bioimpedance results and interpretation, use of compression for hand pain  Given: Symptoms/condition management, Edema management  Program: Reinforced  How Provided: Verbal  Provided to: Patient  Level of Understanding: Verbalized  45144 - PT Self Care/Mgmt Minutes: 10       OP Exercises       Row Name 03/04/24 1018             Subjective    Subjective Comments I am doing well, some days are better than others. I am exercising more so that is helping.  -LB         Subjective Pain    Able to rate subjective pain? yes  -LB      Pre-Treatment Pain Level 2  -LB                User Key  (r) = Recorded By, (t) = Taken By, (c) = Cosigned By      Initials Name Provider Type    Carissa Steel, PT Physical Therapist                                 PT OP Goals       Row Name 03/04/24 1000          Long Term Goals    LTG Date to Achieve 07/10/22  -LB     LTG 1 Pt will maintain LDex bioimpedance score WNL.  -LB     LTG 1 Progress Met  -LB     LTG 1 Progress Comments WNL; stable at 4.9  -LB     LTG 2 Pt will verbalize s/s of lymphedema and steps to prevention.  -LB     LTG 2 Progress Met  -LB      LTG 3 Pt will report 50% improvement in joint pain with continued exercise program and joint mobility strategies.  -LB     LTG 3 Progress Met  -LB               User Key  (r) = Recorded By, (t) = Taken By, (c) = Cosigned By      Initials Name Provider Type    Carissa Steel PT Physical Therapist                     PT Assessment/Plan       Row Name 03/04/24 1041          PT Assessment    Assessment Comments Pt returns for 3 month bioimpedance testing reporting no issues and improving hand symptoms. She has inc her exercise and is tolerating well. Pt's LDex bioimpedance score is 4.9 which is stable and WNL. She understands s/s of lymphedema and has met all goals. She will be d/c'd today and knows to return if symptoms present.  -LB        PT Plan    PT Plan Comments d/c; return if needed should pt have symptoms  -LB               User Key  (r) = Recorded By, (t) = Taken By, (c) = Cosigned By      Initials Name Provider Type    Carissa Steel PT Physical Therapist                                 Time Calculation:   Start Time: 1000  Stop Time: 1015  Time Calculation (min): 15 min  Total Timed Code Minutes- PT: 10 minute(s)  Timed Charges  77870 - PT Self Care/Mgmt Minutes: 10  Total Minutes  Timed Charges Total Minutes: 10   Total Minutes: 10   Therapy Charges for Today       Code Description Service Date Service Provider Modifiers Qty    04791333076 HC PT BIS XTRACELL FLUID ANALYSIS 3/4/2024 Carissa Funes PT  1    91198578550 HC PT SELF CARE/MGMT/TRAIN EA 15 MIN 3/4/2024 Carissa Funes PT GP 1                      Carissa Funes PT  3/4/2024

## 2024-03-04 NOTE — H&P
There are no changes in the history or physical exam, aside from any that are listed as an addendum at the bottom of this document.   Today, patient will go for the surgery listed in the plan below.     Chief Complaint: Kylah Romero is a 39 y.o. female who was seen in consultation at the request of Heidy Gutierrez MD  for newly diagnosed breast cancer and a followup visit    History of Present Illness:  Patient presents with newly diagnosed breast cancer.  She noted a left breast mass in December.  No associated pain or skin changes or nipple discharge.  She noted no other new masses, skin changes, nipple discharge, nipple changes prior to her most recent imaging.  Her most recent imaging includes the following:  10/04/2017   BILATERAL DIAGNOSTIC MAMMO & BILATERAL BREAST US   [facility]   KYLAH JOYCE   No family history. Baseline study.  MMG:  Heterogeneously dense.  Finding 1: Area of asymmetric breast tissue seen in the left breast in the 1:30 o’clock region and in the upper outer region.  US:  Finding 1: Elongated isoechoic area 3 cm and is most consistent with a prominent lobule. This correlates to the palpable area.  Finding 2: Two oval complicated cyst versus solid masses right breast. This is an incidental finding.  IMPRESSION:  Finding 1: Ultrasound of the left breast in 6 months.  Finding 2: Ultrasound of the right breast in 6 months is recommended.  BI-RADS Category 3: Probably Benign.    02/09/2022   BILATERAL DIAGNOSTIC MAMMO WITH MARY & RIGHT LIMITED/LEFT COMPLETE BREAST US   [facility]   KYLAH JOYCE   Palpable lump or thickening in the left breast.  MMG:  Heterogeneously dense.  Finding 1: There is a new area of architectural distortion seen in the left upper outer breast. This correlates with the pinpointed palpable area at 2:30-3:00. The extent is difficult to measure mammographically.  Finding 2: New focal asymmetry seen in the left upper outer breast,  just superior and lateral to the above finding.  Finding 3: There is a new focal asymmetry seen in the left breast upper outer quadrant.  US:  Finding 1: Irregular hypoechoic area versus non-parallel hypoechoic mass with posterior acoustic shadowing and indistinct and spiculated margins measuring 17 x 20 x 16 mm, in the 2:30 o’clock region of the left breast, 6 centimeters from the nipple.  Finding 2: Irregular non-parallel hypoechoic mass 11 x 10 x 11 mm seen in the 1:30 o’clock region of the left breast located 6 centimeters from the nipple.  Finding 3: Oval parallel hypoechoic mass 4 x 3 x 4 mm seen in the left breast at 1 o’clock located 4 centimeters from the nipple.  Finding 4: Stable oval parallel mass with circumscribed margins measuring 12 x 6 x 8 mm seen in the right breast at 3 o’clock located 5 centimeters from the nipple.  Finding 5: Oval mass measuring 8 x 5 x 7 mm seen in the sub-areolar region of the right breast. Finding has decreased in size.  IMPRESSION:  Finding 1: New palpable architectural distortion and mass in the 2:30 o’clock region of the left breast located 6 centimeters from the nipple is highly suggestive of malignancy.  Finding 2: Mass in the 1:30 o’clock region of the left breast located 6 centimeters from the nipple is suspicious.  Finding 3: Mass in the left breast at 1 o’clock located 4 centimeters from the nipple is suspicious.  Finding 4: Stable mass in the right breast since 2017 is considered benign.  Finding 5: Considered benign.  BI-RADS Category 5.      She had a biopsy on the following day that showed:   02/17/2022 LEFT US GUIDED BIOPSY     [facility]     KYLAH JOYCE  Let breast 1:00. 12 gauge Suros. 7 cores. [place] shaped s-pravin tissue marker was placed. Clip in the expected position. Pathology returned as benign breast tissue with complex sclerosing lesion. Pathology high risk and concordant.  Left breast 1:30. 12 gauge. 9 cores were obtained. A tophat shaped  s-pravin tissue marker was placed. Clip in the expected position. Invasive lobular carcinoma. Malignant and concordant.  Left breast at the 2:30 position. 12 gauge core needle Suros. 14 cores were obtained. A Tumark Pro Q shaped tissue marker was placed at the biopsy site. Clip in the expected position. Invasive lobular carcinoma.  PATHOLOGY:  1. Breast, left, 1 o’clock, biopsy:  Benign breast tissue with complex sclerosing lesion.  2. Breast, left, 1:30 o’clock, biopsy:  Invasive lobular carcinoma, histologic grade 2 (tubules = 3, nuclei = 2, mitosis = 1), largest focus measures 9 mm.  ER: 97.01%  SD: 94.9%  HER2: 1+  Ki-67: 32.71%  3. Breast, left, 2:30 o’clock, biopsy:  Invasive lobular carcinoma, histologic grade 2 (tubules = 3, nuclei = 2, mitosis = 1), largest focus measures 10 mm.  ER: 89.94%  SD: 95.74%  HER2: Score 0  Ki-67: 39.2%      We then arranged for a breast MRI:    03/04/22 [facility] MRI BREAST BILATERAL   KYLAH HENSLEY   1.  Suspicious mass and nonmass enhancement measuring up to 7.1 cm in the outer middle and posterior left breast encompasses 2 sites of biopsy-proven malignancy marked with Q and top hat clips. A biopsy clip (mini cork) with adjacent faint ill-defined nonmass enhancement inferior to the biopsy clip in the outer left breast represents a site of biopsy-proven complex sclerosing lesion. Surgical management isrecommended.  2.  No MRI evidence of malignancy in the right breast.   BI-RADS Category 6: Known biopsy-proven malignancy        She has not had a breast biopsy in the past.  She has her uterus and ovaries, is premenopausal, and takes nor hormones. She had a mirena removed in June 2021 and uses no birth control  Her family history includes the following: He has 1 daughter, 1 son, 1 sister, 2 maternal aunts, one paternal aunt, no family history of breast or ovarian cancer.  She does have a paternal aunt who had melanoma.      [employer] genetic testing panel March 4, 2022  breast and gynecology add on melanoma returned as negative for mutation.    Right breast ultrasound April 11, 2022 [facility] ultrasound retroareolar right breast 7-8 o'clock there is an 8 mm hypoechoic mass immediately deep to the skin.  Consistent with that of a benign complex cyst.  This corresponds to the nonenhancing lesion seen on the breast MRI consistent with a benign etiology.    Pathology from 4/13/21 left total mastectomy and sentinel lymph node biopsy followed by reconstruction returned as:  1 of 4 lymph nodes with breast cancer.  Isolated tumor cells.  Invasive lobular carcinoma, intermediate grade, 3, 2, 1, 5 cm, lobular carcinoma in situ, measuring 7 cm.  All margins are clear with the closest being 10 mm from the posterior margin.  The size of the greatest deposit in the lymph node is 0.1 mm or isolated tumor cells.  Pathologic stage T2N0 (i+), stage 2A.  Estrogen , progesterone , HER-2/leonie 1+, Ki-67 39%.      Oncotype DX April 26, 2022 for patients with micro mets and nodes +1-30 returned as recurrence score of 18 with a breast cancer specific survival at 9 years of 97%.  Risk of distant recurrence at 9 years on a hormone blocker alone is 16%.  Chemotherapy for this group as benefit cannot be excluded.        She is here for review.  She states that her drains have come out and she is started her fill.  She sees Dr. Johnson today for another follow-up.        In the interim,  Maribell Romero  has done well.  We placed a right sided port May 13, 2022 she tells me that this is functioning well.  She took with Dr. Palacios chemotherapy starting in May 2022 AC x4 followed by 6 doses of Taxol which she completed around October.  Dr. Lan Anguiano then treated her chest wall and regional lymphatics from October 25, 2022 through November 23, 2022.  She then had a bilateral salpingo-oophorectomy December 15, 2022 with Dr. Gutierrez.  She then started Femara December 2022 but  had significant arthralgias so was switched over in 2023 to Arimidex.    She has had 2 bioimpedance measurements.      She has noted no changes in her breast exam. No new masses, skin changes, nipple changes, nipple discharge RIGHT breast.   She denies headache, bone pain, belly pain, cough, changes in vision or gait.    Her most recent imaging includes the following:  [facility] right screening [facility] with tomosynthesis.  The breast is extremely dense.  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  BI-RADS 2    INterval HIstory:  In the interim,  Maribell Romero  has done well.  She continues on the arimidex with  Dr Kennedy.    She has noted no changes in her RIGHT breast exam. No new masses, skin changes, nipple changes, nipple discharge either breast.  She has noted no masses, nodules, or discolorations LEFT reconstructed breast.     She denies headache, bone pain, belly pain, cough, changes in vision or gait.  Her most recent imaging includes the followin24 [facility] RIGHT screening mammo with 3D- extremely dense. Stable small mass medial RIGHT B2     She has seen PT for bioimpedence FU , which she tells me was normal.      She is here for review.  Review of Systems:  Review of Systems   Constitutional:  Positive for unexpected weight change (7 lb wt gain).   All other systems reviewed and are negative.       Past Medical and Surgical History:  Breast Biopsy History:  Patient had not had a breast biopsy prior to her cancer diagnosis.  Breast Cancer HIstory:  Patient does not have a past medical history of breast cancer.  Breast Operations, and year:  NONE   Obstetric/Gynecologic History:  Age menstrual periods began: 11  Patient is premenopausal, first day of last period: 21  Number of pregnancies:2  Number of live births: 2  Number of abortions or miscarriages: 0  Age of delivery of first child: 28  Patient breast fed, for the following  lenth of time: 10 MONTHS   Length of time taking birth control pills: N/A   Patient has never taken hormone replacement    PATIENT HAS BOTH OVARIES AND UTERUS  Past Surgical History:   Procedure Laterality Date    APPENDECTOMY      BREAST BIOPSY Left     BREAST RECONSTRUCTION Left 2022    Procedure: LEFT BREAST 1ST STAGE RECONSTRUCTION WITH INSERTION OF TISSUE EXPANDER AND BIOLOGIC;  Surgeon: Hermelinda Johnson MD;  Location: [facility];  Service: Plastics;  Laterality: Left;     SECTION       SECTION N/A 12/10/2018    Procedure:  SECTION REPEAT;  Surgeon: Heidy Gutierrez MD;  Location: [facility] LABOR DELIVERY;  Service: Obstetrics/Gynecology    D & C HYSTEROSCOPY N/A 2021    Procedure: HYSTEROSCOPY REMOVAL INTRAUTERINE DEVICE WILL NEED ULTRASOUND IN ROOM;  Surgeon: Heidy Gutierrez MD;  Location: [facility];  Service: Obstetrics/Gynecology;  Laterality: N/A;    DIAGNOSTIC LAPAROSCOPY Bilateral 2022    Procedure: DAVINCI ASSISTED BILATERAL SALPINGO-OOPHORECTOMY;  Surgeon: Heidy Gutierrez MD;  Location: [facility];  Service: Robotics - DaVinci;  Laterality: Bilateral;    KNEE ARTHROSCOPY Right     MASTECTOMY W/ SENTINEL NODE BIOPSY Left 2022    Procedure: left total mastectomy with left axillary sentinel lymph node biopsy;  Surgeon: Monisha Escudero MD;  Location: [facility];  Service: General;  Laterality: Left;    VENOUS ACCESS DEVICE (PORT) INSERTION Right 2022    Procedure: right port placement;  Surgeon: Monisha Escudero MD;  Location: [facility];  Service: General;  Laterality: Right;     Past Medical History:   Diagnosis Date    Anemia     Anesthesia complication     hypotension with last C section    Breast cancer 2022    left sided, stage 3, ER/[place]+, Her2-, s/p mastectomy, LAST CHEMO 10/3/22, CURRENTLY RECEIVING RADIATION    History of COVID-19     2021 AND 2022    Hyperlipidemia     PONV (postoperative  "nausea and vomiting)     Seasonal allergies        Prior Hospitalizations, other than for surgery or childbirth, and year:  NONE     Social History     Socioeconomic History    Marital status:    Tobacco Use    Smoking status: Never     Passive exposure: Never    Smokeless tobacco: Never   Vaping Use    Vaping status: Never Used   Substance and Sexual Activity    Alcohol use: Not Currently     Alcohol/week: 5.0 standard drinks of alcohol    Drug use: Never    Sexual activity: Yes     Partners: Male     Birth control/protection: Bilateral salpingectomy      Patient is .  Patient is a homemaker.  Patient drinks 1 servings of caffeine per day.    Family History:  Family History   Problem Relation Age of Onset    No Known Problems Mother     No Known Problems Father     No Known Problems Sister     No Known Problems Brother     No Known Problems Maternal Grandmother     Diabetes Maternal Grandfather     No Known Problems Paternal Grandmother     No Known Problems Paternal Grandfather     Melanoma Paternal Aunt     Diabetes Other     Malig Hyperthermia Neg Hx     Colon cancer Neg Hx        Vital Signs:  /64 (BP Location: Right arm, Patient Position: Sitting, Cuff Size: Adult)   Pulse 85   Ht 165.1 cm (65\")   Wt 57.6 kg (127 lb)   LMP  (LMP Unknown)   SpO2 99%   BMI 21.13 kg/m²      Medications:    Current Outpatient Medications:     anastrozole (ARIMIDEX) 1 MG tablet, Take 1 tablet by mouth Daily., Disp: 90 tablet, Rfl: 1    atorvastatin (LIPITOR) 10 MG tablet, Take 1 tablet by mouth Every Night., Disp: 90 tablet, Rfl: 3    Cetirizine HCl (ZYRTEC PO), Take 10 mg by mouth As Needed., Disp: , Rfl:     fluticasone (FLONASE) 50 MCG/ACT nasal spray, 1 spray into the nostril(s) as directed by provider 2 (Two) Times a Day. (Patient not taking: Reported on 3/4/2024), Disp: 9.9 mL, Rfl: 0     Allergies:  Allergies   Allergen Reactions    Asa [Aspirin] Anaphylaxis and Shortness Of Breath    Adhesive Tape " "Dermatitis     Paper tape okay    Iopamidol Hives     AKA IV CONTRAST    Ct Contrast Rash     Pt HAD A RASH AND COUGHING       Physical Examination:  /64 (BP Location: Right arm, Patient Position: Sitting, Cuff Size: Adult)   Pulse 85   Ht 165.1 cm (65\")   Wt 57.6 kg (127 lb)   LMP  (LMP Unknown)   SpO2 99%   BMI 21.13 kg/m²   General Appearance:  Patient is in no distress.  She is well kept and has an average build.   Psychiatric:  Patient with appropriate mood and affect. Alert and oriented to self, time, and place.    Breast, RIGHT:  large sized, 34C,asymmetric with the contralateral surgically absent side.  Breast skin is without erythema, edema, rashes.  There are no visible abnormalities upon inspection during the arm-raising maneuver or with hands on hips in the sitting position. There is no nipple retraction, discharge or nipple/areolar skin changes.There are no masses palpable in the sitting or supine positions.    Breast, LEFT:  Surgically absent with soft implant in place.  Well-healed transverse mastectomy incision.  No skin nodules or discolorations.  Tenderness to direct palpation at the junction of the implant with her pectoralis muscle. No skin changes or masses here, mild tender scarring appreciated.    Lymphatic:  There is no axillary, cervical, infraclavicular, or supraclavicular adenopathy bilaterally.  Eyes:  Pupils are round and reactive to light.  Cardiovascular:  Heart rate and rhythm are regular.  Respiratory:  Lungs are clear bilaterally with no crackles or wheezes in any lung field.  Gastrointestinal:  Abdomen is soft, nondistended, and nontender    Musculoskeletal:  Good strength in all 4 extremities.   There is good range of motion in both shoulders.    Skin:  No new skin lesions or rashes on the skin excluding the breast (see breast exam above).        Imaging:  10/04/2017   BILATERAL DIAGNOSTIC MAMMO & BILATERAL BREAST US   [facility]   KYLAH JOYCE   No family " history. Baseline study.  MMG:  Heterogeneously dense.  Finding 1: Area of asymmetric breast tissue seen in the left breast in the 1:30 o’clock region and in the upper outer region.  US:  Finding 1: Elongated isoechoic area 3 cm and is most consistent with a prominent lobule. This correlates to the palpable area.  Finding 2: Two oval complicated cyst versus solid masses right breast. This is an incidental finding.  IMPRESSION:  Finding 1: Ultrasound of the left breast in 6 months.  Finding 2: Ultrasound of the right breast in 6 months is recommended.  BI-RADS Category 3: Probably Benign.    02/09/2022   BILATERAL DIAGNOSTIC MAMMO WITH MARY & RIGHT LIMITED/LEFT COMPLETE BREAST US   [facility]   Palpable lump or thickening in the left breast.  MMG:  Heterogeneously dense.  Finding 1: There is a new area of architectural distortion seen in the left upper outer breast. This correlates with the pinpointed palpable area at 2:30-3:00. The extent is difficult to measure mammographically.  Finding 2: New focal asymmetry seen in the left upper outer breast, just superior and lateral to the above finding.  Finding 3: There is a new focal asymmetry seen in the left breast upper outer quadrant.  US:  Finding 1: Irregular hypoechoic area versus non-parallel hypoechoic mass with posterior acoustic shadowing and indistinct and spiculated margins measuring 17 x 20 x 16 mm, in the 2:30 o’clock region of the left breast, 6 centimeters from the nipple.  Finding 2: Irregular non-parallel hypoechoic mass 11 x 10 x 11 mm seen in the 1:30 o’clock region of the left breast located 6 centimeters from the nipple.  Finding 3: Oval parallel hypoechoic mass 4 x 3 x 4 mm seen in the left breast at 1 o’clock located 4 centimeters from the nipple.  Finding 4: Stable oval parallel mass with circumscribed margins measuring 12 x 6 x 8 mm seen in the right breast at 3 o’clock located 5 centimeters from the nipple.  Finding 5:  Oval mass measuring 8 x 5 x 7 mm seen in the sub-areolar region of the right breast. Finding has decreased in size.  IMPRESSION:  Finding 1: New palpable architectural distortion and mass in the 2:30 o’clock region of the left breast located 6 centimeters from the nipple is highly suggestive of malignancy.  Finding 2: Mass in the 1:30 o’clock region of the left breast located 6 centimeters from the nipple is suspicious.  Finding 3: Mass in the left breast at 1 o’clock located 4 centimeters from the nipple is suspicious.  Finding 4: Stable mass in the right breast since 2017 is considered benign.  Finding 5: Considered benign.  BI-RADS Category 5.    03/04/22 [facility] MRI BREAST BILATERAL   KYLAH HENSLEY   1.  Suspicious mass and nonmass enhancement measuring up to 7.1 cm in the outer middle and posterior left breast encompasses 2 sites of biopsy-proven malignancy marked with Q and top hat clips. A biopsy clip (mini cork) with adjacent faint ill-defined nonmass enhancement inferior to the biopsy clip in the outer left breast represents a site of biopsy-proven complex sclerosing lesion. Surgical management isrecommended.  2.  No MRI evidence of malignancy in the right breast.   BI-RADS Category 6: Known biopsy-proven malignancy    Right breast ultrasound April 11, 2022 [facility] ultrasound retroareolar right breast 7-8 o'clock there is an 8 mm hypoechoic mass immediately deep to the skin.  Consistent with that of a benign complex cyst.  This corresponds to the nonenhancing lesion seen on the breast MRI consistent with a benign etiology.  We will let her know  [facility], 20[facility] right screening mammogram with tomosynthesis.  The breast is extremely dense.  Stable 12 mm mass right breast 3:00 consistent with hyalinized fibroadenoma BI-RADS 2    Pathology:  02/17/2022 LEFT US GUIDED BIOPSY     [facility]     KYLAH JOYCE  Let breast 1:00. 12 gauge Suros. 7  cores. [place] shaped s-pravin tissue marker was placed. Clip in the expected position. Pathology returned as benign breast tissue with complex sclerosing lesion. Pathology high risk and concordant.  Left breast 1:30. 12 gauge. 9 cores were obtained. A tophat shaped s-pravin tissue marker was placed. Clip in the expected position. Invasive lobular carcinoma. Malignant and concordant.  Left breast at the 2:30 position. 12 gauge core needle Suros. 14 cores were obtained. A Tumark Pro Q shaped tissue marker was placed at the biopsy site. Clip in the expected position. Invasive lobular carcinoma.  PATHOLOGY:  1. Breast, left, 1 o’clock, biopsy:  Benign breast tissue with complex sclerosing lesion.  2. Breast, left, 1:30 o’clock, biopsy:  Invasive lobular carcinoma, histologic grade 2 (tubules = 3, nuclei = 2, mitosis = 1), largest focus measures 9 mm.  ER: 97.01%  [place]: 94.9%  HER2: 1+  Ki-67: 32.71%  3. Breast, left, 2:30 o’clock, biopsy:  Invasive lobular carcinoma, histologic grade 2 (tubules = 3, nuclei = 2, mitosis = 1), largest focus measures 10 mm.  ER: 89.94%  WA: 95.74%  HER2: Score 0  Ki-67: 39.2%    Pathology from 4/13/21 left total mastectomy and sentinel lymph node biopsy followed by reconstruction returned as:  1 of 4 lymph nodes with breast cancer.  Isolated tumor cells.  Invasive lobular carcinoma, intermediate grade, 3, 2, 1, 5 cm, lobular carcinoma in situ, measuring 7 cm.  All margins are clear with the closest being 10 mm from the posterior margin.  The size of the greatest deposit in the lymph node is 0.1 mm or isolated tumor cells.  Pathologic stage T2N0 (i+), stage 2A.  Estrogen , progesterone , HER-2/leonie 1+, Ki-67 39%.                           Final Diagnosis   1. Lymph Node, Left [place] #1, Excision (H&E, AE1/AE3):               A. One lymph node with isolated tumor cells.     2. Lymph Node, Left [place] #2, Excision (H&E, AE1/AE3):               A. One benign lymph node (0/1).     3.  Lymph Node, Left [place] #3, Excision (H&E, AE1/AE3):               A. Two benign lymph nodes (0/2).     4. Breast, Left, Mastectomy (655 grams):               A. Invasive lobular carcinoma, [place] histologic grade II (tubules = 3, nuclei = 2, mitoses = 1) measuring        50 mm maximally.   B. Lobular carcinoma in situ, measuring 70 mm maximally.  C. All margins are free of in situ and invasive malignancy.  D. See synoptic template for complete tumor details.     [facility]/m    Electronically signed by Alyse Carver MD on 4/14/2022 at 1217   Synoptic Checklist      INVASIVE CARCINOMA OF THE BREAST: Resection  8th Edition - Protocol posted: 12/17/2021  INVASIVE CARCINOMA OF THE BREAST: COMPLETE EXCISION - All Specimens       SPECIMEN   Procedure   Total mastectomy    Specimen Laterality   Left    TUMOR   Tumor Site   Upper outer quadrant        Lower outer quadrant    Histologic Type   Invasive lobular carcinoma    Histologic Grade ([place] Histologic Score)       Glandular (Acinar) / Tubular Differentiation   Score 3    Nuclear Pleomorphism   Score 2    Mitotic Rate   Score 1    Overall Grade   Grade 2 (scores of 6 or 7)    Tumor Size   Greatest dimension of largest invasive focus (Millimeters): 50 mm   Tumor Focality   Single focus of invasive carcinoma    Ductal Carcinoma In Situ (DCIS)   Not identified    Lobular Carcinoma In Situ (LCIS)   Present            Lymphovascular Invasion   Not identified    Dermal Lymphovascular Invasion   Not identified    Microcalcifications   Not identified    Treatment Effect in the Breast   No known presurgical therapy    MARGINS   Margin Status for Invasive Carcinoma   All margins negative for invasive carcinoma    Distance from Invasive Carcinoma to Closest Margin   10 mm   Closest Margin(s) to Invasive Carcinoma   Posterior    Distance from Invasive Carcinoma to Anterior Margin   15 mm   Distance from Invasive Carcinoma to Superior Margin   40 mm   Distance from  Invasive Carcinoma to Inferior Margin   30 mm   Distance from Invasive Carcinoma to Medial Margin   100 mm   Distance from Invasive Carcinoma to Lateral Margin   20 mm   REGIONAL LYMPH NODES   Regional Lymph Node Status   Tumor present in regional lymph node(s)    Number of Lymph Nodes with Macrometastases   0    Number of Lymph Nodes with Micrometastases   0    Number of Lymph Nodes with Isolated Tumor Cells   1    Size of Largest [place] Metastatic Deposit   0.1 mm   Extranodal Extension   Not identified    Total Number of Lymph Nodes Examined (sentinel and non-sentinel)   4    Number of [place] Nodes Examined   4    PATHOLOGIC STAGE CLASSIFICATION (pTNM, AJCC 8th Edition)   Reporting of pT, pN, and (when applicable) pM categories is based on information available to the pathologist at the time the report is issued. As per the AJCC (Chapter 1, 8th Ed.) it is the managing physician’s responsibility to establish the final pathologic stage based upon all pertinent information, including but potentially not limited to this pathology report.   pT Category   pT2    Regional Lymph Nodes Modifier   (sn): Bogard node(s) evaluated.    pN Category   pN0 (i+)    SPECIAL STUDIES           Estrogen Receptor (ER) Status   Positive (greater than 10% of cells demonstrate nuclear positivity)    Percentage of Cells with Nuclear Positivity   %            Progesterone Receptor (PgR) Status   Positive    Percentage of Cells with Nuclear Positivity   %            HER2 (by immunohistochemistry)   Negative (Score 1+)            Ki-67 Percentage of Positive Nuclei   39 %   Testing Performed on Case Number   US case [id number]    .      Comment     The patient's concurrent left breast core biopsy material is on file at [facility] ([id number]); those slides are not available for review. Both clips are associated with an invasive lobular carcinoma that is part of one roughly dumb bell shaped mass. Hormone receptors were  previously performed at [facility]. The tumor is ER and CA positive, HER2 negative with a Ki-67 measuring up to 39.2% maximally.      mec/pkm             Oncotype DX April 26, 2022 for patients with micro mets and nodes +1-30 returned as recurrence score of 18 with a breast cancer specific survival at 9 years of 97%.  Risk of distant recurrence at 9 years on a hormone blocker alone is 16%.  Chemotherapy for this group as benefit cannot be excluded.        Labs:   [facility] genetic testing panel March 4, 2022 breast and gynecology add on melanoma returned as negative for mutation.  We will let her know        Procedures:      Assessment:   Diagnosis Plan   1. Malignant neoplasm of upper-outer quadrant of left breast in female, estrogen receptor positive        2. Secondary malignant neoplasm of axillary lymph nodes        3. Abnormal MRI, breast        4. Scar adherent          1-  LEFT multifocal malignancy  Left breast 130, 6 cm from the nipple, 1.1 cm on ultrasound.  Top- in good position, 1.2 cm on MRI, invasive lobular carcinoma, intermediate grade, 3, 2, 1, 9 mm in a core, estrogen 97, progesterone 95, HER-2/leonie 1+, Ki-67 33%.    Left breast 230, 6 cm from the nipple, 2 cm on ultrasound-palpable lesion, 2 cm on ultrasound, 6.1 cm on MRI.  Q. marker in good position.  Invasive lobular carcinoma, intermediate grade, 3, 2, 1, 1 cm in greatest dimension, estrogen 90, progesterone 96, HER-2/leonie 0, Ki-67 39%.    Clinical stage mT3N0 stage IIb.      [facility] genetic testing panel March 4, 2022 breast and gynecology add on melanoma returned as negative for mutation.        Pathology from 4/13/22left total mastectomy and sentinel lymph node biopsy followed by reconstruction returned as:  1 of 4 lymph nodes with breast cancer.  Isolated tumor cells.  Invasive lobular carcinoma, intermediate grade, 3, 2, 1, 5 cm, lobular carcinoma in situ, measuring 7 cm.  All margins are clear with the closest being 10 mm from the  posterior margin.  The size of the greatest deposit in the lymph node is 0.1 mm or isolated tumor cells.  Estrogen , progesterone , HER-2/leonie 1+, Ki-67 39%.  Pathologic stage T2N0 (i+), stage 2A.      Oncotype DX April 26, 2022 for patients with micro mets and nodes +1-30 returned as recurrence score of 18 with a breast cancer specific survival at 9 years of 97%.  Risk of distant recurrence at 9 years on a hormone blocker alone is 16%.  Chemotherapy for this group as benefit cannot be excluded.        -I placed a right sided port May 13, 2022  - Dr. Palacios chemotherapy starting in May 2022 AC x4 followed by 6 doses of Taxol which she completed around October.  -Dr. Lan Anguiano then treated her chest wall and regional lymphatics from October 25, 2022 through November 23, 2022.  - bilateral salpingo-oophorectomy December 15, 2022 with Dr. Gutierrez.  - started Femara December 2022 but had significant arthralgias so was switched over in January 2023 to Arimidex.  - 2-2024 tolerating the arimidex      3-  RIGHT immediate retroareolar 7:-8:00- felt to be sebaceous cyst on MRI.   Right breast ultrasound April 11, 2022 [facility] ultrasound retroareolar right breast 7-8 o'clock there is an 8 mm hypoechoic mass immediately deep to the skin.  Consistent with that of a benign complex cyst.  This corresponds to the nonenhancing lesion seen on the breast MRI consistent with a benign etiology.    4-  Scar between implant and pectoralis LEFT- education provided 3-2024 on DTM and ROM      Plan:  Maribell is here today for her 2 year follow-up visit postoperatively.  She is doing well. There is no evidence of disease.    Based on the tenderness of exam at the junction of implant and pectoralis, I have asked her to do daily ROM exercises and we gave her this handout today- I also showed her how to deeply massage the junction between the implant and pectoralis to try to break up some scar/prevent it from becoming  more bothersome.    I asked her to french us in the interim if she feels like it is not improving and we can arrange for her to see PT for deep tissue massage and ROM.    We reviewed the meticulous skin care with massage and moisturization of the chest wall,.    Dr Palacios is ok with her port being removed.  We discussed the procedure of a RIGHT port removal, with risks of bleeding and infection. She wishes to have this removed.  We will schedule this for first available.    Her next mammogram is due 2-23-24 RIGHT screening mammogram at [facility]. We will arrange this for her and see her back after.      Monisha Escudero MD    Next Appointment:  Return for port removal then , Next scheduled follow up, after imaging.    Today I spent 40 minutes doing the following: Reviewing records, labs, outside imaging and reports in preparation for the patient visit; obtaining medical history; performing the physical exam; counseling and educating the patient and any available family or caregivers; ordering medications, tests or procedures; coordinating care with any other physicians on her care team as needed, and documenting all of the above in the medical record as well as sending communications with her other healthcare professionals.      EMR Dragon/transcription disclaimer:    Much of this encounter note is an electronic transcription/translocation of spoken language to printed text.  The electronic translation of spoken language may permit erroneous, or at times, nonsensical words or phrases to be inadvertently transcribed.  Although I have reviewed the note from such areas, some may still exist.

## 2024-03-04 NOTE — PROGRESS NOTES
Chief Complaint: Kylah Romero is a 39 y.o. female who was seen in consultation at the request of Heidy Gutierrez MD  for newly diagnosed breast cancer and a followup visit    History of Present Illness:  Patient presents with newly diagnosed breast cancer.  She noted a left breast mass in December.  No associated pain or skin changes or nipple discharge.  She noted no other new masses, skin changes, nipple discharge, nipple changes prior to her most recent imaging.  Her most recent imaging includes the following:  10/04/2017   BILATERAL DIAGNOSTIC MAMMO & BILATERAL BREAST US   [facility]   KYLAH JOYCE   No family history. Baseline study.  MMG:  Heterogeneously dense.  Finding 1: Area of asymmetric breast tissue seen in the left breast in the 1:30 o’clock region and in the upper outer region.  US:  Finding 1: Elongated isoechoic area 3 cm and is most consistent with a prominent lobule. This correlates to the palpable area.  Finding 2: Two oval complicated cyst versus solid masses right breast. This is an incidental finding.  IMPRESSION:  Finding 1: Ultrasound of the left breast in 6 months.  Finding 2: Ultrasound of the right breast in 6 months is recommended.  BI-RADS Category 3: Probably Benign.    02/09/2022   BILATERAL DIAGNOSTIC MAMMO WITH MARY & RIGHT LIMITED/LEFT COMPLETE BREAST US   [facility]   KYLAH JOYCE   Palpable lump or thickening in the left breast.  MMG:  Heterogeneously dense.  Finding 1: There is a new area of architectural distortion seen in the left upper outer breast. This correlates with the pinpointed palpable area at 2:30-3:00. The extent is difficult to measure mammographically.  Finding 2: New focal asymmetry seen in the left upper outer breast, just superior and lateral to the above finding.  Finding 3: There is a new focal asymmetry seen in the left breast upper outer quadrant.  US:  Finding 1: Irregular hypoechoic area versus non-parallel  hypoechoic mass with posterior acoustic shadowing and indistinct and spiculated margins measuring 17 x 20 x 16 mm, in the 2:30 o’clock region of the left breast, 6 centimeters from the nipple.  Finding 2: Irregular non-parallel hypoechoic mass 11 x 10 x 11 mm seen in the 1:30 o’clock region of the left breast located 6 centimeters from the nipple.  Finding 3: Oval parallel hypoechoic mass 4 x 3 x 4 mm seen in the left breast at 1 o’clock located 4 centimeters from the nipple.  Finding 4: Stable oval parallel mass with circumscribed margins measuring 12 x 6 x 8 mm seen in the right breast at 3 o’clock located 5 centimeters from the nipple.  Finding 5: Oval mass measuring 8 x 5 x 7 mm seen in the sub-areolar region of the right breast. Finding has decreased in size.  IMPRESSION:  Finding 1: New palpable architectural distortion and mass in the 2:30 o’clock region of the left breast located 6 centimeters from the nipple is highly suggestive of malignancy.  Finding 2: Mass in the 1:30 o’clock region of the left breast located 6 centimeters from the nipple is suspicious.  Finding 3: Mass in the left breast at 1 o’clock located 4 centimeters from the nipple is suspicious.  Finding 4: Stable mass in the right breast since 2017 is considered benign.  Finding 5: Considered benign.  BI-RADS Category 5.      She had a biopsy on the following day that showed:   02/17/2022 LEFT US GUIDED BIOPSY     [facility]     KYLAH DEL PILAR GERALD LUIS  Let breast 1:00. 12 gauge Suros. 7 cores. [place] shaped s-pravin tissue marker was placed. Clip in the expected position. Pathology returned as benign breast tissue with complex sclerosing lesion. Pathology high risk and concordant.  Left breast 1:30. 12 gauge. 9 cores were obtained. A tophat shaped s-pravin tissue marker was placed. Clip in the expected position. Invasive lobular carcinoma. Malignant and concordant.  Left breast at the 2:30 position. 12 gauge core needle Suros. 14 cores were  obtained. A Loc[employer] Labsark Pro Q shaped tissue marker was placed at the biopsy site. Clip in the expected position. Invasive lobular carcinoma.  PATHOLOGY:  1. Breast, left, 1 o’clock, biopsy:  Benign breast tissue with complex sclerosing lesion.  2. Breast, left, 1:30 o’clock, biopsy:  Invasive lobular carcinoma, histologic grade 2 (tubules = 3, nuclei = 2, mitosis = 1), largest focus measures 9 mm.  ER: 97.01%  CT: 94.9%  HER2: 1+  Ki-67: 32.71%  3. Breast, left, 2:30 o’clock, biopsy:  Invasive lobular carcinoma, histologic grade 2 (tubules = 3, nuclei = 2, mitosis = 1), largest focus measures 10 mm.  ER: 89.94%  CT: 95.74%  HER2: Score 0  Ki-67: 39.2%      We then arranged for a breast MRI:    03/04/22 [facility] MRI BREAST BILATERAL   KYLAH HENSLEY   1.  Suspicious mass and nonmass enhancement measuring up to 7.1 cm in the outer middle and posterior left breast encompasses 2 sites of biopsy-proven malignancy marked with Q and top hat clips. A biopsy clip (mini cork) with adjacent faint ill-defined nonmass enhancement inferior to the biopsy clip in the outer left breast represents a site of biopsy-proven complex sclerosing lesion. Surgical management isrecommended.  2.  No MRI evidence of malignancy in the right breast.   BI-RADS Category 6: Known biopsy-proven malignancy        She has not had a breast biopsy in the past.  She has her uterus and ovaries, is premenopausal, and takes nor hormones. She had a mirena removed in June 2021 and uses no birth control  Her family history includes the following: He has 1 daughter, 1 son, 1 sister, 2 maternal aunts, one paternal aunt, no family history of breast or ovarian cancer.  She does have a paternal aunt who had melanoma.      [facility] genetic testing panel March 4, 2022 breast and gynecology add on melanoma returned as negative for mutation.    Right breast ultrasound April 11, 2022 [facility] ultrasound retroareolar right breast 7-8 o'clock there is  an 8 mm hypoechoic mass immediately deep to the skin.  Consistent with that of a benign complex cyst.  This corresponds to the nonenhancing lesion seen on the breast MRI consistent with a benign etiology.    Pathology from 4/13/21 left total mastectomy and sentinel lymph node biopsy followed by reconstruction returned as:  1 of 4 lymph nodes with breast cancer.  Isolated tumor cells.  Invasive lobular carcinoma, intermediate grade, 3, 2, 1, 5 cm, lobular carcinoma in situ, measuring 7 cm.  All margins are clear with the closest being 10 mm from the posterior margin.  The size of the greatest deposit in the lymph node is 0.1 mm or isolated tumor cells.  Pathologic stage T2N0 (i+), stage 2A.  Estrogen , progesterone , HER-2/leonie 1+, Ki-67 39%.      Oncotype DX April 26, 2022 for patients with micro mets and nodes +1-30 returned as recurrence score of 18 with a breast cancer specific survival at 9 years of 97%.  Risk of distant recurrence at 9 years on a hormone blocker alone is 16%.  Chemotherapy for this group as benefit cannot be excluded.        She is here for review.  She states that her drains have come out and she is started her fill.  She sees Dr. Johnson today for another follow-up.        In the interim,  Maribell Romero  has done well.  We placed a right sided port May 13, 2022 she tells me that this is functioning well.  She took with Dr. Palacios chemotherapy starting in May 2022 AC x4 followed by 6 doses of Taxol which she completed around October.  Dr. Lan Anguiano then treated her chest wall and regional lymphatics from October 25, 2022 through November 23, 2022.  She then had a bilateral salpingo-oophorectomy December 15, 2022 with Dr. Gutierrez.  She then started Femara December 2022 but had significant arthralgias so was switched over in January 2023 to Arimidex.    She has had 2 bioimpedance measurements.      She has noted no changes in her breast exam. No new masses, skin  changes, nipple changes, nipple discharge RIGHT breast.   She denies headache, bone pain, belly pain, cough, changes in vision or gait.    Her most recent imaging includes the following:  [facility] right screening [facility] with tomosynthesis.  The breast is extremely dense.  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  BI-RADS 2    INterval HIstory:  In the interim,  Maribell Romero  has done well.  She continues on the arimidex with  Dr Kennedy.    She has noted no changes in her RIGHT breast exam. No new masses, skin changes, nipple changes, nipple discharge either breast.  She has noted no masses, nodules, or discolorations LEFT reconstructed breast.     She denies headache, bone pain, belly pain, cough, changes in vision or gait.  Her most recent imaging includes the followin24 [facility] RIGHT screening mammo with 3D- extremely dense. Stable small mass medial RIGHT B2     She has seen PT for bioimpedence FU , which she tells me was normal.      She is here for review.  Review of Systems:  Review of Systems   Constitutional:  Positive for unexpected weight change (7 lb wt gain).   All other systems reviewed and are negative.       Past Medical and Surgical History:  Breast Biopsy History:  Patient had not had a breast biopsy prior to her cancer diagnosis.  Breast Cancer HIstory:  Patient does not have a past medical history of breast cancer.  Breast Operations, and year:  NONE   Obstetric/Gynecologic History:  Age menstrual periods began: 11  Patient is premenopausal, first day of last period: 21  Number of pregnancies:2  Number of live births: 2  Number of abortions or miscarriages: 0  Age of delivery of first child: 28  Patient breast fed, for the following lenth of time: 10 MONTHS   Length of time taking birth control pills: N/A   Patient has never taken hormone replacement    PATIENT HAS BOTH OVARIES AND UTERUS  Past Surgical History:    Procedure Laterality Date    APPENDECTOMY      BREAST BIOPSY Left     BREAST RECONSTRUCTION Left 2022    Procedure: LEFT BREAST 1ST STAGE RECONSTRUCTION WITH INSERTION OF TISSUE EXPANDER AND BIOLOGIC;  Surgeon: Hermelinda Johnson MD;  Location: [facility] OR;  Service: Plastics;  Laterality: Left;     SECTION       SECTION N/A 12/10/2018    Procedure:  SECTION REPEAT;  Surgeon: Heidy Gutierrez MD;  Location: [facility] LABOR DELIVERY;  Service: Obstetrics/Gynecology    D & C HYSTEROSCOPY N/A 2021    Procedure: HYSTEROSCOPY REMOVAL INTRAUTERINE DEVICE WILL NEED ULTRASOUND IN ROOM;  Surgeon: Heidy Gutierrez MD;  Location: [facility] OSC;  Service: Obstetrics/Gynecology;  Laterality: N/A;    DIAGNOSTIC LAPAROSCOPY Bilateral 2022    Procedure: DAVINCI ASSISTED BILATERAL SALPINGO-OOPHORECTOMY;  Surgeon: Heidy Gutierrez MD;  Location: [facility];  Service: Robotics - DaVinci;  Laterality: Bilateral;    KNEE ARTHROSCOPY Right     MASTECTOMY W/ SENTINEL NODE BIOPSY Left 2022    Procedure: left total mastectomy with left axillary sentinel lymph node biopsy;  Surgeon: Monisha Escudero MD;  Location: [facility];  Service: General;  Laterality: Left;    VENOUS ACCESS DEVICE (PORT) INSERTION Right 2022    Procedure: right port placement;  Surgeon: Monisha Escudero MD;  Location: [facility];  Service: General;  Laterality: Right;     Past Medical History:   Diagnosis Date    Anemia     Anesthesia complication     hypotension with last C section    Breast cancer 2022    left sided, stage 3, ER/SC+, Her2-, s/p mastectomy, LAST CHEMO 10/3/22, CURRENTLY RECEIVING RADIATION    History of COVID-19     2021 AND 2022    Hyperlipidemia     PONV (postoperative nausea and vomiting)     Seasonal allergies        Prior Hospitalizations, other than for surgery or childbirth, and year:  NONE     Social History     Socioeconomic History     "Marital status:    Tobacco Use    Smoking status: Never     Passive exposure: Never    Smokeless tobacco: Never   Vaping Use    Vaping status: Never Used   Substance and Sexual Activity    Alcohol use: Not Currently     Alcohol/week: 5.0 standard drinks of alcohol    Drug use: Never    Sexual activity: Yes     Partners: Male     Birth control/protection: Bilateral salpingectomy      Patient is .  Patient is a homemaker.  Patient drinks 1 servings of caffeine per day.    Family History:  Family History   Problem Relation Age of Onset    No Known Problems Mother     No Known Problems Father     No Known Problems Sister     No Known Problems Brother     No Known Problems Maternal Grandmother     Diabetes Maternal Grandfather     No Known Problems Paternal Grandmother     No Known Problems Paternal Grandfather     Melanoma Paternal Aunt     Diabetes Other     Malig Hyperthermia Neg Hx     Colon cancer Neg Hx        Vital Signs:  /64 (BP Location: Right arm, Patient Position: Sitting, Cuff Size: Adult)   Pulse 85   Ht 165.1 cm (65\")   Wt 57.6 kg (127 lb)   LMP  (LMP Unknown)   SpO2 99%   BMI 21.13 kg/m²      Medications:    Current Outpatient Medications:     anastrozole (ARIMIDEX) 1 MG tablet, Take 1 tablet by mouth Daily., Disp: 90 tablet, Rfl: 1    atorvastatin (LIPITOR) 10 MG tablet, Take 1 tablet by mouth Every Night., Disp: 90 tablet, Rfl: 3    Cetirizine HCl (ZYRTEC PO), Take 10 mg by mouth As Needed., Disp: , Rfl:     fluticasone (FLONASE) 50 MCG/ACT nasal spray, 1 spray into the nostril(s) as directed by provider 2 (Two) Times a Day. (Patient not taking: Reported on 3/4/2024), Disp: 9.9 mL, Rfl: 0     Allergies:  Allergies   Allergen Reactions    Asa [Aspirin] Anaphylaxis and Shortness Of Breath    Adhesive Tape Dermatitis     Paper tape okay    Iopamidol Hives     AKA IV CONTRAST    Ct Contrast Rash     Pt HAD A RASH AND COUGHING       Physical Examination:  /64 (BP Location: " "Right arm, Patient Position: Sitting, Cuff Size: Adult)   Pulse 85   Ht 165.1 cm (65\")   Wt 57.6 kg (127 lb)   LMP  (LMP Unknown)   SpO2 99%   BMI 21.13 kg/m²   General Appearance:  Patient is in no distress.  She is well kept and has an average build.   Psychiatric:  Patient with appropriate mood and affect. Alert and oriented to self, time, and place.    Breast, RIGHT:  large sized, 34C,asymmetric with the contralateral surgically absent side.  Breast skin is without erythema, edema, rashes.  There are no visible abnormalities upon inspection during the arm-raising maneuver or with hands on hips in the sitting position. There is no nipple retraction, discharge or nipple/areolar skin changes.There are no masses palpable in the sitting or supine positions.    Breast, LEFT:  Surgically absent with soft implant in place.  Well-healed transverse mastectomy incision.  No skin nodules or discolorations.  Tenderness to direct palpation at the junction of the implant with her pectoralis muscle. No skin changes or masses here, mild tender scarring appreciated.    Lymphatic:  There is no axillary, cervical, infraclavicular, or supraclavicular adenopathy bilaterally.  Eyes:  Pupils are round and reactive to light.  Cardiovascular:  Heart rate and rhythm are regular.  Respiratory:  Lungs are clear bilaterally with no crackles or wheezes in any lung field.  Gastrointestinal:  Abdomen is soft, nondistended, and nontender    Musculoskeletal:  Good strength in all 4 extremities.   There is good range of motion in both shoulders.    Skin:  No new skin lesions or rashes on the skin excluding the breast (see breast exam above).        Imaging:  10/04/2017   BILATERAL DIAGNOSTIC MAMMO & BILATERAL BREAST US   [facility]   KYLAH JOYCE   No family history. Baseline study.  MMG:  Heterogeneously dense.  Finding 1: Area of asymmetric breast tissue seen in the left breast in the 1:30 o’clock region and in the upper outer " region.  US:  Finding 1: Elongated isoechoic area 3 cm and is most consistent with a prominent lobule. This correlates to the palpable area.  Finding 2: Two oval complicated cyst versus solid masses right breast. This is an incidental finding.  IMPRESSION:  Finding 1: Ultrasound of the left breast in 6 months.  Finding 2: Ultrasound of the right breast in 6 months is recommended.  BI-RADS Category 3: Probably Benign.    02/09/2022   BILATERAL DIAGNOSTIC MAMMO WITH MARY & RIGHT LIMITED/LEFT COMPLETE BREAST US   [facility]   KYLAHBRAYAN JOYCE   Palpable lump or thickening in the left breast.  MMG:  Heterogeneously dense.  Finding 1: There is a new area of architectural distortion seen in the left upper outer breast. This correlates with the pinpointed palpable area at 2:30-3:00. The extent is difficult to measure mammographically.  Finding 2: New focal asymmetry seen in the left upper outer breast, just superior and lateral to the above finding.  Finding 3: There is a new focal asymmetry seen in the left breast upper outer quadrant.  US:  Finding 1: Irregular hypoechoic area versus non-parallel hypoechoic mass with posterior acoustic shadowing and indistinct and spiculated margins measuring 17 x 20 x 16 mm, in the 2:30 o’clock region of the left breast, 6 centimeters from the nipple.  Finding 2: Irregular non-parallel hypoechoic mass 11 x 10 x 11 mm seen in the 1:30 o’clock region of the left breast located 6 centimeters from the nipple.  Finding 3: Oval parallel hypoechoic mass 4 x 3 x 4 mm seen in the left breast at 1 o’clock located 4 centimeters from the nipple.  Finding 4: Stable oval parallel mass with circumscribed margins measuring 12 x 6 x 8 mm seen in the right breast at 3 o’clock located 5 centimeters from the nipple.  Finding 5: Oval mass measuring 8 x 5 x 7 mm seen in the sub-areolar region of the right breast. Finding has decreased in size.  IMPRESSION:  Finding 1: New palpable architectural  distortion and mass in the 2:30 o’clock region of the left breast located 6 centimeters from the nipple is highly suggestive of malignancy.  Finding 2: Mass in the 1:30 o’clock region of the left breast located 6 centimeters from the nipple is suspicious.  Finding 3: Mass in the left breast at 1 o’clock located 4 centimeters from the nipple is suspicious.  Finding 4: Stable mass in the right breast since 2017 is considered benign.  Finding 5: Considered benign.  BI-RADS Category 5.    03/04/22 [facility] MRI BREAST BILATERAL   KYLAH HENSLEY   1.  Suspicious mass and nonmass enhancement measuring up to 7.1 cm in the outer middle and posterior left breast encompasses 2 sites of biopsy-proven malignancy marked with Q and top hat clips. A biopsy clip (mini cork) with adjacent faint ill-defined nonmass enhancement inferior to the biopsy clip in the outer left breast represents a site of biopsy-proven complex sclerosing lesion. Surgical management isrecommended.  2.  No MRI evidence of malignancy in the right breast.   BI-RADS Category 6: Known biopsy-proven malignancy    Right breast ultrasound April 11, 2022 [facility] ultrasound retroareolar right breast 7-8 o'clock there is an 8 mm hypoechoic mass immediately deep to the skin.  Consistent with that of a benign complex cyst.  This corresponds to the nonenhancing lesion seen on the breast MRI consistent with a benign etiology.  We will let her know  [facility] right screening mammogram with tomosynthesis.  The breast is extremely dense.  Stable 12 mm mass right breast 3:00 consistent with hyalinized fibroadenoma BI-RADS 2    Pathology:  02/17/2022 LEFT US GUIDED BIOPSY     [facility]     KYLAH JOYCE  Let breast 1:00. 12 gauge Suros. 7 cores. [place] shaped s-pravin tissue marker was placed. Clip in the expected position. Pathology returned as benign breast tissue with complex sclerosing lesion. Pathology  high risk and concordant.  Left breast 1:30. 12 gauge. 9 cores were obtained. A tophat shaped s-pravin tissue marker was placed. Clip in the expected position. Invasive lobular carcinoma. Malignant and concordant.  Left breast at the 2:30 position. 12 gauge core needle Suros. 14 cores were obtained. A [employer] Pro Q shaped tissue marker was placed at the biopsy site. Clip in the expected position. Invasive lobular carcinoma.  PATHOLOGY:  1. Breast, left, 1 o’clock, biopsy:  Benign breast tissue with complex sclerosing lesion.  2. Breast, left, 1:30 o’clock, biopsy:  Invasive lobular carcinoma, histologic grade 2 (tubules = 3, nuclei = 2, mitosis = 1), largest focus measures 9 mm.  ER: 97.01%  OK: 94.9%  HER2: 1+  Ki-67: 32.71%  3. Breast, left, 2:30 o’clock, biopsy:  Invasive lobular carcinoma, histologic grade 2 (tubules = 3, nuclei = 2, mitosis = 1), largest focus measures 10 mm.  ER: 89.94%  OK: 95.74%  HER2: Score 0  Ki-67: 39.2%    Pathology from 4/13/21 left total mastectomy and sentinel lymph node biopsy followed by reconstruction returned as:  1 of 4 lymph nodes with breast cancer.  Isolated tumor cells.  Invasive lobular carcinoma, intermediate grade, 3, 2, 1, 5 cm, lobular carcinoma in situ, measuring 7 cm.  All margins are clear with the closest being 10 mm from the posterior margin.  The size of the greatest deposit in the lymph node is 0.1 mm or isolated tumor cells.  Pathologic stage T2N0 (i+), stage 2A.  Estrogen , progesterone , HER-2/leonie 1+, Ki-67 39%.                           Final Diagnosis   1. Lymph Node, Left [place] #1, Excision (H&E, AE1/AE3):               A. One lymph node with isolated tumor cells.     2. Lymph Node, Left [place] #2, Excision (H&E, AE1/AE3):               A. One benign lymph node (0/1).     3. Lymph Node, Left [place] #3, Excision (H&E, AE1/AE3):               A. Two benign lymph nodes (0/2).     4. Breast, Left, Mastectomy (655 grams):               A.  Invasive lobular carcinoma, [place] histologic grade II (tubules = 3, nuclei = 2, mitoses = 1) measuring        50 mm maximally.   B. Lobular carcinoma in situ, measuring 70 mm maximally.  C. All margins are free of in situ and invasive malignancy.  D. See synoptic template for complete tumor details.     [facility]/pkm    Electronically signed by Alyse Carver MD on 4/14/2022 at 1217   Synoptic Checklist      INVASIVE CARCINOMA OF THE BREAST: Resection  8th Edition - Protocol posted: 12/17/2021  INVASIVE CARCINOMA OF THE BREAST: COMPLETE EXCISION - All Specimens       SPECIMEN   Procedure   Total mastectomy    Specimen Laterality   Left    TUMOR   Tumor Site   Upper outer quadrant        Lower outer quadrant    Histologic Type   Invasive lobular carcinoma    Histologic Grade ([place] Histologic Score)       Glandular (Acinar) / Tubular Differentiation   Score 3    Nuclear Pleomorphism   Score 2    Mitotic Rate   Score 1    Overall Grade   Grade 2 (scores of 6 or 7)    Tumor Size   Greatest dimension of largest invasive focus (Millimeters): 50 mm   Tumor Focality   Single focus of invasive carcinoma    Ductal Carcinoma In Situ (DCIS)   Not identified    Lobular Carcinoma In Situ (LCIS)   Present            Lymphovascular Invasion   Not identified    Dermal Lymphovascular Invasion   Not identified    Microcalcifications   Not identified    Treatment Effect in the Breast   No known presurgical therapy    MARGINS   Margin Status for Invasive Carcinoma   All margins negative for invasive carcinoma    Distance from Invasive Carcinoma to Closest Margin   10 mm   Closest Margin(s) to Invasive Carcinoma   Posterior    Distance from Invasive Carcinoma to Anterior Margin   15 mm   Distance from Invasive Carcinoma to Superior Margin   40 mm   Distance from Invasive Carcinoma to Inferior Margin   30 mm   Distance from Invasive Carcinoma to Medial Margin   100 mm   Distance from Invasive Carcinoma to Lateral Margin   20 mm    REGIONAL LYMPH NODES   Regional Lymph Node Status   Tumor present in regional lymph node(s)    Number of Lymph Nodes with Macrometastases   0    Number of Lymph Nodes with Micrometastases   0    Number of Lymph Nodes with Isolated Tumor Cells   1    Size of Largest [place]in Metastatic Deposit   0.1 mm   Extranodal Extension   Not identified    Total Number of Lymph Nodes Examined (sentinel and non-sentinel)   4    Number of [place] Nodes Examined   4    PATHOLOGIC STAGE CLASSIFICATION (pTNM, AJCC 8th Edition)   Reporting of pT, pN, and (when applicable) pM categories is based on information available to the pathologist at the time the report is issued. As per the AJCC (Chapter 1, 8th Ed.) it is the managing physician’s responsibility to establish the final pathologic stage based upon all pertinent information, including but potentially not limited to this pathology report.   pT Category   pT2    Regional Lymph Nodes Modifier   (sn): [place] node(s) evaluated.    pN Category   pN0 (i+)    SPECIAL STUDIES           Estrogen Receptor (ER) Status   Positive (greater than 10% of cells demonstrate nuclear positivity)    Percentage of Cells with Nuclear Positivity   %            Progesterone Receptor (PgR) Status   Positive    Percentage of Cells with Nuclear Positivity   %            HER2 (by immunohistochemistry)   Negative (Score 1+)            Ki-67 Percentage of Positive Nuclei   39 %   Testing Performed on Case Number   [facility] case [id number]    .      Comment     The patient's concurrent left breast core biopsy material is on file at [facility] ([id number]); those slides are not available for review. Both clips are associated with an invasive lobular carcinoma that is part of one roughly dumb bell shaped mass. Hormone receptors were previously performed at [facility]. The tumor is ER and AL positive, HER2 negative with a Ki-67 measuring up to 39.2% maximally.      mec/pkm             Oncotype DX April 26, 2022  for patients with micro mets and nodes +1-30 returned as recurrence score of 18 with a breast cancer specific survival at 9 years of 97%.  Risk of distant recurrence at 9 years on a hormone blocker alone is 16%.  Chemotherapy for this group as benefit cannot be excluded.        Labs:   [facility] genetic testing panel March 4, 2022 breast and gynecology add on melanoma returned as negative for mutation.  We will let her know        Procedures:      Assessment:   Diagnosis Plan   1. Malignant neoplasm of upper-outer quadrant of left breast in female, estrogen receptor positive        2. Secondary malignant neoplasm of axillary lymph nodes        3. Abnormal MRI, breast        4. Scar adherent          1-  LEFT multifocal malignancy  Left breast 130, 6 cm from the nipple, 1.1 cm on ultrasound.  Top- in good position, 1.2 cm on MRI, invasive lobular carcinoma, intermediate grade, 3, 2, 1, 9 mm in a core, estrogen 97, progesterone 95, HER-2/leonie 1+, Ki-67 33%.    Left breast 230, 6 cm from the nipple, 2 cm on ultrasound-palpable lesion, 2 cm on ultrasound, 6.1 cm on MRI.  Q. marker in good position.  Invasive lobular carcinoma, intermediate grade, 3, 2, 1, 1 cm in greatest dimension, estrogen 90, progesterone 96, HER-2/leonie 0, Ki-67 39%.    Clinical stage mT3N0 stage IIb.      [facility] genetic testing panel March 4, 2022 breast and gynecology add on melanoma returned as negative for mutation.        Pathology from 4/13/22left total mastectomy and sentinel lymph node biopsy followed by reconstruction returned as:  1 of 4 lymph nodes with breast cancer.  Isolated tumor cells.  Invasive lobular carcinoma, intermediate grade, 3, 2, 1, 5 cm, lobular carcinoma in situ, measuring 7 cm.  All margins are clear with the closest being 10 mm from the posterior margin.  The size of the greatest deposit in the lymph node is 0.1 mm or isolated tumor cells.  Estrogen , progesterone , HER-2/leonie 1+, Ki-67 39%.  Pathologic  stage T2N0 (i+), stage 2A.      Oncotype DX April 26, 2022 for patients with micro mets and nodes +1-30 returned as recurrence score of 18 with a breast cancer specific survival at 9 years of 97%.  Risk of distant recurrence at 9 years on a hormone blocker alone is 16%.  Chemotherapy for this group as benefit cannot be excluded.        -I placed a right sided port May 13, 2022  - Dr. Palacios chemotherapy starting in May 2022 AC x4 followed by 6 doses of Taxol which she completed around October.  -Dr. Lan Anguiano then treated her chest wall and regional lymphatics from October 25, 2022 through November 23, 2022.  - bilateral salpingo-oophorectomy December 15, 2022 with Dr. Gutierrez.  - started Femara December 2022 but had significant arthralgias so was switched over in January 2023 to Arimidex.  - 2-2024 tolerating the arimidex      3-  RIGHT immediate retroareolar 7:-8:00- felt to be sebaceous cyst on MRI.   Right breast ultrasound April 11, 2022 [facility] ultrasound retroareolar right breast 7-8 o'clock there is an 8 mm hypoechoic mass immediately deep to the skin.  Consistent with that of a benign complex cyst.  This corresponds to the nonenhancing lesion seen on the breast MRI consistent with a benign etiology.    4-  Scar between implant and pectoralis LEFT- education provided 3-2024 on DTM and ROM      Plan:  Maribell is here today for her 2 year follow-up visit postoperatively.  She is doing well. There is no evidence of disease.    Based on the tenderness of exam at the junction of implant and pectoralis, I have asked her to do daily ROM exercises and we gave her this handout today- I also showed her how to deeply massage the junction between the implant and pectoralis to try to break up some scar/prevent it from becoming more bothersome.    I asked her to french us in the interim if she feels like it is not improving and we can arrange for her to see PT for deep tissue massage and ROM.    We reviewed  the meticulous skin care with massage and moisturization of the chest wall,.    Dr Palacios is ok with her port being removed.  We discussed the procedure of a RIGHT port removal, with risks of bleeding and infection. She wishes to have this removed.  We will schedule this for first available.    Her next mammogram is due 2-23-24 RIGHT screening mammogram at [facility]. We will arrange this for her and see her back after.      Monisha Escudero MD    Next Appointment:  Return for port removal then , Next scheduled follow up, after imaging.    Today I spent 40 minutes doing the following: Reviewing records, labs, outside imaging and reports in preparation for the patient visit; obtaining medical history; performing the physical exam; counseling and educating the patient and any available family or caregivers; ordering medications, tests or procedures; coordinating care with any other physicians on her care team as needed, and documenting all of the above in the medical record as well as sending communications with her other healthcare professionals.      EMR Dragon/transcription disclaimer:    Much of this encounter note is an electronic transcription/translocation of spoken language to printed text.  The electronic translation of spoken language may permit erroneous, or at times, nonsensical words or phrases to be inadvertently transcribed.  Although I have reviewed the note from such areas, some may still exist.

## 2024-03-05 ENCOUNTER — TELEPHONE (OUTPATIENT)
Dept: SURGERY | Facility: CLINIC | Age: 39
End: 2024-03-05
Payer: COMMERCIAL

## 2024-03-05 NOTE — TELEPHONE ENCOUNTER
spoke with patient about their surgery dates/times and the dates/times with their associated appointments with the surgery.    Patient verbalized an understanding of the information given and confirmed their appointments/surgery as well.    Surgery packet mailed     PAT: 03/7/2024 at 2:30 pm  SURGERY: 03/15/24 arriving at 11:00 am and surgery to start at 1:00 pm  POST OP: 03/25/24 at 11:30 am      [facility] [place]

## 2024-03-15 ENCOUNTER — ANESTHESIA EVENT (OUTPATIENT)
Dept: PERIOP | Facility: HOSPITAL | Age: 39
End: 2024-03-15
Payer: COMMERCIAL

## 2024-03-15 ENCOUNTER — ANESTHESIA (OUTPATIENT)
Dept: PERIOP | Facility: HOSPITAL | Age: 39
End: 2024-03-15
Payer: COMMERCIAL

## 2024-03-15 ENCOUNTER — HOSPITAL ENCOUNTER (OUTPATIENT)
Facility: HOSPITAL | Age: 39
Setting detail: HOSPITAL OUTPATIENT SURGERY
Discharge: HOME | End: 2024-03-15
Attending: SURGERY | Admitting: SURGERY
Payer: COMMERCIAL

## 2024-03-15 VITALS
TEMPERATURE: 98.3 F | OXYGEN SATURATION: 93 % | SYSTOLIC BLOOD PRESSURE: 117 MMHG | DIASTOLIC BLOOD PRESSURE: 63 MMHG | HEART RATE: 66 BPM | RESPIRATION RATE: 16 BRPM

## 2024-03-15 DIAGNOSIS — C50.412 MALIGNANT NEOPLASM OF UPPER-OUTER QUADRANT OF LEFT BREAST IN FEMALE, ESTROGEN RECEPTOR POSITIVE: ICD-10-CM

## 2024-03-15 DIAGNOSIS — Z17.0 MALIGNANT NEOPLASM OF UPPER-OUTER QUADRANT OF LEFT BREAST IN FEMALE, ESTROGEN RECEPTOR POSITIVE: ICD-10-CM

## 2024-03-15 PROCEDURE — 25010000002 GENTAMICIN PER 80 MG: Performed by: SURGERY

## 2024-03-15 PROCEDURE — 25010000002 ONDANSETRON PER 1 MG: Performed by: NURSE ANESTHETIST, CERTIFIED REGISTERED

## 2024-03-15 PROCEDURE — 36590 REMOVAL TUNNELED CV CATH: CPT | Performed by: SURGERY

## 2024-03-15 PROCEDURE — 25010000002 HYDROMORPHONE PER 4 MG: Performed by: NURSE ANESTHETIST, CERTIFIED REGISTERED

## 2024-03-15 PROCEDURE — 25010000002 PROPOFOL 10 MG/ML EMULSION: Performed by: NURSE ANESTHETIST, CERTIFIED REGISTERED

## 2024-03-15 PROCEDURE — 25010000002 CEFAZOLIN PER 500 MG: Performed by: SURGERY

## 2024-03-15 PROCEDURE — 25010000002 BUPIVACAINE (PF) 0.25 % SOLUTION 30 ML VIAL: Performed by: SURGERY

## 2024-03-15 PROCEDURE — 81025 URINE PREGNANCY TEST: CPT | Performed by: SURGERY

## 2024-03-15 PROCEDURE — 25010000002 LIDOCAINE 1 % SOLUTION 20 ML VIAL: Performed by: SURGERY

## 2024-03-15 PROCEDURE — 25810000003 LACTATED RINGERS PER 1000 ML: Performed by: NURSE ANESTHETIST, CERTIFIED REGISTERED

## 2024-03-15 PROCEDURE — 25810000003 LACTATED RINGERS PER 1000 ML: Performed by: ANESTHESIOLOGY

## 2024-03-15 PROCEDURE — 25010000002 DEXAMETHASONE SODIUM PHOSPHATE 20 MG/5ML SOLUTION: Performed by: NURSE ANESTHETIST, CERTIFIED REGISTERED

## 2024-03-15 PROCEDURE — 25010000002 PROPOFOL 500 MG/50ML EMULSION: Performed by: NURSE ANESTHETIST, CERTIFIED REGISTERED

## 2024-03-15 RX ORDER — FENTANYL CITRATE 50 UG/ML
50 INJECTION, SOLUTION INTRAMUSCULAR; INTRAVENOUS
Status: DISCONTINUED | OUTPATIENT
Start: 2024-03-15 | End: 2024-03-15 | Stop reason: HOSPADM

## 2024-03-15 RX ORDER — SODIUM CHLORIDE 0.9 % (FLUSH) 0.9 %
3 SYRINGE (ML) INJECTION EVERY 12 HOURS SCHEDULED
Status: DISCONTINUED | OUTPATIENT
Start: 2024-03-15 | End: 2024-03-15 | Stop reason: HOSPADM

## 2024-03-15 RX ORDER — OXYCODONE AND ACETAMINOPHEN 7.5; 325 MG/1; MG/1
1 TABLET ORAL EVERY 4 HOURS PRN
Status: DISCONTINUED | OUTPATIENT
Start: 2024-03-15 | End: 2024-03-15 | Stop reason: HOSPADM

## 2024-03-15 RX ORDER — EPHEDRINE SULFATE 50 MG/ML
5 INJECTION, SOLUTION INTRAVENOUS ONCE AS NEEDED
Status: DISCONTINUED | OUTPATIENT
Start: 2024-03-15 | End: 2024-03-15 | Stop reason: HOSPADM

## 2024-03-15 RX ORDER — SODIUM CHLORIDE 0.9 % (FLUSH) 0.9 %
3-10 SYRINGE (ML) INJECTION AS NEEDED
Status: DISCONTINUED | OUTPATIENT
Start: 2024-03-15 | End: 2024-03-15 | Stop reason: HOSPADM

## 2024-03-15 RX ORDER — FLUMAZENIL 0.1 MG/ML
0.2 INJECTION INTRAVENOUS AS NEEDED
Status: DISCONTINUED | OUTPATIENT
Start: 2024-03-15 | End: 2024-03-15 | Stop reason: HOSPADM

## 2024-03-15 RX ORDER — MIDAZOLAM HYDROCHLORIDE 1 MG/ML
1 INJECTION INTRAMUSCULAR; INTRAVENOUS
Status: DISCONTINUED | OUTPATIENT
Start: 2024-03-15 | End: 2024-03-15 | Stop reason: HOSPADM

## 2024-03-15 RX ORDER — LABETALOL HYDROCHLORIDE 5 MG/ML
5 INJECTION, SOLUTION INTRAVENOUS
Status: DISCONTINUED | OUTPATIENT
Start: 2024-03-15 | End: 2024-03-15 | Stop reason: HOSPADM

## 2024-03-15 RX ORDER — NALOXONE HCL 0.4 MG/ML
0.2 VIAL (ML) INJECTION AS NEEDED
Status: DISCONTINUED | OUTPATIENT
Start: 2024-03-15 | End: 2024-03-15 | Stop reason: HOSPADM

## 2024-03-15 RX ORDER — PROPOFOL 10 MG/ML
VIAL (ML) INTRAVENOUS AS NEEDED
Status: DISCONTINUED | OUTPATIENT
Start: 2024-03-15 | End: 2024-03-15 | Stop reason: SURG

## 2024-03-15 RX ORDER — HYDRALAZINE HYDROCHLORIDE 20 MG/ML
5 INJECTION INTRAMUSCULAR; INTRAVENOUS
Status: DISCONTINUED | OUTPATIENT
Start: 2024-03-15 | End: 2024-03-15 | Stop reason: HOSPADM

## 2024-03-15 RX ORDER — ONDANSETRON 2 MG/ML
4 INJECTION INTRAMUSCULAR; INTRAVENOUS ONCE AS NEEDED
Status: DISCONTINUED | OUTPATIENT
Start: 2024-03-15 | End: 2024-03-15 | Stop reason: HOSPADM

## 2024-03-15 RX ORDER — PROMETHAZINE HYDROCHLORIDE 25 MG/1
25 SUPPOSITORY RECTAL ONCE AS NEEDED
Status: DISCONTINUED | OUTPATIENT
Start: 2024-03-15 | End: 2024-03-15 | Stop reason: HOSPADM

## 2024-03-15 RX ORDER — LIDOCAINE HYDROCHLORIDE 20 MG/ML
INJECTION, SOLUTION INFILTRATION; PERINEURAL AS NEEDED
Status: DISCONTINUED | OUTPATIENT
Start: 2024-03-15 | End: 2024-03-15 | Stop reason: SURG

## 2024-03-15 RX ORDER — DROPERIDOL 2.5 MG/ML
0.62 INJECTION, SOLUTION INTRAMUSCULAR; INTRAVENOUS
Status: DISCONTINUED | OUTPATIENT
Start: 2024-03-15 | End: 2024-03-15 | Stop reason: HOSPADM

## 2024-03-15 RX ORDER — SODIUM CHLORIDE, SODIUM LACTATE, POTASSIUM CHLORIDE, CALCIUM CHLORIDE 600; 310; 30; 20 MG/100ML; MG/100ML; MG/100ML; MG/100ML
100 INJECTION, SOLUTION INTRAVENOUS CONTINUOUS
Status: DISCONTINUED | OUTPATIENT
Start: 2024-03-15 | End: 2024-03-15 | Stop reason: HOSPADM

## 2024-03-15 RX ORDER — PROMETHAZINE HYDROCHLORIDE 25 MG/1
25 TABLET ORAL ONCE AS NEEDED
Status: DISCONTINUED | OUTPATIENT
Start: 2024-03-15 | End: 2024-03-15 | Stop reason: HOSPADM

## 2024-03-15 RX ORDER — ONDANSETRON 2 MG/ML
INJECTION INTRAMUSCULAR; INTRAVENOUS AS NEEDED
Status: DISCONTINUED | OUTPATIENT
Start: 2024-03-15 | End: 2024-03-15 | Stop reason: SURG

## 2024-03-15 RX ORDER — ACETAMINOPHEN 500 MG
1000 TABLET ORAL ONCE
Status: COMPLETED | OUTPATIENT
Start: 2024-03-15 | End: 2024-03-15

## 2024-03-15 RX ORDER — DIPHENHYDRAMINE HYDROCHLORIDE 50 MG/ML
12.5 INJECTION INTRAMUSCULAR; INTRAVENOUS
Status: DISCONTINUED | OUTPATIENT
Start: 2024-03-15 | End: 2024-03-15 | Stop reason: HOSPADM

## 2024-03-15 RX ORDER — FAMOTIDINE 10 MG/ML
20 INJECTION, SOLUTION INTRAVENOUS ONCE
Status: COMPLETED | OUTPATIENT
Start: 2024-03-15 | End: 2024-03-15

## 2024-03-15 RX ORDER — DEXAMETHASONE SODIUM PHOSPHATE 4 MG/ML
INJECTION, SOLUTION INTRA-ARTICULAR; INTRALESIONAL; INTRAMUSCULAR; INTRAVENOUS; SOFT TISSUE AS NEEDED
Status: DISCONTINUED | OUTPATIENT
Start: 2024-03-15 | End: 2024-03-15 | Stop reason: SURG

## 2024-03-15 RX ORDER — HYDROCODONE BITARTRATE AND ACETAMINOPHEN 5; 325 MG/1; MG/1
1 TABLET ORAL ONCE AS NEEDED
Status: DISCONTINUED | OUTPATIENT
Start: 2024-03-15 | End: 2024-03-15 | Stop reason: HOSPADM

## 2024-03-15 RX ORDER — IPRATROPIUM BROMIDE AND ALBUTEROL SULFATE 2.5; .5 MG/3ML; MG/3ML
3 SOLUTION RESPIRATORY (INHALATION) ONCE AS NEEDED
Status: DISCONTINUED | OUTPATIENT
Start: 2024-03-15 | End: 2024-03-15 | Stop reason: HOSPADM

## 2024-03-15 RX ORDER — SODIUM CHLORIDE, SODIUM LACTATE, POTASSIUM CHLORIDE, CALCIUM CHLORIDE 600; 310; 30; 20 MG/100ML; MG/100ML; MG/100ML; MG/100ML
INJECTION, SOLUTION INTRAVENOUS CONTINUOUS PRN
Status: DISCONTINUED | OUTPATIENT
Start: 2024-03-15 | End: 2024-03-15 | Stop reason: SURG

## 2024-03-15 RX ORDER — SODIUM CHLORIDE 9 MG/ML
40 INJECTION, SOLUTION INTRAVENOUS AS NEEDED
Status: DISCONTINUED | OUTPATIENT
Start: 2024-03-15 | End: 2024-03-15 | Stop reason: HOSPADM

## 2024-03-15 RX ORDER — PROPOFOL 10 MG/ML
INJECTION, EMULSION INTRAVENOUS CONTINUOUS PRN
Status: DISCONTINUED | OUTPATIENT
Start: 2024-03-15 | End: 2024-03-15 | Stop reason: SURG

## 2024-03-15 RX ORDER — SODIUM CHLORIDE, SODIUM LACTATE, POTASSIUM CHLORIDE, CALCIUM CHLORIDE 600; 310; 30; 20 MG/100ML; MG/100ML; MG/100ML; MG/100ML
9 INJECTION, SOLUTION INTRAVENOUS CONTINUOUS
Status: DISCONTINUED | OUTPATIENT
Start: 2024-03-15 | End: 2024-03-15 | Stop reason: HOSPADM

## 2024-03-15 RX ORDER — HYDROMORPHONE HYDROCHLORIDE 1 MG/ML
0.5 INJECTION, SOLUTION INTRAMUSCULAR; INTRAVENOUS; SUBCUTANEOUS
Status: DISCONTINUED | OUTPATIENT
Start: 2024-03-15 | End: 2024-03-15 | Stop reason: HOSPADM

## 2024-03-15 RX ADMIN — SODIUM CHLORIDE 2000 MG: 900 INJECTION INTRAVENOUS at 12:56

## 2024-03-15 RX ADMIN — SODIUM CHLORIDE, POTASSIUM CHLORIDE, SODIUM LACTATE AND CALCIUM CHLORIDE 9 ML/HR: 600; 310; 30; 20 INJECTION, SOLUTION INTRAVENOUS at 12:24

## 2024-03-15 RX ADMIN — ACETAMINOPHEN 1000 MG: 500 TABLET ORAL at 12:23

## 2024-03-15 RX ADMIN — FAMOTIDINE 20 MG: 10 INJECTION INTRAVENOUS at 12:23

## 2024-03-15 RX ADMIN — LIDOCAINE HYDROCHLORIDE 100 MG: 20 INJECTION, SOLUTION INFILTRATION; PERINEURAL at 13:03

## 2024-03-15 RX ADMIN — DEXAMETHASONE SODIUM PHOSPHATE 8 MG: 4 INJECTION, SOLUTION INTRAMUSCULAR; INTRAVENOUS at 13:03

## 2024-03-15 RX ADMIN — SODIUM CHLORIDE, POTASSIUM CHLORIDE, SODIUM LACTATE AND CALCIUM CHLORIDE: 600; 310; 30; 20 INJECTION, SOLUTION INTRAVENOUS at 12:58

## 2024-03-15 RX ADMIN — PROPOFOL 100 MCG/KG/MIN: 10 INJECTION, EMULSION INTRAVENOUS at 13:03

## 2024-03-15 RX ADMIN — PROPOFOL 100 MG: 10 INJECTION, EMULSION INTRAVENOUS at 13:03

## 2024-03-15 RX ADMIN — ONDANSETRON 4 MG: 2 INJECTION INTRAMUSCULAR; INTRAVENOUS at 13:03

## 2024-03-15 RX ADMIN — HYDROMORPHONE HYDROCHLORIDE 0.5 MG: 1 INJECTION, SOLUTION INTRAMUSCULAR; INTRAVENOUS; SUBCUTANEOUS at 14:07

## 2024-03-15 NOTE — ANESTHESIA POSTPROCEDURE EVALUATION
Patient: Maribell Romero    Procedure Summary       Date: 03/15/24 Room / Location: [facility] OR  / [facility] MAIN OR    Anesthesia Start: 1258 Anesthesia Stop: 1342    Procedure: RIGHT port removal, (Right) Diagnosis:       Malignant neoplasm of upper-outer quadrant of left breast in female, estrogen receptor positive      (Malignant neoplasm of upper-outer quadrant of left breast in female, estrogen receptor positive [C50.412, Z17.0])    Surgeons: Monisha Escudero MD Provider: Justice Herzog MD    Anesthesia Type: MAC ASA Status: 2            Anesthesia Type: MAC    Vitals  Vitals Value Taken Time   /59 03/15/24 1345   Temp     Pulse 66 03/15/24 1352   Resp 18 03/15/24 1345   SpO2 97 % 03/15/24 1352   Vitals shown include unfiled device data.        Post Anesthesia Care and Evaluation    Patient location during evaluation: PACU  Patient participation: complete - patient participated  Level of consciousness: awake and alert  Pain management: adequate    Airway patency: patent  Anesthetic complications: No anesthetic complications  PONV Status: controlled  Cardiovascular status: acceptable and hemodynamically stable  Respiratory status: acceptable  Hydration status: acceptable    Comments: /59 (BP Location: Right leg, Patient Position: Lying)   Pulse 81   Temp 36.8 °C (98.3 °F) (Oral)   Resp 18   LMP  (LMP Unknown) Comment: negative urine hcg 3/15/2024  SpO2 99%

## 2024-03-15 NOTE — OP NOTE
Operative note    Preoperative Diagnosis:  Breast cancer    Postoperative Diagnosis: Breast cancer    Procedure Performed: right port removal    Dictating physician and surgeon: Monisha Escudero MD    EBL:<5cc    FINDINGS AND DESCRIPTION OF PROCEDURE:       The patient was brought to the operating room and placed on the table in the supine position. After adequate monitored anesthesia was obtained, we sterilly prepped and draped the anterior chest wall.   We did a time out to identify correct patient and correct operative site.  She did receive IV antibiotic within an hour of and prior to incision.     I anesthetized the previous incision and underlying soft tissues using 10 mL of a mixture of 1% lidocaine, quarter percent Marcaine, and bicarbonate.    I made an oblique incision in the deltopectoral groove at the location of prior incision used for placement, using a 15 blade. I dissected around the port using Bovie electrocautery and removed the 3x 2-0 Prolene sutures that were securing the port. I then removed the port from the pocket on the fascia and removed the catheter from the insertion site. I then oversewed the soft tissue overlying the insertion site using a figure of 8,  3-0 Vicryl suture.     I then irrigated , assured hemostasis, and closed the skin in 2 layers,  the first being an interrupted 3-0 Vicryl layer, followed by a running 4-0 Monocryl.    I then applied lengthwise 1/2 inch Steri-Strips, 2x2 s and a Tegederm.    She tolerated the procedure well, there were no immediate complications, and all counts were correct at the end of the case.

## 2024-03-15 NOTE — ANESTHESIA PREPROCEDURE EVALUATION
Anesthesia Evaluation     Patient summary reviewed and Nursing notes reviewed   history of anesthetic complications:  PONV  NPO Solid Status: > 8 hours  NPO Liquid Status: > 2 hours           Airway   Mallampati: II  TM distance: >3 FB  Neck ROM: full  No difficulty expected  Dental - normal exam     Pulmonary - negative pulmonary ROS and normal exam   Cardiovascular - normal exam  Exercise tolerance: good (4-7 METS)    (+) hyperlipidemia      Neuro/Psych- negative ROS  GI/Hepatic/Renal/Endo - negative ROS     Musculoskeletal     Abdominal    Substance History      OB/GYN          Other      history of cancer remission                      Anesthesia Plan    ASA 2     MAC       Anesthetic plan, risks, benefits, and alternatives have been provided, discussed and informed consent has been obtained with: patient.  Pre-procedure education provided      CODE STATUS:

## 2024-03-25 ENCOUNTER — CLINICAL SUPPORT (OUTPATIENT)
Dept: SURGERY | Facility: CLINIC | Age: 39
End: 2024-03-25
Payer: COMMERCIAL

## 2024-04-04 ENCOUNTER — OFFICE VISIT (OUTPATIENT)
Dept: NEUROLOGY | Facility: CLINIC | Age: 39
End: 2024-04-04
Payer: COMMERCIAL

## 2024-04-04 VITALS
WEIGHT: 128.6 LBS | DIASTOLIC BLOOD PRESSURE: 68 MMHG | SYSTOLIC BLOOD PRESSURE: 98 MMHG | BODY MASS INDEX: 23.52 KG/M2 | HEART RATE: 74 BPM | OXYGEN SATURATION: 97 %

## 2024-04-04 DIAGNOSIS — G43.009 MIGRAINE WITHOUT AURA AND WITHOUT STATUS MIGRAINOSUS, NOT INTRACTABLE: Primary | ICD-10-CM

## 2024-04-04 PROCEDURE — 99204 OFFICE O/P NEW MOD 45 MIN: CPT | Performed by: PSYCHIATRY & NEUROLOGY

## 2024-04-04 RX ORDER — SUMATRIPTAN 100 MG/1
100 TABLET, FILM COATED ORAL ONCE AS NEEDED
Qty: 9 TABLET | Refills: 3 | Status: SHIPPED | OUTPATIENT
Start: 2024-04-04 | End: 2024-05-04

## 2024-04-04 NOTE — PROGRESS NOTES
Notes by MA:  Pt is here today as a referral from Dr Palacios.      Subjective:     Dear Dr. Palacios, thank you for referring Mrs. Eddie Romero for neurological consultation.  As you know, she is a 39-year-old right-handed woman who began having headaches a couple years ago.  They are fairly infrequent and occur only every 1 to 3 months.  They are associated with an occipital onset with a shifting predominance, nausea, throbbing sensation, photophobia, mild sonophobia, dizziness and last from hours to all day.  She has tried over-the-counter medications such as Tylenol without success.  She tried a prescription medication from her mother who has migraine and found it useful but she does not remember the name.  There are no other neurological complaints associated with this.  There is no significant vision change.  There is no loss of sensation or motor function.  Patient ID: Maribell Romero is a 39 y.o. female.    Migraine    The following portions of the patient's history were reviewed and updated as appropriate: allergies, current medications, past family history, past medical history, past social history, past surgical history, and problem list.    Review of Systems   Constitutional:  Negative for activity change, appetite change and fatigue.   HENT:  Negative for facial swelling, trouble swallowing and voice change.    Eyes:  Negative for photophobia, pain and visual disturbance.   Respiratory:  Negative for chest tightness, shortness of breath and wheezing.    Cardiovascular:  Negative for chest pain, palpitations and leg swelling.   Gastrointestinal:  Positive for nausea (with migraine). Negative for abdominal pain and vomiting.   Endocrine: Negative for cold intolerance and heat intolerance.   Musculoskeletal:  Negative for arthralgias, back pain, gait problem, joint swelling, myalgias, neck pain and neck stiffness.   Neurological:  Positive for dizziness (with migraine) and headaches. Negative  for tremors, seizures, syncope, facial asymmetry, speech difficulty, weakness, light-headedness and numbness.   Hematological:  Does not bruise/bleed easily.   Psychiatric/Behavioral:  Negative for agitation, behavioral problems, confusion, decreased concentration, dysphoric mood, hallucinations, self-injury, sleep disturbance and suicidal ideas. The patient is not nervous/anxious and is not hyperactive.         Objective:    Neurologic Exam  Awake alert pleasant cooperative oriented in all spheres with fluent speech and normal speech comprehension.    Cranial nerves II through XII normal and symmetric.    Motor exam reveals normal symmetric tone bulk and power throughout without drift or spasticity.  No adventitious movements.    The sensory exam reveals normal and symmetric sensation to light touch, temperature, vibration throughout.    Coordination testing reveals smooth and accurate finger-nose-finger bilaterally, normal heel-to-shin bilaterally and normal rhythmic rapid alternating movements.  Romberg testing is negative.    Tendon reflexes are 2+ and symmetric throughout including preserved ankle jerks bilaterally.  No ankle clonus.    Physical Exam    Assessment/Plan:     Diagnoses and all orders for this visit:    1. Migraine without aura and without status migrainosus, not intractable (Primary)    Other orders  -     SUMAtriptan (IMITREX) 100 MG tablet; Take 1 tablet by mouth 1 (One) Time As Needed for Migraine for up to 30 days. Take one tablet at onset of headache. May repeat dose one time in 2 hours if headache not relieved.  Dispense: 9 tablet; Refill: 3     39-year-old woman with fairly infrequent headaches satisfying diagnostic criteria for migraine without aura.  Their frequency does not warrant prophylaxis at this time.  I discussed the underlying pathophysiology with her and we talked about lifestyle factors that can have an impact on headache frequency and severity.  I have prescribed Imitrex 100  mg as needed with specific instructions on its appropriate use and potential side effects and we will see her back in 3 months to see how she is doing with this.  Thank you very much for the opportunity to participate in the care of this delightful woman.

## 2024-04-06 DIAGNOSIS — E78.5 HYPERLIPIDEMIA, UNSPECIFIED HYPERLIPIDEMIA TYPE: ICD-10-CM

## 2024-04-08 RX ORDER — ATORVASTATIN CALCIUM 10 MG/1
10 TABLET, FILM COATED ORAL DAILY
Qty: 90 TABLET | Refills: 2 | Status: SHIPPED | OUTPATIENT
Start: 2024-04-08

## 2024-04-08 NOTE — TELEPHONE ENCOUNTER
Dr Miller's patient.     Rx Refill Note  Requested Prescriptions     Pending Prescriptions Disp Refills    atorvastatin (LIPITOR) 10 MG tablet [Pharmacy Med Name: Atorvastatin Calcium 10mg Tablet] 90 tablet 2     Sig: Take 1 tablet by mouth daily.      Last office visit with prescribing clinician: 12/13/2023     Next office visit with prescribing clinician: 10/14/2024   Please advise this refill

## 2024-04-09 ENCOUNTER — PATIENT ROUNDING (BHMG ONLY) (OUTPATIENT)
Dept: NEUROLOGY | Facility: CLINIC | Age: 39
End: 2024-04-09
Payer: COMMERCIAL

## 2024-04-15 ENCOUNTER — TELEPHONE (OUTPATIENT)
Dept: NEUROLOGY | Facility: CLINIC | Age: 39
End: 2024-04-15
Payer: COMMERCIAL

## 2024-04-15 NOTE — TELEPHONE ENCOUNTER
04/15/2024 LM to call and reschedule her appointment for 07/18/2024 at 10:40 am to another Day, Dr Anthony will be out of the office on this day

## 2024-04-17 ENCOUNTER — OFFICE VISIT (OUTPATIENT)
Dept: FAMILY MEDICINE CLINIC | Facility: CLINIC | Age: 39
End: 2024-04-17
Payer: COMMERCIAL

## 2024-04-17 VITALS
WEIGHT: 125.4 LBS | DIASTOLIC BLOOD PRESSURE: 74 MMHG | TEMPERATURE: 97.2 F | SYSTOLIC BLOOD PRESSURE: 118 MMHG | HEIGHT: 62 IN | OXYGEN SATURATION: 99 % | BODY MASS INDEX: 23.08 KG/M2 | HEART RATE: 62 BPM

## 2024-04-17 DIAGNOSIS — H65.01 RIGHT ACUTE SEROUS OTITIS MEDIA, RECURRENCE NOT SPECIFIED: Primary | ICD-10-CM

## 2024-04-17 DIAGNOSIS — J01.00 ACUTE MAXILLARY SINUSITIS, RECURRENCE NOT SPECIFIED: ICD-10-CM

## 2024-04-17 PROCEDURE — 99213 OFFICE O/P EST LOW 20 MIN: CPT | Performed by: NURSE PRACTITIONER

## 2024-04-17 RX ORDER — LEVOCETIRIZINE DIHYDROCHLORIDE 5 MG/1
5 TABLET, FILM COATED ORAL EVERY EVENING
Qty: 30 TABLET | Refills: 2 | Status: SHIPPED | OUTPATIENT
Start: 2024-04-17

## 2024-04-17 RX ORDER — AMOXICILLIN AND CLAVULANATE POTASSIUM 875; 125 MG/1; MG/1
1 TABLET, FILM COATED ORAL 2 TIMES DAILY
Qty: 14 TABLET | Refills: 0 | Status: SHIPPED | OUTPATIENT
Start: 2024-04-17

## 2024-04-17 NOTE — PROGRESS NOTES
"Chief Complaint  Cough (For 2 weeks /Home Covid test negative on Saturday /), Nasal Congestion, and Eye Pain    Subjective        Maribell Romero presents to [facility]  History of Present Illness    Patient is a patient of Dr. Miller.  She presents today with complaint of cough, nasal congestion, and eye pain for last 2 weeks.     Reports ear pressure, cough, nasal congestion, eye pain, pressure in chest  Denies shortness of breath, ear pain, nausea, vomiting, diarrhea, sore throat, headache, fever    OTC: zyrtec  Tylenol, cough and cold medication otc    Objective   Vital Signs:  /74   Pulse 62   Temp 97.2 °F (36.2 °C)   Ht 157.5 cm (62\")   Wt 56.9 kg (125 lb 6.4 oz)   SpO2 99%   BMI 22.94 kg/m²   Estimated body mass index is 22.94 kg/m² as calculated from the following:    Height as of this encounter: 157.5 cm (62\").    Weight as of this encounter: 56.9 kg (125 lb 6.4 oz).       BMI is within normal parameters. No other follow-up for BMI required.      Physical Exam  Constitutional:       Appearance: Normal appearance.   HENT:      Head: Normocephalic and atraumatic.      Right Ear: Tympanic membrane is injected and erythematous. Tympanic membrane has decreased mobility.      Left Ear: Tympanic membrane has decreased mobility.      Nose: Rhinorrhea present.      Right Sinus: Maxillary sinus tenderness present. No frontal sinus tenderness.      Left Sinus: Maxillary sinus tenderness present. No frontal sinus tenderness.   Cardiovascular:      Rate and Rhythm: Normal rate and regular rhythm.      Pulses: Normal pulses.   Pulmonary:      Effort: Pulmonary effort is normal.      Breath sounds: Normal breath sounds.   Skin:     General: Skin is warm and dry.   Neurological:      Mental Status: She is alert.   Psychiatric:         Mood and Affect: Mood normal.         Behavior: Behavior normal.         Thought Content: Thought content normal.         Judgment: " Judgment normal.        Result Review :                     Assessment and Plan     Diagnoses and all orders for this visit:    1. Right acute serous otitis media, recurrence not specified (Primary)    2. Acute maxillary sinusitis, recurrence not specified    Other orders  -     amoxicillin-clavulanate (AUGMENTIN) 875-125 MG per tablet; Take 1 tablet by mouth 2 (Two) Times a Day.  Dispense: 14 tablet; Refill: 0  -     levocetirizine (XYZAL) 5 MG tablet; Take 1 tablet by mouth Every Evening.  Dispense: 30 tablet; Refill: 2    Treating for sinus and ear infection.  Patient to continue Flonase and trial Xyzal and place of Zyrtec.  Will give antibiotic for ear and sinus infection.  If no resolution follow-up as needed.  He verbalizes understanding and is agreeable.         Follow Up     No follow-ups on file.  Patient was given instructions and counseling regarding her condition or for health maintenance advice. Please see specific information pulled into the AVS if appropriate.

## 2024-06-19 ENCOUNTER — LAB (OUTPATIENT)
Dept: LAB | Facility: HOSPITAL | Age: 39
End: 2024-06-19
Payer: COMMERCIAL

## 2024-06-19 ENCOUNTER — OFFICE VISIT (OUTPATIENT)
Dept: ONCOLOGY | Facility: CLINIC | Age: 39
End: 2024-06-19
Payer: COMMERCIAL

## 2024-06-19 VITALS
BODY MASS INDEX: 22.93 KG/M2 | HEART RATE: 64 BPM | OXYGEN SATURATION: 100 % | TEMPERATURE: 97.8 F | DIASTOLIC BLOOD PRESSURE: 60 MMHG | RESPIRATION RATE: 16 BRPM | WEIGHT: 124.6 LBS | HEIGHT: 62 IN | SYSTOLIC BLOOD PRESSURE: 96 MMHG

## 2024-06-19 DIAGNOSIS — K21.00 PEPTIC REFLUX ESOPHAGITIS: ICD-10-CM

## 2024-06-19 DIAGNOSIS — Z45.2 FITTING AND ADJUSTMENT OF VASCULAR CATHETER: ICD-10-CM

## 2024-06-19 DIAGNOSIS — E89.40 SURGICAL MENOPAUSE: ICD-10-CM

## 2024-06-19 DIAGNOSIS — E53.8 B12 DEFICIENCY: ICD-10-CM

## 2024-06-19 DIAGNOSIS — G62.0 NEUROPATHY DUE TO CHEMOTHERAPEUTIC DRUG: ICD-10-CM

## 2024-06-19 DIAGNOSIS — Z17.0 MALIGNANT NEOPLASM OF UPPER-OUTER QUADRANT OF LEFT BREAST IN FEMALE, ESTROGEN RECEPTOR POSITIVE: Primary | ICD-10-CM

## 2024-06-19 DIAGNOSIS — C50.412 MALIGNANT NEOPLASM OF UPPER-OUTER QUADRANT OF LEFT BREAST IN FEMALE, ESTROGEN RECEPTOR POSITIVE: Primary | ICD-10-CM

## 2024-06-19 DIAGNOSIS — T45.1X5A NEUROPATHY DUE TO CHEMOTHERAPEUTIC DRUG: ICD-10-CM

## 2024-06-19 DIAGNOSIS — Z17.0 MALIGNANT NEOPLASM OF UPPER-OUTER QUADRANT OF LEFT BREAST IN FEMALE, ESTROGEN RECEPTOR POSITIVE: ICD-10-CM

## 2024-06-19 DIAGNOSIS — M25.60 MORNING STIFFNESS OF JOINTS: ICD-10-CM

## 2024-06-19 DIAGNOSIS — M65.4 TENOSYNOVITIS, DE QUERVAIN: ICD-10-CM

## 2024-06-19 DIAGNOSIS — C50.412 MALIGNANT NEOPLASM OF UPPER-OUTER QUADRANT OF LEFT BREAST IN FEMALE, ESTROGEN RECEPTOR POSITIVE: ICD-10-CM

## 2024-06-19 LAB
ALBUMIN SERPL-MCNC: 4.5 G/DL (ref 3.5–5.2)
ALBUMIN/GLOB SERPL: 1.6 G/DL
ALP SERPL-CCNC: 142 U/L (ref 39–117)
ALT SERPL W P-5'-P-CCNC: 26 U/L (ref 1–33)
ANION GAP SERPL CALCULATED.3IONS-SCNC: 11.9 MMOL/L (ref 5–15)
AST SERPL-CCNC: 45 U/L (ref 1–32)
BASOPHILS # BLD AUTO: 0.03 10*3/MM3 (ref 0–0.2)
BASOPHILS NFR BLD AUTO: 0.7 % (ref 0–1.5)
BILIRUB SERPL-MCNC: 0.4 MG/DL (ref 0–1.2)
BUN SERPL-MCNC: 11 MG/DL (ref 6–20)
BUN/CREAT SERPL: 15.9 (ref 7–25)
CALCIUM SPEC-SCNC: 9.8 MG/DL (ref 8.6–10.5)
CANCER AG15-3 SERPL-ACNC: 9.7 U/ML
CHLORIDE SERPL-SCNC: 104 MMOL/L (ref 98–107)
CO2 SERPL-SCNC: 25.1 MMOL/L (ref 22–29)
CREAT SERPL-MCNC: 0.69 MG/DL (ref 0.57–1)
DEPRECATED RDW RBC AUTO: 38.3 FL (ref 37–54)
EGFRCR SERPLBLD CKD-EPI 2021: 112.7 ML/MIN/1.73
EOSINOPHIL # BLD AUTO: 0.25 10*3/MM3 (ref 0–0.4)
EOSINOPHIL NFR BLD AUTO: 5.8 % (ref 0.3–6.2)
ERYTHROCYTE [DISTWIDTH] IN BLOOD BY AUTOMATED COUNT: 11.9 % (ref 12.3–15.4)
GLOBULIN UR ELPH-MCNC: 2.9 GM/DL
GLUCOSE SERPL-MCNC: 98 MG/DL (ref 65–99)
HCT VFR BLD AUTO: 38.4 % (ref 34–46.6)
HGB BLD-MCNC: 12.9 G/DL (ref 12–15.9)
IMM GRANULOCYTES # BLD AUTO: 0.01 10*3/MM3 (ref 0–0.05)
IMM GRANULOCYTES NFR BLD AUTO: 0.2 % (ref 0–0.5)
LYMPHOCYTES # BLD AUTO: 1.46 10*3/MM3 (ref 0.7–3.1)
LYMPHOCYTES NFR BLD AUTO: 33.7 % (ref 19.6–45.3)
MCH RBC QN AUTO: 29.7 PG (ref 26.6–33)
MCHC RBC AUTO-ENTMCNC: 33.6 G/DL (ref 31.5–35.7)
MCV RBC AUTO: 88.3 FL (ref 79–97)
MONOCYTES # BLD AUTO: 0.32 10*3/MM3 (ref 0.1–0.9)
MONOCYTES NFR BLD AUTO: 7.4 % (ref 5–12)
NEUTROPHILS NFR BLD AUTO: 2.26 10*3/MM3 (ref 1.7–7)
NEUTROPHILS NFR BLD AUTO: 52.2 % (ref 42.7–76)
NRBC BLD AUTO-RTO: 0 /100 WBC (ref 0–0.2)
PLATELET # BLD AUTO: 185 10*3/MM3 (ref 140–450)
PMV BLD AUTO: 10.6 FL (ref 6–12)
POTASSIUM SERPL-SCNC: 4.3 MMOL/L (ref 3.5–5.2)
PROT SERPL-MCNC: 7.4 G/DL (ref 6–8.5)
RBC # BLD AUTO: 4.35 10*6/MM3 (ref 3.77–5.28)
SODIUM SERPL-SCNC: 141 MMOL/L (ref 136–145)
WBC NRBC COR # BLD AUTO: 4.33 10*3/MM3 (ref 3.4–10.8)

## 2024-06-19 PROCEDURE — 80053 COMPREHEN METABOLIC PANEL: CPT

## 2024-06-19 PROCEDURE — 86300 IMMUNOASSAY TUMOR CA 15-3: CPT | Performed by: INTERNAL MEDICINE

## 2024-06-19 PROCEDURE — 36415 COLL VENOUS BLD VENIPUNCTURE: CPT

## 2024-06-19 PROCEDURE — 85025 COMPLETE CBC W/AUTO DIFF WBC: CPT

## 2024-06-19 NOTE — PROGRESS NOTES
Subjective       REASON FOR FOLLOW UP:  LOBULAR CARCINOMA OF THE LEFT BREAST, 70 MM IN SIZE, MARGINS OF RESECTION NEGATIVE AFTER MASTECTOMY, GRADE 2, ER POSITIVE TN POSITIVE, HER 2 NEGATIVE BY IHC,KI 67 39% ,ONE POSITIVE AXILLARY LYMPH NODE OUT OF 4 NODES REMOVAL, PREMENOPAUSAL .INVITAE PANEL NEGATIVE.ONCOTYPE 18: chemotherapy AC X 4 AND TAXOL X 6, STOPPED BECAUSE SEVERE NEUROPATHY IN FEET AND HANDS. Oophorectomy in 11/22, FEMARA INITIATED AT THAT TIME. COMPLETED RADIATION THERAPY AFTER TAXOL COMPLETION.SWITCHED TO ARIMIDEX DUE TO JOINT PAIN, NEGATIVE RHEUMATOLOGIC WORKUP.        HISTORY OF PRESENT ILLNESS:      On 06/19/2024, this 39-year-old female who has previous history of breast cancer returns to the office for follow-up. In the interim, she has had an upper respiratory infection with sinusitis and otitis, now resolved after antibiotic therapy. In regard to her breast cancer, she continues receiving he adjuvant Arimidex with no joint pain. Compliance with the medication is 100%. Appetite and weight are stable. Her bowel function and urination are normal. She has no obvious bone or joint pain at this time.     She has had also her port removed recently.              ONCOLOGIC HISTORY:  The patient is a 39 y.o. year old female who is here for an opinion about the above issue.  I had the opportunity to see this delightful [place] female, 37 years old, mother of 2, who is premenopausal who has found abnormalities in the left breast since 12/2021, having sensation of fullness in the breast and discomfort and occasional pain. After this complaint she was seen by her primary physician and she initiated a process of mammograms and ultrasounds that culminated with the diagnosis of lobular carcinoma of the breast, invasive, 7 cm in size. The patient completed a mastectomy and sentinel lymph nodes almost 3 weeks ago and she is here for review and advice in regard to adjuvant therapy. The patient is still having pain at  the surgical site, especially since yesterday she had injection of saline in the expander that was placed after the mastectomy. She also has neuropathic pain in the inner aspect of the left arm that is bothering her a lot through the day and sometimes almost driving her crazy. She has no motor deficit into the left upper extremity. She denies any fevers or chills or bone pain. Her appetite is acceptable but she has changed her diet and she is almost going into vegetarian with the exception of some fish. She has eliminated all the dairy products and she has eliminated mostly carbohydrates as well. She has lost some weight. The patient otherwise feels well. She has normal bowel activity, normal urination. No skeletal pain or joint pain. No cough, sputum production, shortness of breath or palpitations. She is extremely anxious about this visit today. She is in company of her .    The patient returned to the office on 05/20/2022 in preparation for initiation of chemotherapy. In the meantime she has undergone a port placement in right subclavian position with no difficulties and an echocardiogram that will be discussed below. The patient has had very significant upper respiratory allergies. She has been tested this week for COVID being negative. She is not running any fever. Most of the symptoms in the respiratory tract include sneezing, itching, itchy eyes and rhinorrhea. No sore throat, no alteration in taste or smell. No cough, shortness of breath, pleuritic pain or hemoptysis.     The surgical site from the left breast expander is not painful. She has healed extremely well. The same is applicable to her port that was placed a week ago.  The patient returned to the office on 05/26/2022 for an interim assessment. Since the chemotherapy administration last week the patient had persistent nausea and vomiting at least for 4 days to the point that she spent most of the time sleeping because of the effect of the  Zyprexa but no resolution of her nausea and vomiting. She has vomited so much that now she has significant esophagitis with heartburn and indigestion. She has not had any hematemesis or melena. She just started to eat yesterday rice and potatoes. She was able to handle this and she has been able to handle liquids. She also has had discoloration of the urine that originally was pink and subsequently now has normalized. She had no burning sensation upon urination or vaginal bleeding. She has been very fatigued and tired. On top of that Neulasta triggers significant pain in the cranium, in the pelvic bone and in her femurs and she has the feeling that she has the flu. Again, she has not had any fever. Today is the best day that she has had since the chemotherapy administration. Obviously given the symptoms and this toxicity assessment, the patient will require radical change in her treatment as will be described below.  On 06/23/2022, the patient returns after she has completed 2 cycles of AC. The first cycle was dramatically cumbersome because of the tremendous amount of side effects including persistent nausea and vomiting after chemotherapy that required IV fluids and modification in her nausea regimen. She also developed neutropenic fever and ended up in the hospital. After 2nd cycle with dose adjustment the patient handled the treatment much better and the miracle medicine in regard to nausea control was Ativan. Phenergan and Compazine triggered tremendous degree of confusion. Therefore, for the subsequent cycle 3 and 4, Ativan will be the main medicine to utilize. Zofran also will be utilized.     Physically the patient feels much better today. The patient has had appetite to eat. The heartburn and indigestion have mostly subsided. Her nutrition and hydration are much better. She is keeping her weight and she has no pain. Bowel activity with occasional constipation. Urination is normal. No cardiovascular or  respiratory issues. Energy level much better.     She had significant pain with Neulasta use 4 days after the treatment requiring Zyrtec and significant Tylenol utilization.    This patient has been seen by video medicine visit on 09/12/2022 in regard to continue her chemotherapy administration in the adjuvant therapy of her breast cancer on the left. The patient at this time is undergoing weekly Taxol. Unfortunately she has developed further peripheral neuropathy in the tips of her fingers and her toes to the point that she feels sensation of itching more than pain all of the time and this is disrupting her life and her functionality very substantially. She has to use ice that gives her some relief of the symptom at least 4-6 times a day. It takes at least 10 minutes for the symptoms to improve but they come back in a question of hours. The patient is not having any difficulty with ambulation but the more walking that she does the more discomfort that she develops. The same is applicable to her hands. Her appetite has improved, she has gained weight, she has some degree of constipation associated with chemotherapy administration relieved with flax seed oil. Her bowel function now is normal, urination is normal. She has not had any menstrual flow since 06/2022. She has not had any nausea or vomiting. No cardiovascular or respiratory issues.     The patient otherwise has not had any new problems. No fever or infections. She has had resolution of her epistaxis.   Surgical menopause with bilateral oophorectomy on 11/28/2022. Patient initiated Femara on 12/16/2022 for a total of 5 years. Baseline echocardiogram after completion of chemotherapy disclosed normal ejection fraction. We will proceed with radiological assessment of her chest, abdomen and pelvis baseline for the future and eventually she will proceed also with bone density test.            Past Medical History:   Diagnosis Date    Anesthesia complication      hypotension with last C section    Breast cancer 2022    left sided, stage 3, ER/MS+, Her2-, s/p mastectomy, LAST CHEMO 10/3/22, LAST RADIATION 2022    History of COVID-19     X2  - 2022    Hyperlipidemia     PONV (postoperative nausea and vomiting)     Seasonal allergies         Past Surgical History:   Procedure Laterality Date    APPENDECTOMY      BREAST BIOPSY Left     BREAST RECONSTRUCTION Left 2022    Procedure: LEFT BREAST 1ST STAGE RECONSTRUCTION WITH INSERTION OF TISSUE EXPANDER AND BIOLOGIC;  Surgeon: Hermelinda Johnson MD;  Location: [facility] OR;  Service: Plastics;  Laterality: Left;     SECTION       SECTION N/A 12/10/2018    Procedure:  SECTION REPEAT;  Surgeon: Heidy Gutierrez MD;  Location: [facility] LABOR DELIVERY;  Service: Obstetrics/Gynecology    D & C HYSTEROSCOPY N/A 2021    Procedure: HYSTEROSCOPY REMOVAL INTRAUTERINE DEVICE WILL NEED ULTRASOUND IN ROOM;  Surgeon: Heidy Gutierrez MD;  Location: [facility] OR OSC;  Service: Obstetrics/Gynecology;  Laterality: N/A;    DIAGNOSTIC LAPAROSCOPY Bilateral 2022    Procedure: DAVINCI ASSISTED BILATERAL SALPINGO-OOPHORECTOMY;  Surgeon: Heidy Gutierrez MD;  Location: [facility] OR;  Service: Robotics - DaVinci;  Laterality: Bilateral;    KNEE ARTHROSCOPY Right     MASTECTOMY W/ SENTINEL NODE BIOPSY Left 2022    Procedure: left total mastectomy with left axillary sentinel lymph node biopsy;  Surgeon: Monisha Escudero MD;  Location: [facility] OR;  Service: General;  Laterality: Left;    VENOUS ACCESS DEVICE (PORT) INSERTION Right 2022    Procedure: right port placement;  Surgeon: Monisha Escudero MD;  Location: [facility] OR;  Service: General;  Laterality: Right;    VENOUS ACCESS DEVICE (PORT) REMOVAL Right 3/15/2024    Procedure: RIGHT port removal,;  Surgeon: Monisha Escudero MD;  Location: [facility] OR;  Service: General;  Laterality: Right;         Current Outpatient Medications on File Prior to Visit   Medication Sig Dispense Refill    anastrozole (ARIMIDEX) 1 MG tablet Take 1 tablet by mouth Daily. (Patient taking differently: Take 1 tablet by mouth Every Night.) 90 tablet 1    atorvastatin (LIPITOR) 10 MG tablet Take 1 tablet by mouth daily. 90 tablet 2    fluticasone (FLONASE) 50 MCG/ACT nasal spray 1 spray into the nostril(s) as directed by provider 2 (Two) Times a Day. 9.9 mL 0    levocetirizine (XYZAL) 5 MG tablet Take 1 tablet by mouth Every Evening. 30 tablet 2    SUMAtriptan (IMITREX) 100 MG tablet Take 1 tablet by mouth 1 (One) Time As Needed for Migraine for up to 30 days. Take one tablet at onset of headache. May repeat dose one time in 2 hours if headache not relieved. 9 tablet 3    [DISCONTINUED] amoxicillin-clavulanate (AUGMENTIN) 875-125 MG per tablet Take 1 tablet by mouth 2 (Two) Times a Day. (Patient not taking: Reported on 6/19/2024) 14 tablet 0     No current facility-administered medications on file prior to visit.        ALLERGIES:    Allergies   Allergen Reactions    Asa [Aspirin] Anaphylaxis and Shortness Of Breath    Adhesive Tape Dermatitis     Paper tape okay    Iopamidol Hives and Cough     AKA IV CONTRAST    Ct Contrast Rash and Cough        Social History     Socioeconomic History    Marital status:    Tobacco Use    Smoking status: Never     Passive exposure: Never    Smokeless tobacco: Never   Vaping Use    Vaping status: Never Used   Substance and Sexual Activity    Alcohol use: Yes     Alcohol/week: 5.0 standard drinks of alcohol     Comment: OCCASINAL    Drug use: Never    Sexual activity: Yes     Partners: Male     Birth control/protection: Bilateral salpingectomy         Family History   Problem Relation Age of Onset    No Known Problems Mother     No Known Problems Father     No Known Problems Sister     No Known Problems Brother     No Known Problems Maternal Grandmother     Diabetes Maternal Grandfather      "No Known Problems Paternal Grandmother     No Known Problems Paternal Grandfather     Melanoma Paternal Aunt     Diabetes Other     Malig Hyperthermia Neg Hx     Colon cancer Neg Hx           Objective     Vitals:    06/19/24 0904   BP: 96/60   Pulse: 64   Resp: 16   Temp: 97.8 °F (36.6 °C)   TempSrc: Oral   SpO2: 100%   Weight: 56.5 kg (124 lb 9.6 oz)   Height: 157.5 cm (62.01\")   PainSc: 0-No pain           6/19/2024     9:04 AM   Current Status   ECOG score 0       Physical Exam                GENERAL:  Well-developed, Patient  in no acute distress.   SKIN:  Warm, dry ,NO purpura ,no rash.  HEENT:  Pupils were equal and reactive to light and accomodation, conjunctivae noninjected,  normal visual acuity.   NECK:  Supple with good range of motion; no thyromegaly , no JVD or bruits,.No carotid artery pain, no carotid abnormal pulsation   LYMPHATICS:  No cervical, NO supraclavicular, NO axillary, NO inguinal adenopathies.  CARDIAC   normal rate , regular rhythm, without murmur,NO rubs NO S3 NO S4   LUNGS: normal breath sounds bilateral, no wheezing, NO rhonchi, NO crackles ,NO rubs.  VASCULAR VENOUS: no cyanosis, NO collateral circulation, NO varicosities, NO edema, NO palpable cords, NO pain,NO erythema, NO pigmentation of the skin.  ABDOMEN:  Soft, NO pain,no hepatomegaly, no splenomegaly,no masses, no ascites, no collateral circulation,no distention.  EXTREMITIES  AND SPINE:  No clubbing, no cyanosis ,no deformities , no pain .No kyphosis,  no pain in spine, no pain in ribs , no pain in pelvic bone.  NEUROLOGICAL:  Patient was awake, alert, oriented to time, person and place.              RECENT LABS:  Hematology WBC   Date Value Ref Range Status   06/19/2024 4.33 3.40 - 10.80 10*3/mm3 Final   10/11/2023 4.6 3.4 - 10.8 x10E3/uL Final     RBC   Date Value Ref Range Status   06/19/2024 4.35 3.77 - 5.28 10*6/mm3 Final   10/11/2023 4.48 3.77 - 5.28 x10E6/uL Final     Hemoglobin   Date Value Ref Range Status "   06/19/2024 12.9 12.0 - 15.9 g/dL Final     Hematocrit   Date Value Ref Range Status   06/19/2024 38.4 34.0 - 46.6 % Final     Platelets   Date Value Ref Range Status   06/19/2024 185 140 - 450 10*3/mm3 Final       CBC:    WBC   Date Value Ref Range Status   06/19/2024 4.33 3.40 - 10.80 10*3/mm3 Final   10/11/2023 4.6 3.4 - 10.8 x10E3/uL Final     RBC   Date Value Ref Range Status   06/19/2024 4.35 3.77 - 5.28 10*6/mm3 Final   10/11/2023 4.48 3.77 - 5.28 x10E6/uL Final     Hemoglobin   Date Value Ref Range Status   06/19/2024 12.9 12.0 - 15.9 g/dL Final     Hematocrit   Date Value Ref Range Status   06/19/2024 38.4 34.0 - 46.6 % Final     MCV   Date Value Ref Range Status   06/19/2024 88.3 79.0 - 97.0 fL Final     MCH   Date Value Ref Range Status   06/19/2024 29.7 26.6 - 33.0 pg Final     MCHC   Date Value Ref Range Status   06/19/2024 33.6 31.5 - 35.7 g/dL Final     RDW   Date Value Ref Range Status   06/19/2024 11.9 (L) 12.3 - 15.4 % Final     RDW-SD   Date Value Ref Range Status   06/19/2024 38.3 37.0 - 54.0 fl Final     MPV   Date Value Ref Range Status   06/19/2024 10.6 6.0 - 12.0 fL Final     Platelets   Date Value Ref Range Status   06/19/2024 185 140 - 450 10*3/mm3 Final     Neutrophil %   Date Value Ref Range Status   06/19/2024 52.2 42.7 - 76.0 % Final     Lymphocyte %   Date Value Ref Range Status   06/19/2024 33.7 19.6 - 45.3 % Final     Monocyte %   Date Value Ref Range Status   06/19/2024 7.4 5.0 - 12.0 % Final     Eosinophil %   Date Value Ref Range Status   06/19/2024 5.8 0.3 - 6.2 % Final     Basophil %   Date Value Ref Range Status   06/19/2024 0.7 0.0 - 1.5 % Final     Immature Grans %   Date Value Ref Range Status   06/19/2024 0.2 0.0 - 0.5 % Final     Neutrophils, Absolute   Date Value Ref Range Status   06/19/2024 2.26 1.70 - 7.00 10*3/mm3 Final     Lymphocytes, Absolute   Date Value Ref Range Status   06/19/2024 1.46 0.70 - 3.10 10*3/mm3 Final     Monocytes, Absolute   Date Value Ref Range  Status   06/19/2024 0.32 0.10 - 0.90 10*3/mm3 Final     Eosinophils, Absolute   Date Value Ref Range Status   06/19/2024 0.25 0.00 - 0.40 10*3/mm3 Final     Basophils, Absolute   Date Value Ref Range Status   06/19/2024 0.03 0.00 - 0.20 10*3/mm3 Final     Immature Grans, Absolute   Date Value Ref Range Status   06/19/2024 0.01 0.00 - 0.05 10*3/mm3 Final     nRBC   Date Value Ref Range Status   06/19/2024 0.0 0.0 - 0.2 /100 WBC Final        CMP:    Glucose   Date Value Ref Range Status   06/19/2024 98 65 - 99 mg/dL Final     BUN   Date Value Ref Range Status   06/19/2024 11 6 - 20 mg/dL Final     Creatinine   Date Value Ref Range Status   06/19/2024 0.69 0.57 - 1.00 mg/dL Final     Sodium   Date Value Ref Range Status   06/19/2024 141 136 - 145 mmol/L Final     Potassium   Date Value Ref Range Status   06/19/2024 4.3 3.5 - 5.2 mmol/L Final     Comment:     Slight hemolysis detected by analyzer. Result may be falsely elevated.     Chloride   Date Value Ref Range Status   06/19/2024 104 98 - 107 mmol/L Final     CO2   Date Value Ref Range Status   06/19/2024 25.1 22.0 - 29.0 mmol/L Final     Calcium   Date Value Ref Range Status   06/19/2024 9.8 8.6 - 10.5 mg/dL Final     Total Protein   Date Value Ref Range Status   06/19/2024 7.4 6.0 - 8.5 g/dL Final     Albumin   Date Value Ref Range Status   06/19/2024 4.5 3.5 - 5.2 g/dL Final     ALT (SGPT)   Date Value Ref Range Status   06/19/2024 26 1 - 33 U/L Final     AST (SGOT)   Date Value Ref Range Status   06/19/2024 45 (H) 1 - 32 U/L Final     Alkaline Phosphatase   Date Value Ref Range Status   06/19/2024 142 (H) 39 - 117 U/L Final     Total Bilirubin   Date Value Ref Range Status   06/19/2024 0.4 0.0 - 1.2 mg/dL Final     Globulin   Date Value Ref Range Status   06/19/2024 2.9 gm/dL Final     A/G Ratio   Date Value Ref Range Status   06/19/2024 1.6 g/dL Final     BUN/Creatinine Ratio   Date Value Ref Range Status   06/19/2024 15.9 7.0 - 25.0 Final     Anion Gap   Date  Value Ref Range Status   06/19/2024 11.9 5.0 - 15.0 mmol/L Final                           Assessment & Plan     Diagnoses and all orders for this visit:    1. Malignant neoplasm of upper-outer quadrant of left breast in female, estrogen receptor positive (Primary)    2. Fitting and adjustment of vascular catheter    3. Neuropathy due to chemotherapeutic drug    4. B12 deficiency    5. Morning stiffness of joints    6. Peptic reflux esophagitis         In summary this 37-year-old  female originally from [place] noticed abnormalities in the left breast in 12/2021, sensation of pressure, minor pain, sensation of fullness. She looked for help. Further mammogram and ultrasound documented abnormalities in the left breast that were consistent with breast cancer. Since then the patient has undergone breast MRI. Breast biopsies documented invasive lobular carcinoma. All this culminated with a left-sided mastectomy and sentinel lymph node sampling. The pathology has been posted above. She had a lobular carcinoma, grade 2, margins of resection negative, Ki-67 at 39%, ER positive, NJ positive, HER2/leonie negative. She had 1 positive sentinel lymph node for malignancy with no extracapsular invasion. There was no evidence of lymphovascular invasion.     Invitae panel was negative for any genetic alteration.     The patient has had an expander placed at the mastectomy site. She has discomfort and pain after this was instillated with saline solution yesterday.     The other phenomenon that is happening to the patient is significant pain and discomfort in the inner aspect of the left arm. Sounds neuropathic related to her recent sentinel lymph node sampling. The patient is taking Aleve or ibuprofen with not too much benefit.     The patient also has a minimal component of anemia that will require assessment with ferritin, iron, TIBC, B12, folic acid levels.     RECOMMENDATIONS:  I have advised this patient and  today  that she needs to receive adjuvant chemotherapy in the form of AC x4 and subsequently Taxol x12. Oncotype DX in this patient shows intermediate risk of recurrence. Also not only the chemotherapy will be necessary but we need to make her postmenopausal in the next several weeks or months. This will bring in a lot of benefit for her in the long run to minimize any recurrent disease. She is extremely young. She has 2 young children and we have to do as much as we can in this patient to minimize any potentiality for recurrent disease.     I discussed briefly with her side effects of the treatment including alopecia, cardiac toxicity, anemia, leukopenia, thrombocytopenia, mucositis among others and peripheral neuropathy in the Taxol part of the treatment along with leukopenia, anemia, thrombocytopenia and allergic reaction to the medicine. I discussed with her that the whole treatment is going to take at least 24-26 weeks, in other words 6 months, and thereafter the patient will be hopefully postmenopausal. If she does not become postmenopausal she could qualify for a clinical trial or we could ask Dr. Heidy Gutierrez to do bilateral oophorectomy in this patient.     Eventually the patient will require endocrine adjuvant therapy after she becomes postmenopausal hopefully with letrozole.     I discussed all these facts with the patient and her . I spent 1 hour and 30 minutes with her going through the details of all this but she was able to grasp a lot of information. I appreciated highly the visit with her  and I pointed out to them that they need to work as a team. The patient has 2 young children. She is the only one taking care of the kids. The patient's mother is living with her, coming from [place] after all these issues. I asked her to ask her mother to stay for the period of time that she receives chemotherapy. If we need to do any services for her in regard to visa requirements or status, we will  be glad to incorporate our  into this process.     Finally, I discussed with the patient the need for her to proceed with cardiac assessment. I made a referral to be seen by Cardiology and scheduled her to have an echocardiogram and I sent her back to see Dr. Escudero in order to proceed with the placement of a port.     In order to control the pain in the inner aspect of her arm, I asked her to use Voltaren topically 4 times a day. It will be perfectly fine for her to use ibuprofen and I sent Neurontin 100 mg to take at bedtime. Hopefully this will be a transitory issue.     Dr. Johnson eventually will be in charge of deciding the time for the patient to initiate physical therapy for her left shoulder.     I reviewed all the pertinent records on this patient and I posted the information out of the pathology and radiology as above. I discussed the case with my partner, Dr. Landeros, who agrees with my plan of care.    Finally, I also posted to the patient today that will require followup in the future in regard to her right breast not only with mammogram but also ultrasound. I discussed with her the fact that sometimes lobular carcinoma likes to be a bilateral disease. There are minimal abnormalities in the mammogram in the right breast as well as in the MRI. This will require to be followed up in the future. I did not recommend a 2nd mastectomy prophylactically on her even though she asked the question today.     The patient was reviewed on 05/20/2022. She has had her port placed in right subclavian position a week ago. She is healing perfectly fine and her surgical site on the left mastectomy and the expander looks perfectly normal with healing. No infection, erythema or discharge.     The patient since then has undergone formal education and consent for chemotherapy administration. Her echocardiogram even though technically difficult documented a normal left ventricular ejection fraction. No pericardial  effusion and no valvular dysfunction.     Her white count, hemoglobin and platelets are normal today.     Her chemistry profile, CA 15-3 as well as ferritin, iron, TIBC, B12 and folic acid levels were all normal.     Therefore under the present circumstances I advised the patient and  the followin. For the treatment of her breast cancer the patient will initiate today chemotherapy with dose dense AC that she will receive every 2 weeks supported with Neulasta. The side effects of this already were discussed with her including nausea, vomiting, alopecia, anemia, leukopenia, thrombocytopenia and cardiac dysfunction among others. The patient was ready to proceed.   2. The patient will be utilizing Neulasta On-body to minimize hematological toxicity and be able to keep up on the schedule in regard to her treatment. I advised her that this can produce significant bone pain in the sternum, in the rib cage, in the pelvic bone and she could use antihistaminic that she is already taking in the first place and also she can use either Aleve or Tylenol or a hot shower if necessary.   3. I encouraged her to remain on her nausea medicine, ondansetron, during the next 3 days to minimize any nausea or vomiting.   4. I advised her to stay away from smelling foods like hot soup or fried foods in the next 3 days given the significant increase in smell that patients receiving AC typically have. This will minimize the potential for nausea and vomiting.   5. The patient will require toxicity assessment in a week along with blood WORK CBC.   6. The patient once completes 4 cycles of AC will initiate weekly cycles of Taxol x12. Upon completion of that the patient will initiate a plan of radiation therapy and she already has been seen by Radiation Oncology.     The patient will participate in a clinical trial that we have in the office and she already has been consented for this in regard to the utilization of antiestrogen  medicine upon completion of chemotherapy administration.     The patient will continue using her other medications on routine basis that include the Zyprexa and she will use the EMLA cream to minimize pain at the port site at the time of accessing.     Finally, I advised the patient and the  present in the room that they at any cost had to minimize the risk of pregnancy. In fact a pregnancy test today was negative. I advised them to use a condom.     The patient was ready to proceed.    All the discussion of all these issues took place in Indonesian.  The patient returned to the office on 05/26/2022. Since the previous visit a week ago the patient had chemotherapy administration and she developed very significant nausea and vomiting nonstoppable for almost 5 days. Yesterday was the first day that she was able to have some rice and potatoes. Because she has thrown up so much she has developed significant heartburn and indigestion indicating probably bile gastritis, esophagitis and acid esophagitis. The patient has not had any hematemesis or melena. The other phenomenon that she had was tremendous somnolence due to the use of Zyprexa at the dose of 5 mg every night. She spent almost 4 days in bed sleepy but still with nausea and vomiting.     On top of things, she has very significant pain in her muscles, in her thighs, in her scalp and in her pelvic bone associated with Neulasta use. She is taking Tylenol for this with some improvement.     The patient has been able to reach hydration since yesterday, drinking proper liquids, and she is urinating okay. Therefore, the patient has multiple issues and on top of that she has now leukopenia associated with chemotherapy. The hemoglobin is stable. The platelet count has dropped but is not dramatically or risky for bleeding.     Given all the above circumstances I proposed her the following changes in regard to plan of chemotherapy for the next cycle.     1. We will  modify her plan of chemotherapy, decreasing the doses for all the medications by 20%.   2. The patient will space out the chemotherapy treatments to every 3 weeks for the AC part of the treatment until completion of 4 cycles.   3. The patient will be supported with Neulasta injection. She will utilize Tylenol for pain.   4. The patient will continue using chemotherapy administration, anti-nausea medication and she will receive Emend on day 2 in the office and Emend on day 3 at home. Pharmacist will take care of this issue.   5. The patient will decrease Zyprexa from 5 mg a day to 2.5 mg in the evening to minimize somnolence.   6. The patient was advised to use omeprazole at the dose of 20 mg twice a day to minimize esophageal irritation associated with so much nausea and vomiting.   7. The patient will require still laboratory parameters the week after each one of the cycles of chemotherapy.     I feel the obligation to make all these changes; otherwise this patient will quit her treatment and that is the worst thing that could happen under the present scenario. She agreed with all the changes made. I explained to her all these changes in Swedish to make her life a little bit more comfortable and easy to handle. She understands at the time that she returns in 2 weeks she probably will start to develop alopecia and she already has discussed this with her mother who will help through the process of shaving her head.  On 06/23/2022, the patient had dramatic improvement in regard to nausea control after dose adjustment for the 2nd cycle of chemotherapy and also modification of the nausea regimen. We tried to deliver Compazine and Phenergan. These medicines were tremendously tolling on her in regard to confusion and inability to function. Ativan was the miracle medicine. Therefore for the next 2 cycles Zofran and Ativan will be the ideal combination and she will require also IV fluids on day 2 of each one of the next  cycles of AC.     In regard to pain associated with Neulasta, she required Tylenol around-the-clock for 4 days after each one of the treatments and now she has no discomfort whatsoever.     Today her white count is normal, hemoglobin improved to 10.7, platelet count with minor decrease, no clinical bleeding.    The patient feels substantially better. She is able to function at home. She is able to be with her family. She is able to attend her children and she has been able to walk 2 miles a day late in the evening. Encouraged her to continue doing this.     I suggested for her the followin. She will return in a week to proceed with cycle 3 of AC at the previous dose and supported with Neulasta.   2. She will be supported on day 2 with IV fluids and nausea medication.   3. We will discontinue Phenergan and/or Compazine and she will remain on Zofran and Ativan for nausea control.   4. She will continue PPI to minimize reflux symptomatology, still minimal problem but dramatically better than before.   5. She will use Milk of Magnesia for constipation. Another measure could be prunes and dates.     The patient will complete the total 4 cycles of AC and then will move into the Taxol arena. I pointed out to her that we will have the discussion about how to handle this medicine after the completion of the next 2 cycles.     She also raised the question in regard what to do upon completion of chemotherapy. I advised her that eventually she will have radiological assessment as a baseline and we will proceed with PATRICIA blood sampling for evaluation of minimal residual disease.     The patient was ready to proceed now that she feels dramatically better. What a difference it makes in regard to adjustment of medicines and trying to adjust nausea medication accordingly.  The patient returned to the office on 2022. In regard to her chemotherapy treatment, she is ready to proceed with her 4th or last cycle of AC today.  Her ANC is a little bit on the low side but the patient will be receiving Neulasta, therefore this will be taking care of this problem.     Her hemoglobin remains stable. This is anemia associated with chemotherapy administration. She also had a menstrual flow that was very abundant a few weeks ago. She is not taking any vitamins at this time and I have not advised to take any given concerning data about supplements in the background of adjuvant therapy for breast cancer.     In regard to the sensation of numbness in the left upper extremity arm, I think this is natural after mastectomy more than expected and I pointed out to her that this does not signify anything and does not require any therapy. This is extremely common in many women and it has the tendency to go away spontaneously.     In regard to the abnormality in the left hand there is no evidence of lymphedema. I think she could have a minimal element of tendonitis. I advised her to use topical anti-inflammatory medication or ice the hand. She has not received any IV sites for blood draws in this area.     In regard to future visits, the patient would like to continue her chemotherapy treatments on Mondays because her children will be going back to school and she wants to have a weekend with them as much as she can. I find no problem seeing her in 3 weeks from now on Monday to proceed with initiation of Taxol. I pointed out to her that Taxol will be easier to handle a lower dose, less toxicity in regard to hematological parameters and hopefully no issues in regard to neuropathy.     I also advised her that we will continue monitoring the status of menstrual flow upon completion of chemotherapy. She still has 12 Taxol treatments to go and we do not know if the chemotherapy treatment is going to put her in menopause or not. Depending on the final analysis of this upon completion of chemotherapy and before initiation of hormonal therapy, we will need to do  hormone levels.     Finally, the patient has had a lot of issues between her insurance company and the billing system at [facility]. I asked the billing system to give her a call and relate this to the insurance company. The specific question is about the cost and the utilization of PEGylated Neulasta or Neulasta On-body. Hopefully this will be happening to settle down the situation. Each one of these medicines have been billed in thousands of dollars and she already will complete 4 bills of this medication. She is extremely anxious about this situation. This is called economic toxicity of chemotherapy administration and we need to influence this situation as well on her to give her the benefit of settling down once and for all.    This patient already has enough stressors on the plate to add another one that is economical stressor of her treatment.  On 08/22/2022, the patient's white count, hemoglobin and platelets are stable. Tolerance to Taxol is much better than AC. She has some occasional nausea, some joint pain, some pruritus in the absence of any rash on her hands and her feet and I think this all is side effect of Taxol. I advised her to go ahead and proceed with her Taxol today and I advised her to go back to taking her Zyrtec on a regular basis to try to minimize any itching in her hands and her feet.     I also advised her to continue using Ativan that has been terrific in regard to control of nausea and vomiting and she can use 2-3 doses today and tomorrow to minimize the symptoms. It will be perfectly okay as well for her to take Tylenol tonight and tomorrow to minimize joint pain associated with Taxol utilization.     In regard to her epistaxis, it is very obvious that the nasal passages on the right side are different than the ones on the left. She has very significant deviation of the septum. This triggers airflow currents that are abnormal and capillarity in the anterior part of the septum that  is very abnormal and is the source of bleeding. If this continues she will require referral for ENT for cauterization. Her platelet count is normal.     The patient has not had any menstrual flow now for 2 months and will continue asking this question because theoretically the chemotherapy should make her menopausal. Upon completion of Taxol we will need to do endocrine testing in this regard.     Overall I think the patient is doing much better than before and she will be advised to continue her weekly Taxol along with weekly laboratory testing and weekly nurse practitioner visit and visit with me in 1 month.   On 09/12/2022 the patient continues complaining of significant sensation of itching and discomfort in the tips of her fingers and her toes to the point that she has difficulty doing housework and difficulty walking with this. This is a different way of manifestation of toxicity associated with Taxol administration. She probably has a grade 2 peripheral neuropathy associated with this. The symptoms are not permanent, they come and go but this will obligate me to adjust her Taxol dose today by 25-30%. She has been receiving 120 mg of Taxol per dose. Her dose from now on will be 90 mg.     The patient will receive a B12 injection today and I advised her to take B complex vitamin.    In regard to her anemia associated with chemotherapy administration she was advised by my partner, Mich Agudelo MD, last week to take oral iron therapy. Unfortunately the patient became ill in regard to constipation and cramping and she has stopped the iron therapy.     In regard to her leukopenia associated with chemotherapy administration her white count and ANC are appropriate today and she will be able to proceed with her Taxol administration today. She has not developed any fever or infection.     In regard to her reflux symptomatology, nausea and vomiting the symptoms pertinent to his are resolved now that she is no longer  receiving AC chemotherapy. She remains on Ativan and Compazine on prn basis and she remains on PPI that has corrected her acid issues altogether.    In regard how to monitor her peripheral neuropathy I asked her to call my nurse, Justine Abbott on weekly basis, notify her of the status of her symptoms and if they get any worse or permanent I think we will need to discontinue the Taxol permanently and move into the next stage of therapy. She will require visit with me in 3 weeks.     Duration of the video visit today 20 minutes.     On 10/03/2022 the patient came back to the office for follow up. Since the previous visit she called stating that she could not handle the peripheral neuropathy in her hands and her feet anymore because it is disrupting her lifestyle and keeping her from doing anything around the house and we discontinued the Taxol altogether. Since then she has had very significant recovery especially in her hands, still has discomfort in her feet and she is not able to walk long distances because of the stimulation of the activity in the plantar aspect of her feet triggers discomfort as well in the toes. A detailed neurological exam today disclosed no major abnormalities, temperature discrimination, pinprick vibration, reflexes, strength and so forth is very much unremarkable.    Today her white count, hemoglobin and platelets are normal.     From my point of view I advised the patient the following.     1. She has completed her plan of chemotherapy, we cannot push anymore Taxol on her given her sensory neuropathy that will become permanent.     2. I discussed with the patient options to make her postmenopausal either hormonal therapy goserelin for example or Lupron, another way to do this will be ovary removal. I have placed a phone call to talk with Heidy Gutierrez MD, the patient's Gynecologist to discuss this with her. My request to Dr. Gutierrez to perform bilateral oophorectomy. The patient is  in agreement with that. This with the need for the patient to initiate at some point her adjuvant hormonal therapy if the patient is postmenopausal in the next visit after oophorectomy she will initiate letrozole therapy 2.5 mg a day for the time being. If the patient rejects the possibility of an oophorectomy she will require pharmacological removal of ovarian function and she will require also further adjuvant hormonal therapy.    3. Finally she already has been seen by radiation oncology after her diagnosis. I made the referral for her to be seen by them because from my point of view she is ready for initiation of therapy at this point.     4. The patient also complains of fatigue. We will measure a TSH, ferritin, iron profile, reticulated hemoglobin today. We want to be sure that her iron stores and thyroid function are normal. I think this is leftover from chemotherapy administration.    5. The patient complains of foggy brain. Memory and ability to function is limited. It is very likely effect of chemotherapy administration on her. I asked her to trigger a lot of brain stimulation reading books, chatting with people, working on activities with the kids and doing more physical activity.     6. Port. The patient will require port maintenance for the time being. It will be flushed today and it will be reflushed again in 6 weeks when she returns.    7. I will continue monitoring laboratory parameters periodically.    8. I went ahead and obtained an estradiol, FSH and LH today. She has not had any menstrual flow since 06/2022.   On 10/31/2022 the patient believes that she is going to have menstrual flow this week. She has the sensation of the premenstrual syndrome. During the previous visit I measured estradiol, FSH, LH; those numbers were consistent with postmenopausal status but the patient believes that she is going to have menstrual flow very soon this week.     From my point of view the patient is already  undergoing radiation therapy for breast cancer. She already has completed 4 treatments and she expects to complete all her treatments by November 30th.    It has been impossible to talk with Dr. Heidy Gutierrez in regard to the need for this patient to have oophorectomy to make her postmenopausal and eventually initiate her hormonal therapy. I will talk with Dr. Gutierrez hopefully today and that way the patient is put on the schedule to have an oophorectomy in the 1st week in December after completion of radiation therapy and thereafter the patient will be able to initiate her adjuvant hormonal therapy with Femara. At that point also I would expect to proceed with radiological assessment after completion of her radiation treatment that will include a CT scan of the chest, abdomen and pelvis and a bone density test.     I pointed out to the patient that I would like for her to keep her port in place at least for a year. She agrees with that.     Otherwise, the patient has been given proper information by me in regard to physical activity to improve range of motion at the left shoulder that is not limiting according to my clinical examination on her today. She says that she has a little bit of pull and discomfort upon extension and elevation of her upper extremities expected from the surgery and the expander in the left chest wall.     Otherwise, the patient will return to see us back in a few weeks.    Her white count, hemoglobin and platelets are normal. Her chemistry profile is normal. Her hair is growing back. Her eyebrows, eyelashes are growing back and the patient feels substantially better from this point of view.  ADDENDUM: DISCUSSED WITH DR COBB WHO WILL PLAN BILATERAL OOPHORECTOMY IN FEW WEEKS    On 12/15/2022 the patient returned after having bilateral oophorectomy through laparoscopy on 11/28/2022. Therefore officially now she is considered postmenopausal. The surgical sites are healing fine and she  is going to follow up with Dr. Gutierrez in a few days. She has not encountered the need for any pain medication. Her bowel activity and urination are fine and she is eating better and functioning better at home. She has had near complete resolution of her neuropathy associated with Taxol administration and she has not had any consequences of anthracycline administration through her echocardiogram recently done.     Her clinical examination today is otherwise benign. Her heart function, lung function, abdominal function disclosed no abnormalities. Her white count, hemoglobin and platelets are fine still with minimal leukopenia and minimal anemia that will get over in the next several weeks. Considering that now the patient is postmenopausal I am going to go ahead and proceed with prescription of Femara at the dose of 2.5 mg a day. She will need to take this medicine for the next 5 years. I discussed with her side effects of the Femara including occasional joint pain that can be controlled with Tylenol and physical activity. Also this medicine can raise cholesterol level and I will obtain a new lipid profile baseline today. This will be repeated every 6 months.     Eventually the patient also will proceed with bone density. I made her aware that sometimes these medicines also can favor osteopenia or osteoporosis and we will need to request a bone density test more or less in 6 months from now.     The patient will proceed with radiological assessment of CT scan of the chest, abdomen and pelvis baseline after completion of all her therapy now and before initiation of the Femara. She will not require any further radiological assessment in the future unless that she has any clinical symptomatology to suggest any recurrence.     The patient will require visit with me in 6 weeks. On that occasion her port the week before will be flushed. We will keep the port for the next couple of years.     Finally the patient is very  sad because her father-in-law was diagnosed with colon cancer in [place] and they are going to need to travel to see him.     I discussed all these facts with nurse navigator in the room. Also the patient was advised to proceed with survivorship assessment the same day that she will be seeing me in 6 weeks.     We are glad that she completed all this and many things are behind. She is in remission now and she cried with lam about this issue today.  On 01/24/2023 the patient was further reviewed. Since the previous visit the patient initiated her Femara adjuvant therapy. Unfortunately the medicine is producing some achiness in her joints and I advised her to take a vitamin D supplement 1000 units a day and initiate a tablet of Aleve to be taken at the time that she goes to bed at nighttime. I will ask my nurse, Justine Abbott, to give her a call in a couple of weeks to see if this strategy is having a positive impact on her. If the pain continues, a strong suggestion would be to move her from Femara to Arimidex.     In the meantime we documented that the patient also after post oophorectomy had a very low level of estradiol and her FSH was elevated consistent with menopausal status.     The patient is not experiencing any hot flashes from menopause and she overall is functioning well at home and improving level of energy and activity for all sorts of things.     In the meantime she was seen by Cardio-Oncology. Her EKG and testing was appropriate and she continues taking cholesterol medicine. We will recheck lipid profile in 3 months and we will monitor this area as well in the future. She will require to be tested by Cardio-Oncology once a year.     From the point of view of her reconstruction of her left breast she will see her plastic surgeon at some point in 04/2023. From my point of view she can proceed with any leftover surgery necessary for this at any time after this period of time. Her radiation therapy side  effects are very much over and the patient should be able to undergo her radiation treatment.     In regard to her diet I encouraged her to continue a very healthy diet full of vegetables and fruit. She is going in that agenda anyway and I encouraged her to have a different consumption of red meat only once a week and try to have red meat of organic nature with walking cows. Encouraged her to have also some more vegetarian style meals that will be beneficial to her in the long run. It will be okay to have some occasional sweets here and there. She raised the question in regard to alcohol consumption in regard to a glass of wine here or there. I do not see any problems or difficulties with this whatsoever. She is welcome to have a minimal amount that does not need to become a situation for every day use. She understood this clearly.      From the point of view of fluidization for intercourse I encouraged them to use coconut oil. That will be beneficial for both of them. Any other lubricant will be acceptable.     From the point of view of her port the patient will continue having her port flushed every 6 weeks and she knows that she needs to maintain this for a total of 2 years. She is aware that a high risk of recurrence will happen in this period of time. This will require to be maintained.     From the point of view of vitamin consumption I asked her to take vitamin D 1000 units a day especially knowing that we have been through winter and we have no sun exposure. Eventually in the spring she will need to have some sun exposure like the rest of us that will be crucial in regard to proper vitamin D synthesis.     In regard to any other need for radiological assessment I find no need for this to happen again unless that clinical characteristics change at some point in the future. We will proceed with a CT scan of the chest, abdomen and pelvis. I personally reviewed this showing no evidence of metastasis at any level  in the lungs, liver, bones or any other anatomical site. The spleen, pancreas, kidneys, intestine were all unremarkable. Cardiac anatomy was normal. Pulmonary anatomy was normal with minimal granuloma formation in the right lung.     Therefore, I gave her the good news in regard to this exit CT scan that hopefully will not be required to be done again in the future.     The patient also has been seen by JACQUELIN Xiong, in regard to information pertinent to her future follow ups and plan of care in the past and the future.     I discussed all these facts with the patient and the  in the room.  On 04/03/2023 in spite of switching her from Femara to Arimidex many weeks ago and giving her a break from any aromatase inhibitor that decreased the amount of joint pain the patient continues having joint pain. 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 She has morning stiffness that lasts for 2-3 hours. Most of the joint pain is in the distal interphalangeal joints.     Given the persistency of these symptoms raises the question in absence of any family history of osteoarthritis, lupus, rheumatoid arthritis if she has some other modality of arthritis that has nothing to do with her aromatase inhibitor medication. I went ahead and scheduled her to have an x-ray of both hands, sent her back to the lab to obtain a rheumatoid factor, MISAEL, sedimentation rate, C-reactive protein and CCP antibodies.     Once that all of this information is available I will further discuss with her how to proceed. Independent of all of this eventually she will require a referral to have a deQuervain tendonitis injection anyway.     I encouraged her to use 2 Aleve to be taken at nighttime and she knows that she needs to take this with some food in the stomach to minimize any gastric  irritation. She will remain on PPI for the time being.     I advised her to remain on her Arimidex but maybe in the long run I will need to switch this medicine to Aromasin, we have to see. Maybe also in the long run if these aromatase inhibitors are not the medicines for her we will need to go back onto tamoxifen or Evista in the adjuvant setting.     In the meantime the patient will continue her Arimidex 1 mg a day. I advised her to try foods that contain pigments that could be antiinflammatory and advised her to try to minimize the consumption of carbohydrates and that way she minimizes weight gain associated with menopause.     So far I find no evidence of breast cancer recurrence on her and she has not completed all of her left breast reconstruction. Appointment will be made for her to complete this at some point in the summer.     Patient seen back 6/26/2023 in follow-up having seen Dr. CARR with hand surgery and injections performed which is greatly improved her range of motion.  Patient reports she is has full functionality of her hands at this point.  She continues on Aromasin and states she does have mild achiness in her knees but this is manageable.  She has not utilizing any Aleve currently.  We did discuss the possibility of using tart cherry concentrate 1 ounce every night before bed to see if this helps with any arthralgias in her knees on the aromatase inhibitors.  Her weight is stable today from the last visit.  She will return in 6 weeks for port flush and then again in 3 months with Dr. Palacios for review.  On 10/16/2023 the patient returns to the office. The clinical examination is remarkable for a good cosmetic reconstruction for both breasts. This happened 6 weeks ago and she is still healing and having an element of pain and discomfort. She has had nipple sparing on the right side and she will have tattoo nipple on left side. This eventually will happen in the next several months. Encouraged her  to do more activity with her shoulders to minimize frozen shoulders. I insisted in regard to physical activity to do and showed her some exercises.     In regard to her breast cancer per se, I do not see anything to suggest recurrent disease clinically or by symptomatology. Her exam is very benign. The liver is not palpable. The patient has gained back her weight that she had before chemotherapy initiation. She feels well in the way that she has right now and is stable.     Arimidex tolerance is much better than Femara. Still having occasional pain the MPs and PIPs in both thumbs. There is minimal deformity suggesting maybe an element of osteoarthritis especially in the MP joints. I encouraged her to use topical Voltaren for this. In the meantime, I advised her to continue Arimidex for which she has 100% compliance, 1 mg a day. I would like for her to come and see us back in 4 months.     Her white count, hemoglobin and platelets are normal. Her white count differential is normal. Her chemistry profile discloses a normal blood sugar, electrolytes. Her alkaline phosphatase is 120. Recent laboratory evaluation disclosed alkaline phosphatases as high as in the 130s. It is very likely that the recovery of the weight has triggered a little bit of fatty liver that is now improving given the fact that she has gained more mobility. I do not believe that we need to do any other testing and I do not believe she needs to have radiological assessment of her liver.     I went ahead and ordered a CA 15-3 for completeness anyway.     In regard to her bone density, she has had some decrease in bone mass but no osteopenia. I encouraged her to walk at least 7500 steps a day, continue taking her vitamin D. Dancing and high impact exercise will be applicable once that she is able to do this after the recent surgery.     I will see her back in 4 months with repeat CBC, CMP. We will monitor again the alkaline phosphatase.  On 02/06/2024  the patient has been reviewed. She has no symptoms or signs of breast cancer recurrence. She remains on Arimidex adjuvant therapy and this medicine gives her minimal degree of joint discomfort. Mostly the discomfort happens early in the morning, goes away in question of 1/2 hour. She has no functional limitation otherwise. She has no pain in any other joints, shoulders, elbows, wrists, knees, hips or ankles. No back pain. The clinical examination is very much benign. Her white count, hemoglobin and platelets are normal, her chemistry profile is normal.     I do believe that from the point of view of her breast cancer her adjuvant therapy is still ongoing and it will be perfectly fine for her to remain on her Arimidex 1 mg a day. I do believe that she has had enough time with her port and it will be perfectly fine for the port to be removed by, Monisha Escudero MD, in the close future. No more port flushes will be scheduled at this time.     From the point of view of her arthritis I asked her to take still some Tylenol arthritis on prn basis and be sure that she takes her vitamin D supplement.     For her recent migraine issues I advised her to take some Tylenol along with caffeine and I went ahead and scheduled her an appointment to be seen by neurology to further document how to handle migraines with maintenance medicine for avoidance of the attacks and also medicines for acute status.     I asked her to pay attention to triggering factors that could play a role into this issue.    We will review her back in 4 months with repeat CBC and CMP.    Otherwise no other issues pertinent to her care at this point.  On 06/19/2024, the patient returned to the office in company of her 2 children. They are tammi. In any event the patient is looking terrific, she feels well. She is able to do all the house chores. She has no alteration in performance status, minimal joint pain if any, complete compliance with Arimidex 1 mg  a day, and no new migraines. She has been seen by Neurology as per my recommendation and medication has been provided.     In any event, the patient remains on very small amount of medicine. I encouraged her to remain on her anastrozole 1 mg a day and she will be returning to see us back in 6 months.     On that occasion, she will have a CBC, CMP, CA 15-3. I find no need for radiological assessment on her at this point.

## 2024-06-20 ENCOUNTER — TELEPHONE (OUTPATIENT)
Dept: ONCOLOGY | Facility: CLINIC | Age: 39
End: 2024-06-20
Payer: COMMERCIAL

## 2024-06-20 DIAGNOSIS — R74.8 ELEVATED LIVER ENZYMES: ICD-10-CM

## 2024-06-20 DIAGNOSIS — C50.412 MALIGNANT NEOPLASM OF UPPER-OUTER QUADRANT OF LEFT BREAST IN FEMALE, ESTROGEN RECEPTOR POSITIVE: Primary | ICD-10-CM

## 2024-06-20 DIAGNOSIS — Z17.0 MALIGNANT NEOPLASM OF UPPER-OUTER QUADRANT OF LEFT BREAST IN FEMALE, ESTROGEN RECEPTOR POSITIVE: Primary | ICD-10-CM

## 2024-06-20 NOTE — TELEPHONE ENCOUNTER
----- Message from Chau Palacios sent at 6/19/2024  5:49 PM EDT -----  Call her cancer marker is normal, her liver function up some ask her to have cmp in 5 weeks after she returns from [place] and np to review

## 2024-06-20 NOTE — TELEPHONE ENCOUNTER
Called patient and relayed message. Pt v/u and will eliminate alcohol from her diet for now. Lab orders placed and message sent to scheduling. Chelsea Abbott RN

## 2024-07-08 RX ORDER — ANASTROZOLE 1 MG/1
1 TABLET ORAL DAILY
Qty: 90 TABLET | Refills: 3 | Status: SHIPPED | OUTPATIENT
Start: 2024-07-08

## 2024-07-25 ENCOUNTER — LAB (OUTPATIENT)
Dept: LAB | Facility: HOSPITAL | Age: 39
End: 2024-07-25
Payer: COMMERCIAL

## 2024-07-25 DIAGNOSIS — R74.8 ELEVATED LIVER ENZYMES: ICD-10-CM

## 2024-07-25 DIAGNOSIS — C50.412 MALIGNANT NEOPLASM OF UPPER-OUTER QUADRANT OF LEFT BREAST IN FEMALE, ESTROGEN RECEPTOR POSITIVE: ICD-10-CM

## 2024-07-25 DIAGNOSIS — Z17.0 MALIGNANT NEOPLASM OF UPPER-OUTER QUADRANT OF LEFT BREAST IN FEMALE, ESTROGEN RECEPTOR POSITIVE: ICD-10-CM

## 2024-07-25 LAB
ALBUMIN SERPL-MCNC: 4.5 G/DL (ref 3.5–5.2)
ALBUMIN/GLOB SERPL: 1.5 G/DL
ALP SERPL-CCNC: 143 U/L (ref 39–117)
ALT SERPL W P-5'-P-CCNC: 20 U/L (ref 1–33)
ANION GAP SERPL CALCULATED.3IONS-SCNC: 9.1 MMOL/L (ref 5–15)
AST SERPL-CCNC: 35 U/L (ref 1–32)
BILIRUB SERPL-MCNC: 0.3 MG/DL (ref 0–1.2)
BUN SERPL-MCNC: 15 MG/DL (ref 6–20)
BUN/CREAT SERPL: 23.4 (ref 7–25)
CALCIUM SPEC-SCNC: 9.8 MG/DL (ref 8.6–10.5)
CHLORIDE SERPL-SCNC: 103 MMOL/L (ref 98–107)
CO2 SERPL-SCNC: 27.9 MMOL/L (ref 22–29)
CREAT SERPL-MCNC: 0.64 MG/DL (ref 0.57–1)
EGFRCR SERPLBLD CKD-EPI 2021: 114.7 ML/MIN/1.73
GLOBULIN UR ELPH-MCNC: 3.1 GM/DL
GLUCOSE SERPL-MCNC: 71 MG/DL (ref 65–99)
POTASSIUM SERPL-SCNC: 4 MMOL/L (ref 3.5–5.2)
PROT SERPL-MCNC: 7.6 G/DL (ref 6–8.5)
SODIUM SERPL-SCNC: 140 MMOL/L (ref 136–145)

## 2024-07-25 PROCEDURE — 36415 COLL VENOUS BLD VENIPUNCTURE: CPT

## 2024-07-25 PROCEDURE — 80053 COMPREHEN METABOLIC PANEL: CPT

## 2024-07-26 ENCOUNTER — TELEPHONE (OUTPATIENT)
Dept: ONCOLOGY | Facility: CLINIC | Age: 39
End: 2024-07-26
Payer: COMMERCIAL

## 2024-07-26 DIAGNOSIS — Z17.0 MALIGNANT NEOPLASM OF UPPER-OUTER QUADRANT OF LEFT BREAST IN FEMALE, ESTROGEN RECEPTOR POSITIVE: Primary | ICD-10-CM

## 2024-07-26 DIAGNOSIS — C50.412 MALIGNANT NEOPLASM OF UPPER-OUTER QUADRANT OF LEFT BREAST IN FEMALE, ESTROGEN RECEPTOR POSITIVE: Primary | ICD-10-CM

## 2024-07-26 NOTE — TELEPHONE ENCOUNTER
Caller: Maribell Barber    Relationship: Self    Best call back number: [phone]    Caller requesting test results: LABS    What test was performed: BLOOD TEST    When was the test performed: 7/25/2024    Where was the test performed: [facility]    Additional notes: CALL PATIENT TO GO OVER LAB RESULTS

## 2024-07-26 NOTE — TELEPHONE ENCOUNTER
Returned call to Maribell.  Reviewed CMP from  yesterdays lab draw. Advised her that her liver enzymes have improved.  Will however review with Dr Adams next week upon her return to inquire if she needs to have this repeated prior to her follow up in December.  She voiced understanding.

## 2024-07-29 ENCOUNTER — TELEPHONE (OUTPATIENT)
Dept: ONCOLOGY | Facility: CLINIC | Age: 39
End: 2024-07-29
Payer: COMMERCIAL

## 2024-07-29 NOTE — TELEPHONE ENCOUNTER
----- Message from Emily VASQUEZ sent at 7/29/2024  4:08 PM EDT -----  Please schedule UMANG, RN review in 2 months.  She is aware    Thank you

## 2024-07-29 NOTE — TELEPHONE ENCOUNTER
Follow up call to Maribell.  Advised her Dr Adams recommended rechecking her liver enzymes in 2 months.  She voiced understanding.  Scheduling notified.

## 2024-08-16 ENCOUNTER — TELEPHONE (OUTPATIENT)
Dept: ONCOLOGY | Facility: CLINIC | Age: 39
End: 2024-08-16
Payer: COMMERCIAL

## 2024-08-16 NOTE — TELEPHONE ENCOUNTER
Called patient, said that she felt a lump in her right breast earlier this week. Says that is hard and not movable.

## 2024-08-16 NOTE — TELEPHONE ENCOUNTER
Provider: Philip  Caller: patient  Relationship to Patient: self  Call Back Phone Number: [phone]  Reason for Call: Pt has spot of concern on her breast.

## 2024-09-03 ENCOUNTER — TELEPHONE (OUTPATIENT)
Dept: ONCOLOGY | Facility: CLINIC | Age: 39
End: 2024-09-03
Payer: COMMERCIAL

## 2024-09-03 NOTE — TELEPHONE ENCOUNTER
Caller: Maribell Barber    Relationship to patient: Self    Best call back number: [phone]    Chief complaint: CANC. - [place]/S - [place] OF TOWN    Type of visit: LAB & F/U 2    Requested date: ANYTIME AFTER 9/6/24 BUT NOT THE WEEK OF 9/22 -9/28     If rescheduling, when is the original appointment: 9/6/24

## 2024-09-16 ENCOUNTER — OFFICE VISIT (OUTPATIENT)
Dept: ONCOLOGY | Facility: CLINIC | Age: 39
End: 2024-09-16
Payer: COMMERCIAL

## 2024-09-16 ENCOUNTER — LAB (OUTPATIENT)
Dept: LAB | Facility: HOSPITAL | Age: 39
End: 2024-09-16
Payer: COMMERCIAL

## 2024-09-16 VITALS
SYSTOLIC BLOOD PRESSURE: 108 MMHG | BODY MASS INDEX: 22.62 KG/M2 | RESPIRATION RATE: 16 BRPM | OXYGEN SATURATION: 100 % | DIASTOLIC BLOOD PRESSURE: 74 MMHG | HEART RATE: 64 BPM | WEIGHT: 122.9 LBS | TEMPERATURE: 98 F | HEIGHT: 62 IN

## 2024-09-16 DIAGNOSIS — C50.412 MALIGNANT NEOPLASM OF UPPER-OUTER QUADRANT OF LEFT BREAST IN FEMALE, ESTROGEN RECEPTOR POSITIVE: Primary | ICD-10-CM

## 2024-09-16 DIAGNOSIS — N63.10 MASS OF RIGHT BREAST, UNSPECIFIED QUADRANT: ICD-10-CM

## 2024-09-16 DIAGNOSIS — C50.412 MALIGNANT NEOPLASM OF UPPER-OUTER QUADRANT OF LEFT BREAST IN FEMALE, ESTROGEN RECEPTOR POSITIVE: ICD-10-CM

## 2024-09-16 DIAGNOSIS — Z17.0 MALIGNANT NEOPLASM OF UPPER-OUTER QUADRANT OF LEFT BREAST IN FEMALE, ESTROGEN RECEPTOR POSITIVE: Primary | ICD-10-CM

## 2024-09-16 DIAGNOSIS — Z17.0 MALIGNANT NEOPLASM OF UPPER-OUTER QUADRANT OF LEFT BREAST IN FEMALE, ESTROGEN RECEPTOR POSITIVE: ICD-10-CM

## 2024-09-16 DIAGNOSIS — Z79.811 AROMATASE INHIBITOR USE: ICD-10-CM

## 2024-09-16 LAB
ALBUMIN SERPL-MCNC: 4.5 G/DL (ref 3.5–5.2)
ALBUMIN/GLOB SERPL: 1.5 G/DL
ALP SERPL-CCNC: 136 U/L (ref 39–117)
ALT SERPL W P-5'-P-CCNC: 21 U/L (ref 1–33)
ANION GAP SERPL CALCULATED.3IONS-SCNC: 12.6 MMOL/L (ref 5–15)
AST SERPL-CCNC: 33 U/L (ref 1–32)
BILIRUB SERPL-MCNC: 0.3 MG/DL (ref 0–1.2)
BUN SERPL-MCNC: 12 MG/DL (ref 6–20)
BUN/CREAT SERPL: 17.9 (ref 7–25)
CALCIUM SPEC-SCNC: 9.7 MG/DL (ref 8.6–10.5)
CHLORIDE SERPL-SCNC: 102 MMOL/L (ref 98–107)
CO2 SERPL-SCNC: 26.4 MMOL/L (ref 22–29)
CREAT SERPL-MCNC: 0.67 MG/DL (ref 0.57–1)
EGFRCR SERPLBLD CKD-EPI 2021: 113.5 ML/MIN/1.73
GLOBULIN UR ELPH-MCNC: 3 GM/DL
GLUCOSE SERPL-MCNC: 82 MG/DL (ref 65–99)
POTASSIUM SERPL-SCNC: 3.9 MMOL/L (ref 3.5–5.2)
PROT SERPL-MCNC: 7.5 G/DL (ref 6–8.5)
SODIUM SERPL-SCNC: 141 MMOL/L (ref 136–145)

## 2024-09-16 PROCEDURE — 36415 COLL VENOUS BLD VENIPUNCTURE: CPT

## 2024-09-16 PROCEDURE — 80053 COMPREHEN METABOLIC PANEL: CPT

## 2024-09-16 PROCEDURE — 99215 OFFICE O/P EST HI 40 MIN: CPT | Performed by: NURSE PRACTITIONER

## 2024-09-18 ENCOUNTER — APPOINTMENT (OUTPATIENT)
Dept: WOMENS IMAGING | Facility: HOSPITAL | Age: 39
End: 2024-09-18
Payer: COMMERCIAL

## 2024-09-18 PROCEDURE — 77065 DX MAMMO INCL CAD UNI: CPT | Performed by: RADIOLOGY

## 2024-09-18 PROCEDURE — 77061 BREAST TOMOSYNTHESIS UNI: CPT | Performed by: RADIOLOGY

## 2024-09-18 PROCEDURE — 76642 ULTRASOUND BREAST LIMITED: CPT | Performed by: RADIOLOGY

## 2024-09-18 PROCEDURE — G0279 TOMOSYNTHESIS, MAMMO: HCPCS | Performed by: RADIOLOGY

## 2024-09-24 ENCOUNTER — TELEPHONE (OUTPATIENT)
Dept: ONCOLOGY | Facility: CLINIC | Age: 39
End: 2024-09-24
Payer: COMMERCIAL

## 2024-10-02 ENCOUNTER — CLINICAL SUPPORT (OUTPATIENT)
Dept: ONCOLOGY | Facility: HOSPITAL | Age: 39
End: 2024-10-02
Payer: COMMERCIAL

## 2024-10-02 ENCOUNTER — LAB (OUTPATIENT)
Dept: LAB | Facility: HOSPITAL | Age: 39
End: 2024-10-02
Payer: COMMERCIAL

## 2024-10-02 DIAGNOSIS — Z17.0 MALIGNANT NEOPLASM OF UPPER-OUTER QUADRANT OF LEFT BREAST IN FEMALE, ESTROGEN RECEPTOR POSITIVE: ICD-10-CM

## 2024-10-02 DIAGNOSIS — T45.1X5A NEUROPATHY DUE TO CHEMOTHERAPEUTIC DRUG: ICD-10-CM

## 2024-10-02 DIAGNOSIS — Z45.2 FITTING AND ADJUSTMENT OF VASCULAR CATHETER: ICD-10-CM

## 2024-10-02 DIAGNOSIS — K21.00 PEPTIC REFLUX ESOPHAGITIS: ICD-10-CM

## 2024-10-02 DIAGNOSIS — M25.60 MORNING STIFFNESS OF JOINTS: ICD-10-CM

## 2024-10-02 DIAGNOSIS — G62.0 NEUROPATHY DUE TO CHEMOTHERAPEUTIC DRUG: ICD-10-CM

## 2024-10-02 DIAGNOSIS — E53.8 B12 DEFICIENCY: ICD-10-CM

## 2024-10-02 DIAGNOSIS — C50.412 MALIGNANT NEOPLASM OF UPPER-OUTER QUADRANT OF LEFT BREAST IN FEMALE, ESTROGEN RECEPTOR POSITIVE: ICD-10-CM

## 2024-10-02 LAB
ALBUMIN SERPL-MCNC: 4.7 G/DL (ref 3.5–5.2)
ALBUMIN/GLOB SERPL: 1.7 G/DL
ALP SERPL-CCNC: 130 U/L (ref 39–117)
ALT SERPL W P-5'-P-CCNC: 19 U/L (ref 1–33)
ANION GAP SERPL CALCULATED.3IONS-SCNC: 11.4 MMOL/L (ref 5–15)
AST SERPL-CCNC: 33 U/L (ref 1–32)
BILIRUB SERPL-MCNC: 0.4 MG/DL (ref 0–1.2)
BUN SERPL-MCNC: 16 MG/DL (ref 6–20)
BUN/CREAT SERPL: 25 (ref 7–25)
CALCIUM SPEC-SCNC: 9.5 MG/DL (ref 8.6–10.5)
CHLORIDE SERPL-SCNC: 103 MMOL/L (ref 98–107)
CO2 SERPL-SCNC: 25.6 MMOL/L (ref 22–29)
CREAT SERPL-MCNC: 0.64 MG/DL (ref 0.57–1)
EGFRCR SERPLBLD CKD-EPI 2021: 114.7 ML/MIN/1.73
GLOBULIN UR ELPH-MCNC: 2.8 GM/DL
GLUCOSE SERPL-MCNC: 62 MG/DL (ref 65–99)
POTASSIUM SERPL-SCNC: 3.8 MMOL/L (ref 3.5–5.2)
PROT SERPL-MCNC: 7.5 G/DL (ref 6–8.5)
SODIUM SERPL-SCNC: 140 MMOL/L (ref 136–145)

## 2024-10-02 PROCEDURE — 80053 COMPREHEN METABOLIC PANEL: CPT

## 2024-10-02 PROCEDURE — 36415 COLL VENOUS BLD VENIPUNCTURE: CPT

## 2024-10-04 DIAGNOSIS — C50.412 MALIGNANT NEOPLASM OF UPPER-OUTER QUADRANT OF LEFT BREAST IN FEMALE, ESTROGEN RECEPTOR POSITIVE: Primary | ICD-10-CM

## 2024-10-04 DIAGNOSIS — Z79.811 AROMATASE INHIBITOR USE: ICD-10-CM

## 2024-10-04 DIAGNOSIS — Z17.0 MALIGNANT NEOPLASM OF UPPER-OUTER QUADRANT OF LEFT BREAST IN FEMALE, ESTROGEN RECEPTOR POSITIVE: Primary | ICD-10-CM

## 2024-10-14 ENCOUNTER — OFFICE VISIT (OUTPATIENT)
Dept: FAMILY MEDICINE CLINIC | Facility: CLINIC | Age: 39
End: 2024-10-14
Payer: COMMERCIAL

## 2024-10-14 VITALS
OXYGEN SATURATION: 98 % | RESPIRATION RATE: 16 BRPM | SYSTOLIC BLOOD PRESSURE: 90 MMHG | DIASTOLIC BLOOD PRESSURE: 60 MMHG | HEART RATE: 70 BPM | BODY MASS INDEX: 22.89 KG/M2 | TEMPERATURE: 98.6 F | HEIGHT: 62 IN | WEIGHT: 124.4 LBS

## 2024-10-14 DIAGNOSIS — Z23 IMMUNIZATION DUE: ICD-10-CM

## 2024-10-14 DIAGNOSIS — E78.5 HYPERLIPIDEMIA, UNSPECIFIED HYPERLIPIDEMIA TYPE: ICD-10-CM

## 2024-10-14 DIAGNOSIS — Z00.00 ANNUAL PHYSICAL EXAM: Primary | ICD-10-CM

## 2024-10-14 LAB
BASOPHILS # BLD AUTO: 0.02 10*3/MM3 (ref 0–0.2)
BASOPHILS NFR BLD AUTO: 0.4 % (ref 0–1.5)
CHOLEST SERPL-MCNC: 165 MG/DL (ref 0–200)
EOSINOPHIL # BLD AUTO: 0.23 10*3/MM3 (ref 0–0.4)
EOSINOPHIL NFR BLD AUTO: 4.5 % (ref 0.3–6.2)
ERYTHROCYTE [DISTWIDTH] IN BLOOD BY AUTOMATED COUNT: 12.2 % (ref 12.3–15.4)
HCT VFR BLD AUTO: 39.2 % (ref 34–46.6)
HDLC SERPL-MCNC: 73 MG/DL (ref 40–60)
HGB BLD-MCNC: 12.6 G/DL (ref 12–15.9)
IMM GRANULOCYTES # BLD AUTO: 0.02 10*3/MM3 (ref 0–0.05)
IMM GRANULOCYTES NFR BLD AUTO: 0.4 % (ref 0–0.5)
LDLC SERPL CALC-MCNC: 80 MG/DL (ref 0–100)
LYMPHOCYTES # BLD AUTO: 1.63 10*3/MM3 (ref 0.7–3.1)
LYMPHOCYTES NFR BLD AUTO: 32.2 % (ref 19.6–45.3)
MCH RBC QN AUTO: 29 PG (ref 26.6–33)
MCHC RBC AUTO-ENTMCNC: 32.1 G/DL (ref 31.5–35.7)
MCV RBC AUTO: 90.3 FL (ref 79–97)
MONOCYTES # BLD AUTO: 0.4 10*3/MM3 (ref 0.1–0.9)
MONOCYTES NFR BLD AUTO: 7.9 % (ref 5–12)
NEUTROPHILS # BLD AUTO: 2.76 10*3/MM3 (ref 1.7–7)
NEUTROPHILS NFR BLD AUTO: 54.6 % (ref 42.7–76)
NRBC BLD AUTO-RTO: 0 /100 WBC (ref 0–0.2)
PLATELET # BLD AUTO: 184 10*3/MM3 (ref 140–450)
RBC # BLD AUTO: 4.34 10*6/MM3 (ref 3.77–5.28)
TRIGL SERPL-MCNC: 62 MG/DL (ref 0–150)
TSH SERPL DL<=0.005 MIU/L-ACNC: 1.76 UIU/ML (ref 0.27–4.2)
VLDLC SERPL CALC-MCNC: 12 MG/DL (ref 5–40)
WBC # BLD AUTO: 5.06 10*3/MM3 (ref 3.4–10.8)

## 2024-10-14 PROCEDURE — 90677 PCV20 VACCINE IM: CPT | Performed by: FAMILY MEDICINE

## 2024-10-14 PROCEDURE — 99396 PREV VISIT EST AGE 40-64: CPT | Performed by: FAMILY MEDICINE

## 2024-10-14 PROCEDURE — 90471 IMMUNIZATION ADMIN: CPT | Performed by: FAMILY MEDICINE

## 2024-10-14 RX ORDER — LEVOCETIRIZINE DIHYDROCHLORIDE 5 MG/1
5 TABLET, FILM COATED ORAL EVERY EVENING
Qty: 30 TABLET | Refills: 2 | Status: SHIPPED | OUTPATIENT
Start: 2024-10-14

## 2024-10-14 NOTE — PROGRESS NOTES
Subjective   Maribell Romero is a 39 y.o. female who presents for annual female wellness exam.  Chief Complaint   Patient presents with    Annual Exam     Wants labs done.     Med Refill     For allergies        Menstrual History: Postmenopausal  Pregnancy History:   Sexual History: with  only  Contraception: Salpingo-oophorectomy  Hormone Replacement Therapy: Anastrozole  Diet: balamced home cocked  Exercise: yes  Do you feel safe? yes    Mammogram: 23  Pap Smear: 10/6/23  Bone Density: 10/6/23  Colon Cancer Screening: none      Patient has history of left breast lobular carcinoma ER and ID positive, HER negative s/p mastectomy with reconstruction and chemotherapy and right breast mastopexy currently on anastrozole, following with OB/GYN and hematology oncology.    Patient has history of hyperlipidemia, currently on Lipitor 10 mg.  No available labs before she was started on Lipitor.   Patient declines acute complaints today    Immunization History   Administered Date(s) Administered    COVID-19 (MODERNA) 1st,2nd,3rd Dose Monovalent 2021, 2021    COVID-19 (MODERNA) Monovalent Original Booster 2021    Flu Vaccine Split Quad 2018    Tdap 2018       The following portions of the patient's history were reviewed and updated as appropriate: allergies, current medications, past family history, past medical history, past social history, past surgical history and problem list.    Past Medical History:   Diagnosis Date    Anesthesia complication     hypotension with last C section    Breast cancer 2022    left sided, stage 3, ER/ID+, Her2-, s/p mastectomy, LAST CHEMO 10/3/22, LAST RADIATION 2022    History of COVID-19     X2  - 2022    Hyperlipidemia     PONV (postoperative nausea and vomiting)     Seasonal allergies        Past Surgical History:   Procedure Laterality Date    APPENDECTOMY      BREAST BIOPSY Left     BREAST RECONSTRUCTION Left  2022    Procedure: LEFT BREAST 1ST STAGE RECONSTRUCTION WITH INSERTION OF TISSUE EXPANDER AND BIOLOGIC;  Surgeon: Hermelinda Johnson MD;  Location: [facility] OR;  Service: Plastics;  Laterality: Left;     SECTION       SECTION N/A 12/10/2018    Procedure:  SECTION REPEAT;  Surgeon: Heidy Gutierrez MD;  Location: [facility] LABOR DELIVERY;  Service: Obstetrics/Gynecology    D & C HYSTEROSCOPY N/A 2021    Procedure: HYSTEROSCOPY REMOVAL INTRAUTERINE DEVICE WILL NEED ULTRASOUND IN ROOM;  Surgeon: Heidy Gutierrez MD;  Location: [facility];  Service: Obstetrics/Gynecology;  Laterality: N/A;    DIAGNOSTIC LAPAROSCOPY Bilateral 2022    Procedure: DAVINCI ASSISTED BILATERAL SALPINGO-OOPHORECTOMY;  Surgeon: Heidy Gutierrez MD;  Location: [facility];  Service: Robotics - DaVinci;  Laterality: Bilateral;    KNEE ARTHROSCOPY Right     MASTECTOMY W/ SENTINEL NODE BIOPSY Left 2022    Procedure: left total mastectomy with left axillary sentinel lymph node biopsy;  Surgeon: Monisha Escudero MD;  Location: [facility];  Service: General;  Laterality: Left;    VENOUS ACCESS DEVICE (PORT) INSERTION Right 2022    Procedure: right port placement;  Surgeon: Monisha Escudero MD;  Location: [facility];  Service: General;  Laterality: Right;    VENOUS ACCESS DEVICE (PORT) REMOVAL Right 3/15/2024    Procedure: RIGHT port removal,;  Surgeon: Monisha Escudero MD;  Location: [facility];  Service: General;  Laterality: Right;       Family History   Problem Relation Age of Onset    No Known Problems Mother     No Known Problems Father     No Known Problems Sister     No Known Problems Brother     No Known Problems Maternal Grandmother     Diabetes Maternal Grandfather     No Known Problems Paternal Grandmother     No Known Problems Paternal Grandfather     Melanoma Paternal Aunt     Diabetes Other     Malig Hyperthermia Neg Hx     Colon cancer Neg  Hx        Social History     Socioeconomic History    Marital status:    Tobacco Use    Smoking status: Never     Passive exposure: Never    Smokeless tobacco: Never   Vaping Use    Vaping status: Never Used   Substance and Sexual Activity    Alcohol use: Not Currently     Alcohol/week: 5.0 standard drinks of alcohol    Drug use: Never    Sexual activity: Yes     Partners: Male     Birth control/protection: Bilateral salpingectomy        Review of Systems   Constitutional:  Negative for activity change, chills, diaphoresis, fatigue and unexpected weight change.   HENT:  Negative for congestion, ear pain, rhinorrhea, sinus pressure, sinus pain and sore throat.    Eyes:  Negative for visual disturbance.   Respiratory:  Negative for cough, chest tightness and shortness of breath.    Cardiovascular:  Negative for chest pain, palpitations and leg swelling.   Gastrointestinal:  Negative for abdominal pain, blood in stool, constipation, diarrhea and vomiting.   Endocrine: Positive for polydipsia.   Genitourinary:  Negative for dysuria.   Neurological:  Negative for dizziness, tremors, seizures, syncope, weakness, light-headedness, numbness and headaches.   Psychiatric/Behavioral:  Negative for dysphoric mood and suicidal ideas. The patient is not nervous/anxious.        Objective   Vitals:    10/14/24 0813   BP: 90/60   Pulse: 70   Resp: 16   Temp: 98.6 °F (37 °C)   SpO2: 98%     Body mass index is 22.75 kg/m².  Physical Exam  Constitutional:       General: She is not in acute distress.     Appearance: Normal appearance. She is not ill-appearing.   HENT:      Head: Normocephalic and atraumatic.      Right Ear: Tympanic membrane, ear canal and external ear normal.      Left Ear: Tympanic membrane, ear canal and external ear normal.      Mouth/Throat:      Mouth: Mucous membranes are moist.      Pharynx: No posterior oropharyngeal erythema.   Cardiovascular:      Rate and Rhythm: Normal rate.      Pulses: Normal  pulses.      Heart sounds: No murmur heard.  Pulmonary:      Effort: Pulmonary effort is normal.   Abdominal:      General: Abdomen is flat.      Palpations: Abdomen is soft.      Tenderness: There is no abdominal tenderness.   Musculoskeletal:      Right lower leg: No edema.      Left lower leg: No edema.   Skin:     Capillary Refill: Capillary refill takes less than 2 seconds.   Neurological:      Mental Status: She is alert.   Psychiatric:         Mood and Affect: Mood normal.         Behavior: Behavior normal.           Assessment & Plan   Diagnoses and all orders for this visit:    1. Annual physical exam (Primary)  -     CBC & Differential  -     Lipid Panel  -     TSH Rfx On Abnormal To Free T4    2. Immunization due  -     Pneumococcal Conjugate Vaccine 20-Valent (PCV20)    Other orders  -     levocetirizine (XYZAL) 5 MG tablet; Take 1 tablet by mouth Every Evening.  Dispense: 30 tablet; Refill: 2        Annual physical  Patient declined flu vaccine  PCV 20 vaccine  Discussed the importance of maintaining a healthy weight and getting regular exercise.  Educated patient on the benefits of healthy diet.  Advise follow-up annually for wellness exams.  Check routine labs      There are no Patient Instructions on file for this visit.

## 2024-10-29 ENCOUNTER — TELEPHONE (OUTPATIENT)
Dept: ONCOLOGY | Facility: CLINIC | Age: 39
End: 2024-10-29
Payer: COMMERCIAL

## 2024-10-29 PROBLEM — M85.89 OSTEOPENIA OF MULTIPLE SITES: Status: ACTIVE | Noted: 2024-10-29

## 2024-10-29 NOTE — TELEPHONE ENCOUNTER
Contacted Maribell by phone to discuss dexa scan and recommendation for Zometa.  Explained that she will need to be cleared by her dentist prior to starting therapy.  She reports she is undergoing some deep cleaning treatments every 3 months currently.  The clearance form will be mailed to her. Maribell is instructed to take this with her to the dentist next time. Reiterated that we will not start Zometa until cleared by her dentist.  Will go ahead and add on an appointment for the infusion when she returns in December in anticipation.  She voiced understanding.

## 2024-11-13 ENCOUNTER — TELEPHONE (OUTPATIENT)
Dept: ONCOLOGY | Facility: CLINIC | Age: 39
End: 2024-11-13
Payer: COMMERCIAL

## 2024-11-13 NOTE — TELEPHONE ENCOUNTER
Returned call to Maribell.  She states she obtained approval from her dentist to start zometa and will send in the letter.  She had additional questions about zometa, how it is given, how often it is given and side effects.  We reviewed this information in detail.  She voiced understanding.  Thankful for the call.

## 2024-11-13 NOTE — TELEPHONE ENCOUNTER
Caller: Maribell Barber    Relationship: Self    Best call back number: [phone]  Who are you requesting to speak with (clinical staff, provider,  specific staff member): CLINICAL    What was the call regarding: PT REQUESTING CB FROM DR BRITO'S NURSE

## 2024-11-19 ENCOUNTER — TELEPHONE (OUTPATIENT)
Dept: ONCOLOGY | Facility: CLINIC | Age: 39
End: 2024-11-19
Payer: COMMERCIAL

## 2024-11-19 NOTE — TELEPHONE ENCOUNTER
Caller: Maribell Barber    Relationship: Self    Best call back number: [phone]     What is the best time to reach you: ASAP IF NEEDED    Who are you requesting to speak with (clinical staff, provider,  specific staff member): SCHEDULING        What was the call regarding: PT NOTICED SHE IS SCHEDULED PORT FLUSH AND INFUSION ON DEC.18 BUT SHE NO LONGER HAS A PORT.  PLEASE CALL PT IF NEEDED.

## 2024-12-17 DIAGNOSIS — E78.5 HYPERLIPIDEMIA, UNSPECIFIED HYPERLIPIDEMIA TYPE: ICD-10-CM

## 2024-12-17 RX ORDER — ATORVASTATIN CALCIUM 10 MG/1
10 TABLET, FILM COATED ORAL DAILY
Qty: 90 TABLET | Refills: 3 | Status: SHIPPED | OUTPATIENT
Start: 2024-12-17 | End: 2024-12-17 | Stop reason: SDUPTHER

## 2024-12-17 RX ORDER — ATORVASTATIN CALCIUM 10 MG/1
10 TABLET, FILM COATED ORAL DAILY
Qty: 90 TABLET | Refills: 3 | Status: SHIPPED | OUTPATIENT
Start: 2024-12-17

## 2024-12-18 ENCOUNTER — INFUSION (OUTPATIENT)
Dept: ONCOLOGY | Facility: HOSPITAL | Age: 39
End: 2024-12-18
Payer: COMMERCIAL

## 2024-12-18 ENCOUNTER — OFFICE VISIT (OUTPATIENT)
Dept: ONCOLOGY | Facility: CLINIC | Age: 39
End: 2024-12-18
Payer: COMMERCIAL

## 2024-12-18 VITALS
HEART RATE: 77 BPM | WEIGHT: 124 LBS | OXYGEN SATURATION: 96 % | SYSTOLIC BLOOD PRESSURE: 112 MMHG | TEMPERATURE: 98 F | BODY MASS INDEX: 22.68 KG/M2 | DIASTOLIC BLOOD PRESSURE: 73 MMHG

## 2024-12-18 DIAGNOSIS — E53.8 B12 DEFICIENCY: Primary | ICD-10-CM

## 2024-12-18 DIAGNOSIS — Z79.811 AROMATASE INHIBITOR USE: ICD-10-CM

## 2024-12-18 DIAGNOSIS — M85.89 OSTEOPENIA OF MULTIPLE SITES: ICD-10-CM

## 2024-12-18 DIAGNOSIS — Z17.0 MALIGNANT NEOPLASM OF UPPER-OUTER QUADRANT OF LEFT BREAST IN FEMALE, ESTROGEN RECEPTOR POSITIVE: Primary | ICD-10-CM

## 2024-12-18 DIAGNOSIS — D64.89 ANEMIA DUE TO OTHER CAUSE, NOT CLASSIFIED: ICD-10-CM

## 2024-12-18 DIAGNOSIS — C50.412 MALIGNANT NEOPLASM OF UPPER-OUTER QUADRANT OF LEFT BREAST IN FEMALE, ESTROGEN RECEPTOR POSITIVE: Primary | ICD-10-CM

## 2024-12-18 LAB
ALBUMIN SERPL-MCNC: 4.4 G/DL (ref 3.5–5.2)
ALBUMIN/GLOB SERPL: 1.5 G/DL
ALP SERPL-CCNC: 118 U/L (ref 39–117)
ALT SERPL W P-5'-P-CCNC: 20 U/L (ref 1–33)
ANION GAP SERPL CALCULATED.3IONS-SCNC: 12.6 MMOL/L (ref 5–15)
AST SERPL-CCNC: 29 U/L (ref 1–32)
BASOPHILS # BLD AUTO: 0.03 10*3/MM3 (ref 0–0.2)
BASOPHILS NFR BLD AUTO: 0.6 % (ref 0–1.5)
BILIRUB SERPL-MCNC: 0.3 MG/DL (ref 0–1.2)
BUN SERPL-MCNC: 13 MG/DL (ref 6–20)
BUN/CREAT SERPL: 20.6 (ref 7–25)
CALCIUM SPEC-SCNC: 9.4 MG/DL (ref 8.6–10.5)
CHLORIDE SERPL-SCNC: 103 MMOL/L (ref 98–107)
CO2 SERPL-SCNC: 23.4 MMOL/L (ref 22–29)
CREAT SERPL-MCNC: 0.63 MG/DL (ref 0.57–1)
DEPRECATED RDW RBC AUTO: 36.4 FL (ref 37–54)
EGFRCR SERPLBLD CKD-EPI 2021: 115.2 ML/MIN/1.73
EOSINOPHIL # BLD AUTO: 0.3 10*3/MM3 (ref 0–0.4)
EOSINOPHIL NFR BLD AUTO: 6 % (ref 0.3–6.2)
ERYTHROCYTE [DISTWIDTH] IN BLOOD BY AUTOMATED COUNT: 11.7 % (ref 12.3–15.4)
GLOBULIN UR ELPH-MCNC: 2.9 GM/DL
GLUCOSE SERPL-MCNC: 79 MG/DL (ref 65–99)
HCT VFR BLD AUTO: 37.2 % (ref 34–46.6)
HGB BLD-MCNC: 12.8 G/DL (ref 12–15.9)
IMM GRANULOCYTES # BLD AUTO: 0.01 10*3/MM3 (ref 0–0.05)
IMM GRANULOCYTES NFR BLD AUTO: 0.2 % (ref 0–0.5)
LYMPHOCYTES # BLD AUTO: 1.79 10*3/MM3 (ref 0.7–3.1)
LYMPHOCYTES NFR BLD AUTO: 35.7 % (ref 19.6–45.3)
MAGNESIUM SERPL-MCNC: 1.8 MG/DL (ref 1.6–2.6)
MCH RBC QN AUTO: 29.6 PG (ref 26.6–33)
MCHC RBC AUTO-ENTMCNC: 34.4 G/DL (ref 31.5–35.7)
MCV RBC AUTO: 86.1 FL (ref 79–97)
MONOCYTES # BLD AUTO: 0.39 10*3/MM3 (ref 0.1–0.9)
MONOCYTES NFR BLD AUTO: 7.8 % (ref 5–12)
NEUTROPHILS NFR BLD AUTO: 2.5 10*3/MM3 (ref 1.7–7)
NEUTROPHILS NFR BLD AUTO: 49.7 % (ref 42.7–76)
NRBC BLD AUTO-RTO: 0 /100 WBC (ref 0–0.2)
PHOSPHATE SERPL-MCNC: 4.2 MG/DL (ref 2.5–4.5)
PLATELET # BLD AUTO: 186 10*3/MM3 (ref 140–450)
PMV BLD AUTO: 10.1 FL (ref 6–12)
POTASSIUM SERPL-SCNC: 4 MMOL/L (ref 3.5–5.2)
PROT SERPL-MCNC: 7.3 G/DL (ref 6–8.5)
RBC # BLD AUTO: 4.32 10*6/MM3 (ref 3.77–5.28)
SODIUM SERPL-SCNC: 139 MMOL/L (ref 136–145)
WBC NRBC COR # BLD AUTO: 5.02 10*3/MM3 (ref 3.4–10.8)

## 2024-12-18 PROCEDURE — 80053 COMPREHEN METABOLIC PANEL: CPT

## 2024-12-18 PROCEDURE — 83735 ASSAY OF MAGNESIUM: CPT

## 2024-12-18 PROCEDURE — 84100 ASSAY OF PHOSPHORUS: CPT

## 2024-12-18 PROCEDURE — 96374 THER/PROPH/DIAG INJ IV PUSH: CPT

## 2024-12-18 PROCEDURE — 25010000002 ZOLEDRONIC ACID 4 MG/100ML SOLUTION: Performed by: NURSE PRACTITIONER

## 2024-12-18 PROCEDURE — 85025 COMPLETE CBC W/AUTO DIFF WBC: CPT

## 2024-12-18 RX ORDER — ZOLEDRONIC ACID 0.04 MG/ML
4 INJECTION, SOLUTION INTRAVENOUS ONCE
Status: COMPLETED | OUTPATIENT
Start: 2024-12-18 | End: 2024-12-18

## 2024-12-18 RX ADMIN — ZOLEDRONIC ACID 4 MG: 0.04 INJECTION, SOLUTION INTRAVENOUS at 11:56

## 2024-12-18 NOTE — PROGRESS NOTES
[facility] OUTPATIENT FOLLOW UP CLINIC VISIT    REASON FOR FOLLOW-UP:    Lobular carcinoma of the left breast, grade 2, ER positive, CT positive, HER2 negative.  Right breast lump    HISTORY OF PRESENT ILLNESS:  Maribell Romero is a 39 y.o. female who returns today for follow up of the above issue.      The patient returns the office today, 9/16/2024 for triage visit.  She continues on anastrozole 1 mg daily for her history of lobular left breast cancer status post mastectomy.  She did undergo right mastopexy at the time of reconstruction.  She reports she has noticed approximately 3 weeks ago a mass in her outer right breast at her previous incision line.  She does not feel this is increasing in size.    REVIEW OF SYSTEMS:  ROS as per HPI    PHYSICAL EXAMINATION:    Vitals:    12/18/24 1056   BP: 112/73   Pulse: 77   Temp: 98 °F (36.7 °C)   TempSrc: Oral   SpO2: 96%   Weight: 56.2 kg (124 lb)   PainSc: 0-No pain       PHYSICAL EXAM:  General:  No acute distress, awake, alert and oriented  Skin:  Warm and dry, no visible rash  HEENT:  Normocephalic/atraumatic.  Wearing a face mask.  Chest:  Normal respiratory effort  Extremities:  No visible clubbing, cyanosis, or edema  Neuro/psych:  Grossly nonfocal.  Normal mood and affect.  Breast: Left breast status postmastectomy with reconstruction, no palpable abnormality surrounding the implant.  Right breast status post mastopexy with nondescript density in the outer quadrant at the 8 o'clock position just above the scar line.      DIAGNOSTIC DATA:  Comprehensive Metabolic Panel (09/16/2024 08:27)     IMAGING:    SCANNED - MAMMO (02/22/2024)     ASSESSMENT:  This is a 39 y.o. female with:    *Left breast invasive lobular carcinoma  Patient noted abnormality in the left breast December 2021  Left breast biopsy 2/17/2022 consistent with invasive lobular carcinoma, grade 2, ER 97%, CT 95%, HER2 negative, Ki-67 32%  Status post left mastectomy with  Dr. Escudero  pT2pN0(I+) invasive lobular carcinoma grade 2, 70 mm.  All margins free of in situ and invasive malignancy.  1 positive axillary lymph node out of 4 lymph nodes removed.  Oncotype 18  Adjuvant chemotherapy with Adriamycin Cytoxan followed by Taxol. C1 AC 5/20/2022  Taxol discontinued after 6 cycles due to severe neuropathy. Last Taxol 9/12/2022  Completed radiation therapy to the left breast  11/28/2022 bilateral oophorectomy  Initially treated with letrozole December 2022 which was discontinued due to arthralgias  Transitioned to anastrozole 1 mg daily  2/22/2024 annual right screening mammogram benign with recommendations for follow-up in 1 year  6/19/2024 evaluation by Dr. Palacios, the patient remains in observation regarding her previous malignancy.  CA 15-3 normal at 9.7  12/18/24 Patient returns with no new concerns.  Continues anstrozole with manageable arthralgias.     *Menopausal state  Status post bilateral oophorectomy laparoscopically 11/28/2022    *Bone health  Reviewing patient's records today, 9/16/2024, it does not appear the patient has had a baseline bone density scan.  Given her ongoing aromatase inhibitor use we will proceed with a baseline DEXA scan    *Density in the right breast  Unclear if this is secondary to scar tissue from mastopexy.  However, we will proceed with diagnostic right mammogram and ultrasound. Right breast diagnostic mammogram unremarkable.    *Elevated liver function studies  6/19/2024 AST minimally elevated at 45  12/28/24 AST 29 ALT 20    PLAN:  Continue anastrozole 1mg daily  MD follow-up in 6 months transitioning care to Dr. Adams.      The patient continues on high risk medication requiring close monitoring for toxicity      Carla Ruiz, JACQUELIN  12/18/2024

## 2025-02-04 ENCOUNTER — OFFICE VISIT (OUTPATIENT)
Dept: FAMILY MEDICINE CLINIC | Facility: CLINIC | Age: 40
End: 2025-02-04
Payer: COMMERCIAL

## 2025-02-04 VITALS
DIASTOLIC BLOOD PRESSURE: 56 MMHG | HEIGHT: 62 IN | WEIGHT: 120.4 LBS | OXYGEN SATURATION: 98 % | TEMPERATURE: 98.3 F | RESPIRATION RATE: 18 BRPM | HEART RATE: 94 BPM | SYSTOLIC BLOOD PRESSURE: 84 MMHG | BODY MASS INDEX: 22.16 KG/M2

## 2025-02-04 DIAGNOSIS — R05.9 COUGH, UNSPECIFIED TYPE: ICD-10-CM

## 2025-02-04 DIAGNOSIS — J02.9 SORE THROAT: Primary | ICD-10-CM

## 2025-02-04 DIAGNOSIS — J06.9 UPPER RESPIRATORY TRACT INFECTION, UNSPECIFIED TYPE: ICD-10-CM

## 2025-02-04 PROCEDURE — 99213 OFFICE O/P EST LOW 20 MIN: CPT | Performed by: FAMILY MEDICINE

## 2025-02-04 PROCEDURE — 87880 STREP A ASSAY W/OPTIC: CPT | Performed by: FAMILY MEDICINE

## 2025-02-04 PROCEDURE — 87428 SARSCOV & INF VIR A&B AG IA: CPT | Performed by: FAMILY MEDICINE

## 2025-02-04 RX ORDER — GUAIFENESIN/DEXTROMETHORPHAN 100-10MG/5
10 SYRUP ORAL 3 TIMES DAILY PRN
Qty: 473 ML | Refills: 0 | Status: SHIPPED | OUTPATIENT
Start: 2025-02-04

## 2025-02-04 RX ORDER — BENZONATATE 100 MG/1
100 CAPSULE ORAL 3 TIMES DAILY PRN
Qty: 30 CAPSULE | Refills: 0 | Status: SHIPPED | OUTPATIENT
Start: 2025-02-04

## 2025-02-04 RX ORDER — LIDOCAINE HYDROCHLORIDE 20 MG/ML
10 SOLUTION OROPHARYNGEAL 4 TIMES DAILY PRN
Qty: 200 ML | Refills: 0 | Status: SHIPPED | OUTPATIENT
Start: 2025-02-04

## 2025-02-04 NOTE — PROGRESS NOTES
Chief Complaint  Cough (Since Sunday), Sore Throat, Generalized Body Aches, Headache, and Med Refill    Subjective         Maribell Romero presents to [facility] PRIMARY [facility]  History of Present Illness    40-year-old female complains of acute onset cough, sore throat, body aches and headache for 2 to 3 days.  Patient denies chest pain or shortness of breath, abdominal pain or diarrhea or vomiting.    Objective     Review of Systems     Past Medical History:   Diagnosis Date    Anesthesia complication     hypotension with last C section    Breast cancer 03/2022    left sided, stage 3, ER/TN+, Her2-, s/p mastectomy, LAST CHEMO 10/3/22, LAST RADIATION 12/2022    History of COVID-19     X2  - 2021, 2022    Hyperlipidemia     PONV (postoperative nausea and vomiting)     Seasonal allergies         Current Outpatient Medications:     anastrozole (ARIMIDEX) 1 MG tablet, Take 1 tablet by mouth once daily, Disp: 90 tablet, Rfl: 3    atorvastatin (LIPITOR) 10 MG tablet, Take 1 tablet by mouth Daily., Disp: 90 tablet, Rfl: 3    fluticasone (FLONASE) 50 MCG/ACT nasal spray, 1 spray into the nostril(s) as directed by provider 2 (Two) Times a Day. (Patient taking differently: Administer 1 spray into the nostril(s) as directed by provider As Needed for Allergies.), Disp: 9.9 mL, Rfl: 0    benzonatate (Tessalon Perles) 100 MG capsule, Take 1 capsule by mouth 3 (Three) Times a Day As Needed for Cough., Disp: 30 capsule, Rfl: 0    guaiFENesin-dextromethorphan (ROBITUSSIN DM) 100-10 MG/5ML syrup, Take 10 mL by mouth 3 (Three) Times a Day As Needed for Cough., Disp: 473 mL, Rfl: 0    levocetirizine (XYZAL) 5 MG tablet, Take 1 tablet by mouth Every Evening. (Patient not taking: Reported on 2/4/2025), Disp: 30 tablet, Rfl: 2    Lidocaine Viscous HCl (XYLOCAINE) 2 % solution, Take 10 mL by mouth 4 (Four) Times a Day As Needed for Mild Pain. Gargle and spit, DO NOT swallow, Disp: 200 mL, Rfl: 0     "SUMAtriptan (IMITREX) 100 MG tablet, Take 1 tablet by mouth 1 (One) Time As Needed for Migraine for up to 30 days. Take one tablet at onset of headache. May repeat dose one time in 2 hours if headache not relieved., Disp: 9 tablet, Rfl: 3   Social History     Socioeconomic History    Marital status:    Tobacco Use    Smoking status: Never     Passive exposure: Never    Smokeless tobacco: Never   Vaping Use    Vaping status: Never Used   Substance and Sexual Activity    Alcohol use: Not Currently     Alcohol/week: 5.0 standard drinks of alcohol    Drug use: Never    Sexual activity: Yes     Partners: Male     Birth control/protection: Bilateral salpingectomy       Vital Signs:   BP (!) 84/56   Pulse 94   Temp 98.3 °F (36.8 °C)   Resp 18   Ht 157.5 cm (62\")   Wt 54.6 kg (120 lb 6.4 oz)   SpO2 98%   BMI 22.02 kg/m²       Physical Exam  Constitutional:       Appearance: She is ill-appearing.   HENT:      Right Ear: Tympanic membrane, ear canal and external ear normal.      Left Ear: Tympanic membrane, ear canal and external ear normal.      Nose: Congestion present. No rhinorrhea.      Mouth/Throat:      Mouth: Mucous membranes are moist.      Pharynx: No oropharyngeal exudate or posterior oropharyngeal erythema.   Cardiovascular:      Rate and Rhythm: Normal rate and regular rhythm.      Pulses: Normal pulses.      Heart sounds: No murmur heard.  Lymphadenopathy:      Cervical: No cervical adenopathy.          Result Review :                 Assessment and Plan    Diagnoses and all orders for this visit:    1. Upper respiratory tract infection, unspecified type (Primary)  -     Lidocaine Viscous HCl (XYLOCAINE) 2 % solution; Take 10 mL by mouth 4 (Four) Times a Day As Needed for Mild Pain. Gargle and spit, DO NOT swallow  Dispense: 200 mL; Refill: 0  -     benzonatate (Tessalon Perles) 100 MG capsule; Take 1 capsule by mouth 3 (Three) Times a Day As Needed for Cough.  Dispense: 30 capsule; Refill: 0  -    "  guaiFENesin-dextromethorphan (ROBITUSSIN DM) 100-10 MG/5ML syrup; Take 10 mL by mouth 3 (Three) Times a Day As Needed for Cough.  Dispense: 473 mL; Refill: 0    Negative COVID, flu and strep test  Encouraged hydration and rest  Lidocaine viscous for sore throat  Robitussin DM and benzonatate for cough      Follow Up   No follow-ups on file.  Patient was given instructions and counseling regarding her condition or for health maintenance advice. Please see specific information pulled into the AVS if appropriate.

## 2025-02-04 NOTE — PATIENT INSTRUCTIONS
Take ibuprofen 400 mg every 6 hours as needed for pain on a full stomach and with a full glass of water  Return if having any worsening symptoms   Hydrate well and rest

## 2025-02-25 NOTE — PROGRESS NOTES
Date of Office Visit: 2025  Encounter Provider: JACQUELIN Villar  Place of Service: [facility] CARDIOLOGY  Patient Name: Maribell Romero  :1985  Primary Cardiologist: Dr. Ananda Arita    Chief Complaint   Patient presents with    Cardio-Oncology visit   :     HPI: Maribell Romero is a 40 y.o. female who presents today for cardio-oncology visit.  I reviewed her medical records.    In , she was diagnosed with lobular carcinoma of the left breast, grade 2, ER positive [place] positive, HER2 negative and one positive lymph node.  She underwent left-sided mastectomy. She received 4 treatments of doxorubicin/cyclophosphamide.  She was then started on paclitaxel and this was discontinued due to peripheral neuropathy in 2022.  2022, she underwent oophorectomy.  In 2022, she was started on letrozole.     In May 2022, she was referred to Dr. Ananda Arita for a cardio-oncology visit.  An echocardiogram was performed prior to her first dose of doxorubicin; it was an extremely difficult study due pain from her recent mastectomy.  LVEF was normal.    Echocardiograms:  2022-LVEF 60% and GLS not performed,  2022-LVEF 60% and GLS -22.8%  2022-LVEF 63% and GLS -22.9%  2022-LVEF 61-65% and GLS -24.1%  2023-LVEF 65% and GLS -24.6%  2024-LVEF 60% and GLS -22.5%, trace MR/WY     She presents today for follow-up visit.  She denies chest pain, shortness of air, palpitations, edema, dizziness, syncope, or bleeding.  Her PCP took her off atorvastatin for a couple of months and repeat lipid panel in 2024 was quite elevated.  She has restarted her atorvastatin 10 mg daily.    Echocardiogram completed today shows LVEF 64.2%, GLS -26.1%, and physiologic tricuspid regurgitation.  Patient informed of results during visit today.      Past Medical History:   Diagnosis Date    Anesthesia complication     hypotension with last C  section    Breast cancer 2022    left sided, stage 3, ER/VT+, Her2-, s/p mastectomy, LAST CHEMO 10/3/22, LAST RADIATION 2022    History of COVID-19     X2  - ,     Hyperlipidemia     PONV (postoperative nausea and vomiting)     Seasonal allergies        Past Surgical History:   Procedure Laterality Date    APPENDECTOMY      BREAST BIOPSY Left     BREAST RECONSTRUCTION Left 2022    Procedure: LEFT BREAST 1ST STAGE RECONSTRUCTION WITH INSERTION OF TISSUE EXPANDER AND BIOLOGIC;  Surgeon: Hermelinda Johnson MD;  Location: [facility] OR;  Service: Plastics;  Laterality: Left;     SECTION       SECTION N/A 12/10/2018    Procedure:  SECTION REPEAT;  Surgeon: Heidy Gutierrez MD;  Location: [facility] LABOR DELIVERY;  Service: Obstetrics/Gynecology    D & C HYSTEROSCOPY N/A 2021    Procedure: HYSTEROSCOPY REMOVAL INTRAUTERINE DEVICE WILL NEED ULTRASOUND IN ROOM;  Surgeon: Hediy Gutierrez MD;  Location: [facility] OR OSC;  Service: Obstetrics/Gynecology;  Laterality: N/A;    DIAGNOSTIC LAPAROSCOPY Bilateral 2022    Procedure: DAVINCI ASSISTED BILATERAL SALPINGO-OOPHORECTOMY;  Surgeon: Heidy Gutierrez MD;  Location: [facility] OR;  Service: Robotics - DaVinci;  Laterality: Bilateral;    KNEE ARTHROSCOPY Right     MASTECTOMY W/ SENTINEL NODE BIOPSY Left 2022    Procedure: left total mastectomy with left axillary sentinel lymph node biopsy;  Surgeon: Monisha Escudero MD;  Location: [facility] OR;  Service: General;  Laterality: Left;    VENOUS ACCESS DEVICE (PORT) INSERTION Right 2022    Procedure: right port placement;  Surgeon: Monisha Escudero MD;  Location: [facility] OR;  Service: General;  Laterality: Right;    VENOUS ACCESS DEVICE (PORT) REMOVAL Right 3/15/2024    Procedure: RIGHT port removal,;  Surgeon: Monisha Escudero MD;  Location: [facility] OR;  Service: General;  Laterality: Right;       Social History      Socioeconomic History    Marital status:    Tobacco Use    Smoking status: Never     Passive exposure: Never    Smokeless tobacco: Never   Vaping Use    Vaping status: Never Used   Substance and Sexual Activity    Alcohol use: Not Currently     Alcohol/week: 5.0 standard drinks of alcohol    Drug use: Never    Sexual activity: Yes     Partners: Male     Birth control/protection: Bilateral salpingectomy        Family History   Problem Relation Age of Onset    No Known Problems Mother     No Known Problems Father     No Known Problems Sister     No Known Problems Brother     No Known Problems Maternal Grandmother     Diabetes Maternal Grandfather     No Known Problems Paternal Grandmother     No Known Problems Paternal Grandfather     Melanoma Paternal Aunt     Diabetes Other     Malig Hyperthermia Neg Hx     Colon cancer Neg Hx        The following portion of the patient's history were reviewed and updated as appropriate: past medical history, past surgical history, past social history, past family history, allergies, current medications, and problem list.    Review of Systems   Constitutional: Negative.   Cardiovascular: Negative.    Respiratory: Negative.     Hematologic/Lymphatic: Negative.    Neurological: Negative.        Allergies   Allergen Reactions    Asa [Aspirin] Anaphylaxis and Shortness Of Breath    Adhesive Tape Dermatitis     Paper tape okay    Iopamidol Hives and Cough     AKA IV CONTRAST    Ct Contrast Rash and Cough         Current Outpatient Medications:     anastrozole (ARIMIDEX) 1 MG tablet, Take 1 tablet by mouth once daily, Disp: 90 tablet, Rfl: 3    atorvastatin (LIPITOR) 10 MG tablet, Take 1 tablet by mouth Daily., Disp: 90 tablet, Rfl: 3    fluticasone (FLONASE) 50 MCG/ACT nasal spray, 1 spray into the nostril(s) as directed by provider 2 (Two) Times a Day. (Patient taking differently: Administer 1 spray into the nostril(s) as directed by provider As Needed for Allergies.), Disp:  "9.9 mL, Rfl: 0    levocetirizine (XYZAL) 5 MG tablet, Take 1 tablet by mouth Every Evening., Disp: 30 tablet, Rfl: 2        Objective:     Vitals:    02/26/25 0931   BP: 120/62   Cuff Size: Adult   Pulse: 61   SpO2: 98%   Weight: 57.4 kg (126 lb 9.6 oz)   Height: 157.5 cm (62.01\")     Body mass index is 23.15 kg/m².    PHYSICAL EXAM:    Vitals Reviewed.   General Appearance: No acute distress, well developed and well nourished.   Eyes: Glasses.   HENT: No hearing loss noted.    Respiratory: No signs of respiratory distress. Respiration rhythm and depth normal.  Clear to auscultation.   Cardiovascular:  Jugular Venous Pressure: Normal  Heart Rate and Rhythm: Normal, Heart Sounds: Normal S1 and S2. No S3 or S4 noted.  Murmurs: No murmurs noted. No rubs, thrills, or gallops.   Lower Extremities: No edema noted.  Musculoskeletal: Normal movement of extremities.  Skin: General appearance normal.    Psychiatric: Patient alert and oriented to person, place, and time. Speech and behavior appropriate. Normal mood and affect.       ECG 12 Lead    Date/Time: 2/26/2025 9:41 AM  Performed by: Kori Salas APRN    Authorized by: Kori Salas APRN  Comparison: compared with previous ECG from 2/7/2024  Similar to previous ECG  Rhythm: sinus rhythm  Rate: normal  BPM: 61  Conduction: conduction normal  ST Segments: ST segments normal  T Waves: T waves normal  QRS axis: normal    Clinical impression: normal ECG            Assessment:       Diagnosis Plan   1. Malignant neoplasm of upper-outer quadrant of left breast in female, estrogen receptor positive  Adult Transthoracic Echo Complete w/ Color, Spectral and Contrast if Necessary Per Protocol      2. Mixed hyperlipidemia  Adult Transthoracic Echo Complete w/ Color, Spectral and Contrast if Necessary Per Protocol             Plan:       She is a previously healthy woman who was diagnosed with breast cancer in 2022.  She underwent mastectomy, breast reconstruction, and " oophorectomy.  She received doxorubicin, to 208.5 mg/m². She received left-sided chest radiation, with a mean heart dose of 6.3 cGy.     Serial echocardiograms have revealed normal EF, GLS, and diastolic function.  Echocardiogram results today showed normal LVEF, normal GLS, and physiologic tricuspid regurgitation.      She will require an annual echocardiogram until 2028.  After that, she will require an echocardiogram every 5 years to follow for potential long-term effects of anthracycline treatment and to rule out radiation-induced valvular disease.    She remains on anastrozole.  She has known hyperlipidemia.  Her PCP held her atorvastatin for a couple of months and her total cholesterol and LDL elevated per lipid panel in December 2024.  She has restarted the atorvastatin 10 mg daily and will follow-up with her PCP.  She is not considered to be high risk for ASCVD.    Follow-up with Dr. Arita in 1 year and repeat echocardiogram at that time.  Call with any cardiac concerns.    As always, it has been a pleasure to participate in your patient's care. Thank you.         Sincerely,         JACQUELIN Valle  [facility] Cardiology      Dictated utilizing Dragon Dictation

## 2025-02-26 ENCOUNTER — OFFICE VISIT (OUTPATIENT)
Dept: CARDIOLOGY | Facility: CLINIC | Age: 40
End: 2025-02-26
Payer: COMMERCIAL

## 2025-02-26 ENCOUNTER — HOSPITAL ENCOUNTER (OUTPATIENT)
Dept: CARDIOLOGY | Facility: HOSPITAL | Age: 40
Discharge: HOME | End: 2025-02-26
Admitting: INTERNAL MEDICINE
Payer: COMMERCIAL

## 2025-02-26 VITALS
WEIGHT: 126.6 LBS | OXYGEN SATURATION: 98 % | SYSTOLIC BLOOD PRESSURE: 120 MMHG | HEIGHT: 62 IN | HEART RATE: 61 BPM | BODY MASS INDEX: 23.3 KG/M2 | DIASTOLIC BLOOD PRESSURE: 62 MMHG

## 2025-02-26 VITALS
BODY MASS INDEX: 22.08 KG/M2 | WEIGHT: 120 LBS | SYSTOLIC BLOOD PRESSURE: 120 MMHG | HEART RATE: 70 BPM | HEIGHT: 62 IN | DIASTOLIC BLOOD PRESSURE: 62 MMHG

## 2025-02-26 DIAGNOSIS — Z92.21 HISTORY OF CHEMOTHERAPY: ICD-10-CM

## 2025-02-26 DIAGNOSIS — E78.2 MIXED HYPERLIPIDEMIA: ICD-10-CM

## 2025-02-26 DIAGNOSIS — Z79.811 AROMATASE INHIBITOR USE: ICD-10-CM

## 2025-02-26 DIAGNOSIS — Z17.0 MALIGNANT NEOPLASM OF UPPER-OUTER QUADRANT OF LEFT BREAST IN FEMALE, ESTROGEN RECEPTOR POSITIVE: ICD-10-CM

## 2025-02-26 DIAGNOSIS — Z92.3 HISTORY OF RADIATION THERAPY: ICD-10-CM

## 2025-02-26 DIAGNOSIS — C50.412 MALIGNANT NEOPLASM OF UPPER-OUTER QUADRANT OF LEFT BREAST IN FEMALE, ESTROGEN RECEPTOR POSITIVE: Primary | ICD-10-CM

## 2025-02-26 DIAGNOSIS — Z17.0 MALIGNANT NEOPLASM OF UPPER-OUTER QUADRANT OF LEFT BREAST IN FEMALE, ESTROGEN RECEPTOR POSITIVE: Primary | ICD-10-CM

## 2025-02-26 DIAGNOSIS — C50.412 MALIGNANT NEOPLASM OF UPPER-OUTER QUADRANT OF LEFT BREAST IN FEMALE, ESTROGEN RECEPTOR POSITIVE: ICD-10-CM

## 2025-02-26 LAB
AORTIC ARCH: 2.1 CM
AORTIC DIMENSIONLESS INDEX: 0.56 (DI)
ASCENDING AORTA: 2 CM
AV MEAN PRESS GRAD SYS DOP V1V2: 6 MMHG
AV VMAX SYS DOP: 163 CM/SEC
BH CV ECHO LEFT VENTRICLE GLOBAL LONGITUDINAL STRAIN: -26.1 %
BH CV ECHO MEAS - ACS: 1.63 CM
BH CV ECHO MEAS - AO MAX PG: 10.6 MMHG
BH CV ECHO MEAS - AO ROOT AREA (BSA CORRECTED): 1.7 CM2
BH CV ECHO MEAS - AO ROOT DIAM: 2.6 CM
BH CV ECHO MEAS - AO V2 VTI: 34.7 CM
BH CV ECHO MEAS - AVA(I,D): 1.79 CM2
BH CV ECHO MEAS - EDV(CUBED): 54.9 ML
BH CV ECHO MEAS - EDV(MOD-SP2): 73 ML
BH CV ECHO MEAS - EDV(MOD-SP4): 96 ML
BH CV ECHO MEAS - EF(MOD-SP2): 61.6 %
BH CV ECHO MEAS - EF(MOD-SP4): 66.7 %
BH CV ECHO MEAS - ESV(CUBED): 12.9 ML
BH CV ECHO MEAS - ESV(MOD-SP2): 28 ML
BH CV ECHO MEAS - ESV(MOD-SP4): 32 ML
BH CV ECHO MEAS - FS: 38.3 %
BH CV ECHO MEAS - IVS/LVPW: 0.88 CM
BH CV ECHO MEAS - IVSD: 0.7 CM
BH CV ECHO MEAS - LA 3D VOL INDEX: 25
BH CV ECHO MEAS - LAT PEAK E' VEL: 17.3 CM/SEC
BH CV ECHO MEAS - LV DIASTOLIC VOL/BSA (35-75): 62.4 CM2
BH CV ECHO MEAS - LV MASS(C)D: 78.8 GRAMS
BH CV ECHO MEAS - LV MAX PG: 3.2 MMHG
BH CV ECHO MEAS - LV MEAN PG: 2 MMHG
BH CV ECHO MEAS - LV SYSTOLIC VOL/BSA (12-30): 20.8 CM2
BH CV ECHO MEAS - LV V1 MAX: 90 CM/SEC
BH CV ECHO MEAS - LV V1 VTI: 19.6 CM
BH CV ECHO MEAS - LVIDD: 3.8 CM
BH CV ECHO MEAS - LVIDS: 2.34 CM
BH CV ECHO MEAS - LVOT AREA: 3.2 CM2
BH CV ECHO MEAS - LVOT DIAM: 2.01 CM
BH CV ECHO MEAS - LVPWD: 0.8 CM
BH CV ECHO MEAS - MED PEAK E' VEL: 11.4 CM/SEC
BH CV ECHO MEAS - MV A DUR: 0.15 SEC
BH CV ECHO MEAS - MV A MAX VEL: 77.1 CM/SEC
BH CV ECHO MEAS - MV DEC SLOPE: 574.5 CM/SEC2
BH CV ECHO MEAS - MV DEC TIME: 0.22 SEC
BH CV ECHO MEAS - MV E MAX VEL: 114 CM/SEC
BH CV ECHO MEAS - MV E/A: 1.48
BH CV ECHO MEAS - MV MAX PG: 8.4 MMHG
BH CV ECHO MEAS - MV MEAN PG: 2.6 MMHG
BH CV ECHO MEAS - MV P1/2T: 72.7 MSEC
BH CV ECHO MEAS - MV V2 VTI: 33.4 CM
BH CV ECHO MEAS - MVA(P1/2T): 3 CM2
BH CV ECHO MEAS - MVA(VTI): 1.86 CM2
BH CV ECHO MEAS - PA ACC TIME: 0.11 SEC
BH CV ECHO MEAS - PA V2 MAX: 92.3 CM/SEC
BH CV ECHO MEAS - PULM A REVS DUR: 0.16 SEC
BH CV ECHO MEAS - PULM A REVS VEL: 27 CM/SEC
BH CV ECHO MEAS - PULM DIAS VEL: 51.4 CM/SEC
BH CV ECHO MEAS - PULM S/D: 0.71
BH CV ECHO MEAS - PULM SYS VEL: 36.4 CM/SEC
BH CV ECHO MEAS - QP/QS: 0.6
BH CV ECHO MEAS - RAP SYSTOLE: 3 MMHG
BH CV ECHO MEAS - RV MAX PG: 3.4 MMHG
BH CV ECHO MEAS - RV V1 MAX: 92.1 CM/SEC
BH CV ECHO MEAS - RV V1 VTI: 21 CM
BH CV ECHO MEAS - RVOT DIAM: 1.51 CM
BH CV ECHO MEAS - RVSP: 16 MMHG
BH CV ECHO MEAS - SUP REN AO DIAM: 2.7 CM
BH CV ECHO MEAS - SV(LVOT): 62.2 ML
BH CV ECHO MEAS - SV(MOD-SP2): 45 ML
BH CV ECHO MEAS - SV(MOD-SP4): 64 ML
BH CV ECHO MEAS - SV(RVOT): 37.5 ML
BH CV ECHO MEAS - SVI(LVOT): 40.4 ML/M2
BH CV ECHO MEAS - SVI(MOD-SP2): 29.2 ML/M2
BH CV ECHO MEAS - SVI(MOD-SP4): 41.6 ML/M2
BH CV ECHO MEAS - TAPSE (>1.6): 2.32 CM
BH CV ECHO MEAS - TR MAX PG: 13.2 MMHG
BH CV ECHO MEAS - TR MAX VEL: 181.9 CM/SEC
BH CV ECHO MEASUREMENTS AVERAGE E/E' RATIO: 7.94
BH CV XLRA - RV BASE: 3.6 CM
BH CV XLRA - RV LENGTH: 7.7 CM
BH CV XLRA - RV MID: 3 CM
BH CV XLRA - TDI S': 14.8 CM/SEC
LEFT ATRIUM VOLUME INDEX: 25.4 ML/M2
LV EF 3D SEGMENTATION: 58 %
LV EF BIPLANE MOD: 64.2 %
SINUS: 2.3 CM
STJ: 1.79 CM

## 2025-02-26 PROCEDURE — 93356 MYOCRD STRAIN IMG SPCKL TRCK: CPT

## 2025-02-26 PROCEDURE — 93000 ELECTROCARDIOGRAM COMPLETE: CPT | Performed by: NURSE PRACTITIONER

## 2025-02-26 PROCEDURE — 99214 OFFICE O/P EST MOD 30 MIN: CPT | Performed by: NURSE PRACTITIONER

## 2025-02-26 PROCEDURE — 25510000001 PERFLUTREN 6.52 MG/ML SUSPENSION 2 ML VIAL: Performed by: INTERNAL MEDICINE

## 2025-02-26 PROCEDURE — 93306 TTE W/DOPPLER COMPLETE: CPT

## 2025-02-26 RX ADMIN — PERFLUTREN 2 ML: 6.52 INJECTION, SUSPENSION INTRAVENOUS at 09:10

## 2025-02-28 ENCOUNTER — TELEPHONE (OUTPATIENT)
Dept: ONCOLOGY | Facility: CLINIC | Age: 40
End: 2025-02-28
Payer: COMMERCIAL

## 2025-02-28 NOTE — TELEPHONE ENCOUNTER
Caller: Maribell Barber    Relationship to patient: Self    Best call back number:   Telephone Information:   Mobile [phone]       Chief complaint: GOING OUT TOWN    Type of visit:  LAB, F/U 2 & INFUSION     Requested date: AFTER 7/15/2025, CALL TO R/S     If rescheduling, when is the original appointment: 6/25/2025

## 2025-03-03 NOTE — TELEPHONE ENCOUNTER
PATIENT CALLED BACK AND SHE WANTS TO KEEP THE APPOINTMENT NOW, PLEASE DISREGARD THE MESSAGE TO CANCEL

## 2025-04-28 RX ORDER — LEVOCETIRIZINE DIHYDROCHLORIDE 5 MG/1
5 TABLET, FILM COATED ORAL EVERY EVENING
Qty: 30 TABLET | Refills: 0 | Status: SHIPPED | OUTPATIENT
Start: 2025-04-28

## 2025-04-28 NOTE — TELEPHONE ENCOUNTER
Maribell Romero confirmed she made this request.     Rx Refill Note  Requested Prescriptions     Pending Prescriptions Disp Refills    levocetirizine (XYZAL) 5 MG tablet [Pharmacy Med Name: Levocetirizine Dihydrochloride 5 MG Oral Tablet] 30 tablet 0     Sig: TAKE 1 TABLET BY MOUTH ONCE DAILY IN THE EVENING      Last office visit with prescribing clinician: 2/4/2025   Last telemedicine visit with prescribing clinician: Visit date not found   Next office visit with prescribing clinician: 10/15/2025

## 2025-06-24 DIAGNOSIS — E53.8 B12 DEFICIENCY: Primary | ICD-10-CM

## 2025-06-24 DIAGNOSIS — C50.412 MALIGNANT NEOPLASM OF UPPER-OUTER QUADRANT OF LEFT BREAST IN FEMALE, ESTROGEN RECEPTOR POSITIVE: ICD-10-CM

## 2025-06-24 DIAGNOSIS — M85.89 OSTEOPENIA OF MULTIPLE SITES: ICD-10-CM

## 2025-06-24 DIAGNOSIS — Z17.0 MALIGNANT NEOPLASM OF UPPER-OUTER QUADRANT OF LEFT BREAST IN FEMALE, ESTROGEN RECEPTOR POSITIVE: ICD-10-CM

## 2025-06-25 ENCOUNTER — INFUSION (OUTPATIENT)
Dept: ONCOLOGY | Facility: HOSPITAL | Age: 40
End: 2025-06-25
Payer: COMMERCIAL

## 2025-06-25 ENCOUNTER — OFFICE VISIT (OUTPATIENT)
Dept: ONCOLOGY | Facility: CLINIC | Age: 40
End: 2025-06-25
Payer: COMMERCIAL

## 2025-06-25 VITALS
TEMPERATURE: 98.8 F | WEIGHT: 125 LBS | HEIGHT: 62 IN | DIASTOLIC BLOOD PRESSURE: 66 MMHG | BODY MASS INDEX: 23 KG/M2 | SYSTOLIC BLOOD PRESSURE: 110 MMHG | HEART RATE: 81 BPM | OXYGEN SATURATION: 99 %

## 2025-06-25 DIAGNOSIS — M85.89 OSTEOPENIA OF MULTIPLE SITES: ICD-10-CM

## 2025-06-25 DIAGNOSIS — D64.89 ANEMIA DUE TO OTHER CAUSE, NOT CLASSIFIED: ICD-10-CM

## 2025-06-25 DIAGNOSIS — E53.8 B12 DEFICIENCY: Primary | ICD-10-CM

## 2025-06-25 DIAGNOSIS — C50.412 MALIGNANT NEOPLASM OF UPPER-OUTER QUADRANT OF LEFT BREAST IN FEMALE, ESTROGEN RECEPTOR POSITIVE: ICD-10-CM

## 2025-06-25 DIAGNOSIS — Z17.0 MALIGNANT NEOPLASM OF UPPER-OUTER QUADRANT OF LEFT BREAST IN FEMALE, ESTROGEN RECEPTOR POSITIVE: ICD-10-CM

## 2025-06-25 LAB
ALBUMIN SERPL-MCNC: 4.8 G/DL (ref 3.5–5.2)
ALBUMIN/GLOB SERPL: 1.8 G/DL
ALP SERPL-CCNC: 101 U/L (ref 39–117)
ALT SERPL W P-5'-P-CCNC: 26 U/L (ref 1–33)
ANION GAP SERPL CALCULATED.3IONS-SCNC: 11.8 MMOL/L (ref 5–15)
AST SERPL-CCNC: 36 U/L (ref 1–32)
BASOPHILS # BLD AUTO: 0.03 10*3/MM3 (ref 0–0.2)
BASOPHILS NFR BLD AUTO: 0.4 % (ref 0–1.5)
BILIRUB SERPL-MCNC: 0.2 MG/DL (ref 0–1.2)
BUN SERPL-MCNC: 13.2 MG/DL (ref 6–20)
BUN/CREAT SERPL: 23.2 (ref 7–25)
CALCIUM SPEC-SCNC: 9.5 MG/DL (ref 8.6–10.5)
CHLORIDE SERPL-SCNC: 103 MMOL/L (ref 98–107)
CO2 SERPL-SCNC: 24.2 MMOL/L (ref 22–29)
CREAT SERPL-MCNC: 0.57 MG/DL (ref 0.57–1)
DEPRECATED RDW RBC AUTO: 39.3 FL (ref 37–54)
EGFRCR SERPLBLD CKD-EPI 2021: 117.3 ML/MIN/1.73
EOSINOPHIL # BLD AUTO: 0.28 10*3/MM3 (ref 0–0.4)
EOSINOPHIL NFR BLD AUTO: 3.9 % (ref 0.3–6.2)
ERYTHROCYTE [DISTWIDTH] IN BLOOD BY AUTOMATED COUNT: 12.1 % (ref 12.3–15.4)
GLOBULIN UR ELPH-MCNC: 2.7 GM/DL
GLUCOSE SERPL-MCNC: 110 MG/DL (ref 65–99)
HCT VFR BLD AUTO: 37 % (ref 34–46.6)
HGB BLD-MCNC: 12.4 G/DL (ref 12–15.9)
IMM GRANULOCYTES # BLD AUTO: 0.02 10*3/MM3 (ref 0–0.05)
IMM GRANULOCYTES NFR BLD AUTO: 0.3 % (ref 0–0.5)
LYMPHOCYTES # BLD AUTO: 2.15 10*3/MM3 (ref 0.7–3.1)
LYMPHOCYTES NFR BLD AUTO: 30.1 % (ref 19.6–45.3)
MAGNESIUM SERPL-MCNC: 1.9 MG/DL (ref 1.6–2.6)
MCH RBC QN AUTO: 29.6 PG (ref 26.6–33)
MCHC RBC AUTO-ENTMCNC: 33.5 G/DL (ref 31.5–35.7)
MCV RBC AUTO: 88.3 FL (ref 79–97)
MONOCYTES # BLD AUTO: 0.44 10*3/MM3 (ref 0.1–0.9)
MONOCYTES NFR BLD AUTO: 6.2 % (ref 5–12)
NEUTROPHILS NFR BLD AUTO: 4.22 10*3/MM3 (ref 1.7–7)
NEUTROPHILS NFR BLD AUTO: 59.1 % (ref 42.7–76)
NRBC BLD AUTO-RTO: 0 /100 WBC (ref 0–0.2)
PHOSPHATE SERPL-MCNC: 4.2 MG/DL (ref 2.5–4.5)
PLATELET # BLD AUTO: 202 10*3/MM3 (ref 140–450)
PMV BLD AUTO: 10.6 FL (ref 6–12)
POTASSIUM SERPL-SCNC: 4.1 MMOL/L (ref 3.5–5.2)
PROT SERPL-MCNC: 7.5 G/DL (ref 6–8.5)
RBC # BLD AUTO: 4.19 10*6/MM3 (ref 3.77–5.28)
SODIUM SERPL-SCNC: 139 MMOL/L (ref 136–145)
WBC NRBC COR # BLD AUTO: 7.14 10*3/MM3 (ref 3.4–10.8)

## 2025-06-25 PROCEDURE — 83735 ASSAY OF MAGNESIUM: CPT

## 2025-06-25 PROCEDURE — 25010000002 ZOLEDRONIC ACID 4 MG/100ML SOLUTION: Performed by: INTERNAL MEDICINE

## 2025-06-25 PROCEDURE — 96374 THER/PROPH/DIAG INJ IV PUSH: CPT

## 2025-06-25 PROCEDURE — 84100 ASSAY OF PHOSPHORUS: CPT

## 2025-06-25 PROCEDURE — 80053 COMPREHEN METABOLIC PANEL: CPT

## 2025-06-25 PROCEDURE — 85025 COMPLETE CBC W/AUTO DIFF WBC: CPT

## 2025-06-25 RX ORDER — SODIUM CHLORIDE 9 MG/ML
20 INJECTION, SOLUTION INTRAVENOUS ONCE
Status: CANCELLED | OUTPATIENT
Start: 2025-06-25

## 2025-06-25 RX ORDER — ZOLEDRONIC ACID 0.04 MG/ML
4 INJECTION, SOLUTION INTRAVENOUS ONCE
Status: COMPLETED | OUTPATIENT
Start: 2025-06-25 | End: 2025-06-25

## 2025-06-25 RX ADMIN — ZOLEDRONIC ACID 4 MG: 0.04 INJECTION, SOLUTION INTRAVENOUS at 15:14

## 2025-06-25 NOTE — PROGRESS NOTES
"[facility] OUTPATIENT FOLLOW UP CLINIC VISIT    REASON FOR FOLLOW-UP:    Lobular carcinoma of the left breast, grade 2, ER positive, MD positive, HER2 negative.  Right breast lump    HISTORY OF PRESENT ILLNESS:  Maribell Romero is a 40 y.o. female who returns today for follow up of the above issue.      Patient returns today for follow-up.  She continues on anastrozole.  For the past 1 month she has been experiencing severe arthralgias in her hands particularly worse on the right side where she has had steroid injections in the past.  She experienced severe arthralgias on letrozole due to which the treatment was changed to anastrozole and over the past 2 years she has not had any trouble.  Denies any other new bone pains, cough, abdominal pain nausea vomiting.  Previous workup for autoimmune diseases was negative.  She is scheduled for Zometa today.    She presented with a concern for right breast abnormality in September 2024 for which right breast diagnostic mammogram and ultrasound was performed and this was benign.    REVIEW OF SYSTEMS:  ROS as per HPI    PHYSICAL EXAMINATION:    Vitals:    06/25/25 1436   BP: 110/66   Pulse: 81   Temp: 98.8 °F (37.1 °C)   TempSrc: Oral   SpO2: 99%   Weight: 56.7 kg (125 lb)   Height: 157.5 cm (62.01\")   PainSc: 0-No pain       PHYSICAL EXAM:  General:  No acute distress, awake, alert and oriented  Skin:  Warm and dry, no visible rash  HEENT:  Normocephalic/atraumatic.  Wearing a face mask.  Chest:  Normal respiratory effort  Extremities:  No visible clubbing, cyanosis, or edema  Neuro/psych:  Grossly nonfocal.  Normal mood and affect.  Breast: Left breast status postmastectomy with reconstruction, no palpable abnormality surrounding the implant.  Right breast status post mastopexy with nondescript density in the outer quadrant at the 8 o'clock position just above the scar line.      DIAGNOSTIC DATA:  Comprehensive Metabolic Panel (09/16/2024 08:27) "     IMAGING:    SCANNED - MAMMO (02/22/2024)     ASSESSMENT:  This is a 40 y.o. female with:    *Left breast invasive lobular carcinoma  Patient noted abnormality in the left breast December 2021  Left breast biopsy 2/17/2022 consistent with invasive lobular carcinoma, grade 2, ER 97%, VT 95%, HER2 negative, Ki-67 32%  Status post left mastectomy with Dr. Escudero  pT2pN0(I+) invasive lobular carcinoma grade 2, 70 mm.  All margins free of in situ and invasive malignancy.  1 positive axillary lymph node out of 4 lymph nodes removed.  Oncotype 18  Adjuvant chemotherapy with Adriamycin Cytoxan followed by Taxol. C1 AC 5/20/2022  Taxol discontinued after 6 cycles due to severe neuropathy. Last Taxol 9/12/2022  Completed radiation therapy to the left breast  11/28/2022 bilateral oophorectomy  Initially treated with letrozole December 2022 which was discontinued due to arthralgias  Transitioned to anastrozole 1 mg daily  2/22/2024 annual right screening mammogram benign with recommendations for follow-up in 1 year  September 2024-right breast diagnostic mammogram and ultrasound benign  6/25/2025-patient reporting severe arthralgias likely secondary to anastrozole.  Recommend that she see a hand surgeon again.  We also discussed starting tart cherry  No evidence of recurrent disease    *Menopausal state  Status post bilateral oophorectomy laparoscopically 11/28/2022    *Bone health  Reviewing patient's records today, 9/16/2024, it does not appear the patient has had a baseline bone density scan.  Given her ongoing aromatase inhibitor use we will proceed with a baseline DEXA scan  10/2/2024 DEXA scan showed osteopenia with T-score -2.0.  Per Dr. Adams proceed with Zometa once dental clearance obtained.  6/25/2025-labs reviewed and stable to proceed with Zometa  DEXA scan again in October 2026    *Density in the right breast  Benign    *Elevated liver function studies  6/19/2024 AST minimally elevated at 45  6/25/2025-ALT  26, AST 36, alkaline phosphatase 101 and total bilirubin 0.2    PLAN:  Continue anastrozole 1mg daily  Proceed with Zometa today  Start tart cherry  I discussed adding Cymbalta to help with the arthralgias however patient is reluctant to do so.  She will be seen back in 3 months and if the arthralgias are present then consider Cymbalta versus changing to Aromasin  Recommend that she contact hand surgeon again for steroid injections.  Right breast screening mammogram is due in September 2025  APRN in 3 months and MD in 6 months    The patient continues on high risk medication requiring close monitoring for toxicity.  Patient is experiencing severe arthralgias secondary to anastrozole.  Also with osteopenia secondary to anastrozole      Jennifer Adams MD  06/25/2025

## 2025-08-07 RX ORDER — ANASTROZOLE 1 MG/1
1 TABLET ORAL DAILY
Qty: 90 TABLET | Refills: 3 | Status: SHIPPED | OUTPATIENT
Start: 2025-08-07

## (undated) DEVICE — Device: Brand: FABCO

## (undated) DEVICE — BIOPATCH™ ANTIMICROBIAL DRESSING WITH CHLORHEXIDINE GLUCONATE IS A HYDROPHILLIC POLYURETHANE ABSORPTIVE FOAM WITH CHLORHEXIDINE GLUCONATE (CHG) WHICH INHIBITS BACTERIAL GROWTH UNDER THE DRESSING. THE DRESSING IS INTENDED TO BE USED TO ABSORB EXUDATE, COVER A WOUND CAUSED BY VASCULAR AND NONVASCULAR PERCUTANEOUS MEDICAL DEVICES DURING SURGERY, AS WELL AS REDUCE LOCAL INFECTION AND COLONIZATION OF MICROORGANISMS.: Brand: BIOPATCH

## (undated) DEVICE — BNDG,ELSTC,MATRIX,STRL,6"X5YD,LF,HOOK&LP: Brand: MEDLINE

## (undated) DEVICE — ADHS SKIN SURG TISS VISC PREMIERPRO EXOFIN HI/VISC FAST/DRY

## (undated) DEVICE — PROXIMATE RH ROTATING HEAD SKIN STAPLERS (35 WIDE) CONTAINS 35 STAINLESS STEEL STAPLES: Brand: PROXIMATE

## (undated) DEVICE — COLUMN DRAPE

## (undated) DEVICE — SUT GUT CHRM 0 CT 27IN 914H

## (undated) DEVICE — ARM DRAPE

## (undated) DEVICE — TOTAL TRAY, 16FR 10ML SIL FOLEY, URN: Brand: MEDLINE

## (undated) DEVICE — GLV SURG BIOGEL LTX PF 6 1/2

## (undated) DEVICE — SYR LUERLOK 30CC

## (undated) DEVICE — NDL FLTR 19G 1 1/2 LF

## (undated) DEVICE — CAUTERY TIP POLISHER: Brand: DEVON

## (undated) DEVICE — EXTRCT STPL SKIN STRL BX/12

## (undated) DEVICE — SUT ETHLN 4/0 PS2 PLSTC 1667G

## (undated) DEVICE — ENDOPATH XCEL WITH OPTIVIEW TECHNOLOGY BLADELESS TROCARS WITH STABILITY SLEEVES: Brand: ENDOPATH XCEL OPTIVIEW

## (undated) DEVICE — KT ART BLD GAS QUICK DRAW

## (undated) DEVICE — 3M™ STERI-STRIP™ REINFORCED ADHESIVE SKIN CLOSURES, R1547, 1/2 IN X 4 IN (12 MM X 100 MM), 6 STRIPS/ENVELOPE: Brand: 3M™ STERI-STRIP™

## (undated) DEVICE — ANTIBACTERIAL UNDYED BRAIDED (POLYGLACTIN 910), SYNTHETIC ABSORBABLE SUTURE: Brand: COATED VICRYL

## (undated) DEVICE — GLV SURG BIOGEL LTX PF 7

## (undated) DEVICE — SOL IRR H2O BTL 1000ML STRL

## (undated) DEVICE — SUT MNCRYL PLS ANTIB UD 4/0 PS2 18IN

## (undated) DEVICE — DRSNG WND BORDR/ADHS NONADHR/GZ LF 2X2IN STRL

## (undated) DEVICE — SOL NACL 0.9PCT 1000ML

## (undated) DEVICE — CANNULA SEAL

## (undated) DEVICE — TRAP FLD MINIVAC MEGADYNE 100ML

## (undated) DEVICE — INTENDED FOR TISSUE SEPARATION, AND OTHER PROCEDURES THAT REQUIRE A SHARP SURGICAL BLADE TO PUNCTURE OR CUT.: Brand: BARD-PARKER ® STAINLESS STEEL BLADES

## (undated) DEVICE — ADHS SKIN DERMABOND TOPICAL HI VISC

## (undated) DEVICE — PAD,ABDOMINAL,8"X10",ST,LF: Brand: MEDLINE

## (undated) DEVICE — SKIN PREP TRAY W/CHG: Brand: MEDLINE INDUSTRIES, INC.

## (undated) DEVICE — 3M(TM) TEGADERM(TM) TRANSPARENT FILM DRESSING FRAME STYLE 1627: Brand: 3M™ TEGADERM™

## (undated) DEVICE — PENCL SMOKE/EVAC MEGADYNE TELESCP 10FT

## (undated) DEVICE — INTENDED FOR TISSUE SEPARATION, AND OTHER PROCEDURES THAT REQUIRE A SHARP SURGICAL BLADE TO PUNCTURE OR CUT.: Brand: BARD-PARKER ® CARBON RIB-BACK BLADES

## (undated) DEVICE — VESSEL SEALER EXTEND: Brand: ENDOWRIST

## (undated) DEVICE — STRIP,CLOSURE,WOUND,MEDI-STRIP,1/2X4: Brand: MEDLINE

## (undated) DEVICE — Device

## (undated) DEVICE — PK CHST BRST 40

## (undated) DEVICE — DECANT BG O JET

## (undated) DEVICE — SUT GUT CHRM 3/0 CT1 3/0 36IN 922H

## (undated) DEVICE — UNDYED BRAIDED (POLYGLACTIN 910), SYNTHETIC ABSORBABLE SUTURE: Brand: COATED VICRYL

## (undated) DEVICE — SUT VIC 3/0 TIES 18IN J110T

## (undated) DEVICE — CONN TBG Y 5 IN 1 LF STRL

## (undated) DEVICE — DRSNG SURESITE WNDW 4X4.5

## (undated) DEVICE — SUT PROLN 2/0 CT2 30IN 8411H

## (undated) DEVICE — PATIENT RETURN ELECTRODE, SINGLE-USE, CONTACT QUALITY MONITORING, ADULT, WITH 9FT CORD, FOR PATIENTS WEIGING OVER 33LBS. (15KG): Brand: MEGADYNE

## (undated) DEVICE — SMOKE EVACUATION TUBING WITH 7/8 IN TO 1/4 IN REDUCER: Brand: BUFFALO FILTER

## (undated) DEVICE — SUT SILK 3/0 TIES 18IN A184H

## (undated) DEVICE — LOU MINOR PROCEDURE: Brand: MEDLINE INDUSTRIES, INC.

## (undated) DEVICE — SUT PDS 0 CTX 36IN DYED Z370T

## (undated) DEVICE — SYRINGE, LUER LOCK, 60ML: Brand: MEDLINE

## (undated) DEVICE — SOL NS 500ML

## (undated) DEVICE — SOL ANTISTICK CAUTRY ELECTROLUBE LF

## (undated) DEVICE — NDL BLNT 18G 1 1/2IN

## (undated) DEVICE — SUT VIC 0 TN 27IN DYED JTN0G

## (undated) DEVICE — ELECTRD BLD MEGADYNE 2.75IN SS

## (undated) DEVICE — CVR TRANSD CIV FLX TPR 11.9 TO 3.8X61CM

## (undated) DEVICE — NDL HYPO PRECISIONGLIDE REG 25G 1 1/2

## (undated) DEVICE — NEEDLE, QUINCKE, 20GX3.5": Brand: MEDLINE

## (undated) DEVICE — TIP COVER ACCESSORY

## (undated) DEVICE — ELECTRD BLD EZ CLN MOD 2.5IN

## (undated) DEVICE — OSC HYSTEROSCOPY: Brand: MEDLINE INDUSTRIES, INC.

## (undated) DEVICE — SUT MNCRYL 0/0 CTX 36IN Y398H

## (undated) DEVICE — SUT PDS 3/0 SH 27IN DYED Z316H

## (undated) DEVICE — SEAL HYSTERSCOPE/OUTFLOW CHANNEL MYOSURE

## (undated) DEVICE — TBG PENCL TELESCP MEGADYNE SMOKE EVAC 10FT

## (undated) DEVICE — KT ANTI FOG W/FLD AND SPNG

## (undated) DEVICE — SUT MNCRYL 3/0 PS2 18IN MCP497G

## (undated) DEVICE — DRP C/ARM 41X74IN

## (undated) DEVICE — TISSUE RETRIEVAL SYSTEM: Brand: INZII RETRIEVAL SYSTEM

## (undated) DEVICE — SPNG LAP 18X18IN LF STRL PK/5

## (undated) DEVICE — LOU LITHOTOMY ROBOTIC: Brand: MEDLINE INDUSTRIES, INC.

## (undated) DEVICE — BLADELESS OBTURATOR: Brand: WECK VISTA

## (undated) DEVICE — JACKSON-PRATT 100CC BULB RESERVOIR: Brand: CARDINAL HEALTH

## (undated) DEVICE — SPONGE,LAP,18"X18",DLX,XR,ST,5/PK,40/PK: Brand: MEDLINE

## (undated) DEVICE — PK UNIV COMPL 40

## (undated) DEVICE — CATH FOL SIMPLYLATEX SILELAST 16F 17IN

## (undated) DEVICE — LAPAROSCOPIC SMOKE FILTRATION SYSTEM: Brand: PALL LAPAROSHIELD® PLUS LAPAROSCOPIC SMOKE FILTRATION SYSTEM

## (undated) DEVICE — SOL NACL 0.9PCT 100ML SGL